# Patient Record
Sex: MALE | Race: WHITE | NOT HISPANIC OR LATINO | Employment: OTHER | URBAN - METROPOLITAN AREA
[De-identification: names, ages, dates, MRNs, and addresses within clinical notes are randomized per-mention and may not be internally consistent; named-entity substitution may affect disease eponyms.]

---

## 2017-01-05 ENCOUNTER — ANESTHESIA EVENT (OUTPATIENT)
Dept: PERIOP | Facility: HOSPITAL | Age: 72
End: 2017-01-05
Payer: MEDICARE

## 2017-01-06 ENCOUNTER — ANESTHESIA (OUTPATIENT)
Dept: PERIOP | Facility: HOSPITAL | Age: 72
End: 2017-01-06
Payer: MEDICARE

## 2017-01-06 ENCOUNTER — HOSPITAL ENCOUNTER (OUTPATIENT)
Facility: HOSPITAL | Age: 72
Setting detail: OUTPATIENT SURGERY
Discharge: HOME | End: 2017-01-06
Attending: THORACIC SURGERY (CARDIOTHORACIC VASCULAR SURGERY) | Admitting: THORACIC SURGERY (CARDIOTHORACIC VASCULAR SURGERY)
Payer: MEDICARE

## 2017-01-06 VITALS
OXYGEN SATURATION: 95 % | BODY MASS INDEX: 32.93 KG/M2 | HEIGHT: 70 IN | RESPIRATION RATE: 16 BRPM | DIASTOLIC BLOOD PRESSURE: 67 MMHG | SYSTOLIC BLOOD PRESSURE: 120 MMHG | WEIGHT: 230 LBS | HEART RATE: 76 BPM | TEMPERATURE: 98.6 F

## 2017-01-06 LAB
GLUCOSE SERPL-MCNC: 101 MG/DL (ref 65–140)
GLUCOSE SERPL-MCNC: 98 MG/DL (ref 65–140)

## 2017-01-06 PROCEDURE — 82948 REAGENT STRIP/BLOOD GLUCOSE: CPT

## 2017-01-06 RX ORDER — ALLOPURINOL 100 MG/1
100 TABLET ORAL DAILY
COMMUNITY

## 2017-01-06 RX ORDER — SUCCINYLCHOLINE CHLORIDE 20 MG/ML
INJECTION INTRAMUSCULAR; INTRAVENOUS AS NEEDED
Status: DISCONTINUED | OUTPATIENT
Start: 2017-01-06 | End: 2017-01-06 | Stop reason: SURG

## 2017-01-06 RX ORDER — DORZOLAMIDE HYDROCHLORIDE AND TIMOLOL MALEATE 20; 5 MG/ML; MG/ML
1 SOLUTION/ DROPS OPHTHALMIC 2 TIMES DAILY
Status: ON HOLD | COMMUNITY
End: 2018-02-14 | Stop reason: ALTCHOICE

## 2017-01-06 RX ORDER — FENTANYL CITRATE 50 UG/ML
INJECTION, SOLUTION INTRAMUSCULAR; INTRAVENOUS AS NEEDED
Status: DISCONTINUED | OUTPATIENT
Start: 2017-01-06 | End: 2017-01-06 | Stop reason: SURG

## 2017-01-06 RX ORDER — LIDOCAINE HYDROCHLORIDE 10 MG/ML
INJECTION, SOLUTION INFILTRATION; PERINEURAL AS NEEDED
Status: DISCONTINUED | OUTPATIENT
Start: 2017-01-06 | End: 2017-01-06 | Stop reason: SURG

## 2017-01-06 RX ORDER — SODIUM CHLORIDE, SODIUM LACTATE, POTASSIUM CHLORIDE, CALCIUM CHLORIDE 600; 310; 30; 20 MG/100ML; MG/100ML; MG/100ML; MG/100ML
100 INJECTION, SOLUTION INTRAVENOUS CONTINUOUS
Status: DISCONTINUED | OUTPATIENT
Start: 2017-01-06 | End: 2017-01-09 | Stop reason: HOSPADM

## 2017-01-06 RX ORDER — ONDANSETRON 4 MG/1
4 TABLET, ORALLY DISINTEGRATING ORAL EVERY 8 HOURS PRN
Status: ON HOLD | COMMUNITY
End: 2017-08-03 | Stop reason: ALTCHOICE

## 2017-01-06 RX ORDER — EPHEDRINE SULFATE 50 MG/ML
INJECTION, SOLUTION INTRAVENOUS AS NEEDED
Status: DISCONTINUED | OUTPATIENT
Start: 2017-01-06 | End: 2017-01-06 | Stop reason: SURG

## 2017-01-06 RX ORDER — FENTANYL CITRATE/PF 50 MCG/ML
25 SYRINGE (ML) INJECTION
Status: DISCONTINUED | OUTPATIENT
Start: 2017-01-06 | End: 2017-01-06 | Stop reason: HOSPADM

## 2017-01-06 RX ORDER — PROPOFOL 10 MG/ML
INJECTION, EMULSION INTRAVENOUS AS NEEDED
Status: DISCONTINUED | OUTPATIENT
Start: 2017-01-06 | End: 2017-01-06 | Stop reason: SURG

## 2017-01-06 RX ORDER — ALBUTEROL SULFATE 2.5 MG/3ML
2.5 SOLUTION RESPIRATORY (INHALATION) ONCE
Status: COMPLETED | OUTPATIENT
Start: 2017-01-06 | End: 2017-01-06

## 2017-01-06 RX ORDER — SERTRALINE HYDROCHLORIDE 25 MG/1
25 TABLET, FILM COATED ORAL
COMMUNITY

## 2017-01-06 RX ORDER — ONDANSETRON 2 MG/ML
INJECTION INTRAMUSCULAR; INTRAVENOUS AS NEEDED
Status: DISCONTINUED | OUTPATIENT
Start: 2017-01-06 | End: 2017-01-06 | Stop reason: SURG

## 2017-01-06 RX ORDER — MAGNESIUM HYDROXIDE 1200 MG/15ML
LIQUID ORAL AS NEEDED
Status: DISCONTINUED | OUTPATIENT
Start: 2017-01-06 | End: 2017-01-06 | Stop reason: HOSPADM

## 2017-01-06 RX ADMIN — SUCCINYLCHOLINE CHLORIDE 100 MG: 20 INJECTION, SOLUTION INTRAMUSCULAR; INTRAVENOUS at 14:32

## 2017-01-06 RX ADMIN — ALBUTEROL SULFATE 2.5 MG: 2.5 SOLUTION RESPIRATORY (INHALATION) at 16:10

## 2017-01-06 RX ADMIN — FENTANYL CITRATE 50 MCG: 50 INJECTION, SOLUTION INTRAMUSCULAR; INTRAVENOUS at 14:32

## 2017-01-06 RX ADMIN — PROPOFOL 150 MG: 10 INJECTION, EMULSION INTRAVENOUS at 14:32

## 2017-01-06 RX ADMIN — SODIUM CHLORIDE, SODIUM LACTATE, POTASSIUM CHLORIDE, AND CALCIUM CHLORIDE 100 ML/HR: .6; .31; .03; .02 INJECTION, SOLUTION INTRAVENOUS at 13:16

## 2017-01-06 RX ADMIN — EPHEDRINE SULFATE 10 MG: 50 INJECTION, SOLUTION INTRAMUSCULAR; INTRAVENOUS; SUBCUTANEOUS at 14:45

## 2017-01-06 RX ADMIN — ONDANSETRON 4 MG: 2 INJECTION INTRAMUSCULAR; INTRAVENOUS at 14:41

## 2017-01-06 RX ADMIN — LIDOCAINE HYDROCHLORIDE 80 MG: 10 INJECTION, SOLUTION INFILTRATION; PERINEURAL at 14:32

## 2017-02-07 ENCOUNTER — GENERIC CONVERSION - ENCOUNTER (OUTPATIENT)
Dept: OTHER | Facility: OTHER | Age: 72
End: 2017-02-07

## 2017-02-15 ENCOUNTER — GENERIC CONVERSION - ENCOUNTER (OUTPATIENT)
Dept: OTHER | Facility: OTHER | Age: 72
End: 2017-02-15

## 2017-03-09 ENCOUNTER — HOSPITAL ENCOUNTER (OUTPATIENT)
Dept: RADIOLOGY | Facility: HOSPITAL | Age: 72
Discharge: HOME | End: 2017-03-09
Attending: SURGERY
Payer: MEDICARE

## 2017-03-09 ENCOUNTER — ALLSCRIPTS OFFICE VISIT (OUTPATIENT)
Dept: OTHER | Facility: OTHER | Age: 72
End: 2017-03-09

## 2017-03-09 DIAGNOSIS — C15.9 MALIGNANT NEOPLASM OF ESOPHAGUS (HCC): ICD-10-CM

## 2017-03-09 PROCEDURE — 71260 CT THORAX DX C+: CPT

## 2017-03-09 PROCEDURE — 74177 CT ABD & PELVIS W/CONTRAST: CPT

## 2017-03-09 RX ADMIN — IOHEXOL 100 ML: 350 INJECTION, SOLUTION INTRAVENOUS at 15:07

## 2017-03-22 ENCOUNTER — ALLSCRIPTS OFFICE VISIT (OUTPATIENT)
Dept: OTHER | Facility: OTHER | Age: 72
End: 2017-03-22

## 2017-03-22 ENCOUNTER — TRANSCRIBE ORDERS (OUTPATIENT)
Dept: ADMINISTRATIVE | Facility: HOSPITAL | Age: 72
End: 2017-03-22

## 2017-03-22 DIAGNOSIS — C15.9 MALIGNANT NEOPLASM OF ESOPHAGUS, UNSPECIFIED LOCATION (HCC): Primary | ICD-10-CM

## 2017-03-24 ENCOUNTER — GENERIC CONVERSION - ENCOUNTER (OUTPATIENT)
Dept: OTHER | Facility: OTHER | Age: 72
End: 2017-03-24

## 2017-03-24 ENCOUNTER — HOSPITAL ENCOUNTER (OUTPATIENT)
Dept: INTERVENTIONAL RADIOLOGY/VASCULAR | Facility: HOSPITAL | Age: 72
End: 2017-03-24
Attending: SURGERY | Admitting: RADIOLOGY
Payer: MEDICARE

## 2017-03-24 ENCOUNTER — HOSPITAL ENCOUNTER (INPATIENT)
Facility: HOSPITAL | Age: 72
LOS: 8 days | Discharge: HOME HEALTH | DRG: 178 | End: 2017-04-01
Attending: THORACIC SURGERY (CARDIOTHORACIC VASCULAR SURGERY) | Admitting: THORACIC SURGERY (CARDIOTHORACIC VASCULAR SURGERY)
Payer: MEDICARE

## 2017-03-24 ENCOUNTER — HOSPITAL ENCOUNTER (OUTPATIENT)
Dept: CT IMAGING | Facility: HOSPITAL | Age: 72
Discharge: HOME | End: 2017-03-24
Payer: MEDICARE

## 2017-03-24 VITALS
OXYGEN SATURATION: 90 % | SYSTOLIC BLOOD PRESSURE: 113 MMHG | RESPIRATION RATE: 22 BRPM | DIASTOLIC BLOOD PRESSURE: 72 MMHG | HEART RATE: 68 BPM

## 2017-03-24 VITALS
DIASTOLIC BLOOD PRESSURE: 63 MMHG | OXYGEN SATURATION: 94 % | SYSTOLIC BLOOD PRESSURE: 129 MMHG | RESPIRATION RATE: 22 BRPM | HEART RATE: 66 BPM

## 2017-03-24 DIAGNOSIS — C15.9 ESOPHAGEAL CARCINOMA (HCC): Primary | ICD-10-CM

## 2017-03-24 DIAGNOSIS — J91.0 MALIGNANT PLEURAL EFFUSION: ICD-10-CM

## 2017-03-24 DIAGNOSIS — J86.9 EMPYEMA LUNG (HCC): ICD-10-CM

## 2017-03-24 DIAGNOSIS — H53.9 VISION ABNORMALITIES: Primary | ICD-10-CM

## 2017-03-24 LAB
GLUCOSE FLD-MCNC: 12 MG/DL
GLUCOSE SERPL-MCNC: 163 MG/DL (ref 65–140)
GLUCOSE SERPL-MCNC: 193 MG/DL (ref 65–140)
INR PPP: 1.98 (ref 0.86–1.16)
LDH FLD L TO P-CCNC: NORMAL U/L
PH BODY FLUID: 6.7
PLATELET # BLD AUTO: 251 THOUSANDS/UL (ref 149–390)
PMV BLD AUTO: 10.6 FL (ref 8.9–12.7)
PROT FLD-MCNC: 3.3 G/DL
PROTHROMBIN TIME: 22 SECONDS (ref 12–14.3)

## 2017-03-24 PROCEDURE — 88305 TISSUE EXAM BY PATHOLOGIST: CPT | Performed by: SURGERY

## 2017-03-24 PROCEDURE — 82945 GLUCOSE OTHER FLUID: CPT | Performed by: SURGERY

## 2017-03-24 PROCEDURE — 94640 AIRWAY INHALATION TREATMENT: CPT

## 2017-03-24 PROCEDURE — 94760 N-INVAS EAR/PLS OXIMETRY 1: CPT

## 2017-03-24 PROCEDURE — 82948 REAGENT STRIP/BLOOD GLUCOSE: CPT

## 2017-03-24 PROCEDURE — 85049 AUTOMATED PLATELET COUNT: CPT | Performed by: RADIOLOGY

## 2017-03-24 PROCEDURE — 85610 PROTHROMBIN TIME: CPT | Performed by: RADIOLOGY

## 2017-03-24 PROCEDURE — C1769 GUIDE WIRE: HCPCS

## 2017-03-24 PROCEDURE — 88112 CYTOPATH CELL ENHANCE TECH: CPT | Performed by: SURGERY

## 2017-03-24 PROCEDURE — 87077 CULTURE AEROBIC IDENTIFY: CPT | Performed by: SURGERY

## 2017-03-24 PROCEDURE — 83615 LACTATE (LD) (LDH) ENZYME: CPT | Performed by: SURGERY

## 2017-03-24 PROCEDURE — 87205 SMEAR GRAM STAIN: CPT | Performed by: SURGERY

## 2017-03-24 PROCEDURE — 88312 SPECIAL STAINS GROUP 1: CPT | Performed by: SURGERY

## 2017-03-24 PROCEDURE — 87070 CULTURE OTHR SPECIMN AEROBIC: CPT | Performed by: SURGERY

## 2017-03-24 PROCEDURE — C1729 CATH, DRAINAGE: HCPCS

## 2017-03-24 PROCEDURE — 32557 INSERT CATH PLEURA W/ IMAGE: CPT

## 2017-03-24 PROCEDURE — 84157 ASSAY OF PROTEIN OTHER: CPT | Performed by: SURGERY

## 2017-03-24 PROCEDURE — 83986 ASSAY PH BODY FLUID NOS: CPT | Performed by: SURGERY

## 2017-03-24 RX ORDER — LIDOCAINE HYDROCHLORIDE 10 MG/ML
INJECTION, SOLUTION INFILTRATION; PERINEURAL CODE/TRAUMA/SEDATION MEDICATION
Status: COMPLETED | OUTPATIENT
Start: 2017-03-24 | End: 2017-03-24

## 2017-03-24 RX ORDER — HYDROXYZINE HYDROCHLORIDE 25 MG/1
25 TABLET, FILM COATED ORAL 2 TIMES DAILY PRN
Status: DISCONTINUED | OUTPATIENT
Start: 2017-03-24 | End: 2017-04-01 | Stop reason: HOSPADM

## 2017-03-24 RX ORDER — ROPINIROLE 1 MG/1
2 TABLET, FILM COATED ORAL ONCE
Status: COMPLETED | OUTPATIENT
Start: 2017-03-24 | End: 2017-03-24

## 2017-03-24 RX ORDER — LEVALBUTEROL 1.25 MG/.5ML
1.25 SOLUTION, CONCENTRATE RESPIRATORY (INHALATION)
Status: DISCONTINUED | OUTPATIENT
Start: 2017-03-24 | End: 2017-04-01 | Stop reason: HOSPADM

## 2017-03-24 RX ORDER — ASPIRIN 81 MG/1
81 TABLET ORAL DAILY
Status: DISCONTINUED | OUTPATIENT
Start: 2017-03-25 | End: 2017-04-01 | Stop reason: HOSPADM

## 2017-03-24 RX ORDER — LATANOPROST 50 UG/ML
1 SOLUTION/ DROPS OPHTHALMIC
Status: DISCONTINUED | OUTPATIENT
Start: 2017-03-24 | End: 2017-04-01 | Stop reason: HOSPADM

## 2017-03-24 RX ORDER — BRIMONIDINE TARTRATE 0.15 %
1 DROPS OPHTHALMIC (EYE) EVERY 12 HOURS
Status: DISCONTINUED | OUTPATIENT
Start: 2017-03-24 | End: 2017-04-01 | Stop reason: HOSPADM

## 2017-03-24 RX ORDER — SERTRALINE HYDROCHLORIDE 25 MG/1
25 TABLET, FILM COATED ORAL DAILY
Status: DISCONTINUED | OUTPATIENT
Start: 2017-03-25 | End: 2017-04-01 | Stop reason: HOSPADM

## 2017-03-24 RX ORDER — ONDANSETRON 2 MG/ML
4 INJECTION INTRAMUSCULAR; INTRAVENOUS EVERY 6 HOURS PRN
Status: DISCONTINUED | OUTPATIENT
Start: 2017-03-24 | End: 2017-04-01 | Stop reason: HOSPADM

## 2017-03-24 RX ORDER — AMIODARONE HYDROCHLORIDE 200 MG/1
200 TABLET ORAL DAILY
Status: DISCONTINUED | OUTPATIENT
Start: 2017-03-25 | End: 2017-03-24

## 2017-03-24 RX ORDER — SODIUM CHLORIDE FOR INHALATION 0.9 %
3 VIAL, NEBULIZER (ML) INHALATION EVERY 6 HOURS PRN
Status: DISCONTINUED | OUTPATIENT
Start: 2017-03-24 | End: 2017-04-01 | Stop reason: HOSPADM

## 2017-03-24 RX ORDER — DIAZEPAM 5 MG/1
5 TABLET ORAL EVERY 6 HOURS PRN
Status: DISCONTINUED | OUTPATIENT
Start: 2017-03-24 | End: 2017-04-01 | Stop reason: HOSPADM

## 2017-03-24 RX ORDER — GUAIFENESIN AND CODEINE PHOSPHATE 100; 10 MG/5ML; MG/5ML
5 SOLUTION ORAL 4 TIMES DAILY PRN
COMMUNITY
End: 2017-05-19 | Stop reason: ALTCHOICE

## 2017-03-24 RX ORDER — ACETAMINOPHEN 325 MG/1
650 TABLET ORAL EVERY 6 HOURS PRN
Status: DISCONTINUED | OUTPATIENT
Start: 2017-03-24 | End: 2017-04-01 | Stop reason: HOSPADM

## 2017-03-24 RX ORDER — ATORVASTATIN CALCIUM 80 MG/1
80 TABLET, FILM COATED ORAL
Status: DISCONTINUED | OUTPATIENT
Start: 2017-03-24 | End: 2017-03-24

## 2017-03-24 RX ORDER — ALPRAZOLAM 0.5 MG/1
0.5 TABLET ORAL
Status: DISCONTINUED | OUTPATIENT
Start: 2017-03-24 | End: 2017-04-01 | Stop reason: HOSPADM

## 2017-03-24 RX ORDER — LEVALBUTEROL 1.25 MG/.5ML
1.25 SOLUTION, CONCENTRATE RESPIRATORY (INHALATION) EVERY 6 HOURS PRN
Status: DISCONTINUED | OUTPATIENT
Start: 2017-03-24 | End: 2017-04-01 | Stop reason: HOSPADM

## 2017-03-24 RX ORDER — DOCUSATE SODIUM 100 MG/1
100 CAPSULE, LIQUID FILLED ORAL 2 TIMES DAILY
Status: DISCONTINUED | OUTPATIENT
Start: 2017-03-24 | End: 2017-04-01 | Stop reason: HOSPADM

## 2017-03-24 RX ORDER — OXYCODONE HYDROCHLORIDE 10 MG/1
10 TABLET ORAL EVERY 4 HOURS PRN
Status: DISCONTINUED | OUTPATIENT
Start: 2017-03-24 | End: 2017-04-01 | Stop reason: HOSPADM

## 2017-03-24 RX ORDER — FENTANYL CITRATE 50 UG/ML
INJECTION, SOLUTION INTRAMUSCULAR; INTRAVENOUS CODE/TRAUMA/SEDATION MEDICATION
Status: COMPLETED | OUTPATIENT
Start: 2017-03-24 | End: 2017-03-24

## 2017-03-24 RX ORDER — ALBUTEROL SULFATE 2.5 MG/3ML
2.5 SOLUTION RESPIRATORY (INHALATION) EVERY 4 HOURS PRN
Status: DISCONTINUED | OUTPATIENT
Start: 2017-03-24 | End: 2017-03-24

## 2017-03-24 RX ORDER — OXYCODONE HYDROCHLORIDE 5 MG/1
5 TABLET ORAL EVERY 4 HOURS PRN
Status: DISCONTINUED | OUTPATIENT
Start: 2017-03-24 | End: 2017-04-01 | Stop reason: HOSPADM

## 2017-03-24 RX ORDER — DORZOLAMIDE HYDROCHLORIDE AND TIMOLOL MALEATE 20; 5 MG/ML; MG/ML
1 SOLUTION/ DROPS OPHTHALMIC EVERY 12 HOURS
Status: DISCONTINUED | OUTPATIENT
Start: 2017-03-24 | End: 2017-04-01 | Stop reason: HOSPADM

## 2017-03-24 RX ORDER — ROPINIROLE 1 MG/1
1 TABLET, FILM COATED ORAL
Status: DISCONTINUED | OUTPATIENT
Start: 2017-03-24 | End: 2017-03-24

## 2017-03-24 RX ORDER — ALLOPURINOL 100 MG/1
100 TABLET ORAL DAILY
Status: DISCONTINUED | OUTPATIENT
Start: 2017-03-25 | End: 2017-04-01 | Stop reason: HOSPADM

## 2017-03-24 RX ORDER — ROPINIROLE 1 MG/1
3 TABLET, FILM COATED ORAL
Status: DISCONTINUED | OUTPATIENT
Start: 2017-03-25 | End: 2017-04-01 | Stop reason: HOSPADM

## 2017-03-24 RX ORDER — SODIUM CHLORIDE FOR INHALATION 0.9 %
3 VIAL, NEBULIZER (ML) INHALATION
Status: DISCONTINUED | OUTPATIENT
Start: 2017-03-24 | End: 2017-04-01 | Stop reason: HOSPADM

## 2017-03-24 RX ADMIN — HYDROXYZINE HYDROCHLORIDE 25 MG: 25 TABLET, FILM COATED ORAL at 22:29

## 2017-03-24 RX ADMIN — LIDOCAINE HYDROCHLORIDE 5 ML: 10 INJECTION, SOLUTION INFILTRATION; PERINEURAL at 12:39

## 2017-03-24 RX ADMIN — APIXABAN 5 MG: 5 TABLET, FILM COATED ORAL at 20:11

## 2017-03-24 RX ADMIN — BRIMONIDINE TARTRATE 1 DROP: 1.5 SOLUTION OPHTHALMIC at 22:13

## 2017-03-24 RX ADMIN — INSULIN LISPRO 1 UNITS: 100 INJECTION, SOLUTION INTRAVENOUS; SUBCUTANEOUS at 18:33

## 2017-03-24 RX ADMIN — ISODIUM CHLORIDE 3 ML: 0.03 SOLUTION RESPIRATORY (INHALATION) at 20:03

## 2017-03-24 RX ADMIN — OXYCODONE HYDROCHLORIDE 10 MG: 10 TABLET ORAL at 20:10

## 2017-03-24 RX ADMIN — LIDOCAINE HYDROCHLORIDE 5 ML: 10 INJECTION, SOLUTION INFILTRATION; PERINEURAL at 12:06

## 2017-03-24 RX ADMIN — LEVALBUTEROL HYDROCHLORIDE 1.25 MG: 1.25 SOLUTION, CONCENTRATE RESPIRATORY (INHALATION) at 20:03

## 2017-03-24 RX ADMIN — FENTANYL CITRATE 25 MCG: 50 INJECTION, SOLUTION INTRAMUSCULAR; INTRAVENOUS at 13:29

## 2017-03-24 RX ADMIN — DORZOLAMIDE HYDROCHLORIDE AND TIMOLOL MALEATE 1 DROP: 20; 5 SOLUTION/ DROPS OPHTHALMIC at 22:12

## 2017-03-24 RX ADMIN — DOCUSATE SODIUM 100 MG: 100 CAPSULE, LIQUID FILLED ORAL at 17:27

## 2017-03-24 RX ADMIN — ROPINIROLE 1 MG: 1 TABLET, FILM COATED ORAL at 22:05

## 2017-03-24 RX ADMIN — FENTANYL CITRATE 25 MCG: 50 INJECTION, SOLUTION INTRAMUSCULAR; INTRAVENOUS at 13:15

## 2017-03-24 RX ADMIN — INSULIN LISPRO 1 UNITS: 100 INJECTION, SOLUTION INTRAVENOUS; SUBCUTANEOUS at 22:14

## 2017-03-24 RX ADMIN — LATANOPROST 1 DROP: 50 SOLUTION OPHTHALMIC at 22:05

## 2017-03-24 RX ADMIN — ROPINIROLE 2 MG: 1 TABLET, FILM COATED ORAL at 23:11

## 2017-03-25 ENCOUNTER — APPOINTMENT (INPATIENT)
Dept: RADIOLOGY | Facility: HOSPITAL | Age: 72
DRG: 178 | End: 2017-03-25
Payer: MEDICARE

## 2017-03-25 PROBLEM — J86.9 EMPYEMA (HCC): Status: ACTIVE | Noted: 2017-03-25

## 2017-03-25 LAB
ANION GAP SERPL CALCULATED.3IONS-SCNC: 5 MMOL/L (ref 4–13)
BASOPHILS # BLD AUTO: 0.01 THOUSANDS/ΜL (ref 0–0.1)
BASOPHILS NFR BLD AUTO: 0 % (ref 0–1)
BUN SERPL-MCNC: 11 MG/DL (ref 5–25)
CALCIUM SERPL-MCNC: 8.6 MG/DL (ref 8.3–10.1)
CHLORIDE SERPL-SCNC: 100 MMOL/L (ref 100–108)
CO2 SERPL-SCNC: 33 MMOL/L (ref 21–32)
CREAT SERPL-MCNC: 0.74 MG/DL (ref 0.6–1.3)
EOSINOPHIL # BLD AUTO: 0.2 THOUSAND/ΜL (ref 0–0.61)
EOSINOPHIL NFR BLD AUTO: 2 % (ref 0–6)
ERYTHROCYTE [DISTWIDTH] IN BLOOD BY AUTOMATED COUNT: 21.8 % (ref 11.6–15.1)
GFR SERPL CREATININE-BSD FRML MDRD: >60 ML/MIN/1.73SQ M
GLUCOSE SERPL-MCNC: 169 MG/DL (ref 65–140)
GLUCOSE SERPL-MCNC: 185 MG/DL (ref 65–140)
GLUCOSE SERPL-MCNC: 217 MG/DL (ref 65–140)
GLUCOSE SERPL-MCNC: 223 MG/DL (ref 65–140)
GLUCOSE SERPL-MCNC: 271 MG/DL (ref 65–140)
HCT VFR BLD AUTO: 34.3 % (ref 36.5–49.3)
HGB BLD-MCNC: 9.9 G/DL (ref 12–17)
LYMPHOCYTES # BLD AUTO: 0.95 THOUSANDS/ΜL (ref 0.6–4.47)
LYMPHOCYTES NFR BLD AUTO: 8 % (ref 14–44)
MAGNESIUM SERPL-MCNC: 1.6 MG/DL (ref 1.6–2.6)
MCH RBC QN AUTO: 22.6 PG (ref 26.8–34.3)
MCHC RBC AUTO-ENTMCNC: 28.9 G/DL (ref 31.4–37.4)
MCV RBC AUTO: 78 FL (ref 82–98)
MONOCYTES # BLD AUTO: 0.68 THOUSAND/ΜL (ref 0.17–1.22)
MONOCYTES NFR BLD AUTO: 6 % (ref 4–12)
NEUTROPHILS # BLD AUTO: 9.88 THOUSANDS/ΜL (ref 1.85–7.62)
NEUTS SEG NFR BLD AUTO: 84 % (ref 43–75)
NRBC BLD AUTO-RTO: 0 /100 WBCS
PLATELET # BLD AUTO: 226 THOUSANDS/UL (ref 149–390)
PMV BLD AUTO: 10.3 FL (ref 8.9–12.7)
POTASSIUM SERPL-SCNC: 3.9 MMOL/L (ref 3.5–5.3)
RBC # BLD AUTO: 4.38 MILLION/UL (ref 3.88–5.62)
SODIUM SERPL-SCNC: 138 MMOL/L (ref 136–145)
WBC # BLD AUTO: 11.75 THOUSAND/UL (ref 4.31–10.16)

## 2017-03-25 PROCEDURE — 71020 HB CHEST X-RAY 2VW FRONTAL&LATL: CPT

## 2017-03-25 PROCEDURE — 94640 AIRWAY INHALATION TREATMENT: CPT

## 2017-03-25 PROCEDURE — 85025 COMPLETE CBC W/AUTO DIFF WBC: CPT | Performed by: SURGERY

## 2017-03-25 PROCEDURE — 94760 N-INVAS EAR/PLS OXIMETRY 1: CPT

## 2017-03-25 PROCEDURE — 82948 REAGENT STRIP/BLOOD GLUCOSE: CPT

## 2017-03-25 PROCEDURE — 83735 ASSAY OF MAGNESIUM: CPT | Performed by: SURGERY

## 2017-03-25 PROCEDURE — 80048 BASIC METABOLIC PNL TOTAL CA: CPT | Performed by: SURGERY

## 2017-03-25 PROCEDURE — 3E0F3GC INTRODUCTION OF OTHER THERAPEUTIC SUBSTANCE INTO RESPIRATORY TRACT, PERCUTANEOUS APPROACH: ICD-10-PCS | Performed by: THORACIC SURGERY (CARDIOTHORACIC VASCULAR SURGERY)

## 2017-03-25 RX ORDER — AMIODARONE HYDROCHLORIDE 200 MG/1
200 TABLET ORAL
Status: DISCONTINUED | OUTPATIENT
Start: 2017-03-25 | End: 2017-04-01 | Stop reason: HOSPADM

## 2017-03-25 RX ORDER — VANCOMYCIN HYDROCHLORIDE 1 G/200ML
12.5 INJECTION, SOLUTION INTRAVENOUS EVERY 12 HOURS
Status: DISCONTINUED | OUTPATIENT
Start: 2017-03-25 | End: 2017-03-27

## 2017-03-25 RX ADMIN — INSULIN LISPRO 2 UNITS: 100 INJECTION, SOLUTION INTRAVENOUS; SUBCUTANEOUS at 22:53

## 2017-03-25 RX ADMIN — LATANOPROST 1 DROP: 50 SOLUTION OPHTHALMIC at 22:53

## 2017-03-25 RX ADMIN — Medication 300 UNITS: at 09:28

## 2017-03-25 RX ADMIN — BRIMONIDINE TARTRATE 1 DROP: 1.5 SOLUTION OPHTHALMIC at 20:41

## 2017-03-25 RX ADMIN — DORZOLAMIDE HYDROCHLORIDE AND TIMOLOL MALEATE 1 DROP: 20; 5 SOLUTION/ DROPS OPHTHALMIC at 08:44

## 2017-03-25 RX ADMIN — INSULIN LISPRO 1 UNITS: 100 INJECTION, SOLUTION INTRAVENOUS; SUBCUTANEOUS at 07:23

## 2017-03-25 RX ADMIN — ISODIUM CHLORIDE 3 ML: 0.03 SOLUTION RESPIRATORY (INHALATION) at 14:14

## 2017-03-25 RX ADMIN — ALTEPLASE 8 MG: 2.2 INJECTION, POWDER, LYOPHILIZED, FOR SOLUTION INTRAVENOUS at 10:11

## 2017-03-25 RX ADMIN — DOCUSATE SODIUM 100 MG: 100 CAPSULE, LIQUID FILLED ORAL at 08:26

## 2017-03-25 RX ADMIN — DORZOLAMIDE HYDROCHLORIDE AND TIMOLOL MALEATE 1 DROP: 20; 5 SOLUTION/ DROPS OPHTHALMIC at 20:41

## 2017-03-25 RX ADMIN — METOPROLOL TARTRATE 5 MG: 5 INJECTION INTRAVENOUS at 08:28

## 2017-03-25 RX ADMIN — Medication 300 UNITS: at 10:20

## 2017-03-25 RX ADMIN — HYDROXYZINE HYDROCHLORIDE 25 MG: 25 TABLET, FILM COATED ORAL at 15:46

## 2017-03-25 RX ADMIN — APIXABAN 5 MG: 5 TABLET, FILM COATED ORAL at 20:41

## 2017-03-25 RX ADMIN — SERTRALINE 25 MG: 25 TABLET, FILM COATED ORAL at 08:26

## 2017-03-25 RX ADMIN — LEVALBUTEROL HYDROCHLORIDE 1.25 MG: 1.25 SOLUTION, CONCENTRATE RESPIRATORY (INHALATION) at 20:14

## 2017-03-25 RX ADMIN — ROPINIROLE 3 MG: 1 TABLET, FILM COATED ORAL at 22:53

## 2017-03-25 RX ADMIN — OXYCODONE HYDROCHLORIDE 10 MG: 10 TABLET ORAL at 03:08

## 2017-03-25 RX ADMIN — ASPIRIN 81 MG: 81 TABLET, COATED ORAL at 08:26

## 2017-03-25 RX ADMIN — DOCUSATE SODIUM 100 MG: 100 CAPSULE, LIQUID FILLED ORAL at 20:41

## 2017-03-25 RX ADMIN — OXYCODONE HYDROCHLORIDE 10 MG: 10 TABLET ORAL at 22:55

## 2017-03-25 RX ADMIN — VANCOMYCIN HYDROCHLORIDE 1000 MG: 1 INJECTION, SOLUTION INTRAVENOUS at 13:20

## 2017-03-25 RX ADMIN — ACETAMINOPHEN 650 MG: 325 TABLET, FILM COATED ORAL at 12:33

## 2017-03-25 RX ADMIN — ISODIUM CHLORIDE 3 ML: 0.03 SOLUTION RESPIRATORY (INHALATION) at 20:14

## 2017-03-25 RX ADMIN — INSULIN LISPRO 3 UNITS: 100 INJECTION, SOLUTION INTRAVENOUS; SUBCUTANEOUS at 16:25

## 2017-03-25 RX ADMIN — BRIMONIDINE TARTRATE 1 DROP: 1.5 SOLUTION OPHTHALMIC at 08:44

## 2017-03-25 RX ADMIN — APIXABAN 5 MG: 5 TABLET, FILM COATED ORAL at 08:26

## 2017-03-25 RX ADMIN — LEVALBUTEROL HYDROCHLORIDE 1.25 MG: 1.25 SOLUTION, CONCENTRATE RESPIRATORY (INHALATION) at 14:14

## 2017-03-25 RX ADMIN — AMIODARONE HYDROCHLORIDE 200 MG: 200 TABLET ORAL at 08:47

## 2017-03-25 RX ADMIN — INSULIN LISPRO 2 UNITS: 100 INJECTION, SOLUTION INTRAVENOUS; SUBCUTANEOUS at 11:56

## 2017-03-25 RX ADMIN — ALLOPURINOL 100 MG: 100 TABLET ORAL at 08:26

## 2017-03-26 ENCOUNTER — APPOINTMENT (INPATIENT)
Dept: RADIOLOGY | Facility: HOSPITAL | Age: 72
DRG: 178 | End: 2017-03-26
Payer: MEDICARE

## 2017-03-26 LAB
ANION GAP SERPL CALCULATED.3IONS-SCNC: 6 MMOL/L (ref 4–13)
BASOPHILS # BLD AUTO: 0.01 THOUSANDS/ΜL (ref 0–0.1)
BASOPHILS NFR BLD AUTO: 0 % (ref 0–1)
BUN SERPL-MCNC: 11 MG/DL (ref 5–25)
CALCIUM SERPL-MCNC: 8.7 MG/DL (ref 8.3–10.1)
CHLORIDE SERPL-SCNC: 98 MMOL/L (ref 100–108)
CO2 SERPL-SCNC: 32 MMOL/L (ref 21–32)
CREAT SERPL-MCNC: 0.7 MG/DL (ref 0.6–1.3)
EOSINOPHIL # BLD AUTO: 0.18 THOUSAND/ΜL (ref 0–0.61)
EOSINOPHIL NFR BLD AUTO: 1 % (ref 0–6)
ERYTHROCYTE [DISTWIDTH] IN BLOOD BY AUTOMATED COUNT: 21.5 % (ref 11.6–15.1)
GFR SERPL CREATININE-BSD FRML MDRD: >60 ML/MIN/1.73SQ M
GLUCOSE SERPL-MCNC: 150 MG/DL (ref 65–140)
GLUCOSE SERPL-MCNC: 186 MG/DL (ref 65–140)
GLUCOSE SERPL-MCNC: 187 MG/DL (ref 65–140)
GLUCOSE SERPL-MCNC: 224 MG/DL (ref 65–140)
GLUCOSE SERPL-MCNC: 252 MG/DL (ref 65–140)
HCT VFR BLD AUTO: 31 % (ref 36.5–49.3)
HGB BLD-MCNC: 9.1 G/DL (ref 12–17)
LYMPHOCYTES # BLD AUTO: 1.09 THOUSANDS/ΜL (ref 0.6–4.47)
LYMPHOCYTES NFR BLD AUTO: 8 % (ref 14–44)
MCH RBC QN AUTO: 22.7 PG (ref 26.8–34.3)
MCHC RBC AUTO-ENTMCNC: 29.4 G/DL (ref 31.4–37.4)
MCV RBC AUTO: 77 FL (ref 82–98)
MONOCYTES # BLD AUTO: 0.82 THOUSAND/ΜL (ref 0.17–1.22)
MONOCYTES NFR BLD AUTO: 6 % (ref 4–12)
NEUTROPHILS # BLD AUTO: 10.99 THOUSANDS/ΜL (ref 1.85–7.62)
NEUTS SEG NFR BLD AUTO: 85 % (ref 43–75)
NRBC BLD AUTO-RTO: 0 /100 WBCS
PLATELET # BLD AUTO: 229 THOUSANDS/UL (ref 149–390)
PMV BLD AUTO: 11 FL (ref 8.9–12.7)
POTASSIUM SERPL-SCNC: 3.7 MMOL/L (ref 3.5–5.3)
RBC # BLD AUTO: 4.01 MILLION/UL (ref 3.88–5.62)
SODIUM SERPL-SCNC: 136 MMOL/L (ref 136–145)
WBC # BLD AUTO: 13.12 THOUSAND/UL (ref 4.31–10.16)

## 2017-03-26 PROCEDURE — 94640 AIRWAY INHALATION TREATMENT: CPT

## 2017-03-26 PROCEDURE — 82948 REAGENT STRIP/BLOOD GLUCOSE: CPT

## 2017-03-26 PROCEDURE — 80048 BASIC METABOLIC PNL TOTAL CA: CPT | Performed by: PHYSICIAN ASSISTANT

## 2017-03-26 PROCEDURE — 94668 MNPJ CHEST WALL SBSQ: CPT

## 2017-03-26 PROCEDURE — 85025 COMPLETE CBC W/AUTO DIFF WBC: CPT | Performed by: PHYSICIAN ASSISTANT

## 2017-03-26 PROCEDURE — 94760 N-INVAS EAR/PLS OXIMETRY 1: CPT

## 2017-03-26 PROCEDURE — 71020 HB CHEST X-RAY 2VW FRONTAL&LATL: CPT

## 2017-03-26 RX ORDER — POTASSIUM CHLORIDE 20 MEQ/1
40 TABLET, EXTENDED RELEASE ORAL ONCE
Status: COMPLETED | OUTPATIENT
Start: 2017-03-26 | End: 2017-03-26

## 2017-03-26 RX ADMIN — ISODIUM CHLORIDE 3 ML: 0.03 SOLUTION RESPIRATORY (INHALATION) at 13:51

## 2017-03-26 RX ADMIN — VANCOMYCIN HYDROCHLORIDE 1000 MG: 1 INJECTION, SOLUTION INTRAVENOUS at 11:45

## 2017-03-26 RX ADMIN — BRIMONIDINE TARTRATE 1 DROP: 1.5 SOLUTION OPHTHALMIC at 08:04

## 2017-03-26 RX ADMIN — BISACODYL 5 MG: 5 TABLET, COATED ORAL at 17:17

## 2017-03-26 RX ADMIN — ASPIRIN 81 MG: 81 TABLET, COATED ORAL at 08:04

## 2017-03-26 RX ADMIN — ACETAMINOPHEN 650 MG: 325 TABLET, FILM COATED ORAL at 15:18

## 2017-03-26 RX ADMIN — BRIMONIDINE TARTRATE 1 DROP: 1.5 SOLUTION OPHTHALMIC at 20:56

## 2017-03-26 RX ADMIN — INSULIN LISPRO 1 UNITS: 100 INJECTION, SOLUTION INTRAVENOUS; SUBCUTANEOUS at 11:53

## 2017-03-26 RX ADMIN — LATANOPROST 1 DROP: 50 SOLUTION OPHTHALMIC at 21:07

## 2017-03-26 RX ADMIN — INSULIN LISPRO 3 UNITS: 100 INJECTION, SOLUTION INTRAVENOUS; SUBCUTANEOUS at 15:18

## 2017-03-26 RX ADMIN — ROPINIROLE 3 MG: 1 TABLET, FILM COATED ORAL at 21:07

## 2017-03-26 RX ADMIN — DOCUSATE SODIUM 100 MG: 100 CAPSULE, LIQUID FILLED ORAL at 08:04

## 2017-03-26 RX ADMIN — ALLOPURINOL 100 MG: 100 TABLET ORAL at 08:04

## 2017-03-26 RX ADMIN — AMIODARONE HYDROCHLORIDE 200 MG: 200 TABLET ORAL at 08:04

## 2017-03-26 RX ADMIN — ISODIUM CHLORIDE 3 ML: 0.03 SOLUTION RESPIRATORY (INHALATION) at 07:39

## 2017-03-26 RX ADMIN — LEVALBUTEROL HYDROCHLORIDE 1.25 MG: 1.25 SOLUTION, CONCENTRATE RESPIRATORY (INHALATION) at 13:50

## 2017-03-26 RX ADMIN — ISODIUM CHLORIDE 3 ML: 0.03 SOLUTION RESPIRATORY (INHALATION) at 19:27

## 2017-03-26 RX ADMIN — HYDROXYZINE HYDROCHLORIDE 25 MG: 25 TABLET, FILM COATED ORAL at 11:52

## 2017-03-26 RX ADMIN — LEVALBUTEROL HYDROCHLORIDE 1.25 MG: 1.25 SOLUTION, CONCENTRATE RESPIRATORY (INHALATION) at 19:27

## 2017-03-26 RX ADMIN — Medication 300 UNITS: at 06:49

## 2017-03-26 RX ADMIN — DORZOLAMIDE HYDROCHLORIDE AND TIMOLOL MALEATE 1 DROP: 20; 5 SOLUTION/ DROPS OPHTHALMIC at 08:04

## 2017-03-26 RX ADMIN — APIXABAN 5 MG: 5 TABLET, FILM COATED ORAL at 20:56

## 2017-03-26 RX ADMIN — VANCOMYCIN HYDROCHLORIDE 1000 MG: 1 INJECTION, SOLUTION INTRAVENOUS at 23:53

## 2017-03-26 RX ADMIN — LEVALBUTEROL HYDROCHLORIDE 1.25 MG: 1.25 SOLUTION, CONCENTRATE RESPIRATORY (INHALATION) at 07:39

## 2017-03-26 RX ADMIN — APIXABAN 5 MG: 5 TABLET, FILM COATED ORAL at 08:04

## 2017-03-26 RX ADMIN — INSULIN LISPRO 1 UNITS: 100 INJECTION, SOLUTION INTRAVENOUS; SUBCUTANEOUS at 06:46

## 2017-03-26 RX ADMIN — SERTRALINE 25 MG: 25 TABLET, FILM COATED ORAL at 08:04

## 2017-03-26 RX ADMIN — POTASSIUM CHLORIDE 40 MEQ: 1500 TABLET, EXTENDED RELEASE ORAL at 08:54

## 2017-03-26 RX ADMIN — INSULIN LISPRO 2 UNITS: 100 INJECTION, SOLUTION INTRAVENOUS; SUBCUTANEOUS at 21:15

## 2017-03-26 RX ADMIN — OXYCODONE HYDROCHLORIDE 10 MG: 10 TABLET ORAL at 22:51

## 2017-03-26 RX ADMIN — DORZOLAMIDE HYDROCHLORIDE AND TIMOLOL MALEATE 1 DROP: 20; 5 SOLUTION/ DROPS OPHTHALMIC at 20:56

## 2017-03-26 RX ADMIN — VANCOMYCIN HYDROCHLORIDE 1000 MG: 1 INJECTION, SOLUTION INTRAVENOUS at 00:03

## 2017-03-27 ENCOUNTER — APPOINTMENT (INPATIENT)
Dept: RADIOLOGY | Facility: HOSPITAL | Age: 72
DRG: 178 | End: 2017-03-27
Payer: MEDICARE

## 2017-03-27 ENCOUNTER — APPOINTMENT (INPATIENT)
Dept: RADIOLOGY | Facility: HOSPITAL | Age: 72
DRG: 178 | End: 2017-03-27
Attending: THORACIC SURGERY (CARDIOTHORACIC VASCULAR SURGERY)
Payer: MEDICARE

## 2017-03-27 ENCOUNTER — GENERIC CONVERSION - ENCOUNTER (OUTPATIENT)
Dept: OTHER | Facility: OTHER | Age: 72
End: 2017-03-27

## 2017-03-27 LAB
ANION GAP SERPL CALCULATED.3IONS-SCNC: 7 MMOL/L (ref 4–13)
ATRIAL RATE: 64 BPM
BASOPHILS # BLD AUTO: 0.01 THOUSANDS/ΜL (ref 0–0.1)
BASOPHILS NFR BLD AUTO: 0 % (ref 0–1)
BUN SERPL-MCNC: 11 MG/DL (ref 5–25)
CALCIUM SERPL-MCNC: 8.5 MG/DL (ref 8.3–10.1)
CHLORIDE SERPL-SCNC: 99 MMOL/L (ref 100–108)
CO2 SERPL-SCNC: 32 MMOL/L (ref 21–32)
CREAT SERPL-MCNC: 0.66 MG/DL (ref 0.6–1.3)
EOSINOPHIL # BLD AUTO: 0.18 THOUSAND/ΜL (ref 0–0.61)
EOSINOPHIL NFR BLD AUTO: 2 % (ref 0–6)
ERYTHROCYTE [DISTWIDTH] IN BLOOD BY AUTOMATED COUNT: 21.6 % (ref 11.6–15.1)
GFR SERPL CREATININE-BSD FRML MDRD: >60 ML/MIN/1.73SQ M
GLUCOSE SERPL-MCNC: 145 MG/DL (ref 65–140)
GLUCOSE SERPL-MCNC: 167 MG/DL (ref 65–140)
GLUCOSE SERPL-MCNC: 175 MG/DL (ref 65–140)
GLUCOSE SERPL-MCNC: 185 MG/DL (ref 65–140)
GLUCOSE SERPL-MCNC: 259 MG/DL (ref 65–140)
HCT VFR BLD AUTO: 28.9 % (ref 36.5–49.3)
HGB BLD-MCNC: 8.5 G/DL (ref 12–17)
LYMPHOCYTES # BLD AUTO: 0.95 THOUSANDS/ΜL (ref 0.6–4.47)
LYMPHOCYTES NFR BLD AUTO: 10 % (ref 14–44)
MAGNESIUM SERPL-MCNC: 1.6 MG/DL (ref 1.6–2.6)
MCH RBC QN AUTO: 22.7 PG (ref 26.8–34.3)
MCHC RBC AUTO-ENTMCNC: 29.4 G/DL (ref 31.4–37.4)
MCV RBC AUTO: 77 FL (ref 82–98)
MONOCYTES # BLD AUTO: 0.66 THOUSAND/ΜL (ref 0.17–1.22)
MONOCYTES NFR BLD AUTO: 7 % (ref 4–12)
NEUTROPHILS # BLD AUTO: 7.79 THOUSANDS/ΜL (ref 1.85–7.62)
NEUTS SEG NFR BLD AUTO: 81 % (ref 43–75)
NRBC BLD AUTO-RTO: 0 /100 WBCS
P AXIS: 47 DEGREES
PLATELET # BLD AUTO: 216 THOUSANDS/UL (ref 149–390)
PMV BLD AUTO: 11.3 FL (ref 8.9–12.7)
POTASSIUM SERPL-SCNC: 3.6 MMOL/L (ref 3.5–5.3)
PR INTERVAL: 124 MS
QRS AXIS: -42 DEGREES
QRSD INTERVAL: 104 MS
QT INTERVAL: 338 MS
QTC INTERVAL: 348 MS
RBC # BLD AUTO: 3.75 MILLION/UL (ref 3.88–5.62)
SODIUM SERPL-SCNC: 138 MMOL/L (ref 136–145)
T WAVE AXIS: 9 DEGREES
VENTRICULAR RATE: 64 BPM
WBC # BLD AUTO: 9.61 THOUSAND/UL (ref 4.31–10.16)

## 2017-03-27 PROCEDURE — 0W28X0Z CHANGE DRAINAGE DEVICE IN CHEST WALL, EXTERNAL APPROACH: ICD-10-PCS | Performed by: RADIOLOGY

## 2017-03-27 PROCEDURE — 85025 COMPLETE CBC W/AUTO DIFF WBC: CPT | Performed by: PHYSICIAN ASSISTANT

## 2017-03-27 PROCEDURE — 94640 AIRWAY INHALATION TREATMENT: CPT

## 2017-03-27 PROCEDURE — 80048 BASIC METABOLIC PNL TOTAL CA: CPT | Performed by: PHYSICIAN ASSISTANT

## 2017-03-27 PROCEDURE — C1729 CATH, DRAINAGE: HCPCS

## 2017-03-27 PROCEDURE — 93005 ELECTROCARDIOGRAM TRACING: CPT

## 2017-03-27 PROCEDURE — 32557 INSERT CATH PLEURA W/ IMAGE: CPT

## 2017-03-27 PROCEDURE — C1769 GUIDE WIRE: HCPCS

## 2017-03-27 PROCEDURE — 82948 REAGENT STRIP/BLOOD GLUCOSE: CPT

## 2017-03-27 PROCEDURE — 94760 N-INVAS EAR/PLS OXIMETRY 1: CPT

## 2017-03-27 PROCEDURE — 71020 HB CHEST X-RAY 2VW FRONTAL&LATL: CPT

## 2017-03-27 PROCEDURE — 83735 ASSAY OF MAGNESIUM: CPT | Performed by: PHYSICIAN ASSISTANT

## 2017-03-27 RX ORDER — POTASSIUM CHLORIDE 20 MEQ/1
40 TABLET, EXTENDED RELEASE ORAL 2 TIMES DAILY
Status: COMPLETED | OUTPATIENT
Start: 2017-03-27 | End: 2017-03-27

## 2017-03-27 RX ORDER — MAGNESIUM SULFATE HEPTAHYDRATE 40 MG/ML
2 INJECTION, SOLUTION INTRAVENOUS ONCE
Status: COMPLETED | OUTPATIENT
Start: 2017-03-27 | End: 2017-03-27

## 2017-03-27 RX ORDER — FENTANYL CITRATE 50 UG/ML
INJECTION, SOLUTION INTRAMUSCULAR; INTRAVENOUS CODE/TRAUMA/SEDATION MEDICATION
Status: COMPLETED | OUTPATIENT
Start: 2017-03-27 | End: 2017-03-27

## 2017-03-27 RX ADMIN — FENTANYL CITRATE 50 MCG: 50 INJECTION, SOLUTION INTRAMUSCULAR; INTRAVENOUS at 12:26

## 2017-03-27 RX ADMIN — MAGNESIUM SULFATE HEPTAHYDRATE 2 G: 40 INJECTION, SOLUTION INTRAVENOUS at 08:16

## 2017-03-27 RX ADMIN — LEVALBUTEROL HYDROCHLORIDE 1.25 MG: 1.25 SOLUTION, CONCENTRATE RESPIRATORY (INHALATION) at 08:22

## 2017-03-27 RX ADMIN — CEFAZOLIN SODIUM 2000 MG: 2 SOLUTION INTRAVENOUS at 16:49

## 2017-03-27 RX ADMIN — SERTRALINE 25 MG: 25 TABLET, FILM COATED ORAL at 08:11

## 2017-03-27 RX ADMIN — ALLOPURINOL 100 MG: 100 TABLET ORAL at 08:11

## 2017-03-27 RX ADMIN — BRIMONIDINE TARTRATE 1 DROP: 1.5 SOLUTION OPHTHALMIC at 07:07

## 2017-03-27 RX ADMIN — APIXABAN 5 MG: 5 TABLET, FILM COATED ORAL at 07:06

## 2017-03-27 RX ADMIN — POTASSIUM CHLORIDE 40 MEQ: 1500 TABLET, EXTENDED RELEASE ORAL at 18:21

## 2017-03-27 RX ADMIN — LEVALBUTEROL HYDROCHLORIDE 1.25 MG: 1.25 SOLUTION, CONCENTRATE RESPIRATORY (INHALATION) at 19:44

## 2017-03-27 RX ADMIN — Medication 300 UNITS: at 04:33

## 2017-03-27 RX ADMIN — HYDROXYZINE HYDROCHLORIDE 25 MG: 25 TABLET, FILM COATED ORAL at 20:05

## 2017-03-27 RX ADMIN — CEFAZOLIN SODIUM 2000 MG: 2 SOLUTION INTRAVENOUS at 23:12

## 2017-03-27 RX ADMIN — POTASSIUM CHLORIDE 40 MEQ: 1500 TABLET, EXTENDED RELEASE ORAL at 08:13

## 2017-03-27 RX ADMIN — CEFAZOLIN SODIUM 2000 MG: 2 SOLUTION INTRAVENOUS at 10:09

## 2017-03-27 RX ADMIN — INSULIN LISPRO 3 UNITS: 100 INJECTION, SOLUTION INTRAVENOUS; SUBCUTANEOUS at 13:06

## 2017-03-27 RX ADMIN — ISODIUM CHLORIDE 3 ML: 0.03 SOLUTION RESPIRATORY (INHALATION) at 13:46

## 2017-03-27 RX ADMIN — DOCUSATE SODIUM 100 MG: 100 CAPSULE, LIQUID FILLED ORAL at 08:11

## 2017-03-27 RX ADMIN — BRIMONIDINE TARTRATE 1 DROP: 1.5 SOLUTION OPHTHALMIC at 20:49

## 2017-03-27 RX ADMIN — ACETAMINOPHEN 650 MG: 325 TABLET, FILM COATED ORAL at 18:30

## 2017-03-27 RX ADMIN — DORZOLAMIDE HYDROCHLORIDE AND TIMOLOL MALEATE 1 DROP: 20; 5 SOLUTION/ DROPS OPHTHALMIC at 07:10

## 2017-03-27 RX ADMIN — HYDROXYZINE HYDROCHLORIDE 25 MG: 25 TABLET, FILM COATED ORAL at 04:33

## 2017-03-27 RX ADMIN — DORZOLAMIDE HYDROCHLORIDE AND TIMOLOL MALEATE 1 DROP: 20; 5 SOLUTION/ DROPS OPHTHALMIC at 20:07

## 2017-03-27 RX ADMIN — APIXABAN 5 MG: 5 TABLET, FILM COATED ORAL at 20:06

## 2017-03-27 RX ADMIN — OXYCODONE HYDROCHLORIDE 10 MG: 10 TABLET ORAL at 15:09

## 2017-03-27 RX ADMIN — ACETAMINOPHEN 650 MG: 325 TABLET, FILM COATED ORAL at 04:26

## 2017-03-27 RX ADMIN — LEVALBUTEROL HYDROCHLORIDE 1.25 MG: 1.25 SOLUTION, CONCENTRATE RESPIRATORY (INHALATION) at 13:46

## 2017-03-27 RX ADMIN — INSULIN LISPRO 1 UNITS: 100 INJECTION, SOLUTION INTRAVENOUS; SUBCUTANEOUS at 22:36

## 2017-03-27 RX ADMIN — LATANOPROST 1 DROP: 50 SOLUTION OPHTHALMIC at 22:36

## 2017-03-27 RX ADMIN — ISODIUM CHLORIDE 3 ML: 0.03 SOLUTION RESPIRATORY (INHALATION) at 08:22

## 2017-03-27 RX ADMIN — ISODIUM CHLORIDE 3 ML: 0.03 SOLUTION RESPIRATORY (INHALATION) at 19:44

## 2017-03-27 RX ADMIN — INSULIN LISPRO 1 UNITS: 100 INJECTION, SOLUTION INTRAVENOUS; SUBCUTANEOUS at 16:48

## 2017-03-27 RX ADMIN — BISACODYL 5 MG: 5 TABLET, COATED ORAL at 08:16

## 2017-03-27 RX ADMIN — AMIODARONE HYDROCHLORIDE 200 MG: 200 TABLET ORAL at 07:06

## 2017-03-27 RX ADMIN — DOCUSATE SODIUM 100 MG: 100 CAPSULE, LIQUID FILLED ORAL at 18:22

## 2017-03-27 RX ADMIN — ASPIRIN 81 MG: 81 TABLET, COATED ORAL at 08:11

## 2017-03-27 RX ADMIN — ROPINIROLE 3 MG: 1 TABLET, FILM COATED ORAL at 22:36

## 2017-03-27 RX ADMIN — INSULIN LISPRO 1 UNITS: 100 INJECTION, SOLUTION INTRAVENOUS; SUBCUTANEOUS at 07:06

## 2017-03-28 ENCOUNTER — APPOINTMENT (INPATIENT)
Dept: RADIOLOGY | Facility: HOSPITAL | Age: 72
DRG: 178 | End: 2017-03-28
Payer: MEDICARE

## 2017-03-28 LAB
ANION GAP SERPL CALCULATED.3IONS-SCNC: 6 MMOL/L (ref 4–13)
BUN SERPL-MCNC: 10 MG/DL (ref 5–25)
CALCIUM SERPL-MCNC: 8.5 MG/DL (ref 8.3–10.1)
CHLORIDE SERPL-SCNC: 100 MMOL/L (ref 100–108)
CO2 SERPL-SCNC: 31 MMOL/L (ref 21–32)
CREAT SERPL-MCNC: 0.68 MG/DL (ref 0.6–1.3)
GFR SERPL CREATININE-BSD FRML MDRD: >60 ML/MIN/1.73SQ M
GLUCOSE SERPL-MCNC: 128 MG/DL (ref 65–140)
GLUCOSE SERPL-MCNC: 146 MG/DL (ref 65–140)
GLUCOSE SERPL-MCNC: 159 MG/DL (ref 65–140)
GLUCOSE SERPL-MCNC: 180 MG/DL (ref 65–140)
GLUCOSE SERPL-MCNC: 186 MG/DL (ref 65–140)
MAGNESIUM SERPL-MCNC: 1.8 MG/DL (ref 1.6–2.6)
POTASSIUM SERPL-SCNC: 4.1 MMOL/L (ref 3.5–5.3)
SODIUM SERPL-SCNC: 137 MMOL/L (ref 136–145)

## 2017-03-28 PROCEDURE — 71020 HB CHEST X-RAY 2VW FRONTAL&LATL: CPT

## 2017-03-28 PROCEDURE — 83735 ASSAY OF MAGNESIUM: CPT | Performed by: PHYSICIAN ASSISTANT

## 2017-03-28 PROCEDURE — 82948 REAGENT STRIP/BLOOD GLUCOSE: CPT

## 2017-03-28 PROCEDURE — 94668 MNPJ CHEST WALL SBSQ: CPT

## 2017-03-28 PROCEDURE — 94640 AIRWAY INHALATION TREATMENT: CPT

## 2017-03-28 PROCEDURE — 94760 N-INVAS EAR/PLS OXIMETRY 1: CPT

## 2017-03-28 PROCEDURE — 80048 BASIC METABOLIC PNL TOTAL CA: CPT | Performed by: PHYSICIAN ASSISTANT

## 2017-03-28 RX ORDER — BISACODYL 10 MG
10 SUPPOSITORY, RECTAL RECTAL DAILY PRN
Status: DISCONTINUED | OUTPATIENT
Start: 2017-03-28 | End: 2017-04-01 | Stop reason: HOSPADM

## 2017-03-28 RX ORDER — SODIUM PHOSPHATE, DIBASIC AND SODIUM PHOSPHATE, MONOBASIC 7; 19 G/133ML; G/133ML
1 ENEMA RECTAL DAILY PRN
Status: DISCONTINUED | OUTPATIENT
Start: 2017-03-28 | End: 2017-04-01 | Stop reason: HOSPADM

## 2017-03-28 RX ORDER — ROPINIROLE 1 MG/1
3 TABLET, FILM COATED ORAL ONCE
Status: COMPLETED | OUTPATIENT
Start: 2017-03-28 | End: 2017-03-28

## 2017-03-28 RX ORDER — METOCLOPRAMIDE HYDROCHLORIDE 5 MG/ML
10 INJECTION INTRAMUSCULAR; INTRAVENOUS
Status: DISCONTINUED | OUTPATIENT
Start: 2017-03-28 | End: 2017-04-01 | Stop reason: HOSPADM

## 2017-03-28 RX ORDER — POLYETHYLENE GLYCOL 3350 17 G/17G
17 POWDER, FOR SOLUTION ORAL DAILY PRN
Status: DISCONTINUED | OUTPATIENT
Start: 2017-03-28 | End: 2017-03-29

## 2017-03-28 RX ADMIN — DORZOLAMIDE HYDROCHLORIDE AND TIMOLOL MALEATE 1 DROP: 20; 5 SOLUTION/ DROPS OPHTHALMIC at 10:56

## 2017-03-28 RX ADMIN — MAGNESIUM SULFATE HEPTAHYDRATE 1 G: 1 INJECTION, SOLUTION INTRAVENOUS at 10:52

## 2017-03-28 RX ADMIN — ISODIUM CHLORIDE 3 ML: 0.03 SOLUTION RESPIRATORY (INHALATION) at 14:35

## 2017-03-28 RX ADMIN — METOCLOPRAMIDE 10 MG: 5 INJECTION, SOLUTION INTRAMUSCULAR; INTRAVENOUS at 10:56

## 2017-03-28 RX ADMIN — AMIODARONE HYDROCHLORIDE 200 MG: 200 TABLET ORAL at 08:04

## 2017-03-28 RX ADMIN — DOCUSATE SODIUM 100 MG: 100 CAPSULE, LIQUID FILLED ORAL at 08:04

## 2017-03-28 RX ADMIN — METOCLOPRAMIDE 10 MG: 5 INJECTION, SOLUTION INTRAMUSCULAR; INTRAVENOUS at 15:31

## 2017-03-28 RX ADMIN — INSULIN LISPRO 1 UNITS: 100 INJECTION, SOLUTION INTRAVENOUS; SUBCUTANEOUS at 21:51

## 2017-03-28 RX ADMIN — DORZOLAMIDE HYDROCHLORIDE AND TIMOLOL MALEATE 1 DROP: 20; 5 SOLUTION/ DROPS OPHTHALMIC at 20:20

## 2017-03-28 RX ADMIN — ALLOPURINOL 100 MG: 100 TABLET ORAL at 08:04

## 2017-03-28 RX ADMIN — BRIMONIDINE TARTRATE 1 DROP: 1.5 SOLUTION OPHTHALMIC at 20:50

## 2017-03-28 RX ADMIN — INSULIN LISPRO 1 UNITS: 100 INJECTION, SOLUTION INTRAVENOUS; SUBCUTANEOUS at 12:03

## 2017-03-28 RX ADMIN — ISODIUM CHLORIDE 3 ML: 0.03 SOLUTION RESPIRATORY (INHALATION) at 20:28

## 2017-03-28 RX ADMIN — ROPINIROLE 3 MG: 1 TABLET, FILM COATED ORAL at 15:29

## 2017-03-28 RX ADMIN — LATANOPROST 1 DROP: 50 SOLUTION OPHTHALMIC at 21:50

## 2017-03-28 RX ADMIN — APIXABAN 5 MG: 5 TABLET, FILM COATED ORAL at 20:19

## 2017-03-28 RX ADMIN — Medication 300 UNITS: at 05:14

## 2017-03-28 RX ADMIN — LEVALBUTEROL HYDROCHLORIDE 1.25 MG: 1.25 SOLUTION, CONCENTRATE RESPIRATORY (INHALATION) at 14:31

## 2017-03-28 RX ADMIN — CEFAZOLIN SODIUM 2000 MG: 2 SOLUTION INTRAVENOUS at 17:09

## 2017-03-28 RX ADMIN — INSULIN LISPRO 1 UNITS: 100 INJECTION, SOLUTION INTRAVENOUS; SUBCUTANEOUS at 17:08

## 2017-03-28 RX ADMIN — CEFAZOLIN SODIUM 2000 MG: 2 SOLUTION INTRAVENOUS at 10:48

## 2017-03-28 RX ADMIN — ONDANSETRON 4 MG: 2 INJECTION INTRAMUSCULAR; INTRAVENOUS at 08:04

## 2017-03-28 RX ADMIN — SERTRALINE 25 MG: 25 TABLET, FILM COATED ORAL at 08:04

## 2017-03-28 RX ADMIN — ASPIRIN 81 MG: 81 TABLET, COATED ORAL at 08:04

## 2017-03-28 RX ADMIN — ROPINIROLE 3 MG: 1 TABLET, FILM COATED ORAL at 21:50

## 2017-03-28 RX ADMIN — LEVALBUTEROL HYDROCHLORIDE 1.25 MG: 1.25 SOLUTION, CONCENTRATE RESPIRATORY (INHALATION) at 20:28

## 2017-03-28 RX ADMIN — LEVALBUTEROL HYDROCHLORIDE 1.25 MG: 1.25 SOLUTION, CONCENTRATE RESPIRATORY (INHALATION) at 08:41

## 2017-03-28 RX ADMIN — APIXABAN 5 MG: 5 TABLET, FILM COATED ORAL at 08:05

## 2017-03-28 RX ADMIN — ISODIUM CHLORIDE 3 ML: 0.03 SOLUTION RESPIRATORY (INHALATION) at 08:41

## 2017-03-28 RX ADMIN — DOCUSATE SODIUM 100 MG: 100 CAPSULE, LIQUID FILLED ORAL at 17:08

## 2017-03-28 RX ADMIN — Medication 300 UNITS: at 20:20

## 2017-03-28 RX ADMIN — BRIMONIDINE TARTRATE 1 DROP: 1.5 SOLUTION OPHTHALMIC at 10:48

## 2017-03-29 ENCOUNTER — APPOINTMENT (INPATIENT)
Dept: RADIOLOGY | Facility: HOSPITAL | Age: 72
DRG: 178 | End: 2017-03-29
Attending: THORACIC SURGERY (CARDIOTHORACIC VASCULAR SURGERY)
Payer: MEDICARE

## 2017-03-29 ENCOUNTER — APPOINTMENT (INPATIENT)
Dept: RADIOLOGY | Facility: HOSPITAL | Age: 72
DRG: 178 | End: 2017-03-29
Payer: MEDICARE

## 2017-03-29 LAB
ANION GAP SERPL CALCULATED.3IONS-SCNC: 7 MMOL/L (ref 4–13)
BACTERIA SPEC BFLD CULT: NORMAL
BILIRUB UR QL STRIP: NEGATIVE
BUN SERPL-MCNC: 9 MG/DL (ref 5–25)
CALCIUM SERPL-MCNC: 8.4 MG/DL (ref 8.3–10.1)
CHLORIDE SERPL-SCNC: 101 MMOL/L (ref 100–108)
CLARITY UR: CLEAR
CO2 SERPL-SCNC: 31 MMOL/L (ref 21–32)
COLOR UR: YELLOW
CREAT SERPL-MCNC: 0.74 MG/DL (ref 0.6–1.3)
GFR SERPL CREATININE-BSD FRML MDRD: >60 ML/MIN/1.73SQ M
GLUCOSE SERPL-MCNC: 129 MG/DL (ref 65–140)
GLUCOSE SERPL-MCNC: 158 MG/DL (ref 65–140)
GLUCOSE SERPL-MCNC: 171 MG/DL (ref 65–140)
GLUCOSE SERPL-MCNC: 186 MG/DL (ref 65–140)
GLUCOSE SERPL-MCNC: 205 MG/DL (ref 65–140)
GLUCOSE UR STRIP-MCNC: NEGATIVE MG/DL
GRAM STN SPEC: NORMAL
GRAM STN SPEC: NORMAL
HGB UR QL STRIP.AUTO: NEGATIVE
KETONES UR STRIP-MCNC: NEGATIVE MG/DL
LEUKOCYTE ESTERASE UR QL STRIP: NEGATIVE
MAGNESIUM SERPL-MCNC: 1.9 MG/DL (ref 1.6–2.6)
NITRITE UR QL STRIP: NEGATIVE
PH UR STRIP.AUTO: 7 [PH] (ref 4.5–8)
POTASSIUM SERPL-SCNC: 3.7 MMOL/L (ref 3.5–5.3)
PROT UR STRIP-MCNC: NEGATIVE MG/DL
SODIUM SERPL-SCNC: 139 MMOL/L (ref 136–145)
SP GR UR STRIP.AUTO: 1 (ref 1–1.03)
UROBILINOGEN UR QL STRIP.AUTO: 1 E.U./DL

## 2017-03-29 PROCEDURE — 94640 AIRWAY INHALATION TREATMENT: CPT

## 2017-03-29 PROCEDURE — 80048 BASIC METABOLIC PNL TOTAL CA: CPT | Performed by: PHYSICIAN ASSISTANT

## 2017-03-29 PROCEDURE — 83735 ASSAY OF MAGNESIUM: CPT | Performed by: PHYSICIAN ASSISTANT

## 2017-03-29 PROCEDURE — 94668 MNPJ CHEST WALL SBSQ: CPT

## 2017-03-29 PROCEDURE — 71020 HB CHEST X-RAY 2VW FRONTAL&LATL: CPT

## 2017-03-29 PROCEDURE — 81003 URINALYSIS AUTO W/O SCOPE: CPT | Performed by: SURGERY

## 2017-03-29 PROCEDURE — 82948 REAGENT STRIP/BLOOD GLUCOSE: CPT

## 2017-03-29 PROCEDURE — 94760 N-INVAS EAR/PLS OXIMETRY 1: CPT

## 2017-03-29 PROCEDURE — 70551 MRI BRAIN STEM W/O DYE: CPT

## 2017-03-29 RX ORDER — POTASSIUM CHLORIDE 20 MEQ/1
40 TABLET, EXTENDED RELEASE ORAL 2 TIMES DAILY
Status: COMPLETED | OUTPATIENT
Start: 2017-03-29 | End: 2017-03-29

## 2017-03-29 RX ORDER — POLYETHYLENE GLYCOL 3350 17 G/17G
17 POWDER, FOR SOLUTION ORAL DAILY
Status: DISCONTINUED | OUTPATIENT
Start: 2017-03-29 | End: 2017-04-01 | Stop reason: HOSPADM

## 2017-03-29 RX ORDER — MAGNESIUM SULFATE HEPTAHYDRATE 40 MG/ML
2 INJECTION, SOLUTION INTRAVENOUS ONCE
Status: COMPLETED | OUTPATIENT
Start: 2017-03-29 | End: 2017-03-29

## 2017-03-29 RX ADMIN — DORZOLAMIDE HYDROCHLORIDE AND TIMOLOL MALEATE 1 DROP: 20; 5 SOLUTION/ DROPS OPHTHALMIC at 08:09

## 2017-03-29 RX ADMIN — LEVALBUTEROL HYDROCHLORIDE 1.25 MG: 1.25 SOLUTION, CONCENTRATE RESPIRATORY (INHALATION) at 08:10

## 2017-03-29 RX ADMIN — METOCLOPRAMIDE 10 MG: 5 INJECTION, SOLUTION INTRAMUSCULAR; INTRAVENOUS at 06:13

## 2017-03-29 RX ADMIN — ROPINIROLE 3 MG: 1 TABLET, FILM COATED ORAL at 21:29

## 2017-03-29 RX ADMIN — APIXABAN 5 MG: 5 TABLET, FILM COATED ORAL at 20:53

## 2017-03-29 RX ADMIN — Medication 300 UNITS: at 17:42

## 2017-03-29 RX ADMIN — ISODIUM CHLORIDE 3 ML: 0.03 SOLUTION RESPIRATORY (INHALATION) at 08:10

## 2017-03-29 RX ADMIN — Medication 300 UNITS: at 11:51

## 2017-03-29 RX ADMIN — POLYETHYLENE GLYCOL 3350 17 G: 17 POWDER, FOR SOLUTION ORAL at 09:09

## 2017-03-29 RX ADMIN — CEFAZOLIN SODIUM 2000 MG: 2 SOLUTION INTRAVENOUS at 08:59

## 2017-03-29 RX ADMIN — Medication 300 UNITS: at 09:38

## 2017-03-29 RX ADMIN — Medication 300 UNITS: at 06:13

## 2017-03-29 RX ADMIN — APIXABAN 5 MG: 5 TABLET, FILM COATED ORAL at 08:06

## 2017-03-29 RX ADMIN — ISODIUM CHLORIDE 3 ML: 0.03 SOLUTION RESPIRATORY (INHALATION) at 21:07

## 2017-03-29 RX ADMIN — SERTRALINE 25 MG: 25 TABLET, FILM COATED ORAL at 08:06

## 2017-03-29 RX ADMIN — METOCLOPRAMIDE 10 MG: 5 INJECTION, SOLUTION INTRAMUSCULAR; INTRAVENOUS at 16:52

## 2017-03-29 RX ADMIN — LEVALBUTEROL HYDROCHLORIDE 1.25 MG: 1.25 SOLUTION, CONCENTRATE RESPIRATORY (INHALATION) at 14:36

## 2017-03-29 RX ADMIN — DOCUSATE SODIUM 100 MG: 100 CAPSULE, LIQUID FILLED ORAL at 17:42

## 2017-03-29 RX ADMIN — BRIMONIDINE TARTRATE 1 DROP: 1.5 SOLUTION OPHTHALMIC at 08:09

## 2017-03-29 RX ADMIN — INSULIN LISPRO 1 UNITS: 100 INJECTION, SOLUTION INTRAVENOUS; SUBCUTANEOUS at 16:52

## 2017-03-29 RX ADMIN — CEFAZOLIN SODIUM 2000 MG: 2 SOLUTION INTRAVENOUS at 16:52

## 2017-03-29 RX ADMIN — POTASSIUM CHLORIDE 40 MEQ: 1500 TABLET, EXTENDED RELEASE ORAL at 09:09

## 2017-03-29 RX ADMIN — POTASSIUM CHLORIDE 40 MEQ: 1500 TABLET, EXTENDED RELEASE ORAL at 17:42

## 2017-03-29 RX ADMIN — Medication 300 UNITS: at 01:29

## 2017-03-29 RX ADMIN — METOCLOPRAMIDE 10 MG: 5 INJECTION, SOLUTION INTRAMUSCULAR; INTRAVENOUS at 11:46

## 2017-03-29 RX ADMIN — POLYETHYLENE GLYCOL 3350 17 G: 17 POWDER, FOR SOLUTION ORAL at 23:13

## 2017-03-29 RX ADMIN — DORZOLAMIDE HYDROCHLORIDE AND TIMOLOL MALEATE 1 DROP: 20; 5 SOLUTION/ DROPS OPHTHALMIC at 20:53

## 2017-03-29 RX ADMIN — INSULIN LISPRO 1 UNITS: 100 INJECTION, SOLUTION INTRAVENOUS; SUBCUTANEOUS at 11:46

## 2017-03-29 RX ADMIN — MAGNESIUM SULFATE HEPTAHYDRATE 2 G: 40 INJECTION, SOLUTION INTRAVENOUS at 09:38

## 2017-03-29 RX ADMIN — DOCUSATE SODIUM 100 MG: 100 CAPSULE, LIQUID FILLED ORAL at 08:06

## 2017-03-29 RX ADMIN — INSULIN LISPRO 2 UNITS: 100 INJECTION, SOLUTION INTRAVENOUS; SUBCUTANEOUS at 06:29

## 2017-03-29 RX ADMIN — CEFAZOLIN SODIUM 2000 MG: 2 SOLUTION INTRAVENOUS at 00:13

## 2017-03-29 RX ADMIN — ASPIRIN 81 MG: 81 TABLET, COATED ORAL at 08:06

## 2017-03-29 RX ADMIN — AMIODARONE HYDROCHLORIDE 200 MG: 200 TABLET ORAL at 08:06

## 2017-03-29 RX ADMIN — LEVALBUTEROL HYDROCHLORIDE 1.25 MG: 1.25 SOLUTION, CONCENTRATE RESPIRATORY (INHALATION) at 21:07

## 2017-03-29 RX ADMIN — LATANOPROST 1 DROP: 50 SOLUTION OPHTHALMIC at 21:26

## 2017-03-29 RX ADMIN — DIAZEPAM 5 MG: 5 TABLET ORAL at 14:49

## 2017-03-29 RX ADMIN — ISODIUM CHLORIDE 3 ML: 0.03 SOLUTION RESPIRATORY (INHALATION) at 14:36

## 2017-03-29 RX ADMIN — ALLOPURINOL 100 MG: 100 TABLET ORAL at 08:06

## 2017-03-29 RX ADMIN — BRIMONIDINE TARTRATE 1 DROP: 1.5 SOLUTION OPHTHALMIC at 20:53

## 2017-03-29 RX ADMIN — CEFAZOLIN SODIUM 2000 MG: 2 SOLUTION INTRAVENOUS at 23:13

## 2017-03-30 ENCOUNTER — APPOINTMENT (INPATIENT)
Dept: RADIOLOGY | Facility: HOSPITAL | Age: 72
DRG: 178 | End: 2017-03-30
Attending: THORACIC SURGERY (CARDIOTHORACIC VASCULAR SURGERY)
Payer: MEDICARE

## 2017-03-30 ENCOUNTER — APPOINTMENT (INPATIENT)
Dept: PHYSICAL THERAPY | Facility: HOSPITAL | Age: 72
DRG: 178 | End: 2017-03-30
Payer: MEDICARE

## 2017-03-30 ENCOUNTER — APPOINTMENT (INPATIENT)
Dept: RADIOLOGY | Facility: HOSPITAL | Age: 72
DRG: 178 | End: 2017-03-30
Payer: MEDICARE

## 2017-03-30 LAB
ANION GAP SERPL CALCULATED.3IONS-SCNC: 7 MMOL/L (ref 4–13)
BUN SERPL-MCNC: 9 MG/DL (ref 5–25)
CALCIUM SERPL-MCNC: 7.2 MG/DL (ref 8.3–10.1)
CHLORIDE SERPL-SCNC: 107 MMOL/L (ref 100–108)
CO2 SERPL-SCNC: 26 MMOL/L (ref 21–32)
CREAT SERPL-MCNC: 0.56 MG/DL (ref 0.6–1.3)
GFR SERPL CREATININE-BSD FRML MDRD: >60 ML/MIN/1.73SQ M
GLUCOSE SERPL-MCNC: 148 MG/DL (ref 65–140)
GLUCOSE SERPL-MCNC: 153 MG/DL (ref 65–140)
GLUCOSE SERPL-MCNC: 166 MG/DL (ref 65–140)
GLUCOSE SERPL-MCNC: 171 MG/DL (ref 65–140)
GLUCOSE SERPL-MCNC: 219 MG/DL (ref 65–140)
MAGNESIUM SERPL-MCNC: 1.6 MG/DL (ref 1.6–2.6)
POTASSIUM SERPL-SCNC: 3.3 MMOL/L (ref 3.5–5.3)
SODIUM SERPL-SCNC: 140 MMOL/L (ref 136–145)

## 2017-03-30 PROCEDURE — 71250 CT THORAX DX C-: CPT

## 2017-03-30 PROCEDURE — 94640 AIRWAY INHALATION TREATMENT: CPT

## 2017-03-30 PROCEDURE — G8978 MOBILITY CURRENT STATUS: HCPCS

## 2017-03-30 PROCEDURE — 83735 ASSAY OF MAGNESIUM: CPT | Performed by: PHYSICIAN ASSISTANT

## 2017-03-30 PROCEDURE — 71020 HB CHEST X-RAY 2VW FRONTAL&LATL: CPT

## 2017-03-30 PROCEDURE — G8979 MOBILITY GOAL STATUS: HCPCS

## 2017-03-30 PROCEDURE — 80048 BASIC METABOLIC PNL TOTAL CA: CPT | Performed by: PHYSICIAN ASSISTANT

## 2017-03-30 PROCEDURE — 82948 REAGENT STRIP/BLOOD GLUCOSE: CPT

## 2017-03-30 PROCEDURE — 94668 MNPJ CHEST WALL SBSQ: CPT

## 2017-03-30 PROCEDURE — 94760 N-INVAS EAR/PLS OXIMETRY 1: CPT

## 2017-03-30 PROCEDURE — 97162 PT EVAL MOD COMPLEX 30 MIN: CPT

## 2017-03-30 RX ORDER — POLYVINYL ALCOHOL 14 MG/ML
1 SOLUTION/ DROPS OPHTHALMIC
Status: DISCONTINUED | OUTPATIENT
Start: 2017-03-30 | End: 2017-04-01 | Stop reason: HOSPADM

## 2017-03-30 RX ORDER — POTASSIUM CHLORIDE 20 MEQ/1
40 TABLET, EXTENDED RELEASE ORAL ONCE
Status: COMPLETED | OUTPATIENT
Start: 2017-03-30 | End: 2017-03-30

## 2017-03-30 RX ADMIN — POLYVINYL ALCOHOL 1 DROP: 14 SOLUTION/ DROPS OPHTHALMIC at 12:10

## 2017-03-30 RX ADMIN — POLYVINYL ALCOHOL 1 DROP: 14 SOLUTION/ DROPS OPHTHALMIC at 15:20

## 2017-03-30 RX ADMIN — INSULIN LISPRO 2 UNITS: 100 INJECTION, SOLUTION INTRAVENOUS; SUBCUTANEOUS at 11:27

## 2017-03-30 RX ADMIN — ROPINIROLE 3 MG: 1 TABLET, FILM COATED ORAL at 22:22

## 2017-03-30 RX ADMIN — DOCUSATE SODIUM 100 MG: 100 CAPSULE, LIQUID FILLED ORAL at 08:23

## 2017-03-30 RX ADMIN — LEVALBUTEROL HYDROCHLORIDE 1.25 MG: 1.25 SOLUTION, CONCENTRATE RESPIRATORY (INHALATION) at 07:50

## 2017-03-30 RX ADMIN — ISODIUM CHLORIDE 3 ML: 0.03 SOLUTION RESPIRATORY (INHALATION) at 07:50

## 2017-03-30 RX ADMIN — Medication 300 UNITS: at 18:09

## 2017-03-30 RX ADMIN — Medication 300 UNITS: at 04:48

## 2017-03-30 RX ADMIN — SERTRALINE 25 MG: 25 TABLET, FILM COATED ORAL at 08:24

## 2017-03-30 RX ADMIN — CEFAZOLIN SODIUM 2000 MG: 2 SOLUTION INTRAVENOUS at 08:45

## 2017-03-30 RX ADMIN — INSULIN LISPRO 1 UNITS: 100 INJECTION, SOLUTION INTRAVENOUS; SUBCUTANEOUS at 22:25

## 2017-03-30 RX ADMIN — Medication 300 UNITS: at 10:06

## 2017-03-30 RX ADMIN — ISODIUM CHLORIDE 3 ML: 0.03 SOLUTION RESPIRATORY (INHALATION) at 19:36

## 2017-03-30 RX ADMIN — METOCLOPRAMIDE 10 MG: 5 INJECTION, SOLUTION INTRAMUSCULAR; INTRAVENOUS at 06:32

## 2017-03-30 RX ADMIN — APIXABAN 5 MG: 5 TABLET, FILM COATED ORAL at 20:47

## 2017-03-30 RX ADMIN — LEVALBUTEROL HYDROCHLORIDE 1.25 MG: 1.25 SOLUTION, CONCENTRATE RESPIRATORY (INHALATION) at 19:36

## 2017-03-30 RX ADMIN — DORZOLAMIDE HYDROCHLORIDE AND TIMOLOL MALEATE 1 DROP: 20; 5 SOLUTION/ DROPS OPHTHALMIC at 20:47

## 2017-03-30 RX ADMIN — LATANOPROST 1 DROP: 50 SOLUTION OPHTHALMIC at 22:26

## 2017-03-30 RX ADMIN — METOCLOPRAMIDE 10 MG: 5 INJECTION, SOLUTION INTRAMUSCULAR; INTRAVENOUS at 11:28

## 2017-03-30 RX ADMIN — METOCLOPRAMIDE 10 MG: 5 INJECTION, SOLUTION INTRAMUSCULAR; INTRAVENOUS at 16:38

## 2017-03-30 RX ADMIN — DORZOLAMIDE HYDROCHLORIDE AND TIMOLOL MALEATE 1 DROP: 20; 5 SOLUTION/ DROPS OPHTHALMIC at 08:35

## 2017-03-30 RX ADMIN — ASPIRIN 81 MG: 81 TABLET, COATED ORAL at 08:24

## 2017-03-30 RX ADMIN — INSULIN LISPRO 1 UNITS: 100 INJECTION, SOLUTION INTRAVENOUS; SUBCUTANEOUS at 06:32

## 2017-03-30 RX ADMIN — INSULIN LISPRO 1 UNITS: 100 INJECTION, SOLUTION INTRAVENOUS; SUBCUTANEOUS at 16:42

## 2017-03-30 RX ADMIN — APIXABAN 5 MG: 5 TABLET, FILM COATED ORAL at 08:27

## 2017-03-30 RX ADMIN — POLYETHYLENE GLYCOL 3350 17 G: 17 POWDER, FOR SOLUTION ORAL at 08:25

## 2017-03-30 RX ADMIN — BRIMONIDINE TARTRATE 1 DROP: 1.5 SOLUTION OPHTHALMIC at 20:47

## 2017-03-30 RX ADMIN — DOCUSATE SODIUM 100 MG: 100 CAPSULE, LIQUID FILLED ORAL at 18:10

## 2017-03-30 RX ADMIN — LEVALBUTEROL HYDROCHLORIDE 1.25 MG: 1.25 SOLUTION, CONCENTRATE RESPIRATORY (INHALATION) at 14:16

## 2017-03-30 RX ADMIN — CEFAZOLIN SODIUM 2000 MG: 2 SOLUTION INTRAVENOUS at 16:38

## 2017-03-30 RX ADMIN — DIAZEPAM 5 MG: 5 TABLET ORAL at 20:47

## 2017-03-30 RX ADMIN — ALLOPURINOL 100 MG: 100 TABLET ORAL at 08:24

## 2017-03-30 RX ADMIN — POTASSIUM CHLORIDE 40 MEQ: 1500 TABLET, EXTENDED RELEASE ORAL at 10:05

## 2017-03-30 RX ADMIN — AMIODARONE HYDROCHLORIDE 200 MG: 200 TABLET ORAL at 08:24

## 2017-03-30 RX ADMIN — ISODIUM CHLORIDE 3 ML: 0.03 SOLUTION RESPIRATORY (INHALATION) at 14:16

## 2017-03-30 RX ADMIN — Medication 300 UNITS: at 06:33

## 2017-03-30 RX ADMIN — Medication 300 UNITS: at 00:05

## 2017-03-30 RX ADMIN — BRIMONIDINE TARTRATE 1 DROP: 1.5 SOLUTION OPHTHALMIC at 08:35

## 2017-03-30 RX ADMIN — Medication 300 UNITS: at 11:33

## 2017-03-31 ENCOUNTER — TELEPHONE (OUTPATIENT)
Dept: OTHER | Facility: HOSPITAL | Age: 72
End: 2017-03-31

## 2017-03-31 LAB
ANION GAP SERPL CALCULATED.3IONS-SCNC: 7 MMOL/L (ref 4–13)
BUN SERPL-MCNC: 10 MG/DL (ref 5–25)
CALCIUM SERPL-MCNC: 8.5 MG/DL (ref 8.3–10.1)
CHLORIDE SERPL-SCNC: 101 MMOL/L (ref 100–108)
CO2 SERPL-SCNC: 31 MMOL/L (ref 21–32)
CREAT SERPL-MCNC: 0.7 MG/DL (ref 0.6–1.3)
GFR SERPL CREATININE-BSD FRML MDRD: >60 ML/MIN/1.73SQ M
GLUCOSE SERPL-MCNC: 149 MG/DL (ref 65–140)
GLUCOSE SERPL-MCNC: 165 MG/DL (ref 65–140)
GLUCOSE SERPL-MCNC: 178 MG/DL (ref 65–140)
GLUCOSE SERPL-MCNC: 180 MG/DL (ref 65–140)
GLUCOSE SERPL-MCNC: 190 MG/DL (ref 65–140)
MAGNESIUM SERPL-MCNC: 1.8 MG/DL (ref 1.6–2.6)
POTASSIUM SERPL-SCNC: 4 MMOL/L (ref 3.5–5.3)
SODIUM SERPL-SCNC: 139 MMOL/L (ref 136–145)

## 2017-03-31 PROCEDURE — 82948 REAGENT STRIP/BLOOD GLUCOSE: CPT

## 2017-03-31 PROCEDURE — 83735 ASSAY OF MAGNESIUM: CPT | Performed by: SURGERY

## 2017-03-31 PROCEDURE — 97110 THERAPEUTIC EXERCISES: CPT

## 2017-03-31 PROCEDURE — 97112 NEUROMUSCULAR REEDUCATION: CPT

## 2017-03-31 PROCEDURE — 94760 N-INVAS EAR/PLS OXIMETRY 1: CPT

## 2017-03-31 PROCEDURE — 94668 MNPJ CHEST WALL SBSQ: CPT

## 2017-03-31 PROCEDURE — 94640 AIRWAY INHALATION TREATMENT: CPT

## 2017-03-31 PROCEDURE — 97116 GAIT TRAINING THERAPY: CPT

## 2017-03-31 PROCEDURE — 80048 BASIC METABOLIC PNL TOTAL CA: CPT | Performed by: SURGERY

## 2017-03-31 RX ORDER — MAGNESIUM SULFATE HEPTAHYDRATE 40 MG/ML
2 INJECTION, SOLUTION INTRAVENOUS ONCE
Status: COMPLETED | OUTPATIENT
Start: 2017-03-31 | End: 2017-03-31

## 2017-03-31 RX ADMIN — METOCLOPRAMIDE 10 MG: 5 INJECTION, SOLUTION INTRAMUSCULAR; INTRAVENOUS at 06:30

## 2017-03-31 RX ADMIN — APIXABAN 5 MG: 5 TABLET, FILM COATED ORAL at 19:47

## 2017-03-31 RX ADMIN — SERTRALINE 25 MG: 25 TABLET, FILM COATED ORAL at 08:04

## 2017-03-31 RX ADMIN — BRIMONIDINE TARTRATE 1 DROP: 1.5 SOLUTION OPHTHALMIC at 19:51

## 2017-03-31 RX ADMIN — CEFAZOLIN SODIUM 2000 MG: 2 SOLUTION INTRAVENOUS at 23:05

## 2017-03-31 RX ADMIN — DOCUSATE SODIUM 100 MG: 100 CAPSULE, LIQUID FILLED ORAL at 08:05

## 2017-03-31 RX ADMIN — INSULIN LISPRO 1 UNITS: 100 INJECTION, SOLUTION INTRAVENOUS; SUBCUTANEOUS at 16:30

## 2017-03-31 RX ADMIN — APIXABAN 5 MG: 5 TABLET, FILM COATED ORAL at 08:05

## 2017-03-31 RX ADMIN — METOCLOPRAMIDE 10 MG: 5 INJECTION, SOLUTION INTRAMUSCULAR; INTRAVENOUS at 13:13

## 2017-03-31 RX ADMIN — Medication 300 UNITS: at 06:32

## 2017-03-31 RX ADMIN — BRIMONIDINE TARTRATE 1 DROP: 1.5 SOLUTION OPHTHALMIC at 08:08

## 2017-03-31 RX ADMIN — INSULIN LISPRO 1 UNITS: 100 INJECTION, SOLUTION INTRAVENOUS; SUBCUTANEOUS at 22:56

## 2017-03-31 RX ADMIN — ISODIUM CHLORIDE 3 ML: 0.03 SOLUTION RESPIRATORY (INHALATION) at 19:10

## 2017-03-31 RX ADMIN — INSULIN LISPRO 1 UNITS: 100 INJECTION, SOLUTION INTRAVENOUS; SUBCUTANEOUS at 06:33

## 2017-03-31 RX ADMIN — ISODIUM CHLORIDE 3 ML: 0.03 SOLUTION RESPIRATORY (INHALATION) at 07:45

## 2017-03-31 RX ADMIN — POLYETHYLENE GLYCOL 3350 17 G: 17 POWDER, FOR SOLUTION ORAL at 08:04

## 2017-03-31 RX ADMIN — Medication 300 UNITS: at 02:21

## 2017-03-31 RX ADMIN — Medication 300 UNITS: at 09:03

## 2017-03-31 RX ADMIN — DORZOLAMIDE HYDROCHLORIDE AND TIMOLOL MALEATE 1 DROP: 20; 5 SOLUTION/ DROPS OPHTHALMIC at 19:52

## 2017-03-31 RX ADMIN — LEVALBUTEROL HYDROCHLORIDE 1.25 MG: 1.25 SOLUTION, CONCENTRATE RESPIRATORY (INHALATION) at 19:10

## 2017-03-31 RX ADMIN — CEFAZOLIN SODIUM 2000 MG: 2 SOLUTION INTRAVENOUS at 16:21

## 2017-03-31 RX ADMIN — LATANOPROST 1 DROP: 50 SOLUTION OPHTHALMIC at 22:56

## 2017-03-31 RX ADMIN — Medication 300 UNITS: at 05:07

## 2017-03-31 RX ADMIN — LEVALBUTEROL HYDROCHLORIDE 1.25 MG: 1.25 SOLUTION, CONCENTRATE RESPIRATORY (INHALATION) at 13:15

## 2017-03-31 RX ADMIN — ASPIRIN 81 MG: 81 TABLET, COATED ORAL at 08:05

## 2017-03-31 RX ADMIN — METOCLOPRAMIDE 10 MG: 5 INJECTION, SOLUTION INTRAMUSCULAR; INTRAVENOUS at 16:29

## 2017-03-31 RX ADMIN — Medication 300 UNITS: at 13:14

## 2017-03-31 RX ADMIN — INSULIN LISPRO 1 UNITS: 100 INJECTION, SOLUTION INTRAVENOUS; SUBCUTANEOUS at 11:30

## 2017-03-31 RX ADMIN — LEVALBUTEROL HYDROCHLORIDE 1.25 MG: 1.25 SOLUTION, CONCENTRATE RESPIRATORY (INHALATION) at 07:45

## 2017-03-31 RX ADMIN — AMIODARONE HYDROCHLORIDE 200 MG: 200 TABLET ORAL at 06:39

## 2017-03-31 RX ADMIN — CEFAZOLIN SODIUM 2000 MG: 2 SOLUTION INTRAVENOUS at 00:42

## 2017-03-31 RX ADMIN — MAGNESIUM SULFATE HEPTAHYDRATE 2 G: 40 INJECTION, SOLUTION INTRAVENOUS at 14:00

## 2017-03-31 RX ADMIN — ISODIUM CHLORIDE 3 ML: 0.03 SOLUTION RESPIRATORY (INHALATION) at 13:16

## 2017-03-31 RX ADMIN — Medication 300 UNITS: at 23:52

## 2017-03-31 RX ADMIN — Medication 300 UNITS: at 17:18

## 2017-03-31 RX ADMIN — ALLOPURINOL 100 MG: 100 TABLET ORAL at 08:05

## 2017-03-31 RX ADMIN — CEFAZOLIN SODIUM 2000 MG: 2 SOLUTION INTRAVENOUS at 08:33

## 2017-03-31 RX ADMIN — DORZOLAMIDE HYDROCHLORIDE AND TIMOLOL MALEATE 1 DROP: 20; 5 SOLUTION/ DROPS OPHTHALMIC at 08:08

## 2017-04-01 ENCOUNTER — APPOINTMENT (INPATIENT)
Dept: RADIOLOGY | Facility: HOSPITAL | Age: 72
DRG: 178 | End: 2017-04-01
Payer: MEDICARE

## 2017-04-01 VITALS
SYSTOLIC BLOOD PRESSURE: 127 MMHG | TEMPERATURE: 98.2 F | WEIGHT: 186 LBS | BODY MASS INDEX: 26.63 KG/M2 | HEART RATE: 72 BPM | HEIGHT: 70 IN | DIASTOLIC BLOOD PRESSURE: 68 MMHG | RESPIRATION RATE: 18 BRPM | OXYGEN SATURATION: 95 %

## 2017-04-01 LAB
GLUCOSE SERPL-MCNC: 177 MG/DL (ref 65–140)
GLUCOSE SERPL-MCNC: 179 MG/DL (ref 65–140)

## 2017-04-01 PROCEDURE — 94640 AIRWAY INHALATION TREATMENT: CPT

## 2017-04-01 PROCEDURE — 94760 N-INVAS EAR/PLS OXIMETRY 1: CPT

## 2017-04-01 PROCEDURE — 97116 GAIT TRAINING THERAPY: CPT

## 2017-04-01 PROCEDURE — 71020 HB CHEST X-RAY 2VW FRONTAL&LATL: CPT

## 2017-04-01 PROCEDURE — 82948 REAGENT STRIP/BLOOD GLUCOSE: CPT

## 2017-04-01 PROCEDURE — 94668 MNPJ CHEST WALL SBSQ: CPT

## 2017-04-01 RX ORDER — CEPHALEXIN 500 MG/1
500 CAPSULE ORAL EVERY 6 HOURS SCHEDULED
Qty: 40 CAPSULE | Refills: 0 | Status: SHIPPED | OUTPATIENT
Start: 2017-04-01 | End: 2017-04-11

## 2017-04-01 RX ORDER — CEPHALEXIN 500 MG/1
500 CAPSULE ORAL EVERY 6 HOURS SCHEDULED
Status: DISCONTINUED | OUTPATIENT
Start: 2017-04-01 | End: 2017-04-01 | Stop reason: HOSPADM

## 2017-04-01 RX ADMIN — INSULIN LISPRO 1 UNITS: 100 INJECTION, SOLUTION INTRAVENOUS; SUBCUTANEOUS at 11:54

## 2017-04-01 RX ADMIN — ASPIRIN 81 MG: 81 TABLET, COATED ORAL at 09:00

## 2017-04-01 RX ADMIN — Medication 300 UNITS: at 08:41

## 2017-04-01 RX ADMIN — BRIMONIDINE TARTRATE 1 DROP: 1.5 SOLUTION OPHTHALMIC at 09:04

## 2017-04-01 RX ADMIN — ISODIUM CHLORIDE 3 ML: 0.03 SOLUTION RESPIRATORY (INHALATION) at 13:39

## 2017-04-01 RX ADMIN — DORZOLAMIDE HYDROCHLORIDE AND TIMOLOL MALEATE 1 DROP: 20; 5 SOLUTION/ DROPS OPHTHALMIC at 09:04

## 2017-04-01 RX ADMIN — ISODIUM CHLORIDE 3 ML: 0.03 SOLUTION RESPIRATORY (INHALATION) at 08:32

## 2017-04-01 RX ADMIN — SERTRALINE 25 MG: 25 TABLET, FILM COATED ORAL at 09:00

## 2017-04-01 RX ADMIN — METOCLOPRAMIDE 10 MG: 5 INJECTION, SOLUTION INTRAMUSCULAR; INTRAVENOUS at 06:42

## 2017-04-01 RX ADMIN — CEPHALEXIN 500 MG: 500 CAPSULE ORAL at 11:54

## 2017-04-01 RX ADMIN — INSULIN LISPRO 1 UNITS: 100 INJECTION, SOLUTION INTRAVENOUS; SUBCUTANEOUS at 06:42

## 2017-04-01 RX ADMIN — ALLOPURINOL 100 MG: 100 TABLET ORAL at 09:00

## 2017-04-01 RX ADMIN — APIXABAN 5 MG: 5 TABLET, FILM COATED ORAL at 09:00

## 2017-04-01 RX ADMIN — Medication 300 UNITS: at 11:54

## 2017-04-01 RX ADMIN — Medication 300 UNITS: at 13:20

## 2017-04-01 RX ADMIN — LEVALBUTEROL HYDROCHLORIDE 1.25 MG: 1.25 SOLUTION, CONCENTRATE RESPIRATORY (INHALATION) at 13:39

## 2017-04-01 RX ADMIN — CEFAZOLIN SODIUM 2000 MG: 2 SOLUTION INTRAVENOUS at 07:37

## 2017-04-01 RX ADMIN — LEVALBUTEROL HYDROCHLORIDE 1.25 MG: 1.25 SOLUTION, CONCENTRATE RESPIRATORY (INHALATION) at 08:33

## 2017-04-01 RX ADMIN — AMIODARONE HYDROCHLORIDE 200 MG: 200 TABLET ORAL at 09:00

## 2017-04-01 RX ADMIN — METOCLOPRAMIDE 10 MG: 5 INJECTION, SOLUTION INTRAMUSCULAR; INTRAVENOUS at 11:54

## 2017-04-07 ENCOUNTER — HOSPITAL ENCOUNTER (OUTPATIENT)
Dept: RADIOLOGY | Facility: HOSPITAL | Age: 72
Discharge: HOME | End: 2017-04-07
Payer: MEDICARE

## 2017-04-07 ENCOUNTER — TRANSCRIBE ORDERS (OUTPATIENT)
Dept: ADMINISTRATIVE | Facility: HOSPITAL | Age: 72
End: 2017-04-07

## 2017-04-07 DIAGNOSIS — R06.02 SHORTNESS OF BREATH: ICD-10-CM

## 2017-04-07 PROCEDURE — 71020 HB CHEST X-RAY 2VW FRONTAL&LATL: CPT

## 2017-04-18 ENCOUNTER — ALLSCRIPTS OFFICE VISIT (OUTPATIENT)
Dept: OTHER | Facility: OTHER | Age: 72
End: 2017-04-18

## 2017-04-19 ENCOUNTER — GENERIC CONVERSION - ENCOUNTER (OUTPATIENT)
Dept: OTHER | Facility: OTHER | Age: 72
End: 2017-04-19

## 2017-05-16 DIAGNOSIS — C15.9 MALIGNANT NEOPLASM OF ESOPHAGUS (HCC): ICD-10-CM

## 2017-05-16 DIAGNOSIS — R13.10 DYSPHAGIA: ICD-10-CM

## 2017-05-16 DIAGNOSIS — C22.0 LIVER CELL CARCINOMA (HCC): ICD-10-CM

## 2017-05-23 ENCOUNTER — ANESTHESIA EVENT (OUTPATIENT)
Dept: PERIOP | Facility: HOSPITAL | Age: 72
End: 2017-05-23
Payer: MEDICARE

## 2017-05-24 ENCOUNTER — ANESTHESIA (OUTPATIENT)
Dept: PERIOP | Facility: HOSPITAL | Age: 72
End: 2017-05-24
Payer: MEDICARE

## 2017-05-24 ENCOUNTER — HOSPITAL ENCOUNTER (OUTPATIENT)
Facility: HOSPITAL | Age: 72
Setting detail: OUTPATIENT SURGERY
Discharge: HOME | End: 2017-05-24
Attending: THORACIC SURGERY (CARDIOTHORACIC VASCULAR SURGERY) | Admitting: THORACIC SURGERY (CARDIOTHORACIC VASCULAR SURGERY)
Payer: MEDICARE

## 2017-05-24 VITALS
HEART RATE: 52 BPM | RESPIRATION RATE: 16 BRPM | BODY MASS INDEX: 28 KG/M2 | DIASTOLIC BLOOD PRESSURE: 62 MMHG | SYSTOLIC BLOOD PRESSURE: 120 MMHG | OXYGEN SATURATION: 99 % | WEIGHT: 200 LBS | HEIGHT: 71 IN | TEMPERATURE: 98 F

## 2017-05-24 LAB
ABO GROUP BLD: NORMAL
BLD GP AB SCN SERPL QL: NEGATIVE
GLUCOSE SERPL-MCNC: 160 MG/DL (ref 65–140)
GLUCOSE SERPL-MCNC: 175 MG/DL (ref 65–140)
RH BLD: POSITIVE
SPECIMEN EXPIRATION DATE: NORMAL

## 2017-05-24 PROCEDURE — C1726 CATH, BAL DIL, NON-VASCULAR: HCPCS | Performed by: THORACIC SURGERY (CARDIOTHORACIC VASCULAR SURGERY)

## 2017-05-24 PROCEDURE — 86900 BLOOD TYPING SEROLOGIC ABO: CPT | Performed by: THORACIC SURGERY (CARDIOTHORACIC VASCULAR SURGERY)

## 2017-05-24 PROCEDURE — 86901 BLOOD TYPING SEROLOGIC RH(D): CPT | Performed by: THORACIC SURGERY (CARDIOTHORACIC VASCULAR SURGERY)

## 2017-05-24 PROCEDURE — 86850 RBC ANTIBODY SCREEN: CPT | Performed by: THORACIC SURGERY (CARDIOTHORACIC VASCULAR SURGERY)

## 2017-05-24 PROCEDURE — 82948 REAGENT STRIP/BLOOD GLUCOSE: CPT

## 2017-05-24 RX ORDER — PROPOFOL 10 MG/ML
INJECTION, EMULSION INTRAVENOUS AS NEEDED
Status: DISCONTINUED | OUTPATIENT
Start: 2017-05-24 | End: 2017-05-24 | Stop reason: SURG

## 2017-05-24 RX ORDER — SODIUM CHLORIDE, SODIUM LACTATE, POTASSIUM CHLORIDE, CALCIUM CHLORIDE 600; 310; 30; 20 MG/100ML; MG/100ML; MG/100ML; MG/100ML
50 INJECTION, SOLUTION INTRAVENOUS CONTINUOUS
Status: DISCONTINUED | OUTPATIENT
Start: 2017-05-24 | End: 2017-05-24 | Stop reason: HOSPADM

## 2017-05-24 RX ORDER — ONDANSETRON 2 MG/ML
4 INJECTION INTRAMUSCULAR; INTRAVENOUS EVERY 6 HOURS PRN
Status: DISCONTINUED | OUTPATIENT
Start: 2017-05-24 | End: 2017-05-24 | Stop reason: HOSPADM

## 2017-05-24 RX ORDER — ONDANSETRON 2 MG/ML
4 INJECTION INTRAMUSCULAR; INTRAVENOUS ONCE AS NEEDED
Status: DISCONTINUED | OUTPATIENT
Start: 2017-05-24 | End: 2017-05-24 | Stop reason: HOSPADM

## 2017-05-24 RX ORDER — FENTANYL CITRATE/PF 50 MCG/ML
25 SYRINGE (ML) INJECTION
Status: DISCONTINUED | OUTPATIENT
Start: 2017-05-24 | End: 2017-05-24 | Stop reason: HOSPADM

## 2017-05-24 RX ORDER — LIDOCAINE HYDROCHLORIDE 10 MG/ML
INJECTION, SOLUTION INFILTRATION; PERINEURAL AS NEEDED
Status: DISCONTINUED | OUTPATIENT
Start: 2017-05-24 | End: 2017-05-24 | Stop reason: SURG

## 2017-05-24 RX ORDER — SUCCINYLCHOLINE CHLORIDE 20 MG/ML
INJECTION INTRAMUSCULAR; INTRAVENOUS AS NEEDED
Status: DISCONTINUED | OUTPATIENT
Start: 2017-05-24 | End: 2017-05-24 | Stop reason: SURG

## 2017-05-24 RX ORDER — ONDANSETRON 2 MG/ML
INJECTION INTRAMUSCULAR; INTRAVENOUS AS NEEDED
Status: DISCONTINUED | OUTPATIENT
Start: 2017-05-24 | End: 2017-05-24 | Stop reason: SURG

## 2017-05-24 RX ORDER — ACETAMINOPHEN 325 MG/1
650 TABLET ORAL EVERY 4 HOURS PRN
Status: DISCONTINUED | OUTPATIENT
Start: 2017-05-24 | End: 2017-05-24 | Stop reason: HOSPADM

## 2017-05-24 RX ADMIN — LIDOCAINE HYDROCHLORIDE 40 MG: 10 INJECTION, SOLUTION INFILTRATION; PERINEURAL at 13:22

## 2017-05-24 RX ADMIN — PROPOFOL 150 MG: 10 INJECTION, EMULSION INTRAVENOUS at 13:22

## 2017-05-24 RX ADMIN — SODIUM CHLORIDE, SODIUM LACTATE, POTASSIUM CHLORIDE, AND CALCIUM CHLORIDE 50 ML/HR: .6; .31; .03; .02 INJECTION, SOLUTION INTRAVENOUS at 11:09

## 2017-05-24 RX ADMIN — SODIUM CHLORIDE, SODIUM LACTATE, POTASSIUM CHLORIDE, AND CALCIUM CHLORIDE: .6; .31; .03; .02 INJECTION, SOLUTION INTRAVENOUS at 13:12

## 2017-05-24 RX ADMIN — PROPOFOL 20 MG: 10 INJECTION, EMULSION INTRAVENOUS at 13:40

## 2017-05-24 RX ADMIN — SUCCINYLCHOLINE CHLORIDE 100 MG: 20 INJECTION, SOLUTION INTRAMUSCULAR; INTRAVENOUS at 13:22

## 2017-05-24 RX ADMIN — ONDANSETRON 4 MG: 2 INJECTION INTRAMUSCULAR; INTRAVENOUS at 13:34

## 2017-06-06 ENCOUNTER — ALLSCRIPTS OFFICE VISIT (OUTPATIENT)
Dept: OTHER | Facility: OTHER | Age: 72
End: 2017-06-06

## 2017-06-06 ENCOUNTER — HOSPITAL ENCOUNTER (OUTPATIENT)
Dept: CT IMAGING | Facility: HOSPITAL | Age: 72
Discharge: HOME | End: 2017-06-06
Attending: SURGERY
Payer: MEDICARE

## 2017-06-06 DIAGNOSIS — C15.9 MALIGNANT NEOPLASM OF ESOPHAGUS (HCC): ICD-10-CM

## 2017-06-06 PROCEDURE — 71260 CT THORAX DX C+: CPT

## 2017-06-06 PROCEDURE — 74177 CT ABD & PELVIS W/CONTRAST: CPT

## 2017-06-06 RX ADMIN — IOHEXOL 100 ML: 350 INJECTION, SOLUTION INTRAVENOUS at 10:40

## 2017-06-22 DIAGNOSIS — I63.9 CEREBRAL INFARCTION (HCC): ICD-10-CM

## 2017-06-22 DIAGNOSIS — C15.9 MALIGNANT NEOPLASM OF ESOPHAGUS (HCC): ICD-10-CM

## 2017-06-22 DIAGNOSIS — C22.0 LIVER CELL CARCINOMA (HCC): ICD-10-CM

## 2017-06-30 ENCOUNTER — TRANSCRIBE ORDERS (OUTPATIENT)
Dept: ADMINISTRATIVE | Facility: HOSPITAL | Age: 72
End: 2017-06-30

## 2017-06-30 DIAGNOSIS — I63.9 CEREBROVASCULAR ACCIDENT (CVA), UNSPECIFIED MECHANISM (HCC): Primary | ICD-10-CM

## 2017-07-14 ENCOUNTER — HOSPITAL ENCOUNTER (OUTPATIENT)
Dept: MRI IMAGING | Facility: HOSPITAL | Age: 72
Discharge: HOME | End: 2017-07-14
Payer: MEDICARE

## 2017-07-14 DIAGNOSIS — I63.9 CEREBRAL INFARCTION (HCC): ICD-10-CM

## 2017-07-14 DIAGNOSIS — I63.9 CEREBROVASCULAR ACCIDENT (CVA), UNSPECIFIED MECHANISM (HCC): ICD-10-CM

## 2017-07-14 PROCEDURE — 70544 MR ANGIOGRAPHY HEAD W/O DYE: CPT

## 2017-07-14 PROCEDURE — 70551 MRI BRAIN STEM W/O DYE: CPT

## 2017-07-14 PROCEDURE — 70547 MR ANGIOGRAPHY NECK W/O DYE: CPT

## 2017-07-28 ENCOUNTER — TRANSCRIBE ORDERS (OUTPATIENT)
Dept: LAB | Facility: HOSPITAL | Age: 72
End: 2017-07-28

## 2017-07-28 ENCOUNTER — TRANSCRIBE ORDERS (OUTPATIENT)
Dept: ADMINISTRATIVE | Facility: HOSPITAL | Age: 72
End: 2017-07-28

## 2017-07-28 ENCOUNTER — LAB (OUTPATIENT)
Dept: LAB | Facility: HOSPITAL | Age: 72
End: 2017-07-28
Payer: MEDICARE

## 2017-07-28 ENCOUNTER — ALLSCRIPTS OFFICE VISIT (OUTPATIENT)
Dept: OTHER | Facility: OTHER | Age: 72
End: 2017-07-28

## 2017-07-28 DIAGNOSIS — R13.10 DYSPHAGIA: ICD-10-CM

## 2017-07-28 DIAGNOSIS — R13.10 DYSPHAGIA, UNSPECIFIED TYPE: Primary | ICD-10-CM

## 2017-07-28 DIAGNOSIS — Z87.39 PERSONAL HISTORY OF OTHER DISEASES OF THE MUSCULOSKELETAL SYSTEM AND CONNECTIVE TISSUE: ICD-10-CM

## 2017-07-28 DIAGNOSIS — E11.9 TYPE 2 DIABETES MELLITUS WITHOUT COMPLICATIONS (HCC): ICD-10-CM

## 2017-07-28 DIAGNOSIS — Z87.39 PERSONAL HISTORY OF ARTHRITIS: Primary | ICD-10-CM

## 2017-07-28 DIAGNOSIS — R13.10 DYSPHAGIA, UNSPECIFIED TYPE: ICD-10-CM

## 2017-07-28 LAB
ABO GROUP BLD: NORMAL
ANION GAP SERPL CALCULATED.3IONS-SCNC: 5 MMOL/L (ref 4–13)
APTT PPP: 29 SECONDS (ref 23–35)
ATRIAL RATE: 50 BPM
BLD GP AB SCN SERPL QL: NEGATIVE
BUN SERPL-MCNC: 11 MG/DL (ref 5–25)
CALCIUM SERPL-MCNC: 8.8 MG/DL (ref 8.3–10.1)
CHLORIDE SERPL-SCNC: 103 MMOL/L (ref 100–108)
CO2 SERPL-SCNC: 29 MMOL/L (ref 21–32)
CREAT SERPL-MCNC: 0.89 MG/DL (ref 0.6–1.3)
ERYTHROCYTE [DISTWIDTH] IN BLOOD BY AUTOMATED COUNT: 14.6 % (ref 11.6–15.1)
EST. AVERAGE GLUCOSE BLD GHB EST-MCNC: 183 MG/DL
GFR SERPL CREATININE-BSD FRML MDRD: 84 ML/MIN/1.73SQ M
GLUCOSE SERPL-MCNC: 300 MG/DL (ref 65–140)
HBA1C MFR BLD: 8 % (ref 4.2–6.3)
HCT VFR BLD AUTO: 45.1 % (ref 36.5–49.3)
HGB BLD-MCNC: 15 G/DL (ref 12–17)
INR PPP: 1.3 (ref 0.86–1.16)
MCH RBC QN AUTO: 29.2 PG (ref 26.8–34.3)
MCHC RBC AUTO-ENTMCNC: 33.3 G/DL (ref 31.4–37.4)
MCV RBC AUTO: 88 FL (ref 82–98)
P AXIS: 53 DEGREES
PLATELET # BLD AUTO: 107 THOUSANDS/UL (ref 149–390)
PMV BLD AUTO: 11.4 FL (ref 8.9–12.7)
POTASSIUM SERPL-SCNC: 4.3 MMOL/L (ref 3.5–5.3)
PR INTERVAL: 146 MS
PROTHROMBIN TIME: 16.3 SECONDS (ref 12.1–14.4)
QRS AXIS: -58 DEGREES
QRSD INTERVAL: 106 MS
QT INTERVAL: 456 MS
QTC INTERVAL: 415 MS
RBC # BLD AUTO: 5.14 MILLION/UL (ref 3.88–5.62)
RH BLD: POSITIVE
SODIUM SERPL-SCNC: 137 MMOL/L (ref 136–145)
SPECIMEN EXPIRATION DATE: NORMAL
T WAVE AXIS: -72 DEGREES
VENTRICULAR RATE: 50 BPM
WBC # BLD AUTO: 6.49 THOUSAND/UL (ref 4.31–10.16)

## 2017-07-28 PROCEDURE — 86900 BLOOD TYPING SEROLOGIC ABO: CPT

## 2017-07-28 PROCEDURE — 86850 RBC ANTIBODY SCREEN: CPT

## 2017-07-28 PROCEDURE — 85027 COMPLETE CBC AUTOMATED: CPT

## 2017-07-28 PROCEDURE — 93005 ELECTROCARDIOGRAM TRACING: CPT

## 2017-07-28 PROCEDURE — 85610 PROTHROMBIN TIME: CPT

## 2017-07-28 PROCEDURE — 80048 BASIC METABOLIC PNL TOTAL CA: CPT

## 2017-07-28 PROCEDURE — 85730 THROMBOPLASTIN TIME PARTIAL: CPT

## 2017-07-28 PROCEDURE — 83036 HEMOGLOBIN GLYCOSYLATED A1C: CPT

## 2017-07-28 PROCEDURE — 86901 BLOOD TYPING SEROLOGIC RH(D): CPT

## 2017-07-28 PROCEDURE — 36415 COLL VENOUS BLD VENIPUNCTURE: CPT

## 2017-07-31 LAB
ATRIAL RATE: 50 BPM
P AXIS: 53 DEGREES
PR INTERVAL: 146 MS
QRS AXIS: -58 DEGREES
QRSD INTERVAL: 106 MS
QT INTERVAL: 456 MS
QTC INTERVAL: 415 MS
T WAVE AXIS: -72 DEGREES
VENTRICULAR RATE: 50 BPM

## 2017-08-02 ENCOUNTER — ANESTHESIA EVENT (OUTPATIENT)
Dept: PERIOP | Facility: HOSPITAL | Age: 72
End: 2017-08-02
Payer: MEDICARE

## 2017-08-03 ENCOUNTER — HOSPITAL ENCOUNTER (OUTPATIENT)
Facility: HOSPITAL | Age: 72
Setting detail: OUTPATIENT SURGERY
Discharge: HOME | End: 2017-08-03
Attending: THORACIC SURGERY (CARDIOTHORACIC VASCULAR SURGERY) | Admitting: THORACIC SURGERY (CARDIOTHORACIC VASCULAR SURGERY)
Payer: MEDICARE

## 2017-08-03 ENCOUNTER — ANESTHESIA (OUTPATIENT)
Dept: PERIOP | Facility: HOSPITAL | Age: 72
End: 2017-08-03
Payer: MEDICARE

## 2017-08-03 VITALS
HEIGHT: 71 IN | SYSTOLIC BLOOD PRESSURE: 128 MMHG | RESPIRATION RATE: 20 BRPM | OXYGEN SATURATION: 98 % | WEIGHT: 210 LBS | HEART RATE: 50 BPM | DIASTOLIC BLOOD PRESSURE: 73 MMHG | BODY MASS INDEX: 29.4 KG/M2 | TEMPERATURE: 97.3 F

## 2017-08-03 LAB
GLUCOSE SERPL-MCNC: 167 MG/DL (ref 65–140)
GLUCOSE SERPL-MCNC: 192 MG/DL (ref 65–140)

## 2017-08-03 PROCEDURE — 82948 REAGENT STRIP/BLOOD GLUCOSE: CPT

## 2017-08-03 RX ORDER — ONDANSETRON 2 MG/ML
4 INJECTION INTRAMUSCULAR; INTRAVENOUS ONCE AS NEEDED
Status: DISCONTINUED | OUTPATIENT
Start: 2017-08-03 | End: 2017-08-03 | Stop reason: HOSPADM

## 2017-08-03 RX ORDER — ONDANSETRON 2 MG/ML
4 INJECTION INTRAMUSCULAR; INTRAVENOUS ONCE AS NEEDED
Status: CANCELLED | OUTPATIENT
Start: 2017-08-03

## 2017-08-03 RX ORDER — LIDOCAINE HYDROCHLORIDE 10 MG/ML
INJECTION, SOLUTION INFILTRATION; PERINEURAL AS NEEDED
Status: DISCONTINUED | OUTPATIENT
Start: 2017-08-03 | End: 2017-08-03 | Stop reason: SURG

## 2017-08-03 RX ORDER — FENTANYL CITRATE/PF 50 MCG/ML
50 SYRINGE (ML) INJECTION
Status: CANCELLED | OUTPATIENT
Start: 2017-08-03

## 2017-08-03 RX ORDER — PROPOFOL 10 MG/ML
INJECTION, EMULSION INTRAVENOUS AS NEEDED
Status: DISCONTINUED | OUTPATIENT
Start: 2017-08-03 | End: 2017-08-03 | Stop reason: SURG

## 2017-08-03 RX ORDER — SODIUM CHLORIDE, SODIUM LACTATE, POTASSIUM CHLORIDE, CALCIUM CHLORIDE 600; 310; 30; 20 MG/100ML; MG/100ML; MG/100ML; MG/100ML
75 INJECTION, SOLUTION INTRAVENOUS CONTINUOUS
Status: DISCONTINUED | OUTPATIENT
Start: 2017-08-03 | End: 2017-08-03 | Stop reason: HOSPADM

## 2017-08-03 RX ORDER — FENTANYL CITRATE/PF 50 MCG/ML
25 SYRINGE (ML) INJECTION
Status: DISCONTINUED | OUTPATIENT
Start: 2017-08-03 | End: 2017-08-03 | Stop reason: HOSPADM

## 2017-08-03 RX ORDER — SUCCINYLCHOLINE CHLORIDE 20 MG/ML
INJECTION INTRAMUSCULAR; INTRAVENOUS AS NEEDED
Status: DISCONTINUED | OUTPATIENT
Start: 2017-08-03 | End: 2017-08-03 | Stop reason: SURG

## 2017-08-03 RX ORDER — ONDANSETRON 2 MG/ML
INJECTION INTRAMUSCULAR; INTRAVENOUS AS NEEDED
Status: DISCONTINUED | OUTPATIENT
Start: 2017-08-03 | End: 2017-08-03 | Stop reason: SURG

## 2017-08-03 RX ADMIN — LIDOCAINE HYDROCHLORIDE 50 MG: 10 INJECTION, SOLUTION INFILTRATION; PERINEURAL at 07:59

## 2017-08-03 RX ADMIN — PROPOFOL 130 MG: 10 INJECTION, EMULSION INTRAVENOUS at 07:59

## 2017-08-03 RX ADMIN — SODIUM CHLORIDE, SODIUM LACTATE, POTASSIUM CHLORIDE, AND CALCIUM CHLORIDE: .6; .31; .03; .02 INJECTION, SOLUTION INTRAVENOUS at 07:54

## 2017-08-03 RX ADMIN — ONDANSETRON 4 MG: 2 INJECTION INTRAMUSCULAR; INTRAVENOUS at 08:11

## 2017-08-03 RX ADMIN — SUCCINYLCHOLINE CHLORIDE 100 MG: 20 INJECTION, SOLUTION INTRAMUSCULAR; INTRAVENOUS at 07:59

## 2017-08-28 ENCOUNTER — ALLSCRIPTS OFFICE VISIT (OUTPATIENT)
Dept: OTHER | Facility: OTHER | Age: 72
End: 2017-08-28

## 2017-08-28 ENCOUNTER — HOSPITAL ENCOUNTER (OUTPATIENT)
Dept: INFUSION CENTER | Facility: HOSPITAL | Age: 72
Discharge: HOME | End: 2017-08-28
Payer: MEDICARE

## 2017-08-28 PROCEDURE — 96523 IRRIG DRUG DELIVERY DEVICE: CPT

## 2017-08-28 RX ADMIN — Medication 300 UNITS: at 15:38

## 2017-08-28 NOTE — PLAN OF CARE
Problem: Potential for Falls  Goal: Patient will remain free of falls  INTERVENTIONS:  - Assess patient frequently for physical needs  -  Identify cognitive and physical deficits and behaviors that affect risk of falls    -  [place] fall precautions as indicated by assessment   - Educate patient/family on patient safety including physical limitations  - Instruct patient to call for assistance with activity based on assessment  - Modify environment to reduce risk of injury  - Consider OT/PT consult to assist with strengthening/mobility   Outcome: Progressing

## 2017-09-06 ENCOUNTER — GENERIC CONVERSION - ENCOUNTER (OUTPATIENT)
Dept: OTHER | Facility: OTHER | Age: 72
End: 2017-09-06

## 2017-09-14 ENCOUNTER — ALLSCRIPTS OFFICE VISIT (OUTPATIENT)
Dept: OTHER | Facility: OTHER | Age: 72
End: 2017-09-14

## 2017-09-21 ENCOUNTER — HOSPITAL ENCOUNTER (OUTPATIENT)
Dept: NEUROLOGY | Facility: AMBULATORY SURGERY CENTER | Age: 72
Discharge: HOME | End: 2017-09-21
Payer: MEDICARE

## 2017-09-21 DIAGNOSIS — G57.31 PERONEAL NEUROPATHY, RIGHT: ICD-10-CM

## 2017-09-21 DIAGNOSIS — Z87.39 PERSONAL HISTORY OF ARTHRITIS: ICD-10-CM

## 2017-09-21 PROCEDURE — 95886 MUSC TEST DONE W/N TEST COMP: CPT

## 2017-09-21 PROCEDURE — 95910 NRV CNDJ TEST 7-8 STUDIES: CPT

## 2017-10-05 ENCOUNTER — GENERIC CONVERSION - ENCOUNTER (OUTPATIENT)
Dept: OTHER | Facility: OTHER | Age: 72
End: 2017-10-05

## 2017-10-06 ENCOUNTER — GENERIC CONVERSION - ENCOUNTER (OUTPATIENT)
Dept: OTHER | Facility: OTHER | Age: 72
End: 2017-10-06

## 2017-10-06 ENCOUNTER — ALLSCRIPTS OFFICE VISIT (OUTPATIENT)
Dept: OTHER | Facility: OTHER | Age: 72
End: 2017-10-06

## 2017-10-06 ENCOUNTER — HOSPITAL ENCOUNTER (OUTPATIENT)
Dept: CT IMAGING | Facility: HOSPITAL | Age: 72
Discharge: HOME | End: 2017-10-06
Attending: SURGERY
Payer: MEDICARE

## 2017-10-06 DIAGNOSIS — C15.9 MALIGNANT NEOPLASM OF ESOPHAGUS (HCC): ICD-10-CM

## 2017-10-06 DIAGNOSIS — C22.0 LIVER CELL CARCINOMA (HCC): ICD-10-CM

## 2017-10-06 PROCEDURE — 74177 CT ABD & PELVIS W/CONTRAST: CPT

## 2017-10-06 PROCEDURE — 71260 CT THORAX DX C+: CPT

## 2017-10-06 RX ADMIN — IOHEXOL 100 ML: 350 INJECTION, SOLUTION INTRAVENOUS at 11:21

## 2017-10-07 NOTE — PROGRESS NOTES
Assessment  1  Liver lesion (573 8) (K76 9)   2  Hepatocellular carcinoma (155 0) (C22 0)   3  Esophageal cancer (150 9) (C15 9)    Plan  Liver lesion    · *1- [facility] Co-Management  *  Status: Hold For - Scheduling   Requested for: 29PIM1178   Ordered; For: Liver lesion; Ordered By: Allison Malone Performed:  Due: 32XRJ4962  Care Summary provided  : Yes   · Follow-up visit in 2 weeks Evaluation and Treatment  Follow-up  Status: Hold For -  Scheduling  Requested for: 92OFN5675   Ordered; For: Liver lesion; Ordered By: Allison Malone Performed:  Due: 60QZX3256    Discussion/Summary  Discussion Summary:   64-year-old male status post esophagectomy and liver resection  His tumor markers are normal, but his CT shows a new liver lesion  It is unclear if this is esophageal versus HCC  I've recommended liver biopsy  I will see him back once we have those results  We discussed treatment options based on the pathology including further surgery, chemotherapy, or embolization  He is agreeable to this  All his questions were answered  Counseling Documentation With Imm: The patient, patient's family was counseled regarding diagnostic results,-instructions for management,-prognosis,-risks and benefits of treatment options  Patient's Capacity to Self-Care: Patient is able to Self-Care  Medication SE Review and Pt Understands Tx: The treatment plan was reviewed with the patient/guardian  The patient/guardian understands and agrees with the treatment plan      Chief Complaint  Chief Complaint Free Text Note Form: Patient here today for a four month follow up for esophageal cancer and hepatocellular carcinoma   Patient has no complaints today      History of Present Illness  Diagnosis and Staging: [id number] esophageal adenocarcinoma May 2016esophageal adenocarcinoma October 2016[facility] October 2016   Treatment History: Taxol Carbo with radiation   Current Therapy: Observation   Disease Status: MIGUEL   Interval History: Patient returns in follow-up of his esophageal cancer and HCC  He is eating all consistencies of food without difficulty, although he did have some dysphagia a few weeks ago  His weight is stable  His tumor markers are now normal  His CEA is 4 4 and his AFP is 2  CT from October 6, 2017 revealed a new lesion in the left lobe of the liver measuring 4 1 x 2 7 cm  This was subtle  I personally reviewed the films with radiology  He is having no changes to his physician at this time  Review of Systems  Complete Male ROS Surg Onc:   Constitutional: The patient denies new or recent history of general fatigue, no recent weight loss, no change in appetite  Eyes: No complaints of visual problems, no scleral icterus  ENT: no complaints of ear pain, no hoarseness, no difficulty swallowing,-no tinnitus-and-no new masses in head, oral cavity, or neck  Cardiovascular: No complaints of chest pain, no palpitations, no ankle edema  Respiratory: No complaints of shortness of breath, no cough  Gastrointestinal: No complaints of jaundice, no bloody stools, no pale stools  Genitourinary: No complaints of dysuria, no hematuria, no nocturia, no frequent urination, no urethral discharge  Musculoskeletal: No complaints of weakness, paralysis, joint stiffness or arthralgias,  Integumentary: No complaints of rash, no new lesions  Neurological: No complaints of convulsions, no seizures, no dizziness  Hematologic/Lymphatic: No complaints of easy bruising  ROS Reviewed:   ROS reviewed  Active Problems  1  Acute bronchitis (466 0) (J20 9)   2  Acute cardioembolic stroke (530 41) (I63 9)   3  Arthritis (716 90) (M19 90)   4  Atrial fibrillation (427 31) (I48 91)   5  Chemotherapy-induced nausea (787 02,E933 1) (R11 0,T45  1X5A)   6  Diabetes (250 00) (E11 9)   7  Dysphagia (787 20) (R13 10)   8  Empyema, right (510 9) (J86 9)   9  Esophageal cancer (150 9) (C15 9)   10  Gout (274 9) (M10 9)   11   Hepatocellular carcinoma (155 0) (C22 0)   12  History of right foot drop (V13 59) (Z87 39)   13  Hyperlipidemia (272 4) (E78 5)   14  Hypertension (401 9) (I10)   15  Liver lesion (573 8) (K76 9)   16  Radiation esophagitis (530 19,E926 9) (K20 8)   17  Restless legs syndrome (333 94) (G25 81)   18  Rheumatoid arthritis (714 0) (M06 9)   19  Shortness of breath (786 05) (R06 02)   20  Swallowing difficulty (787 20) (R13 10)   21  Urinary retention (788 20) (R33 9)   22  Urinary tract infection, site unspecified (599 0) (N39 0)   23  Wheezing (786 07) (R06 2)    Past Medical History  1  History of Anxiety (300 00) (F41 9)   2  History of Coronary artery disease (414 00) (I25 10)   3  History of anemia (V12 3) (Z86 2)   4  History of depression (V11 8) (Z86 59)   5  History of diabetes mellitus (V12 29) (Z86 39)   6  History of esophagogastroduodenoscopy (EGD) (V15 29) (Z98 890)   7  History of esophagogastroduodenoscopy (EGD) (V15 29) (Z98 890)   8  History of peripheral neuropathy (V12 49) (Z86 69)   9  History of rheumatoid arthritis (V13 4) (Z87 39)   10  History of Stroke or transient ischemic attack (TIA) diagnosed during current admission    (435 9)    Surgical History  1  History of Appendectomy   2  History of Arthroscopy Knee   3  History of Cholecystotomy   4  History of Esophagogastroduodenoscopy With Biopsy   · 4/27/16   5  History of Knee Surgery   6  History of Percutaneous Placement Of Jejunostomy Tube   7  History of Tonsillectomy   8  History of Upper GI Endoscopy With Endoscopic Ultrasound Examination   · 6/1/16  Surgical History Reviewed: The surgical history was reviewed and updated today  Family History  Mother    1  Family history of cardiac disorder (V17 49) (Z82 49)   2  Family history of cerebrovascular accident (CVA) (V17 1) (Z82 3)  Father    3  Family history of Colon cancer   4  Family history of Colon carcinoma   5  Family history of cardiac disorder (V17 49) (Z82 49)   6   Family history of Inflammatory polyps of colon  Maternal Half Sister    7  Family history of cardiac disorder (V17 49) (Z82 49)   8  Family history of diabetes mellitus (V18 0) (Z83 3)   9  Family history of Mouth cancer   10  Family history of Oral cancer  Grandfather    11  Family history of Mouth cancer   12  Family history of Oral cancer  Family History Reviewed: The family history was reviewed and updated today  Social History   · Denied: History of Drug use   · Former smoker (V15 82) (L96 041)   ·    · No alcohol use   · Three children  Social History Reviewed: The social history was reviewed and updated today  Current Meds   1  Albuterol Sulfate (2 5 MG/3ML) 0 083% Inhalation Nebulization Solution; USE 1 UNIT   DOSE IN NEBULIZER EVERY 4 TO 6 HOURS AS NEEDED; Therapy: (Recorded:15Jun2016) to Recorded   2  Allopurinol 100 MG Oral Tablet; TAKE 1 TABLET DAILY Recorded   3  ALPRAZolam 0 5 MG Oral Tablet; TAKE 1 TABLET AT BEDTIME AS NEEDED; Therapy: (Recorded:15Jun2016) to Recorded   4  Amiodarone HCl - 200 MG Oral Tablet; TAKE 1 TABLET DAILY; Therapy: 06XJW7610 to (Evaluate:41Wsz2019)  Requested for: 82GSL5072; Last   Rx:67Pmz7924; Status: ACTIVE - Transmit to Pharmacy - Awaiting Verification   Ordered   5  Brimonidine Tartrate 0 2 % Ophthalmic Solution; INSTILL 1 DROP IN BOTH EYES   EVERY 12 HOURS DAILY; Therapy: (Recorded:09Mar2017) to Recorded   6  DiazePAM 5 MG Oral Tablet; take 1/2 to 1 tab as needed for sleep; Therapy: (Recorded:23Dec2016) to Recorded   7   MG Oral Capsule; PRN; Therapy: (Recorded:09Mar2017) to Recorded   8  Dorzolamide HCl-Timolol Mal 22 3-6 8 MG/ML Ophthalmic Solution; INSTILL 1 DROP   INTO BOTH EYES 2 TIMES DAILY; Therapy: (Recorded:15Jun2016) to Recorded   9  Ecotrin 81 MG TBEC; TAKE 1 TABLET DAILY; Therapy: (Recorded:09Mar2017) to Recorded   10  Eliquis 5 MG Oral Tablet; Take 1 tablet daily;     Therapy: 66RDL7988 to (Evaluate:69Qza5933)  Requested for: 13PTA7293; Last    Rx:09Dec2016; Status: [facility]IVE [facility]    Ordered   11  HydrOXYzine HCl - 25 MG Oral Tablet; 1 pill 2x daily as needed; Therapy: (Recorded:93Qbj0307) to Recorded   12  Latanoprost 0 005 % Ophthalmic Solution; 1 drop each eye in pm;    Therapy: (Recorded:15Jun2016) to Recorded   13  LORazepam 1 MG Oral Tablet; Therapy: (Recorded:06Oct2017) to Recorded   14  NovoLOG 100 UNIT/ML Subcutaneous Solution; sliding scale BID; Therapy: (Recorded:15Jun2016) to Recorded   15  NovoLOG Mix 70/30 FlexPen (70-30) 100 UNIT/ML SUSP; INJECT 40 UNIT Twice daily; Therapy: (Recorded:15Jun2016) to Recorded   16  Ondansetron HCl - 4 MG Oral Tablet; One tab PO q 4-6 hours PRN nausea; Therapy: 35OQS8834 to (Last Rx:22Jun2016) Ordered   17  Reglan 10 MG Oral Tablet; Therapy: (Recorded:18Apr2017) to Recorded   18  ROPINIRole HCl - 1 MG Oral Tablet; TAKE 3 TABLET Bedtime Recorded   19  Sertraline HCl - 25 MG Oral Tablet; TAKE 1 TABLET DAILY; Therapy: (Recorded:09Mar2017) to Recorded   20  TraMADol HCl - 50 MG Oral Tablet; 2 tabs 4x daily as needed for pain; Therapy: (Recorded:45Vhr8261) to Recorded   21  Thea Cee AEPB; Therapy: (Recorded:06Oct2017) to Recorded   22  Vitron-C TABS; Take 1 tablet daily; Therapy: (Recorded:09Mar2017) to Recorded  Medication List Reviewed: The medication list was reviewed and updated today  Allergies  1  Humira   2  levofloxacin    Vitals  Vital Signs    Recorded: 68EVH1157 01:17PM   Temperature 97 6 F   Heart Rate 60   Respiration 16   Systolic 133   Diastolic 60   Height 5 ft 10 in   Weight 211 lb 0 48 oz   BMI Calculated 30 28   BSA Calculated 2 14   O2 Saturation 99     Physical Exam    Constitutional: General appearance: The Patient is well-developed, well-nourished male who appears his stated age in no acute distress  He is pleasant and talkative      HEENT: Pupils and irises: Abnormal  -There is still a field cut in his left eye -Mucous membranes are moist  -Neck is supple without adenopathy  No JVD  Chest: Auscultation of lungs: Abnormal   diminished breath sounds over the right base  Cardiac: Heart is regular rate  Abdomen: Abdomen is soft, nontender without masses  Extremities: There is no clubbing or cyanosis  -There is no edema  Neuro: Grossly nonfocal  -Gait is normal  -(He is walking with a cane)  Lymphatic: no evidence of cervical adenopathy bilaterally  -no evidence of axillary adenopathy bilaterally  -no evidence of inguinal adenopathy bilaterally  Skin: Warm, anicteric  Results/Data  * CT CHEST ABDOMEN PELVIS W CONTRAST 37RMQ1525 10:16AM Maria De Jesus Christenseneduardo    Order Number: [id number]   Performing Comments: NO ORAL CONTRAST  IV ONLY  - Patient Instructions: To be done at [address] on 6/6/2017 at 11:00AM     Central Scheduling at [phone]  Test Name Result Flag Reference   CT CHEST ABDOMEN PELVIS W CONTRAST (Report)     CT CHEST, ABDOMEN AND PELVIS WITH IV CONTRAST     INDICATION: Esophageal carcinoma  Follow-up study  COMPARISON: 6/6/2017     TECHNIQUE: CT examination of the chest, abdomen and pelvis was performed  Reformatted images were created in axial, sagittal, and coronal planes  Radiation dose length product (DLP) for this visit: 1225 mGy-cm   This examination, like all CT scans performed in the [facility], was performed utilizing techniques to minimize radiation dose exposure, including the use of iterative    reconstruction and automated exposure control  IV Contrast: 100 mL of iohexol (OMNIPAQUE)      Enteric Contrast: No oral contrast     FINDINGS:     CHEST     LUNGS: Scattered miniscule pulmonary nodules, all less than 3 mm in size noted without interval change  There is no new or enlarging pulmonary nodule  PLEURA: Right posterior pleural thickening   On the prior study, and air-fluid collection was present posteriorly  There is complete resolution of the air and no progression of the fluid component     HEART/GREAT VESSELS: Coronary artery calcification, no pericardial effusion  MEDIASTINUM AND JORGE: Status post gastric pull-through without change in appearance from prior study  No suspicious mediastinal or hilar adenopathy  CHEST WALL AND LOWER NECK:    Normal      ABDOMEN     LIVER/BILIARY TREE: Unchanged postoperative appearance of the anterior portions of the right and left hepatic lobes  However, there is suspicion of a developing lesion in the medial segment of the left hepatic lobe best seen on image 2/57 and measuring   4 1 x 2 7 cm  The finding is subtle however there does appear to be slightly diminished central density and potentially rim enhancement  GALLBLADDER: Absent  Fluid collection underneath the gallbladder fossa remains stable since prior     SPLEEN: Unremarkable  PANCREAS: Unremarkable  ADRENAL GLANDS: Unremarkable  KIDNEYS/URETERS: Unremarkable  No hydronephrosis  STOMACH AND BOWEL: Unremarkable  APPENDIX: No findings to suggest appendicitis  ABDOMINOPELVIC CAVITY: No ascites or free intraperitoneal air  No lymphadenopathy  VESSELS: Unremarkable for patient's age  PELVIS     REPRODUCTIVE ORGANS: Unremarkable for patient's age  URINARY BLADDER: Unremarkable  ABDOMINAL WALL/INGUINAL REGIONS: Unremarkable  OSSEOUS STRUCTURES: No acute fracture or destructive osseous lesion  IMPRESSION:   1  New metastatic lesion, medial segment of left hepatic lobe measuring 4 1 x 2 7 cm    2  Other areas of the liver with postoperative change are stable  3  No other sites of new metastatic disease detected in the chest abdomen or pelvis     4  Resolution of previously seen loculated air within the right posterior pleural space       ##sigslh##sigslh       Workstation performed: [id number]     Signed by:   Maged Servin MD   10/6/17     Future Appointments    Date/Time Provider Specialty Site   12/22/2017 03:30 PM Bennie Cheek MD Neu[facility]     End of Encounter Meds  1  Eliquis 5 MG Oral Tablet; Take 1 tablet daily; Therapy: 51UNQ4324 to (Evaluate:03Vrg5947)  Requested for: 80XFB2649; Last   Rx:94Tpy1236; Status: ACTIVE - Transmit to Pharmacy - Awaiting Verification   Ordered  2  Amiodarone HCl - 200 MG Oral Tablet (Cordarone); TAKE 1 TABLET DAILY; Therapy: 44SEV2053 to (Evaluate:73Fjs1210)  Requested for: 94QBC0509; Last   Rx:39Kch3150; Status: ACTIVE - Transmit to Pharmacy - Awaiting Verification   Ordered  3  Ondansetron HCl - 4 MG Oral Tablet (Zofran); One tab PO q 4-6 hours PRN nausea; Therapy: 19YND9473 to (Last Rx:22Jun2016) Ordered  4  Reglan 10 MG Oral Tablet (Metoclopramide HCl); Therapy: (Recorded:18Apr2017) to Recorded  5  Albuterol Sulfate (2 5 MG/3ML) 0 083% Inhalation Nebulization Solution; USE 1 UNIT   DOSE IN NEBULIZER EVERY 4 TO 6 HOURS AS NEEDED; Therapy: (Recorded:15Jun2016) to Recorded   6  Allopurinol 100 MG Oral Tablet; TAKE 1 TABLET DAILY Recorded   7  ALPRAZolam 0 5 MG Oral Tablet; TAKE 1 TABLET AT BEDTIME AS NEEDED; Therapy: (Recorded:15Jun2016) to Recorded   8  Brimonidine Tartrate 0 2 % Ophthalmic Solution; INSTILL 1 DROP IN BOTH EYES   EVERY 12 HOURS DAILY; Therapy: (Recorded:09Mar2017) to Recorded   9  DiazePAM 5 MG Oral Tablet; take 1/2 to 1 tab as needed for sleep; Therapy: (Recorded:53Uig4494) to Recorded   10   MG Oral Capsule; PRN; Therapy: (Recorded:09Mar2017) to Recorded   11  Dorzolamide HCl-Timolol Mal 22 3-6 8 MG/ML Ophthalmic Solution; INSTILL 1 DROP    INTO BOTH EYES 2 TIMES DAILY; Therapy: (Recorded:15Jun2016) to Recorded   12  Ecotrin 81 MG TBEC; TAKE 1 TABLET DAILY; Therapy: (Recorded:09Mar2017) to Recorded   13  HydrOXYzine HCl - 25 MG Oral Tablet; 1 pill 2x daily as needed; Therapy: (Recorded:32Dgz6820) to Recorded   14   Latanoprost 0 005 % Ophthalmic Solution; 1 drop each eye in pm;    Therapy: (Recorded:15Jun2016) to Recorded   15  LORazepam 1 MG Oral Tablet; Therapy: (Recorded:06Oct2017) to Recorded   16  NovoLOG 100 UNIT/ML Subcutaneous Solution; sliding scale BID; Therapy: (Recorded:15Jun2016) to Recorded   17  NovoLOG Mix 70/30 FlexPen (70-30) 100 UNIT/ML SUSP; INJECT 40 UNIT Twice daily; Therapy: (Recorded:15Jun2016) to Recorded   18  ROPINIRole HCl - 1 MG Oral Tablet; TAKE 3 TABLET Bedtime Recorded   19  Sertraline HCl - 25 MG Oral Tablet; TAKE 1 TABLET DAILY; Therapy: (Recorded:09Mar2017) to Recorded   20  TraMADol HCl - 50 MG Oral Tablet; 2 tabs 4x daily as needed for pain; Therapy: (Recorded:79Rua5108) to Recorded   21  Skipper Cliche AEPB; Therapy: (Recorded:06Oct2017) to Recorded   22  Vitron-C TABS; Take 1 tablet daily;     Therapy: (Recorded:09Mar2017) to Recorded    Signatures   Electronically signed by : JAMSHID Westbrook ; Oct  6 2017  2:09PM EST                       (Author)

## 2017-10-11 ENCOUNTER — HOSPITAL ENCOUNTER (OUTPATIENT)
Dept: RADIOLOGY | Facility: HOSPITAL | Age: 72
Discharge: HOME | End: 2017-10-11
Attending: RADIOLOGY | Admitting: RADIOLOGY
Payer: MEDICARE

## 2017-10-11 VITALS
WEIGHT: 210 LBS | DIASTOLIC BLOOD PRESSURE: 65 MMHG | TEMPERATURE: 98 F | OXYGEN SATURATION: 94 % | HEIGHT: 70 IN | SYSTOLIC BLOOD PRESSURE: 142 MMHG | BODY MASS INDEX: 30.06 KG/M2 | HEART RATE: 58 BPM | RESPIRATION RATE: 18 BRPM

## 2017-10-11 DIAGNOSIS — K76.9 LIVER LESION: ICD-10-CM

## 2017-10-11 LAB
ERYTHROCYTE [DISTWIDTH] IN BLOOD BY AUTOMATED COUNT: 13.8 % (ref 11.6–15.1)
HCT VFR BLD AUTO: 41 % (ref 36.5–49.3)
HGB BLD-MCNC: 13.4 G/DL (ref 12–17)
INR PPP: 1.15 (ref 0.86–1.16)
MCH RBC QN AUTO: 29.6 PG (ref 26.8–34.3)
MCHC RBC AUTO-ENTMCNC: 32.7 G/DL (ref 31.4–37.4)
MCV RBC AUTO: 91 FL (ref 82–98)
PLATELET # BLD AUTO: 102 THOUSANDS/UL (ref 149–390)
PMV BLD AUTO: 11 FL (ref 8.9–12.7)
PROTHROMBIN TIME: 14.7 SECONDS (ref 12.1–14.4)
RBC # BLD AUTO: 4.52 MILLION/UL (ref 3.88–5.62)
WBC # BLD AUTO: 5.73 THOUSAND/UL (ref 4.31–10.16)

## 2017-10-11 PROCEDURE — 99153 MOD SED SAME PHYS/QHP EA: CPT

## 2017-10-11 PROCEDURE — 88341 IMHCHEM/IMCYTCHM EA ADD ANTB: CPT | Performed by: SURGERY

## 2017-10-11 PROCEDURE — 88334 PATH CONSLTJ SURG CYTO XM EA: CPT | Performed by: SURGERY

## 2017-10-11 PROCEDURE — 77012 CT SCAN FOR NEEDLE BIOPSY: CPT

## 2017-10-11 PROCEDURE — 88342 IMHCHEM/IMCYTCHM 1ST ANTB: CPT | Performed by: SURGERY

## 2017-10-11 PROCEDURE — 85610 PROTHROMBIN TIME: CPT | Performed by: RADIOLOGY

## 2017-10-11 PROCEDURE — 88333 PATH CONSLTJ SURG CYTO XM 1: CPT | Performed by: SURGERY

## 2017-10-11 PROCEDURE — 88313 SPECIAL STAINS GROUP 2: CPT | Performed by: SURGERY

## 2017-10-11 PROCEDURE — 85027 COMPLETE CBC AUTOMATED: CPT | Performed by: RADIOLOGY

## 2017-10-11 PROCEDURE — 99152 MOD SED SAME PHYS/QHP 5/>YRS: CPT

## 2017-10-11 PROCEDURE — 47000 NEEDLE BIOPSY OF LIVER PERQ: CPT

## 2017-10-11 PROCEDURE — 88307 TISSUE EXAM BY PATHOLOGIST: CPT | Performed by: SURGERY

## 2017-10-11 RX ORDER — MIDAZOLAM HYDROCHLORIDE 1 MG/ML
INJECTION INTRAMUSCULAR; INTRAVENOUS CODE/TRAUMA/SEDATION MEDICATION
Status: COMPLETED | OUTPATIENT
Start: 2017-10-11 | End: 2017-10-11

## 2017-10-11 RX ORDER — FENTANYL CITRATE 50 UG/ML
INJECTION, SOLUTION INTRAMUSCULAR; INTRAVENOUS CODE/TRAUMA/SEDATION MEDICATION
Status: COMPLETED | OUTPATIENT
Start: 2017-10-11 | End: 2017-10-11

## 2017-10-11 RX ORDER — SODIUM CHLORIDE 9 MG/ML
75 INJECTION, SOLUTION INTRAVENOUS CONTINUOUS
Status: DISCONTINUED | OUTPATIENT
Start: 2017-10-11 | End: 2017-10-11 | Stop reason: HOSPADM

## 2017-10-11 RX ORDER — LORAZEPAM 1 MG/1
2 TABLET ORAL
COMMUNITY
End: 2019-02-04 | Stop reason: ALTCHOICE

## 2017-10-11 RX ORDER — ROSUVASTATIN CALCIUM 40 MG/1
40 TABLET, COATED ORAL DAILY
COMMUNITY
End: 2018-04-11 | Stop reason: ALTCHOICE

## 2017-10-11 RX ORDER — AMIODARONE HYDROCHLORIDE 200 MG/1
200 TABLET ORAL DAILY
COMMUNITY

## 2017-10-11 RX ORDER — DICYCLOMINE HYDROCHLORIDE 10 MG/1
10 CAPSULE ORAL
COMMUNITY
End: 2018-04-11 | Stop reason: ALTCHOICE

## 2017-10-11 RX ORDER — DEXLANSOPRAZOLE 60 MG/1
60 CAPSULE, DELAYED RELEASE ORAL DAILY
COMMUNITY
End: 2018-09-20

## 2017-10-11 RX ADMIN — MIDAZOLAM 0.5 MG: 1 INJECTION INTRAMUSCULAR; INTRAVENOUS at 12:30

## 2017-10-11 RX ADMIN — SODIUM CHLORIDE 75 ML/HR: 0.9 INJECTION, SOLUTION INTRAVENOUS at 08:55

## 2017-10-11 RX ADMIN — FENTANYL CITRATE 50 MCG: 50 INJECTION, SOLUTION INTRAMUSCULAR; INTRAVENOUS at 12:20

## 2017-10-11 RX ADMIN — MIDAZOLAM 1 MG: 1 INJECTION INTRAMUSCULAR; INTRAVENOUS at 12:20

## 2017-10-11 NOTE — DISCHARGE INSTRUCTIONS
Percutaneous Liver Biopsy   WHAT YOU NEED TO KNOW:   A PLB is a procedure to remove a sample of tissue from your liver  The sample can be sent to a lab and tested for liver disease, cancer, or infection  After the procedure you may have pain and bruising at the biopsy site  You may also have pain in your right shoulder  These symptoms should get better in 48 to 72 hours  DISCHARGE INSTRUCTIONS:     [facility] and [facility] patients,  Contact Interventional Radiology at [phone] PATIENTS: Contact Interventional Radiology at [phone]   [facility] PATIENTS: Contact Interventional Radiology at [phone] if:  · You cannot stop the bleeding from your wound even after you hold firm pressure for 10 minutes  · Blood soaks through your bandage  · You have severe pain in your abdomen  · Your abdomen is larger than usual and feels hard  · Your neck is more swollen and you have trouble swallowing  · You feel weak or dizzy  · Your heart is beating faster than usual    · You have a fever or chills  · Your pain does not get better after you take pain medicine  · Your wound is red, swollen, or draining pus  · You have nausea or are vomiting  · Your skin is itchy, swollen, or you have a rash  · You have questions or concerns about your condition or care  Medicines:   · Acetaminophen decreases pain and fever  It is available without a doctor's order  Acetaminophen can cause liver damage if not taken correctly  · Take your home medicine as directed  · Resume your normal diet  Self-care:   · Rest as directed  Do not play sports, exercise, or lift anything heavier than 5 pounds for up to 1 week  · Drink liquids as directed  Liquids will help flush the contrast liquid out of your body  Ask how much liquid to drink each day and which liquids are best for you  · Apply firm, steady pressure if bleeding occurs  A small amount of bleeding from your wound is possible  Apply pressure with a clean gauze or towel for 5 to 10 minutes  Call 911 if bleeding becomes heavy or does not stop  · Ask your healthcare provider when to take your blood thinner or antiplatelet medicine  You may need to wait 24 to 72 hours to take your medicine  This will prevent bleeding  Follow up with your healthcare provider as directed: Write down your questions so you remember to ask them during your visits

## 2017-10-11 NOTE — BRIEF OP NOTE (RAD/CATH)
CT NEEDLE BIOPSY LIVER  Procedure Note    PATIENT NAME: James Millan  : 1945  MRN: [id number]     Pre-op Diagnosis:   1  Liver lesion      Post-op Diagnosis:   1  Liver lesion        Surgeon:   Parvez Trinh DO  Assistants:     No qualified resident was available  Estimated Blood Loss: None  Findings: Ill-defined hypodense lesion in left hepatic lobe  Specimens: Three 18G core biopsy samples left hepatic lobe lesion  Complications:  None      Anesthesia: Conscious sedation and Local    Parvez Trinh DO     Date: 10/11/2017  Time: 12:53 PM

## 2017-10-18 ENCOUNTER — TRANSCRIBE ORDERS (OUTPATIENT)
Dept: ADMINISTRATIVE | Facility: HOSPITAL | Age: 72
End: 2017-10-18

## 2017-10-18 DIAGNOSIS — D75.1 ERYTHROCYTOSIS DUE TO HEPATOMA (HCC): Primary | ICD-10-CM

## 2017-10-18 DIAGNOSIS — C22.0 ERYTHROCYTOSIS DUE TO HEPATOMA (HCC): Primary | ICD-10-CM

## 2017-10-25 ENCOUNTER — TRANSCRIBE ORDERS (OUTPATIENT)
Dept: ADMINISTRATIVE | Facility: HOSPITAL | Age: 72
End: 2017-10-25

## 2017-10-25 DIAGNOSIS — M25.511 RIGHT SHOULDER PAIN, UNSPECIFIED CHRONICITY: Primary | ICD-10-CM

## 2017-10-26 ENCOUNTER — GENERIC CONVERSION - ENCOUNTER (OUTPATIENT)
Dept: OTHER | Facility: OTHER | Age: 72
End: 2017-10-26

## 2017-10-26 ENCOUNTER — HOSPITAL ENCOUNTER (OUTPATIENT)
Dept: MRI IMAGING | Facility: HOSPITAL | Age: 72
Discharge: HOME | End: 2017-10-26
Attending: SURGERY
Payer: MEDICARE

## 2017-10-26 DIAGNOSIS — D75.1 ERYTHROCYTOSIS DUE TO HEPATOMA (HCC): ICD-10-CM

## 2017-10-26 DIAGNOSIS — C22.0 ERYTHROCYTOSIS DUE TO HEPATOMA (HCC): ICD-10-CM

## 2017-10-26 PROCEDURE — 74183 MRI ABD W/O CNTR FLWD CNTR: CPT

## 2017-10-26 PROCEDURE — A9581 GADOXETATE DISODIUM INJ: HCPCS | Performed by: SURGERY

## 2017-10-26 RX ADMIN — GADOXETATE DISODIUM 10 ML: 181.43 INJECTION, SOLUTION INTRAVENOUS at 14:08

## 2017-11-07 ENCOUNTER — APPOINTMENT (OUTPATIENT)
Dept: LAB | Facility: CLINIC | Age: 72
End: 2017-11-07
Payer: MEDICARE

## 2017-11-07 ENCOUNTER — GENERIC CONVERSION - ENCOUNTER (OUTPATIENT)
Dept: OTHER | Facility: OTHER | Age: 72
End: 2017-11-07

## 2017-11-07 ENCOUNTER — APPOINTMENT (OUTPATIENT)
Dept: RADIATION ONCOLOGY | Facility: HOSPITAL | Age: 72
End: 2017-11-07
Attending: RADIOLOGY
Payer: MEDICARE

## 2017-11-07 DIAGNOSIS — C22.0 LIVER CELL CARCINOMA (HCC): ICD-10-CM

## 2017-11-07 LAB
ALBUMIN SERPL BCP-MCNC: 3.4 G/DL (ref 3.5–5)
ALP SERPL-CCNC: 132 U/L (ref 46–116)
ALT SERPL W P-5'-P-CCNC: 32 U/L (ref 12–78)
ANION GAP SERPL CALCULATED.3IONS-SCNC: 8 MMOL/L (ref 4–13)
APTT PPP: 29 SECONDS (ref 23–35)
AST SERPL W P-5'-P-CCNC: 23 U/L (ref 5–45)
BASOPHILS # BLD AUTO: 0.02 THOUSANDS/ΜL (ref 0–0.1)
BASOPHILS NFR BLD AUTO: 0 % (ref 0–1)
BILIRUB SERPL-MCNC: 0.4 MG/DL (ref 0.2–1)
BUN SERPL-MCNC: 16 MG/DL (ref 5–25)
CALCIUM SERPL-MCNC: 9.1 MG/DL (ref 8.3–10.1)
CHLORIDE SERPL-SCNC: 104 MMOL/L (ref 100–108)
CO2 SERPL-SCNC: 29 MMOL/L (ref 21–32)
CREAT SERPL-MCNC: 1.05 MG/DL (ref 0.6–1.3)
EOSINOPHIL # BLD AUTO: 0.15 THOUSAND/ΜL (ref 0–0.61)
EOSINOPHIL NFR BLD AUTO: 2 % (ref 0–6)
ERYTHROCYTE [DISTWIDTH] IN BLOOD BY AUTOMATED COUNT: 14 % (ref 11.6–15.1)
GFR SERPL CREATININE-BSD FRML MDRD: 70 ML/MIN/1.73SQ M
GLUCOSE SERPL-MCNC: 172 MG/DL (ref 65–140)
HCT VFR BLD AUTO: 46.8 % (ref 36.5–49.3)
HGB BLD-MCNC: 15.4 G/DL (ref 12–17)
INR PPP: 1.2 (ref 0.86–1.16)
LYMPHOCYTES # BLD AUTO: 1.22 THOUSANDS/ΜL (ref 0.6–4.47)
LYMPHOCYTES NFR BLD AUTO: 19 % (ref 14–44)
MCH RBC QN AUTO: 29.3 PG (ref 26.8–34.3)
MCHC RBC AUTO-ENTMCNC: 32.9 G/DL (ref 31.4–37.4)
MCV RBC AUTO: 89 FL (ref 82–98)
MONOCYTES # BLD AUTO: 0.47 THOUSAND/ΜL (ref 0.17–1.22)
MONOCYTES NFR BLD AUTO: 7 % (ref 4–12)
NEUTROPHILS # BLD AUTO: 4.61 THOUSANDS/ΜL (ref 1.85–7.62)
NEUTS SEG NFR BLD AUTO: 72 % (ref 43–75)
PLATELET # BLD AUTO: 140 THOUSANDS/UL (ref 149–390)
PMV BLD AUTO: 11 FL (ref 8.9–12.7)
POTASSIUM SERPL-SCNC: 4.3 MMOL/L (ref 3.5–5.3)
PROT SERPL-MCNC: 7.7 G/DL (ref 6.4–8.2)
PROTHROMBIN TIME: 15.6 SECONDS (ref 12.1–14.4)
RBC # BLD AUTO: 5.26 MILLION/UL (ref 3.88–5.62)
SODIUM SERPL-SCNC: 141 MMOL/L (ref 136–145)
WBC # BLD AUTO: 6.47 THOUSAND/UL (ref 4.31–10.16)

## 2017-11-07 PROCEDURE — 82105 ALPHA-FETOPROTEIN SERUM: CPT

## 2017-11-07 PROCEDURE — 85025 COMPLETE CBC W/AUTO DIFF WBC: CPT

## 2017-11-07 PROCEDURE — 80053 COMPREHEN METABOLIC PANEL: CPT

## 2017-11-07 PROCEDURE — 99215 OFFICE O/P EST HI 40 MIN: CPT | Performed by: RADIOLOGY

## 2017-11-07 PROCEDURE — 85730 THROMBOPLASTIN TIME PARTIAL: CPT

## 2017-11-07 PROCEDURE — 36415 COLL VENOUS BLD VENIPUNCTURE: CPT

## 2017-11-07 PROCEDURE — 85610 PROTHROMBIN TIME: CPT

## 2017-11-08 LAB — AFP-TM SERPL-MCNC: 2.5 NG/ML (ref 0–8.3)

## 2017-11-10 ENCOUNTER — HOSPITAL ENCOUNTER (OUTPATIENT)
Dept: MRI IMAGING | Facility: HOSPITAL | Age: 72
Discharge: HOME | End: 2017-11-10
Payer: MEDICARE

## 2017-11-10 DIAGNOSIS — M25.511 RIGHT SHOULDER PAIN, UNSPECIFIED CHRONICITY: ICD-10-CM

## 2017-11-10 PROCEDURE — 73221 MRI JOINT UPR EXTREM W/O DYE: CPT

## 2017-11-16 ENCOUNTER — GENERIC CONVERSION - ENCOUNTER (OUTPATIENT)
Dept: OTHER | Facility: OTHER | Age: 72
End: 2017-11-16

## 2017-11-21 ENCOUNTER — GENERIC CONVERSION - ENCOUNTER (OUTPATIENT)
Dept: OTHER | Facility: OTHER | Age: 72
End: 2017-11-21

## 2017-11-28 ENCOUNTER — TELEPHONE (OUTPATIENT)
Dept: RADIOLOGY | Facility: HOSPITAL | Age: 72
End: 2017-11-28

## 2017-11-29 ENCOUNTER — TELEPHONE (OUTPATIENT)
Dept: RADIOLOGY | Facility: HOSPITAL | Age: 72
End: 2017-11-29

## 2017-11-30 ENCOUNTER — TELEPHONE (OUTPATIENT)
Dept: RADIOLOGY | Facility: HOSPITAL | Age: 72
End: 2017-11-30

## 2017-11-30 RX ORDER — SODIUM CHLORIDE 9 MG/ML
75 INJECTION, SOLUTION INTRAVENOUS CONTINUOUS
Status: CANCELLED | OUTPATIENT
Start: 2017-11-30

## 2017-12-03 ENCOUNTER — TELEPHONE (OUTPATIENT)
Dept: INPATIENT UNIT | Facility: HOSPITAL | Age: 72
End: 2017-12-03

## 2017-12-04 ENCOUNTER — ANESTHESIA (OUTPATIENT)
Dept: SURGERY | Facility: HOSPITAL | Age: 72
End: 2017-12-04
Payer: MEDICARE

## 2017-12-04 ENCOUNTER — GENERIC CONVERSION - ENCOUNTER (OUTPATIENT)
Dept: OTHER | Facility: OTHER | Age: 72
End: 2017-12-04

## 2017-12-04 ENCOUNTER — HOSPITAL ENCOUNTER (OUTPATIENT)
Dept: RADIOLOGY | Facility: HOSPITAL | Age: 72
Discharge: HOME | End: 2017-12-04
Attending: RADIOLOGY
Payer: MEDICARE

## 2017-12-04 ENCOUNTER — HOSPITAL ENCOUNTER (OUTPATIENT)
Dept: RADIOLOGY | Facility: HOSPITAL | Age: 72
Discharge: HOME | End: 2017-12-04
Attending: RADIOLOGY | Admitting: RADIOLOGY
Payer: MEDICARE

## 2017-12-04 ENCOUNTER — ANESTHESIA EVENT (OUTPATIENT)
Dept: SURGERY | Facility: HOSPITAL | Age: 72
End: 2017-12-04
Payer: MEDICARE

## 2017-12-04 ENCOUNTER — APPOINTMENT (OUTPATIENT)
Dept: RADIOLOGY | Facility: HOSPITAL | Age: 72
End: 2017-12-04
Payer: MEDICARE

## 2017-12-04 VITALS
TEMPERATURE: 97.4 F | HEIGHT: 70 IN | OXYGEN SATURATION: 95 % | SYSTOLIC BLOOD PRESSURE: 117 MMHG | WEIGHT: 210 LBS | BODY MASS INDEX: 30.06 KG/M2 | HEART RATE: 71 BPM | DIASTOLIC BLOOD PRESSURE: 66 MMHG | RESPIRATION RATE: 18 BRPM

## 2017-12-04 DIAGNOSIS — C22.0 LIVER CELL CARCINOMA (HCC): ICD-10-CM

## 2017-12-04 LAB
GLUCOSE SERPL-MCNC: 200 MG/DL (ref 65–140)
INR PPP: 2.15 (ref 0.86–1.16)
PROTHROMBIN TIME: 24.2 SECONDS (ref 12.1–14.4)

## 2017-12-04 PROCEDURE — 75726 ARTERY X-RAYS ABDOMEN: CPT

## 2017-12-04 PROCEDURE — C1769 GUIDE WIRE: HCPCS

## 2017-12-04 PROCEDURE — C1760 CLOSURE DEV, VASC: HCPCS

## 2017-12-04 PROCEDURE — C1887 CATHETER, GUIDING: HCPCS

## 2017-12-04 PROCEDURE — 75774 ARTERY X-RAY EACH VESSEL: CPT

## 2017-12-04 PROCEDURE — 78201 LIVER IMAGING STATIC ONLY: CPT

## 2017-12-04 PROCEDURE — C1894 INTRO/SHEATH, NON-LASER: HCPCS

## 2017-12-04 PROCEDURE — 85610 PROTHROMBIN TIME: CPT | Performed by: RADIOLOGY

## 2017-12-04 PROCEDURE — 82948 REAGENT STRIP/BLOOD GLUCOSE: CPT

## 2017-12-04 PROCEDURE — 36247 INS CATH ABD/L-EXT ART 3RD: CPT

## 2017-12-04 PROCEDURE — 36248 INS CATH ABD/L-EXT ART ADDL: CPT

## 2017-12-04 PROCEDURE — 74170 CT ABD WO CNTRST FLWD CNTRST: CPT

## 2017-12-04 PROCEDURE — 36245 INS CATH ABD/L-EXT ART 1ST: CPT

## 2017-12-04 PROCEDURE — A9540 TC99M MAA: HCPCS

## 2017-12-04 RX ORDER — FENTANYL CITRATE/PF 50 MCG/ML
25 SYRINGE (ML) INJECTION
Status: DISCONTINUED | OUTPATIENT
Start: 2017-12-04 | End: 2017-12-04 | Stop reason: HOSPADM

## 2017-12-04 RX ORDER — ONDANSETRON 2 MG/ML
4 INJECTION INTRAMUSCULAR; INTRAVENOUS ONCE AS NEEDED
Status: DISCONTINUED | OUTPATIENT
Start: 2017-12-04 | End: 2017-12-04 | Stop reason: HOSPADM

## 2017-12-04 RX ORDER — EPHEDRINE SULFATE 50 MG/ML
INJECTION, SOLUTION INTRAVENOUS AS NEEDED
Status: DISCONTINUED | OUTPATIENT
Start: 2017-12-04 | End: 2017-12-04 | Stop reason: SURG

## 2017-12-04 RX ORDER — PROPOFOL 10 MG/ML
INJECTION, EMULSION INTRAVENOUS CONTINUOUS PRN
Status: DISCONTINUED | OUTPATIENT
Start: 2017-12-04 | End: 2017-12-04 | Stop reason: SURG

## 2017-12-04 RX ORDER — SODIUM CHLORIDE 9 MG/ML
75 INJECTION, SOLUTION INTRAVENOUS CONTINUOUS
Status: DISCONTINUED | OUTPATIENT
Start: 2017-12-04 | End: 2017-12-04 | Stop reason: HOSPADM

## 2017-12-04 RX ORDER — ONDANSETRON 2 MG/ML
INJECTION INTRAMUSCULAR; INTRAVENOUS AS NEEDED
Status: DISCONTINUED | OUTPATIENT
Start: 2017-12-04 | End: 2017-12-04 | Stop reason: SURG

## 2017-12-04 RX ORDER — LIDOCAINE HYDROCHLORIDE 10 MG/ML
INJECTION, SOLUTION INFILTRATION; PERINEURAL AS NEEDED
Status: DISCONTINUED | OUTPATIENT
Start: 2017-12-04 | End: 2017-12-04 | Stop reason: SURG

## 2017-12-04 RX ORDER — PROPOFOL 10 MG/ML
INJECTION, EMULSION INTRAVENOUS AS NEEDED
Status: DISCONTINUED | OUTPATIENT
Start: 2017-12-04 | End: 2017-12-04 | Stop reason: SURG

## 2017-12-04 RX ORDER — MEPERIDINE HYDROCHLORIDE 25 MG/ML
12.5 INJECTION INTRAMUSCULAR; INTRAVENOUS; SUBCUTANEOUS ONCE AS NEEDED
Status: DISCONTINUED | OUTPATIENT
Start: 2017-12-04 | End: 2017-12-04 | Stop reason: HOSPADM

## 2017-12-04 RX ORDER — ROCURONIUM BROMIDE 10 MG/ML
INJECTION, SOLUTION INTRAVENOUS AS NEEDED
Status: DISCONTINUED | OUTPATIENT
Start: 2017-12-04 | End: 2017-12-04 | Stop reason: SURG

## 2017-12-04 RX ORDER — METOCLOPRAMIDE HYDROCHLORIDE 5 MG/ML
10 INJECTION INTRAMUSCULAR; INTRAVENOUS ONCE AS NEEDED
Status: DISCONTINUED | OUTPATIENT
Start: 2017-12-04 | End: 2017-12-04 | Stop reason: HOSPADM

## 2017-12-04 RX ORDER — FENTANYL CITRATE 50 UG/ML
INJECTION, SOLUTION INTRAMUSCULAR; INTRAVENOUS AS NEEDED
Status: DISCONTINUED | OUTPATIENT
Start: 2017-12-04 | End: 2017-12-04 | Stop reason: SURG

## 2017-12-04 RX ORDER — SODIUM CHLORIDE, SODIUM LACTATE, POTASSIUM CHLORIDE, CALCIUM CHLORIDE 600; 310; 30; 20 MG/100ML; MG/100ML; MG/100ML; MG/100ML
50 INJECTION, SOLUTION INTRAVENOUS CONTINUOUS
Status: DISCONTINUED | OUTPATIENT
Start: 2017-12-04 | End: 2017-12-04 | Stop reason: HOSPADM

## 2017-12-04 RX ORDER — SUCCINYLCHOLINE CHLORIDE 20 MG/ML
INJECTION INTRAMUSCULAR; INTRAVENOUS AS NEEDED
Status: DISCONTINUED | OUTPATIENT
Start: 2017-12-04 | End: 2017-12-04 | Stop reason: SURG

## 2017-12-04 RX ADMIN — FENTANYL CITRATE 25 MCG: 50 INJECTION, SOLUTION INTRAMUSCULAR; INTRAVENOUS at 12:55

## 2017-12-04 RX ADMIN — SODIUM CHLORIDE 75 ML/HR: 0.9 INJECTION, SOLUTION INTRAVENOUS at 08:41

## 2017-12-04 RX ADMIN — ROCURONIUM BROMIDE 20 MG: 10 INJECTION INTRAVENOUS at 11:50

## 2017-12-04 RX ADMIN — EPHEDRINE SULFATE 5 MG: 50 INJECTION, SOLUTION INTRAMUSCULAR; INTRAVENOUS; SUBCUTANEOUS at 11:55

## 2017-12-04 RX ADMIN — EPHEDRINE SULFATE 5 MG: 50 INJECTION, SOLUTION INTRAMUSCULAR; INTRAVENOUS; SUBCUTANEOUS at 12:05

## 2017-12-04 RX ADMIN — IOHEXOL 28 ML: 350 INJECTION, SOLUTION INTRAVENOUS at 13:45

## 2017-12-04 RX ADMIN — PROPOFOL 100 MCG/KG/MIN: 10 INJECTION, EMULSION INTRAVENOUS at 12:45

## 2017-12-04 RX ADMIN — IODIXANOL 88 ML: 320 INJECTION, SOLUTION INTRAVASCULAR at 13:39

## 2017-12-04 RX ADMIN — ONDANSETRON 4 MG: 2 INJECTION INTRAMUSCULAR; INTRAVENOUS at 12:39

## 2017-12-04 RX ADMIN — SUCCINYLCHOLINE CHLORIDE 120 MG: 20 INJECTION, SOLUTION INTRAMUSCULAR; INTRAVENOUS at 11:08

## 2017-12-04 RX ADMIN — Medication 300 UNITS: at 17:43

## 2017-12-04 RX ADMIN — LIDOCAINE HYDROCHLORIDE 50 MG: 10 INJECTION, SOLUTION INFILTRATION; PERINEURAL at 11:08

## 2017-12-04 RX ADMIN — DEXAMETHASONE SODIUM PHOSPHATE 10 MG: 10 INJECTION INTRAMUSCULAR; INTRAVENOUS at 12:39

## 2017-12-04 RX ADMIN — EPHEDRINE SULFATE 10 MG: 50 INJECTION, SOLUTION INTRAMUSCULAR; INTRAVENOUS; SUBCUTANEOUS at 11:23

## 2017-12-04 RX ADMIN — EPHEDRINE SULFATE 10 MG: 50 INJECTION, SOLUTION INTRAMUSCULAR; INTRAVENOUS; SUBCUTANEOUS at 11:39

## 2017-12-04 RX ADMIN — FENTANYL CITRATE 50 MCG: 50 INJECTION, SOLUTION INTRAMUSCULAR; INTRAVENOUS at 11:37

## 2017-12-04 RX ADMIN — PROPOFOL 200 MG: 10 INJECTION, EMULSION INTRAVENOUS at 11:08

## 2017-12-04 NOTE — ANESTHESIA POSTPROCEDURE EVALUATION
Post-Op Assessment Note      CV Status:  Stable    Mental Status:  Alert and awake    Hydration Status:  Euvolemic    PONV Controlled:  Controlled    Airway Patency:  Patent    Post Op Vitals Reviewed: Yes          Staff: CRNA           /70 (12/04/17 1445)    Temp 98 1 °F (36 7 °C) (12/04/17 1445)    Pulse 73 (12/04/17 1445)   Resp 18 (12/04/17 1445)    SpO2 98 % (12/04/17 1445)

## 2017-12-04 NOTE — SEDATION DOCUMENTATION
To nuclear med via stretcher for post scan  Remains intubated/ventilated with anesthesia at bedside    Right groin C/D/I

## 2017-12-04 NOTE — SEDATION DOCUMENTATION
To CT for scanning during SIRS mapping  Patient remains intubated/ventilated with anesthesia at bedside  Right groin sheath C/D/I

## 2017-12-04 NOTE — PROGRESS NOTES
59-year-old male with a history of esophageal cancer treated with chemo radiation and surgery, now presents with 3 liver masses  One of the lesions was biopsied, and was demonstrated to represent hepatocellular carcinoma  Patient presents for [facility] mapping procedure  Consent was obtained for both the [facility] mapping and treatment today  Due to the patient's gastric pull-through operation, he has significant issues with regurgitation/vomiting, and cannot lay flat for that reason  The Department of Anesthesiology was consulted, and will intubate the patient for the procedure

## 2017-12-04 NOTE — PROGRESS NOTES
Patient stated that his port has "not been flushed for awhile" asking if it can be flushed here before he goes home, Dr Mcadams Challenger aware, orders received to flush port, P6 charge nurse made aware

## 2017-12-04 NOTE — PERIOPERATIVE NURSING NOTE
VSS, pt denies pain or nausea, assessment unchanged, report called, no questions, pt transferred to floor via stretcher with RN

## 2017-12-04 NOTE — ANESTHESIA PREPROCEDURE EVALUATION
Review of Systems/Medical History  Patient summary reviewed  Chart reviewed  No history of anesthetic complications     Cardiovascular  Hyperlipidemia, Hypertension , No CAD, , Dysrhythmias, atrial fibrillation,    Pulmonary  Smoker ex-smoker , Sleep apnea (doesn't use CPAP currently) , ,   Comment: Hx empyema     GI/Hepatic    Esophageal disease (S/P esophagectomy with gastric pull-up 10/31/2016) esophageal cancer, GI malignancy (Hepatocellular cancer),   Comment: Hx aspiration          Endo/Other  Diabetes Insulin,      GYN       Hematology  Anemia ,     Musculoskeletal  Rheumatoid arthritis ,        Neurology    CVA (Post esophagectomy/ only slight visual field deficit remains) ,    Psychology           Physical Exam    Airway  Comment: Small mouth opening, MP 2-3    TM Distance: >3 FB       Dental   No notable dental hx     Cardiovascular  Rhythm: irregular,     Pulmonary  Breath sounds clear to auscultation,     Other Findings        Anesthesia Plan  ASA Score- 3       Anesthesia Type- general with ASA Monitors  Additional Monitors:   Airway Plan: ETT  Induction- intravenous  Informed Consent- Anesthetic plan and risks discussed with patient  I personally reviewed this patient with the CRNA  Discussed and agreed on the Anesthesia Plan with the CRNA  Remi Fernandez

## 2017-12-04 NOTE — PROGRESS NOTES
Dr Quan Miranda aware of blood sugar 200, no new orders received, may transfer to floor when patient is ready

## 2017-12-04 NOTE — DISCHARGE INSTRUCTIONS
Discharge Instructions for SIRS Mapping Procedure                                    A Sirs mapping procedure is an arteriogram  done to prepare your                                    liver for a SIRS Implantation  During the mapping your doctor will                                      embolize (block) some of your blood vessels to keep the SIRS                                           Spheres from traveling to areas outside your liver        AFTER YOU LEAVE:     Self-care:   · Limit activity: Rest for the remainder of the day of your procedure  Have some one with you until the next morning  Keep your arm or leg straight as much as possible   Rest as much as possible, sitting lying or reclining  Walk only to go to the bathroom, to bed or to eat  If the angiogram catheter was put in your leg, use the stairs as little as possible  No driving  · Keep your wound clean and dry  You may shower 24 hours after your procedure  The bandage you have on should fall off in 2-3 days  If there is any drainage from the puncture site, you should put on a clean bandage  · Watch for bleeding and bruising: It is normal to have a bruise and soreness where the angiogram catheter went in  · Diet:   · You may resume your regular diet  Small sips of flat soda will help with mild nausea  · Drink more liquids than usual for the next 24 hours                      Medications              Resume your normal medications              Start taking Prilosec 20 mg daily for the next 30 days               Please get the Medrol prescription filled prior to your implantation (you                 will start taking it after the implantation)             WHAT [address]    After your second procedure, the SIRS implant  For  72 hours  NO pregnant visitors or family  No physical contact with others for more than two hours  Sleep alone in bed   No children or pets sitting on your lap  Keep a distance of three feet between yourself and others  · IMMEDIATELY Contact Interventional Radiology at [phone] [place] PATIENTS: Contact Interventional Radiology at [phone]) Keira Back PATIENTS: Contact Interventional Radiology at [phone]) if any of the following occur:  · If your bruise gets larger or if you notice any active bleeding  APPLY DIRECT PRESSURE TO THE BLEEDING SITE  · If you notice increased swelling or have increased pain at the puncture site   · If you have any numbness or pain in the extremity of the puncture site   · If that extremity seems cold or pale  · You have fever greater than 101  · Persistent nausea or vomiting    Follow up with your primary healthcare provider  as directed: Write down your questions so you remember to ask them during your visits              SIRS IMPLANT DISCHARGE INSTRUCTIONS    WHAT YOU SHOULD KNOW:  For the next 72 hours after your SIRS implant NO pregnant visitors or family  No physical contact with others for more than two hours  Sleep alone in bed  No children or pets sitting on your lap  Keep a distance of three feet between yourself and others  AFTER YOU LEAVE:     Self-care:   · Limit activity: Rest for the remainder of the day of your procedure  Have some one with you until the next morning  Keep your arm or leg straight as much as possible  Rest as much as possible, sitting lying or reclining  Walk only to go to the bathroom, to bed or to eat  If the angiogram catheter was put in your leg, use the stairs as little as possible  No driving  · Keep your wound clean and dry  You may shower 24 hours after your procedure  The bandage you have on should fall off in 2-3 days  If there is any drainage from the puncture site, you should put on a clean bandage  · Watch for bleeding and bruising: It is normal to have a bruise and soreness where the angiogram catheter went in    · Medication Continue  to take Prilosec 20 mg daily for a total of  30 days after the initial mapping procedure  Start taking the  Medrol as directed  · Diet: You may resume your regular diet  Small sips of flat soda will help with mild nausea  Drink more liquids than usual for the next 24 hours      · IMMEDIATELY Contact Interventional Radiology at [phone] [place] PATIENTS: Contact Interventional Radiology at [phone]) Henry Booker PATIENTS: Contact Interventional Radiology at [phone]) if any of the following occur:  · If your bruise gets larger or if you notice any active bleeding  APPLY DIRECT PRESSURE TO THE BLEEDING SITE  · If you notice increased swelling or have increased pain at the puncture site   · If you have any numbness or pain in the extremity of the puncture site   · If that extremity seems cold or pale  · You have fever greater than 101  · Persistent nausea or vomiting      Follow up with your primary healthcare provider  as directed: Write down your questions so you remember to ask them during your visits

## 2017-12-07 ENCOUNTER — TELEPHONE (OUTPATIENT)
Dept: RADIOLOGY | Facility: HOSPITAL | Age: 72
End: 2017-12-07

## 2017-12-07 DIAGNOSIS — C22.0 LIVER CELL CARCINOMA (HCC): ICD-10-CM

## 2017-12-07 RX ORDER — SODIUM CHLORIDE 9 MG/ML
75 INJECTION, SOLUTION INTRAVENOUS CONTINUOUS
Status: CANCELLED | OUTPATIENT
Start: 2017-12-07

## 2017-12-07 RX ORDER — WARFARIN SODIUM 2.5 MG/1
2.5 TABLET ORAL
Status: ON HOLD | COMMUNITY
End: 2018-02-14 | Stop reason: ALTCHOICE

## 2017-12-13 ENCOUNTER — APPOINTMENT (OUTPATIENT)
Dept: RADIOLOGY | Facility: HOSPITAL | Age: 72
End: 2017-12-13
Attending: RADIOLOGY
Payer: MEDICARE

## 2017-12-13 ENCOUNTER — OFFICE VISIT (OUTPATIENT)
Dept: RADIATION ONCOLOGY | Facility: HOSPITAL | Age: 72
End: 2017-12-13
Attending: RADIOLOGY
Payer: MEDICARE

## 2017-12-13 ENCOUNTER — ANESTHESIA (OUTPATIENT)
Dept: SURGERY | Facility: HOSPITAL | Age: 72
End: 2017-12-13
Payer: MEDICARE

## 2017-12-13 ENCOUNTER — HOSPITAL ENCOUNTER (OUTPATIENT)
Dept: RADIOLOGY | Facility: HOSPITAL | Age: 72
Discharge: HOME | End: 2017-12-13
Attending: RADIOLOGY

## 2017-12-13 ENCOUNTER — ANESTHESIA EVENT (OUTPATIENT)
Dept: SURGERY | Facility: HOSPITAL | Age: 72
End: 2017-12-13
Payer: MEDICARE

## 2017-12-13 ENCOUNTER — HOSPITAL ENCOUNTER (OUTPATIENT)
Dept: RADIOLOGY | Facility: HOSPITAL | Age: 72
Discharge: HOME | End: 2017-12-13
Attending: RADIOLOGY | Admitting: RADIOLOGY
Payer: MEDICARE

## 2017-12-13 VITALS
RESPIRATION RATE: 18 BRPM | SYSTOLIC BLOOD PRESSURE: 130 MMHG | HEART RATE: 71 BPM | HEIGHT: 70 IN | DIASTOLIC BLOOD PRESSURE: 61 MMHG | BODY MASS INDEX: 30.06 KG/M2 | OXYGEN SATURATION: 93 % | WEIGHT: 210 LBS | TEMPERATURE: 97.2 F

## 2017-12-13 DIAGNOSIS — C22.0 LIVER CELL CARCINOMA (HCC): ICD-10-CM

## 2017-12-13 LAB
ALBUMIN SERPL BCP-MCNC: 2.8 G/DL (ref 3.5–5)
ALP SERPL-CCNC: 141 U/L (ref 46–116)
ALT SERPL W P-5'-P-CCNC: 30 U/L (ref 12–78)
ANION GAP SERPL CALCULATED.3IONS-SCNC: 3 MMOL/L (ref 4–13)
AST SERPL W P-5'-P-CCNC: 43 U/L (ref 5–45)
BILIRUB SERPL-MCNC: 1.07 MG/DL (ref 0.2–1)
BUN SERPL-MCNC: 16 MG/DL (ref 5–25)
CALCIUM SERPL-MCNC: 8.6 MG/DL (ref 8.3–10.1)
CHLORIDE SERPL-SCNC: 102 MMOL/L (ref 100–108)
CO2 SERPL-SCNC: 34 MMOL/L (ref 21–32)
CREAT SERPL-MCNC: 0.82 MG/DL (ref 0.6–1.3)
ERYTHROCYTE [DISTWIDTH] IN BLOOD BY AUTOMATED COUNT: 14.1 % (ref 11.6–15.1)
GFR SERPL CREATININE-BSD FRML MDRD: 87 ML/MIN/1.73SQ M
GLUCOSE P FAST SERPL-MCNC: 165 MG/DL (ref 65–99)
GLUCOSE SERPL-MCNC: 152 MG/DL (ref 65–140)
GLUCOSE SERPL-MCNC: 165 MG/DL (ref 65–140)
GLUCOSE SERPL-MCNC: 180 MG/DL (ref 65–140)
HCT VFR BLD AUTO: 45.6 % (ref 36.5–49.3)
HGB BLD-MCNC: 14.9 G/DL (ref 12–17)
INR PPP: 1.03 (ref 0.86–1.16)
MCH RBC QN AUTO: 30.1 PG (ref 26.8–34.3)
MCHC RBC AUTO-ENTMCNC: 32.7 G/DL (ref 31.4–37.4)
MCV RBC AUTO: 92 FL (ref 82–98)
PLATELET # BLD AUTO: 103 THOUSANDS/UL (ref 149–390)
PMV BLD AUTO: 11.2 FL (ref 8.9–12.7)
POTASSIUM SERPL-SCNC: 5.6 MMOL/L (ref 3.5–5.3)
PROT SERPL-MCNC: 7.6 G/DL (ref 6.4–8.2)
PROTHROMBIN TIME: 13.5 SECONDS (ref 12.1–14.4)
RBC # BLD AUTO: 4.95 MILLION/UL (ref 3.88–5.62)
SODIUM SERPL-SCNC: 139 MMOL/L (ref 136–145)
WBC # BLD AUTO: 7.69 THOUSAND/UL (ref 4.31–10.16)

## 2017-12-13 PROCEDURE — 75774 ARTERY X-RAY EACH VESSEL: CPT

## 2017-12-13 PROCEDURE — 77470 SPECIAL RADIATION TREATMENT: CPT | Performed by: RADIOLOGY

## 2017-12-13 PROCEDURE — 75898 FOLLOW-UP ANGIOGRAPHY: CPT

## 2017-12-13 PROCEDURE — 36247 INS CATH ABD/L-EXT ART 3RD: CPT

## 2017-12-13 PROCEDURE — 80053 COMPREHEN METABOLIC PANEL: CPT | Performed by: RADIOLOGY

## 2017-12-13 PROCEDURE — C1894 INTRO/SHEATH, NON-LASER: HCPCS

## 2017-12-13 PROCEDURE — C1760 CLOSURE DEV, VASC: HCPCS

## 2017-12-13 PROCEDURE — 77790 RADIATION HANDLING: CPT | Performed by: RADIOLOGY

## 2017-12-13 PROCEDURE — 82948 REAGENT STRIP/BLOOD GLUCOSE: CPT

## 2017-12-13 PROCEDURE — 36248 INS CATH ABD/L-EXT ART ADDL: CPT

## 2017-12-13 PROCEDURE — 77300 RADIATION THERAPY DOSE PLAN: CPT | Performed by: RADIOLOGY

## 2017-12-13 PROCEDURE — 37243 VASC EMBOLIZE/OCCLUDE ORGAN: CPT

## 2017-12-13 PROCEDURE — 85610 PROTHROMBIN TIME: CPT | Performed by: RADIOLOGY

## 2017-12-13 PROCEDURE — C1769 GUIDE WIRE: HCPCS

## 2017-12-13 PROCEDURE — C2616 BRACHYTX, NON-STR,YTTRIUM-90: HCPCS | Performed by: RADIOLOGY

## 2017-12-13 PROCEDURE — 85027 COMPLETE CBC AUTOMATED: CPT | Performed by: RADIOLOGY

## 2017-12-13 PROCEDURE — 79445 NUCLEAR RX INTRA-ARTERIAL: CPT | Performed by: RADIOLOGY

## 2017-12-13 PROCEDURE — 78205 HB LIVER IMAGING (3D): CPT

## 2017-12-13 PROCEDURE — 77370 RADIATION PHYSICS CONSULT: CPT | Performed by: RADIOLOGY

## 2017-12-13 PROCEDURE — 75726 ARTERY X-RAYS ABDOMEN: CPT

## 2017-12-13 RX ORDER — FENTANYL CITRATE 50 UG/ML
INJECTION, SOLUTION INTRAMUSCULAR; INTRAVENOUS AS NEEDED
Status: DISCONTINUED | OUTPATIENT
Start: 2017-12-13 | End: 2017-12-13 | Stop reason: SURG

## 2017-12-13 RX ORDER — ONDANSETRON 2 MG/ML
INJECTION INTRAMUSCULAR; INTRAVENOUS AS NEEDED
Status: DISCONTINUED | OUTPATIENT
Start: 2017-12-13 | End: 2017-12-13 | Stop reason: SURG

## 2017-12-13 RX ORDER — SODIUM CHLORIDE 9 MG/ML
75 INJECTION, SOLUTION INTRAVENOUS CONTINUOUS
Status: DISCONTINUED | OUTPATIENT
Start: 2017-12-13 | End: 2017-12-13 | Stop reason: HOSPADM

## 2017-12-13 RX ORDER — LIDOCAINE HYDROCHLORIDE 10 MG/ML
INJECTION, SOLUTION INFILTRATION; PERINEURAL AS NEEDED
Status: DISCONTINUED | OUTPATIENT
Start: 2017-12-13 | End: 2017-12-13 | Stop reason: SURG

## 2017-12-13 RX ORDER — SODIUM CHLORIDE, SODIUM LACTATE, POTASSIUM CHLORIDE, CALCIUM CHLORIDE 600; 310; 30; 20 MG/100ML; MG/100ML; MG/100ML; MG/100ML
125 INJECTION, SOLUTION INTRAVENOUS CONTINUOUS
Status: DISCONTINUED | OUTPATIENT
Start: 2017-12-13 | End: 2017-12-13 | Stop reason: HOSPADM

## 2017-12-13 RX ORDER — SUCCINYLCHOLINE CHLORIDE 20 MG/ML
INJECTION INTRAMUSCULAR; INTRAVENOUS AS NEEDED
Status: DISCONTINUED | OUTPATIENT
Start: 2017-12-13 | End: 2017-12-13 | Stop reason: SURG

## 2017-12-13 RX ORDER — FENTANYL CITRATE/PF 50 MCG/ML
25 SYRINGE (ML) INJECTION
Status: DISCONTINUED | OUTPATIENT
Start: 2017-12-13 | End: 2017-12-13 | Stop reason: HOSPADM

## 2017-12-13 RX ORDER — ALBUTEROL SULFATE 2.5 MG/3ML
2.5 SOLUTION RESPIRATORY (INHALATION) ONCE AS NEEDED
Status: DISCONTINUED | OUTPATIENT
Start: 2017-12-13 | End: 2017-12-13 | Stop reason: HOSPADM

## 2017-12-13 RX ORDER — EPHEDRINE SULFATE 50 MG/ML
INJECTION, SOLUTION INTRAVENOUS AS NEEDED
Status: DISCONTINUED | OUTPATIENT
Start: 2017-12-13 | End: 2017-12-13 | Stop reason: SURG

## 2017-12-13 RX ORDER — PROPOFOL 10 MG/ML
INJECTION, EMULSION INTRAVENOUS AS NEEDED
Status: DISCONTINUED | OUTPATIENT
Start: 2017-12-13 | End: 2017-12-13 | Stop reason: SURG

## 2017-12-13 RX ADMIN — EPHEDRINE SULFATE 5 MG: 50 INJECTION, SOLUTION INTRAMUSCULAR; INTRAVENOUS; SUBCUTANEOUS at 11:33

## 2017-12-13 RX ADMIN — NITROGLYCERIN 50 MCG: 20 INJECTION INTRAVENOUS at 11:51

## 2017-12-13 RX ADMIN — EPHEDRINE SULFATE 5 MG: 50 INJECTION, SOLUTION INTRAMUSCULAR; INTRAVENOUS; SUBCUTANEOUS at 11:45

## 2017-12-13 RX ADMIN — PROPOFOL 150 MG: 10 INJECTION, EMULSION INTRAVENOUS at 11:14

## 2017-12-13 RX ADMIN — IODIXANOL 80 ML: 320 INJECTION, SOLUTION INTRAVASCULAR at 13:41

## 2017-12-13 RX ADMIN — FENTANYL CITRATE 50 MCG: 50 INJECTION, SOLUTION INTRAMUSCULAR; INTRAVENOUS at 12:03

## 2017-12-13 RX ADMIN — SUCCINYLCHOLINE CHLORIDE 120 MG: 20 INJECTION, SOLUTION INTRAMUSCULAR; INTRAVENOUS at 11:14

## 2017-12-13 RX ADMIN — SODIUM CHLORIDE 75 ML/HR: 0.9 INJECTION, SOLUTION INTRAVENOUS at 08:00

## 2017-12-13 RX ADMIN — FENTANYL CITRATE 50 MCG: 50 INJECTION, SOLUTION INTRAMUSCULAR; INTRAVENOUS at 11:14

## 2017-12-13 RX ADMIN — SODIUM CHLORIDE: 0.9 INJECTION, SOLUTION INTRAVENOUS at 13:00

## 2017-12-13 RX ADMIN — LIDOCAINE HYDROCHLORIDE 50 MG: 10 INJECTION, SOLUTION INFILTRATION; PERINEURAL at 11:14

## 2017-12-13 RX ADMIN — EPHEDRINE SULFATE 10 MG: 50 INJECTION, SOLUTION INTRAMUSCULAR; INTRAVENOUS; SUBCUTANEOUS at 11:21

## 2017-12-13 RX ADMIN — NITROGLYCERIN 50 MCG: 20 INJECTION INTRAVENOUS at 12:23

## 2017-12-13 RX ADMIN — ONDANSETRON 4 MG: 2 INJECTION INTRAMUSCULAR; INTRAVENOUS at 11:23

## 2017-12-13 NOTE — PROGRESS NOTES
49-year-old male with a history of esophageal cancer, status post radiation therapy and surgery with gastric pull-through  Due to reflux from the surgery, the patient is scheduled with anesthesia  The patient presents with multifocal hepatocellular carcinoma affecting predominantly the left lobe, but with significant amount of tumor supplied by the right hepatic artery  Patient is going to have whole liver brachytherapy with Y 90

## 2017-12-13 NOTE — ANESTHESIA POSTPROCEDURE EVALUATION
Post-Op Assessment Note      CV Status:  Stable    Mental Status:  Alert and awake    Hydration Status:  Euvolemic    PONV Controlled:  Controlled    Airway Patency:  Patent    Post Op Vitals Reviewed: Yes          Staff: Anesthesiologist           /66 (12/13/17 1311)    Temp (!) 97 1 °F (36 2 °C) (12/13/17 1311)    Pulse 88 (12/13/17 1311)   Resp 20 (12/13/17 1311)    SpO2 98 % (12/13/17 1311)

## 2017-12-13 NOTE — DISCHARGE INSTRUCTIONS
SIRS IMPLANT DISCHARGE INSTRUCTIONS    WHAT YOU SHOULD KNOW:  For the next 72 hours after your SIRS implant NO pregnant visitors or family  No physical contact with others for more than two hours  Sleep alone in bed  No children or pets sitting on your lap  Keep a distance of three feet between yourself and others  AFTER YOU LEAVE:     Self-care:   · Limit activity: Rest for the remainder of the day of your procedure  Have some one with you until the next morning  Keep your arm or leg straight as much as possible  Rest as much as possible, sitting lying or reclining  Walk only to go to the bathroom, to bed or to eat  If the angiogram catheter was put in your leg, use the stairs as little as possible  No driving  · Keep your wound clean and dry  You may shower 24 hours after your procedure  The bandage you have on should fall off in 2-3 days  If there is any drainage from the puncture site, you should put on a clean bandage  · Watch for bleeding and bruising: It is normal to have a bruise and soreness where the angiogram catheter went in  · Medication Continue  to take Prilosec 20 mg daily for a total of  30 days after the initial mapping procedure  Start taking the  Medrol as directed  · Diet: You may resume your regular diet  Small sips of flat soda will help with mild nausea  Drink more liquids than usual for the next 24 hours      · IMMEDIATELY Contact Interventional Radiology at [phone] [facility] PATIENTS: Contact Interventional Radiology at [phone]) Eugene Night PATIENTS: Contact Interventional Radiology at [phone]) if any of the following occur:  · If your bruise gets larger or if you notice any active bleeding  APPLY DIRECT PRESSURE TO THE BLEEDING SITE  · If you notice increased swelling or have increased pain at the puncture site   · If you have any numbness or pain in the extremity of the puncture site   · If that extremity seems cold or pale      · You have fever greater than 101  · Persistent nausea or vomiting      Follow up with your primary healthcare provider  as directed: Write down your questions so you remember to ask them during your visits

## 2017-12-13 NOTE — ANESTHESIA PREPROCEDURE EVALUATION
Review of Systems/Medical History  Patient summary reviewed  Chart reviewed  No history of anesthetic complications (grade 1 view with Quiñones 3)     Cardiovascular  EKG reviewed, Exercise tolerance: poor,  Hyperlipidemia, Hypertension , Dysrhythmias (paroxysmal afib, on coumadin until 12/3, on amiodarone with last dose this AM), ,    Pulmonary  Not a smoker (former smoker) , Sleep apnea (does not use CPAP) , ,   Comment: Hx of empyema     GI/Hepatic    Liver disease (hepatocellular carcinoma), , GI malignancy (esophageal CA s/p esophagectomy),   Comment: Confirmed NPO appropriate  Patient does have hx of aspiration     Negative  ROS        Endo/Other  Diabetes (Hgb A1c 8) poorly controlled type 2 Insulin, Arthritis     GYN       Hematology  Anemia ,     Musculoskeletal  Rheumatoid arthritis , Gout,        Neurology    CVA (s/p esophagectomy, likely 2/2 new onset Afib at the time, left visual field deficit, otherwise no nubmness/tingling/weakness) , Diabetic neuropathy,    Psychology           Physical Exam    Airway    Mallampati score: II  TM Distance: >3 FB  Neck ROM: full     Dental       Cardiovascular  Rhythm: regular, Rate: normal, Cardiovascular exam normal    Pulmonary  Pulmonary exam normal No rhonchi, Decreased breath sounds, No wheezes,     Other Findings        Anesthesia Plan  ASA Score- 3       Anesthesia Type- general with ASA Monitors  Additional Monitors:   Airway Plan: ETT  Induction- intravenous and rapid sequence induction  Informed Consent- Anesthetic plan and risks discussed with patient and spouse  I personally reviewed this patient with the CRNA  Discussed and agreed on the Anesthesia Plan with the CRNA     CXR 12/10/17:  FINDINGS  Heart size and vascularity normal   Ngozi normal   Hyperinflation   Small ill-defined infiltrate for atelectasis right base with pleural thickening or fluid   This is similar compared with the previous study and could be chronic or recurrent   Tiny strand of linear fibrosis or atelectasis left costophrenic angle   No pneumothorax   No tracheal deviation   No mediastinal widening   Right power port tip in the SVC   Gastric pull-through is better appreciated on the previous CT scan   Visualized osseous structures are intact   The visualized upper abdomen is unremarkable  IMPRESSION  Small infiltrate or atelectasis right base with pleural thickening or fluid, either chronic or recurrent  TTE 11/2/16:SUMMARY  PROCEDURE INFORMATION:  This was a technically difficult study  LEFT VENTRICLE:  Systolic function was normal  Ejection fraction was estimated to be 58 %  Although no diagnostic regional wall motion abnormality was identified, this  possibility cannot be completely excluded on the basis of this study  Wall thickness was mildly increased  There was mild concentric hypertrophy  Doppler parameters were consistent with abnormal left ventricular relaxation  (grade 1 diastolic dysfunction)  LEFT ATRIUM:  The atrium was mildly dilated  TRICUSPID VALVE:  There was trace regurgitation  PULMONIC VALVE:  There was trace regurgitation  AORTA:  The root exhibited mild dilatation        Lab Results   Component Value Date    WBC 7 69 12/13/2017    HGB 14 9 12/13/2017    HCT 45 6 12/13/2017    MCV 92 12/13/2017     (L) 12/13/2017     Lab Results   Component Value Date    GLUCOSE 165 (H) 12/13/2017    CALCIUM 8 6 12/13/2017     12/13/2017    K 5 6 (H) 12/13/2017    CO2 34 (H) 12/13/2017     12/13/2017    BUN 16 12/13/2017    CREATININE 0 82 12/13/2017     Lab Results   Component Value Date    ALT 30 12/13/2017    AST 43 12/13/2017    ALKPHOS 141 (H) 12/13/2017    BILITOT 1 07 (H) 12/13/2017     Lab Results   Component Value Date    INR 1 03 12/13/2017    INR 2 15 (H) 12/04/2017    INR 1 20 (H) 11/07/2017    PROTIME 13 5 12/13/2017    PROTIME 24 2 (H) 12/04/2017    PROTIME 15 6 (H) 11/07/2017     Lab Results   Component Value Date    HGBA1C 8 0 (H) 07/28/2017

## 2017-12-13 NOTE — PERIOPERATIVE NURSING NOTE
Spoke with Rasta Coker in [facility]- patient is not to be scanned today and to return to [facility]

## 2017-12-19 ENCOUNTER — GENERIC CONVERSION - ENCOUNTER (OUTPATIENT)
Dept: OTHER | Facility: OTHER | Age: 72
End: 2017-12-19

## 2017-12-22 ENCOUNTER — ALLSCRIPTS OFFICE VISIT (OUTPATIENT)
Dept: OTHER | Facility: OTHER | Age: 72
End: 2017-12-22

## 2017-12-26 NOTE — PROGRESS NOTES
Assessment   1  Acute cardioembolic stroke (841 82) (I63 9)   2  Atrial fibrillation (427 31) (I48 91)   3  Diabetes (250 00) (E11 9)   4  Hepatocellular carcinoma (155 0) (C22 0)   5  Hyperlipidemia (272 4) (E78 5)   6  Hypertension (401 9) (I10)   7  Peripheral neuropathy (356 9) (G62 9)   8  Restless legs syndrome (333 94) (G25 81)    Plan   Peripheral neuropathy, Restless legs syndrome    · Gabapentin 100 MG Oral Capsule; take 3 caps at bedtime, for daytime sx start an    additoinal 1 cap in the AM X 1 week, then 1 cap BID X 1 week, then call    MD   Rx By: Moo Be; Dispense: 30 Days ; #:150 Capsule; Refill: 3;For: Peripheral neuropathy, Restless legs syndrome; BETH = N; Verified Transmission to [facility] # [id number]; Last Updated By: Systemipadio; 12/22/2017 4:45:48 PM    Discussion/Summary   Discussion Summary:    Stroke: From a stroke standpoint Elaine Carmen is doing reasonably well  He did not endorse any new events concerning for recurrent TIA or stroke  He reports that he does take his medication as prescribed  He has not had any recent severe bleeding issues  In the interval since his last visit to the office it has been determined that he has recurrence of his liver cancer  He has been transitioned by the [place] pharmacy from Eliquis to Coumadin  He also reports a worsening of his RLS symptoms as well as his overall neuropathy symptoms  for secondary stroke prevention will plan to continue his combination of full anticoagulation, and we will continue to defer a statin medication at this point in time considering his liver carcinoma  he should continue to follow-up with his cardiologist, and I would like him to call his cardiologist's office in regard to an abnormal sensation in his chest which occurs when he sits on the toilet, however considering his lack of chest pain, dizziness, sweating, or other associated symptoms I think it is unlikely this represents a dangerous cardiac arrhythmia   for improved control of his neuropathy symptoms and restless leg syndrome she begin taking gabapentin 300 mg at bedtime  If he notes some benefit it would be reasonable to add a morning dose for 1 week at 100 mg, and then an afternoon dose at 100 mg for a total daily dose of 500 mg  I would like him to call our office in no more than 4 weeks time to report on his progress on this medication and we would be happy to increase it as directed  am concerned about his transition to Coumadin considering that he does not have any current prescription to have his INR monitored, and recently his Coumadin was on hold for implantation of a radiation emission device into his liver  I would like him to call the [address] [place] [address] pharmacy/ anticoagulation program as well as his family physician to work on setting up monitoring of his INR  Ultimately, it may be reasonable to remain on Coumadin as the INR is easier to monitor than the anticoagulant effect of Eliquis, but in the long run I would consider Eliquis to be the superior option for him in terms of bleeding risk as well as treatment stability  I would like him to review this with his oncology team to determine their preference in this regard  will plan for him to call our office in 4 weeks time and to return to see me in 6 months  Chief Complaint   Chief Complaint Free Text Note Form: Patient present for follow-up appointment regarding stroke      History of Present Illness   HPI: Bushra Hutchinson Presents for follow-up with regard to his prior stroke  He takes his medication as prescribed  He did not describe /in Anette any events concerning for recurrent TIA or stroke  Unfortunately he seems to have had a recurrence of carcinoma in the liver  He has recently had implantation of radioactive material in the region  At the time of his cancer diagnosis he reports that the [facility] pharmacy discontinued the use of his Eliquis in favor of transitioning to Coumadin which he reports is their policy  Recently his Coumadin seems to have being placed on hold for implantation of the radiation delivering material into his liver  he is not aware of any specific plan with regard to repeat INR testing at this point in time  He reports that he occasionally gets an abnormal sensation in his chest when he sits on the toilet  He denies any associated chest pain, spread into his jaw, lightheadedness, or diaphoresis associated with this sensation  He also reports that his neuropathy and restless leg syndrome symptoms have been growing more significant recently  Review of Systems   Neurological ROS:      Constitutional: no fever, no chills, no recent weight gain, no recent weight loss, no complaints of feeling tired, no changes in appetite  HEENT: feeling congested-- and-- dysphagia  Cardiovascular: palpitations present   Respiratory: unusual or persistant cough-- and-- shortness of breath with or without exertion  Gastrointestinal: nausea,-- vomiting-- and-- loss of bowel control  Genitourinary:  no incontinence, no feelings of urinary urgency, no increase in frequency, no urinary hesitancy, no dysuria, no hematuria  Musculoskeletal:  no arthralgias, no myalgias, no immobility or loss of function, no head/neck/back pain, no pain while walking  Integumentary  no masses, no rash, no skin lesions, no livedo reticularis  Psychiatric: anxiety-- and-- depression  Endocrine loss of sexual ability or drive -- and-- erection difficulty  Hematologic/Lymphatic:  no unusual bleeding, no tendency for easy bruising, no clotting skin or lumps  Neurological General: waking up at night  Neurological Mental Status: memory problems  Neurological Cranial Nerves: loss of vision-- and-- taste or smell loss/changes  Neurological Motor findings include:  no tremor, no twitching, no cramping(pre/post exercise), no atrophy  Neurological Coordination: balance difficulties  Neurological Sensory: numbness  Neurological Gait: difficulty walking  ROS Reviewed:    ROS reviewed  Active Problems   1  Acute bronchitis (466 0) (J20 9)   2  Acute cardioembolic stroke (566 31) (I63 9)   3  Arthritis (716 90) (M19 90)   4  Atrial fibrillation (427 31) (I48 91)   5  Chemotherapy-induced nausea (787 02,E933 1) (R11 0,T45  1X5A)   6  Diabetes (250 00) (E11 9)   7  Dysphagia (787 20) (R13 10)   8  Empyema, right (510 9) (J86 9)   9  Esophageal cancer (150 9) (C15 9)   10  Gout (274 9) (M10 9)   11  Hepatocellular carcinoma (155 0) (C22 0)   12  History of right foot drop (V13 59) (Z87 39)   13  Hyperlipidemia (272 4) (E78 5)   14  Hypertension (401 9) (I10)   15  Liver lesion (573 8) (K76 9)   16  Radiation esophagitis (530 19,E926 9) (K20 8)   17  Restless legs syndrome (333 94) (G25 81)   18  Rheumatoid arthritis (714 0) (M06 9)   19  Shortness of breath (786 05) (R06 02)   20  Swallowing difficulty (787 20) (R13 10)   21  Urinary retention (788 20) (R33 9)   22  Urinary tract infection, site unspecified (599 0) (N39 0)   23  Wheezing (786 07) (R06 2)    Past Medical History   1  History of Anxiety (300 00) (F41 9)   2  History of Coronary artery disease (414 00) (I25 10)   3  History of anemia (V12 3) (Z86 2)   4  History of chemotherapy (V87 41) (Z92 21)   5  History of depression (V11 8) (Z86 59)   6  History of diabetes mellitus (V12 29) (Z86 39)   7  History of esophagogastroduodenoscopy (EGD) (V15 29) (Z98 890)   8  History of esophagogastroduodenoscopy (EGD) (V15 29) (Z98 890)   9  History of radiation therapy (V15 3) (Z92 3)   10  History of rheumatoid arthritis (V13 4) (Z87 39)   11  History of Stroke or transient ischemic attack (TIA) diagnosed during current admission      (435 9)  Active Problems And Past Medical History Reviewed: The active problems and past medical history were reviewed and updated today  Surgical History   1  History of Appendectomy   2  History of Arthroscopy Knee   3  History of Cholecystotomy   4  History of Esophagogastroduodenoscopy With Biopsy   5  History of Knee Surgery   6  History of Liver Surgery   7  History of Percutaneous Placement Of Jejunostomy Tube   8  History of Tonsillectomy   9  History of Upper GI Endoscopy With Endoscopic Ultrasound Examination  Surgical History Reviewed: The surgical history was reviewed and updated today  Family History   Mother    1  Family history of cardiac disorder (V17 49) (Z82 49)   2  Family history of cerebrovascular accident (CVA) (V17 1) (Z82 3)  Father    3  Family history of Colon cancer   4  Family history of Colon carcinoma   5  Family history of cardiac disorder (V17 49) (Z82 49)   6  Family history of Inflammatory polyps of colon  Maternal Half Sister    7  Family history of cardiac disorder (V17 49) (Z82 49)   8  Family history of diabetes mellitus (V18 0) (Z83 3)   9  Family history of Mouth cancer   10  Family history of Oral cancer  Grandfather    11  Family history of Mouth cancer   12  Family history of Oral cancer  Family History Reviewed: The family history was reviewed and updated today  Social History    · Denied: History of Drug use   · Former smoker (V15 82) (M18 674)   ·    · No alcohol use   · Three children  Social History Reviewed: The social history was reviewed and updated today  Current Meds    1  Albuterol Sulfate (2 5 MG/3ML) 0 083% Inhalation Nebulization Solution; USE 1 UNIT     DOSE IN NEBULIZER EVERY 4 TO 6 HOURS AS NEEDED; Therapy: (Recorded:85Itt5856) to Recorded   2  Allopurinol 100 MG Oral Tablet; TAKE 1 TABLET DAILY; Therapy: (Kiersten Carney) to Recorded   3  Amiodarone HCl - 100 MG Oral Tablet; TAKE 1 TABLET DAILY; Therapy: (Kiersten Carney) to Recorded   4  Amiodarone HCl - 200 MG Oral Tablet; TAKE 1 TABLET DAILY;      Therapy: 14HUC3218 to (Evaluate:25Cff6497)  Requested for: 69OFS9476; Last     Rx:[id number]; Status: ACTIVE - Transmit to Pharmacy - Awaiting Verification     Ordered   5  Bentyl 10 MG Oral Capsule; TAKE 1 CAPSULE Daily PRN; Therapy: (Elkin Lot) to Recorded   6  Cefuroxime Axetil 500 MG Oral Tablet; TAKE 1 TABLET EVERY 12 HOURS DAILY; Therapy: (Elkin Lot) to Recorded   7  Crestor 40 MG Oral Tablet; TAKE 1 TABLET DAILY; Therapy: (Elkin Lot) to Recorded   8  Dexilant 60 MG Oral Capsule Delayed Release; TAKE 1 CAPSULE DAILY EVERY     MORNING BEFORE BREAKFAST; Therapy: (Elkin Lot) to Recorded   9  DiazePAM 5 MG Oral Tablet; take 1/2 to 1 tab as needed for sleep; Therapy: (Elkin Lot) to Recorded   10  Eliquis 5 MG Oral Tablet; Take 1 tablet daily; Therapy: 73ERM9207 to (Evaluate:96Gpj7969)  Requested for: 80XRU0971; Last      Rx:[id number]; Status: ACTIVE - Transmit to [facility] Verification      Ordered   11  Guaifenesin-Codeine 100-10 MG/5ML SYRP; SWALLOW 2 TSP Every 6 hours PRN; Therapy: (Elkin Lot) to Recorded   12  HydrOXYzine HCl - 25 MG Oral Tablet; 1 pill 2x daily as needed; Therapy: (Elkin Lot) to Recorded   13  Latanoprost 0 005 % Ophthalmic Solution; 1 drop each eye in pm;      Therapy: (Recorded:83Uld1869) to Recorded   14  LORazepam 1 MG Oral Tablet; TAKE 1 TABLET Bedtime; Therapy: (Elkin Lot) to Recorded   15  NovoLOG 100 UNIT/ML Subcutaneous Solution; sliding scale BID; Therapy: (Recorded:04Agp9889) to Recorded   16  NovoLOG Mix 70/30 FlexPen (70-30) 100 UNIT/ML SUSP; INJECT 40 UNIT Twice daily; Therapy: (Recorded:90Bon8259) to Recorded   17  ROPINIRole HCl - 1 MG Oral Tablet; TAKE 3 TABLET Bedtime; Therapy: (Elkin Lot) to Recorded   18  Sertraline HCl - 25 MG Oral Tablet; TAKE 1 TABLET DAILY; Therapy: (Elkin Lot) to Recorded   19  TraMADol HCl - 50 MG Oral Tablet; 2 tabs 4x daily as needed for pain;       Therapy: (Recorded:10Otm0192) to Recorded   20  Tudorza Pressair AEPB; PRN; Therapy: (Recorded:58Ker9629) to Recorded  Medication List Reviewed: The medication list was reviewed and updated today  Allergies   1  Humira   2  levofloxacin    Vitals   Signs   Recorded: 91Lnt4157 03:44PM   Heart Rate: 63  Systolic: 298  Diastolic: 68  Height: 5 ft 10 in  Weight: 210 lb   BMI Calculated: 30 13  BSA Calculated: 2 13    Physical Exam     At the time of my evaluation the patient was awake and alert with intact an appropriate language in mental status function  There were no obvious cranial neuropathies or lateralizing weakness apparent  Future Appointments      Date/Time Provider Specialty Site   01/04/2018 03:15 PM JAMSHID Javier   Surgical Oncology [facility] [address]     Signatures    Electronically signed by : Sekou Verdugo MD; Dec 26 2017  1:41AM EST                       (Author)

## 2018-01-04 ENCOUNTER — TRANSCRIBE ORDERS (OUTPATIENT)
Dept: ADMINISTRATIVE | Facility: HOSPITAL | Age: 73
End: 2018-01-04

## 2018-01-04 DIAGNOSIS — C22.0 LIVER CARCINOMA (HCC): Primary | ICD-10-CM

## 2018-01-08 ENCOUNTER — GENERIC CONVERSION - ENCOUNTER (OUTPATIENT)
Dept: NEUROLOGY | Facility: CLINIC | Age: 73
End: 2018-01-08

## 2018-01-09 NOTE — MISCELLANEOUS
Message   Recorded as Task   Date: 10/21/2016 10:50 AM, Created By: Wilbur Servin   Task Name: Follow Up   Assigned To: Rianna Morales   Regarding Patient: Rimma Srivastava, Status: Active   CommentAnnemarie Damon - 21 Oct 2016 10:50 AM     TASK CREATED  Caller: Self; Other; [phone] (Home)  patient called this morning and indicated that he wanted to cancel his appointment for today with Dr Lazaro Smith which was scheduled for 1:15  I asked him to reschedule and he indicated that he was having surgery and didn't wish to reschedule his appointment at this time  No appointment was rescheduled  Active Problems    1  Acute bronchitis (466 0) (J20 9)   2  Arthritis (716 90) (M19 90)   3  Chemotherapy-induced nausea (787 02,E933 1) (R11 0,T45  1X5A)   4  Diabetes (250 00) (E11 9)   5  Dysphagia (787 20) (R13 10)   6  Esophageal cancer (150 9) (C15 9)   7  Gout (274 9) (M10 9)   8  Hepatocellular carcinoma (155 0) (C22 0)   9  Hyperlipidemia (272 4) (E78 5)   10  Hypertension (401 9) (I10)   11  Liver lesion (573 8) (K76 9)   12  Radiation esophagitis (530 19) (K20 8)   13  Restless legs syndrome (333 94) (G25 81)   14  Rheumatoid arthritis (714 0) (M06 9)   15  Shortness of breath (786 05) (R06 02)   16  Swallowing difficulty (787 20) (R13 10)   17  Urinary retention (788 20) (R33 9)   18  Urinary tract infection, site unspecified (599 0) (N39 0)   19  Wheezing (786 07) (R06 2)    Current Meds   1  Albuterol Sulfate (2 5 MG/3ML) 0 083% Inhalation Nebulization Solution; USE 1 UNIT   DOSE IN NEBULIZER EVERY 4 TO 6 HOURS AS NEEDED; Therapy: (Recorded:15Jun2016) to Recorded   2  Allopurinol 100 MG Oral Tablet; TAKE 1 TABLET DAILY Recorded   3  ALPRAZolam 0 5 MG Oral Tablet; TAKE 1 TABLET AT BEDTIME AS NEEDED; Therapy: (Recorded:15Jun2016) to Recorded   4  Carafate 1 GM/10ML Oral Suspension; TAKE 10 ML 4 TIMES DAILY Recorded   5  DiazePAM 5 MG Oral Tablet; Take 1/2 to 1 tablet as needed to sleep;    Therapy: (Recorded:15Jun2016) to Recorded   6  Dorzolamide HCl-Timolol Mal 22 3-6 8 MG/ML Ophthalmic Solution; INSTILL 1 DROP   INTO BOTH EYES 2 TIMES DAILY; Therapy: (Recorded:15Jun2016) to Recorded   7  Latanoprost 0 005 % Ophthalmic Solution; 1 drop each eye in pm;   Therapy: (Recorded:15Jun2016) to Recorded   8  NovoLOG 100 UNIT/ML Subcutaneous Solution; sliding scale BID; Therapy: (Recorded:15Jun2016) to Recorded   9  NovoLOG Mix 70/30 FlexPen (70-30) 100 UNIT/ML SUSP; INJECT 40 UNIT Twice daily; Therapy: (Recorded:15Jun2016) to Recorded   10  Omeprazole 40 MG Oral Capsule Delayed Release; TAKE 1 CAPSULE TWICE DAILY; Therapy: (Recorded:15Jun2016) to Recorded   11  Ondansetron HCl - 4 MG Oral Tablet (Zofran); One tab PO q 4-6 hours PRN nausea; Therapy: 29TJG6784 to (Last Rx:22Jun2016) Ordered   12  ROPINIRole HCl - 1 MG Oral Tablet; TAKE 3 TABLET Bedtime Recorded   13  Sucralfate 1 GM Oral Tablet; Therapy: (Recorded:97Uuq6107) to Recorded    Allergies    1   Humira   2  levofloxacin    Signatures   Electronically signed by : JAMSHID Fuentes ,DO; Oct 21 2016  4:29PM EST

## 2018-01-09 NOTE — MISCELLANEOUS
Message   Recorded as Task   Date: 02/07/2017 12:45 PM, Created By: Naomi Oro   Task Name: Care Coordination   Assigned To: Shirley Batres   Regarding Patient: Alison Whittaker, Status: Active   Comment:    Suzi Tejada - 07 Feb 2017 12:45 PM     TASK CREATED  Caller: Self; Care Coordination; [phone] (Home)  Olga Bob / DR Peg Galvez PT, WOULD [place]  HE IS VERY WEAK AND HAVING DIFFICULTY BREATHING  HE WAS ALSO LOOKING FOR A 'S APPT, BUT WANTED TO TALK FIRST  PLEASE CALL  Patient reports that he is feeling very weak and having SOB  This is getting progressively worse  He oxygen was ordered by his pcp  he states even with oxygen he is getting SOB with ambulation  I suggested patient go to the ER for evaluation  He was agreeable but says he will go tomorrow  I explained he should be going today - I told him to call an ambulance if things are getting worse  He was agreeable and verbalized understanding      Active Problems    1  Acute bronchitis (466 0) (J20 9)   2  Acute cardioembolic stroke (497 67) (I63 9)   3  Arthritis (716 90) (M19 90)   4  Atrial fibrillation (427 31) (I48 91)   5  Chemotherapy-induced nausea (787 02,E933 1) (R11 0,T45  1X5A)   6  Diabetes (250 00) (E11 9)   7  Dysphagia (787 20) (R13 10)   8  Esophageal cancer (150 9) (C15 9)   9  Gout (274 9) (M10 9)   10  Hepatocellular carcinoma (155 0) (C22 0)   11  Hyperlipidemia (272 4) (E78 5)   12  Hypertension (401 9) (I10)   13  Liver lesion (573 8) (K76 9)   14  Radiation esophagitis (530 19) (K20 8)   15  Restless legs syndrome (333 94) (G25 81)   16  Rheumatoid arthritis (714 0) (M06 9)   17  Shortness of breath (786 05) (R06 02)   18  Swallowing difficulty (787 20) (R13 10)   19  Urinary retention (788 20) (R33 9)   20  Urinary tract infection, site unspecified (599 0) (N39 0)   21  Wheezing (786 07) (R06 2)    Current Meds   1   Albuterol Sulfate (2 5 MG/3ML) 0 083% Inhalation Nebulization Solution; USE 1 UNIT   DOSE IN NEBULIZER EVERY 4 TO 6 HOURS AS NEEDED; Therapy: (Recorded:15Jun2016) to Recorded   2  Allopurinol 100 MG Oral Tablet; TAKE 1 TABLET DAILY Recorded   3  ALPRAZolam 0 5 MG Oral Tablet; TAKE 1 TABLET AT BEDTIME AS NEEDED; Therapy: (Recorded:15Jun2016) to Recorded   4  Amiodarone HCl - 200 MG Oral Tablet (Cordarone); TAKE 1 TABLET DAILY; Therapy: 66CZF2235 to (Evaluate:61Etx7098)  Requested for: 53QZV4247; Last   Rx:09Dec2016; Status: ACTIVE - Transmit to Pharmacy - Awaiting Verification   Ordered   5  DiazePAM 5 MG Oral Tablet; take 1/2 to 1 tab as needed for sleep; Therapy: (Recorded:23Dec2016) to Recorded   6  Dorzolamide HCl-Timolol Mal 22 3-6 8 MG/ML Ophthalmic Solution; INSTILL 1 DROP   INTO BOTH EYES 2 TIMES DAILY; Therapy: (Recorded:15Jun2016) to Recorded   7  Eliquis 5 MG Oral Tablet; Take 1 tablet daily; Therapy: 89RSR2716 to (Evaluate:16Dec2016)  Requested for: 99UFC5727; Last   Rx:[id number]; Status: ACTIVE - Transmit to Pharmacy - Awaiting Verification   Ordered   8  HydrOXYzine HCl - 25 MG Oral Tablet; 1 pill 2x daily as needed; Therapy: (Recorded:23Dec2016) to Recorded   9  Latanoprost 0 005 % Ophthalmic Solution; 1 drop each eye in pm;   Therapy: (Recorded:15Jun2016) to Recorded   10  LORazepam 0 5 MG Oral Tablet; 1-2 tabs under the tongue prio to meals; Therapy: 23Dec2016 to (Evaluate:22Jan2017); Last Rx:23Dec2016 Ordered   11  NovoLOG 100 UNIT/ML Subcutaneous Solution; sliding scale BID; Therapy: (Recorded:15Jun2016) to Recorded   12  NovoLOG Mix 70/30 FlexPen (70-30) 100 UNIT/ML SUSP; INJECT 40 UNIT Twice daily; Therapy: (Recorded:15Jun2016) to Recorded   13  Ondansetron HCl - 4 MG Oral Tablet (Zofran); One tab PO q 4-6 hours PRN nausea; Therapy: 97BAH2003 to (Last Rx:22Jun2016) Ordered   14  Potassium Chloride 10 MEQ TBCR; Therapy: (Recorded:16Nov2016) to Recorded   15  ROPINIRole HCl - 1 MG Oral Tablet; TAKE 3 TABLET Bedtime Recorded   16   Sertraline HCl - 25 MG Oral Tablet; Therapy: (Recorded:90Uqj5027) to Recorded   17  TraMADol HCl - 50 MG Oral Tablet; 2 tabs 4x daily as needed for pain; Therapy: (Recorded:99Act5311) to Recorded    Allergies    1   Humira   2  levofloxacin    Signatures   Electronically signed by : Branden Blair, ; Feb 7 2017  1:15PM EST                       (Author)

## 2018-01-09 NOTE — MISCELLANEOUS
Message   Recorded as Task   Date: 02/13/2017 03:48 PM, Created By: Kenya Shafer   Task Name: Call Back   Assigned To: Lesia Bailey   Regarding Patient: Solomon Andre, Status: In Progress   Era Mendes - 13 Feb 2017 3:48 PM     TASK CREATED  Caller: Adele Bhatti; Other; [phone]  called from Dr Hema Escobedo office (internal medicine) wanting to let us know that the pt was treated for pneumonia last week and is much better  His hemoglobin is low though  On Friday it was 8 1 and his iron was 15  FYI If we need anything we call them at [phone]   Lesia Bailey - 14 Feb 2017 9:16 AM     TASK IN PROGRESS   Spoke with patient yesteday and today - he will have [facility] call me to discuss additional lab work that should be drawn when they are at his house today  Iron, TIBC and Ferritin      Active Problems    1  Acute bronchitis (466 0) (J20 9)   2  Acute cardioembolic stroke (173 86) (I63 9)   3  Arthritis (716 90) (M19 90)   4  Atrial fibrillation (427 31) (I48 91)   5  Chemotherapy-induced nausea (787 02,E933 1) (R11 0,T45  1X5A)   6  Diabetes (250 00) (E11 9)   7  Dysphagia (787 20) (R13 10)   8  Esophageal cancer (150 9) (C15 9)   9  Gout (274 9) (M10 9)   10  Hepatocellular carcinoma (155 0) (C22 0)   11  Hyperlipidemia (272 4) (E78 5)   12  Hypertension (401 9) (I10)   13  Liver lesion (573 8) (K76 9)   14  Radiation esophagitis (530 19) (K20 8)   15  Restless legs syndrome (333 94) (G25 81)   16  Rheumatoid arthritis (714 0) (M06 9)   17  Shortness of breath (786 05) (R06 02)   18  Swallowing difficulty (787 20) (R13 10)   19  Urinary retention (788 20) (R33 9)   20  Urinary tract infection, site unspecified (599 0) (N39 0)   21  Wheezing (786 07) (R06 2)    Current Meds   1  Albuterol Sulfate (2 5 MG/3ML) 0 083% Inhalation Nebulization Solution; USE 1 UNIT   DOSE IN NEBULIZER EVERY 4 TO 6 HOURS AS NEEDED; Therapy: (Recorded:15Jun2016) to Recorded   2   Allopurinol 100 MG Oral Tablet; TAKE 1 TABLET DAILY Recorded   3  ALPRAZolam 0 5 MG Oral Tablet; TAKE 1 TABLET AT BEDTIME AS NEEDED; Therapy: (Recorded:15Jun2016) to Recorded   4  Amiodarone HCl - 200 MG Oral Tablet (Cordarone); TAKE 1 TABLET DAILY; Therapy: 44DOR0993 to (Evaluate:39Qyz0241)  Requested for: 33XQU0982; Last   Rx:[id number]; Status: ACTIVE - Transmit to Pharmacy - Awaiting Verification   Ordered   5  DiazePAM 5 MG Oral Tablet; take 1/2 to 1 tab as needed for sleep; Therapy: (Recorded:23Dec2016) to Recorded   6  Dorzolamide HCl-Timolol Mal 22 3-6 8 MG/ML Ophthalmic Solution; INSTILL 1 DROP   INTO BOTH EYES 2 TIMES DAILY; Therapy: (Recorded:15Jun2016) to Recorded   7  Eliquis 5 MG Oral Tablet; Take 1 tablet daily; Therapy: 38EIG6586 to (Evaluate:16Dec2016)  Requested for: 34VBD8463; Last   Rx:[id number]; Status: ACTIVE - Transmit to Pharmacy - Awaiting Verification   Ordered   8  HydrOXYzine HCl - 25 MG Oral Tablet; 1 pill 2x daily as needed; Therapy: (Recorded:23Dec2016) to Recorded   9  Latanoprost 0 005 % Ophthalmic Solution; 1 drop each eye in pm;   Therapy: (Recorded:15Jun2016) to Recorded   10  LORazepam 0 5 MG Oral Tablet; 1-2 tabs under the tongue prio to meals; Therapy: 23Dec2016 to (Evaluate:22Jan2017); Last Rx:23Dec2016 Ordered   11  NovoLOG 100 UNIT/ML Subcutaneous Solution; sliding scale BID; Therapy: (Recorded:15Jun2016) to Recorded   12  NovoLOG Mix 70/30 FlexPen (70-30) 100 UNIT/ML SUSP; INJECT 40 UNIT Twice daily; Therapy: (Recorded:15Jun2016) to Recorded   13  Ondansetron HCl - 4 MG Oral Tablet (Zofran); One tab PO q 4-6 hours PRN nausea; Therapy: 00JEO8868 to (Last Rx:22Jun2016) Ordered   14  Potassium Chloride 10 MEQ TBCR; Therapy: (Recorded:16Nov2016) to Recorded   15  ROPINIRole HCl - 1 MG Oral Tablet; TAKE 3 TABLET Bedtime Recorded   16  Sertraline HCl - 25 MG Oral Tablet; Therapy: (Recorded:30Vxl6580) to Recorded   17   TraMADol HCl - 50 MG Oral Tablet; 2 tabs 4x daily as needed for pain; Therapy: (Recorded:09Gyr9222) to Recorded    Allergies    1   Humira   2  levofloxacin    Signatures   Electronically signed by : Marisa Merlin, ; Feb 15 2017  8:49AM EST                       (Author)

## 2018-01-09 NOTE — MISCELLANEOUS
Message   Recorded as Task   Date: 06/16/2016 04:42 PM, Created By: Candice Hernandez   Task Name: Care Coordination   Assigned To: Ravindra Turner   Regarding Patient: Aneudy Moyer, Status: In Progress   Linda Barbosa - 16 Jun 2016 4:42 PM     TASK CREATED  Please call patient about chemo set up etc at the [facility], as well as Dr Sly Borrego [phone] to do concurrent XRT  pt has already had simulation  Lesia Bailey - 17 Jun 2016 1:16 PM     TASK IN PROGRESS   Spoke with Dr Chaya Prado office today - They will make arrangements for patient to begin RT on Monday 6/27/16 - Patient will have first chemotherapy that day as well  Will review schedule at [facility] on 6/22/16  Patient verbalized understanding of plan and instructions      Active Problems    1  Arthritis (716 90) (M19 90)   2  Diabetes (250 00) (E11 9)   3  Difficulty swallowing (787 20) (R13 10)   4  Dysphagia (787 20) (R13 10)   5  Esophageal cancer (150 9) (C15 9)   6  Gout (274 9) (M10 9)   7  Hyperlipidemia (272 4) (E78 5)   8  Hypertension (401 9) (I10)   9  Restless legs syndrome (333 94) (G25 81)   10  Rheumatoid arthritis (714 0) (M06 9)   11  Shortness of breath (786 05) (R06 02)   12  Wheezing (786 07) (R06 2)    Current Meds   1  Albuterol Sulfate (2 5 MG/3ML) 0 083% Inhalation Nebulization Solution; USE 1 UNIT   DOSE IN NEBULIZER EVERY 4 TO 6 HOURS AS NEEDED; Therapy: (Recorded:15Jun2016) to Recorded   2  Allopurinol 100 MG Oral Tablet; TAKE 1 TABLET DAILY; Therapy: (Recorded:15Jun2016) to Recorded   3  ALPRAZolam 0 5 MG Oral Tablet; TAKE 1 TABLET AT BEDTIME AS NEEDED; Therapy: (Recorded:15Jun2016) to Recorded   4  Brimonidine Tartrate 0 2 % Ophthalmic Solution; INSTILL 1 DROP IN BOTH EYES   EVERY 12 HOURS DAILY; Therapy: (Recorded:15Jun2016) to Recorded   5  Clotrimazole-Betamethasone 1-0 05 % External Cream; Apply BID as needed; Therapy: (Recorded:15Jun2016) to Recorded   6  Diazepam 5 MG Oral Tablet;  Take 1/2 to 1 tablet as needed to sleep; Therapy: (Recorded:15Jun2016) to Recorded   7  Dorzolamide HCl-Timolol Mal 22 3-6 8 MG/ML Ophthalmic Solution; INSTILL 1 DROP   INTO BOTH EYES 2 TIMES DAILY; Therapy: (Recorded:15Jun2016) to Recorded   8  Ecotrin 81 MG TBEC; take 2 tablet daily; Therapy: (Recorded:15Jun2016) to Recorded   9  HydrOXYzine HCl - 25 MG Oral Tablet; Take 1 pill BID PRN; Therapy: (Recorded:15Jun2016) to Recorded   10  Hyoscyamine Sulfate 0 125 MG Sublingual Tablet Sublingual; Take 2 under tongue    before meals; Therapy: (Recorded:15Jun2016) to Recorded   11  Iron (Ferrous Sulfate) 325 MG TABS; TAKE 1 TABLET 3 TIMES DAILY; Therapy: (Recorded:15Jun2016) to Recorded   12  Latanoprost 0 005 % Ophthalmic Solution; 1 drop each eye in pm;    Therapy: (Recorded:15Jun2016) to Recorded   13  Levocetirizine Dihydrochloride 5 MG Oral Tablet; TAKE 1 TABLET DAILY; Therapy: (Recorded:15Jun2016) to Recorded   14  Lisinopril 20 MG Oral Tablet; TAKE 1 TABLET DAILY; Therapy: (Recorded:15Jun2016) to Recorded   15  Magnesium 500 MG Oral Capsule; take 1 capsule daily; Therapy: (Recorded:15Jun2016) to Recorded   16  MetFORMIN HCl - 1000 MG Oral Tablet; TAKE 1 TABLET EVERY 12 HOURS; Therapy: (Recorded:15Jun2016) to Recorded   17  NovoLOG 100 UNIT/ML Subcutaneous Solution; sliding scale BID; Therapy: (Recorded:15Jun2016) to Recorded   18  NovoLOG Mix 70/30 FlexPen (70-30) 100 UNIT/ML SUSP; INJECT 40 UNIT Twice daily; Therapy: (Recorded:15Jun2016) to Recorded   19  Omeprazole 40 MG Oral Capsule Delayed Release; TAKE 1 CAPSULE TWICE DAILY; Therapy: (Recorded:15Jun2016) to Recorded   20  Pioglitazone HCl - 15 MG Oral Tablet; TAKE 1 TABLET ONCE DAILY; Therapy: (Recorded:15Jun2016) to Recorded   21  Potassium Gluconate 595 MG Oral Tablet; TAKE 1 TABLET DAILY; Therapy: (Recorded:15Jun2016) to Recorded   22  ROPINIRole HCl - 1 MG Oral Tablet; TAKE 3 TABLET Bedtime;     Therapy: (Recorded:15Jun2016) to Recorded   23  Santyl OINT; apply to ankle wound  BID; Therapy: (Recorded:15Jun2016) to Recorded   24  Simvastatin 10 MG Oral Tablet; TAKE 1 TABLET DAILY; Therapy: (Recorded:15Jun2016) to Recorded   25  TraMADol HCl - 50 MG Oral Tablet; TAKE 2 TABLETS 4 TIMES DAILY AS NEEDED FOR    PAIN;    Therapy: (Recorded:15Jun2016) to Recorded   26  Vitamin D3 2000 UNIT Oral Tablet; Take 1 tablet daily; Therapy: (Recorded:15Jun2016) to Recorded    Allergies    1   Humira   2  levofloxacin    Signatures   Electronically signed by : Mirian Mitchell, ; Jun 20 2016  2:24PM EST                       (Author)

## 2018-01-11 NOTE — PROGRESS NOTES
Assessment    1  Acute cardioembolic stroke (403 70) (I63 9)    Plan  Acute cardioembolic stroke    · Follow-up visit in 6 months Evaluation and Treatment  Follow-up  Status: Hold For -  Scheduling  Requested for: 86TWG6381   Ordered; For: Acute cardioembolic stroke; Ordered By: Johnathan Strong Performed:  Due: 36ZUJ1927    Discussion/Summary  Discussion Summary:   The patient is doing well from a cerebrovascular point of view  He should remain on an oral anticoagulant namely Eliquis and low-dose aspirin  He is tolerating this well without any bleeding problems  I have asked to see him in 6 months  Patient's Capacity to Self-Care: Patient is able to Self-Care  Medication SE Review and Pt Understands Tx: Possible side effects of new medications were reviewed with the patient/guardian today  The treatment plan was reviewed with the patient/guardian  The patient/guardian understands and agrees with the treatment plan   Patient Guardian understands agrees: The treatment plan was reviewed with the patient/guardian  The patient/guardian understands and agrees with the treatment plan   Counseling Documentation With Imm: The patient, patient's family was counseled regarding diagnostic results, instructions for management, risk factor reductions, prognosis, patient and family education, impressions, risks and benefits of treatment options, importance of compliance with treatment  total time of encounter was 35 minutes and 20 minutes was spent counseling  Headache [facility]:   The patient was counseled regarding;      Chief Complaint  Chief Complaint Free Text Note Form: Patient presents for follow-up appointment regarding stroke      History of Present Illness  HPI: The patient returns today is doing well from the cerebrovascular point of view with no further events of visual obscuration or loss   He still has decreased vision in his left homonymous inferior quadrant territory but he has had no further visual obscurations or visual intrusions      Review of Systems  Neurological ROS:   Constitutional: no fever, no chills, no recent weight gain, no recent weight loss, no complaints of feeling tired, no changes in appetite  HEENT:  no sinus problems, not feeling congested, no blurred vision, no dryness of the eyes, no eye pain, no hearing loss, no tinnitus, no mouth sores, no sore throat, no hoarseness, no dysphagia, no masses, no bleeding  Cardiovascular:  no chest pain or pressure, no palpitations present, the heart rate was not rapid or irregular, no swelling in the arms or legs, no poor circulation  Respiratory: unusual or persistant cough and shortness of breath with or without exertion  Gastrointestinal: nausea and vomiting  Genitourinary:  no incontinence, no feelings of urinary urgency, no increase in frequency, no urinary hesitancy, no dysuria, no hematuria  Musculoskeletal:  no arthralgias, no myalgias, no immobility or loss of function, no head/neck/back pain, no pain while walking  Integumentary  no masses, no rash, no skin lesions, no livedo reticularis  Psychiatric:  no anxiety, no depression, no mood swings, no psychiatric hospitalizations, no sleep problems  Endocrine  no unusual weight loss or gain, no excessive urination, no excessive thirst, no hair loss or gain, no hot or cold intolerance, no menstrual period change or irregularity, no loss of sexual ability or drive, no erection difficulty, no nipple discharge  Hematologic/Lymphatic:  no unusual bleeding, no tendency for easy bruising, no clotting skin or lumps  Neurological General:  no headache, no nausea or vomiting, no lightheadedness, no convulsions, no blackouts, no syncope, no trauma, no photopsia, no increased sleepiness, no trouble falling asleep, no snoring, no awakening at night     Neurological Mental Status:  no confusion, no mood swings, no alteration or loss of consciousness, no difficulty expressing/understanding speech, no memory problems  Neurological Cranial Nerves:  no blurry or double vision, no loss of vision, no face drooping, no facial numbness or weakness, no taste or smell loss/changes, no hearing loss or ringing, no vertigo or dizziness, no dysphagia, no slurred speech  Neurological Motor findings include:  no tremor, no twitching, no cramping(pre/post exercise), no atrophy  Neurological Coordination:  no unsteadiness, no vertigo or dizziness, no clumsiness, no problems reaching for objects  Neurological Sensory:  no numbness, no pain, no tingling, does not fall when eyes closed or taking a shower  Neurological Gait:  no difficulty walking, not falling to one side, no sensation of being pushed, has not had falls  ROS Reviewed:   ROS reviewed  Active Problems    1  Acute bronchitis (466 0) (J20 9)   2  Acute cardioembolic stroke (054 99) (I63 9)   3  Arthritis (716 90) (M19 90)   4  Atrial fibrillation (427 31) (I48 91)   5  Chemotherapy-induced nausea (787 02,E933 1) (R11 0,T45  1X5A)   6  Diabetes (250 00) (E11 9)   7  Dysphagia (787 20) (R13 10)   8  Esophageal cancer (150 9) (C15 9)   9  Gout (274 9) (M10 9)   10  Hepatocellular carcinoma (155 0) (C22 0)   11  Hyperlipidemia (272 4) (E78 5)   12  Hypertension (401 9) (I10)   13  Liver lesion (573 8) (K76 9)   14  Radiation esophagitis (530 19) (K20 8)   15  Restless legs syndrome (333 94) (G25 81)   16  Rheumatoid arthritis (714 0) (M06 9)   17  Shortness of breath (786 05) (R06 02)   18  Swallowing difficulty (787 20) (R13 10)   19  Urinary retention (788 20) (R33 9)   20  Urinary tract infection, site unspecified (599 0) (N39 0)   21  Wheezing (786 07) (R06 2)    Past Medical History    1  History of Anxiety (300 00) (F41 9)   2  History of Coronary artery disease (414 00) (I25 10)   3  History of anemia (V12 3) (Z86 2)   4  History of depression (V11 8) (Z86 59)   5  History of diabetes mellitus (V12 29) (Z86 39)   6   History of esophagogastroduodenoscopy (EGD) (V15 29) (Z98 890)   7  History of esophagogastroduodenoscopy (EGD) (V15 29) (Z98 890)   8  History of rheumatoid arthritis (V13 4) (Z87 39)   9  History of Stroke or transient ischemic attack (TIA) diagnosed during current admission   (435 9)  Active Problems And Past Medical History Reviewed: The active problems and past medical history were reviewed and updated today  Surgical History    1  History of Appendectomy   2  History of Arthroscopy Knee   3  History of Cholecystotomy   4  History of Esophagogastroduodenoscopy With Biopsy   5  History of Knee Surgery   6  History of Percutaneous Placement Of Jejunostomy Tube   7  History of Tonsillectomy   8  History of Upper GI Endoscopy With Endoscopic Ultrasound Examination  Surgical History Reviewed: The surgical history was reviewed and updated today  Family History  Mother    1  Family history of cardiac disorder (V17 49) (Z82 49)   2  Family history of cerebrovascular accident (CVA) (V17 1) (Z82 3)  Father    3  Family history of Colon cancer   4  Family history of Colon carcinoma   5  Family history of cardiac disorder (V17 49) (Z82 49)   6  Family history of Inflammatory polyps of colon  Maternal Half Sister    7  Family history of cardiac disorder (V17 49) (Z82 49)   8  Family history of diabetes mellitus (V18 0) (Z83 3)   9  Family history of Mouth cancer   10  Family history of Oral cancer  Grandfather    11  Family history of Mouth cancer   12  Family history of Oral cancer  Family History Reviewed: The family history was reviewed and updated today  Social History    · Denied: History of Drug use   · Former smoker (V15 82) (J64 198)   ·    · No alcohol use   · Three children  Social History Reviewed: The social history was reviewed and updated today  The social history was reviewed and is unchanged  Current Meds   1   Albuterol Sulfate (2 5 MG/3ML) 0 083% Inhalation Nebulization Solution; USE 1 UNIT   DOSE IN NEBULIZER EVERY 4 TO 6 HOURS AS NEEDED; Therapy: (Recorded:15Jun2016) to Recorded   2  Allopurinol 100 MG Oral Tablet; TAKE 1 TABLET DAILY Recorded   3  ALPRAZolam 0 5 MG Oral Tablet; TAKE 1 TABLET AT BEDTIME AS NEEDED; Therapy: (Recorded:15Jun2016) to Recorded   4  Amiodarone HCl - 200 MG Oral Tablet; TAKE 1 TABLET DAILY; Therapy: 71NKE4413 to (Evaluate:20Cue0176)  Requested for: 09TVR9120; Last   Rx:09Dec2016; Status: ACTIVE - Transmit to Pharmacy - Awaiting Verification   Ordered   5  Brimonidine Tartrate 0 2 % Ophthalmic Solution; INSTILL 1 DROP IN BOTH EYES   EVERY 12 HOURS DAILY; Therapy: (Recorded:09Mar2017) to Recorded   6  DiazePAM 5 MG Oral Tablet; take 1/2 to 1 tab as needed for sleep; Therapy: (Recorded:23Dec2016) to Recorded   7   MG Oral Capsule; PRN; Therapy: (Recorded:09Mar2017) to Recorded   8  Dorzolamide HCl-Timolol Mal 22 3-6 8 MG/ML Ophthalmic Solution; INSTILL 1 DROP   INTO BOTH EYES 2 TIMES DAILY; Therapy: (Recorded:15Jun2016) to Recorded   9  Ecotrin 81 MG TBEC; TAKE 1 TABLET DAILY; Therapy: (Recorded:09Mar2017) to Recorded   10  Eliquis 5 MG Oral Tablet; Take 1 tablet daily; Therapy: 22ZLT9722 to (Evaluate:62Dky4597)  Requested for: 55HAK4404; Last    Rx:[id number]; Status: ACTIVE - Transmit to [facility] Verification    Ordered   11  HydrOXYzine HCl - 25 MG Oral Tablet; 1 pill 2x daily as needed; Therapy: (Recorded:53Qyn7717) to Recorded   12  Latanoprost 0 005 % Ophthalmic Solution; 1 drop each eye in pm;    Therapy: (Recorded:15Jun2016) to Recorded   13  NovoLOG 100 UNIT/ML Subcutaneous Solution; sliding scale BID; Therapy: (Recorded:15Jun2016) to Recorded   14  NovoLOG Mix 70/30 FlexPen (70-30) 100 UNIT/ML SUSP; INJECT 40 UNIT Twice daily; Therapy: (Recorded:15Jun2016) to Recorded   15  Ondansetron HCl - 4 MG Oral Tablet; One tab PO q 4-6 hours PRN nausea; Therapy: 85DXQ4096 to (Last Rx:22Jun2016) Ordered   16   ROPINIRole HCl - 1 MG Oral Tablet; TAKE 3 TABLET Bedtime Recorded   17  Sertraline HCl - 25 MG Oral Tablet; TAKE 1 TABLET DAILY; Therapy: (Recorded:09Mar2017) to Recorded   18  TraMADol HCl - 50 MG Oral Tablet; 2 tabs 4x daily as needed for pain; Therapy: (Recorded:80Xqh7199) to Recorded   19  Vitron-C TABS; Take 1 tablet daily; Therapy: (Recorded:09Mar2017) to Recorded  Medication List Reviewed: The medication list was reviewed and updated today  Allergies    1  Humira   2  levofloxacin    Vitals  Signs   Recorded: 85BVE6728 73:79SV   Systolic: 92  Diastolic: 54  Height: 5 ft 11 in  Weight: 212 lb 9 oz  BMI Calculated: 29 65  BSA Calculated: 2 16    Physical Exam    Constitutional   General appearance: No acute distress, well appearing and well nourished  Eyes   Ophthalmoscopic examination: Vision is grossly normal  Gross visual field testing by confrontation shows no abnormalities  EOMI in both eyes  Conjunctivae clear  Eyelids normal palpebral fissures equal  Orbits exhibit normal position  No discharge from the eyes  PERRL  Musculoskeletal   Gait and station: Normal gait, stance and balance  Muscle strength: Normal strength throughout  Muscle tone: No atrophy, abnormal movements, flaccidity, cogwheeling or spasticity  Involuntary movements: None observed  Neurologic   Orientation to person, place, and time: Normal     Recent and remote memory: Demonstrates normal memory  Attention span and concentration: Normal thought process and attention span  Fund of knowledge: Normal vocabulary with appropriate knowledge of current events and past history  1st cranial nerve: Normal     2nd cranial nerve: Abnormal   He has a left inferior homonymous quadrant defect     3rd, 4th, and 6th cranial nerves: Normal     5th cranial nerve: Normal     7th cranial nerve: Normal     9th cranial nerve: Normal     10th cranial nerve: Normal     11th cranial nerve: Normal     12th cranial nerve: Normal  Future Appointments    Date/Time Provider Specialty Site   03/24/2017 03:00 PM JAMSHID Oliveira , DO, [facility] Hematology Oncology [facility]   03/14/2017 03:00 PM JAMSHID Quintanilla   Surgical Oncology [facility]   07/13/2017 01:50 PM Berto Powell MD Neurology [facility]     Signatures   Electronically signed by : Darci Orlando MD; Mar  9 2017  1:11PM EST                       (Author)

## 2018-01-11 NOTE — MISCELLANEOUS
Message  Pt's IR bx being scheduled for 10am Wednesday 10/11/17 @ [facility]  Called pt, made him aware of bx time, and that IR nurse will call him on Monday  Per IR, Eliquis must be stopped 48 hrs in advance  Spoke with Dr Obed Melendez office (neurology)  Per covering physician, pt must have lovenox bridging if Eliquis will be stopped due to high risk of CVA  Discussed this with nurse at Dr Nikolai Dos Santos office (PCP)  They advised me that they can give pt lovenox shot if he brings the syringes in, but that Dr Juan F Heath must order the bridging  Script for 4 syringes of lovenox 100 mg/ml sent to pharmacy with instructions to give 0 95 mL q 12 hours beginning Monday morning  Last dose to be given Tuesday night  Discussed these instructions with pt and explained that he is to take his last dose of Eliquis on Sunday; he acknowledged understanding  Pt will  script and walk-in at his PCP's office Monday morning for shot and instruction  Active Problems    1  Acute bronchitis (466 0) (J20 9)   2  Acute cardioembolic stroke (765 13) (I63 9)   3  Arthritis (716 90) (M19 90)   4  Atrial fibrillation (427 31) (I48 91)   5  Chemotherapy-induced nausea (787 02,E933 1) (R11 0,T45  1X5A)   6  Diabetes (250 00) (E11 9)   7  Dysphagia (787 20) (R13 10)   8  Empyema, right (510 9) (J86 9)   9  Esophageal cancer (150 9) (C15 9)   10  Gout (274 9) (M10 9)   11  Hepatocellular carcinoma (155 0) (C22 0)   12  History of right foot drop (V13 59) (Z87 39)   13  Hyperlipidemia (272 4) (E78 5)   14  Hypertension (401 9) (I10)   15  Liver lesion (573 8) (K76 9)   16  Radiation esophagitis (530 19,E926 9) (K20 8)   17  Restless legs syndrome (333 94) (G25 81)   18  Rheumatoid arthritis (714 0) (M06 9)   19  Shortness of breath (786 05) (R06 02)   20  Swallowing difficulty (787 20) (R13 10)   21  Urinary retention (788 20) (R33 9)   22  Urinary tract infection, site unspecified (599 0) (N39 0)   23  Wheezing (786 07) (R06 2)    Current Meds   1  Albuterol Sulfate (2 5 MG/3ML) 0 083% Inhalation Nebulization Solution; USE 1 UNIT   DOSE IN NEBULIZER EVERY 4 TO 6 HOURS AS NEEDED; Therapy: (Recorded:15Jun2016) to Recorded   2  Allopurinol 100 MG Oral Tablet; TAKE 1 TABLET DAILY Recorded   3  ALPRAZolam 0 5 MG Oral Tablet; TAKE 1 TABLET AT BEDTIME AS NEEDED; Therapy: (Recorded:15Jun2016) to Recorded   4  Amiodarone HCl - 200 MG Oral Tablet (Cordarone); TAKE 1 TABLET DAILY; Therapy: 58ESI3806 to (Evaluate:12Dig8937)  Requested for: 83RGO2155; Last   Rx:[id number]; Status: ACTIVE - Transmit to Pharmacy - Awaiting Verification   Ordered   5  Brimonidine Tartrate 0 2 % Ophthalmic Solution; INSTILL 1 DROP IN BOTH EYES   EVERY 12 HOURS DAILY; Therapy: (Recorded:09Mar2017) to Recorded   6  DiazePAM 5 MG Oral Tablet; take 1/2 to 1 tab as needed for sleep; Therapy: (Recorded:23Dec2016) to Recorded   7   MG Oral Capsule; PRN; Therapy: (Recorded:09Mar2017) to Recorded   8  Dorzolamide HCl-Timolol Mal 22 3-6 8 MG/ML Ophthalmic Solution; INSTILL 1 DROP   INTO BOTH EYES 2 TIMES DAILY; Therapy: (Recorded:15Jun2016) to Recorded   9  Ecotrin 81 MG TBEC; TAKE 1 TABLET DAILY; Therapy: (Recorded:09Mar2017) to Recorded   10  Eliquis 5 MG Oral Tablet; Take 1 tablet daily; Therapy: 42PFZ6663 to (Evaluate:38Kjc4584)  Requested for: 36DKK0891; Last    Rx:[id number]; Status: ACTIVE - Transmit to [facility] Verification    Ordered   11  HydrOXYzine HCl - 25 MG Oral Tablet; 1 pill 2x daily as needed; Therapy: (Recorded:63Kid8696) to Recorded   12  Latanoprost 0 005 % Ophthalmic Solution; 1 drop each eye in pm;    Therapy: (Recorded:15Jun2016) to Recorded   13  LORazepam 1 MG Oral Tablet; Therapy: (Recorded:06Oct2017) to Recorded   14  NovoLOG 100 UNIT/ML Subcutaneous Solution; sliding scale BID; Therapy: (Recorded:15Jun2016) to Recorded   15  NovoLOG Mix 70/30 FlexPen (70-30) 100 UNIT/ML SUSP; INJECT 40 UNIT Twice daily;     Therapy: (Recorded:15Jun2016) to Recorded   16  Ondansetron HCl - 4 MG Oral Tablet (Zofran); One tab PO q 4-6 hours PRN nausea; Therapy: 06VCD2594 to (Last Rx:22Jun2016) Ordered   17  Reglan 10 MG Oral Tablet (Metoclopramide HCl); Therapy: (Recorded:18Apr2017) to Recorded   18  ROPINIRole HCl - 1 MG Oral Tablet; TAKE 3 TABLET Bedtime Recorded   19  Sertraline HCl - 25 MG Oral Tablet; TAKE 1 TABLET DAILY; Therapy: (Recorded:09Mar2017) to Recorded   20  TraMADol HCl - 50 MG Oral Tablet; 2 tabs 4x daily as needed for pain; Therapy: (Recorded:23Dec2016) to Recorded   21  Ismael Breaker AEPB; Therapy: (Recorded:06Oct2017) to Recorded   22  Vitron-C TABS; Take 1 tablet daily; Therapy: (Recorded:09Mar2017) to Recorded    Allergies    1  Humira   2  levofloxacin    Plan  Liver lesion    · Enoxaparin Sodium 100 MG/ML Subcutaneous Solution; Inject 0 95 mL every 12  hours - 2 doses on Monday and 2 doses on Tuesday    Signatures   Electronically signed by :  Dartha Gaucher, ; Oct  6 2017  4:29PM EST                       (Author)

## 2018-01-11 NOTE — CONSULTS
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, HER-2 negative  Tumor extended from the GE junction to the gastric cardia  CT scan showed no evidence of metastatic disease  May 19, 2016- PET/CT showed thickening of the distal esophagus/gastric cardia  SUV 13 0  No disease was noted elsewhere  June 1, 2016-endoscopic ultrasound indicated a T3 lesion with malignant appearing nodes in the perigastric and gastrohepatic ligament area, suggesting a stage of T3 N1  Reports 7 8, HEMOGLOBIN 10 8, MCV 73, PLATELETS 164, CREATININE 0 7  LFTS NORMAL  He saw Dr Mk Daniel for evaluation  Recommendation was made for radiation therapy with concomitant chemotherapy  He indicated a number of different chemotherapy programs that were considered  He is seen today regarding second opinion  Review of Systems    Constitutional: recent 10 lb weight loss  Eyes: No complaints of eye pain, no red eyes, no discharge from eyes, no itchy eyes  ENT: no complaints of earache, no hearing loss, no nosebleeds, no nasal discharge, no sore throat, no hoarseness  Cardiovascular: No complaints of slow heart rate, no fast heart rate, no chest pain, no palpitations, no leg claudication, no lower extremity  Respiratory: No complaints of shortness of breath, no wheezing, no cough, no SOB on exertion, no orthopnea or PND  Gastrointestinal: No complaints of abdominal pain, no constipation, no nausea or vomiting, no diarrhea or bloody stools  Genitourinary: No complaints of dysuria, no incontinence, no hesitancy, no nocturia, no genital lesion, no testicular pain  Musculoskeletal: No complaints of arthralgia, no myalgias, no joint swelling or stiffness, no limb pain or swelling  Integumentary: No complaints of skin rash or skin lesions, no itching, no skin wound, no dry skin  Neurological: No compliants of headache, no confusion, no convulsions, no numbness or tingling, no dizziness or fainting, no limb weakness, no difficulty walking  Psychiatric: Is not suicidal, no sleep disturbances, no anxiety or depression, no change in personality, no emotional problems  Endocrine: No complaints of proptosis, no hot flashes, no muscle weakness, no erectile dysfunction, no deepening of the voice, no feelings of weakness  Hematologic/Lymphatic: No complaints of swollen glands, no swollen glands in the neck, does not bleed easily, no easy bruising  Active Problems    1  Arthritis (716 90) (M19 90)   2  Diabetes (250 00) (E11 9)   3  Difficulty swallowing (787 20) (R13 10)   4  Dysphagia (787 20) (R13 10)   5  Esophageal cancer (150 9) (C15 9)   6  Gout (274 9) (M10 9)   7  Hyperlipidemia (272 4) (E78 5)   8  Hypertension (401 9) (I10)   9  Restless legs syndrome (333 94) (G25 81)   10  Rheumatoid arthritis (714 0) (M06 9)   11  Shortness of breath (786 05) (R06 02)   12   Wheezing (786 07) (R06 2)    Past Medical History    · History of Coronary artery disease (414 00) (I25 10)   · History of anemia (V12 3) (Z86 2)   · History of diabetes mellitus (V12 29) (Z86 39)   · History of rheumatoid arthritis (V13 4) (Z87 39)    Surgical History    · History of Appendectomy   · History of Appendectomy   · History of Arthroscopy Knee   · History of Cholecystectomy   · History of Cholecystotomy   · History of Esophagogastroduodenoscopy With Biopsy   · History of Knee Surgery   · History of Tonsillectomy   · History of Tonsillectomy   · History of Upper GI Endoscopy With Endoscopic Ultrasound Examination    Family History    · Family history of cardiac disorder (V17 49) (Z82 49)   · Family history of cerebrovascular accident (CVA) (V17 1) (Z82 3)    · Family history of Colon cancer   · Family history of Colon carcinoma   · Family history of cardiac disorder (V17 49) (Z82 49)   · Family history of Inflammatory polyps of colon    · Family history of cardiac disorder (V17 49) (Z82 49)   · Family history of diabetes mellitus (V18 0) (Z83 3)   · Family history of Mouth cancer   · Family history of Oral cancer    · Family history of Mouth cancer   · Family history of Oral cancer    Social History    · Former smoker (V15 82) (S29 437)   · No alcohol use   · Occasional alcohol use    Current Meds   1  Albuterol Sulfate (2 5 MG/3ML) 0 083% Inhalation Nebulization Solution; USE 1 UNIT   DOSE IN NEBULIZER EVERY 4 TO 6 HOURS AS NEEDED; Therapy: (Recorded:15Jun2016) to Recorded   2  Allopurinol 100 MG Oral Tablet; TAKE 1 TABLET DAILY; Therapy: (Recorded:15Jun2016) to Recorded   3  ALPRAZolam 0 5 MG Oral Tablet; TAKE 1 TABLET AT BEDTIME AS NEEDED; Therapy: (Recorded:15Jun2016) to Recorded   4  Brimonidine Tartrate 0 2 % Ophthalmic Solution; INSTILL 1 DROP IN BOTH EYES   EVERY 12 HOURS DAILY; Therapy: (Recorded:15Jun2016) to Recorded   5  Clotrimazole-Betamethasone 1-0 05 % External Cream; Apply BID as needed; Therapy: (Recorded:15Jun2016) to Recorded   6  Diazepam 5 MG Oral Tablet; Take 1/2 to 1 tablet as needed to sleep; Therapy: (Recorded:15Jun2016) to Recorded   7  Dorzolamide HCl-Timolol Mal 22 3-6 8 MG/ML Ophthalmic Solution; INSTILL 1 DROP   INTO BOTH EYES 2 TIMES DAILY; Therapy: (Recorded:15Jun2016) to Recorded   8  Ecotrin 81 MG TBEC; take 2 tablet daily; Therapy: (Recorded:15Jun2016) to Recorded   9  HydrOXYzine HCl - 25 MG Oral Tablet; Take 1 pill BID PRN; Therapy: (Recorded:15Jun2016) to Recorded   10  Hyoscyamine Sulfate 0 125 MG Sublingual Tablet Sublingual; Take 2 under tongue    before meals; Therapy: (Recorded:15Jun2016) to Recorded   11  Iron (Ferrous Sulfate) 325 MG TABS; TAKE 1 TABLET 3 TIMES DAILY; Therapy: (Recorded:15Jun2016) to Recorded   12   Latanoprost 0 005 % Ophthalmic Solution; 1 drop each eye in pm;    Therapy: (Recorded:15Jun2016) to Recorded   13  Levocetirizine Dihydrochloride 5 MG Oral Tablet; TAKE 1 TABLET DAILY; Therapy: (Recorded:15Jun2016) to Recorded   14  Lisinopril 20 MG Oral Tablet; TAKE 1 TABLET DAILY; Therapy: (Recorded:15Jun2016) to Recorded   15  Magnesium 500 MG Oral Capsule; take 1 capsule daily; Therapy: (Recorded:15Jun2016) to Recorded   16  MetFORMIN HCl - 1000 MG Oral Tablet; TAKE 1 TABLET EVERY 12 HOURS; Therapy: (Recorded:15Jun2016) to Recorded   17  NovoLOG 100 UNIT/ML Subcutaneous Solution; sliding scale BID; Therapy: (Recorded:15Jun2016) to Recorded   18  NovoLOG Mix 70/30 FlexPen (70-30) 100 UNIT/ML SUSP; INJECT 40 UNIT Twice daily; Therapy: (Recorded:15Jun2016) to Recorded   19  Omeprazole 40 MG Oral Capsule Delayed Release; TAKE 1 CAPSULE TWICE DAILY; Therapy: (Recorded:15Jun2016) to Recorded   20  Pioglitazone HCl - 15 MG Oral Tablet; TAKE 1 TABLET ONCE DAILY; Therapy: (Recorded:15Jun2016) to Recorded   21  Potassium Gluconate 595 MG Oral Tablet; TAKE 1 TABLET DAILY; Therapy: (Recorded:15Jun2016) to Recorded   22  ROPINIRole HCl - 1 MG Oral Tablet; TAKE 3 TABLET Bedtime; Therapy: (Recorded:15Jun2016) to Recorded   23  Santyl OINT; apply to ankle wound  BID; Therapy: (Recorded:15Jun2016) to Recorded   24  Simvastatin 10 MG Oral Tablet; TAKE 1 TABLET DAILY; Therapy: (Recorded:15Jun2016) to Recorded   25  TraMADol HCl - 50 MG Oral Tablet; TAKE 2 TABLETS 4 TIMES DAILY AS NEEDED FOR    PAIN;    Therapy: (Recorded:15Jun2016) to Recorded   26  Vitamin D3 2000 UNIT Oral Tablet; Take 1 tablet daily; Therapy: (Recorded:15Jun2016) to Recorded    Allergies    1  Humira   2  levofloxacin    Physical Exam    Constitutional   General appearance: No acute distress, well appearing and well nourished  Eyes   Conjunctiva and lids: No swelling, erythema, or discharge      Ears, Nose, Mouth, and Throat   External inspection of ears and nose: Normal     Oropharynx: Normal with no erythema, edema, exudate or lesions  Pulmonary   Auscultation of lungs: Clear to auscultation, equal breath sounds bilaterally, no wheezes, no rales, no rhonci  Cardiovascular   Auscultation of heart: Normal rate and rhythm, normal S1 and S2, without murmurs  Examination of extremities for edema and/or varicosities: Normal     Abdomen   Abdomen: Non-tender, no masses  Liver and spleen: No hepatomegaly or splenomegaly  Lymphatic   Palpation of lymph nodes in neck: No lymphadenopathy  Musculoskeletal   Gait and station: Normal     Skin   Skin and subcutaneous tissue: Normal without rashes or lesions  Neurologic   Cranial nerves: Cranial nerves 2-12 intact  Psychiatric   Orientation to person, place and time: Normal          Assessment    1  Esophageal cancer (150 9) (C15 9)   2  History of diabetes mellitus (V12 29) (Z86 39)    Plan  Esophageal cancer    · Drink plenty of fluids ; Status:Complete;   Done: 16IXG8048   Ordered;  For:Esophageal cancer; Ordered By:Belman, Alveda Sandifer;   · Follow-up PRN Evaluation and Treatment  Follow-up  To be arranged  Status: Complete  Done: 43SES8069   Ordered; For: Esophageal cancer; Ordered By: Adna Arce Performed:  Due: 15ZHP4655    Discussion/Summary    In summary, this is a 68-year-old male history of rheumatoid arthritis and diabetes as well as 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  Staging is likely, stage IIB or IIIa depending upon the accuracy of wanda staging  This is discrepant between the endoscopic ultrasound and PET/CT  In either event, treatment with Radhada Sandifer adjuvant chemoradiation therapy appears reasonable and within NCCN guidelines  We reviewed that optimal chemotherapy program for patients with concomitant chemoradiation in the harshal-adjuvant setting is unclear   A number of programs could be considered, including 5-FU either by infusion or orally combined with cisplatin, weekly Taxol and carboplatin, 5-FU plus Oxaliplatin  These all have similar efficacy rates, but some variability in toxicity  I reviewed with the patient and his significant other that any of these choices would be reasonable  Based on efficacy  My inclination would be to use weekly Taxol Carbo at a dose of 50 mg/m^2 and AUC-2, respectively with concomitant radiation therapy  This would be based on consideration of minimizing potential toxicity for worsening neuropathy  Wall  Taxol can cause neuropathy this is more common at the 175 mg/m^2 every 3 week dose Ativan at the 50 mg/m^2 every week dose  We reviewed potential toxicities of this program including but not limited to, nausea, vomiting, alopecia, neuropathy, constipation, cytopenias, allergic reactions  We reviewed prophylactic measures taken routinely as well as monitoring to allow for early intervention is appropriate  We reviewed the natural history of this disease with and without treatment  The above program combined with radiation therapy and surgery can-year-old results with long-term survival in 20-30% of patients  The pathologic complete response rate is in the 25-30 percent range  There is some discussion in the literature about the incremental benefit of surgery above and beyond that of combined chemoradiation  For patients who are medically fit to undergo surgery  It is preferred, however  Again, I reviewed the above considerations at length with the patient and his   He is considering what facility to be treated at  I asked him to consider the matter somewhat more and let us know how he wishes to proceed  I discussed his case with Dr Wilner Blanton earlier today was in agreement with the above recommendation  The patient voiced understanding and agreement with the above plan  Possible side effects of new medications were reviewed with the patient/guardian today  The treatment plan was reviewed with the patient/guardian   The patient/guardian understands and agrees with the treatment plan   The treatment plan was reviewed with the patient/guardian  The patient/guardian understands and agrees with the treatment plan   The patient was counseled regarding diagnostic results, instructions for management, prognosis, patient and family education, impressions, risks and benefits of treatment options  total time of encounter was 40 minutes        Signatures   Electronically signed by : JAMSHID Nino D ,DO; Darrick 15 2016  5:56PM EST                       (Author)

## 2018-01-12 VITALS
TEMPERATURE: 98.4 F | RESPIRATION RATE: 16 BRPM | SYSTOLIC BLOOD PRESSURE: 122 MMHG | WEIGHT: 209 LBS | HEIGHT: 71 IN | OXYGEN SATURATION: 95 % | BODY MASS INDEX: 29.26 KG/M2 | DIASTOLIC BLOOD PRESSURE: 68 MMHG | HEART RATE: 54 BPM

## 2018-01-12 VITALS
DIASTOLIC BLOOD PRESSURE: 62 MMHG | HEIGHT: 70 IN | HEART RATE: 69 BPM | RESPIRATION RATE: 16 BRPM | WEIGHT: 213.13 LBS | OXYGEN SATURATION: 96 % | TEMPERATURE: 98.2 F | SYSTOLIC BLOOD PRESSURE: 112 MMHG | BODY MASS INDEX: 30.51 KG/M2

## 2018-01-12 NOTE — MISCELLANEOUS
Message   Recorded as Task   Date: 07/13/2016 02:11 PM, Created By: Piedda Perez   Task Name: Call Back   Assigned To: Lesia Bailey   Regarding Patient: Marina Samuels, Status: Active   Comment:    Piedad Peerz - 13 Jul 2016 2:11 PM     TASK CREATED  patient called having a problem  left knee is super sore and swollen  He did not bump  Radiation told him to call us    please call him back at [phone]   Spoke with patient yesterday - c/o pain in L knee - very painful to touch  Knee is swollen - Pt denies redness  this has been developing over the past week  Pt does have a history of gout - he admits he does not remember if he has been taking his Allopurinol correctly  Discussed with Dr Niya Dexter prescribed for patient - instructions reviewed  Instructed him to follow up with me on status of his pain - He verbalized understanding      Active Problems    1  Acute bronchitis (466 0) (J20 9)   2  Arthritis (716 90) (M19 90)   3  Chemotherapy-induced nausea (787 02,E933 1) (R11 0,T45  1X5A)   4  Diabetes (250 00) (E11 9)   5  Difficulty swallowing (787 20) (R13 10)   6  Dysphagia (787 20) (R13 10)   7  Esophageal cancer (150 9) (C15 9)   8  Gout (274 9) (M10 9)   9  Hyperlipidemia (272 4) (E78 5)   10  Hypertension (401 9) (I10)   11  Restless legs syndrome (333 94) (G25 81)   12  Rheumatoid arthritis (714 0) (M06 9)   13  Shortness of breath (786 05) (R06 02)   14  Wheezing (786 07) (R06 2)    Current Meds   1  Albuterol Sulfate (2 5 MG/3ML) 0 083% Inhalation Nebulization Solution; USE 1 UNIT   DOSE IN NEBULIZER EVERY 4 TO 6 HOURS AS NEEDED; Therapy: (Recorded:15Jun2016) to Recorded   2  Allopurinol 100 MG Oral Tablet; TAKE 1 TABLET DAILY; Therapy: (Recorded:15Jun2016) to Recorded   3  ALPRAZolam 0 5 MG Oral Tablet; TAKE 1 TABLET AT BEDTIME AS NEEDED; Therapy: (Recorded:15Jun2016) to Recorded   4   Azithromycin 250 MG Oral Tablet (Zithromax Z-Jayy); TAKE 2 TABLETS ON DAY 1 THEN   TAKE 1 TABLET A DAY FOR 4 DAYS; Therapy: 62DOM2284 to (Evaluate:84Xuk6996); Last Rx:[id number] Ordered   5  Brimonidine Tartrate 0 2 % Ophthalmic Solution; INSTILL 1 DROP IN BOTH EYES   EVERY 12 HOURS DAILY; Therapy: (Recorded:15Jun2016) to Recorded   6  Clotrimazole-Betamethasone 1-0 05 % External Cream; Apply BID as needed; Therapy: (Recorded:15Jun2016) to Recorded   7  Colcrys 0 6 MG Oral Tablet; one tablet every 3 hours; Therapy: 88YFH4877 to (Last Rx:89Nge1789)  Requested for: 94Ufo2398; Status: ACTIVE -   Retrospective By Protocol Authorization Ordered   8  Diazepam 5 MG Oral Tablet; Take 1/2 to 1 tablet as needed to sleep; Therapy: (Recorded:15Jun2016) to Recorded   9  Dorzolamide HCl-Timolol Mal 22 3-6 8 MG/ML Ophthalmic Solution; INSTILL 1 DROP   INTO BOTH EYES 2 TIMES DAILY; Therapy: (Recorded:15Jun2016) to Recorded   10  Ecotrin 81 MG TBEC; take 2 tablet daily; Therapy: (Recorded:15Jun2016) to Recorded   11  HydrOXYzine HCl - 25 MG Oral Tablet; Take 1 pill BID PRN; Therapy: (Recorded:15Jun2016) to Recorded   12  Hyoscyamine Sulfate 0 125 MG Sublingual Tablet Sublingual; Take 2 under tongue    before meals; Therapy: (Recorded:15Jun2016) to Recorded   13  Iron (Ferrous Sulfate) 325 MG TABS; TAKE 1 TABLET 3 TIMES DAILY; Therapy: (Recorded:15Jun2016) to Recorded   14  Latanoprost 0 005 % Ophthalmic Solution; 1 drop each eye in pm;    Therapy: (Recorded:15Jun2016) to Recorded   15  Levocetirizine Dihydrochloride 5 MG Oral Tablet; TAKE 1 TABLET DAILY; Therapy: (Recorded:15Jun2016) to Recorded   16  Lidocaine-Prilocaine 2 5-2 5 % External Cream; Apply to port 30min prior to use; Therapy: 35JKY1016 to (Last Rx:27Jun2016) Ordered   17  Lisinopril 20 MG Oral Tablet; TAKE 1 TABLET DAILY; Therapy: (Recorded:15Jun2016) to Recorded   18  Magnesium 500 MG Oral Capsule; take 1 capsule daily; Therapy: (Recorded:70Qzb7750) to Recorded   19   MetFORMIN HCl - 1000 MG Oral Tablet; TAKE 1 TABLET EVERY 12 HOURS; Therapy: (Recorded:15Jun2016) to Recorded   20  NovoLOG 100 UNIT/ML Subcutaneous Solution; sliding scale BID; Therapy: (Recorded:15Jun2016) to Recorded   21  NovoLOG Mix 70/30 FlexPen (70-30) 100 UNIT/ML SUSP; INJECT 40 UNIT Twice daily; Therapy: (Recorded:15Jun2016) to Recorded   22  Omeprazole 40 MG Oral Capsule Delayed Release; TAKE 1 CAPSULE TWICE DAILY; Therapy: (Recorded:15Jun2016) to Recorded   23  Ondansetron HCl - 4 MG Oral Tablet (Zofran); One tab PO q 4-6 hours PRN nausea; Therapy: 45PSR6840 to (Last Rx:22Jun2016) Ordered   24  Pioglitazone HCl - 15 MG Oral Tablet; TAKE 1 TABLET ONCE DAILY; Therapy: (Recorded:15Jun2016) to Recorded   25  Potassium Gluconate 595 MG Oral Tablet; TAKE 1 TABLET DAILY; Therapy: (Recorded:15Jun2016) to Recorded   26  ROPINIRole HCl - 1 MG Oral Tablet; TAKE 3 TABLET Bedtime; Therapy: (Recorded:15Jun2016) to Recorded   27  Santyl OINT; apply to ankle wound  BID; Therapy: (Recorded:15Jun2016) to Recorded   28  Simvastatin 10 MG Oral Tablet; TAKE 1 TABLET DAILY; Therapy: (Recorded:15Jun2016) to Recorded   29  TraMADol HCl - 50 MG Oral Tablet; TAKE 2 TABLETS 4 TIMES DAILY AS NEEDED FOR    PAIN;    Therapy: (Recorded:15Jun2016) to Recorded   30  Vitamin D3 2000 UNIT Oral Tablet; Take 1 tablet daily; Therapy: (Recorded:15Jun2016) to Recorded    Allergies    1   Humira   2  levofloxacin    Signatures   Electronically signed by : Stefani Tate, ; Jul 14 2016 11:56AM EST                       (Author)

## 2018-01-12 NOTE — MISCELLANEOUS
Message  Patient will have a portacath placed in IR on Wednesday in the [facility] at 11am-report at 9:30  The order is generated in [employer]  The patient will have a chemotherapy teach and consent for chemo signed on Wed 6/22 at 8:30 in the [facility] office  Crystal RN aware of plan  VM left for patient and will follow up with additional call to confirm  Active Problems    1  Arthritis (716 90) (M19 90)   2  Diabetes (250 00) (E11 9)   3  Difficulty swallowing (787 20) (R13 10)   4  Dysphagia (787 20) (R13 10)   5  Esophageal cancer (150 9) (C15 9)   6  Gout (274 9) (M10 9)   7  Hyperlipidemia (272 4) (E78 5)   8  Hypertension (401 9) (I10)   9  Restless legs syndrome (333 94) (G25 81)   10  Rheumatoid arthritis (714 0) (M06 9)   11  Shortness of breath (786 05) (R06 02)   12  Wheezing (786 07) (R06 2)    Current Meds   1  Albuterol Sulfate (2 5 MG/3ML) 0 083% Inhalation Nebulization Solution; USE 1 UNIT   DOSE IN NEBULIZER EVERY 4 TO 6 HOURS AS NEEDED; Therapy: (Recorded:15Jun2016) to Recorded   2  Allopurinol 100 MG Oral Tablet; TAKE 1 TABLET DAILY; Therapy: (Recorded:15Jun2016) to Recorded   3  ALPRAZolam 0 5 MG Oral Tablet; TAKE 1 TABLET AT BEDTIME AS NEEDED; Therapy: (Recorded:15Jun2016) to Recorded   4  Brimonidine Tartrate 0 2 % Ophthalmic Solution; INSTILL 1 DROP IN BOTH EYES   EVERY 12 HOURS DAILY; Therapy: (Recorded:15Jun2016) to Recorded   5  Clotrimazole-Betamethasone 1-0 05 % External Cream; Apply BID as needed; Therapy: (Recorded:15Jun2016) to Recorded   6  Diazepam 5 MG Oral Tablet; Take 1/2 to 1 tablet as needed to sleep; Therapy: (Recorded:15Jun2016) to Recorded   7  Dorzolamide HCl-Timolol Mal 22 3-6 8 MG/ML Ophthalmic Solution; INSTILL 1 DROP   INTO BOTH EYES 2 TIMES DAILY; Therapy: (Recorded:15Jun2016) to Recorded   8  Ecotrin 81 MG TBEC; take 2 tablet daily; Therapy: (Recorded:15Jun2016) to Recorded   9  HydrOXYzine HCl - 25 MG Oral Tablet;  Take 1 pill BID PRN; Therapy: (Recorded:15Jun2016) to Recorded   10  Hyoscyamine Sulfate 0 125 MG Sublingual Tablet Sublingual; Take 2 under tongue    before meals; Therapy: (Recorded:15Jun2016) to Recorded   11  Iron (Ferrous Sulfate) 325 MG TABS; TAKE 1 TABLET 3 TIMES DAILY; Therapy: (Recorded:15Jun2016) to Recorded   12  Latanoprost 0 005 % Ophthalmic Solution; 1 drop each eye in pm;    Therapy: (Recorded:15Jun2016) to Recorded   13  Levocetirizine Dihydrochloride 5 MG Oral Tablet; TAKE 1 TABLET DAILY; Therapy: (Recorded:15Jun2016) to Recorded   14  Lisinopril 20 MG Oral Tablet; TAKE 1 TABLET DAILY; Therapy: (Recorded:15Jun2016) to Recorded   15  Magnesium 500 MG Oral Capsule; take 1 capsule daily; Therapy: (Recorded:15Jun2016) to Recorded   16  MetFORMIN HCl - 1000 MG Oral Tablet; TAKE 1 TABLET EVERY 12 HOURS; Therapy: (Recorded:15Jun2016) to Recorded   17  NovoLOG 100 UNIT/ML Subcutaneous Solution; sliding scale BID; Therapy: (Recorded:15Jun2016) to Recorded   18  NovoLOG Mix 70/30 FlexPen (70-30) 100 UNIT/ML SUSP; INJECT 40 UNIT Twice daily; Therapy: (Recorded:15Jun2016) to Recorded   19  Omeprazole 40 MG Oral Capsule Delayed Release; TAKE 1 CAPSULE TWICE DAILY; Therapy: (Recorded:15Jun2016) to Recorded   20  Pioglitazone HCl - 15 MG Oral Tablet; TAKE 1 TABLET ONCE DAILY; Therapy: (Recorded:15Jun2016) to Recorded   21  Potassium Gluconate 595 MG Oral Tablet; TAKE 1 TABLET DAILY; Therapy: (Recorded:15Jun2016) to Recorded   22  ROPINIRole HCl - 1 MG Oral Tablet; TAKE 3 TABLET Bedtime; Therapy: (Recorded:15Jun2016) to Recorded   23  Santyl OINT; apply to ankle wound  BID; Therapy: (Recorded:15Jun2016) to Recorded   24  Simvastatin 10 MG Oral Tablet; TAKE 1 TABLET DAILY; Therapy: (Recorded:15Jun2016) to Recorded   25  TraMADol HCl - 50 MG Oral Tablet; TAKE 2 TABLETS 4 TIMES DAILY AS NEEDED FOR    PAIN;    Therapy: (Recorded:15Jun2016) to Recorded   26  Vitamin D3 2000 UNIT Oral Tablet;  Take 1 tablet daily; Therapy: (Recorded:15Jun2016) to Recorded    Allergies    1   Humira   2  levofloxacin    Signatures   Electronically signed by : Bg Boyle, ; Jun 20 2016 10:35AM EST                       (Author)

## 2018-01-12 NOTE — PROCEDURES
Procedures by JIMBO Patel at 11/7/2016 11:05 AM      Author:  JIMBO Patel Service:  (none) Author Type:  Speech and Language Pathologist     Filed:  11/7/2016 12:21 PM Date of Service:  11/7/2016 11:05 AM Status:  Signed     :  JIMBO Patel (Speech and Language Pathologist)                                               Video Swallow Study      Patient Name: Cecilia Centeno  Today's Date: 11/7/2016  par  par  Past Medical History  Past Medical History     Diagnosis  Date    Anemia     Arthritis     Coronary artery disease     Diabetes mellitus     Dysphagia     Esophageal cancer     Gout     Hyperlipidemia     Hypertension     Restless leg syndrome     Rheumatoid arthritis     Sleep apnea         Past Surgical History  Past Surgical History       Procedure   Laterality Date    Appendectomy       Knee arthroscopy       Cholecystectomy       Tonsillectomy       Gastrojejunostomy w/ jejunostomy tube  N/A 8/4/2016     Procedure: INSERTION JEJUNOSTOMY TUBE OPEN;  Surgeon: Jocelynn Melgar MD;  [facility];  Service:      Levora Ornelas Portacath placement       Pr esophagogastroduodenoscopy transoral diagnostic  N/A 10/31/2016     Procedure: ESOPHAGOGASTRODUODENOSCOPY (EGD); Surgeon: Jocelynn Melgar MD;  [facility];  Service: Surgical Oncology      Pr removal esophagus,no thoracotomy  N/A 10/31/2016     Procedure: ESOPHAGECTOMY;  Surgeon: Jocelynn Melgar MD;  [facility];  Service: Surgical Oncology      Liver resection  N/A 10/31/2016     Procedure: INTRAOPERATIVE ULTRASOUND OF LIVER; LIVER LESION RESECTION/ABLATION ;  Surgeon: Jocelynn Melgar MD;  [facility];  Service:            General Information:  General Information  Type of Study: Initial MBS  Additional History: Recent esophagectomy c gastric pull up  Aspiration on Barium Swallow (I took too big of a sip)  Pt admits to h/o choking spells c liquids    Materials Adminstered: Puree, Soft solid food, Honey thick liquid, Nectar thick liquid, Thin liquid      Oral Stage: Within Functional Limits             Pharyngeal Stage:  Mild impaired    Swallow Mechanics  Swallowing Initiation was[de-identified] Prompt  Hyloaryngeal Excursion was[de-identified] Mildly limited, Moderatley limited  Epiglottic Inversion was[de-identified] Present  Tongue Drive was[de-identified] Adequate  Cricopharyngeal Opening/Relaxation was[de-identified] Adequate    Pharyngeal Comment: All food traversed the pharynx safely & efficiently  Penetration to vocal cords noted c honey thick liquid to begin (not remediated c neck flexion or head turn strategies)  Trace penetration c nectar noted only 1 of 3 trials  No  penetration/aspiration c thin liquid with & without strategies  Assessment Summary:  Mr Lynette Leija presented c s/s mild pharygneal dysphagia c suggestion of improvement in airway protection during swallowing/swallowing physiology as study progressed  He tolerated soft food & thin liquid well in the limited sample this am     Diagnosis/Prognosis:  Dysphagia Diagnosis: Mild pharyngeal stage dysphagia  Prognosis for Safe Diet Advancement: Excellent         Recommendations:  Recommendations/Treat  Primary Recommendations: Oral feeding  Liquid Consistency:  Thin  Suggested Postioning: Upright/ 90 degrees  Suggested Precautions: Small frequent meals, Remain upright 45 degrees after meals, Head up 30 degreees at all times  Dysphagia Treatment Recommendations: As directed, By medical status, By clincial status                           Received for:Provider  EPIC   Nov 7 2016 12:21PM [facility] Standard Time

## 2018-01-12 NOTE — MISCELLANEOUS
Message  Spoke with patient today as a follow up to yesterdays appointment who would like to have his chemotherapy Rx with Dr Tin Huynh at the [facility] site  Dr Tin Huynh and Edel Goncalves are aware that he would like to start treatment and will be getting his XRT treatment in [facility] with Dr Pawel Menchaca [phone]  According to the patient, he has already gone through simulation  He is aware that Crystal will call him tomorrow as well as Dr Bety Watson to discuss treatment details  A task was generated to Edel Goncalves for f/u  Active Problems    1  Arthritis (716 90) (M19 90)   2  Diabetes (250 00) (E11 9)   3  Difficulty swallowing (787 20) (R13 10)   4  Dysphagia (787 20) (R13 10)   5  Esophageal cancer (150 9) (C15 9)   6  Gout (274 9) (M10 9)   7  Hyperlipidemia (272 4) (E78 5)   8  Hypertension (401 9) (I10)   9  Restless legs syndrome (333 94) (G25 81)   10  Rheumatoid arthritis (714 0) (M06 9)   11  Shortness of breath (786 05) (R06 02)   12  Wheezing (786 07) (R06 2)    Current Meds   1  Albuterol Sulfate (2 5 MG/3ML) 0 083% Inhalation Nebulization Solution; USE 1 UNIT   DOSE IN NEBULIZER EVERY 4 TO 6 HOURS AS NEEDED; Therapy: (Recorded:15Jun2016) to Recorded   2  Allopurinol 100 MG Oral Tablet; TAKE 1 TABLET DAILY; Therapy: (Recorded:15Jun2016) to Recorded   3  ALPRAZolam 0 5 MG Oral Tablet; TAKE 1 TABLET AT BEDTIME AS NEEDED; Therapy: (Recorded:15Jun2016) to Recorded   4  Brimonidine Tartrate 0 2 % Ophthalmic Solution; INSTILL 1 DROP IN BOTH EYES   EVERY 12 HOURS DAILY; Therapy: (Recorded:15Jun2016) to Recorded   5  Clotrimazole-Betamethasone 1-0 05 % External Cream; Apply BID as needed; Therapy: (Recorded:15Jun2016) to Recorded   6  Diazepam 5 MG Oral Tablet; Take 1/2 to 1 tablet as needed to sleep; Therapy: (Recorded:15Jun2016) to Recorded   7  Dorzolamide HCl-Timolol Mal 22 3-6 8 MG/ML Ophthalmic Solution; INSTILL 1 DROP   INTO BOTH EYES 2 TIMES DAILY;    Therapy: (Recorded:15Jun2016) to Recorded   8  Ecotrin 81 MG TBEC; take 2 tablet daily; Therapy: (Recorded:15Jun2016) to Recorded   9  HydrOXYzine HCl - 25 MG Oral Tablet; Take 1 pill BID PRN; Therapy: (Recorded:15Jun2016) to Recorded   10  Hyoscyamine Sulfate 0 125 MG Sublingual Tablet Sublingual; Take 2 under tongue    before meals; Therapy: (Recorded:15Jun2016) to Recorded   11  Iron (Ferrous Sulfate) 325 MG TABS; TAKE 1 TABLET 3 TIMES DAILY; Therapy: (Recorded:15Jun2016) to Recorded   12  Latanoprost 0 005 % Ophthalmic Solution; 1 drop each eye in pm;    Therapy: (Recorded:15Jun2016) to Recorded   13  Levocetirizine Dihydrochloride 5 MG Oral Tablet; TAKE 1 TABLET DAILY; Therapy: (Recorded:15Jun2016) to Recorded   14  Lisinopril 20 MG Oral Tablet; TAKE 1 TABLET DAILY; Therapy: (Recorded:15Jun2016) to Recorded   15  Magnesium 500 MG Oral Capsule; take 1 capsule daily; Therapy: (Recorded:15Jun2016) to Recorded   16  MetFORMIN HCl - 1000 MG Oral Tablet; TAKE 1 TABLET EVERY 12 HOURS; Therapy: (Recorded:15Jun2016) to Recorded   17  NovoLOG 100 UNIT/ML Subcutaneous Solution; sliding scale BID; Therapy: (Recorded:15Jun2016) to Recorded   18  NovoLOG Mix 70/30 FlexPen (70-30) 100 UNIT/ML SUSP; INJECT 40 UNIT Twice daily; Therapy: (Recorded:15Jun2016) to Recorded   19  Omeprazole 40 MG Oral Capsule Delayed Release; TAKE 1 CAPSULE TWICE DAILY; Therapy: (Recorded:15Jun2016) to Recorded   20  Pioglitazone HCl - 15 MG Oral Tablet; TAKE 1 TABLET ONCE DAILY; Therapy: (Recorded:15Jun2016) to Recorded   21  Potassium Gluconate 595 MG Oral Tablet; TAKE 1 TABLET DAILY; Therapy: (Recorded:15Jun2016) to Recorded   22  ROPINIRole HCl - 1 MG Oral Tablet; TAKE 3 TABLET Bedtime; Therapy: (Recorded:15Jun2016) to Recorded   23  Santyl OINT; apply to ankle wound  BID; Therapy: (Recorded:15Jun2016) to Recorded   24  Simvastatin 10 MG Oral Tablet; TAKE 1 TABLET DAILY; Therapy: (Recorded:15Jun2016) to Recorded   25   TraMADol HCl - 50 MG Oral Tablet; TAKE 2 TABLETS 4 TIMES DAILY AS NEEDED FOR    PAIN;    Therapy: (Recorded:[id number]) to Recorded   26  Vitamin D3 2000 UNIT Oral Tablet; Take 1 tablet daily; Therapy: (Recorded:15Jun2016) to Recorded    Allergies    1   Humira   2  levofloxacin    Signatures   Electronically signed by : Alexandra Jameson, ; Jun 16 2016  4:38PM EST                       (Author)

## 2018-01-13 VITALS
TEMPERATURE: 97.6 F | HEART RATE: 60 BPM | RESPIRATION RATE: 16 BRPM | BODY MASS INDEX: 30.21 KG/M2 | WEIGHT: 211.03 LBS | OXYGEN SATURATION: 99 % | HEIGHT: 70 IN | DIASTOLIC BLOOD PRESSURE: 60 MMHG | SYSTOLIC BLOOD PRESSURE: 126 MMHG

## 2018-01-13 VITALS
HEIGHT: 71 IN | BODY MASS INDEX: 29.76 KG/M2 | SYSTOLIC BLOOD PRESSURE: 92 MMHG | WEIGHT: 212.56 LBS | DIASTOLIC BLOOD PRESSURE: 54 MMHG

## 2018-01-13 VITALS
HEIGHT: 71 IN | HEART RATE: 74 BPM | BODY MASS INDEX: 28.42 KG/M2 | TEMPERATURE: 96.6 F | OXYGEN SATURATION: 94 % | WEIGHT: 203 LBS | SYSTOLIC BLOOD PRESSURE: 142 MMHG | DIASTOLIC BLOOD PRESSURE: 94 MMHG

## 2018-01-13 VITALS
RESPIRATION RATE: 18 BRPM | OXYGEN SATURATION: 97 % | DIASTOLIC BLOOD PRESSURE: 58 MMHG | SYSTOLIC BLOOD PRESSURE: 118 MMHG | WEIGHT: 203.19 LBS | BODY MASS INDEX: 28.34 KG/M2 | HEART RATE: 82 BPM

## 2018-01-13 NOTE — PROGRESS NOTES
Preliminary Nursing Report                Patient Information    Initial Encounter Entry Date:   2016 1:55 PM EST (Automated Transmission Automated Transmission)       Last Modified:   {Dariusz Roche}              Legal Name: Shaun Morocho        Social Security Number:        YOB: 1945        Age (years): 66        Gender: M        Body Mass Index (BMI): 2 kg/m2        Height: 71 in  Weight: 15 44 lbs (7 kgs)           Address:   [address] [id number] [place]              Phone: [phone]   (consent to leave messages)        Email:        Ethnicity: Decline to State        Mandaen:        Marital Status:        Preferred Language: English        Race: Other Race                    Patient Insurance Information        Primary Insurance Information Carrier Name: {Primary  CarrierName}           Carrier Address:   {Primary  CarrierAddress}              Carrier Phone: {Primary  CarrierPhone}          Group Number: {Primary  GroupNumber}          Policy Number: {Primary  PolicyNumber}          Insured Name: {Primary  InsuredName}          Insured : {Primary  InsuredDOB}          Relationship to Insured: {Primary  RelationshiptoInsured}           Secondary Insurance Information Carrier Name: {Secondary  CarrierName}           Carrier Address:   {Secondary  CarrierAddress}              Carrier Phone: {Secondary  CarrierPhone}          Group Number: {Secondary  GroupNumber}          Policy Number: {Secondary  PolicyNumber}          Insured Name: {Secondary  InsuredName}          Insured : {Secondary  InsuredDOB}          Relationship to Insured: {Secondary  RelationshiptoInsured}                       Health Profile   Booking #:   Michael Mercado #: [id number]               DOS: 10/31/2016    Surgery :  Total or near total esophagectomy, without thoracotomy; with pharyngogastrostomy or cervical esophagogastrostomy, with or without pyloroplasty (transhiatal)    Add'l Procedures/Notes: Surgery Risk: Major          Precautions            BMI       Diabetes                   Allergies    Humira       levofloxacin             Medications    Albuterol Sulfate (2 5 MG/3ML) 0 083% Inhalation Nebulization Solution       ALPRAZolam 0 5 MG Oral Tablet       Diazepam 5 MG Oral Tablet       Dorzolamide HCl-Timolol Mal 22 3-6 8 MG/ML Ophthalmic Solution       Hyoscyamine Sulfate 0 125 MG Sublingual Tablet Sublingual       Latanoprost 0 005 % Ophthalmic Solution       NovoLOG 100 UNIT/ML Subcutaneous Solution       NovoLOG Mix 70/30 FlexPen (70-30) 100 UNIT/ML SUSP       Omeprazole 40 MG Oral Capsule Delayed Release       Ondansetron HCl - 4 MG Oral Tablet       Sucralfate 1 GM Oral Tablet       Sulfamethoxazole-Trimethoprim 800-160 MG Oral Tablet       Vitamin D3 2000 UNIT Oral Tablet               Conditions    Acute bronchitis       Arthritis       Chemotherapy-induced nausea       Diabetes       Dysphagia       Esophageal cancer       Gout       Hepatocellular carcinoma       Hyperlipidemia       Hypertension       Liver lesion       Radiation esophagitis       Restless legs syndrome       Rheumatoid arthritis       Shortness of breath       Swallowing difficulty       Urinary retention       Urinary tract infection, site unspecified       Wheezing               Family History    None             Surgical History    None             Social History    Former smoker       No alcohol use       Occasional alcohol use                               Patient Instructions       ? NPO Instructions   The day before surgery it is recommended to have a light dinner at your usual time and you are allowed a light snack early in the evening  Do not eat anything heavy or eat a big meal after 7pm  Do not eat or drink anything after midnight prior to your surgery  If you are supposed to take any of your medications, do so with a sip of water   Failure to follow these instructions can lead to an increased risk of lung complications and may result in a delay or cancellation of your procedure  If you have any questions, contact your institution for further instructions  No candy, no gum, no mints, no chewing tobacco   Triggered by: Medical Procedure Risk         ? Benzodiazepine 15  Please continue the following medications, if needed, up to and including the day of surgery (with a sip of water)  Triggered by: Diazepam 5 MG Oral Tablet, , ALPRAZolam 0 5 MG Oral Tablet         ? Bronchodilator 18  Please continue the following medications up to and including the day of surgery  Please bring this medication with you on the day of surgery  Triggered by: Albuterol Sulfate (2 5 MG/3ML) 0 083% Inhalation Nebulization Solution         ? Diabetic Medication   Please decrease your morning insulin dose to one-half of normal dose  If you are taking oral diabetes medications, the morning dose should be omitted  If taking metformin (Glucophage), discontinue the medication for 24 hours prior to surgery  If you have an insulin pump, continue at a basal rate only  Triggered by: Diabetes, NovoLOG Mix 70/30 FlexPen (70-30) 100 UNIT/ML SUSP, , NovoLOG 100 UNIT/ML Subcutaneous Solution         ? Herbal Medications 39, 40  Please stop the following medications one week prior to surgery  Triggered by: Hyoscyamine Sulfate 0 125 MG Sublingual Tablet Sublingual               Testing Considerations       ? Blood Glucose on Day of Surgery t  Please check the blood sugar on the morning of surgery  Triggered by: Diabetes         ? Chest X-ray (CXR) t  Consider a Chest X-Ray (CXR) if patient is having respiratory symptoms  Triggered by: Shortness of breath, Age or Facility Rec         ? Coagulation Tests (PT/PTT/INR) t  Triggered by: Esophageal cancer         ? Complete Blood Count (CBC) t, client, client  If test was completed and normal within last six months, repeat test is not necessary  Triggered by: Shortness of breath, Age or Facility Rec         ? Comprehensive Metabolic Panel (CMP) t  If test was completed and normal within last six months, repeat test is not necessary  Triggered by: Shortness of breath         ? Electrocardiogram (ECG) t  Patient does not need new test if normal ECG is present within the last six months and no change in clinical condition  Triggered by: Diabetes, Esophageal cancer, Hypertension, Rheumatoid arthritis, Shortness of breath, Age or Facility Rec         ? Hemoglobin A1c (HbA1c) client  If test was completed and normal within the last three months, repeat test is not necessary  Triggered by: Diabetes, Age or Facility Rec         ? Type and Screen client  Type and Screen - Blood: If there is anticipated or possible large blood loss with this procedure, then a Type and Screen for Blood should be ordered  Triggered by: Age or Facility Rec         ? Urinalysis t  Urinalysis may be appropriate if recent urinary symptoms or implants are being placed in surgical procedure  Triggered by: Urinary retention, Urinary tract infection, site unspecified               Consultations       ? Primary Care Physician Evaluation   Primary care physician may need to evaluate patient prior to surgery  This is likely NOT necessary if the listed conditions are chronic and stable  Triggered by: Radiation esophagitis, Rheumatoid arthritis, Shortness of breath, Wheezing               Miscellaneous Questions         Question: Are you able to walk up a flight of stairs, walk up a hill or do heavy housework WITHOUT having chest pain or shortness of breath? Answer: YES                   Allergies/Conditions/Medications Not Found        The following were not recognized by our system when generating the recommendations  Please consider if this would impact any preoperative protocols  ? No alcohol use       ?  Occasional alcohol use                  Appointment Information         Date:    10/31/2016        Location:    [facility] [place]        Address: Directions:                      Footnotes revision 14      ?? Denotes a free-text entry  Legal Disclaimer: Any and all recommendations and services provided herein are designed to assist in the preoperative care of the patient  Nothing contained herein is designed to replace, eliminate or alleviate the responsibility of the attending physician to supervise and determine the patient?s preoperative care and course of treatment  Failure to provide complete, accurate information may negatively impact the system?s ability to recommend the proper preoperative protocol  THE ATTENDING PHYSICIAN IS RESPONSIBLE TO REVIEW THE SUGGESTED PREOPERATIVE PROTOCOLS/COURSE OF TREATMENT AND PRESCRIBE THE FINAL COURSE OF PREOPERATIVE TREATMENT IN CONSULTATION WITH THE PATIENT  THE ePREOP SYSTEM AND ITS MATERIALS ARE PROVIDED ? AS IS? WITHOUT WARRANTY OF ANY KIND, EXPRESS OR IMPLIED, INCLUDING, BUT NOT LIMITED TO, WARRANTIES OF PERFORMANCE OR MERCHANTABILITY OR FITNESS FOR A PARTICULAR PURPOSE  PATIENT AND PHYSICIANS HEREBY AGREE THAT THEIR USE OF THE MATERIALS AND RESOURCES ACT AS A CONSENT TO RELEASE AND WAIVE ePREOP FROM ANY AND ALL CLAIMS OF WARRANTY, TORT OR CONTRACT LAW OF ANY KIND  Electronically signed by:Tobin FONTAINE    Sep 29 2016  3:41PM EST

## 2018-01-13 NOTE — PROCEDURES
Procedures by  Faye Alberts MD at 11/2/2016  4:44 AM      Author:  Faye Alberts MD  Service:  Cardiology  Author Type:  Resident     Filed:  11/2/2016  6:46 AM  Date of Service:  11/2/2016  4:44 AM  Status:  Attested Addendum     :  Faye Alberts MD (Resident)         Related Notes: Original Note by Faye Alberts MD (Resident) filed at 11/2/2016  5:51 AM        Cosigner:  Kaleb Ramírez MD at 11/18/2016  2:49 PM           Procedure Orders:       1  ELECTRICAL CARDIOVERSION [[id number]] ordered by Faye Alberts MD at 11/02/16 0445                 Post-procedure Diagnoses:       1  Paroxysmal atrial fibrillation [I48 0]              Attestation signed by Kaleb Ramírez MD at 11/18/2016  2:49 PM           I have reviewed the notes, assessments, and plan performed by Dr Armijo Ramos was performed in an emergent basis at night  I concur with his documentation of Davis Sierra  Electrical Cardioversion   Date/Time: 11/2/2016 4:45 AM  Performed by: Kar Gale  Authorized by: Kar Gale   Consent: The procedure was performed in an emergent situation  Verbal consent obtained  Risks and benefits: risks, benefits and alternatives were discussed  Patient identity confirmed: arm band and provided demographic  data  Sedation:  Patient sedated: yes  Sedatives: midazolam  Analgesia: fentanyl  Vitals: Vital signs were monitored during sedation      Cardioversion basis: emergent  Pre-procedure rhythm: atrial fibrillation  Patient position: patient was placed in a supine position  Chest area: chest area exposed  Electrodes: pads  Electrodes placed: anterior-posterior  Number of attempts: 3  Attempt 1 mode: synchronous  Attempt 1 waveform: biphasic  Attempt 1 shock (in Joules): 150  Attempt 1 outcome: no change in rhythm  Attempt 2 mode: synchronous  Attempt 2 waveform: biphasic  Attempt 2 shock (in Joules): 200  Attempt 2 outcome: no change in rhythm  Attempt 3 mode: synchronous  Attempt 3 waveform: biphasic  Attempt 3 shock (in Joules): 200  Post-procedure rhythm: atrial fibrillation  Complications: no complications  Patient tolerance: Patient tolerated the procedure well with no immediate complications      Indication: Hemodynamically unstable Atrial fibrillation, refractory to medications (Amiodarone and Digoxin)  Has been hypotensive to 70-80/40-50s over past hour, needing low-dose nor-epinephrine    Procedure notes  Electrode Pads were placed over anterior-posterior chest wall for attempts #1 and #2, and the pads were changed to anterior-lateral for attempt #3   Patient has a right anterior port-a-cath, and as a result the anterior pad was placed slightly medially  towards the sternum    Impression & plan  ·  Unsuccessful electrical cardioversion  ·  Patient remains in Afib at -130  ·  MAP currently remains at 60-65, will continue loading with amiodarone and digoxin, and reconsider cardioversion in AM                   Received for:Francis Tan MD  Nov 18 2016  2:49PM [facility] Standard Time

## 2018-01-13 NOTE — MISCELLANEOUS
Message  Mr Smith contacted the office to state that he has not been able to eat much food secondary to feeling full and like his conduit was filling up with food  He has not had any regurgitation or coughing and is able to tolerate liquids without difficulty  He is using tube feeds at 84 cc/hour for 12 hours at night  He also states that he has not been moving his bowels very frequently  I recommended that he return to tube feeds for 18 hours daily and restrict his oral intake during the day to liquids only and mostly for comfort   He will also go for a barium swallow at [facility] in [place] November 23 and will follow up with Dr Yaima Doe 11/29      Plan  Barium swallow 11/23 at 3 pm     Signatures   Electronically signed by : Sameera Conde [facility]; Nov 23 2016  9:55AM EST                       (Author)

## 2018-01-13 NOTE — MISCELLANEOUS
Message   Recorded as Task   Date: 06/23/2016 10:38 AM, Created By: Shayna Emanuel   Task Name: Call Back   Assigned To: Lesia Bailey   Regarding Patient: Ana Rosa Kenny, Status: Active   Comment:    Meredith Trevino - 23 Jun 2016 10:38 AM     TASK CREATED  pt wouldl aliza to know if there is another med he can take in place of Hyoscyamine  He said he cant even eat because it makes him so nauseous and he has vomiting  [phone]   Discussed with Dr Macie Yoo - He stated that best treatment for difficulty swallowing / nausea is chemotherapy  These symptoms are likely coming from his cancer  Patient may reach out to PCP who prescribed this medication to see if he has any other suggestions  Active Problems    1  Arthritis (716 90) (M19 90)   2  Chemotherapy-induced nausea (787 02,E933 1) (R11 0,T45  1X5A)   3  Diabetes (250 00) (E11 9)   4  Difficulty swallowing (787 20) (R13 10)   5  Dysphagia (787 20) (R13 10)   6  Esophageal cancer (150 9) (C15 9)   7  Gout (274 9) (M10 9)   8  Hyperlipidemia (272 4) (E78 5)   9  Hypertension (401 9) (I10)   10  Restless legs syndrome (333 94) (G25 81)   11  Rheumatoid arthritis (714 0) (M06 9)   12  Shortness of breath (786 05) (R06 02)   13  Wheezing (786 07) (R06 2)    Current Meds   1  Albuterol Sulfate (2 5 MG/3ML) 0 083% Inhalation Nebulization Solution; USE 1 UNIT   DOSE IN NEBULIZER EVERY 4 TO 6 HOURS AS NEEDED; Therapy: (Recorded:15Jun2016) to Recorded   2  Allopurinol 100 MG Oral Tablet; TAKE 1 TABLET DAILY; Therapy: (Recorded:15Jun2016) to Recorded   3  ALPRAZolam 0 5 MG Oral Tablet; TAKE 1 TABLET AT BEDTIME AS NEEDED; Therapy: (Recorded:15Jun2016) to Recorded   4  Brimonidine Tartrate 0 2 % Ophthalmic Solution; INSTILL 1 DROP IN BOTH EYES   EVERY 12 HOURS DAILY; Therapy: (Recorded:15Jun2016) to Recorded   5  Clotrimazole-Betamethasone 1-0 05 % External Cream; Apply BID as needed; Therapy: (Recorded:15Jun2016) to Recorded   6  Diazepam 5 MG Oral Tablet;  Take 1/2 to 1 tablet as needed to sleep; Therapy: (Recorded:15Jun2016) to Recorded   7  Dorzolamide HCl-Timolol Mal 22 3-6 8 MG/ML Ophthalmic Solution; INSTILL 1 DROP   INTO BOTH EYES 2 TIMES DAILY; Therapy: (Recorded:15Jun2016) to Recorded   8  Ecotrin 81 MG TBEC; take 2 tablet daily; Therapy: (Recorded:15Jun2016) to Recorded   9  HydrOXYzine HCl - 25 MG Oral Tablet; Take 1 pill BID PRN; Therapy: (Recorded:15Jun2016) to Recorded   10  Hyoscyamine Sulfate 0 125 MG Sublingual Tablet Sublingual; Take 2 under tongue    before meals; Therapy: (Recorded:15Jun2016) to Recorded   11  Iron (Ferrous Sulfate) 325 MG TABS; TAKE 1 TABLET 3 TIMES DAILY; Therapy: (Recorded:15Jun2016) to Recorded   12  Latanoprost 0 005 % Ophthalmic Solution; 1 drop each eye in pm;    Therapy: (Recorded:15Jun2016) to Recorded   13  Levocetirizine Dihydrochloride 5 MG Oral Tablet; TAKE 1 TABLET DAILY; Therapy: (Recorded:15Jun2016) to Recorded   14  Lisinopril 20 MG Oral Tablet; TAKE 1 TABLET DAILY; Therapy: (Recorded:15Jun2016) to Recorded   15  Magnesium 500 MG Oral Capsule; take 1 capsule daily; Therapy: (Recorded:15Jun2016) to Recorded   16  MetFORMIN HCl - 1000 MG Oral Tablet; TAKE 1 TABLET EVERY 12 HOURS; Therapy: (Recorded:15Jun2016) to Recorded   17  NovoLOG 100 UNIT/ML Subcutaneous Solution; sliding scale BID; Therapy: (Recorded:15Jun2016) to Recorded   18  NovoLOG Mix 70/30 FlexPen (70-30) 100 UNIT/ML SUSP; INJECT 40 UNIT Twice daily; Therapy: (Recorded:15Jun2016) to Recorded   19  Omeprazole 40 MG Oral Capsule Delayed Release; TAKE 1 CAPSULE TWICE DAILY; Therapy: (Recorded:15Jun2016) to Recorded   20  Ondansetron HCl - 4 MG Oral Tablet (Zofran); One tab PO q 4-6 hours PRN nausea; Therapy: [id number] to (Last Rx:22Jun2016) Ordered   21  Pioglitazone HCl - 15 MG Oral Tablet; TAKE 1 TABLET ONCE DAILY; Therapy: (Recorded:15Jun2016) to Recorded   22   Potassium Gluconate 595 MG Oral Tablet; TAKE 1 TABLET DAILY; Therapy: (Recorded:15Jun2016) to Recorded   23  ROPINIRole HCl - 1 MG Oral Tablet; TAKE 3 TABLET Bedtime; Therapy: (Recorded:15Jun2016) to Recorded   24  Santyl OINT; apply to ankle wound  BID; Therapy: (Recorded:15Jun2016) to Recorded   25  Simvastatin 10 MG Oral Tablet; TAKE 1 TABLET DAILY; Therapy: (Recorded:15Jun2016) to Recorded   26  TraMADol HCl - 50 MG Oral Tablet; TAKE 2 TABLETS 4 TIMES DAILY AS NEEDED FOR    PAIN;    Therapy: (Recorded:15Jun2016) to Recorded   27  Vitamin D3 2000 UNIT Oral Tablet; Take 1 tablet daily; Therapy: (Recorded:15Jun2016) to Recorded    Allergies    1   Humira   2  levofloxacin    Signatures   Electronically signed by : Sean Medellin, ; Jun 23 2016  1:50PM EST                       (Author)

## 2018-01-13 NOTE — MISCELLANEOUS
Message   Recorded as Task   Date: 07/26/2016 12:27 PM, Created By: Aki Borrego   Task Name: Add Referring Physician   Assigned To: Malika Guerra   Regarding Patient: Selam Mandel, Status: Active   CommentMarice Sandhoff - 26 Jul 2016 12:27 PM     TASK CREATED  Please add Dr Cheli Trinidad ([facility]) to the database and copy todays letter to that dr Lambert Cazares,  NB   [address] TO [place]- 08/04/2016      Active Problems    1  Acute bronchitis (466 0) (J20 9)   2  Arthritis (716 90) (M19 90)   3  Chemotherapy-induced nausea (787 02,E933 1) (R11 0,T45  1X5A)   4  Diabetes (250 00) (E11 9)   5  Dysphagia (787 20) (R13 10)   6  Esophageal cancer (150 9) (C15 9)   7  Gout (274 9) (M10 9)   8  Hyperlipidemia (272 4) (E78 5)   9  Hypertension (401 9) (I10)   10  Radiation esophagitis (530 19) (K20 8)   11  Restless legs syndrome (333 94) (G25 81)   12  Rheumatoid arthritis (714 0) (M06 9)   13  Shortness of breath (786 05) (R06 02)   14  Swallowing difficulty (787 20) (R13 10)   15  Wheezing (786 07) (R06 2)    Current Meds   1  Albuterol Sulfate (2 5 MG/3ML) 0 083% Inhalation Nebulization Solution; USE 1 UNIT   DOSE IN NEBULIZER EVERY 4 TO 6 HOURS AS NEEDED; Therapy: (Recorded:15Jun2016) to Recorded   2  Allopurinol 100 MG Oral Tablet; TAKE 1 TABLET DAILY; Therapy: (Recorded:15Jun2016) to Recorded   3  ALPRAZolam 0 5 MG Oral Tablet; TAKE 1 TABLET AT BEDTIME AS NEEDED; Therapy: (Recorded:15Jun2016) to Recorded   4  Azithromycin 250 MG Oral Tablet (Zithromax Z-Jayy); TAKE 2 TABLETS ON DAY 1 THEN   TAKE 1 TABLET A DAY FOR 4 DAYS; Therapy: 50CNH9338 to (Evaluate:07Zqg2841); Last Rx:29Hbt1024 Ordered   5  Brimonidine Tartrate 0 2 % Ophthalmic Solution; INSTILL 1 DROP IN BOTH EYES   EVERY 12 HOURS DAILY; Therapy: (Recorded:15Jun2016) to Recorded   6  Carafate 1 GM/10ML Oral Suspension; TAKE 10 ML 4 TIMES DAILY;    Therapy: 49REQ0639 to (Evaluate:17Myl3753)  Requested for: 29Jul2016; Last   Rx:29Jul2016 Ordered   7  Clotrimazole-Betamethasone 1-0 05 % External Cream; Apply BID as needed; Therapy: (Recorded:15Jun2016) to Recorded   8  Colcrys 0 6 MG Oral Tablet; one tablet every 3 hours; Therapy: 06GUI7988 to (Last Rx:86Kqm0035)  Requested for: 24DVC9306 Ordered   9  Diazepam 5 MG Oral Tablet; Take 1/2 to 1 tablet as needed to sleep; Therapy: (Recorded:15Jun2016) to Recorded   10  Dorzolamide HCl-Timolol Mal 22 3-6 8 MG/ML Ophthalmic Solution; INSTILL 1 DROP    INTO BOTH EYES 2 TIMES DAILY; Therapy: (Recorded:15Jun2016) to Recorded   11  Ecotrin 81 MG TBEC; take 2 tablet daily; Therapy: (Recorded:15Jun2016) to Recorded   12  HydrOXYzine HCl - 25 MG Oral Tablet; Take 1 pill BID PRN; Therapy: (Recorded:15Jun2016) to Recorded   13  Hyoscyamine Sulfate 0 125 MG Sublingual Tablet Sublingual; Take 2 under tongue    before meals; Therapy: (Recorded:15Jun2016) to Recorded   14  Iron (Ferrous Sulfate) 325 MG TABS; TAKE 1 TABLET 3 TIMES DAILY; Therapy: (Recorded:15Jun2016) to Recorded   15  Latanoprost 0 005 % Ophthalmic Solution; 1 drop each eye in pm;    Therapy: (Recorded:15Jun2016) to Recorded   16  Levocetirizine Dihydrochloride 5 MG Oral Tablet; TAKE 1 TABLET DAILY; Therapy: (Recorded:15Jun2016) to Recorded   17  Lidocaine-Prilocaine 2 5-2 5 % External Cream; Apply to port 30min prior to use; Therapy: 01MRP9734 to (Last Rx:41Bvr4809) Ordered   18  Lisinopril 20 MG Oral Tablet; TAKE 1 TABLET DAILY; Therapy: (Recorded:15Jun2016) to Recorded   19  Magnesium 500 MG Oral Capsule; take 1 capsule daily; Therapy: (Recorded:15Jun2016) to Recorded   20  MetFORMIN HCl - 1000 MG Oral Tablet; TAKE 1 TABLET EVERY 12 HOURS; Therapy: (Recorded:15Jun2016) to Recorded   21  NovoLOG 100 UNIT/ML Subcutaneous Solution; sliding scale BID; Therapy: (Recorded:42Lzt1878) to Recorded   22  NovoLOG Mix 70/30 FlexPen (70-30) 100 UNIT/ML SUSP; INJECT 40 UNIT Twice daily;     Therapy: (Recorded:15Jun2016) to Recorded   23  Omeprazole 40 MG Oral Capsule Delayed Release; TAKE 1 CAPSULE TWICE DAILY; Therapy: (Recorded:15Jun2016) to Recorded   24  Ondansetron HCl - 4 MG Oral Tablet (Zofran); One tab PO q 4-6 hours PRN nausea; Therapy: 62TBV1965 to (Last Rx:22Jun2016) Ordered   25  Pioglitazone HCl - 15 MG Oral Tablet; TAKE 1 TABLET ONCE DAILY; Therapy: (Recorded:15Jun2016) to Recorded   26  Potassium Gluconate 595 MG Oral Tablet; TAKE 1 TABLET DAILY; Therapy: (Recorded:15Jun2016) to Recorded   27  ROPINIRole HCl - 1 MG Oral Tablet; TAKE 3 TABLET Bedtime; Therapy: (Recorded:15Jun2016) to Recorded   28  Santyl OINT; apply to ankle wound  BID; Therapy: (Recorded:15Jun2016) to Recorded   29  Simvastatin 10 MG Oral Tablet; TAKE 1 TABLET DAILY; Therapy: (Recorded:15Jun2016) to Recorded   30  TraMADol HCl - 50 MG Oral Tablet; TAKE 2 TABLETS 4 TIMES DAILY AS NEEDED FOR    PAIN;    Therapy: (Recorded:15Jun2016) to Recorded   31  Vitamin D3 2000 UNIT Oral Tablet; Take 1 tablet daily; Therapy: (Recorded:15Jun2016) to Recorded    Allergies    1   Humira   2  levofloxacin    Signatures   Electronically signed by : Calin Godinez, ; Aug  5 2016  8:46AM EST                       (Author)

## 2018-01-14 VITALS
HEART RATE: 50 BPM | BODY MASS INDEX: 30.38 KG/M2 | DIASTOLIC BLOOD PRESSURE: 62 MMHG | SYSTOLIC BLOOD PRESSURE: 120 MMHG | WEIGHT: 212.19 LBS | OXYGEN SATURATION: 97 % | RESPIRATION RATE: 16 BRPM | HEIGHT: 70 IN

## 2018-01-14 VITALS
RESPIRATION RATE: 14 BRPM | WEIGHT: 208.56 LBS | DIASTOLIC BLOOD PRESSURE: 60 MMHG | HEART RATE: 58 BPM | SYSTOLIC BLOOD PRESSURE: 102 MMHG | TEMPERATURE: 97.6 F | HEIGHT: 71 IN | BODY MASS INDEX: 29.2 KG/M2

## 2018-01-15 NOTE — PROCEDURES
Procedures by Ramona Shaw MD  at 11/10/2016  6:35 PM      Author:  Ramona Shaw MD Service:  Neurology Author Type:  Physician     Filed:  11/10/2016  6:39 PM Date of Service:  11/10/2016  6:35 PM Status:  Signed     :  Ramona Shaw MD (Physician)            ELECTROENCEPHALOGRAM (EEG)      Patient Name:  Jake Ly  MRN: [id number]   :  1945 File #: Minal Arroyo    Age: 66 y o  Encounter #: [id number]   Date performed: 11/10/16            Report date: 11/10/2016          Study type: Routine EEG    ICD 10 diagnosis: Spells/Fit NOS R56 9    Start time: 12:40 End time: 13:37     -------------------------------------------------------------------------------------------------------------------   Patient History:  66year old male with  esophageal cancer s/p esophagogastrectomy and liver ablation last Monday  Patient developed acute right occipital stroke resulting in left  homonymous hemianopsia      Medications include:   amiodarone 200 mg Oral TID With Meals   apixaban 5 mg Oral BID   aspirin 81 mg Oral Daily   atorvastatin 80 mg Oral Daily With Dinner   brimonidine 1 drop Both Eyes Q12H   calcium gluconate 1 g Intravenous Once   docusate sodium 100 mg Oral BID   dorzolamide-timolol 1 drop Both Eyes BID   furosemide 40 mg Intravenous BID (diuretic)   insulin aspart protamine-insulin aspart 40 Units Subcutaneous BID AC   [START ON 2016] insulin lispro 1-6 Units Subcutaneous 0200   insulin lispro 2-12 Units Subcutaneous TID AC   insulin lispro 2-12 Units Subcutaneous HS   latanoprost 1 drop Both Eyes HS   levETIRAcetam 1,000 mg Oral Q12H   pantoprazole 40 mg Intravenous Q12H ABBIE   polyethylene glycol 17 g Oral Daily   senna-docusate sodium 1 tablet Oral HS       -------------------------------------------------------------------------------------------------------------------   Description of Procedure:  ·  32 channel digital recording with electrodes placed according to the International 10-20 system with additional  T1/T2 electrodes, EOG, EKG, and simultaneous  video  The recording was technically satisfactory  -------------------------------------------------------------------------------------------------------------------   Results:  Background Activity:  The recording was performed with the patient awake  During wakefulness, there were brief runs of slightly irregular, low to mid amplitude,  posteriorly dominant, symmetric, 8-9 Hz with some intermixed 5-6 Hz  activity that did attenuate with eye opening      -------------------------------------------------------------------------------------------------------------------   Activation Procedures:  Hyperventilation was not performed  Stepped photic stimulation between 1-20 cps was performed and did not induce any changes  -------------------------------------------------------------------------------------------------------------------   Abnormal Findings:  See Background Activity  No focal abnormalities or interictal epileptiform discharges were noted  No electrographic seizures were observed during this recording     -------------------------------------------------------------------------------------------------------------------  Other Findings: The single lead ECG demonstrated a regular rhythm     -------------------------------------------------------------------------------------------------------------------  Events:  No patient push button events  -------------------------------------------------------------------------------------------------------------------   EEG Interpretation:  Moderately abnormal 35 minute EEG, due to background irregularity and intermixed theta slowing  No focal abnormalities nor interictal epileptiform discharges were noted  No electrographic seizures occurred during the recording      Scribe Attestation:  Documented by Darlyn Jo, acting as a scribe for Dr George Douglas on 11/10/2016  at 6:38 PM     Physician Attestation:  All documentation recorded by the scribe accurately reflects the service I personally performed and the decisions made by me  I was present during the time the encounter was recorded and have reviewed all the documentation and confirm that it is all accurate  and representative of the encounter  Herrera Holland MD   Medical Director  [address] [facility]  Pager # Kyle Bumpers M D    Nov 10 2016  6:39PM [facility] Standard Time

## 2018-01-15 NOTE — PROGRESS NOTES
Assessment    1  Esophageal cancer (150 9) (C15 9)    Plan  Arthritis, Chemotherapy-induced nausea, Esophageal cancer    · (1) CBC/PLT/DIFF; Status:Active; Requested for:[id number];    Perform:[facility]; JXC:[id number]; For:Arthritis, Chemotherapy-induced nausea, Esophageal cancer; Ordered By:Ketty Don; Discussion/Summary  Discussion Summary:   In summary, this is a 75-year-old male history of rheumatoid arthritis and diabetes who was experiencing progressive dysphagia  Biopsy of the gastric cardia mass 4-27-16 was consistent with adenocarcinoma  Her2 negative  Staging is likely, stage IIB or IIIa depending upon the accuracy of wanda staging  This is discrepant between the endoscopic ultrasound and PET/CT  He is currently undergoing adjuvant chemoradiation  Taxol Carbo at a dose of 50 mg/m^2 and AUC-2, respectively  1st treatment date 6/27/16  His pain and nausea are resolved which he attributes to medical marijuana use  He requests a prescription for medical marijuana  Discussed that I have not been trained to prescribe this medication  He also questions the utilization of cyberknife treatment  He states he has friends who have received cyberknife treatment under the direction so Linda Seay in [place]  Discussed that cyberknife treatment is not indicated in his case at this time  Medication SE Review and Pt Understands Tx: Possible side effects of new medications were reviewed with the patient/guardian today  The treatment plan was reviewed with the patient/guardian  The patient/guardian understands and agrees with the treatment plan   Understands and agrees with treatment plan: The treatment plan was reviewed with the patient/guardian   The patient/guardian understands and agrees with the treatment plan   Counseling Documentation With Imm: The patient was counseled regarding diagnostic results, instructions for management, prognosis, patient and family education, impressions, risks and benefits of treatment options  total time of encounter was 40 minutes  History of Present Illness  HPI: [id number] patient developed progressive dysphasia  Ultimately, he underwent EGD in April 2016  This showed an esophageal carcinoma at the GE junction  Biopsy proved the presence of adenocarcinoma, HER-2 negative  Tumor extended from the GE junction to the gastric cardia  CT scan showed no evidence of metastatic disease  May 19, 2016- PET/CT showed thickening of the distal esophagus/gastric cardia  SUV 13 0  No disease was noted elsewhere  June 1, 2016-endoscopic ultrasound indicated a T3 lesion with malignant appearing nodes in the perigastric and gastrohepatic ligament area, suggesting a stage of T3 N1  Reports 7 8, HEMOGLOBIN 10 8, MCV 73, PLATELETS 327, CREATININE 0 7  LFTS NORMAL  He saw Dr Zena Soliz for evaluation  Recommendation was made for radiation therapy with concomitant chemotherapy  He indicated a number of different chemotherapy programs that were considered  He is seen today regarding second opinion  Current Therapy: weekly paclitaxel + carboplatin concurrent with RT  #1 6/27/16  RT under the direction of Alessia Matos MD in [place], [place]  Today is treatment #5  Did not receive tx 7/1/16 due to machine and did not 7/4/16 due to holiday  Interval History: Called the on call doctor over the weekend with complaint of yellowish phlegm production  No shortness of breath  Chronic sinus congestion x years  No fever, chills  I was asked to see patient in infusion center today regarding this issue  Has been using marijuana candy that a friend gave him and reports that it has helped with his esophageal pain and nausea  He still has a decreased appetite  Review of Systems  Complete-Male:   Constitutional: recent 10 lb weight loss  Eyes: No complaints of eye pain, no red eyes, no discharge from eyes, no itchy eyes     ENT: no complaints of earache, no hearing loss, no nosebleeds, no nasal discharge, no sore throat, no hoarseness  Cardiovascular: No complaints of slow heart rate, no fast heart rate, no chest pain, no palpitations, no leg claudication, no lower extremity  Respiratory: cough, but no shortness of breath, no orthopnea, no wheezing, no shortness of breath during exertion and no PND  Gastrointestinal: No complaints of abdominal pain, no constipation, no nausea or vomiting, no diarrhea or bloody stools  Genitourinary: No complaints of dysuria, no incontinence, no hesitancy, no nocturia, no genital lesion, no testicular pain  Musculoskeletal: No complaints of arthralgia, no myalgias, no joint swelling or stiffness, no limb pain or swelling  Integumentary: No complaints of skin rash or skin lesions, no itching, no skin wound, no dry skin  Neurological: No compliants of headache, no confusion, no convulsions, no numbness or tingling, no dizziness or fainting, no limb weakness, no difficulty walking  Psychiatric: Is not suicidal, no sleep disturbances, no anxiety or depression, no change in personality, no emotional problems  Endocrine: No complaints of proptosis, no hot flashes, no muscle weakness, no erectile dysfunction, no deepening of the voice, no feelings of weakness  Hematologic/Lymphatic: No complaints of swollen glands, no swollen glands in the neck, does not bleed easily, no easy bruising  Active Problems    1  Arthritis (716 90) (M19 90)   2  Chemotherapy-induced nausea (787 02,E933 1) (R11 0,T45  1X5A)   3  Diabetes (250 00) (E11 9)   4  Difficulty swallowing (787 20) (R13 10)   5  Dysphagia (787 20) (R13 10)   6  Esophageal cancer (150 9) (C15 9)   7  Gout (274 9) (M10 9)   8  Hyperlipidemia (272 4) (E78 5)   9  Hypertension (401 9) (I10)   10  Restless legs syndrome (333 94) (G25 81)   11  Rheumatoid arthritis (714 0) (M06 9)   12  Shortness of breath (786 05) (R06 02)   13  Wheezing (786 07) (R06 2)    Past Medical History    1  History of Coronary artery disease (414 00) (I25 10)   2  History of anemia (V12 3) (Z86 2)   3  History of diabetes mellitus (V12 29) (Z86 39)   4  History of rheumatoid arthritis (V13 4) (Z87 39)    Surgical History    1  History of Appendectomy   2  History of Appendectomy   3  History of Arthroscopy Knee   4  History of Cholecystectomy   5  History of Cholecystotomy   6  History of Esophagogastroduodenoscopy With Biopsy   7  History of Knee Surgery   8  History of Tonsillectomy   9  History of Tonsillectomy   10  History of Upper GI Endoscopy With Endoscopic Ultrasound Examination    Family History  Mother    1  Family history of cardiac disorder (V17 49) (Z82 49)   2  Family history of cerebrovascular accident (CVA) (V17 1) (Z82 3)  Father    3  Family history of Colon cancer   4  Family history of Colon carcinoma   5  Family history of cardiac disorder (V17 49) (Z82 49)   6  Family history of Inflammatory polyps of colon  Maternal Half Sister    7  Family history of cardiac disorder (V17 49) (Z82 49)   8  Family history of diabetes mellitus (V18 0) (Z83 3)   9  Family history of Mouth cancer   10  Family history of Oral cancer  Grandfather    11  Family history of Mouth cancer   12  Family history of Oral cancer    Social History    · Former smoker (A04 41) (F78 080)   · No alcohol use   · Occasional alcohol use    Current Meds   1  Albuterol Sulfate (2 5 MG/3ML) 0 083% Inhalation Nebulization Solution; USE 1 UNIT   DOSE IN NEBULIZER EVERY 4 TO 6 HOURS AS NEEDED; Therapy: (Recorded:15Jun2016) to Recorded   2  Allopurinol 100 MG Oral Tablet; TAKE 1 TABLET DAILY; Therapy: (Recorded:15Jun2016) to Recorded   3  ALPRAZolam 0 5 MG Oral Tablet; TAKE 1 TABLET AT BEDTIME AS NEEDED; Therapy: (Recorded:15Jun2016) to Recorded   4  Brimonidine Tartrate 0 2 % Ophthalmic Solution; INSTILL 1 DROP IN BOTH EYES   EVERY 12 HOURS DAILY; Therapy: (Recorded:15Jun2016) to Recorded   5   Clotrimazole-Betamethasone 1-0 05 % External Cream; Apply BID as needed; Therapy: (Recorded:15Jun2016) to Recorded   6  Diazepam 5 MG Oral Tablet; Take 1/2 to 1 tablet as needed to sleep; Therapy: (Recorded:15Jun2016) to Recorded   7  Dorzolamide HCl-Timolol Mal 22 3-6 8 MG/ML Ophthalmic Solution; INSTILL 1 DROP   INTO BOTH EYES 2 TIMES DAILY; Therapy: (Recorded:15Jun2016) to Recorded   8  Ecotrin 81 MG TBEC; take 2 tablet daily; Therapy: (Recorded:15Jun2016) to Recorded   9  HydrOXYzine HCl - 25 MG Oral Tablet; Take 1 pill BID PRN; Therapy: (Recorded:15Jun2016) to Recorded   10  Hyoscyamine Sulfate 0 125 MG Sublingual Tablet Sublingual; Take 2 under tongue    before meals; Therapy: (Recorded:15Jun2016) to Recorded   11  Iron (Ferrous Sulfate) 325 MG TABS; TAKE 1 TABLET 3 TIMES DAILY; Therapy: (Recorded:15Jun2016) to Recorded   12  Latanoprost 0 005 % Ophthalmic Solution; 1 drop each eye in pm;    Therapy: (Recorded:15Jun2016) to Recorded   13  Levocetirizine Dihydrochloride 5 MG Oral Tablet; TAKE 1 TABLET DAILY; Therapy: (Recorded:15Jun2016) to Recorded   14  Lidocaine-Prilocaine 2 5-2 5 % External Cream; Apply to port 30min prior to use; Therapy: [id number] to (Last Rx:27Jun2016); Status: ACTIVE - Retrospective    Authorization Ordered   15  Lisinopril 20 MG Oral Tablet; TAKE 1 TABLET DAILY; Therapy: (Recorded:15Jun2016) to Recorded   16  Magnesium 500 MG Oral Capsule; take 1 capsule daily; Therapy: (Recorded:15Jun2016) to Recorded   17  MetFORMIN HCl - 1000 MG Oral Tablet; TAKE 1 TABLET EVERY 12 HOURS; Therapy: (Recorded:15Jun2016) to Recorded   18  NovoLOG 100 UNIT/ML Subcutaneous Solution; sliding scale BID; Therapy: (Recorded:15Jun2016) to Recorded   19  NovoLOG Mix 70/30 FlexPen (70-30) 100 UNIT/ML SUSP; INJECT 40 UNIT Twice daily; Therapy: (Recorded:15Jun2016) to Recorded   20  Omeprazole 40 MG Oral Capsule Delayed Release; TAKE 1 CAPSULE TWICE DAILY; Therapy: (Recorded:15Jun2016) to Recorded   21  Ondansetron HCl - 4 MG Oral Tablet; One tab PO q 4-6 hours PRN nausea; Therapy: [id number] to (Last Rx:22Jun2016) Ordered   22  Pioglitazone HCl - 15 MG Oral Tablet; TAKE 1 TABLET ONCE DAILY; Therapy: (Recorded:15Jun2016) to Recorded   23  Potassium Gluconate 595 MG Oral Tablet; TAKE 1 TABLET DAILY; Therapy: (Recorded:15Jun2016) to Recorded   24  ROPINIRole HCl - 1 MG Oral Tablet; TAKE 3 TABLET Bedtime; Therapy: (Recorded:15Jun2016) to Recorded   25  Santyl OINT; apply to ankle wound  BID; Therapy: (Recorded:15Jun2016) to Recorded   26  Simvastatin 10 MG Oral Tablet; TAKE 1 TABLET DAILY; Therapy: (Recorded:15Jun2016) to Recorded   27  TraMADol HCl - 50 MG Oral Tablet; TAKE 2 TABLETS 4 TIMES DAILY AS NEEDED FOR    PAIN;    Therapy: (Recorded:15Jun2016) to Recorded   28  Vitamin D3 2000 UNIT Oral Tablet; Take 1 tablet daily; Therapy: (Recorded:15Jun2016) to Recorded    Allergies    1  Humira   2  levofloxacin    Vitals  Vital Signs [Data Includes: Current Encounter]    Recorded: [id number] 02:35PM   Temperature 97 F   Heart Rate 76   Respiration 18   Systolic 888   Diastolic 58   Weight 870 lb 2 26 oz   BMI Calculated 36 28   BSA Calculated 2 36     Physical Exam    Constitutional   General appearance: No acute distress, well appearing and well nourished  Eyes   Conjunctiva and lids: No swelling, erythema, or discharge  Ears, Nose, Mouth, and Throat   External inspection of ears and nose: Normal     Oropharynx: Normal with no erythema, edema, exudate or lesions  Pulmonary   Auscultation of lungs: Clear to auscultation, equal breath sounds bilaterally, no wheezes, no rales, no rhonci  Cardiovascular   Auscultation of heart: Normal rate and rhythm, normal S1 and S2, without murmurs  Examination of extremities for edema and/or varicosities: Normal     Abdomen   Abdomen: Non-tender, no masses  Lymphatic   Palpation of lymph nodes in neck: No lymphadenopathy      Skin   Skin and subcutaneous tissue: Normal without rashes or lesions  Psychiatric   Orientation to person, place and time: Normal          Future Appointments    Date/Time Provider Specialty Site   07/08/2016 11:30 AM JAMSHID Frederick , DO, [facility] Hematology Oncology [address] [place] [address] ONCOLOGY [facility]   09/06/2016 03:45 PM JAMSHID Velasco   Surgical Oncology [facility] SURGICAL ONCOLOGY     Signatures   Electronically signed by : Patience Xiao, [facility]; Jul 5 2016  3:29PM EST                       (Author)    Electronically signed by : JAMSHID Castle ,DO; Jul 8 2016  5:50PM EST

## 2018-01-16 NOTE — CONSULTS
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  Staging is likely, stage IIB or IIIa depending upon the accuracy of wanda staging  This is discrepant between the endoscopic ultrasound and PET/CT  In either event, treatment with Aaron Hansen adjuvant chemoradiation therapy appears reasonable and within NCCN guidelines  We reviewed that optimal chemotherapy program for patients with concomitant chemoradiation in the harshal-adjuvant setting is unclear  A number of programs could be considered, including 5-FU either by infusion or orally combined with cisplatin, weekly Taxol and carboplatin, 5-FU plus Oxaliplatin  These all have similar efficacy rates, but some variability in toxicity  I reviewed with the patient and his significant other that any of these choices would be reasonable  Based on efficacy  My inclination would be to use weekly Taxol Carbo at a dose of 50 mg/m^2 and AUC-2, respectively with concomitant radiation therapy  This would be based on consideration of minimizing potential toxicity for worsening neuropathy  Wall  Taxol can cause neuropathy this is more common at the 175 mg/m^2 every 3 week dose Ativan at the 50 mg/m^2 every week dose  We reviewed potential toxicities of this program including but not limited to, nausea, vomiting, alopecia, neuropathy, constipation, cytopenias, allergic reactions   We reviewed prophylactic measures taken routinely as well as monitoring to allow for early intervention is appropriate  We reviewed the natural history of this disease with and without treatment  The above program combined with radiation therapy and surgery can-year-old results with long-term survival in 20-30% of patients  The pathologic complete response rate is in the 25-30 percent range  There is some discussion in the literature about the incremental benefit of surgery above and beyond that of combined chemoradiation  For patients who are medically fit to undergo surgery  It is preferred, however  Again, I reviewed the above considerations at length with the patient and his   He is considering what facility to be treated at  I asked him to consider the matter somewhat more and let us know how he wishes to proceed  I discussed his case with Dr Oliverio Fabian earlier today was in agreement with the above recommendation  The patient voiced understanding and agreement with the above plan  Possible side effects of new medications were reviewed with the patient/guardian today  The treatment plan was reviewed with the patient/guardian  The patient/guardian understands and agrees with the treatment plan   The treatment plan was reviewed with the patient/guardian  The patient/guardian understands and agrees with the treatment plan   The patient was counseled regarding diagnostic results, instructions for management, prognosis, patient and family education, impressions, risks and benefits of treatment options  total time of encounter was 40 minutes  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  History of Present Illness  [id number] patient developed progressive dysphasia  Ultimately, he underwent EGD in April 2016  This showed an esophageal carcinoma at the GE junction  Biopsy proved the presence of adenocarcinoma, HER-2 negative  Tumor extended from the GE junction to the gastric cardia   CT scan showed no evidence of metastatic disease  May 19, 2016- PET/CT showed thickening of the distal esophagus/gastric cardia  SUV 13 0  No disease was noted elsewhere  June 1, 2016-endoscopic ultrasound indicated a T3 lesion with malignant appearing nodes in the perigastric and gastrohepatic ligament area, suggesting a stage of T3 N1  Reports 7 8, HEMOGLOBIN 10 8, MCV 73, PLATELETS 749, CREATININE 0 7  LFTS NORMAL  He saw Dr Zena Soliz for evaluation  Recommendation was made for radiation therapy with concomitant chemotherapy  He indicated a number of different chemotherapy programs that were considered  He is seen today regarding second opinion  Review of Systems    Constitutional: recent 10 lb weight loss  Eyes: No complaints of eye pain, no red eyes, no discharge from eyes, no itchy eyes  ENT: no complaints of earache, no hearing loss, no nosebleeds, no nasal discharge, no sore throat, no hoarseness  Cardiovascular: No complaints of slow heart rate, no fast heart rate, no chest pain, no palpitations, no leg claudication, no lower extremity  Respiratory: No complaints of shortness of breath, no wheezing, no cough, no SOB on exertion, no orthopnea or PND  Gastrointestinal: No complaints of abdominal pain, no constipation, no nausea or vomiting, no diarrhea or bloody stools  Genitourinary: No complaints of dysuria, no incontinence, no hesitancy, no nocturia, no genital lesion, no testicular pain  Musculoskeletal: No complaints of arthralgia, no myalgias, no joint swelling or stiffness, no limb pain or swelling  Integumentary: No complaints of skin rash or skin lesions, no itching, no skin wound, no dry skin  Neurological: No compliants of headache, no confusion, no convulsions, no numbness or tingling, no dizziness or fainting, no limb weakness, no difficulty walking  Psychiatric: Is not suicidal, no sleep disturbances, no anxiety or depression, no change in personality, no emotional problems     Endocrine: No complaints of proptosis, no hot flashes, no muscle weakness, no erectile dysfunction, no deepening of the voice, no feelings of weakness  Hematologic/Lymphatic: No complaints of swollen glands, no swollen glands in the neck, does not bleed easily, no easy bruising  Active Problems    1  Arthritis (716 90) (M19 90)   2  Diabetes (250 00) (E11 9)   3  Difficulty swallowing (787 20) (R13 10)   4  Dysphagia (787 20) (R13 10)   5  Esophageal cancer (150 9) (C15 9)   6  Gout (274 9) (M10 9)   7  Hyperlipidemia (272 4) (E78 5)   8  Hypertension (401 9) (I10)   9  Restless legs syndrome (333 94) (G25 81)   10  Rheumatoid arthritis (714 0) (M06 9)   11  Shortness of breath (786 05) (R06 02)   12  Wheezing (786 07) (R06 2)    Past Medical History    1  History of Coronary artery disease (414 00) (I25 10)   2  History of anemia (V12 3) (Z86 2)   3  History of diabetes mellitus (V12 29) (Z86 39)   4  History of rheumatoid arthritis (V13 4) (Z87 39)    Surgical History    1  History of Appendectomy   2  History of Appendectomy   3  History of Arthroscopy Knee   4  History of Cholecystectomy   5  History of Cholecystotomy   6  History of Esophagogastroduodenoscopy With Biopsy   7  History of Knee Surgery   8  History of Tonsillectomy   9  History of Tonsillectomy   10  History of Upper GI Endoscopy With Endoscopic Ultrasound Examination    Family History  Mother    1  Family history of cardiac disorder (V17 49) (Z82 49)   2  Family history of cerebrovascular accident (CVA) (V17 1) (Z82 3)  Father    3  Family history of Colon cancer   4  Family history of Colon carcinoma   5  Family history of cardiac disorder (V17 49) (Z82 49)   6  Family history of Inflammatory polyps of colon  Maternal Half Sister    7  Family history of cardiac disorder (V17 49) (Z82 49)   8  Family history of diabetes mellitus (V18 0) (Z83 3)   9  Family history of Mouth cancer   10  Family history of Oral cancer  Grandfather    11   Family history of Mouth cancer   12  Family history of Oral cancer    Social History    · Former smoker (M96 36) (R45 518)   · No alcohol use   · Occasional alcohol use    Current Meds   1  Albuterol Sulfate (2 5 MG/3ML) 0 083% Inhalation Nebulization Solution; USE 1 UNIT   DOSE IN NEBULIZER EVERY 4 TO 6 HOURS AS NEEDED; Therapy: (Recorded:15Jun2016) to Recorded   2  Allopurinol 100 MG Oral Tablet; TAKE 1 TABLET DAILY; Therapy: (Recorded:15Jun2016) to Recorded   3  ALPRAZolam 0 5 MG Oral Tablet; TAKE 1 TABLET AT BEDTIME AS NEEDED; Therapy: (Recorded:15Jun2016) to Recorded   4  Brimonidine Tartrate 0 2 % Ophthalmic Solution; INSTILL 1 DROP IN BOTH EYES   EVERY 12 HOURS DAILY; Therapy: (Recorded:15Jun2016) to Recorded   5  Clotrimazole-Betamethasone 1-0 05 % External Cream; Apply BID as needed; Therapy: (Recorded:15Jun2016) to Recorded   6  Diazepam 5 MG Oral Tablet; Take 1/2 to 1 tablet as needed to sleep; Therapy: (Recorded:15Jun2016) to Recorded   7  Dorzolamide HCl-Timolol Mal 22 3-6 8 MG/ML Ophthalmic Solution; INSTILL 1 DROP   INTO BOTH EYES 2 TIMES DAILY; Therapy: (Recorded:15Jun2016) to Recorded   8  Ecotrin 81 MG TBEC; take 2 tablet daily; Therapy: (Recorded:15Jun2016) to Recorded   9  HydrOXYzine HCl - 25 MG Oral Tablet; Take 1 pill BID PRN; Therapy: (Recorded:15Jun2016) to Recorded   10  Hyoscyamine Sulfate 0 125 MG Sublingual Tablet Sublingual; Take 2 under tongue    before meals; Therapy: (Recorded:15Jun2016) to Recorded   11  Iron (Ferrous Sulfate) 325 MG TABS; TAKE 1 TABLET 3 TIMES DAILY; Therapy: (Recorded:15Jun2016) to Recorded   12  Latanoprost 0 005 % Ophthalmic Solution; 1 drop each eye in pm;    Therapy: (Recorded:15Jun2016) to Recorded   13  Levocetirizine Dihydrochloride 5 MG Oral Tablet; TAKE 1 TABLET DAILY; Therapy: (Recorded:15Jun2016) to Recorded   14  Lisinopril 20 MG Oral Tablet; TAKE 1 TABLET DAILY; Therapy: (Recorded:15Jun2016) to Recorded   15   Magnesium 500 MG Oral Capsule; take 1 capsule daily; Therapy: (Recorded:15Jun2016) to Recorded   16  MetFORMIN HCl - 1000 MG Oral Tablet; TAKE 1 TABLET EVERY 12 HOURS; Therapy: (Recorded:15Jun2016) to Recorded   17  NovoLOG 100 UNIT/ML Subcutaneous Solution; sliding scale BID; Therapy: (Recorded:15Jun2016) to Recorded   18  NovoLOG Mix 70/30 FlexPen (70-30) 100 UNIT/ML SUSP; INJECT 40 UNIT Twice daily; Therapy: (Recorded:15Jun2016) to Recorded   19  Omeprazole 40 MG Oral Capsule Delayed Release; TAKE 1 CAPSULE TWICE DAILY; Therapy: (Recorded:15Jun2016) to Recorded   20  Pioglitazone HCl - 15 MG Oral Tablet; TAKE 1 TABLET ONCE DAILY; Therapy: (Recorded:15Jun2016) to Recorded   21  Potassium Gluconate 595 MG Oral Tablet; TAKE 1 TABLET DAILY; Therapy: (Recorded:15Jun2016) to Recorded   22  ROPINIRole HCl - 1 MG Oral Tablet; TAKE 3 TABLET Bedtime; Therapy: (Recorded:15Jun2016) to Recorded   23  Santyl OINT; apply to ankle wound  BID; Therapy: (Recorded:15Jun2016) to Recorded   24  Simvastatin 10 MG Oral Tablet; TAKE 1 TABLET DAILY; Therapy: (Recorded:15Jun2016) to Recorded   25  TraMADol HCl - 50 MG Oral Tablet; TAKE 2 TABLETS 4 TIMES DAILY AS NEEDED FOR    PAIN;    Therapy: (Recorded:15Jun2016) to Recorded   26  Vitamin D3 2000 UNIT Oral Tablet; Take 1 tablet daily; Therapy: (Recorded:15Jun2016) to Recorded    Allergies    1  Humira   2  levofloxacin    Physical Exam    Constitutional   General appearance: No acute distress, well appearing and well nourished  Eyes   Conjunctiva and lids: No swelling, erythema, or discharge  Ears, Nose, Mouth, and Throat   External inspection of ears and nose: Normal     Oropharynx: Normal with no erythema, edema, exudate or lesions  Pulmonary   Auscultation of lungs: Clear to auscultation, equal breath sounds bilaterally, no wheezes, no rales, no rhonci  Cardiovascular   Auscultation of heart: Normal rate and rhythm, normal S1 and S2, without murmurs      Examination of extremities for edema and/or varicosities: Normal     Abdomen   Abdomen: Non-tender, no masses  Liver and spleen: No hepatomegaly or splenomegaly  Lymphatic   Palpation of lymph nodes in neck: No lymphadenopathy  Musculoskeletal   Gait and station: Normal     Skin   Skin and subcutaneous tissue: Normal without rashes or lesions  Neurologic   Cranial nerves: Cranial nerves 2-12 intact  Psychiatric   Orientation to person, place and time: Normal          Future Appointments    Date/Time Provider Specialty Site   09/06/2016 03:45 PM JAMSHID Edge   [facility]     Signatures   Electronically signed by : JAMSHID Espinoza D ,DO; Darrick 15 2016  5:56PM EST                       (Author)

## 2018-01-16 NOTE — MISCELLANEOUS
Message  UPDATED PT'S CHART TO ADD JENA CASTILLO TO HER DOCTORS  ALSO FAXED 08/02/16 LETTER TO HIM 08-05-16      Active Problems    1  Acute bronchitis (466 0) (J20 9)   2  Arthritis (716 90) (M19 90)   3  Chemotherapy-induced nausea (787 02,E933 1) (R11 0,T45  1X5A)   4  Diabetes (250 00) (E11 9)   5  Dysphagia (787 20) (R13 10)   6  Esophageal cancer (150 9) (C15 9)   7  Gout (274 9) (M10 9)   8  Hyperlipidemia (272 4) (E78 5)   9  Hypertension (401 9) (I10)   10  Radiation esophagitis (530 19) (K20 8)   11  Restless legs syndrome (333 94) (G25 81)   12  Rheumatoid arthritis (714 0) (M06 9)   13  Shortness of breath (786 05) (R06 02)   14  Swallowing difficulty (787 20) (R13 10)   15  Wheezing (786 07) (R06 2)    Current Meds   1  Albuterol Sulfate (2 5 MG/3ML) 0 083% Inhalation Nebulization Solution; USE 1 UNIT   DOSE IN NEBULIZER EVERY 4 TO 6 HOURS AS NEEDED; Therapy: (Recorded:15Jun2016) to Recorded   2  Allopurinol 100 MG Oral Tablet; TAKE 1 TABLET DAILY; Therapy: (Recorded:15Jun2016) to Recorded   3  ALPRAZolam 0 5 MG Oral Tablet; TAKE 1 TABLET AT BEDTIME AS NEEDED; Therapy: (Recorded:15Jun2016) to Recorded   4  Azithromycin 250 MG Oral Tablet (Zithromax Z-Jayy); TAKE 2 TABLETS ON DAY 1 THEN   TAKE 1 TABLET A DAY FOR 4 DAYS; Therapy: 19GZS8766 to (Evaluate:50Twq5957); Last Rx:[id number] Ordered   5  Brimonidine Tartrate 0 2 % Ophthalmic Solution; INSTILL 1 DROP IN BOTH EYES   EVERY 12 HOURS DAILY; Therapy: (Recorded:15Jun2016) to Recorded   6  Carafate 1 GM/10ML Oral Suspension; TAKE 10 ML 4 TIMES DAILY; Therapy: 98ECX7575 to (Evaluate:77Jaj6046)  Requested for: 02Nbm2546; Last   Rx:08Jze1353 Ordered   7  Clotrimazole-Betamethasone 1-0 05 % External Cream; Apply BID as needed; Therapy: (Recorded:15Jun2016) to Recorded   8  Colcrys 0 6 MG Oral Tablet; one tablet every 3 hours; Therapy: 57VAF0878 to (Last Rx:08Qri8275)  Requested for: 97XBJ3310 Ordered   9   Diazepam 5 MG Oral Tablet; Take 1/2 to 1 tablet as needed to sleep; Therapy: (Recorded:15Jun2016) to Recorded   10  Dorzolamide HCl-Timolol Mal 22 3-6 8 MG/ML Ophthalmic Solution; INSTILL 1 DROP    INTO BOTH EYES 2 TIMES DAILY; Therapy: (Recorded:15Jun2016) to Recorded   11  Ecotrin 81 MG TBEC; take 2 tablet daily; Therapy: (Recorded:15Jun2016) to Recorded   12  HydrOXYzine HCl - 25 MG Oral Tablet; Take 1 pill BID PRN; Therapy: (Recorded:15Jun2016) to Recorded   13  Hyoscyamine Sulfate 0 125 MG Sublingual Tablet Sublingual; Take 2 under tongue    before meals; Therapy: (Recorded:15Jun2016) to Recorded   14  Iron (Ferrous Sulfate) 325 MG TABS; TAKE 1 TABLET 3 TIMES DAILY; Therapy: (Recorded:15Jun2016) to Recorded   15  Latanoprost 0 005 % Ophthalmic Solution; 1 drop each eye in pm;    Therapy: (Recorded:15Jun2016) to Recorded   16  Levocetirizine Dihydrochloride 5 MG Oral Tablet; TAKE 1 TABLET DAILY; Therapy: (Recorded:15Jun2016) to Recorded   17  Lidocaine-Prilocaine 2 5-2 5 % External Cream; Apply to port 30min prior to use; Therapy: 27NFZ6483 to (Last Rx:27Jun2016) Ordered   18  Lisinopril 20 MG Oral Tablet; TAKE 1 TABLET DAILY; Therapy: (Recorded:15Jun2016) to Recorded   19  Magnesium 500 MG Oral Capsule; take 1 capsule daily; Therapy: (Recorded:15Jun2016) to Recorded   20  MetFORMIN HCl - 1000 MG Oral Tablet; TAKE 1 TABLET EVERY 12 HOURS; Therapy: (Recorded:15Jun2016) to Recorded   21  NovoLOG 100 UNIT/ML Subcutaneous Solution; sliding scale BID; Therapy: (Recorded:15Jun2016) to Recorded   22  NovoLOG Mix 70/30 FlexPen (70-30) 100 UNIT/ML SUSP; INJECT 40 UNIT Twice daily; Therapy: (Recorded:15Jun2016) to Recorded   23  Omeprazole 40 MG Oral Capsule Delayed Release; TAKE 1 CAPSULE TWICE DAILY; Therapy: (Recorded:15Jun2016) to Recorded   24  Ondansetron HCl - 4 MG Oral Tablet (Zofran); One tab PO q 4-6 hours PRN nausea; Therapy: 11KDY3564 to (Last Rx:22Jun2016) Ordered   25   Pioglitazone HCl - 15 MG Oral Tablet; TAKE 1 TABLET ONCE DAILY; Therapy: (Recorded:15Jun2016) to Recorded   26  Potassium Gluconate 595 MG Oral Tablet; TAKE 1 TABLET DAILY; Therapy: (Recorded:15Jun2016) to Recorded   27  ROPINIRole HCl - 1 MG Oral Tablet; TAKE 3 TABLET Bedtime; Therapy: (Recorded:15Jun2016) to Recorded   28  Santyl OINT; apply to ankle wound  BID; Therapy: (Recorded:15Jun2016) to Recorded   29  Simvastatin 10 MG Oral Tablet; TAKE 1 TABLET DAILY; Therapy: (Recorded:15Jun2016) to Recorded   30  TraMADol HCl - 50 MG Oral Tablet; TAKE 2 TABLETS 4 TIMES DAILY AS NEEDED FOR    PAIN;    Therapy: (Recorded:15Jun2016) to Recorded   31  Vitamin D3 2000 UNIT Oral Tablet; Take 1 tablet daily; Therapy: (Recorded:15Jun2016) to Recorded    Allergies    1   Humira   2  levofloxacin    Signatures   Electronically signed by : Eugene Carmen, ; Aug  5 2016  9:39AM EST                       (Author)

## 2018-01-16 NOTE — PROCEDURES
Procedures by Mary Mayo MD at 3/27/2017  12:41 PM      Author:  Mary Mayo MD Service:  Interventional Radiology  Author Type:  Physician     Filed:  3/27/2017 12:42 PM Date of Service:  3/27/2017 12:41 PM Status:  Signed     :  Mary Mayo MD (Physician)            Procedures    INTERVENTIONAL RADIOLOGY PROCEDURE NOTE    Date: 03/27/17    Procedures:     1  Tube exchange and reposition over a wire  Preoperative diagnosis: Indwelling chest tube was retracted  Postoperative diagnosis: Same    Surgeon: Mary Mayo MD     Assistant: None  No qualified resident was available  Blood loss: Minimal    Specimens: none    Findings: Successful repositioning of chest tube under fluoro guidance  Complications: None    Anesthesia: 1% local lidocaine infiltration only  Plan:  Monitor chest tube outputs                  Received for:Provider  EPIC   Mar 27 2017 12:42PM [facility] [address] Standard Time

## 2018-01-16 NOTE — MISCELLANEOUS
Message  Patient called  He is scheduled for an MRI next week and was wondering if he could take Valium prior to test due to claustrophobia  Patient states the last time he had an MRI he took 10mg of valium  Instructed patient ok to take as long as he has someone driving him to and from MRI  Active Problems    1  Acute bronchitis (466 0) (J20 9)   2  Arthritis (716 90) (M19 90)   3  Chemotherapy-induced nausea (787 02,E933 1) (R11 0,T45  1X5A)   4  Diabetes (250 00) (E11 9)   5  Dysphagia (787 20) (R13 10)   6  Esophageal cancer (150 9) (C15 9)   7  Gout (274 9) (M10 9)   8  Hyperlipidemia (272 4) (E78 5)   9  Hypertension (401 9) (I10)   10  Liver lesion (573 8) (K76 9)   11  Radiation esophagitis (530 19) (K20 8)   12  Restless legs syndrome (333 94) (G25 81)   13  Rheumatoid arthritis (714 0) (M06 9)   14  Shortness of breath (786 05) (R06 02)   15  Swallowing difficulty (787 20) (R13 10)   16  Wheezing (786 07) (R06 2)    Current Meds   1  Albuterol Sulfate (2 5 MG/3ML) 0 083% Inhalation Nebulization Solution; USE 1 UNIT   DOSE IN NEBULIZER EVERY 4 TO 6 HOURS AS NEEDED; Therapy: (Recorded:15Jun2016) to Recorded   2  ALPRAZolam 0 5 MG Oral Tablet; TAKE 1 TABLET AT BEDTIME AS NEEDED; Therapy: (Recorded:15Jun2016) to Recorded   3  Diazepam 5 MG Oral Tablet; Take 1/2 to 1 tablet as needed to sleep; Therapy: (Recorded:15Jun2016) to Recorded   4  Dorzolamide HCl-Timolol Mal 22 3-6 8 MG/ML Ophthalmic Solution; INSTILL 1 DROP   INTO BOTH EYES 2 TIMES DAILY; Therapy: (Recorded:15Jun2016) to Recorded   5  Hyoscyamine Sulfate 0 125 MG Sublingual Tablet Sublingual; Take 2 under tongue   before meals; Therapy: (Recorded:15Jun2016) to Recorded   6  Latanoprost 0 005 % Ophthalmic Solution; 1 drop each eye in pm;   Therapy: (Recorded:15Jun2016) to Recorded   7  NovoLOG 100 UNIT/ML Subcutaneous Solution; sliding scale BID; Therapy: (Recorded:15Jun2016) to Recorded   8   NovoLOG Mix 70/30 FlexPen (70-30) 100 UNIT/ML SUSP; INJECT 40 UNIT Twice daily; Therapy: (Recorded:15Jun2016) to Recorded   9  Omeprazole 40 MG Oral Capsule Delayed Release; TAKE 1 CAPSULE TWICE DAILY; Therapy: (Recorded:15Jun2016) to Recorded   10  Ondansetron HCl - 4 MG Oral Tablet (Zofran); One tab PO q 4-6 hours PRN nausea; Therapy: 17WMN0338 to (Last Rx:22Jun2016) Ordered   11  Sucralfate 1 GM Oral Tablet; Therapy: (Recorded:64Bck2773) to Recorded   12  Vitamin D3 2000 UNIT Oral Tablet; Take 1 tablet daily; Therapy: (Recorded:15Jun2016) to Recorded    Allergies    1   Humira   2  levofloxacin    Signatures   Electronically signed by : JAMSHID Armstrong ; Sep  4 2016  9:59AM EST

## 2018-01-16 NOTE — MISCELLANEOUS
Message   Recorded as Task   Date: 07/29/2016 02:10 PM, Created By: Nuzhat Crump   Task Name: Call Back   Assigned To: Lesia Bailey   Regarding Patient: Zoltan Lowe, Status: Active   Comment:    Darlyn Domínguez - 29 Jul 2016 2:10 PM     TASK CREATED  patient called asking you to call him regarding medication carafate 1gm  Please call him back at [phone]  Thanks   SPoke with patient today - RX and documentation sent to the [place] for Carafate oral suspension  Patient unable to tolerate capsule  [facility] much lower Co[facility] than regular pharmacy  Active Problems    1  Acute bronchitis (466 0) (J20 9)   2  Arthritis (716 90) (M19 90)   3  Chemotherapy-induced nausea (787 02,E933 1) (R11 0,T45  1X5A)   4  Diabetes (250 00) (E11 9)   5  Difficulty swallowing (787 20) (R13 10)   6  Dysphagia (787 20) (R13 10)   7  Esophageal cancer (150 9) (C15 9)   8  Gout (274 9) (M10 9)   9  Hyperlipidemia (272 4) (E78 5)   10  Hypertension (401 9) (I10)   11  Restless legs syndrome (333 94) (G25 81)   12  Rheumatoid arthritis (714 0) (M06 9)   13  Shortness of breath (786 05) (R06 02)   14  Wheezing (786 07) (R06 2)    Current Meds   1  Albuterol Sulfate (2 5 MG/3ML) 0 083% Inhalation Nebulization Solution; USE 1 UNIT   DOSE IN NEBULIZER EVERY 4 TO 6 HOURS AS NEEDED; Therapy: (Recorded:15Jun2016) to Recorded   2  Allopurinol 100 MG Oral Tablet; TAKE 1 TABLET DAILY; Therapy: (Recorded:15Jun2016) to Recorded   3  ALPRAZolam 0 5 MG Oral Tablet; TAKE 1 TABLET AT BEDTIME AS NEEDED; Therapy: (Recorded:15Jun2016) to Recorded   4  Azithromycin 250 MG Oral Tablet (Zithromax Z-Jayy); TAKE 2 TABLETS ON DAY 1 THEN   TAKE 1 TABLET A DAY FOR 4 DAYS; Therapy: 97WJA3561 to (Evaluate:68Slj5627); Last Rx:[id number] Ordered   5  Brimonidine Tartrate 0 2 % Ophthalmic Solution; INSTILL 1 DROP IN BOTH EYES   EVERY 12 HOURS DAILY; Therapy: (Recorded:15Jun2016) to Recorded   6   Carafate 1 GM/10ML Oral Suspension; TAKE 10 ML 4 TIMES DAILY; Therapy: 09HNH5227 to (Evaluate:52Jhp6549)  Requested for: 44Hfj6797; Last   Rx:68Kly4824 Ordered   7  Clotrimazole-Betamethasone 1-0 05 % External Cream; Apply BID as needed; Therapy: (Recorded:63Caa8881) to Recorded   8  Colcrys 0 6 MG Oral Tablet; one tablet every 3 hours; Therapy: 81JFV6947 to (Last Rx:66Gbe0557)  Requested for: 27BID7960 Ordered   9  Diazepam 5 MG Oral Tablet; Take 1/2 to 1 tablet as needed to sleep; Therapy: (Recorded:67Unq8785) to Recorded   10  Dorzolamide HCl-Timolol Mal 22 3-6 8 MG/ML Ophthalmic Solution; INSTILL 1 DROP    INTO BOTH EYES 2 TIMES DAILY; Therapy: (Recorded:15Jun2016) to Recorded   11  Ecotrin 81 MG TBEC; take 2 tablet daily; Therapy: (Recorded:15Jun2016) to Recorded   12  HydrOXYzine HCl - 25 MG Oral Tablet; Take 1 pill BID PRN; Therapy: (Recorded:15Jun2016) to Recorded   13  Hyoscyamine Sulfate 0 125 MG Sublingual Tablet Sublingual; Take 2 under tongue    before meals; Therapy: (Recorded:15Jun2016) to Recorded   14  Iron (Ferrous Sulfate) 325 MG TABS; TAKE 1 TABLET 3 TIMES DAILY; Therapy: (Recorded:15Jun2016) to Recorded   15  Latanoprost 0 005 % Ophthalmic Solution; 1 drop each eye in pm;    Therapy: (Recorded:15Jun2016) to Recorded   16  Levocetirizine Dihydrochloride 5 MG Oral Tablet; TAKE 1 TABLET DAILY; Therapy: (Recorded:15Jun2016) to Recorded   17  Lidocaine-Prilocaine 2 5-2 5 % External Cream; Apply to port 30min prior to use; Therapy: 47XSQ2543 to (Last Rx:42Vwy8506) Ordered   18  Lisinopril 20 MG Oral Tablet; TAKE 1 TABLET DAILY; Therapy: (Recorded:15Jun2016) to Recorded   19  Magnesium 500 MG Oral Capsule; take 1 capsule daily; Therapy: (Recorded:19Fvt0795) to Recorded   20  MetFORMIN HCl - 1000 MG Oral Tablet; TAKE 1 TABLET EVERY 12 HOURS; Therapy: (Recorded:15Jun2016) to Recorded   21  NovoLOG 100 UNIT/ML Subcutaneous Solution; sliding scale BID; Therapy: (Recorded:15Jun2016) to Recorded   22   Garry Aleman Mix 70/30 FlexPen (70-30) 100 UNIT/ML SUSP; INJECT 40 UNIT Twice daily; Therapy: (Recorded:15Jun2016) to Recorded   23  Omeprazole 40 MG Oral Capsule Delayed Release; TAKE 1 CAPSULE TWICE DAILY; Therapy: (Recorded:15Jun2016) to Recorded   24  Ondansetron HCl - 4 MG Oral Tablet (Zofran); One tab PO q 4-6 hours PRN nausea; Therapy: 56TZX9276 to (Last Rx:22Jun2016) Ordered   25  Pioglitazone HCl - 15 MG Oral Tablet; TAKE 1 TABLET ONCE DAILY; Therapy: (Recorded:15Jun2016) to Recorded   26  Potassium Gluconate 595 MG Oral Tablet; TAKE 1 TABLET DAILY; Therapy: (Recorded:15Jun2016) to Recorded   27  ROPINIRole HCl - 1 MG Oral Tablet; TAKE 3 TABLET Bedtime; Therapy: (Recorded:15Jun2016) to Recorded   28  Santyl OINT; apply to ankle wound  BID; Therapy: (Recorded:15Jun2016) to Recorded   29  Simvastatin 10 MG Oral Tablet; TAKE 1 TABLET DAILY; Therapy: (Recorded:15Jun2016) to Recorded   30  TraMADol HCl - 50 MG Oral Tablet; TAKE 2 TABLETS 4 TIMES DAILY AS NEEDED FOR    PAIN;    Therapy: (Recorded:15Jun2016) to Recorded   31  Vitamin D3 2000 UNIT Oral Tablet; Take 1 tablet daily; Therapy: (Recorded:15Jun2016) to Recorded    Allergies    1   Humira   2  levofloxacin    Signatures   Electronically signed by : Guero Winn, ; Jul 29 2016  3:41PM EST                       (Author)

## 2018-01-16 NOTE — MISCELLANEOUS
Message   Recorded as Task   Date: 07/27/2016 11:07 AM, Created By: Luis F Green   Task Name: Call Back   Assigned To: Lesia Bailey   Regarding Patient: Adrian Beard, Status: Active   Comment:    Nelda Pinon - 27 Jul 2016 11:07 AM     TASK CREATED   Arthur from [address] [employer] in [facility] called questioning Carafate susp prescription  Patient has Medicare part D this medication needs a precert and cost is  $6,461 the carafate tablets cost $20 and does not need precert  Said patient could drop tablet in water and make a slurry  He spoke to patient who said he would be able to do that with tablet  Need doctors ok to change order  PHONE [phone]   Rx changed to capsules      Active Problems    1  Acute bronchitis (466 0) (J20 9)   2  Arthritis (716 90) (M19 90)   3  Chemotherapy-induced nausea (787 02,E933 1) (R11 0,T45  1X5A)   4  Diabetes (250 00) (E11 9)   5  Difficulty swallowing (787 20) (R13 10)   6  Dysphagia (787 20) (R13 10)   7  Esophageal cancer (150 9) (C15 9)   8  Gout (274 9) (M10 9)   9  Hyperlipidemia (272 4) (E78 5)   10  Hypertension (401 9) (I10)   11  Restless legs syndrome (333 94) (G25 81)   12  Rheumatoid arthritis (714 0) (M06 9)   13  Shortness of breath (786 05) (R06 02)   14  Wheezing (786 07) (R06 2)    Current Meds   1  Albuterol Sulfate (2 5 MG/3ML) 0 083% Inhalation Nebulization Solution; USE 1 UNIT   DOSE IN NEBULIZER EVERY 4 TO 6 HOURS AS NEEDED; Therapy: (Recorded:15Jun2016) to Recorded   2  Allopurinol 100 MG Oral Tablet; TAKE 1 TABLET DAILY; Therapy: (Recorded:15Jun2016) to Recorded   3  ALPRAZolam 0 5 MG Oral Tablet; TAKE 1 TABLET AT BEDTIME AS NEEDED; Therapy: (Recorded:15Jun2016) to Recorded   4  Azithromycin 250 MG Oral Tablet (Zithromax Z-Jayy); TAKE 2 TABLETS ON DAY 1 THEN   TAKE 1 TABLET A DAY FOR 4 DAYS; Therapy: 80BIE1319 to (Evaluate:37Oqy7319); Last Rx:02Upk6307 Ordered   5   Brimonidine Tartrate 0 2 % Ophthalmic Solution; INSTILL 1 DROP IN BOTH EYES EVERY 12 HOURS DAILY; Therapy: (Recorded:15Jun2016) to Recorded   6  Carafate 1 GM/10ML Oral Suspension; TAKE 10 ML 4 TIMES DAILY; Therapy: 05WEJ1037 to (Evaluate:75Clf9809)  Requested for: 52Cxb0693; Last   Rx:45Gfq8424 Ordered   7  Clotrimazole-Betamethasone 1-0 05 % External Cream; Apply BID as needed; Therapy: (Recorded:15Jun2016) to Recorded   8  Colcrys 0 6 MG Oral Tablet; one tablet every 3 hours; Therapy: 98KBX3860 to (Last Rx:32Nfz7368)  Requested for: 92OZT8921 Ordered   9  Diazepam 5 MG Oral Tablet; Take 1/2 to 1 tablet as needed to sleep; Therapy: (Recorded:15Jun2016) to Recorded   10  Dorzolamide HCl-Timolol Mal 22 3-6 8 MG/ML Ophthalmic Solution; INSTILL 1 DROP    INTO BOTH EYES 2 TIMES DAILY; Therapy: (Recorded:15Jun2016) to Recorded   11  Ecotrin 81 MG TBEC; take 2 tablet daily; Therapy: (Recorded:15Jun2016) to Recorded   12  HydrOXYzine HCl - 25 MG Oral Tablet; Take 1 pill BID PRN; Therapy: (Recorded:15Jun2016) to Recorded   13  Hyoscyamine Sulfate 0 125 MG Sublingual Tablet Sublingual; Take 2 under tongue    before meals; Therapy: (Recorded:15Jun2016) to Recorded   14  Iron (Ferrous Sulfate) 325 MG TABS; TAKE 1 TABLET 3 TIMES DAILY; Therapy: (Recorded:15Jun2016) to Recorded   15  Latanoprost 0 005 % Ophthalmic Solution; 1 drop each eye in pm;    Therapy: (Recorded:15Jun2016) to Recorded   16  Levocetirizine Dihydrochloride 5 MG Oral Tablet; TAKE 1 TABLET DAILY; Therapy: (Recorded:15Jun2016) to Recorded   17  Lidocaine-Prilocaine 2 5-2 5 % External Cream; Apply to port 30min prior to use; Therapy: 61EOT6435 to (Last Rx:66Exu0592) Ordered   18  Lisinopril 20 MG Oral Tablet; TAKE 1 TABLET DAILY; Therapy: (Recorded:15Jun2016) to Recorded   19  Magnesium 500 MG Oral Capsule; take 1 capsule daily; Therapy: (Recorded:15Jun2016) to Recorded   20  MetFORMIN HCl - 1000 MG Oral Tablet; TAKE 1 TABLET EVERY 12 HOURS; Therapy: (Recorded:15Jun2016) to Recorded   21 Zelma Dash 100 UNIT/ML Subcutaneous Solution; sliding scale BID; Therapy: (Recorded:15Jun2016) to Recorded   22  NovoLOG Mix 70/30 FlexPen (70-30) 100 UNIT/ML SUSP; INJECT 40 UNIT Twice daily; Therapy: (Recorded:15Jun2016) to Recorded   23  Omeprazole 40 MG Oral Capsule Delayed Release; TAKE 1 CAPSULE TWICE DAILY; Therapy: (Recorded:15Jun2016) to Recorded   24  Ondansetron HCl - 4 MG Oral Tablet (Zofran); One tab PO q 4-6 hours PRN nausea; Therapy: 59BMM6207 to (Last Rx:22Jun2016) Ordered   25  Pioglitazone HCl - 15 MG Oral Tablet; TAKE 1 TABLET ONCE DAILY; Therapy: (Recorded:15Jun2016) to Recorded   26  Potassium Gluconate 595 MG Oral Tablet; TAKE 1 TABLET DAILY; Therapy: (Recorded:15Jun2016) to Recorded   27  ROPINIRole HCl - 1 MG Oral Tablet; TAKE 3 TABLET Bedtime; Therapy: (Recorded:15Jun2016) to Recorded   28  Santyl OINT; apply to ankle wound  BID; Therapy: (Recorded:15Jun2016) to Recorded   29  Simvastatin 10 MG Oral Tablet; TAKE 1 TABLET DAILY; Therapy: (Recorded:15Jun2016) to Recorded   30  TraMADol HCl - 50 MG Oral Tablet; TAKE 2 TABLETS 4 TIMES DAILY AS NEEDED FOR    PAIN;    Therapy: (Recorded:15Jun2016) to Recorded   31  Vitamin D3 2000 UNIT Oral Tablet; Take 1 tablet daily; Therapy: (Recorded:15Jun2016) to Recorded    Allergies    1   Humira   2  levofloxacin    Signatures   Electronically signed by : Abbi Garcia, ; Jul 27 2016 11:31AM EST                       (Author)

## 2018-01-18 NOTE — PROCEDURES
Procedures by Lori Bright DO at  3/24/2017  1:39 PM      Author:  Lori Bright DO Service:  Interventional Radiology  Author Type:  Physician     Filed:  3/24/2017  1:42 PM Date of Service:  3/24/2017  1:39 PM Status:  Signed     :  Lori Bright DO (Physician)         Procedure Orders:       1  CHEST TUBE INSERTION [[id number]] ordered by Lori Bright DO at 03/24/17 1339                 Post-procedure Diagnoses:       1  Esophageal carcinoma [C15 9]                   Chest Tube Insertion  Date/Time: 3/24/2017 1:00 PM  Performed by: Baltazar Puente by: Vania Davis     Patient location: CT Scan  Other Assisting Provider:  No    Consent:     Consent obtained:  Written    Consent given by:  Patient    Risks discussed:  Bleeding, incomplete drainage, nerve damage, damage to surrounding structures, infection and pain    Alternatives discussed:  No treatment and delayed treatment  Universal protocol:     Procedure explained and questions answered to patient or proxy's satisfaction: yes      Relevant documents present and verified: yes      Test results available and properly labeled: yes       Imaging studies available: yes      Required blood products, implants, devices, and special equipment available: yes      Site/side marked: yes      Immediately prior to procedure a time out was called: yes      Patient identity confirmed:  Verbally with patient and arm band  Pre-procedure details:     Skin preparation:  ChloraPrep    Preparation: Patient was prepped and draped in the usual sterile fashion    Indications:     Indications: pleural effusion    Sedation:     Sedation type: Anxiolysis  Anesthesia (see MAR for exact dosages): Anesthesia method:  Local infiltration    Local anesthetic:  Lidocaine 1% w/o epi (10)  Procedure details:     Placement location:  Lateral    Approach:  Percutaneous    Tube size (Bermudian): 14 Fr      Ultrasound guidance: yes      Tension pneumothorax: no Needle Decompression: no      Tube connected to:  Suction    Suture material: 2-0 prolene  Dressing:  4x4 sterile gauze  Post-procedure details:     Post-insertion x-ray findings: tube in good position      Patient tolerance of procedure:   Tolerated well, no immediate complications                 Received for:Trace Giron DO  Mar 24 2017  1:42PM Eastern Standard Time

## 2018-01-22 VITALS
BODY MASS INDEX: 29.92 KG/M2 | TEMPERATURE: 96.8 F | RESPIRATION RATE: 16 BRPM | HEART RATE: 80 BPM | SYSTOLIC BLOOD PRESSURE: 102 MMHG | DIASTOLIC BLOOD PRESSURE: 60 MMHG | OXYGEN SATURATION: 77 % | WEIGHT: 209 LBS | HEIGHT: 70 IN

## 2018-01-22 VITALS
RESPIRATION RATE: 16 BRPM | WEIGHT: 214.38 LBS | HEART RATE: 67 BPM | SYSTOLIC BLOOD PRESSURE: 102 MMHG | OXYGEN SATURATION: 96 % | HEIGHT: 70 IN | BODY MASS INDEX: 30.69 KG/M2 | TEMPERATURE: 97.2 F | DIASTOLIC BLOOD PRESSURE: 64 MMHG

## 2018-01-23 ENCOUNTER — GENERIC CONVERSION - ENCOUNTER (OUTPATIENT)
Dept: OTHER | Facility: OTHER | Age: 73
End: 2018-01-23

## 2018-01-23 ENCOUNTER — APPOINTMENT (OUTPATIENT)
Dept: RADIATION ONCOLOGY | Facility: HOSPITAL | Age: 73
End: 2018-01-23
Attending: RADIOLOGY
Payer: MEDICARE

## 2018-01-23 VITALS
HEART RATE: 63 BPM | DIASTOLIC BLOOD PRESSURE: 68 MMHG | WEIGHT: 210 LBS | SYSTOLIC BLOOD PRESSURE: 137 MMHG | BODY MASS INDEX: 30.06 KG/M2 | HEIGHT: 70 IN

## 2018-01-23 DIAGNOSIS — C22.0 LIVER CELL CARCINOMA (HCC): ICD-10-CM

## 2018-01-23 PROCEDURE — 99214 OFFICE O/P EST MOD 30 MIN: CPT | Performed by: RADIOLOGY

## 2018-01-25 ENCOUNTER — TELEPHONE (OUTPATIENT)
Dept: SURGICAL ONCOLOGY | Facility: CLINIC | Age: 73
End: 2018-01-25

## 2018-02-06 ENCOUNTER — TRANSCRIBE ORDERS (OUTPATIENT)
Dept: ADMINISTRATIVE | Facility: HOSPITAL | Age: 73
End: 2018-02-06

## 2018-02-06 ENCOUNTER — OFFICE VISIT (OUTPATIENT)
Dept: SURGICAL ONCOLOGY | Facility: CLINIC | Age: 73
End: 2018-02-06
Payer: MEDICARE

## 2018-02-06 ENCOUNTER — HOSPITAL ENCOUNTER (OUTPATIENT)
Dept: MRI IMAGING | Facility: HOSPITAL | Age: 73
Discharge: HOME | End: 2018-02-06
Attending: SURGERY
Payer: MEDICARE

## 2018-02-06 VITALS
BODY MASS INDEX: 30.78 KG/M2 | DIASTOLIC BLOOD PRESSURE: 70 MMHG | TEMPERATURE: 96.2 F | WEIGHT: 215 LBS | SYSTOLIC BLOOD PRESSURE: 126 MMHG | RESPIRATION RATE: 16 BRPM | HEART RATE: 67 BPM | HEIGHT: 70 IN

## 2018-02-06 DIAGNOSIS — C22.0 LIVER CARCINOMA (HCC): ICD-10-CM

## 2018-02-06 DIAGNOSIS — C22.0 CANCER, HEPATOCELLULAR (HCC): Primary | ICD-10-CM

## 2018-02-06 PROBLEM — G47.30 SLEEP APNEA: Status: ACTIVE | Noted: 2018-02-06

## 2018-02-06 PROBLEM — G62.9 PERIPHERAL NEUROPATHY: Status: ACTIVE | Noted: 2017-07-28

## 2018-02-06 PROCEDURE — 99215 OFFICE O/P EST HI 40 MIN: CPT | Performed by: SURGERY

## 2018-02-06 PROCEDURE — 74183 MRI ABD W/O CNTR FLWD CNTR: CPT

## 2018-02-06 PROCEDURE — A9585 GADOBUTROL INJECTION: HCPCS | Performed by: SURGERY

## 2018-02-06 RX ORDER — MULTIVIT-MIN/IRON/FOLIC ACID/K 18-600-40
2000 CAPSULE ORAL
COMMUNITY
End: 2018-04-06 | Stop reason: ALTCHOICE

## 2018-02-06 RX ORDER — CEFUROXIME AXETIL 500 MG/1
1 TABLET ORAL EVERY 12 HOURS
Status: ON HOLD | COMMUNITY
End: 2018-02-14 | Stop reason: ALTCHOICE

## 2018-02-06 RX ORDER — ALPRAZOLAM 0.5 MG/1
TABLET ORAL
Status: ON HOLD | COMMUNITY
End: 2018-02-14 | Stop reason: ALTCHOICE

## 2018-02-06 RX ORDER — GABAPENTIN 100 MG/1
100 CAPSULE ORAL DAILY
COMMUNITY
Start: 2017-12-22 | End: 2018-09-20

## 2018-02-06 RX ORDER — GUAIFENESIN AND CODEINE PHOSPHATE 100; 10 MG/5ML; MG/5ML
10 SOLUTION ORAL EVERY 6 HOURS
Status: ON HOLD | COMMUNITY
End: 2018-02-14 | Stop reason: ALTCHOICE

## 2018-02-06 RX ORDER — PNV NO.95/FERROUS FUM/FOLIC AC 28MG-0.8MG
TABLET ORAL
COMMUNITY
End: 2018-04-11 | Stop reason: ALTCHOICE

## 2018-02-06 RX ORDER — ONDANSETRON 4 MG/1
4 TABLET, FILM COATED ORAL
Status: ON HOLD | COMMUNITY
End: 2018-02-14 | Stop reason: ALTCHOICE

## 2018-02-06 RX ORDER — TRAMADOL HYDROCHLORIDE 50 MG/1
TABLET ORAL
COMMUNITY
End: 2018-04-11 | Stop reason: ALTCHOICE

## 2018-02-06 RX ORDER — OMEPRAZOLE 40 MG/1
40 CAPSULE, DELAYED RELEASE ORAL
Status: ON HOLD | COMMUNITY
End: 2018-02-14 | Stop reason: ALTCHOICE

## 2018-02-06 RX ORDER — SUCRALFATE ORAL 1 G/10ML
1 SUSPENSION ORAL
Status: ON HOLD | COMMUNITY
End: 2018-02-14 | Stop reason: ALTCHOICE

## 2018-02-06 RX ADMIN — GADOBUTROL 9 ML: 604.72 INJECTION INTRAVENOUS at 12:54

## 2018-02-06 NOTE — PROGRESS NOTES
Surgical Oncology Follow Up       [address]  [facility] [place]  [address] [address]  [facility] [address]  1945  [phone]      Pt is here for f/u after tx for recurrent HCC  Diagnosis and Staging: [id number] esophageal adenocarcinoma May 2016  [id number] esophageal adenocarcinoma October 2016    T2NxM0 [address]  October 2016   Treatment History: Taxol Carbo with radiation   Sir spheres December 13, 2017  Current Therapy: Observation   Disease Status: Recurrent HCC   Interval History: Patient returns in follow-up of his esophageal cancer and HCC  He is eating all consistencies of food without difficulty, although he did have some dysphagia a few weeks ago  He is going to undergo dilatation shortly  He underwent Sir spheres embolization on December 13, 2017 for his recurrence HCC  He had an MRI earlier today which shows that these lesions are slightly larger than before  I personally reviewed his films with Radiology  His AFP level has also increased to 15 7  He denies any abdominal pain, nausea or vomiting  His only complaint is dysphagia  Review of Systems  Complete ROS Surg Onc:   Complete ROS Surg Onc:   Constitutional: The patient denies new or recent history of general fatigue, no recent weight loss, no change in appetite  Eyes: No complaints of visual problems, no scleral icterus  ENT: no complaints of ear pain, no hoarseness, no difficulty swallowing,  no tinnitus and no new masses in head, oral cavity, or neck  Cardiovascular: No complaints of chest pain, no palpitations, no ankle edema  Respiratory: No complaints of shortness of breath, no cough  Gastrointestinal: No complaints of jaundice, no bloody stools, no pale stools  Genitourinary: No complaints of dysuria, no hematuria, no nocturia, no frequent urination, no urethral discharge     Musculoskeletal: No complaints of weakness, paralysis, joint stiffness or arthralgias  Integumentary: No complaints of rash, no new lesions  Neurological: No complaints of convulsions, no seizures, no dizziness  Hematologic/Lymphatic: No complaints of easy bruising  Endocrine:  No hot or cold intolerance  No polydipsia, polyphagia, or polyuria  Allergy/immunology:  No environmental allergies  No food allergies  Not immunocompromised  Skin:  No pallor or rash  No wound  Patient Active Problem List   Diagnosis    Esophageal cancer ([place] [address] )    Cancer, hepatocellular (HCC)    Atrial fibrillation (HCC)    Acute blood loss anemia    Hypokalemia    Hypomagnesemia    Hypotension    Type 2 diabetes mellitus (HCC)    Vision abnormalities    Dysphagia    Empyema (HCC)    Acute cardioembolic stroke (HCC)    Radiation esophagitis    Restless legs syndrome    Rheumatoid arthritis (HCC)    Sleep apnea    Hyperlipidemia    Hypertension    Peripheral neuropathy     Past Medical History:   Diagnosis Date    Anemia     Arthritis     Coronary artery disease     Diabetes mellitus ([place] [address] )     Dysphagia     Esophageal cancer (HCC)     Gout     Hyperlipidemia     Hypertension     Restless leg syndrome     Rheumatoid arthritis ([place] [address] )     Sleep apnea     Vision abnormalities      Past Surgical History:   Procedure Laterality Date    APPENDECTOMY      CHOLECYSTECTOMY      GASTROJEJUNOSTOMY W/ JEJUNOSTOMY TUBE N/A 8/4/2016    Procedure: INSERTION JEJUNOSTOMY TUBE OPEN;  Surgeon: Mukul Schmitt MD;  [facility];  Service:     KNEE ARTHROSCOPY      LIVER RESECTION N/A 10/31/2016    Procedure: INTRAOPERATIVE ULTRASOUND OF LIVER; LIVER LESION RESECTION/ABLATION ;  Surgeon: Mukul Shcmitt MD;  [facility];  Service:    D[place] Coma PLACEMENT      [place] ESOPHAGOGASTRODUODENOSCOPY TRANSORAL DIAGNOSTIC N/A 10/31/2016    Procedure: ESOPHAGOGASTRODUODENOSCOPY (EGD);   Surgeon: Mukul Schmitt MD;  [facility];  Service: Surgical Oncology    [place] ESOPHAGOSCOPY FLEX BALLOON DILAT <30 MM DIAM N/A 1/6/2017    Procedure: DILATATION ESOPHAGEAL;  Surgeon: Sara Luz MD;  [facility];  Service: Thoracic    MT ESOPHAGOSCOPY FLEX [place] Choud[place]ury <30 MM DIAM N/A 12/12/2016    Procedure: DILATATION ESOPHAGEAL, EGD;  Surgeon: Sara Luz MD;  [facility];  Service: Thoracic    MT ESOPHAGOSCOPY FLEX BALLOON DILAT <30 MM DIAM N/A 5/24/2017    Procedure: EGD WITH DILATION ;  Surgeon: Paul Garcia MD;  [facility];  Service: Thoracic    MT ESOPHAGOSCOPY FLEX [place] <30 MM DIAM N/A 8/3/2017    Procedure: EGD WITH DILATION;  Surgeon: Sara Luz MD;  [facility];  Service: Thoracic    MT REMOVAL [address] THORACOTOMY N/A 10/31/2016    Procedure: ESOPHAGECTOMY;  Surgeon: Adolph Hernandez MD;  [facility];  Service: Surgical Oncology    TONSILLECTOMY       Family History   Problem Relation Age of Onset    Stroke Mother     Colon cancer Father     Cancer Sister      Social History     Social History    Marital status: /Civil Union     Spouse name: N/A    Number of children: N/A    Years of education: N/A     Occupational History    Not on file       Social History Main Topics    Smoking status: Former Smoker     Packs/day: 3 00     Years: 40 00     Quit date: 4/13/1991    Smokeless tobacco: Never Used      Comment: quit 20 yrs ago    Alcohol use No    Drug use: No    Sexual activity: Not Currently     Other Topics Concern    Not on file     Social History Narrative    No narrative on file       Current Outpatient Prescriptions:     gabapentin (NEURONTIN) 100 mg capsule, Take by mouth, Disp: , Rfl:     glucose blood (FREESTYLE LITE) test strip, USE 1 STRIP TO TEST Q 4-6 H PRN, Disp: , Rfl:     Aclidinium Bromide (TUDORZA PRESSAIR) 400 MCG/ACT AEPB, Inhale 1 Act as needed, Disp: , Rfl:     albuterol (2 5 mg/3 mL) 0 083 % nebulizer solution, Take 1 ampule by nebulization every 4 (four) hours as needed for wheezing  , Disp: , Rfl:     allopurinol (ZYLOPRIM) 100 mg tablet, Take 100 mg by mouth daily, Disp: , Rfl:     ALPRAZolam (XANAX) 0 5 mg tablet, , Disp: , Rfl:     amiodarone 200 mg tablet, Take 200 mg by mouth daily, Disp: , Rfl:     apixaban (ELIQUIS) 5 mg, Take 5 mg by mouth 2 (two) times a day, Disp: , Rfl:     aspirin (ECOTRIN LOW STRENGTH) 81 mg EC tablet, Take 1 tablet by mouth daily for 30 days, Disp: 30 tablet, Rfl: 0    atorvastatin (LIPITOR) 80 mg tablet, Take 1 tablet by mouth daily with dinner for 30 days, Disp: 30 tablet, Rfl: 0    Bisacodyl (LAXATIVE PO), Take by mouth daily as needed, Disp: , Rfl:     brimonidine tartrate 0 2 % ophthalmic solution, 1 drop every 12 (twelve) hours  , Disp: , Rfl:     cefuroxime (CEFTIN) 500 mg tablet, Take 1 tablet by mouth every 12 (twelve) hours, Disp: , Rfl:     Cholecalciferol (VITAMIN D) 2000 units CAPS, Take 2,000 Units by mouth, Disp: , Rfl:     dexlansoprazole (DEXILANT) 60 MG capsule, Take 60 mg by mouth daily, Disp: , Rfl:     diazepam (VALIUM) 5 mg tablet, Take 5 mg by mouth every 6 (six) hours as needed for anxiety, Disp: , Rfl:     dicyclomine (BENTYL) 10 mg capsule, Take 10 mg by mouth 4 (four) times a day (before meals and at bedtime), Disp: , Rfl:     docusate sodium (COLACE) 100 mg capsule, Take 1 capsule by mouth 2 (two) times a day for 30 days, Disp: 60 capsule, Rfl: 0    dorzolamide-timolol (COSOPT) ophthalmic solution, 1 drop 2 (two) times a day, Disp: , Rfl:     Ferrous Sulfate (IRON) 325 (65 Fe) MG TABS, Take by mouth, Disp: , Rfl:     furosemide (LASIX) 40 mg tablet, Take 1 tablet by mouth daily for 7 days, Disp: 7 tablet, Rfl: 0    guaifenesin-codeine (GUAIFENESIN AC) 100-10 MG/5ML liquid, Take 10 mL by mouth every 6 (six) hours, Disp: , Rfl:     hydrOXYzine HCL (ATARAX) 25 mg tablet, Take 25 mg by mouth 2 (two) times a day as needed for itching, Disp: , Rfl:     insulin aspart protamine-insulin aspart (NovoLOG 70/30) 100 units/mL injection, Inject 25 Units under the skin 2 (two) times a day before meals  , Disp: , Rfl:     latanoprost (XALATAN) 0 005 % ophthalmic solution, Administer 1 drop to both eyes daily at bedtime  , Disp: , Rfl:     LORazepam (ATIVAN) 1 mg tablet, Take 1 mg by mouth every 8 (eight) hours as needed for anxiety, Disp: , Rfl:     omeprazole (PriLOSEC) 40 MG capsule, Take 40 mg by mouth, Disp: , Rfl:     ondansetron (ZOFRAN) 4 mg tablet, Take 4 mg by mouth, Disp: , Rfl:     potassium chloride (K-DUR,KLOR-CON) 10 mEq tablet, Take 1 tablet by mouth daily for 7 days, Disp: 7 tablet, Rfl: 0    rOPINIRole (REQUIP) 1 mg tablet, Take 3 mg by mouth daily at bedtime  , Disp: , Rfl:     rosuvastatin (CRESTOR) 40 MG tablet, Take 40 mg by mouth daily, Disp: , Rfl:     sertraline (ZOLOFT) 25 mg tablet, Take 25 mg by mouth daily at bedtime  , Disp: , Rfl:     sucralfate (CARAFATE) 1 g/10 mL suspension, Take 1 g by mouth, Disp: , Rfl:     traMADol (ULTRAM) 50 mg tablet, Take by mouth, Disp: , Rfl:     warfarin (COUMADIN) 2 5 mg tablet, Take 2 5 mg by mouth daily, Disp: , Rfl:   Allergies   Allergen Reactions    Humira [Adalimumab] Rash    Levaquin [Levofloxacin In D5w] Rash    Levofloxacin Rash    Lovenox [Enoxaparin] Rash     There were no vitals filed for this visit  Physical Exam  Constitutional: General appearance: The Patient is well-developed and well-nourished who appears the stated age in no acute distress  Patient is pleasant and talkative  HEENT:  Normocephalic  Sclerae are anicteric  Mucous membranes are moist  Neck is supple without adenopathy  No JVD  Chest: The lungs are clear to auscultation  Cardiac: Heart is regular rate  Abdomen: Abdomen is soft, non-tender, non-distended and without masses  Extremities: There is no clubbing or cyanosis  There is no edema  Symmetric    Neuro: Grossly nonfocal  Gait is normal      Lymphatic: No evidence of cervical adenopathy bilaterally  No evidence of axillary adenopathy bilaterally  No evidence of inguinal adenopathy bilaterally  Skin: Warm, anicteric  Psych:  Patient is pleasant and talkative  Pathology:      Labs:  AFP level is 15 7  Imaging  MRI is as above  I reviewed the above laboratory and imaging data  Discussion/Summary:     26-year-old male status post esophagectomy and liver resection  He has undergone Sir spheres embolization, but is AFP is still elevated  His imaging looks like the lesions have slightly enlarged  I did discuss this with Dr Christa Kwon from Radiation Oncology  He does not think any further radiation doses will be possible  I discussed multiple options  We could wait 6 weeks after discussing this with radiation oncology and repeat his imaging and AFP level to see if this improves since sometimes it can take up to 3 months after Sir spheres embolization to see improvement in the imaging  We discussed possible chemoembolization in the near future  I also discussed starting Nexovar  After some discussion we have elected on short-term observation  I will repeat his MRI and AFP level in 6 weeks  I will see him again at that time  We will also plan on discussing his case at upper GI working group  He is agreeable to this  All his questions were answered

## 2018-02-12 ENCOUNTER — PREP FOR PROCEDURE (OUTPATIENT)
Dept: CARDIAC SURGERY | Facility: CLINIC | Age: 73
End: 2018-02-12

## 2018-02-12 ENCOUNTER — TELEPHONE (OUTPATIENT)
Dept: CARDIAC SURGERY | Facility: CLINIC | Age: 73
End: 2018-02-12

## 2018-02-12 DIAGNOSIS — C15.9 MALIGNANT NEOPLASM OF ESOPHAGUS, UNSPECIFIED LOCATION (HCC): Primary | ICD-10-CM

## 2018-02-12 NOTE — TELEPHONE ENCOUNTER
Pt called this morning  He would like to have his throat stretched  He has been chocking a lot lately

## 2018-02-12 NOTE — TELEPHONE ENCOUNTER
Wm Nichols spoke with patient and he has been having choking with eating for the past 1-2 months, but has recently progressed  This is occurring 1/3 of the time that he eats and drinks  We will schedule him for an EGD on 2/14/18, he took his Eliquis this morning, but will not take anymore until the procedure

## 2018-02-13 ENCOUNTER — ANESTHESIA EVENT (OUTPATIENT)
Dept: PERIOP | Facility: HOSPITAL | Age: 73
End: 2018-02-13
Payer: MEDICARE

## 2018-02-13 RX ORDER — SODIUM CHLORIDE 9 MG/ML
30 INJECTION, SOLUTION INTRAVENOUS CONTINUOUS
Status: CANCELLED | OUTPATIENT
Start: 2018-02-13

## 2018-02-13 NOTE — ANESTHESIA PREPROCEDURE EVALUATION
Review of Systems/Medical History  Patient summary reviewed  Chart reviewed  No history of anesthetic complications     Cardiovascular  Hyperlipidemia, Hypertension , No CAD, , Dysrhythmias, atrial fibrillation,   Comment: 2016: EF 55%,  Pulmonary  Smoker ex-smoker  , Sleep apnea (doesn't use CPAP currently) ,   Comment: Hx empyema     GI/Hepatic    Esophageal disease (S/P esophagectomy with gastric pull-up 10/31/2016) esophageal cancer, Liver disease (CA), , GI malignancy (Hepatocellular cancer),   Comment: Hx aspiration          Endo/Other  Diabetes Insulin,      GYN       Hematology  Anemia ,     Musculoskeletal  Rheumatoid arthritis ,        Neurology    CVA (Post esophagectomy/ only slight visual field deficit remains) ,    Psychology           Physical Exam    Airway    Mallampati score: II  TM Distance: >3 FB  Neck ROM: full     Dental       Cardiovascular      Pulmonary      Other Findings        Anesthesia Plan  ASA Score- 3     Anesthesia Type-   Additional Monitors:   Airway Plan: ETT  Plan Factors-    Induction- intravenous  Postoperative Plan-     Informed Consent- Anesthetic plan and risks discussed with patient  I personally reviewed this patient with the CRNA  Discussed and agreed on the Anesthesia Plan with the CRNA  Abbe Hogan

## 2018-02-14 ENCOUNTER — HOSPITAL ENCOUNTER (OUTPATIENT)
Facility: HOSPITAL | Age: 73
Setting detail: OUTPATIENT SURGERY
Discharge: HOME | End: 2018-02-14
Attending: THORACIC SURGERY (CARDIOTHORACIC VASCULAR SURGERY) | Admitting: THORACIC SURGERY (CARDIOTHORACIC VASCULAR SURGERY)
Payer: MEDICARE

## 2018-02-14 ENCOUNTER — ANESTHESIA (OUTPATIENT)
Dept: PERIOP | Facility: HOSPITAL | Age: 73
End: 2018-02-14
Payer: MEDICARE

## 2018-02-14 VITALS
WEIGHT: 210 LBS | OXYGEN SATURATION: 95 % | HEART RATE: 64 BPM | SYSTOLIC BLOOD PRESSURE: 145 MMHG | TEMPERATURE: 100.4 F | HEIGHT: 70 IN | RESPIRATION RATE: 18 BRPM | BODY MASS INDEX: 30.06 KG/M2 | DIASTOLIC BLOOD PRESSURE: 73 MMHG

## 2018-02-14 LAB
GLUCOSE SERPL-MCNC: 169 MG/DL (ref 65–140)
GLUCOSE SERPL-MCNC: 180 MG/DL (ref 65–140)

## 2018-02-14 PROCEDURE — 43248 EGD GUIDE WIRE INSERTION: CPT | Performed by: THORACIC SURGERY (CARDIOTHORACIC VASCULAR SURGERY)

## 2018-02-14 PROCEDURE — 82948 REAGENT STRIP/BLOOD GLUCOSE: CPT

## 2018-02-14 RX ORDER — SUCCINYLCHOLINE CHLORIDE 20 MG/ML
INJECTION INTRAMUSCULAR; INTRAVENOUS AS NEEDED
Status: DISCONTINUED | OUTPATIENT
Start: 2018-02-14 | End: 2018-02-14 | Stop reason: SURG

## 2018-02-14 RX ORDER — ONDANSETRON 2 MG/ML
4 INJECTION INTRAMUSCULAR; INTRAVENOUS ONCE AS NEEDED
Status: DISCONTINUED | OUTPATIENT
Start: 2018-02-14 | End: 2018-02-14 | Stop reason: HOSPADM

## 2018-02-14 RX ORDER — GLYCOPYRROLATE 0.2 MG/ML
INJECTION INTRAMUSCULAR; INTRAVENOUS AS NEEDED
Status: DISCONTINUED | OUTPATIENT
Start: 2018-02-14 | End: 2018-02-14 | Stop reason: SURG

## 2018-02-14 RX ORDER — PROPOFOL 10 MG/ML
INJECTION, EMULSION INTRAVENOUS AS NEEDED
Status: DISCONTINUED | OUTPATIENT
Start: 2018-02-14 | End: 2018-02-14 | Stop reason: SURG

## 2018-02-14 RX ORDER — ONDANSETRON 2 MG/ML
INJECTION INTRAMUSCULAR; INTRAVENOUS AS NEEDED
Status: DISCONTINUED | OUTPATIENT
Start: 2018-02-14 | End: 2018-02-14 | Stop reason: SURG

## 2018-02-14 RX ORDER — LIDOCAINE HYDROCHLORIDE 10 MG/ML
INJECTION, SOLUTION INFILTRATION; PERINEURAL AS NEEDED
Status: DISCONTINUED | OUTPATIENT
Start: 2018-02-14 | End: 2018-02-14 | Stop reason: SURG

## 2018-02-14 RX ORDER — SODIUM CHLORIDE, SODIUM LACTATE, POTASSIUM CHLORIDE, CALCIUM CHLORIDE 600; 310; 30; 20 MG/100ML; MG/100ML; MG/100ML; MG/100ML
20 INJECTION, SOLUTION INTRAVENOUS CONTINUOUS
Status: DISCONTINUED | OUTPATIENT
Start: 2018-02-14 | End: 2018-02-14 | Stop reason: HOSPADM

## 2018-02-14 RX ORDER — FENTANYL CITRATE 50 UG/ML
INJECTION, SOLUTION INTRAMUSCULAR; INTRAVENOUS AS NEEDED
Status: DISCONTINUED | OUTPATIENT
Start: 2018-02-14 | End: 2018-02-14 | Stop reason: SURG

## 2018-02-14 RX ORDER — SODIUM CHLORIDE 9 MG/ML
30 INJECTION, SOLUTION INTRAVENOUS CONTINUOUS
Status: DISCONTINUED | OUTPATIENT
Start: 2018-02-14 | End: 2018-02-14 | Stop reason: HOSPADM

## 2018-02-14 RX ORDER — EPHEDRINE SULFATE 50 MG/ML
INJECTION, SOLUTION INTRAVENOUS AS NEEDED
Status: DISCONTINUED | OUTPATIENT
Start: 2018-02-14 | End: 2018-02-14 | Stop reason: SURG

## 2018-02-14 RX ORDER — FENTANYL CITRATE/PF 50 MCG/ML
50 SYRINGE (ML) INJECTION
Status: DISCONTINUED | OUTPATIENT
Start: 2018-02-14 | End: 2018-02-14 | Stop reason: HOSPADM

## 2018-02-14 RX ADMIN — FENTANYL CITRATE 50 MCG: 50 INJECTION, SOLUTION INTRAMUSCULAR; INTRAVENOUS at 08:34

## 2018-02-14 RX ADMIN — Medication 300 UNITS: at 10:39

## 2018-02-14 RX ADMIN — LIDOCAINE HYDROCHLORIDE 100 MG: 10 INJECTION, SOLUTION INFILTRATION; PERINEURAL at 08:34

## 2018-02-14 RX ADMIN — EPHEDRINE SULFATE 5 MG: 50 INJECTION, SOLUTION INTRAMUSCULAR; INTRAVENOUS; SUBCUTANEOUS at 08:41

## 2018-02-14 RX ADMIN — SUCCINYLCHOLINE CHLORIDE 140 MG: 20 INJECTION, SOLUTION INTRAMUSCULAR; INTRAVENOUS at 08:34

## 2018-02-14 RX ADMIN — SODIUM CHLORIDE, SODIUM LACTATE, POTASSIUM CHLORIDE, AND CALCIUM CHLORIDE: .6; .31; .03; .02 INJECTION, SOLUTION INTRAVENOUS at 07:50

## 2018-02-14 RX ADMIN — PROPOFOL 200 MG: 10 INJECTION, EMULSION INTRAVENOUS at 08:34

## 2018-02-14 RX ADMIN — GLYCOPYRROLATE 0.1 MG: 0.2 INJECTION, SOLUTION INTRAMUSCULAR; INTRAVENOUS at 08:27

## 2018-02-14 RX ADMIN — EPHEDRINE SULFATE 5 MG: 50 INJECTION, SOLUTION INTRAMUSCULAR; INTRAVENOUS; SUBCUTANEOUS at 08:43

## 2018-02-14 RX ADMIN — ONDANSETRON 4 MG: 2 INJECTION INTRAMUSCULAR; INTRAVENOUS at 08:27

## 2018-02-14 RX ADMIN — DEXAMETHASONE SODIUM PHOSPHATE 10 MG: 10 INJECTION INTRAMUSCULAR; INTRAVENOUS at 08:34

## 2018-02-14 NOTE — ANESTHESIA POSTPROCEDURE EVALUATION
Post-Op Assessment Note      CV Status:  Stable    Mental Status:  Alert and awake    Hydration Status:  Stable    PONV Controlled:  None    Airway Patency:  Patent    Post Op Vitals Reviewed: Yes          Staff: CRNA       Comments: Pt  to Pacu  Fully AAO x3; Course uneventful; VSS; Airway patent            BP      Temp      Pulse     Resp      SpO2

## 2018-02-14 NOTE — OP NOTE
OPERATIVE REPORT  PATIENT NAME: Duglas Garcia    :  1945  MRN: [id number]  Pt Location: [facility] OR ROOM 08    SURGERY DATE: 2018    Surgeon(s) and Role:     * Benedict Melvin MD - Primary    Preop Diagnosis:  Malignant neoplasm of esophagus, unspecified location [facility]) [C15 9]  Status post esophagogastrectomy  Dysphagia     Post-Op Diagnosis Codes:  Malignant neoplasm of esophagus, unspecified location ([facility] [address] ) [C15 9]  Status post esophagogastrectomy  Dysphagia     Procedure(s) (LRB):  ESOPHAGOGASTRODUODENOSCOPY (EGD) WITH DILATION OVER A GUIDEWIRE TO [address] [place]    Specimen(s):  * No specimens in log *    Estimated Blood Loss:   Minimal    Drains:       Anesthesia Type:   General    Operative Indications:  Malignant neoplasm of esophagus, unspecified location [facility]) [C15 9]  Status post esophagogastrectomy  Dysphagia   Mr Any Maki had an esophagogastrectomy approximately 2 years ago  He has intermittent dysphagia and requires dilation  He has had some progressive dysphagia over the last month is brought to the operating room for dilation  Operative Findings:  Mild stricture at the anastomosis    Complications:   None    Procedure and Technique:  Mr Any Maki was brought to the operating room and placed in the supine position  After institution of adequate general anesthesia, fiberoptic endoscopy was performed  The adult esophagus scope would traverse the anastomosis and the gastric conduit appeared normal  The conduit distends normally and the pylorus appears normal   The proximal duodenum appears normal   The scope was brought back into the stomach and a guidewire was placed through the esophagus to lie down in the distal stomach  The scope is removed over the guidewire  Serial dilations up to [address] [place] are performed  The guidewire was then removed and the scope is reinserted  There is evidence of a nice dilation at the anastomosis with no evidence of ongoing bleeding   There is no evidence of perforation  The excess air was suctioned from the GI tract, the scope is removed, the patient is extubated, and brought to the recovery unit in stable condition having tolerated the procedure well  Sponge and instrument counts were correct     I was present for the entire procedure    Patient Disposition:  PACU     SIGNATURE: Serafin May MD  DATE: February 14, 2018  TIME: 9:02 AM

## 2018-02-14 NOTE — DISCHARGE INSTRUCTIONS
No need to follow up with Dr Zia Wong after the procedure, do so as needed  Do not drive after anesthesia

## 2018-03-07 NOTE — PROGRESS NOTES
Dear Blair Guzman,  My name is Araceli Boykin and I am a Registered Nurse and Care Coordinator for [address] [facility]  We recently  spoke on the phone regarding your hospital stay and you opted out of continuing to receive follow-up phone calls from me  Because of the reason that you were in the hospital, Medicare has placed you in a program called [address] [place] [address]  I have enclosed a letter with more information about this program and have included my business card so that you have my contact information in case you would need to contact me in the future      Sincerely,     Araceli Boykin RN  Care Coordinator  [address] [place] [address]  [phone]          Electronically signed Lukas Johnson RN  Apr 19 2017  1:51PM EST Author

## 2018-04-04 PROBLEM — H40.9 GLAUCOMA: Status: ACTIVE | Noted: 2018-04-04

## 2018-04-05 ENCOUNTER — HOSPITAL ENCOUNTER (OUTPATIENT)
Dept: RADIOLOGY | Facility: HOSPITAL | Age: 73
Discharge: HOME | End: 2018-04-05
Attending: THORACIC SURGERY (CARDIOTHORACIC VASCULAR SURGERY)
Payer: MEDICARE

## 2018-04-05 ENCOUNTER — OFFICE VISIT (OUTPATIENT)
Dept: SURGICAL ONCOLOGY | Facility: CLINIC | Age: 73
End: 2018-04-05
Payer: MEDICARE

## 2018-04-05 ENCOUNTER — HOSPITAL ENCOUNTER (OUTPATIENT)
Dept: MRI IMAGING | Facility: HOSPITAL | Age: 73
Discharge: HOME | End: 2018-04-05
Attending: SURGERY
Payer: MEDICARE

## 2018-04-05 VITALS
HEIGHT: 70 IN | RESPIRATION RATE: 14 BRPM | SYSTOLIC BLOOD PRESSURE: 122 MMHG | DIASTOLIC BLOOD PRESSURE: 64 MMHG | WEIGHT: 213 LBS | HEART RATE: 63 BPM | TEMPERATURE: 98.3 F | BODY MASS INDEX: 30.49 KG/M2

## 2018-04-05 DIAGNOSIS — C22.0 CANCER, HEPATOCELLULAR (HCC): ICD-10-CM

## 2018-04-05 DIAGNOSIS — C15.5 MALIGNANT NEOPLASM OF LOWER THIRD OF ESOPHAGUS (HCC): Primary | ICD-10-CM

## 2018-04-05 DIAGNOSIS — C15.9 MALIGNANT NEOPLASM OF ESOPHAGUS, UNSPECIFIED LOCATION (HCC): Primary | ICD-10-CM

## 2018-04-05 DIAGNOSIS — C15.9 MALIGNANT NEOPLASM OF ESOPHAGUS, UNSPECIFIED LOCATION (HCC): ICD-10-CM

## 2018-04-05 PROCEDURE — 99215 OFFICE O/P EST HI 40 MIN: CPT | Performed by: SURGERY

## 2018-04-05 PROCEDURE — 71046 X-RAY EXAM CHEST 2 VIEWS: CPT

## 2018-04-05 PROCEDURE — 74183 MRI ABD W/O CNTR FLWD CNTR: CPT

## 2018-04-05 PROCEDURE — A9585 GADOBUTROL INJECTION: HCPCS | Performed by: SURGERY

## 2018-04-05 RX ORDER — DRONABINOL 2.5 MG/1
2.5 CAPSULE ORAL
Qty: 30 CAPSULE | Refills: 3 | Status: SHIPPED | OUTPATIENT
Start: 2018-04-05 | End: 2018-04-11 | Stop reason: ALTCHOICE

## 2018-04-05 RX ADMIN — GADOBUTROL 9 ML: 604.72 INJECTION INTRAVENOUS at 11:57

## 2018-04-05 NOTE — LETTER
April 5, 2018     Jose Paez MD  [address] [facility] [address]    Patient: Marko Puentes   YOB: 1945   Date of Visit: 4/5/2018       Dear Dr Louis Hanson: Thank you for referring [facility] to me for evaluation  Below are my notes for this consultation  If you have questions, please do not hesitate to call me  I look forward to following your patient along with you  Sincerely,        Destiny Boggs MD        CC: DO Silvia Renteria MD Janelle Holms, MD Cloyd Divers, MD  4/5/2018  2:25 PM  Sign at close encounter               Surgical Oncology Follow Up       Amelia  [address] [facility] [address]  1945  [id number]  [facility]  [facility]ER [facility] SURGICAL ONCOLOGY [address] [address]      Diagnoses and all orders for this visit:    Malignant neoplasm of lower third of esophagus ([address] )    Cancer, hepatocellular ([facility] [address] )  -     MRI abdomen w wo contrast; Future  -     dronabinol (MARINOL) 2 5 mg capsule; Take 1 capsule (2 5 mg total) by mouth 2 (two) times a day before meals  -     BUN; Future  -     Creatinine, serum; Future  -     Ambulatory referral to Radiation Oncology; Future  -     Ambulatory referral to Hematology / Oncology; Future  -     AFP tumor marker; Future      Chief Complaint   Patient presents with    Results     MRI of liver and esophagus ?       Medication Problem     regarding to side affects, Pt concern w cough med w codiene             Esophageal cancer ([facility] [address] )    4/27/2016 Initial Diagnosis     Esophageal cancer ([address] )         6/27/2016 - 7/26/2016 Chemotherapy     Taxol 50 + Carbo AUC 2         6/27/2016 - 8/3/2016 Radiation     Concurrent RT         10/31/2016 Surgery     Esophagectomy           Cancer, hepatocellular ([address] )    10/31/2016 Initial Diagnosis     Cancer, hepatocellular ([address] ) 10/31/2016 Surgery     Resection/ablation of liver - segments 5/6         10/11/2017 Biopsy     Local recurrence         12/13/2017 -  Radiation     SIRSphere treatment            Diagnosis and Staging: [id number] esophageal adenocarcinoma May 2016  [id number] esophageal adenocarcinoma October 2016    [id number] [address]  October 2016   Treatment History: Taxol Carbo with radiation   Sir spheres December 13, 2017  Current Therapy: Observation   Disease Status: Recurrent HCC     History of Present Illness:   Patient returns in follow-up of his [address]  He underwent Sir spheres embolization on December 13, 2017 for recurrent [address]  Follow-up AFP level from March 10, 2018 as increased to 17  6  MRI from today reveals that almost all of his lesions are either stable or have decreased in size  There is only 1 lesion in segment 4 that has increased in size slightly  This now measures 2 2 x 2 5 cm  There is also an enlarging lymph node anterior to the IVC and lateral to the pancreas and the silvestre hepatis  This now measures 2 5 x 2 4 cm  I personally reviewed the films  He comes in now to discuss further therapy  Review of Systems  Complete ROS Surg Onc:   Complete ROS Surg Onc:   Constitutional: The patient denies new or recent history of general fatigue, no recent weight loss, no change in appetite  Eyes: No complaints of visual problems, no scleral icterus  ENT: no complaints of ear pain, no hoarseness, no difficulty swallowing,  no tinnitus and no new masses in head, oral cavity, or neck  Cardiovascular: No complaints of chest pain, no palpitations, no ankle edema  Respiratory: No complaints of shortness of breath, no cough  Gastrointestinal: No complaints of jaundice, no bloody stools, no pale stools  Genitourinary: No complaints of dysuria, no hematuria, no nocturia, no frequent urination, no urethral discharge  Musculoskeletal: No complaints of weakness, paralysis, joint stiffness or arthralgias    Integumentary: No complaints of rash, no new lesions  Neurological: No complaints of convulsions, no seizures, no dizziness  Hematologic/Lymphatic: No complaints of easy bruising  Endocrine:  No hot or cold intolerance  No polydipsia, polyphagia, or polyuria  Allergy/immunology:  No environmental allergies  No food allergies  Not immunocompromised  Skin:  No pallor or rash  No wound          Patient Active Problem List   Diagnosis    Esophageal cancer ([facility] )    Cancer, hepatocellular (HCC)    Atrial fibrillation (HCC)    Acute blood loss anemia    Hypokalemia    Hypomagnesemia    Hypotension    Type 2 diabetes mellitus (HCC)    Vision abnormalities    Dysphagia    Empyema (HCC)    Acute cardioembolic stroke (HCC)    Radiation esophagitis    Restless legs syndrome    Rheumatoid arthritis (HCC)    Sleep apnea    Hyperlipidemia    Hypertension    Peripheral neuropathy    Epidermoid cyst of neck    Glaucoma    Gout     Past Medical History:   Diagnosis Date    Anemia     Arthritis     Coronary artery disease     Diabetes mellitus ([place] [address] )     Dysphagia     Esophageal cancer (HCC)     Gout     Hyperlipidemia     Hypertension     Restless leg syndrome     Rheumatoid arthritis ([place] [address] )     Sleep apnea     Vision abnormalities      Past Surgical History:   Procedure Laterality Date    APPENDECTOMY      CHOLECYSTECTOMY      GASTROJEJUNOSTOMY W/ JEJUNOSTOMY TUBE N/A 8/4/2016    Procedure: INSERTION JEJUNOSTOMY TUBE OPEN;  Surgeon: Misti Gaitan MD;  Location: [facility] MAIN OR;  Service:     KNEE ARTHROSCOPY      LIVER RESECTION N/A 10/31/2016    Procedure: INTRAOPERATIVE ULTRASOUND OF LIVER; LIVER LESION RESECTION/ABLATION ;  Surgeon: Misti Gaitan MD;  Location: [facility] MA[facility] OR;  Service:    Watt Miu PLACEMENT      AL EGD INSERT GUIDE WIRE DILATOR PASSAGE ESOPHAGUS N/A 2/14/2018    Procedure: ESOPHAGOGASTRODUODENOSCOPY (EGD) with dilation;  Surgeon: Steve Shah MD;  Location: [facility] MA[facility] OR;  Service: Thoracic    VA ESOPHAGOGASTRODUODENOSCOPY TRANSORAL DIAGNOSTIC N/A 10/31/2016    Procedure: ESOPHAGOGASTRODUODENOSCOPY (EGD); Surgeon: Sarabjit Ramírez MD;  Location: [facility] MA[facility] OR;  Service: Surgical Oncology    VA ESOPHAGOSCOPY FLEX René Angle <30 MM DIAM N/A 1/6/2017    Procedure: DILATATION ESOPHAGEAL;  Surgeon: Mckenzie Fermin MD;  Location: [facility];  Service: Thoracic    VA ESOPHAGOSCOPY FLEX BALLOON DILAT <30 MM DIAM N/A 12/12/2016    Procedure: DILATATION ESOPHAGEAL, EGD;  Surgeon: Mckenzie Fermin MD;  Location: [facility];  Service: Thoracic    VA ESOPHAGOSCOPY FLEX BALLOON DILAT <30 MM DIAM N/A 5/24/2017    Procedure: EGD WITH DILATION ;  Surgeon: Lexie Sepulveda MD;  Location: [facility];  Service: Thoracic    VA ESOPHAGOSCOPY FLEX René Angle <30 MM DIAM N/A 8/3/2017    Procedure: EGD WITH DILATION;  Surgeon: Mckenzie Fermin MD;  Location: [facility];  Service: Thoracic    VA REMOVAL [address] THORACOTOMY N/A 10/31/2016    Procedure: ESOPHAGECTOMY;  Surgeon: Sarabjit Ramírez MD;  Location: [facility];  Service: Surgical Oncology    TONSILLECTOMY       Family History   Problem Relation Age of Onset    Stroke Mother     Colon cancer Father     Cancer Sister      oral cancer     Social History     Social History    Marital status: /Civil Union     Spouse name: N/A    Number of children: N/A    Years of education: N/A     Occupational History    Not on file       Social History Main Topics    Smoking status: Former Smoker     Packs/day: 3 00     Years: 40 00     Quit date: 4/13/1991    Smokeless tobacco: Never Used      Comment: quit 20 yrs ago    Alcohol use No    Drug use: No    Sexual activity: Not Currently     Other Topics Concern    Not on file     Social History Narrative    No narrative on file       Current Outpatient Prescriptions:     Aclidinium Bromide (TUDORZA PRESSAIR) 400 MCG/ACT AEPB, Inhale 1 Act as needed, Disp: , Rfl:     albuterol (2 5 mg/3 mL) 0 083 % nebulizer solution, Take 1 ampule by nebulization every 4 (four) hours as needed for wheezing  , Disp: , Rfl:     amiodarone 200 mg tablet, Take 200 mg by mouth daily, Disp: , Rfl:     apixaban (ELIQUIS) 5 mg, Take 5 mg by mouth 2 (two) times a day, Disp: , Rfl:     Bisacodyl (LAXATIVE PO), Take by mouth daily as needed, Disp: , Rfl:     Cholecalciferol (VITAMIN D) 2000 units CAPS, Take 2,000 Units by mouth, Disp: , Rfl:     dexlansoprazole (DEXILANT) 60 MG capsule, Take 60 mg by mouth daily, Disp: , Rfl:     diazepam (VALIUM) 5 mg tablet, Take 5 mg by mouth every 6 (six) hours as needed for anxiety, Disp: , Rfl:     dicyclomine (BENTYL) 10 mg capsule, Take 10 mg by mouth 4 (four) times a day (before meals and at bedtime), Disp: , Rfl:     Ferrous Sulfate (IRON) 325 (65 Fe) MG TABS, Take by mouth, Disp: , Rfl:     gabapentin (NEURONTIN) 100 mg capsule, Take by mouth, Disp: , Rfl:     glucose blood (FREESTYLE LITE) test strip, USE 1 STRIP TO TEST Q 4-6 H PRN, Disp: , Rfl:     hydrOXYzine HCL (ATARAX) 25 mg tablet, Take 25 mg by mouth 2 (two) times a day as needed for itching, Disp: , Rfl:     latanoprost (XALATAN) 0 005 % ophthalmic solution, Administer 1 drop to both eyes daily at bedtime  , Disp: , Rfl:     LORazepam (ATIVAN) 1 mg tablet, Take 1 mg by mouth every 8 (eight) hours as needed for anxiety, Disp: , Rfl:     rOPINIRole (REQUIP) 1 mg tablet, Take 3 mg by mouth daily at bedtime  , Disp: , Rfl:     rosuvastatin (CRESTOR) 40 MG tablet, Take 40 mg by mouth daily, Disp: , Rfl:     sertraline (ZOLOFT) 25 mg tablet, Take 25 mg by mouth daily at bedtime  , Disp: , Rfl:     traMADol (ULTRAM) 50 mg tablet, Take by mouth, Disp: , Rfl:     allopurinol (ZYLOPRIM) 100 mg tablet, Take 100 mg by mouth daily, Disp: , Rfl:     aspirin (ECOTRIN LOW STRENGTH) 81 mg EC tablet, Take 1 tablet by mouth daily for 30 days, Disp: 30 tablet, Rfl: 0    atorvastatin (LIPITOR) 80 mg tablet, Take 1 tablet by mouth daily with dinner for 30 days, Disp: 30 tablet, Rfl: 0  No current facility-administered medications for this visit  Allergies   Allergen Reactions    Humira [Adalimumab] Rash    Levaquin [Levofloxacin In D5w] Rash    Levofloxacin Rash    Lovenox [Enoxaparin] Rash     Vitals:    04/05/18 1344   BP: 122/64   Pulse: 63   Resp: 14   Temp: 98 3 °F (36 8 °C)       Physical Exam  Constitutional: General appearance: The Patient is well-developed and well-nourished who appears the stated age in no acute distress  Patient is pleasant and talkative  HEENT:  Normocephalic  Sclerae are anicteric  Mucous membranes are moist  Neck is supple without adenopathy  No JVD  Chest: The lungs are clear to auscultation  Cardiac: Heart is regular rate  Abdomen: Abdomen is soft, non-tender, non-distended and without masses  Extremities: There is no clubbing or cyanosis  There is no edema  Symmetric  Neuro: Grossly nonfocal  Gait is normal      Lymphatic: No evidence of cervical adenopathy bilaterally  No evidence of axillary adenopathy bilaterally  No evidence of inguinal adenopathy bilaterally  Skin: Warm, anicteric  Psych:  Patient is pleasant and talkative  Breasts:        Pathology:      Labs:  AFP level is 17 6  Imaging  Mri Abdomen W Wo Contrast    Result Date: 4/5/2018  Narrative: MRI - ABDOMEN - WITH AND WITHOUT CONTRAST INDICATION:  Hepatic lesion COMPARISON: February 6, 2018 TECHNIQUE:  The following pulse sequences were obtained on a 1 5 T scanner prior to and following the administration of intravenous contrast:     Coronal and axial T2 with TE of 90 and 180 respectively, axial T2 with fat saturation, axial FIESTA fat-sat,  axial T1-weighted in-and-out-of phase, axial DWI/ADC, precontrast axial T1 with fat saturation, post-contrast dynamic axial T1 with fat saturation at 20, 70, and 180 seconds, coronal T1  with fat saturation and 7 minute delayed axial T1 with fat saturation    IV Contrast:  9 mL of gadobutrol injection (MULTI-DOSE) FINDINGS: LIVER: Again noted is a lesion in the left hepatic lobe which demonstrates the marginal enhancement  In the segment 2 and 3  This measures 5 7 x 4 7 cm  On the previous study this was measuring 6 8 x 4 8 cm  The amount of necrosis in this lesion is mildly increased  However there is thick nodular enhancement noted in this lesion An additional lesion seen in the segment 4 posteriorly now measures 3 3 x 3 8 cm  On the previous study this was measuring 4 6 x 3 5 cm  Thickened nodular enhancement is noted in this lesion An additional lesion seen in the segment 4 and 2 junction in the left hepatic lobe, now measures 2 2 x 2 5 cm  On the previous study this was measuring 2 4 x 1 8 cm  An additional lesion seen in segment 5, is stable, doesn't demonstrate any significant thickening nodular enhancement  This measures 4 6 x 3 1 cm  There is visualization of a enlarging nodule located in the upper abdomen anterior to the IVC and lateral to the pancreas in the silvestre hepatis area suggest enlarging lymph node  On the previous study this was measuring 1 3 x 1 5 cm  On the current study this measures 2 5 x 2 4 cm Postsurgical changes in the liver  BILIARY TREE:  There is no intra-hepatic biliary ductal dilatation  The common bile duct is normal in caliber  There is no gross evidence of mass or choledocholithiasis  GALLBLADDER:  Surgically absent PANCREAS: The pancreas is normal in morphology and signal intensity  No cystic or solid mass is identified  The pancreatic duct is normal in caliber  ADRENAL GLANDS:  The adrenal glands are normal in morphology without thickening or focal mass  SPLEEN:  The spleen is normal in size, morphology and signal intensity  No focal mass is identified  KIDNEYS:  The kidneys enhance symmetrically and are normal in morphology  There is no hydronephrosis  No focal cystic or solid renal masses are identified   LOWER CHEST:   No gross evidence of mass or infiltrate  ABDOMINAL CAVITY: There is enlarging lymph node in the upper abdomen below the level of the silvestre hepatis  Mesenteric fat is normal in signal without evidence of stranding or edema  No mesenteric nodularity or focal mass is identified  ABDOMINAL WALL:  No mass or hernia identified  BOWEL:   Limited evaluation of the hollow viscera demonstrates no gross obstruction or mass  OSSEOUS STRUCTURES:  There is no evidence of destructive process  VASCULAR STRUCTURES:  The visualized vascular structures are patent without evidence of thrombosis or external compression  No aneurysm is identified  Impression: There is no new liver lesion  The previously noted dominant lesion in the left hepatic lobe demonstrate increasing necrosis suggest treatment response The segment 4 lesion is decreasing in size The lesion in the segment 5 cm remains stable with thin rim enhancement suggesting no viable tissue Segment 2 lesion is stable Enlarging regional lymph node in the upper abdomen just below the silvestre hepatis which measures 2 5 x 2 4 cm The study was marked in EPIC for significant notification  Workstation performed: [id number]     I reviewed the above laboratory and imaging data  Discussion/Summary:   67-year-old male status post esophagectomy and liver resection  He has undergone Sir spheres embolization  His AFP is still elevated  All of his liver lesions appear to have increased necrosis from his previous imaging, but 1 lesion appears to have increased in size very slightly  Further radiation doses are not likely possible  Bigger concern is the enlarging silvestre hepatis lymph node  If this indeed has [address] , his best options would be systemic therapy or possibly radiation to this area although I believe systemic therapy would be his best treatment    Alternatively, he is relatively asymptomatic and an argument could be made for observation given the efficacy of approved systemic therapies  I have recommended that he see Dr Crescencio Balderas at this time to see if he wishes to initiate systemic therapy  I will also have him see Dr Shi Hoang  I would not recommend any surgical intervention  I will see him again in 3 months with a repeat MRI and AFP level  He is agreeable to this  All his questions were answered

## 2018-04-05 NOTE — PROGRESS NOTES
Surgical Oncology Follow Up       [facility]  [facility] [address]  1945  [id number]  [facility]  [facility] [place] [address]      Diagnoses and all orders for this visit:    Malignant neoplasm of lower third of esophagus ([place] [id number] )    Cancer, hepatocellular ([place] [address] )  -     MRI abdomen w wo contrast; Future  -     dronabinol (MARINOL) 2 5 mg capsule; Take 1 capsule (2 5 mg total) by mouth 2 (two) times a day before meals  -     BUN; Future  -     Creatinine, serum; Future  -     Ambulatory referral to Radiation Oncology; Future  -     Ambulatory referral to Hematology / Oncology; Future  -     AFP tumor marker; Future      Chief Complaint   Patient presents with    Results     MRI of liver and esophagus ?  Medication Problem     regarding to side affects, Pt concern w cough med w codiene             Esophageal cancer ([place] [address] )    4/27/2016 Initial Diagnosis     Esophageal cancer ([place] [address] )         6/27/2016 - 7/26/2016 Chemotherapy     Taxol 50 + Carbo AUC 2         6/27/2016 - 8/3/2016 Radiation     Concurrent RT         10/31/2016 Surgery     Esophagectomy           Cancer, hepatocellular ([place] [address] )    10/31/2016 Initial Diagnosis     Cancer, hepatocellular ([place] [address] )       10/31/2016 Surgery     Resection/ablation of liver - segments 5/6         10/11/2017 Biopsy     Local recurrence         12/13/2017 -  Radiation     SIRSphere treatment            Diagnosis and Staging: [id number] esophageal adenocarcinoma May 2016  [id number] esophageal adenocarcinoma October 2016    [id number] [place] [address]  October 2016   Treatment History: Taxol Carbo with radiation   Sir spheres December 13, 2017  Current Therapy: Observation   Disease Status: Recurrent HCC     History of Present Illness:   Patient returns in follow-up of his [place] [id number]    He underwent Sir spheres embolization on December 13, 2017 for recurrent [address]  Follow-up AFP level from March 10, 2018 as increased to 17  6  MRI from today reveals that almost all of his lesions are either stable or have decreased in size  There is only 1 lesion in segment 4 that has increased in size slightly  This now measures 2 2 x 2 5 cm  There is also an enlarging lymph node anterior to the IVC and lateral to the pancreas and the silvestre hepatis  This now measures 2 5 x 2 4 cm  I personally reviewed the films  He comes in now to discuss further therapy  Review of Systems  Complete ROS Surg Onc:   Complete ROS Surg Onc:   Constitutional: The patient denies new or recent history of general fatigue, no recent weight loss, no change in appetite  Eyes: No complaints of visual problems, no scleral icterus  ENT: no complaints of ear pain, no hoarseness, no difficulty swallowing,  no tinnitus and no new masses in head, oral cavity, or neck  Cardiovascular: No complaints of chest pain, no palpitations, no ankle edema  Respiratory: No complaints of shortness of breath, no cough  Gastrointestinal: No complaints of jaundice, no bloody stools, no pale stools  Genitourinary: No complaints of dysuria, no hematuria, no nocturia, no frequent urination, no urethral discharge  Musculoskeletal: No complaints of weakness, paralysis, joint stiffness or arthralgias  Integumentary: No complaints of rash, no new lesions  Neurological: No complaints of convulsions, no seizures, no dizziness  Hematologic/Lymphatic: No complaints of easy bruising  Endocrine:  No hot or cold intolerance  No polydipsia, polyphagia, or polyuria  Allergy/immunology:  No environmental allergies  No food allergies  Not immunocompromised  Skin:  No pallor or rash  No wound          Patient Active Problem List   Diagnosis    [facility]    [facility]    Atrial fibrillation [facility]    Acute blood loss anemia    Hypokalemia    Hypomagnesemia    Hypotension    Type 2 diabetes mellitus (HCC)    Vision abnormalities    Dysphagia    Empyema (HCC)    Acute cardioembolic stroke (HCC)    Radiation esophagitis    Restless legs syndrome    Rheumatoid arthritis (HCC)    Sleep apnea    Hyperlipidemia    Hypertension    Peripheral neuropathy    Epidermoid cyst of neck    Glaucoma    Gout     Past Medical History:   Diagnosis Date    Anemia     Arthritis     Coronary artery disease     Diabetes mellitus [facility]     Dysphagia     Esophageal cancer (HCC)     Gout     Hyperlipidemia     Hypertension     Restless leg syndrome     Rheumatoid arthritis ([facility]     Sleep apnea     Vision abnormalities      Past Surgical History:   Procedure Laterality Date    APPENDECTOMY      CHOLECYSTECTOMY      GASTROJEJUNOSTOMY W/ JEJUNOSTOMY TUBE N/A 8/4/2016    Procedure: INSERTION JEJUNOSTOMY TUBE OPEN;  Surgeon: Maco Oneil MD;  [facility];  Service:     KNEE ARTHROSCOPY      LIVER RESECTION N/A 10/31/2016    Procedure: INTRAOPERATIVE ULTRASOUND OF LIVER; LIVER LESION RESECTION/ABLATION ;  Surgeon: Maco Oneil MD;  [facility];  Service:    Brunilda Cheese PLACEMENT      IN EGD INSERT GUIDE WIRE DILATOR PASSAGE ESOPHAGUS N/A 2/14/2018    Procedure: ESOPHAGOGASTRODUODENOSCOPY (EGD) with dilation;  Surgeon: Suhas Liang MD;  [facility];  Service: Thoracic    IN ESOPHAGOGASTRODUODENOSCOPY TRANSORAL DIAGNOSTIC N/A 10/31/2016    Procedure: ESOPHAGOGASTRODUODENOSCOPY (EGD);   Surgeon: Maco Oneil MD;  [facility];  Service: Surgical Oncology    IN ESOPHAGOSCOPY FLEX BALLOON DILAT <30 MM DIAM N/A 1/6/2017    Procedure: DILATATION ESOPHAGEAL;  Surgeon: Suhas Liang MD;  [facility];  Service: Thoracic    IN ESOPHAGOSCOPY FLEX BALLOON DILAT <30 MM DIAM N/A 12/12/2016    Procedure: DILATATION ESOPHAGEAL, EGD;  Surgeon: Suhas Liang MD;  [facility];  Service: Thoracic    MT ESOPHAGOSCOPY FLEX BALLOON DILAT <30 MM DIAM N/A 5/24/2017    Procedure: EGD WITH DILATION ;  Surgeon: Marianela Mendiola MD;  [facility];  Service: Thoracic    MT ESOPHAGOSCOPY FLEX Collie Rogue <30 MM DIAM N/A 8/3/2017    Procedure: EGD WITH DILATION;  Surgeon: Jabari Harper MD;  [facility];  Service: Thoracic    MT REMOVAL [address] THORACOTOMY N/A 10/31/2016    Procedure: ESOPHAGECTOMY;  Surgeon: Kristen Gonzales MD;  [facility];  Service: Surgical Oncology    TONSILLECTOMY       Family History   Problem Relation Age of Onset    Stroke Mother     Colon cancer Father     Cancer Sister      oral cancer     Social History     Social History    Marital status: /Civil Union     Spouse name: N/A    Number of children: N/A    Years of education: N/A     Occupational History    Not on file       Social History Main Topics    Smoking status: Former Smoker     Packs/day: 3 00     Years: 40 00     Quit date: 4/13/1991    Smokeless tobacco: Never Used      Comment: quit 20 yrs ago    Alcohol use No    Drug use: No    Sexual activity: Not Currently     Other Topics Concern    Not on file     Social History Narrative    No narrative on file       Current Outpatient Prescriptions:     Aclidinium Bromide (TUDORZA PRESSAIR) 400 MCG/ACT AEPB, Inhale 1 Act as needed, Disp: , Rfl:     albuterol (2 5 mg/3 mL) 0 083 % nebulizer solution, Take 1 ampule by nebulization every 4 (four) hours as needed for wheezing  , Disp: , Rfl:     amiodarone 200 mg tablet, Take 200 mg by mouth daily, Disp: , Rfl:     apixaban (ELIQUIS) 5 mg, Take 5 mg by mouth 2 (two) times a day, Disp: , Rfl:     Bisacodyl (LAXATIVE PO), Take by mouth daily as needed, Disp: , Rfl:     Cholecalciferol (VITAMIN D) 2000 units CAPS, Take 2,000 Units by mouth, Disp: , Rfl:     dexlansoprazole (DEXILANT) 60 MG capsule, Take 60 mg by mouth daily, Disp: , Rfl:     diazepam (VALIUM) 5 mg tablet, Take 5 mg by mouth every 6 (six) hours as needed for anxiety, Disp: , Rfl:     dicyclomine (BENTYL) 10 mg capsule, Take 10 mg by mouth 4 (four) times a day (before meals and at bedtime), Disp: , Rfl:     Ferrous Sulfate (IRON) 325 (65 Fe) MG TABS, Take by mouth, Disp: , Rfl:     gabapentin (NEURONTIN) 100 mg capsule, Take by mouth, Disp: , Rfl:     glucose blood (FREESTYLE LITE) test strip, USE 1 STRIP TO TEST Q 4-6 H PRN, Disp: , Rfl:     hydrOXYzine HCL (ATARAX) 25 mg tablet, Take 25 mg by mouth 2 (two) times a day as needed for itching, Disp: , Rfl:     latanoprost (XALATAN) 0 005 % ophthalmic solution, Administer 1 drop to both eyes daily at bedtime  , Disp: , Rfl:     LORazepam (ATIVAN) 1 mg tablet, Take 1 mg by mouth every 8 (eight) hours as needed for anxiety, Disp: , Rfl:     rOPINIRole (REQUIP) 1 mg tablet, Take 3 mg by mouth daily at bedtime  , Disp: , Rfl:     rosuvastatin (CRESTOR) 40 MG tablet, Take 40 mg by mouth daily, Disp: , Rfl:     sertraline (ZOLOFT) 25 mg tablet, Take 25 mg by mouth daily at bedtime  , Disp: , Rfl:     traMADol (ULTRAM) 50 mg tablet, Take by mouth, Disp: , Rfl:     allopurinol (ZYLOPRIM) 100 mg tablet, Take 100 mg by mouth daily, Disp: , Rfl:     aspirin (ECOTRIN LOW STRENGTH) 81 mg EC tablet, Take 1 tablet by mouth daily for 30 days, Disp: 30 tablet, Rfl: 0    atorvastatin (LIPITOR) 80 mg tablet, Take 1 tablet by mouth daily with dinner for 30 days, Disp: 30 tablet, Rfl: 0  No current facility-administered medications for this visit  Allergies   Allergen Reactions    Humira [Adalimumab] Rash    Levaquin [Levofloxacin In D5w] Rash    Levofloxacin Rash    Lovenox [Enoxaparin] Rash     Vitals:    04/05/18 1344   BP: 122/64   Pulse: 63   Resp: 14   Temp: 98 3 °F (36 8 °C)       Physical Exam  Constitutional: General appearance: The Patient is well-developed and well-nourished who appears the stated age in no acute distress  Patient is pleasant and talkative       HEENT: Normocephalic  Sclerae are anicteric  Mucous membranes are moist  Neck is supple without adenopathy  No JVD  Chest: The lungs are clear to auscultation  Cardiac: Heart is regular rate  Abdomen: Abdomen is soft, non-tender, non-distended and without masses  Extremities: There is no clubbing or cyanosis  There is no edema  Symmetric  Neuro: Grossly nonfocal  Gait is normal      Lymphatic: No evidence of cervical adenopathy bilaterally  No evidence of axillary adenopathy bilaterally  No evidence of inguinal adenopathy bilaterally  Skin: Warm, anicteric  Psych:  Patient is pleasant and talkative  Breasts:        Pathology:      Labs:  AFP level is 17 6  Imaging  Mri Abdomen W Wo Contrast    Result Date: 4/5/2018  Narrative: MRI - ABDOMEN - WITH AND WITHOUT CONTRAST INDICATION:  Hepatic lesion COMPARISON: February 6, 2018 TECHNIQUE:  The following pulse sequences were obtained on a 1 5 T scanner prior to and following the administration of intravenous contrast:     Coronal and axial T2 with TE of 90 and 180 respectively, axial T2 with fat saturation, axial FIESTA fat-sat,  axial T1-weighted in-and-out-of phase, axial DWI/ADC, precontrast axial T1 with fat saturation, post-contrast dynamic axial T1 with fat saturation at 20, 70, and 180 seconds, coronal T1  with fat saturation and 7 minute delayed axial T1 with fat saturation  IV Contrast:  9 mL of gadobutrol injection (MULTI-DOSE) FINDINGS: LIVER: Again noted is a lesion in the left hepatic lobe which demonstrates the marginal enhancement  In the segment 2 and 3  This measures 5 7 x 4 7 cm  On the previous study this was measuring 6 8 x 4 8 cm  The amount of necrosis in this lesion is mildly increased  However there is thick nodular enhancement noted in this lesion An additional lesion seen in the segment 4 posteriorly now measures 3 3 x 3 8 cm  On the previous study this was measuring 4 6 x 3 5 cm    Thickened nodular enhancement is noted in this lesion An additional lesion seen in the segment 4 and 2 junction in the left hepatic lobe, now measures 2 2 x 2 5 cm  On the previous study this was measuring 2 4 x 1 8 cm  An additional lesion seen in segment 5, is stable, doesn't demonstrate any significant thickening nodular enhancement  This measures 4 6 x 3 1 cm  There is visualization of a enlarging nodule located in the upper abdomen anterior to the IVC and lateral to the pancreas in the silvestre hepatis area suggest enlarging lymph node  On the previous study this was measuring 1 3 x 1 5 cm  On the current study this measures 2 5 x 2 4 cm Postsurgical changes in the liver  BILIARY TREE:  There is no intra-hepatic biliary ductal dilatation  The common bile duct is normal in caliber  There is no gross evidence of mass or choledocholithiasis  GALLBLADDER:  Surgically absent PANCREAS: The pancreas is normal in morphology and signal intensity  No cystic or solid mass is identified  The pancreatic duct is normal in caliber  ADRENAL GLANDS:  The adrenal glands are normal in morphology without thickening or focal mass  SPLEEN:  The spleen is normal in size, morphology and signal intensity  No focal mass is identified  KIDNEYS:  The kidneys enhance symmetrically and are normal in morphology  There is no hydronephrosis  No focal cystic or solid renal masses are identified  LOWER CHEST:   No gross evidence of mass or infiltrate  ABDOMINAL CAVITY: There is enlarging lymph node in the upper abdomen below the level of the silvestre hepatis  Mesenteric fat is normal in signal without evidence of stranding or edema  No mesenteric nodularity or focal mass is identified  ABDOMINAL WALL:  No mass or hernia identified  BOWEL:   Limited evaluation of the hollow viscera demonstrates no gross obstruction or mass  OSSEOUS STRUCTURES:  There is no evidence of destructive process   VASCULAR STRUCTURES:  The visualized vascular structures are patent without evidence of thrombosis or external compression  No aneurysm is identified  Impression: There is no new liver lesion  The previously noted dominant lesion in the left hepatic lobe demonstrate increasing necrosis suggest treatment response The segment 4 lesion is decreasing in size The lesion in the segment 5 cm remains stable with thin rim enhancement suggesting no viable tissue Segment 2 lesion is stable Enlarging regional lymph node in the upper abdomen just below the silvestre hepatis which measures 2 5 x 2 4 cm The study was marked in EPIC for significant notification  Workstation performed: [id number]     I reviewed the above laboratory and imaging data  Discussion/Summary:   57-year-old male status post esophagectomy and liver resection  He has undergone Sir spheres embolization  His AFP is still elevated  All of his liver lesions appear to have increased necrosis from his previous imaging, but 1 lesion appears to have increased in size very slightly  Further radiation doses are not likely possible  Bigger concern is the enlarging silvestre hepatis lymph node  If this indeed has [address] , his best options would be systemic therapy or possibly radiation to this area although I believe systemic therapy would be his best treatment  Alternatively, he is relatively asymptomatic and an argument could be made for observation given the efficacy of approved systemic therapies  I have recommended that he see Dr Shira Perez at this time to see if he wishes to initiate systemic therapy  I will also have him see Dr Anglin Frames  I would not recommend any surgical intervention  I will see him again in 3 months with a repeat MRI and AFP level  He is agreeable to this  All his questions were answered

## 2018-04-06 ENCOUNTER — OFFICE VISIT (OUTPATIENT)
Dept: HEMATOLOGY ONCOLOGY | Facility: HOSPITAL | Age: 73
End: 2018-04-06
Payer: MEDICARE

## 2018-04-06 VITALS
RESPIRATION RATE: 16 BRPM | DIASTOLIC BLOOD PRESSURE: 66 MMHG | HEIGHT: 70 IN | WEIGHT: 217.6 LBS | BODY MASS INDEX: 31.15 KG/M2 | SYSTOLIC BLOOD PRESSURE: 114 MMHG | TEMPERATURE: 98.6 F | HEART RATE: 77 BPM

## 2018-04-06 DIAGNOSIS — C22.0 CANCER, HEPATOCELLULAR (HCC): ICD-10-CM

## 2018-04-06 DIAGNOSIS — C15.5 MALIGNANT NEOPLASM OF LOWER THIRD OF ESOPHAGUS (HCC): Primary | ICD-10-CM

## 2018-04-06 PROCEDURE — 99215 OFFICE O/P EST HI 40 MIN: CPT | Performed by: INTERNAL MEDICINE

## 2018-04-06 RX ORDER — GUAIFENESIN 100 MG/5ML
200 SYRUP ORAL EVERY 6 HOURS PRN
COMMUNITY
End: 2018-04-11 | Stop reason: ALTCHOICE

## 2018-04-06 NOTE — PROGRESS NOTES
Roxi Romero  1945  [address] [facility] [address]  [address]  [facility] [address]    Chief Complaint   Patient presents with    Follow-up            Esophageal cancer ([facility] )    4/27/2016 Initial Diagnosis     Esophageal cancer ([facility] )         6/27/2016 - 7/26/2016 Chemotherapy     Taxol 50 + Carbo AUC 2         6/27/2016 - 8/3/2016 Radiation     Concurrent RT         10/31/2016 Surgery     Esophagectomy           Cancer, hepatocellular ([place] [address] )    10/31/2016 Initial Diagnosis     Cancer, hepatocellular ([place] [address] )       10/31/2016 Surgery     Resection/ablation of liver - segments 5/6         10/11/2017 Biopsy     Local recurrence         12/13/2017 -  Radiation     [facility] treatment            History of Present Illness:  -No ref  provider found:  -Previous Therapy (if not listed in Oncology History):  -Current Therapy (if not listed in Oncology History):  -Disease Status:  -Interval History:  -Tumor Markers:    Review of Systems:  Review of Systems   Constitutional: Negative for chills and fever  HENT: Negative for nosebleeds  Eyes: Negative for discharge  Respiratory: Negative for cough and shortness of breath  Cardiovascular: Negative for chest pain  Gastrointestinal: Negative for abdominal pain, constipation and diarrhea  Endocrine: Negative for polydipsia  Genitourinary: Negative for hematuria  Musculoskeletal: Negative for arthralgias  Skin: Negative for color change  Allergic/Immunologic: Negative for immunocompromised state  Neurological: Negative for dizziness and headaches  Hematological: Negative for adenopathy  Psychiatric/Behavioral: Negative for agitation         Patient Active Problem List   Diagnosis    Esophageal cancer ([place] [address] )    Cancer, hepatocellular ([place] [address] )    Atrial fibrillation ([place] [address] )    Acute blood loss anemia    Hypokalemia    Hypomagnesemia    Hypotension    Type 2 diabetes mellitus ([place] [address] )  Vision abnormalities    Dysphagia    Empyema (HCC)    Acute cardioembolic stroke (HCC)    Radiation esophagitis    Restless legs syndrome    Rheumatoid arthritis (HCC)    Sleep apnea    Hyperlipidemia    Hypertension    Peripheral neuropathy    Epidermoid cyst of neck    Glaucoma    Gout     Past Medical History:   Diagnosis Date    Anemia     Arthritis     Coronary artery disease     Diabetes mellitus ([facility] )     Dysphagia     Esophageal cancer (HCC)     Gout     Hyperlipidemia     Hypertension     Restless leg syndrome     Rheumatoid arthritis ([facility] )     Sleep apnea     Vision abnormalities      Past Surgical History:   Procedure Laterality Date    APPENDECTOMY      CHOLECYSTECTOMY      GASTROJEJUNOSTOMY W/ JEJUNOSTOMY TUBE N/A 8/4/2016    Procedure: INSERTION JEJUNOSTOMY TUBE OPEN;  Surgeon: Jamey Yi MD;  [facility];  Service:     KNEE ARTHROSCOPY      LIVER RESECTION N/A 10/31/2016    Procedure: INTRAOPERATIVE ULTRASOUND OF LIVER; LIVER LESION RESECTION/ABLATION ;  Surgeon: Jamey Yi MD;  [facility];  Service:    Marene Go PLACEMENT      RI EGD INSERT GUIDE WIRE DILATOR PASSAGE ESOPHAGUS N/A 2/14/2018    Procedure: ESOPHAGOGASTRODUODENOSCOPY (EGD) with dilation;  Surgeon: Walter Chavez MD;  [facility];  Service: Thoracic    RI ESOPHAGOGASTRODUODENOSCOPY TRANSORAL DIAGNOSTIC N/A 10/31/2016    Procedure: ESOPHAGOGASTRODUODENOSCOPY (EGD);   Surgeon: Jamey Yi MD;  [facility];  Service: Surgical Oncology    RI ESOPHAGOSCOPY FLEX BALLOON DILAT <30 MM DIAM N/A 1/6/2017    Procedure: DILATATION ESOPHAGEAL;  Surgeon: Walter Chavez MD;  [facility];  Service: Thoracic    RI ESOPHAGOSCOPY FLEX BALLOON DILAT <30 MM DIAM N/A 12/12/2016    Procedure: DILATATION ESOPHAGEAL, EGD;  Surgeon: Walter Chavez MD;  [facility];  Service: Thoracic    RI ESOPHAGOSCOPY FLEX Cindia Lyn <30 MM DIAM N/A 5/24/2017 Procedure: EGD WITH DILATION ;  Surgeon: Jason Tay MD;  [facility];  Service: Thoracic    NE ESOPHAGOSCOPY FLEX Adriana Gut <30 MM DIAM N/A 8/3/2017    Procedure: EGD WITH DILATION;  Surgeon: Claudine Arciniega MD;  [facility];  Service: Thoracic    NE REMOVAL Josemanuel Mcgrath [address] THORACOTOMY N/A 10/31/2016    Procedure: ESOPHAGECTOMY;  Surgeon: Yvonne Summers MD;  [facility];  Service: Surgical Oncology    TONSILLECTOMY       Family History   Problem Relation Age of Onset    Stroke Mother     Colon cancer Father     Cancer Sister      oral cancer     Social History     Social History    Marital status: /Civil Union     Spouse name: N/A    Number of children: N/A    Years of education: N/A     Occupational History    Not on file       Social History Main Topics    Smoking status: Former Smoker     Packs/day: 3 00     Years: 40 00     Quit date: 4/13/1991    Smokeless tobacco: Never Used      Comment: quit 20 yrs ago    Alcohol use No    Drug use: No    Sexual activity: Not Currently     Other Topics Concern    Not on file     Social History Narrative    No narrative on file       Current Outpatient Prescriptions:     Aclidinium Bromide (TUDORZA PRESSAIR) 400 MCG/ACT AEPB, Inhale 1 Act as needed, Disp: , Rfl:     albuterol (2 5 mg/3 mL) 0 083 % nebulizer solution, Take 1 ampule by nebulization every 4 (four) hours as needed for wheezing  , Disp: , Rfl:     allopurinol (ZYLOPRIM) 100 mg tablet, Take 100 mg by mouth daily, Disp: , Rfl:     amiodarone 200 mg tablet, Take 200 mg by mouth daily, Disp: , Rfl:     apixaban (ELIQUIS) 5 mg, Take 5 mg by mouth 2 (two) times a day, Disp: , Rfl:     diazepam (VALIUM) 5 mg tablet, Take 5 mg by mouth every 6 (six) hours as needed for anxiety, Disp: , Rfl:     dicyclomine (BENTYL) 10 mg capsule, Take 10 mg by mouth 4 (four) times a day (before meals and at bedtime), Disp: , Rfl:     dronabinol (MARINOL) 2 5 mg capsule, Take 1 capsule (2 5 mg total) by mouth 2 (two) times a day before meals, Disp: 30 capsule, Rfl: 3    Ferrous Sulfate (IRON) 325 (65 Fe) MG TABS, Take by mouth, Disp: , Rfl:     gabapentin (NEURONTIN) 100 mg capsule, Take by mouth, Disp: , Rfl:     guaiFENesin (ROBITUSSIN) 100 mg/5 mL syrup, Take 200 mg by mouth every 6 (six) hours as needed for cough, Disp: , Rfl:     hydrOXYzine HCL (ATARAX) 25 mg tablet, Take 25 mg by mouth 2 (two) times a day as needed for itching, Disp: , Rfl:     insulin aspart (NovoLOG) 100 units/mL injection, Inject under the skin as needed for high blood sugar, Disp: , Rfl:     latanoprost (XALATAN) 0 005 % ophthalmic solution, Administer 1 drop to both eyes daily at bedtime  , Disp: , Rfl:     LORazepam (ATIVAN) 1 mg tablet, Take 1 mg by mouth every 8 (eight) hours as needed for anxiety, Disp: , Rfl:     rOPINIRole (REQUIP) 1 mg tablet, Take 3 mg by mouth daily at bedtime  , Disp: , Rfl:     sertraline (ZOLOFT) 25 mg tablet, Take 25 mg by mouth daily at bedtime  , Disp: , Rfl:     traMADol (ULTRAM) 50 mg tablet, Take by mouth, Disp: , Rfl:     aspirin (ECOTRIN LOW STRENGTH) 81 mg EC tablet, Take 1 tablet by mouth daily for 30 days, Disp: 30 tablet, Rfl: 0    atorvastatin (LIPITOR) 80 mg tablet, Take 1 tablet by mouth daily with dinner for 30 days, Disp: 30 tablet, Rfl: 0    dexlansoprazole (DEXILANT) 60 MG capsule, Take 60 mg by mouth daily, Disp: , Rfl:     rosuvastatin (CRESTOR) 40 MG tablet, Take 40 mg by mouth daily, Disp: , Rfl:   Allergies   Allergen Reactions    Humira [Adalimumab] Rash    Levaquin [Levofloxacin In D5w] Rash    Levofloxacin Rash    Lovenox [Enoxaparin] Rash     Vitals:    04/06/18 1302   BP: 114/66   Pulse: 77   Resp: 16   Temp: 98 6 °F (37 °C)         Physical Exam   Constitutional: He is oriented to person, place, and time  He appears well-developed  HENT:   Head: Normocephalic  Eyes: Pupils are equal, round, and reactive to light     Neck: Neck supple  Cardiovascular: Normal rate and regular rhythm  No murmur heard  Pulmonary/Chest: Breath sounds normal  He has no wheezes  He has no rales  Abdominal: Soft  There is no tenderness  Musculoskeletal: Normal range of motion  He exhibits no edema or tenderness  Lymphadenopathy:     He has no cervical adenopathy  Neurological: He is alert and oriented to person, place, and time  He has normal reflexes  No cranial nerve deficit  Skin: No rash noted  No erythema  Psychiatric: He has a normal mood and affect  His behavior is normal            Performance Status: ECOG/Zubrod/WHO: 2 - Symptomatic, <50% confined to bed    Labs:  CBC, Coags, BMP, Mg, Phos     Imaging  Xr Chest Pa & Lateral    Result Date: 4/5/2018  Narrative: CHEST INDICATION:   C15 9: Malignant neoplasm of esophagus, unspecified  COMPARISON:  April 27, 2017 EXAM PERFORMED/VIEWS:  XR CHEST PA & LATERAL FINDINGS:  Right-sided line seen with tip in the SVC Cardiomediastinal silhouette appears unremarkable  The lungs are clear  No pneumothorax or pleural effusion  Osseous structures appear within normal limits for patient age   Increased as seen in relation to the lower left rib, unchanged    Impression: No acute consolidation congestion Stable chest x-ray Workstation performed: [id number]     Mri Abdomen W Wo Contrast    Result Date: 4/5/2018  Narrative: MRI - ABDOMEN - WITH AND WITHOUT CONTRAST INDICATION:  Hepatic lesion COMPARISON: February 6, 2018 TECHNIQUE:  The following pulse sequences were obtained on a 1 5 T scanner prior to and following the administration of intravenous contrast:     Coronal and axial T2 with TE of 90 and 180 respectively, axial T2 with fat saturation, axial FIESTA fat-sat,  axial T1-weighted in-and-out-of phase, axial DWI/ADC, precontrast axial T1 with fat saturation, post-contrast dynamic axial T1 with fat saturation at 20, 70, and 180 seconds, coronal T1  with fat saturation and 7 minute delayed axial T1 with fat saturation  IV Contrast:  9 mL of gadobutrol injection (MULTI-DOSE) FINDINGS: LIVER: Again noted is a lesion in the left hepatic lobe which demonstrates the marginal enhancement  In the segment 2 and 3  This measures 5 7 x 4 7 cm  On the previous study this was measuring 6 8 x 4 8 cm  The amount of necrosis in this lesion is mildly increased  However there is thick nodular enhancement noted in this lesion An additional lesion seen in the segment 4 posteriorly now measures 3 3 x 3 8 cm  On the previous study this was measuring 4 6 x 3 5 cm  Thickened nodular enhancement is noted in this lesion An additional lesion seen in the segment 4 and 2 junction in the left hepatic lobe, now measures 2 2 x 2 5 cm  On the previous study this was measuring 2 4 x 1 8 cm  An additional lesion seen in segment 5, is stable, doesn't demonstrate any significant thickening nodular enhancement  This measures 4 6 x 3 1 cm  There is visualization of a enlarging nodule located in the upper abdomen anterior to the IVC and lateral to the pancreas in the silvestre hepatis area suggest enlarging lymph node  On the previous study this was measuring 1 3 x 1 5 cm  On the current study this measures 2 5 x 2 4 cm Postsurgical changes in the liver  BILIARY TREE:  There is no intra-hepatic biliary ductal dilatation  The common bile duct is normal in caliber  There is no gross evidence of mass or choledocholithiasis  GALLBLADDER:  Surgically absent PANCREAS: The pancreas is normal in morphology and signal intensity  No cystic or solid mass is identified  The pancreatic duct is normal in caliber  ADRENAL GLANDS:  The adrenal glands are normal in morphology without thickening or focal mass  SPLEEN:  The spleen is normal in size, morphology and signal intensity  No focal mass is identified  KIDNEYS:  The kidneys enhance symmetrically and are normal in morphology  There is no hydronephrosis    No focal cystic or solid renal masses are identified  LOWER CHEST:   No gross evidence of mass or infiltrate  ABDOMINAL CAVITY: There is enlarging lymph node in the upper abdomen below the level of the silvestre hepatis  Mesenteric fat is normal in signal without evidence of stranding or edema  No mesenteric nodularity or focal mass is identified  ABDOMINAL WALL:  No mass or hernia identified  BOWEL:   Limited evaluation of the hollow viscera demonstrates no gross obstruction or mass  OSSEOUS STRUCTURES:  There is no evidence of destructive process  VASCULAR STRUCTURES:  The visualized vascular structures are patent without evidence of thrombosis or external compression  No aneurysm is identified  Impression: There is no new liver lesion  The previously noted dominant lesion in the left hepatic lobe demonstrate increasing necrosis suggest treatment response The segment 4 lesion is decreasing in size The lesion in the segment 5 cm remains stable with thin rim enhancement suggesting no viable tissue Segment 2 lesion is stable Enlarging regional lymph node in the upper abdomen just below the silvestre hepatis which measures 2 5 x 2 4 cm The study was marked in EPIC for significant notification  Workstation performed: [id number]     I reviewed the above laboratory and imaging data  Discussion/Summary:  In summary, this is a 43-year-old male 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  Additionally, he had hepatocellular carcinoma, removed  He had recurrence treated with [employer]  MRI yesterday showed slight decrease in size and increasing necrosis of multifocal intrahepatic disease  Lymph node in the silvestre hepatis increased, previous 1 5 cm, current 2 5 cm  The remainder of the MRI showed no metastatic disease throughout the abdomen  Chest x-ray was normal   We reviewed that he appears to have a local regional recurrence with progression in the lymph node basin    If radiation therapy could be applied to this lesion deferral of systemic treatment initiation could be accomplished  We reviewed potential treatment options for patients with [address]  including Nexavar, Garey Mortimer  Marybelle Holms is only approved for patients in the second-line setting or beyond  We reviewed potential toxicities of each of these medications as well as efficacy rates  The oral medications tend to be poorly tolerated with modest efficacy  Marybelle Holms has modest efficacy although the stable disease rate is proximally 10-15%  This tends to be generally better tolerated with the predominant toxicities including diarrhea, eczematoid rash, hypothyroidism  Toxicities are generally reversible  Intravenous administration is required  I reviewed the above considerations at length with the patient and his wife  We await the results of upcoming radiation therapy consultation

## 2018-04-06 NOTE — LETTER
April 6, 2018     Santhosh Christopher MD  [address] [place] [address]    Patient: James Millan   YOB: 1945   Date of Visit: 4/6/2018       Dear Dr Andrew Haley: Thank you for referring Woodward Barthel to me for evaluation  Below are my notes for this consultation  If you have questions, please do not hesitate to call me  I look forward to following your patient along with you  Sincerely,        Diaz Hu DO        CC: MD María Bhatt MD Darrol [place], DO  4/6/2018  1:42 PM  Sign at close encounter  James Millan  1945  [address] [place]  [address] [facility] [id number]    Chief Complaint   Patient presents with    Follow-up            Esophageal cancer ([facility] )    4/27/2016 Initial Diagnosis     Esophageal cancer ([facility] )         6/27/2016 - 7/26/2016 Chemotherapy     Taxol 50 + Carbo AUC 2         6/27/2016 - 8/3/2016 Radiation     Concurrent RT         10/31/2016 Surgery     Esophagectomy           Cancer, hepatocellular ([facility] )    10/31/2016 Initial Diagnosis     Cancer, hepatocellular ([facility] )       10/31/2016 Surgery     Resection/ablation of liver - segments 5/6         10/11/2017 Biopsy     Local recurrence         12/13/2017 -  Radiation     SIRSphere treatment            History of Present Illness:  -No ref  provider found:  -Previous Therapy (if not listed in Oncology History):  -Current Therapy (if not listed in Oncology History):  -Disease Status:  -Interval History:  -Tumor Markers:    Review of Systems:  Review of Systems   Constitutional: Negative for chills and fever  HENT: Negative for nosebleeds  Eyes: Negative for discharge  Respiratory: Negative for cough and shortness of breath  Cardiovascular: Negative for chest pain  Gastrointestinal: Negative for abdominal pain, constipation and diarrhea  Endocrine: Negative for polydipsia     Genitourinary: Negative for hematuria  Musculoskeletal: Negative for arthralgias  Skin: Negative for color change  Allergic/Immunologic: Negative for immunocompromised state  Neurological: Negative for dizziness and headaches  Hematological: Negative for adenopathy  Psychiatric/Behavioral: Negative for agitation         Patient Active Problem List   Diagnosis    Esophageal cancer ([facility] )    Cancer, hepatocellular (HCC)    Atrial fibrillation (HCC)    Acute blood loss anemia    Hypokalemia    Hypomagnesemia    Hypotension    Type 2 diabetes mellitus (HCC)    Vision abnormalities    Dysphagia    Empyema (HCC)    Acute cardioembolic stroke (HCC)    Radiation esophagitis    Restless legs syndrome    Rheumatoid arthritis (HCC)    Sleep apnea    Hyperlipidemia    Hypertension    Peripheral neuropathy    Epidermoid cyst of neck    Glaucoma    Gout     Past Medical History:   Diagnosis Date    Anemia     Arthritis     Coronary artery disease     Diabetes mellitus ([facility] )     Dysphagia     Esophageal cancer (HCC)     Gout     Hyperlipidemia     Hypertension     Restless leg syndrome     Rheumatoid arthritis ([facility] )     Sleep apnea     Vision abnormalities      Past Surgical History:   Procedure Laterality Date    APPENDECTOMY      CHOLECYSTECTOMY      GASTROJEJUNOSTOMY W/ JEJUNOSTOMY TUBE N/A 8/4/2016    Procedure: INSERTION JEJUNOSTOMY TUBE OPEN;  Surgeon: Whit Taylor MD;  [facility];  Service:     KNEE ARTHROSCOPY      LIVER RESECTION N/A 10/31/2016    Procedure: INTRAOPERATIVE ULTRASOUND OF LIVER; LIVER LESION RESECTION/ABLATION ;  Surgeon: Whit Taylor MD;  [facility];  Service:    Judy Dejenny PLACEMENT      NC EGD INSERT GUIDE WIRE DILATOR PASSAGE ESOPHAGUS N/A 2/14/2018    Procedure: ESOPHAGOGASTRODUODENOSCOPY (EGD) with dilation;  Surgeon: Gin Marie MD;  [facility];  Service: Thoracic    NC ESOPHAGOGASTRODUODENOSCOPY TRANSORAL DIAGNOSTIC N/A 10/31/2016    Procedure: ESOPHAGOGASTRODUODENOSCOPY (EGD); Surgeon: Khari Guy MD;  [facility];  Service: Surgical Oncology    MT ESOPHAGOSCOPY FLEX Bryon Eugenie <30 MM DIAM N/A 1/6/2017    Procedure: DILATATION ESOPHAGEAL;  Surgeon: Myrick Kawasaki, MD;  [facility];  Service: Thoracic    MT ESOPHAGOSCOPY FLEX BALLOON DILAT <30 MM DIAM N/A 12/12/2016    Procedure: DILATATION ESOPHAGEAL, EGD;  Surgeon: Myrick Kawasaki, MD;  [facility];  Service: Thoracic    MT ESOPHAGOSCOPY FLEX BALLOON DILAT <30 MM DIAM N/A 5/24/2017    Procedure: EGD WITH DILATION ;  Surgeon: Mayra Gaytan MD;  [facility];  Service: Thoracic    MT ESOPHAGOSCOPY FLEX Bryon Eugenie <30 MM DIAM N/A 8/3/2017    Procedure: EGD WITH DILATION;  Surgeon: Myrick Kawasaki, MD;  [facility];  Service: Thoracic    MT REMOVAL [address] THORACOTOMY N/A 10/31/2016    Procedure: ESOPHAGECTOMY;  Surgeon: Khari Guy MD;  [facility];  Service: Surgical Oncology    TONSILLECTOMY       Family History   Problem Relation Age of Onset    Stroke Mother     Colon cancer Father     Cancer Sister      oral cancer     Social History     Social History    Marital status: /Civil Union     Spouse name: N/A    Number of children: N/A    Years of education: N/A     Occupational History    Not on file       Social History Main Topics    Smoking status: Former Smoker     Packs/day: 3 00     Years: 40 00     Quit date: 4/13/1991    Smokeless tobacco: Never Used      Comment: quit 20 yrs ago    Alcohol use No    Drug use: No    Sexual activity: Not Currently     Other Topics Concern    Not on file     Social History Narrative    No narrative on file       Current Outpatient Prescriptions:     Aclidinium Bromide (TUDORZA PRESSAIR) 400 MCG/ACT AEPB, Inhale 1 Act as needed, Disp: , Rfl:     albuterol (2 5 mg/3 mL) 0 083 % nebulizer solution, Take 1 ampule by nebulization every 4 (four) hours as needed for wheezing  , Disp: , Rfl:     allopurinol (ZYLOPRIM) 100 mg tablet, Take 100 mg by mouth daily, Disp: , Rfl:     amiodarone 200 mg tablet, Take 200 mg by mouth daily, Disp: , Rfl:     apixaban (ELIQUIS) 5 mg, Take 5 mg by mouth 2 (two) times a day, Disp: , Rfl:     diazepam (VALIUM) 5 mg tablet, Take 5 mg by mouth every 6 (six) hours as needed for anxiety, Disp: , Rfl:     dicyclomine (BENTYL) 10 mg capsule, Take 10 mg by mouth 4 (four) times a day (before meals and at bedtime), Disp: , Rfl:     dronabinol (MARINOL) 2 5 mg capsule, Take 1 capsule (2 5 mg total) by mouth 2 (two) times a day before meals, Disp: 30 capsule, Rfl: 3    Ferrous Sulfate (IRON) 325 (65 Fe) MG TABS, Take by mouth, Disp: , Rfl:     gabapentin (NEURONTIN) 100 mg capsule, Take by mouth, Disp: , Rfl:     guaiFENesin (ROBITUSSIN) 100 mg/5 mL syrup, Take 200 mg by mouth every 6 (six) hours as needed for cough, Disp: , Rfl:     hydrOXYzine HCL (ATARAX) 25 mg tablet, Take 25 mg by mouth 2 (two) times a day as needed for itching, Disp: , Rfl:     insulin aspart (NovoLOG) 100 units/mL injection, Inject under the skin as needed for high blood sugar, Disp: , Rfl:     latanoprost (XALATAN) 0 005 % ophthalmic solution, Administer 1 drop to both eyes daily at bedtime  , Disp: , Rfl:     LORazepam (ATIVAN) 1 mg tablet, Take 1 mg by mouth every 8 (eight) hours as needed for anxiety, Disp: , Rfl:     rOPINIRole (REQUIP) 1 mg tablet, Take 3 mg by mouth daily at bedtime  , Disp: , Rfl:     sertraline (ZOLOFT) 25 mg tablet, Take 25 mg by mouth daily at bedtime  , Disp: , Rfl:     traMADol (ULTRAM) 50 mg tablet, Take by mouth, Disp: , Rfl:     aspirin (ECOTRIN LOW STRENGTH) 81 mg EC tablet, Take 1 tablet by mouth daily for 30 days, Disp: 30 tablet, Rfl: 0    atorvastatin (LIPITOR) 80 mg tablet, Take 1 tablet by mouth daily with dinner for 30 days, Disp: 30 tablet, Rfl: 0    dexlansoprazole (DEXILANT) 60 MG capsule, Take 60 mg by mouth daily, Disp: , Rfl:     rosuvastatin (CRESTOR) 40 MG tablet, Take 40 mg by mouth daily, Disp: , Rfl:   Allergies   Allergen Reactions    Humira [Adalimumab] Rash    Levaquin [Levofloxacin In D5w] Rash    Levofloxacin Rash    Lovenox [Enoxaparin] Rash     Vitals:    04/06/18 1302   BP: 114/66   Pulse: 77   Resp: 16   Temp: 98 6 °F (37 °C)         Physical Exam   Constitutional: He is oriented to person, place, and time  He appears well-developed  HENT:   Head: Normocephalic  Eyes: Pupils are equal, round, and reactive to light  Neck: Neck supple  Cardiovascular: Normal rate and regular rhythm  No murmur heard  Pulmonary/Chest: Breath sounds normal  He has no wheezes  He has no rales  Abdominal: Soft  There is no tenderness  Musculoskeletal: Normal range of motion  He exhibits no edema or tenderness  Lymphadenopathy:     He has no cervical adenopathy  Neurological: He is alert and oriented to person, place, and time  He has normal reflexes  No cranial nerve deficit  Skin: No rash noted  No erythema  Psychiatric: He has a normal mood and affect  His behavior is normal            Performance Status: ECOG/Zubrod/WHO: 2 - Symptomatic, <50% confined to bed    Labs:  CBC, Coags, BMP, Mg, Phos     Imaging  Xr Chest Pa & Lateral    Result Date: 4/5/2018  Narrative: CHEST INDICATION:   C15 9: Malignant neoplasm of esophagus, unspecified  COMPARISON:  April 27, 2017 EXAM PERFORMED/VIEWS:  XR CHEST PA & LATERAL FINDINGS:  Right-sided line seen with tip in the SVC Cardiomediastinal silhouette appears unremarkable  The lungs are clear  No pneumothorax or pleural effusion  Osseous structures appear within normal limits for patient age   Increased as seen in relation to the lower left rib, unchanged    Impression: No acute consolidation congestion Stable chest x-ray Workstation performed: [id number]     Mri Abdomen W Wo Contrast    Result Date: 4/5/2018  Narrative: MRI - ABDOMEN - WITH AND WITHOUT CONTRAST INDICATION:  Hepatic lesion COMPARISON: February 6, 2018 TECHNIQUE:  The following pulse sequences were obtained on a 1 5 T scanner prior to and following the administration of intravenous contrast:     Coronal and axial T2 with TE of 90 and 180 respectively, axial T2 with fat saturation, axial FIESTA fat-sat,  axial T1-weighted in-and-out-of phase, axial DWI/ADC, precontrast axial T1 with fat saturation, post-contrast dynamic axial T1 with fat saturation at 20, 70, and 180 seconds, coronal T1  with fat saturation and 7 minute delayed axial T1 with fat saturation  IV Contrast:  9 mL of gadobutrol injection (MULTI-DOSE) FINDINGS: LIVER: Again noted is a lesion in the left hepatic lobe which demonstrates the marginal enhancement  In the segment 2 and 3  This measures 5 7 x 4 7 cm  On the previous study this was measuring 6 8 x 4 8 cm  The amount of necrosis in this lesion is mildly increased  However there is thick nodular enhancement noted in this lesion An additional lesion seen in the segment 4 posteriorly now measures 3 3 x 3 8 cm  On the previous study this was measuring 4 6 x 3 5 cm  Thickened nodular enhancement is noted in this lesion An additional lesion seen in the segment 4 and 2 junction in the left hepatic lobe, now measures 2 2 x 2 5 cm  On the previous study this was measuring 2 4 x 1 8 cm  An additional lesion seen in segment 5, is stable, doesn't demonstrate any significant thickening nodular enhancement  This measures 4 6 x 3 1 cm  There is visualization of a enlarging nodule located in the upper abdomen anterior to the IVC and lateral to the pancreas in the silvestre hepatis area suggest enlarging lymph node  On the previous study this was measuring 1 3 x 1 5 cm  On the current study this measures 2 5 x 2 4 cm Postsurgical changes in the liver  BILIARY TREE:  There is no intra-hepatic biliary ductal dilatation  The common bile duct is normal in caliber    There is no gross evidence of mass or choledocholithiasis  GALLBLADDER:  Surgically absent PANCREAS: The pancreas is normal in morphology and signal intensity  No cystic or solid mass is identified  The pancreatic duct is normal in caliber  ADRENAL GLANDS:  The adrenal glands are normal in morphology without thickening or focal mass  SPLEEN:  The spleen is normal in size, morphology and signal intensity  No focal mass is identified  KIDNEYS:  The kidneys enhance symmetrically and are normal in morphology  There is no hydronephrosis  No focal cystic or solid renal masses are identified  LOWER CHEST:   No gross evidence of mass or infiltrate  ABDOMINAL CAVITY: There is enlarging lymph node in the upper abdomen below the level of the silvestre hepatis  Mesenteric fat is normal in signal without evidence of stranding or edema  No mesenteric nodularity or focal mass is identified  ABDOMINAL WALL:  No mass or hernia identified  BOWEL:   Limited evaluation of the hollow viscera demonstrates no gross obstruction or mass  OSSEOUS STRUCTURES:  There is no evidence of destructive process  VASCULAR STRUCTURES:  The visualized vascular structures are patent without evidence of thrombosis or external compression  No aneurysm is identified  Impression: There is no new liver lesion  The previously noted dominant lesion in the left hepatic lobe demonstrate increasing necrosis suggest treatment response The segment 4 lesion is decreasing in size The lesion in the segment 5 cm remains stable with thin rim enhancement suggesting no viable tissue Segment 2 lesion is stable Enlarging regional lymph node in the upper abdomen just below the silvestre hepatis which measures 2 5 x 2 4 cm The study was marked in EPIC for significant notification  Workstation performed: [id number]     I reviewed the above laboratory and imaging data        Discussion/Summary:  In summary, this is a 78-year-old male 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  Additionally, he had hepatocellular carcinoma, removed  He had recurrence treated with Sir spheres  MRI yesterday showed slight decrease in size and increasing necrosis of multifocal intrahepatic disease  Lymph node in the silvestre hepatis increased, previous 1 5 cm, current 2 5 cm  The remainder of the MRI showed no metastatic disease throughout the abdomen  Chest x-ray was normal   We reviewed that he appears to have a local regional recurrence with progression in the lymph node basin  If radiation therapy could be applied to this lesion deferral of systemic treatment initiation could be accomplished  We reviewed potential treatment options for patients with [address]  including Nexavar, Michael Peppers  King Dixons is only approved for patients in the second-line setting or beyond  We reviewed potential toxicities of each of these medications as well as efficacy rates  The oral medications tend to be poorly tolerated with modest efficacy  King Dixons has modest efficacy although the stable disease rate is proximally 10-15%  This tends to be generally better tolerated with the predominant toxicities including diarrhea, eczematoid rash, hypothyroidism  Toxicities are generally reversible  Intravenous administration is required  I reviewed the above considerations at length with the patient and his wife  We await the results of upcoming radiation therapy consultation

## 2018-04-11 ENCOUNTER — APPOINTMENT (OUTPATIENT)
Dept: RADIATION ONCOLOGY | Facility: HOSPITAL | Age: 73
End: 2018-04-11
Attending: RADIOLOGY
Payer: MEDICARE

## 2018-04-11 ENCOUNTER — OFFICE VISIT (OUTPATIENT)
Dept: PULMONOLOGY | Facility: CLINIC | Age: 73
End: 2018-04-11
Payer: MEDICARE

## 2018-04-11 ENCOUNTER — RADIATION ONCOLOGY CONSULT (OUTPATIENT)
Dept: RADIATION ONCOLOGY | Facility: HOSPITAL | Age: 73
End: 2018-04-11

## 2018-04-11 VITALS
TEMPERATURE: 97 F | DIASTOLIC BLOOD PRESSURE: 78 MMHG | OXYGEN SATURATION: 97 % | BODY MASS INDEX: 30.78 KG/M2 | SYSTOLIC BLOOD PRESSURE: 120 MMHG | RESPIRATION RATE: 16 BRPM | HEART RATE: 77 BPM | WEIGHT: 215 LBS | HEIGHT: 70 IN

## 2018-04-11 VITALS
HEIGHT: 70 IN | HEART RATE: 66 BPM | BODY MASS INDEX: 30.78 KG/M2 | DIASTOLIC BLOOD PRESSURE: 70 MMHG | SYSTOLIC BLOOD PRESSURE: 110 MMHG | WEIGHT: 215 LBS | TEMPERATURE: 97.3 F | OXYGEN SATURATION: 92 %

## 2018-04-11 DIAGNOSIS — C77.2 SECONDARY MALIGNANT NEOPLASM OF INTRA-ABDOMINAL LYMPH NODES (HCC): Primary | ICD-10-CM

## 2018-04-11 DIAGNOSIS — R05.9 COUGH: ICD-10-CM

## 2018-04-11 DIAGNOSIS — C15.5 MALIGNANT NEOPLASM OF LOWER THIRD OF ESOPHAGUS (HCC): ICD-10-CM

## 2018-04-11 DIAGNOSIS — C22.0 HEPATOCELLULAR CARCINOMA (HCC): ICD-10-CM

## 2018-04-11 PROBLEM — J86.9 EMPYEMA (HCC): Status: RESOLVED | Noted: 2017-03-25 | Resolved: 2018-04-11

## 2018-04-11 PROBLEM — J44.9 MODERATE COPD (CHRONIC OBSTRUCTIVE PULMONARY DISEASE) (HCC): Status: ACTIVE | Noted: 2018-04-11

## 2018-04-11 PROCEDURE — 99213 OFFICE O/P EST LOW 20 MIN: CPT | Performed by: RADIOLOGY

## 2018-04-11 PROCEDURE — 94010 BREATHING CAPACITY TEST: CPT | Performed by: INTERNAL MEDICINE

## 2018-04-11 PROCEDURE — 99205 OFFICE O/P NEW HI 60 MIN: CPT | Performed by: INTERNAL MEDICINE

## 2018-04-11 RX ORDER — HYDROXYZINE HYDROCHLORIDE 25 MG/1
25 TABLET, FILM COATED ORAL 2 TIMES DAILY PRN
COMMUNITY

## 2018-04-11 RX ORDER — TRAMADOL HYDROCHLORIDE 50 MG/1
50 TABLET ORAL EVERY 6 HOURS PRN
COMMUNITY
End: 2018-12-18

## 2018-04-11 RX ORDER — DIPHENHYDRAMINE HCL 25 MG
25 CAPSULE ORAL DAILY
COMMUNITY
End: 2019-02-19 | Stop reason: HOSPADM

## 2018-04-11 RX ORDER — GUAIFENESIN AND CODEINE PHOSPHATE 100; 10 MG/5ML; MG/5ML
5 SOLUTION ORAL 3 TIMES DAILY PRN
Qty: 120 ML | Refills: 0 | Status: SHIPPED | OUTPATIENT
Start: 2018-04-11 | End: 2018-09-20

## 2018-04-11 NOTE — ASSESSMENT & PLAN NOTE
The patient has untreated severe COPD which may be contributing to his "chronic cough "  It is also likely contributing to his dyspnea on exertion  I have asked him to start using Anoro 1 puff once daily  He will use his albuterol nebulizer 2 to 3 times a day depending on the severity of his cough  I also provided him with codeine cough syrup to use at night  At this time we will hold off on pulmonary rehabilitation  He states that he has been walking at home and has a gym membership and will increase his activity level on his own at this time  He is up-to-date on immunizations  I will hold off on repeat CT scan of the chest as he had images in October that showed no significant changes  Most recent chest x-ray again showed no focal infiltrate / mass lesion

## 2018-04-11 NOTE — ASSESSMENT & PLAN NOTE
In addition to untreated COPD, I believe he strong component of his cough is related to dysphagia/reflux status post esophagectomy with recurrent stricture  We discussed the importance of not reclining / lying down within 2-4 hours of eating  He should eat smaller meals multiple times throughout the day instead of a large meal   He will follow regularly with thoracic surgery in regards to dilation of stricture

## 2018-04-11 NOTE — PROGRESS NOTES
Assessment:    Stage 3 severe COPD by GOLD classification ([facility] [address] )    The patient has untreated severe COPD which may be contributing to his "chronic cough "  It is also likely contributing to his dyspnea on exertion  I have asked him to start using Anoro 1 puff once daily  He will use his albuterol nebulizer 2 to 3 times a day depending on the severity of his cough  I also provided him with codeine cough syrup to use at night  At this time we will hold off on pulmonary rehabilitation  He states that he has been walking at home and has a gym membership and will increase his activity level on his own at this time  He is up-to-date on immunizations  I will hold off on repeat CT scan of the chest as he had images in October that showed no significant changes  Most recent chest x-ray again showed no focal infiltrate / mass lesion  Cough    In addition to untreated COPD, I believe he strong component of his cough is related to dysphagia/reflux status post esophagectomy with recurrent stricture  We discussed the importance of not reclining / lying down within 2-4 hours of eating  He should eat smaller meals multiple times throughout the day instead of a large meal   He will follow regularly with thoracic surgery in regards to dilation of stricture  Plan:      Diagnoses and all orders for this visit:    Cough  -     POCT spirometry  -     Umeclidinium-Vilanterol (ANORO ELLIPTA) 62 5-25 MCG/INH AEPB; Inhale 1 puff daily  -     guaifenesin-codeine (GUAIFENESIN AC) 100-10 MG/5ML liquid; Take 5 mL by mouth 3 (three) times a day as needed for cough    Other orders  -     Cancel: Tiotropium Bromide-Olodaterol 2 5-2 5 MCG/ACT AERS; Inhale 2 puffs daily        Follow-up:   2 months      HPI:  The patient reports a cough for at least one year       It gets bad and he will take an antibiotics for a week and it will calm down  He coughs all day long - every 14-15 times he will expectorate salmon colored mucous  No blood  The cough is continuous and it wakes hyim up at night  He then sleeps in the recliner but he still coughs  He has shortness of breath with exertion, like climbing stairs  He does limited cardiovascular exercise  No chest pain  He wheezes a lot  He feels like his lungs just can't expand  He was told that he has chronic bronchitis by someone a long time ago  He has been on inhalers but he is unsure if it helped  He uses a nebulizer 3-5 times a day  It helps to bring out the mucous  It works less effective now than it did in the past   No mucinex in the past   Robitussin didn't help  The cough syrup with codeine helps a lot  He had an esophagectomy Oct 2016  After the procedure he had a right sided pleural effusion giving him severe dyspnea and hypoxia  He had a thoracentesis at [place] and was transferred to [facility] with a chest tube for 1 week for empyema  No recurrence of fluid  He does have significant dysphagia since an esophagectomy  He states that he gets recurrent strictures and can feel a difference in his swallowing now even just 3 weeks after most recent dilation  He eats throughout the day and immediatly before bedtime  He has valium but he only takes that for MRIs  Not using that for sleep  He has sleep apnea but he felt that it was too powerful and he doesn't use the CPAP at all  Review of Systems   Constitutional: Negative for diaphoresis, fatigue, fever and unexpected weight change  HENT: Positive for congestion  Negative for hearing loss, postnasal drip, sore throat and voice change  Eyes: Negative for visual disturbance  Respiratory: Positive for cough, chest tightness and shortness of breath  Negative for apnea and wheezing  Cardiovascular: Negative for chest pain, palpitations and leg swelling           dysphagia   Gastrointestinal: Negative for abdominal distention, abdominal pain, blood in stool, constipation, diarrhea, nausea and vomiting  Endocrine: Negative for polyuria  Genitourinary: Negative for dysuria  Musculoskeletal: Positive for arthralgias and gait problem  Skin: Negative for rash  Allergic/Immunologic: Negative for environmental allergies  Neurological: Negative for dizziness, tremors and numbness  Hematological: Does not bruise/bleed easily  Psychiatric/Behavioral: Positive for sleep disturbance  The patient is not nervous/anxious  Historical Information   Past Medical History:   Diagnosis Date    Anemia     Arthritis     Coronary artery disease     Diabetes mellitus ([facility] )     Dysphagia     Esophageal cancer (HCC)     Gout     Hyperlipidemia     Hypertension     Restless leg syndrome     Rheumatoid arthritis ([facility] )     Sleep apnea     Vision abnormalities      Past Surgical History:   Procedure Laterality Date    APPENDECTOMY      CHOLECYSTECTOMY      GASTROJEJUNOSTOMY W/ JEJUNOSTOMY TUBE N/A 8/4/2016    Procedure: INSERTION JEJUNOSTOMY TUBE OPEN;  Surgeon: Flory Herrera MD;  [facility];  Service:     KNEE ARTHROSCOPY      LIVER RESECTION N/A 10/31/2016    Procedure: INTRAOPERATIVE ULTRASOUND OF LIVER; LIVER LESION RESECTION/ABLATION ;  Surgeon: Flory Herrera MD;  [facility];  Service:    Lisa Certain PLACEMENT      AK EGD INSERT GUIDE WIRE DILATOR PASSAGE ESOPHAGUS N/A 2/14/2018    Procedure: ESOPHAGOGASTRODUODENOSCOPY (EGD) with dilation;  Surgeon: Rohith Hu MD;  [facility];  Service: Thoracic    AK ESOPHAGOGASTRODUODENOSCOPY TRANSORAL DIAGNOSTIC N/A 10/31/2016    Procedure: ESOPHAGOGASTRODUODENOSCOPY (EGD);   Surgeon: Flory Herrera MD;  [facility];  Service: Surgical Oncology    AK ESOPHAGOSCOPY FLEX Calleen Brenda <30 MM DIAM N/A 1/6/2017    Procedure: DILATATION ESOPHAGEAL;  Surgeon: Rohith Hu MD;  [facility];  Service: Thoracic    AK ESOPHAGOSCOPY FLEX BALLOON DILAT <30 MM DIAM N/A 12/12/2016    Procedure: DILATATION ESOPHAGEAL, EGD;  Surgeon: Mounika Frederick MD;  [facility];  Service: Thoracic    MT ESOPHAGOSCOPY FLEX BALLOON DILAT <30 MM DIAM N/A 5/24/2017    Procedure: EGD WITH DILATION ;  Surgeon: Linda Montalvo MD;  [facility];  Service: Thoracic    MT ESOPHAGOSCOPY FLEX BALLOON DILAT <30 MM DIAM N/A 8/3/2017    Procedure: EGD WITH DILATION;  Surgeon: Mounika Frederick MD;  [facility];  Service: Thoracic    MT REMOVAL Josemanuel Mcgrath [address] THORACOTOMY N/A 10/31/2016    Procedure: ESOPHAGECTOMY;  Surgeon: Andi Engle MD;  [facility];  Service: Surgical Oncology    TONSILLECTOMY       Social History   History   Alcohol Use No     History   Smoking Status    Former Smoker    Packs/day: 3 00    Years: 40 00    Types: Cigarettes    Quit date: 4/13/1991   Smokeless Tobacco    Never Used       Family History:   Family History   Problem Relation Age of Onset    Stroke Mother     Colon cancer Father     Other Father      Pneumoconiosis    Cancer Sister      oral cancer       Medications: The patient's active and prehospital medications were reviewed  VITALS:  Vitals:    04/11/18 1225   BP: 110/70   BP Location: Right arm   Patient Position: Sitting   Pulse: 66   Temp: (!) 97 3 °F (36 3 °C)   TempSrc: Tympanic   SpO2: 92%   Weight: 97 5 kg (215 lb)   Height: 5' 10" (1 778 m)       Physical Exam   Constitutional: He is oriented to person, place, and time  He appears well-developed and well-nourished  No distress  HENT:   Head: Normocephalic and atraumatic  Eyes: Pupils are equal, round, and reactive to light  Right eye exhibits no discharge  No scleral icterus  Neck: Normal range of motion  Neck supple  No JVD present  Cardiovascular: Normal rate, regular rhythm and normal heart sounds  Exam reveals no gallop and no friction rub  No murmur heard  Pulmonary/Chest: Effort normal  No stridor  No respiratory distress  He has no wheezes  He has no rales  Decreased breath sounds in all lung fields   Abdominal: Soft  Bowel sounds are normal  There is no tenderness  Musculoskeletal: Normal range of motion  He exhibits no edema or deformity  Neurological: He is alert and oriented to person, place, and time  Skin: Skin is warm and dry  No rash noted  He is not diaphoretic  Psychiatric: He has a normal mood and affect  His behavior is normal    Vitals reviewed  PFT results:   in office spirometry 04/11/2018:    Spirometry:  FEV1/FVC Ratio is 45  FEV1 is 1 21L which is 40% predicted  FVC is 2 69L which is 64% predicted  There is no significant postbronchodilator improvement in FEV1 or FVC  Flow volume loop:    Obstructive pattern    IMPRESSION:   severe obstructive lung disease with decreased forced vital capacity which may be related to air trapping or restrictive lung disease    Diagnostic Data:  CBC:   Lab Results   Component Value Date    WBC 7 69 12/13/2017    HGB 14 9 12/13/2017    HCT 45 6 12/13/2017    MCV 92 12/13/2017     (L) 12/13/2017         CMP:   Lab Results   Component Value Date     12/13/2017    K 5 6 (H) 12/13/2017     12/13/2017    CO2 34 (H) 12/13/2017    ANIONGAP 3 (L) 12/13/2017    BUN 16 12/13/2017    CREATININE 0 82 12/13/2017    GLUCOSE 165 (H) 12/13/2017    GLUF 165 (H) 12/13/2017    CALCIUM 8 6 12/13/2017    AST 43 12/13/2017    ALT 30 12/13/2017    ALKPHOS 141 (H) 12/13/2017    PROT 7 6 12/13/2017    BILITOT 1 07 (H) 12/13/2017    EGFR 87 12/13/2017       Imaging:   chest x-ray 04/05/2018:  No acute consolidations / pneumonia  CT chest October 2017:  LUNGS:  Scattered miniscule pulmonary nodules, all less than 3 mm in size noted without interval change  There is no new or enlarging pulmonary nodule      PLEURA:  Right posterior pleural thickening  On the prior study, and air-fluid collection was present posteriorly    There is complete resolution of the air and no progression of the fluid component      Ck Drop, DO

## 2018-04-11 NOTE — PROGRESS NOTES
Jaylan Smith    No diagnosis found  Esophageal cancer ([facility] [address] )    Staging form: Esophagus - Adenocarcinoma, AJCC 7th Edition    - Pathologic stage from 10/31/2016: Stage IIIA (yT3, N1, cM0, G2) - Unsigned    Hepatocellular carcinoma (HCC)    Staging form: Liver (Excluding Intrahepatic Bile Ducts), AJCC 7th Edition    - Clinical: Stage Unknown (rT2, NX, M0) - Unsigned  Oncology History    68-year-old male status post esophagectomy and liver resection 10/31/16 for esophageal cancer and hepatocellular carcinoma in 2016  Treatment included a course of radiation to the esophagus to a total dose of 4680 cGy with treatments given from 6/27/16 to 8/3/2016  Radiation therapy was administered at [facility] by Dr Bety Watson  Concurrent Taxol and Carboplatin was under the care of Dr Tin Huynh at [facility]  CT scan of the abdomen and MRI of the abdomen in October 2017 revealed a new lesion in the left lobe of the liver measuring 4 1 x 2 7 cm with 1-2 additional lesions  CT-guided biopsy liver lesion was positive for hepatocellular carcinoma on October 11, 2017  Recommendations were made for Sir sphere radiation therapy to the whole liver which was given on December 13, 2017  The patient returns today for follow-up examination  reports he is feeling well and is having no abdominal complaints  He denies any pain  He is still having occasional nausea with some dysphagia and is considering having a repeat esophageal dilation performed  He had repeat blood work including liver function studies performed January 19, 2017 at [facility] but results are not available today  We will call him with these results  He has MRI of the abdomen and follow-up appointment with Dr Gee Arauz on the same day February 6, 2018  Hopefully, this will show good results and response to treatment  Assuming MRI shows favorable response, then we would recommend repeat MRI again in 3 months   The patient states he will be following with Dr Terri Brittle every 3 months  It takes about 1 5 hours to come here from [place], [place] and we will therefore see him here on a p r n  basis  Should he have evidence of progressive disease the future then, we would be happy to re-evaluate him for consideration of additional Sir spheres to the liver  The patient is wife were in agreement with the above plan and they know they can call any time if they have any questions  2/14/18 Had EGD, dialtion of the esophagus  There was a mild stricture at the site of anastomosis  4/5/18 Had MRI of the abd: The previously noted dominant lesion in the left hepatic lobe demonstrate increasing necrosis suggest treatment response The segment 4 lesion is decreasing in size The lesion in the segment 5 cm remains stable with thin rim enhancement suggesting no viable tissue Segment 2 lesion is stable Enlarging regional lymph node in the upper abdomen just below the silvestre hepatis which measures 2 5 x 2 4 cm    Pt saw both surg Onc, and Med onc  On 4/6/18 Pt met with Dr Tricia Girard, who discussed, If radiation therapy could be applied to this lesion deferral of systemic treatment initiation could be accomplished  Systemic treatment options: potential treatment options for patients with [address]  including [employer] lema  Pt had previous esophagus EBRT,8/2016  at [facility] in [place]  Treatment recoreds available in [address]           Esophageal cancer ([address] )    4/27/2016 Initial Diagnosis     Esophageal cancer ([address] )         6/27/2016 - 7/26/2016 Chemotherapy     Taxol 50 + Carbo AUC 2         6/27/2016 - 8/3/2016 Radiation     Concurrent RT Esophagus 4,680 cGy   [facility]         10/31/2016 Surgery     Esophagectomy           Hepatocellular carcinoma ([address] )    10/31/2016 Initial Diagnosis     Cancer, hepatocellular ([address] )       10/31/2016 Surgery     Resection/ablation of liver - segments 5/6         10/11/2017 Biopsy     Local recurrence 12/13/2017 -  Radiation     SIRSphere treatment  Treatment site:  whole liver  Whole liver Dose:  Prescribed: 1 66 GBq, Delivered: 1 61GBq  (Right lobe Dose:   Prescribed: 1 06 GBq,  Delivered: 1 03 GBq)  (Left lobe Dose:   Prescribed: 0 60 GBq,  Delivered: 0 58 GBq)            Interval History:***    GYN History:***    Patient Active Problem List   Diagnosis    Esophageal cancer ([facility] [address] )    Hepatocellular carcinoma ([facility] [address] )    Atrial fibrillation ([facility] [address] )    Acute blood loss anemia    Hypokalemia    Hypomagnesemia    Hypotension    Type 2 diabetes mellitus (HCC)    Vision abnormalities    Dysphagia    Empyema (HCC)    Acute cardioembolic stroke (HCC)    Radiation esophagitis    Restless legs syndrome    Rheumatoid arthritis (HCC)    Sleep apnea    Hyperlipidemia    Hypertension    Peripheral neuropathy    Epidermoid cyst of neck    Glaucoma    Gout    Incisional hernia    Cough     Past Medical History:   Diagnosis Date    Anemia     Arthritis     Coronary artery disease     Diabetes mellitus ([facility] [address] )     Dysphagia     Esophageal cancer (HCC)     Gout     Hyperlipidemia     Hypertension     Restless leg syndrome     Rheumatoid arthritis ([facility] [address] )     Sleep apnea     Vision abnormalities      Past Surgical History:   Procedure Laterality Date    APPENDECTOMY      CHOLECYSTECTOMY      GASTROJEJUNOSTOMY W/ JEJUNOSTOMY TUBE N/A 8/4/2016    Procedure: INSERTION JEJUNOSTOMY TUBE OPEN;  Surgeon: Raul Cuellar MD;  [facility];  Service:     KNEE ARTHROSCOPY      LIVER RESECTION N/A 10/31/2016    Procedure: INTRAOPERATIVE ULTRASOUND OF LIVER; LIVER LESION RESECTION/ABLATION ;  Surgeon: Raul Cuellar MD;  [facility];  Service:    Wright Crews PLACEMENT      MD EGD INSERT GUIDE WIRE DILATOR PASSAGE ESOPHAGUS N/A 2/14/2018    Procedure: ESOPHAGOGASTRODUODENOSCOPY (EGD) with dilation;  Surgeon: Saroj Holden MD;  [facility];  Service: Thoracic    MD ESOPHAGOGASTRODUODENOSCOPY TRANSORAL DIAGNOSTIC N/A 10/31/2016    Procedure: ESOPHAGOGASTRODUODENOSCOPY (EGD);   Surgeon: Khari Guy MD;  [facility];  Service: Surgical Oncology    NM ESOPHAGOSCOPY FLEX Bryon Eugenie <30 MM DIAM N/A 1/6/2017    Procedure: DILATATION ESOPHAGEAL;  Surgeon: Myrick Kawasaki, MD;  [facility];  Service: Thoracic    NM ESOPHAGOSCOPY FLEX BALLOON DILAT <30 MM DIAM N/A 12/12/2016    Procedure: DILATATION ESOPHAGEAL, EGD;  Surgeon: Myrick Kawasaki, MD;  [facility];  Service: Thoracic    NM ESOPHAGOSCOPY FLEX BALLOON DILAT <30 MM DIAM N/A 5/24/2017    Procedure: EGD WITH DILATION ;  Surgeon: Mayra Gaytan MD;  [facility];  Service: Thoracic    NM ESOPHAGOSCOPY FLEX Bryon Eugenie <30 MM DIAM N/A 8/3/2017    Procedure: EGD WITH DILATION;  Surgeon: Myrick Kawasaki, MD;  [facility];  Service: Thoracic    NM REMOVAL [address] THORACOTOMY N/A 10/31/2016    Procedure: ESOPHAGECTOMY;  Surgeon: Khari Guy MD;  [facility];  Service: Surgical Oncology    TONSILLECTOMY       Family History   Problem Relation Age of Onset    Stroke Mother     Colon cancer Father     Other Father      Pneumoconiosis    Cancer Sister      oral cancer     Social History     Social History    Marital status: /Civil Union     Spouse name: N/A    Number of children: N/A    Years of education: N/A     Occupational History          Social History Main Topics    Smoking status: Former Smoker     Packs/day: 3 00     Years: 40 00     Types: Cigarettes     Quit date: 4/13/1991    Smokeless tobacco: Never Used    Alcohol use No    Drug use: No    Sexual activity: Not Currently     Other Topics Concern    Not on file     Social History Narrative    No narrative on file       Current Outpatient Prescriptions:     albuterol (2 5 mg/3 mL) 0 083 % nebulizer solution, Take 1 ampule by nebulization every 4 (four) hours as needed for wheezing  , Disp: , Rfl:     allopurinol (ZYLOPRIM) 100 mg tablet, Take 100 mg by mouth daily, Disp: , Rfl:     amiodarone 200 mg tablet, Take 200 mg by mouth daily, Disp: , Rfl:     apixaban (ELIQUIS) 5 mg, Take 5 mg by mouth 2 (two) times a day, Disp: , Rfl:     dexlansoprazole (DEXILANT) 60 MG capsule, Take 60 mg by mouth daily, Disp: , Rfl:     diazepam (VALIUM) 5 mg tablet, Take 5 mg by mouth every 6 (six) hours as needed for anxiety, Disp: , Rfl:     diphenhydrAMINE (BENADRYL) 25 mg capsule, Take 25 mg by mouth daily, Disp: , Rfl:     gabapentin (NEURONTIN) 100 mg capsule, Take by mouth, Disp: , Rfl:     hydrOXYzine HCL (ATARAX) 25 mg tablet, Take 25 mg by mouth 2 (two) times a day as needed for itching, Disp: , Rfl:     insulin aspart (NovoLOG) 100 units/mL injection, Inject under the skin as needed for high blood sugar, Disp: , Rfl:     latanoprost (XALATAN) 0 005 % ophthalmic solution, Administer 1 drop to both eyes daily at bedtime  , Disp: , Rfl:     LORazepam (ATIVAN) 1 mg tablet, Take 1 mg by mouth every 8 (eight) hours as needed for anxiety, Disp: , Rfl:     rOPINIRole (REQUIP) 1 mg tablet, Take 3 mg by mouth daily at bedtime  , Disp: , Rfl:     sertraline (ZOLOFT) 25 mg tablet, Take 25 mg by mouth daily at bedtime  , Disp: , Rfl:     traMADol (ULTRAM) 50 mg tablet, Take 50 mg by mouth every 6 (six) hours as needed for moderate pain, Disp: , Rfl:     atorvastatin (LIPITOR) 80 mg tablet, Take 1 tablet by mouth daily with dinner for 30 days, Disp: 30 tablet, Rfl: 0  Allergies   Allergen Reactions    Humira [Adalimumab] Rash    Levaquin [Levofloxacin In D5w] Rash    Levofloxacin Rash    Lovenox [Enoxaparin] Rash       Review of Systems:  Review of Systems    Vitals:    04/11/18 1427   BP: 120/78   BP Location: Left arm   Pulse: 77   Resp: 16   Temp: (!) 97 °F (36 1 °C)   SpO2: 97%   Weight: 97 5 kg (215 lb)   Height: 5' 10" (1 778 m)       Imaging:Xr Chest Pa & Lateral    Result Date: 4/5/2018  Narrative: CHEST INDICATION:   C15 9: Malignant neoplasm of esophagus, unspecified  COMPARISON:  April 27, 2017 EXAM PERFORMED/VIEWS:  XR CHEST PA & LATERAL FINDINGS:  Right-sided line seen with tip in the SVC Cardiomediastinal silhouette appears unremarkable  The lungs are clear  No pneumothorax or pleural effusion  Osseous structures appear within normal limits for patient age  Increased as seen in relation to the lower left rib, unchanged    Impression: No acute consolidation congestion Stable chest x-ray Workstation performed: [id number]     Mri Abdomen W Wo Contrast    Result Date: 4/5/2018  Narrative: MRI - ABDOMEN - WITH AND WITHOUT CONTRAST INDICATION:  Hepatic lesion COMPARISON: February 6, 2018 TECHNIQUE:  The following pulse sequences were obtained on a 1 5 T scanner prior to and following the administration of intravenous contrast:     Coronal and axial T2 with TE of 90 and 180 respectively, axial T2 with fat saturation, axial FIESTA fat-sat,  axial T1-weighted in-and-out-of phase, axial DWI/ADC, precontrast axial T1 with fat saturation, post-contrast dynamic axial T1 with fat saturation at 20, 70, and 180 seconds, coronal T1  with fat saturation and 7 minute delayed axial T1 with fat saturation  IV Contrast:  9 mL of gadobutrol injection (MULTI-DOSE) FINDINGS: LIVER: Again noted is a lesion in the left hepatic lobe which demonstrates the marginal enhancement  In the segment 2 and 3  This measures 5 7 x 4 7 cm  On the previous study this was measuring 6 8 x 4 8 cm  The amount of necrosis in this lesion is mildly increased  However there is thick nodular enhancement noted in this lesion An additional lesion seen in the segment 4 posteriorly now measures 3 3 x 3 8 cm  On the previous study this was measuring 4 6 x 3 5 cm    Thickened nodular enhancement is noted in this lesion An additional lesion seen in the segment 4 and 2 junction in the left hepatic lobe, now measures 2 2 x 2 5 cm  On the previous study this was measuring 2 4 x 1 8 cm  An additional lesion seen in segment 5, is stable, doesn't demonstrate any significant thickening nodular enhancement  This measures 4 6 x 3 1 cm  There is visualization of a enlarging nodule located in the upper abdomen anterior to the IVC and lateral to the pancreas in the silvestre hepatis area suggest enlarging lymph node  On the previous study this was measuring 1 3 x 1 5 cm  On the current study this measures 2 5 x 2 4 cm Postsurgical changes in the liver  BILIARY TREE:  There is no intra-hepatic biliary ductal dilatation  The common bile duct is normal in caliber  There is no gross evidence of mass or choledocholithiasis  GALLBLADDER:  Surgically absent PANCREAS: The pancreas is normal in morphology and signal intensity  No cystic or solid mass is identified  The pancreatic duct is normal in caliber  ADRENAL GLANDS:  The adrenal glands are normal in morphology without thickening or focal mass  SPLEEN:  The spleen is normal in size, morphology and signal intensity  No focal mass is identified  KIDNEYS:  The kidneys enhance symmetrically and are normal in morphology  There is no hydronephrosis  No focal cystic or solid renal masses are identified  LOWER CHEST:   No gross evidence of mass or infiltrate  ABDOMINAL CAVITY: There is enlarging lymph node in the upper abdomen below the level of the silvestre hepatis  Mesenteric fat is normal in signal without evidence of stranding or edema  No mesenteric nodularity or focal mass is identified  ABDOMINAL WALL:  No mass or hernia identified  BOWEL:   Limited evaluation of the hollow viscera demonstrates no gross obstruction or mass  OSSEOUS STRUCTURES:  There is no evidence of destructive process  VASCULAR STRUCTURES:  The visualized vascular structures are patent without evidence of thrombosis or external compression  No aneurysm is identified       Impression: There is no new liver lesion  The previously noted dominant lesion in the left hepatic lobe demonstrate increasing necrosis suggest treatment response The segment 4 lesion is decreasing in size The lesion in the segment 5 cm remains stable with thin rim enhancement suggesting no viable tissue Segment 2 lesion is stable Enlarging regional lymph node in the upper abdomen just below the silvestre hepatis which measures 2 5 x 2 4 cm The study was marked in EPIC for significant notification    Workstation performed: [id number]       Teaching:***

## 2018-04-11 NOTE — PROGRESS NOTES
Karyle [place]ington  1945  Mr Natty Brooks is a 74 y o  male    Chief Complaint   Patient presents with    Consult       Esophageal cancer ([facility] [address] )    Staging form: Esophagus - Adenocarcinoma, AJCC 7th Edition    - Pathologic stage from 10/31/2016: Stage IIIA (yT3, N1, cM0, G2) - Unsigned    Hepatocellular carcinoma ([facility] [address] )    Staging form: Liver (Excluding Intrahepatic Bile Ducts), AJCC 7th Edition    - Clinical: Stage Unknown (rT2, NX, M0) - Unsigned    Oncology History    67-year-old male status post esophagectomy and liver resection 10/31/16 for esophageal cancer and hepatocellular carcinoma in 2016  Treatment included a course of radiation to the esophagus to a total dose of 4680 cGy with treatments given from 6/27/16 to 8/3/2016  Radiation therapy was administered at [facility] by Dr Cindy Cage  Concurrent Taxol and Carboplatin was under the care of Dr Joshua Rogers at [facility]  CT scan of the abdomen and MRI of the abdomen in October 2017 revealed a new lesion in the left lobe of the liver measuring 4 1 x 2 7 cm with 1-2 additional lesions  CT-guided biopsy liver lesion was positive for hepatocellular carcinoma on October 11, 2017  Recommendations were made for Sir sphere radiation therapy to the whole liver which was given on December 13, 2017  The patient returns today for follow-up examination  reports he is feeling well and is having no abdominal complaints  He denies any pain  He is still having occasional nausea with some dysphagia and is considering having a repeat esophageal dilation performed  He had repeat blood work including liver function studies performed January 19, 2017 at [facility] but results are not available today  We will call him with these results  He has MRI of the abdomen and follow-up appointment with Dr Yamilet Garcia on the same day February 6, 2018  Hopefully, this will show good results and response to treatment   Assuming MRI shows favorable response, then we would recommend repeat MRI again in 3 months  The patient states he will be following with Dr Loan Saurez every 3 months  It takes about 1 5 hours to come here from [place], [place] and we will therefore see him here on a p r n  basis  Should he have evidence of progressive disease the future then, we would be happy to re-evaluate him for consideration of additional Sir spheres to the liver  The patient is wife were in agreement with the above plan and they know they can call any time if they have any questions  2/14/18 Had EGD, dialtion of the esophagus  There was a mild stricture at the site of anastomosis  4/5/18 Had MRI of the abd: The previously noted dominant lesion in the left hepatic lobe demonstrate increasing necrosis suggest treatment response The segment 4 lesion is decreasing in size The lesion in the segment 5 cm remains stable with thin rim enhancement suggesting no viable tissue Segment 2 lesion is stable Enlarging regional lymph node in the upper abdomen just below the silvestre hepatis which measures 2 5 x 2 4 cm    Pt saw both surg Onc, and Med onc  On 4/6/18 Pt met with Dr Blake Morris, who discussed, If radiation therapy could be applied to this lesion deferral of systemic treatment initiation could be accomplished  Systemic treatment options: potential treatment options for patients with [address]  including Giuliana Guzmán  Pt had previous esophagus EBRT,8/2016  at [facility] in [place]  Treatment recoreds available in [address]           Esophageal cancer ([facility] [address] )    4/27/2016 Initial Diagnosis     Esophageal cancer ([facility] [address] )         6/27/2016 - 7/26/2016 Chemotherapy     Taxol 50 + Carbo AUC 2         6/27/2016 - 8/3/2016 Radiation     Concurrent [place]ophagus 4,680 [place]Gy   [place] [place]         10/31/2016 Surgery     Esophagectomy           Hepatocellular carcinoma ([address] )    10/31/2016 Initial Diagnosis     Cancer, hepatocellular ([address] )       10/31/2016 Surgery Resection/ablation of liver - segments 5/6         10/11/2017 Biopsy     Local recurrence         12/13/2017 -  Radiation     SIRSphere treatment  Treatment site:  whole liver  Whole liver Dose:  Prescribed: 1 66 GBq, Delivered: 1 61GBq  (Right lobe Dose:   Prescribed: 1 06 GBq,  Delivered: 1 03 GBq)  (Left lobe Dose:   Prescribed: 0 60 GBq,  Delivered: 0 58 GBq)                Health Maintenance   Topic Date Due    COLONOSCOPY  1945    Diabetic Foot Exam  03/18/1953    OPHTHALMOLOGY EXAM  03/18/1953    URINE MICROALBUMIN  03/18/1953    Depression Screening PHQ-9  03/18/1955    DTaP,Tdap,and Td Vaccines (1 - Tdap) 03/18/1964    Fall Risk  03/18/2008    ABDOMINAL AORTIC ANEURYSM (AAA) SCREEN  03/18/2008    PNEUMOCOCCAL POLYSACCHARIDE VACCINE AGE 72 AND OVER  03/18/2008    GLAUCOMA SCREENING 67+ YR  03/18/2010    INFLUENZA VACCINE  09/01/2017       Patient Active Problem List   Diagnosis    Esophageal cancer ([facility] [address] )    Hepatocellular carcinoma ([facility] [address] )    Atrial fibrillation ([facility] [address] )    Acute blood loss anemia    Hypokalemia    Hypomagnesemia    Hypotension    Type 2 diabetes mellitus (HCC)    Vision abnormalities    Dysphagia    Empyema (HCC)    Acute cardioembolic stroke (HCC)    Radiation esophagitis    Restless legs syndrome    Rheumatoid arthritis (HCC)    Sleep apnea    Hyperlipidemia    Hypertension    Peripheral neuropathy    Epidermoid cyst of neck    Glaucoma    Gout    Incisional hernia    Cough     Past Medical History:   Diagnosis Date    Anemia     Arthritis     Coronary artery disease     Diabetes mellitus ([address] )     Dysphagia     Esophageal cancer (HCC)     Gout     Hyperlipidemia     Hypertension     Restless leg syndrome     Rheumatoid arthritis ([facility] [address] )     Sleep apnea     Vision abnormalities      Past Surgical History:   Procedure Laterality Date    APPENDECTOMY      CHOLECYSTECTOMY      GASTROJEJUNOSTOMY W/ JEJUNOSTOMY TUBE N/A 8/4/2016    Procedure: INSERTION JEJUNOSTOMY TUBE OPEN;  Surgeon: Jim Huerta MD;  [facility];  Service:     KNEE ARTHROSCOPY      LIVER RESECTION N/A 10/31/2016    Procedure: INTRAOPERATIVE ULTRASOUND OF LIVER; LIVER LESION RESECTION/ABLATION ;  Surgeon: Jim Huerta MD;  [facility];  Service:    Moisés Palma PORTACATH PLACEMENT      WA EGD INSERT GUIDE WIRE DILATOR PASSAGE ESOPHAGUS N/A 2/14/2018    Procedure: ESOPHAGOGASTRODUODENOSCOPY (EGD) with dilation;  Surgeon: Katie Quarles MD;  [facility];  Service: Thoracic    WA ESOPHAGOGASTRODUODENOSCOPY TRANSORAL DIAGNOSTIC N/A 10/31/2016    Procedure: ESOPHAGOGASTRODUODENOSCOPY (EGD);   Surgeon: Jim Huerta MD;  [facility];  Service: Surgical Oncology    WA ESOPHAGOSCOPY FLEX Malika Abel <30 MM DIAM N/A 1/6/2017    Procedure: DILATATION ESOPHAGEAL;  Surgeon: Katie Quarles MD;  [facility];  Service: Thoracic    WA ESOPHAGOSCOPY FLEX BALLOON DILAT <30 MM DIAM N/A 12/12/2016    Procedure: DILATATION ESOPHAGEAL, EGD;  Surgeon: Katie Quarles MD;  [facility];  Service: Thoracic    WA ESOPHAGOSCOPY FLEX BALLOON DILAT <30 MM DIAM N/A 5/24/2017    Procedure: EGD WITH DILATION ;  Surgeon: Abdirahman Modi MD;  [facility];  Service: Thoracic    WA ESOPHAGOSCOPY FLEX Malika Abel <30 MM DIAM N/A 8/3/2017    Procedure: EGD WITH DILATION;  Surgeon: Katie Quarles MD;  [facility];  Service: Thoracic    WA REMOVAL [address] THORACOTOMY N/A 10/31/2016    Procedure: ESOPHAGECTOMY;  Surgeon: Jim Huerta MD;  [facility];  Service: Surgical Oncology    TONSILLECTOMY       Family History   Problem Relation Age of Onset    Stroke Mother     Colon cancer Father     Other Father      Pneumoconiosis    Cancer Sister      oral cancer     Social History     Social History    Marital status: /Civil Union     Spouse name: N/A    Number of children: N/A    Years of education: N/A     Occupational History          Social History Main Topics    Smoking status: Former Smoker     Packs/day: 3 00     Years: 40 00     Types: Cigarettes     Quit date: 4/13/1991    Smokeless tobacco: Never Used    Alcohol use No    Drug use: No    Sexual activity: Not Currently     Other Topics Concern    Not on file     Social History Narrative    No narrative on file       Current Outpatient Prescriptions:     albuterol (2 5 mg/3 mL) 0 083 % nebulizer solution, Take 1 ampule by nebulization every 4 (four) hours as needed for wheezing  , Disp: , Rfl:     allopurinol (ZYLOPRIM) 100 mg tablet, Take 100 mg by mouth daily, Disp: , Rfl:     amiodarone 200 mg tablet, Take 200 mg by mouth daily, Disp: , Rfl:     apixaban (ELIQUIS) 5 mg, Take 5 mg by mouth 2 (two) times a day, Disp: , Rfl:     dexlansoprazole (DEXILANT) 60 MG capsule, Take 60 mg by mouth daily, Disp: , Rfl:     diazepam (VALIUM) 5 mg tablet, Take 5 mg by mouth every 6 (six) hours as needed for anxiety, Disp: , Rfl:     diphenhydrAMINE (BENADRYL) 25 mg capsule, Take 25 mg by mouth daily, Disp: , Rfl:     gabapentin (NEURONTIN) 100 mg capsule, Take by mouth, Disp: , Rfl:     hydrOXYzine HCL (ATARAX) 25 mg tablet, Take 25 mg by mouth 2 (two) times a day as needed for itching, Disp: , Rfl:     insulin aspart (NovoLOG) 100 units/mL injection, Inject under the skin as needed for high blood sugar, Disp: , Rfl:     latanoprost (XALATAN) 0 005 % ophthalmic solution, Administer 1 drop to both eyes daily at bedtime  , Disp: , Rfl:     LORazepam (ATIVAN) 1 mg tablet, Take 1 mg by mouth every 8 (eight) hours as needed for anxiety, Disp: , Rfl:     rOPINIRole (REQUIP) 1 mg tablet, Take 3 mg by mouth daily at bedtime  , Disp: , Rfl:     sertraline (ZOLOFT) 25 mg tablet, Take 25 mg by mouth daily at bedtime  , Disp: , Rfl:     traMADol (ULTRAM) 50 mg tablet, Take 50 mg by mouth every 6 (six) hours as needed for moderate pain, Disp: , Rfl:    atorvastatin (LIPITOR) 80 mg tablet, Take 1 tablet by mouth daily with dinner for 30 days, Disp: 30 tablet, Rfl: 0    Allergies   Allergen Reactions    Humira [Adalimumab] Rash    Levaquin [Levofloxacin In D5w] Rash    Levofloxacin Rash    Lovenox [Enoxaparin] Rash       Review of Systems:  Review of Systems   Constitutional: Positive for appetite change (early satiety  ) and fatigue (varies, up to 10/10, at the worst)  HENT: Positive for postnasal drip (constantly has dripping nose) and trouble swallowing (has required esophageal stretching >5 times  HAs this done by Dr Michelle Miller  )  Eyes:        Glaucoma   Respiratory: Positive for apnea (has CpAp , had not been wearing hs , now will re start using ), choking (notes choking with foods, when he needs esophagus to be stretched  LAst time was 2/14/18 ) and shortness of breath (only with exhertion)  Cardiovascular: Positive for palpitations (occasionally but not sustained  )  Gastrointestinal: Positive for constipation and vomiting (usually occurs in the evening, usually PC and he feels better after that)  Endocrine: Positive for cold intolerance  Genitourinary:        Occasionally has nocturia   Musculoskeletal: Positive for arthralgias (shoulder joints) and myalgias (shoulder muscles)  Skin:        Scratches and occasionally has rash    Neurological: Positive for numbness (fingertips , and below knees numb from neuropathy  uses a cane  no recent falls)  Hematological: Negative  Psychiatric/Behavioral: Positive for sleep disturbance (sleeps poorly)  Vitals:    04/11/18 1427   BP: 120/78   BP Location: Left arm   Pulse: 77   Resp: 16   Temp: (!) 97 °F (36 1 °C)   SpO2: 97%   Weight: 97 5 kg (215 lb)   Height: 5' 10" (1 778 m)         Imaging:Xr Chest Pa & Lateral    Result Date: 4/5/2018  Narrative: CHEST INDICATION:   C15 9: Malignant neoplasm of esophagus, unspecified   COMPARISON:  April 27, 2017 EXAM PERFORMED/VIEWS:  XR CHEST PA & LATERAL FINDINGS:  Right-sided line seen with tip in the SVC Cardiomediastinal silhouette appears unremarkable  The lungs are clear  No pneumothorax or pleural effusion  Osseous structures appear within normal limits for patient age  Increased as seen in relation to the lower left rib, unchanged    Impression: No acute consolidation congestion Stable chest x-ray Workstation performed: [id number]     Mri Abdomen W Wo Contrast    Result Date: 4/5/2018  Narrative: MRI - ABDOMEN - WITH AND WITHOUT CONTRAST INDICATION:  Hepatic lesion COMPARISON: February 6, 2018 TECHNIQUE:  The following pulse sequences were obtained on a 1 5 T scanner prior to and following the administration of intravenous contrast:     Coronal and axial T2 with TE of 90 and 180 respectively, axial T2 with fat saturation, axial FIESTA fat-sat,  axial T1-weighted in-and-out-of phase, axial DWI/ADC, precontrast axial T1 with fat saturation, post-contrast dynamic axial T1 with fat saturation at 20, 70, and 180 seconds, coronal T1  with fat saturation and 7 minute delayed axial T1 with fat saturation  IV Contrast:  9 mL of gadobutrol injection (MULTI-DOSE) FINDINGS: LIVER: Again noted is a lesion in the left hepatic lobe which demonstrates the marginal enhancement  In the segment 2 and 3  This measures 5 7 x 4 7 cm  On the previous study this was measuring 6 8 x 4 8 cm  The amount of necrosis in this lesion is mildly increased  However there is thick nodular enhancement noted in this lesion An additional lesion seen in the segment 4 posteriorly now measures 3 3 x 3 8 cm  On the previous study this was measuring 4 6 x 3 5 cm  Thickened nodular enhancement is noted in this lesion An additional lesion seen in the segment 4 and 2 junction in the left hepatic lobe, now measures 2 2 x 2 5 cm  On the previous study this was measuring 2 4 x 1 8 cm   An additional lesion seen in segment 5, is stable, doesn't demonstrate any significant thickening nodular enhancement  This measures 4 6 x 3 1 cm  There is visualization of a enlarging nodule located in the upper abdomen anterior to the IVC and lateral to the pancreas in the silvestre hepatis area suggest enlarging lymph node  On the previous study this was measuring 1 3 x 1 5 cm  On the current study this measures 2 5 x 2 4 cm Postsurgical changes in the liver  BILIARY TREE:  There is no intra-hepatic biliary ductal dilatation  The common bile duct is normal in caliber  There is no gross evidence of mass or choledocholithiasis  GALLBLADDER:  Surgically absent PANCREAS: The pancreas is normal in morphology and signal intensity  No cystic or solid mass is identified  The pancreatic duct is normal in caliber  ADRENAL GLANDS:  The adrenal glands are normal in morphology without thickening or focal mass  SPLEEN:  The spleen is normal in size, morphology and signal intensity  No focal mass is identified  KIDNEYS:  The kidneys enhance symmetrically and are normal in morphology  There is no hydronephrosis  No focal cystic or solid renal masses are identified  LOWER CHEST:   No gross evidence of mass or infiltrate  ABDOMINAL CAVITY: There is enlarging lymph node in the upper abdomen below the level of the silvestre hepatis  Mesenteric fat is normal in signal without evidence of stranding or edema  No mesenteric nodularity or focal mass is identified  ABDOMINAL WALL:  No mass or hernia identified  BOWEL:   Limited evaluation of the hollow viscera demonstrates no gross obstruction or mass  OSSEOUS STRUCTURES:  There is no evidence of destructive process  VASCULAR STRUCTURES:  The visualized vascular structures are patent without evidence of thrombosis or external compression  No aneurysm is identified  Impression: There is no new liver lesion   The previously noted dominant lesion in the left hepatic lobe demonstrate increasing necrosis suggest treatment response The segment 4 lesion is decreasing in size The lesion in the segment 5 cm remains stable with thin rim enhancement suggesting no viable tissue Segment 2 lesion is stable Enlarging regional lymph node in the upper abdomen just below the silvestre hepatis which measures 2 5 x 2 4 cm The study was marked in EPIC for significant notification  Workstation performed: [id number]       Teaching   If receiving radiation will discuss EBRT

## 2018-04-12 ENCOUNTER — TELEPHONE (OUTPATIENT)
Dept: HEMATOLOGY ONCOLOGY | Facility: CLINIC | Age: 73
End: 2018-04-12

## 2018-04-12 NOTE — TELEPHONE ENCOUNTER
Patient wanted Dr Macie Yoo to know per Dr Chary Núñez, the patient will be going [place] for further radiation  He will be back in touch with Dr Macie Yoo when that is completed

## 2018-04-13 PROBLEM — C77.2 SECONDARY MALIGNANT NEOPLASM OF INTRA-ABDOMINAL LYMPH NODES (HCC): Status: ACTIVE | Noted: 2018-04-13

## 2018-04-13 NOTE — PROGRESS NOTES
Consultation - Radiation Oncology   Castro Mancini 74 y o  male [id number]      Chief Complaint:  Consultation for radiation therapy to an enlarging upper abdominal lymph node  Chief Complaint   Patient presents with    Consult       History of Present Illness   Castro Mancini is a 74y o  year old male with a history of esophageal carcinoma and hepatocellular carcinoma as outlined below  He is seen today for re-evaluation to consider local radiation therapy for an enlarging regional lymph node in the upper abdomen just below the silvestre hepatis  Historical Information   Oncology History    75-year-old male status post esophagectomy and liver resection 10/31/16 for esophageal cancer and hepatocellular carcinoma in 2016  Treatment included a course of radiation to the esophagus to a total dose of 4680 cGy with treatments given from 6/27/16 to 8/3/2016  Radiation therapy was administered at [facility] by Dr Cynthia Burger  Concurrent Taxol and Carboplatin was under the care of Dr Gregory Cushing at [facility]  CT scan of the abdomen and MRI of the abdomen in October 2017 revealed a new lesion in the left lobe of the liver measuring 4 1 x 2 7 cm with 1-2 additional lesions  CT-guided biopsy liver lesion was positive for hepatocellular carcinoma on October 11, 2017  Recommendations were made for Sir sphere radiation therapy to the whole liver which was given on December 13, 2017  The patient returns today for follow-up examination  reports he is feeling well and is having no abdominal complaints  He denies any pain  He is still having occasional nausea with some dysphagia and is considering having a repeat esophageal dilation performed  He had repeat blood work including liver function studies performed January 19, 2017 at [facility] but results are not available today  We will call him with these results   He has MRI of the abdomen and follow-up appointment with Dr Balaji Rios on the same day February 6, 2018  Hopefully, this will show good results and response to treatment  Assuming MRI shows favorable response, then we would recommend repeat MRI again in 3 months  The patient states he will be following with Dr Kya Booth every 3 months  It takes about 1 5 hours to come here from [place], [place] and we will therefore see him here on a p r n  basis  Should he have evidence of progressive disease the future then, we would be happy to re-evaluate him for consideration of additional Sir spheres to the liver  The patient is wife were in agreement with the above plan and they know they can call any time if they have any questions  2/14/18 Had EGD, dialtion of the esophagus  There was a mild stricture at the site of anastomosis  4/5/18 Had MRI of the abd: The previously noted dominant lesion in the left hepatic lobe demonstrate increasing necrosis suggest treatment response The segment 4 lesion is decreasing in size The lesion in the segment 5 cm remains stable with thin rim enhancement suggesting no viable tissue Segment 2 lesion is stable Enlarging regional lymph node in the upper abdomen just below the silvestre hepatis which measures 2 5 x 2 4 cm    Pt saw both surg Onc, and Med onc  On 4/6/18 Pt met with Dr Becki Abraham, who discussed, If radiation therapy could be applied to this lesion deferral of systemic treatment initiation could be accomplished  Systemic treatment options: potential treatment options for patients with [address]  including Laith Guzmán  Pt had previous esophagus EBRT,8/2016  at [facility] in [place]  Treatment recoreds available in [address]           Esophageal cancer ([facility] [address] )    4/27/2016 Initial Diagnosis     Esophageal cancer ([address] )         6/27/2016 - 7/26/2016 Chemotherapy     Taxol 50 + Carbo AUC 2         6/27/2016 - 8/3/2016 Radiation     Concurrent RT Esophagus 4,680 cGy   @ [facility]         10/31/2016 Surgery     Esophagectomy Hepatocellular carcinoma ([place] [address] )    10/31/2016 Initial Diagnosis     Cancer, hepatocellular ([place] [address] )       10/31/2016 Surgery     Resection/ablation of liver - segments 5/6         10/11/2017 Biopsy     Local recurrence         12/13/2017 -  Radiation     SIRSphere treatment  Treatment site:  whole liver  Whole liver Dose:  Prescribed: 1 66 GBq, Delivered: 1 61GBq  (Right lobe Dose:   Prescribed: 1 06 GBq,  Delivered: 1 03 GBq)  (Left lobe Dose:   Prescribed: 0 60 GBq,  Delivered: 0 58 GBq)          Esophageal cancer ([place] [address] )    Staging form: Esophagus - Adenocarcinoma, AJCC 7th Edition    - Pathologic stage from 10/31/2016: Stage IIIA (yT3, N1, cM0, G2) - Unsigned    Hepatocellular carcinoma ([place] [address] )    Staging form: Liver (Excluding Intrahepatic Bile Ducts), AJCC 7th Edition    - Clinical: Stage Unknown (rT2, NX, M0) - Unsigned     Previous Radiation History: See Above    Past Medical History:   Diagnosis Date    Anemia     Arthritis     Coronary artery disease     Diabetes mellitus ([place] [address] )     Dysphagia     Esophageal cancer (HCC)     Gout     Hyperlipidemia     Hypertension     Restless leg syndrome     Rheumatoid arthritis ([place] [address] )     Sleep apnea     Vision abnormalities      Past Surgical History:   Procedure Laterality Date    APPENDECTOMY      CHOLECYSTECTOMY      GASTROJEJUNOSTOMY W/ JEJUNOSTOMY TUBE N/A 8/4/2016    Procedure: INSERTION JEJUNOSTOMY TUBE OPEN;  Surgeon: Darcy Luevano MD;  [facility];  Service:     KNEE ARTHROSCOPY      LIVER RESECTION N/A 10/31/2016    Procedure: INTRAOPERATIVE ULTRASOUND OF LIVER; LIVER LESION RESECTION/ABLATION ;  Surgeon: Darcy Luevano MD;  [facility];  Service:    Kaylin Dial PLACEMENT      MS EGD INSERT GUIDE WIRE DILATOR PASSAGE ESOPHAGUS N/A 2/14/2018    Procedure: ESOPHAGOGASTRODUODENOSCOPY (EGD) with dilation;  Surgeon: Debbie Connor MD;  [facility];  Service: Thoracic    MS ESOPHAGOGASTRODUODENOSCOPY TRANSORAL DIAGNOSTIC N/A 10/31/2016    Procedure: ESOPHAGOGASTRODUODENOSCOPY (EGD);   Surgeon: Raymond Frey MD;  [facility];  Service: Surgical Oncology    HI ESOPHAGOSCOPY FLEX BALLOON DILAT <30 MM DIAM N/A 1/6/2017    Procedure: DILATATION ESOPHAGEAL;  Surgeon: Blaire Rodrigez MD;  [facility];  Service: Thoracic    HI ESOPHAGOSCOPY FLEX BALLOON DILAT <30 MM DIAM N/A 12/12/2016    Procedure: DILATATION ESOPHAGEAL, EGD;  Surgeon: Blaire Rodrigez MD;  [facility];  Service: Thoracic    HI ESOPHAGOSCOPY FLEX BALLOON DILAT <30 MM DIAM N/A 5/24/2017    Procedure: EGD WITH DILATION ;  Surgeon: Rodrigo Patel MD;  [facility];  Service: Thoracic    HI ESOPHAGOSCOPY FLEX BALLOON DILAT <30 MM DIAM N/A 8/3/2017    Procedure: EGD WITH DILATION;  Surgeon: Blaire Rodrigez MD;  [facility];  Service: Thoracic    HI REMOVAL [address] THORACOTOMY N/A 10/31/2016    Procedure: ESOPHAGECTOMY;  Surgeon: Raymond Frey MD;  [facility];  Service: Surgical Oncology    TONSILLECTOMY         Family History   Problem Relation Age of Onset    Stroke Mother     Colon cancer Father     Other Father      Pneumoconiosis    Cancer Sister      oral cancer       Social History   History   Alcohol Use No     History   Drug Use No     History   Smoking Status    Former Smoker    Packs/day: 3 00    Years: 40 00    Types: Cigarettes    Quit date: 4/13/1991   Smokeless Tobacco    Never Used       Meds/Allergies     Current Outpatient Prescriptions:     albuterol (2 5 mg/3 mL) 0 083 % nebulizer solution, Take 1 ampule by nebulization every 4 (four) hours as needed for wheezing  , Disp: , Rfl:     allopurinol (ZYLOPRIM) 100 mg tablet, Take 100 mg by mouth daily, Disp: , Rfl:     amiodarone 200 mg tablet, Take 200 mg by mouth daily, Disp: , Rfl:     apixaban (ELIQUIS) 5 mg, Take 5 mg by mouth 2 (two) times a day, Disp: , Rfl:     dexlansoprazole (DEXILANT) 60 MG capsule, Take 60 mg by mouth daily, Disp: , Rfl:     diazepam (VALIUM) 5 mg tablet, Take 5 mg by mouth every 6 (six) hours as needed for anxiety, Disp: , Rfl:     diphenhydrAMINE (BENADRYL) 25 mg capsule, Take 25 mg by mouth daily, Disp: , Rfl:     gabapentin (NEURONTIN) 100 mg capsule, Take by mouth, Disp: , Rfl:     hydrOXYzine HCL (ATARAX) 25 mg tablet, Take 25 mg by mouth 2 (two) times a day as needed for itching, Disp: , Rfl:     insulin aspart (NovoLOG) 100 units/mL injection, Inject under the skin as needed for high blood sugar, Disp: , Rfl:     latanoprost (XALATAN) 0 005 % ophthalmic solution, Administer 1 drop to both eyes daily at bedtime  , Disp: , Rfl:     LORazepam (ATIVAN) 1 mg tablet, Take 1 mg by mouth every 8 (eight) hours as needed for anxiety, Disp: , Rfl:     rOPINIRole (REQUIP) 1 mg tablet, Take 3 mg by mouth daily at bedtime  , Disp: , Rfl:     sertraline (ZOLOFT) 25 mg tablet, Take 25 mg by mouth daily at bedtime  , Disp: , Rfl:     traMADol (ULTRAM) 50 mg tablet, Take 50 mg by mouth every 6 (six) hours as needed for moderate pain, Disp: , Rfl:     atorvastatin (LIPITOR) 80 mg tablet, Take 1 tablet by mouth daily with dinner for 30 days, Disp: 30 tablet, Rfl: 0    guaifenesin-codeine (GUAIFENESIN AC) 100-10 MG/5ML liquid, Take 5 mL by mouth 3 (three) times a day as needed for cough, Disp: 120 mL, Rfl: 0    Umeclidinium-Vilanterol (ANORO ELLIPTA) 62 5-25 MCG/INH AEPB, Inhale 1 puff daily, Disp: 1 each, Rfl: 0  Allergies   Allergen Reactions    Humira [Adalimumab] Rash    Levaquin [Levofloxacin In D5w] Rash    Levofloxacin Rash    Lovenox [Enoxaparin] Rash       Review of Systems   Constitutional: Positive for appetite change (early satiety  ) and fatigue (varies, up to 10/10, at the worst)  HENT: Positive for postnasal drip (constantly has dripping nose) and trouble swallowing (has required esophageal stretching >5 times  HAs this done by Dr Ani Martin  )      Eyes:        Glaucoma   Respiratory: Positive for apnea (has CpAp , had not been wearing hs , now will re start using ), choking (notes choking with foods, when he needs esophagus to be stretched  LAst time was 18 ) and shortness of breath (only with exhertion)  Cardiovascular: Positive for palpitations (occasionally but not sustained  )  Gastrointestinal: Positive for constipation and vomiting (usually occurs in the evening, usually PC and he feels better after that)  Endocrine: Positive for cold intolerance  Genitourinary:        Occasionally has nocturia   Musculoskeletal: Positive for arthralgias (shoulder joints) and myalgias (shoulder muscles)  Skin:        Scratches and occasionally has rash    Neurological: Positive for numbness (fingertips , and below knees numb from neuropathy  uses a cane  no recent falls)  Hematological: Negative  Psychiatric/Behavioral: Positive for sleep disturbance (sleeps poorly)  Objective   /78 (BP Location: Left arm)   Pulse 77   Temp (!) 97 °F (36 1 °C)   Resp 16   Ht 5' 10" (1 778 m)   Wt 97 5 kg (215 lb)   SpO2 97%   BMI 30 85 kg/m²   ECO - Symptomatic but completely ambulatory    Physical Exam   Constitutional: He is oriented to person, place, and time  He appears well-developed and well-nourished  No distress  HENT:   Head: Normocephalic and atraumatic  Mouth/Throat: No oropharyngeal exudate  Eyes: Conjunctivae and EOM are normal  Pupils are equal, round, and reactive to light  No scleral icterus  Neck: Normal range of motion  Neck supple  No tracheal deviation present  No thyromegaly present  Cardiovascular: Normal rate, regular rhythm and normal heart sounds  Pulmonary/Chest: Effort normal and breath sounds normal  No respiratory distress  He has no wheezes  He has no rales  He exhibits no tenderness  Abdominal: Soft  Bowel sounds are normal  He exhibits no distension and no mass  There is no tenderness  Musculoskeletal: Normal range of motion   He exhibits no edema or tenderness  Lymphadenopathy:     He has no cervical adenopathy  Neurological: He is alert and oriented to person, place, and time  No cranial nerve deficit  Coordination normal    Skin: Skin is warm and dry  No rash noted  He is not diaphoretic  No erythema  No pallor  Psychiatric: He has a normal mood and affect  His behavior is normal  Judgment and thought content normal    Nursing note and vitals reviewed  RESULTS  Lab Results:  Recent Results (from the past 2016 hour(s))   Fingerstick Glucose (POCT)    Collection Time: 02/14/18  6:29 AM   Result Value Ref Range    POC Glucose 169 (H) 65 - 140 mg/dl   Fingerstick Glucose (POCT)    Collection Time: 02/14/18  9:29 AM   Result Value Ref Range    POC Glucose 180 (H) 65 - 140 mg/dl       Imaging Studies:    Xr Chest Pa & Lateral    Result Date: 4/5/2018  Narrative: CHEST INDICATION:   C15 9: Malignant neoplasm of esophagus, unspecified  COMPARISON:  April 27, 2017 EXAM PERFORMED/VIEWS:  XR CHEST PA & LATERAL FINDINGS:  Right-sided line seen with tip in the SVC Cardiomediastinal silhouette appears unremarkable  The lungs are clear  No pneumothorax or pleural effusion  Osseous structures appear within normal limits for patient age   Increased as seen in relation to the lower left rib, unchanged    Impression: No acute consolidation congestion Stable chest x-ray Workstation performed: [id number]     Mri Abdomen W Wo Contrast    Result Date: 4/5/2018  Narrative: MRI - ABDOMEN - WITH AND WITHOUT CONTRAST INDICATION:  Hepatic lesion COMPARISON: February 6, 2018 TECHNIQUE:  The following pulse sequences were obtained on a 1 5 T scanner prior to and following the administration of intravenous contrast:     Coronal and axial T2 with TE of 90 and 180 respectively, axial T2 with fat saturation, axial FIESTA fat-sat,  axial T1-weighted in-and-out-of phase, axial DWI/ADC, precontrast axial T1 with fat saturation, post-contrast dynamic axial T1 with fat saturation at 20, 70, and 180 seconds, coronal T1  with fat saturation and 7 minute delayed axial T1 with fat saturation  IV Contrast:  9 mL of gadobutrol injection (MULTI-DOSE) FINDINGS: LIVER: Again noted is a lesion in the left hepatic lobe which demonstrates the marginal enhancement  In the segment 2 and 3  This measures 5 7 x 4 7 cm  On the previous study this was measuring 6 8 x 4 8 cm  The amount of necrosis in this lesion is mildly increased  However there is thick nodular enhancement noted in this lesion An additional lesion seen in the segment 4 posteriorly now measures 3 3 x 3 8 cm  On the previous study this was measuring 4 6 x 3 5 cm  Thickened nodular enhancement is noted in this lesion An additional lesion seen in the segment 4 and 2 junction in the left hepatic lobe, now measures 2 2 x 2 5 cm  On the previous study this was measuring 2 4 x 1 8 cm  An additional lesion seen in segment 5, is stable, doesn't demonstrate any significant thickening nodular enhancement  This measures 4 6 x 3 1 cm  There is visualization of a enlarging nodule located in the upper abdomen anterior to the IVC and lateral to the pancreas in the silvesrte hepatis area suggest enlarging lymph node  On the previous study this was measuring 1 3 x 1 5 cm  On the current study this measures 2 5 x 2 4 cm Postsurgical changes in the liver  BILIARY TREE:  There is no intra-hepatic biliary ductal dilatation  The common bile duct is normal in caliber  There is no gross evidence of mass or choledocholithiasis  GALLBLADDER:  Surgically absent PANCREAS: The pancreas is normal in morphology and signal intensity  No cystic or solid mass is identified  The pancreatic duct is normal in caliber  ADRENAL GLANDS:  The adrenal glands are normal in morphology without thickening or focal mass  SPLEEN:  The spleen is normal in size, morphology and signal intensity  No focal mass is identified   KIDNEYS:  The kidneys enhance symmetrically and are normal in morphology  There is no hydronephrosis  No focal cystic or solid renal masses are identified  LOWER CHEST:   No gross evidence of mass or infiltrate  ABDOMINAL CAVITY: There is enlarging lymph node in the upper abdomen below the level of the silvestre hepatis  Mesenteric fat is normal in signal without evidence of stranding or edema  No mesenteric nodularity or focal mass is identified  ABDOMINAL WALL:  No mass or hernia identified  BOWEL:   Limited evaluation of the hollow viscera demonstrates no gross obstruction or mass  OSSEOUS STRUCTURES:  There is no evidence of destructive process  VASCULAR STRUCTURES:  The visualized vascular structures are patent without evidence of thrombosis or external compression  No aneurysm is identified  Impression: There is no new liver lesion  The previously noted dominant lesion in the left hepatic lobe demonstrate increasing necrosis suggest treatment response The segment 4 lesion is decreasing in size The lesion in the segment 5 cm remains stable with thin rim enhancement suggesting no viable tissue Segment 2 lesion is stable Enlarging regional lymph node in the upper abdomen just below the silvestre hepatis which measures 2 5 x 2 4 cm The study was marked in EPIC for significant notification  Workstation performed: [id number]     Pathology: See Above    Assessment/Plan    1  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[address] )  Radiation Simulation Treatment   2  Hepatocellular carcinoma ([address] )     3  Malignant neoplasm of lower third of esophagus (HCC)       Discussion/Summary:  Saniya Swanson is a 74y o  year old male with a history of esophageal carcinoma and hepatocellular carcinoma as outlined above  He is seen today for re-evaluation to consider local radiation therapy for an enlarging regional lymph node in the upper abdomen just below the silvestre hepatis    He completed Sir sphere radiation therapy to the entire liver on December 13, 2017 and follow-up MRI of the abdomen April 5, 2018 reveals a good response with increasing necrosis within his hepatic lesions with rim enhancement suggesting no viable tissue  He does have an enlarging regional lymph node measuring 2 5 x 2 4 cm in the upper abdomen just below the silvestre hepatis  This measured 1 5 cm on MRI of the abdomen performed February 6, 2018  His AFP level in late January 2018 radha to 15 7 and his most recent AFP March 10, 2018 is now 17 6  Therefore, the isolated lymph node likely represents metastatic hepatocellular carcinoma  He received preoperative esophageal radiation therapy to a dose of 4680 cGy ending August 3, 2016 in [place], [place] with Dr Kim Tilley  I reviewed his current MRI films along with copy of his treatment plan for his esophagus that went into the upper abdomen and appeared close to the current metastatic lymph node  Since he has no other sites of progressive disease, local radiation therapy could be delivered to this lymph node  This could be best accomplished with IMRT radiation/TomoTherapy in order to minimize overlap with his previous fields of radiation therapy  He lives 1 5 hours from [facility] and it would be best for him to return to see Dr Maxwell Cohen for additional radiation therapy  He is willing to receive additional local therapy with radiation therapy  He was seen by Dr Neville Cardenas on April 6, 2018 who discussed systemic treatment options with him but he would rather not start systemic treatment until he absolutely needs to due to the potential side effects  He was also seen by Dr Johann Izquierdo on April 5, 2018 and he did not recommend any additional surgery  I called Dr Maxwell Cohen today and requested copy of his 2 most recent MRIs so they can be sent along with his medical records to Dr Maxwell Cohen who will be able to see him next week      Ludwig Colon MD  5/86/1180,3:70 PM    Portions of the record may have been created with voice recognition software   Occasional wrong word or "sound a like" substitutions may have occurred due to the inherent limitations of voice recognition software   Read the chart carefully and recognize, using context, where substitutions have occurred

## 2018-04-23 DIAGNOSIS — J44.9 CHRONIC OBSTRUCTIVE PULMONARY DISEASE, UNSPECIFIED COPD TYPE (HCC): Primary | ICD-10-CM

## 2018-04-23 DIAGNOSIS — R05.9 COUGH: ICD-10-CM

## 2018-04-23 RX ORDER — ALBUTEROL SULFATE 90 UG/1
2 AEROSOL, METERED RESPIRATORY (INHALATION) EVERY 6 HOURS PRN
Qty: 1 INHALER | Refills: 11 | Status: SHIPPED | OUTPATIENT
Start: 2018-04-23 | End: 2018-04-24 | Stop reason: SDUPTHER

## 2018-04-23 NOTE — PROGRESS NOTES
I have recently diagnosed Mr Ruma Zuleta with Severe COPD based of spirometry and symptoms  I started him on Anoro and he is having great success  Please provide him with Anoro to use 1 puff once daily and Ventolin to use 2 puffs every 6 hours as needed for cough or dyspnea

## 2018-04-24 DIAGNOSIS — R05.9 COUGH: ICD-10-CM

## 2018-04-24 DIAGNOSIS — J44.9 CHRONIC OBSTRUCTIVE PULMONARY DISEASE, UNSPECIFIED COPD TYPE (HCC): ICD-10-CM

## 2018-04-24 RX ORDER — ALBUTEROL SULFATE 90 UG/1
2 AEROSOL, METERED RESPIRATORY (INHALATION) EVERY 6 HOURS PRN
Qty: 1 INHALER | Refills: 11 | Status: SHIPPED | OUTPATIENT
Start: 2018-04-24 | End: 2018-09-20

## 2018-05-01 ENCOUNTER — APPOINTMENT (EMERGENCY)
Dept: RADIOLOGY | Facility: HOSPITAL | Age: 73
DRG: 501 | End: 2018-05-01
Payer: MEDICARE

## 2018-05-01 ENCOUNTER — HOSPITAL ENCOUNTER (INPATIENT)
Facility: HOSPITAL | Age: 73
LOS: 7 days | Discharge: HOME | DRG: 501 | End: 2018-05-08
Attending: ORTHOPAEDIC SURGERY | Admitting: ORTHOPAEDIC SURGERY
Payer: MEDICARE

## 2018-05-01 DIAGNOSIS — E11.22 TYPE 2 DIABETES MELLITUS WITH DIABETIC CHRONIC KIDNEY DISEASE, UNSPECIFIED CKD STAGE, UNSPECIFIED WHETHER LONG TERM INSULIN USE (HCC): Chronic | ICD-10-CM

## 2018-05-01 DIAGNOSIS — M25.512 ACUTE PAIN OF LEFT SHOULDER: Primary | ICD-10-CM

## 2018-05-01 DIAGNOSIS — C22.0 HEPATOCELLULAR CARCINOMA (HCC): ICD-10-CM

## 2018-05-01 DIAGNOSIS — I48.91 ATRIAL FIBRILLATION, UNSPECIFIED TYPE (HCC): ICD-10-CM

## 2018-05-01 DIAGNOSIS — C15.5 MALIGNANT NEOPLASM OF LOWER THIRD OF ESOPHAGUS (HCC): ICD-10-CM

## 2018-05-01 DIAGNOSIS — E78.5 HYPERLIPIDEMIA, UNSPECIFIED HYPERLIPIDEMIA TYPE: ICD-10-CM

## 2018-05-01 DIAGNOSIS — I15.9 SECONDARY HYPERTENSION: ICD-10-CM

## 2018-05-01 DIAGNOSIS — I63.9 ACUTE CARDIOEMBOLIC STROKE (HCC): ICD-10-CM

## 2018-05-01 DIAGNOSIS — M75.52 BURSITIS OF LEFT SHOULDER: ICD-10-CM

## 2018-05-01 DIAGNOSIS — E87.6 HYPOKALEMIA: ICD-10-CM

## 2018-05-01 DIAGNOSIS — E83.42 HYPOMAGNESEMIA: ICD-10-CM

## 2018-05-01 DIAGNOSIS — J44.9 STAGE 3 SEVERE COPD BY GOLD CLASSIFICATION (HCC): ICD-10-CM

## 2018-05-01 LAB
ANION GAP SERPL CALCULATED.3IONS-SCNC: 4 MMOL/L (ref 4–13)
BASOPHILS # BLD AUTO: 0.01 THOUSANDS/ΜL (ref 0–0.1)
BASOPHILS NFR BLD AUTO: 0 % (ref 0–1)
BUN SERPL-MCNC: 16 MG/DL (ref 5–25)
CALCIUM SERPL-MCNC: 8.7 MG/DL (ref 8.3–10.1)
CHLORIDE SERPL-SCNC: 100 MMOL/L (ref 100–108)
CO2 SERPL-SCNC: 31 MMOL/L (ref 21–32)
CREAT SERPL-MCNC: 0.89 MG/DL (ref 0.6–1.3)
CRP SERPL QL: >90 MG/L
CRYSTALS SNV QL MICRO: NORMAL
EOSINOPHIL # BLD AUTO: 0.04 THOUSAND/ΜL (ref 0–0.61)
EOSINOPHIL NFR BLD AUTO: 1 % (ref 0–6)
ERYTHROCYTE [DISTWIDTH] IN BLOOD BY AUTOMATED COUNT: 13.3 % (ref 11.6–15.1)
ERYTHROCYTE [SEDIMENTATION RATE] IN BLOOD: 84 MM/HOUR (ref 0–10)
GFR SERPL CREATININE-BSD FRML MDRD: 84 ML/MIN/1.73SQ M
GLUCOSE SERPL-MCNC: 200 MG/DL (ref 65–140)
GRAM STN SPEC: NORMAL
HCT VFR BLD AUTO: 41.2 % (ref 36.5–49.3)
HGB BLD-MCNC: 14.1 G/DL (ref 12–17)
LYMPHOCYTES # BLD AUTO: 0.64 THOUSANDS/ΜL (ref 0.6–4.47)
LYMPHOCYTES NFR BLD AUTO: 8 % (ref 14–44)
MCH RBC QN AUTO: 30.7 PG (ref 26.8–34.3)
MCHC RBC AUTO-ENTMCNC: 34.2 G/DL (ref 31.4–37.4)
MCV RBC AUTO: 90 FL (ref 82–98)
MONOCYTES # BLD AUTO: 1.08 THOUSAND/ΜL (ref 0.17–1.22)
MONOCYTES NFR BLD AUTO: 14 % (ref 4–12)
NEUTROPHILS # BLD AUTO: 6.05 THOUSANDS/ΜL (ref 1.85–7.62)
NEUTS SEG NFR BLD AUTO: 77 % (ref 43–75)
NRBC BLD AUTO-RTO: 0 /100 WBCS
PLATELET # BLD AUTO: 177 THOUSANDS/UL (ref 149–390)
PMV BLD AUTO: 11 FL (ref 8.9–12.7)
POTASSIUM SERPL-SCNC: 4.4 MMOL/L (ref 3.5–5.3)
RBC # BLD AUTO: 4.6 MILLION/UL (ref 3.88–5.62)
SODIUM SERPL-SCNC: 135 MMOL/L (ref 136–145)
WBC # BLD AUTO: 7.84 THOUSAND/UL (ref 4.31–10.16)

## 2018-05-01 PROCEDURE — 85652 RBC SED RATE AUTOMATED: CPT | Performed by: ORTHOPAEDIC SURGERY

## 2018-05-01 PROCEDURE — 73030 X-RAY EXAM OF SHOULDER: CPT

## 2018-05-01 PROCEDURE — 89060 EXAM SYNOVIAL FLUID CRYSTALS: CPT | Performed by: ORTHOPAEDIC SURGERY

## 2018-05-01 PROCEDURE — 80048 BASIC METABOLIC PNL TOTAL CA: CPT | Performed by: ORTHOPAEDIC SURGERY

## 2018-05-01 PROCEDURE — 87070 CULTURE OTHR SPECIMN AEROBIC: CPT | Performed by: ORTHOPAEDIC SURGERY

## 2018-05-01 PROCEDURE — 85025 COMPLETE CBC W/AUTO DIFF WBC: CPT | Performed by: ORTHOPAEDIC SURGERY

## 2018-05-01 PROCEDURE — 86140 C-REACTIVE PROTEIN: CPT | Performed by: ORTHOPAEDIC SURGERY

## 2018-05-01 PROCEDURE — 87205 SMEAR GRAM STAIN: CPT | Performed by: ORTHOPAEDIC SURGERY

## 2018-05-01 PROCEDURE — 99284 EMERGENCY DEPT VISIT MOD MDM: CPT

## 2018-05-01 PROCEDURE — 36415 COLL VENOUS BLD VENIPUNCTURE: CPT | Performed by: ORTHOPAEDIC SURGERY

## 2018-05-01 RX ORDER — ALBUTEROL SULFATE 90 UG/1
2 AEROSOL, METERED RESPIRATORY (INHALATION) EVERY 6 HOURS PRN
Status: DISCONTINUED | OUTPATIENT
Start: 2018-05-01 | End: 2018-05-08 | Stop reason: HOSPADM

## 2018-05-01 RX ORDER — DIPHENHYDRAMINE HCL 25 MG
25 TABLET ORAL DAILY
Status: DISCONTINUED | OUTPATIENT
Start: 2018-05-02 | End: 2018-05-08 | Stop reason: HOSPADM

## 2018-05-01 RX ORDER — HYDROXYZINE HYDROCHLORIDE 25 MG/1
25 TABLET, FILM COATED ORAL 2 TIMES DAILY PRN
Status: DISCONTINUED | OUTPATIENT
Start: 2018-05-01 | End: 2018-05-08 | Stop reason: HOSPADM

## 2018-05-01 RX ORDER — ACETAMINOPHEN 325 MG/1
650 TABLET ORAL EVERY 6 HOURS PRN
Status: DISCONTINUED | OUTPATIENT
Start: 2018-05-01 | End: 2018-05-08 | Stop reason: HOSPADM

## 2018-05-01 RX ORDER — LATANOPROST 50 UG/ML
1 SOLUTION/ DROPS OPHTHALMIC
Status: DISCONTINUED | OUTPATIENT
Start: 2018-05-01 | End: 2018-05-08 | Stop reason: HOSPADM

## 2018-05-01 RX ORDER — SERTRALINE HYDROCHLORIDE 25 MG/1
25 TABLET, FILM COATED ORAL
Status: DISCONTINUED | OUTPATIENT
Start: 2018-05-01 | End: 2018-05-08 | Stop reason: HOSPADM

## 2018-05-01 RX ORDER — MORPHINE SULFATE 2 MG/ML
2 INJECTION, SOLUTION INTRAMUSCULAR; INTRAVENOUS EVERY 2 HOUR PRN
Status: DISCONTINUED | OUTPATIENT
Start: 2018-05-01 | End: 2018-05-08 | Stop reason: HOSPADM

## 2018-05-01 RX ORDER — AMIODARONE HYDROCHLORIDE 200 MG/1
200 TABLET ORAL DAILY
Status: DISCONTINUED | OUTPATIENT
Start: 2018-05-02 | End: 2018-05-08 | Stop reason: HOSPADM

## 2018-05-01 RX ORDER — OXYCODONE HYDROCHLORIDE 5 MG/1
5 TABLET ORAL EVERY 4 HOURS PRN
Status: DISCONTINUED | OUTPATIENT
Start: 2018-05-01 | End: 2018-05-08 | Stop reason: HOSPADM

## 2018-05-01 RX ORDER — ROPINIROLE 1 MG/1
3 TABLET, FILM COATED ORAL
Status: DISCONTINUED | OUTPATIENT
Start: 2018-05-01 | End: 2018-05-08 | Stop reason: HOSPADM

## 2018-05-01 RX ORDER — LORAZEPAM 1 MG/1
1 TABLET ORAL EVERY 8 HOURS PRN
Status: DISCONTINUED | OUTPATIENT
Start: 2018-05-01 | End: 2018-05-02

## 2018-05-01 RX ORDER — DIAZEPAM 5 MG/1
5 TABLET ORAL EVERY 6 HOURS PRN
Status: DISCONTINUED | OUTPATIENT
Start: 2018-05-01 | End: 2018-05-05

## 2018-05-01 RX ORDER — OXYCODONE HYDROCHLORIDE 5 MG/1
10 TABLET ORAL EVERY 4 HOURS PRN
Status: DISCONTINUED | OUTPATIENT
Start: 2018-05-01 | End: 2018-05-08 | Stop reason: HOSPADM

## 2018-05-01 RX ORDER — ALBUTEROL SULFATE 2.5 MG/3ML
2.5 SOLUTION RESPIRATORY (INHALATION) EVERY 4 HOURS PRN
Status: DISCONTINUED | OUTPATIENT
Start: 2018-05-01 | End: 2018-05-08 | Stop reason: HOSPADM

## 2018-05-01 RX ORDER — ATORVASTATIN CALCIUM 80 MG/1
80 TABLET, FILM COATED ORAL
Status: DISCONTINUED | OUTPATIENT
Start: 2018-05-01 | End: 2018-05-05

## 2018-05-01 RX ORDER — ALLOPURINOL 100 MG/1
100 TABLET ORAL DAILY
Status: DISCONTINUED | OUTPATIENT
Start: 2018-05-02 | End: 2018-05-08 | Stop reason: HOSPADM

## 2018-05-01 RX ORDER — GABAPENTIN 300 MG/1
300 CAPSULE ORAL
Status: DISCONTINUED | OUTPATIENT
Start: 2018-05-01 | End: 2018-05-05

## 2018-05-01 RX ORDER — TRAMADOL HYDROCHLORIDE 50 MG/1
50 TABLET ORAL EVERY 6 HOURS PRN
Status: DISCONTINUED | OUTPATIENT
Start: 2018-05-01 | End: 2018-05-08 | Stop reason: HOSPADM

## 2018-05-01 RX ADMIN — ATORVASTATIN CALCIUM 80 MG: 80 TABLET, FILM COATED ORAL at 22:16

## 2018-05-01 RX ADMIN — LATANOPROST 1 DROP: 50 SOLUTION OPHTHALMIC at 22:15

## 2018-05-01 RX ADMIN — ROPINIROLE 3 MG: 1 TABLET, FILM COATED ORAL at 22:18

## 2018-05-01 RX ADMIN — SERTRALINE HYDROCHLORIDE 25 MG: 25 TABLET ORAL at 22:17

## 2018-05-01 RX ADMIN — APIXABAN 5 MG: 5 TABLET, FILM COATED ORAL at 22:16

## 2018-05-01 RX ADMIN — GABAPENTIN 300 MG: 300 CAPSULE ORAL at 22:16

## 2018-05-02 ENCOUNTER — APPOINTMENT (INPATIENT)
Dept: RADIOLOGY | Facility: HOSPITAL | Age: 73
DRG: 501 | End: 2018-05-02
Payer: MEDICARE

## 2018-05-02 PROBLEM — F32.A ANXIETY AND DEPRESSION: Status: ACTIVE | Noted: 2018-05-02

## 2018-05-02 PROBLEM — F41.9 ANXIETY AND DEPRESSION: Status: ACTIVE | Noted: 2018-05-02

## 2018-05-02 PROBLEM — M25.512 LEFT SHOULDER PAIN: Status: ACTIVE | Noted: 2018-05-02

## 2018-05-02 PROBLEM — M25.512 ACUTE PAIN OF LEFT SHOULDER: Status: ACTIVE | Noted: 2018-05-01

## 2018-05-02 LAB
AFP-TM SERPL-MCNC: 6.9 NG/ML (ref 0.5–8)
EOSINOPHIL NFR SNV MANUAL: 5 %
EST. AVERAGE GLUCOSE BLD GHB EST-MCNC: 209 MG/DL
GLUCOSE SERPL-MCNC: 181 MG/DL (ref 65–140)
GLUCOSE SERPL-MCNC: 240 MG/DL (ref 65–140)
GLUCOSE SERPL-MCNC: 246 MG/DL (ref 65–140)
GLUCOSE SERPL-MCNC: 395 MG/DL (ref 65–140)
GRAM STN SPEC: NORMAL
GRAM STN SPEC: NORMAL
HBA1C MFR BLD: 8.9 % (ref 4.2–6.3)
LYMPHOCYTES # SNV MANUAL: 9 %
MONOCYTES NFR SNV MANUAL: 2 %
NEUTROPHILS NFR SNV MANUAL: 84 %
RBC # SNV MANUAL: ABNORMAL /UL (ref 0–10)
TOTAL CELLS COUNTED SPEC: 100
WBC # FLD MANUAL: NORMAL /UL

## 2018-05-02 PROCEDURE — 99222 1ST HOSP IP/OBS MODERATE 55: CPT | Performed by: ORTHOPAEDIC SURGERY

## 2018-05-02 PROCEDURE — 20610 DRAIN/INJ JOINT/BURSA W/O US: CPT

## 2018-05-02 PROCEDURE — 99232 SBSQ HOSP IP/OBS MODERATE 35: CPT | Performed by: SURGERY

## 2018-05-02 PROCEDURE — 71260 CT THORAX DX C+: CPT

## 2018-05-02 PROCEDURE — 87070 CULTURE OTHR SPECIMN AEROBIC: CPT | Performed by: STUDENT IN AN ORGANIZED HEALTH CARE EDUCATION/TRAINING PROGRAM

## 2018-05-02 PROCEDURE — 74177 CT ABD & PELVIS W/CONTRAST: CPT

## 2018-05-02 PROCEDURE — 89051 BODY FLUID CELL COUNT: CPT | Performed by: STUDENT IN AN ORGANIZED HEALTH CARE EDUCATION/TRAINING PROGRAM

## 2018-05-02 PROCEDURE — 70450 CT HEAD/BRAIN W/O DYE: CPT

## 2018-05-02 PROCEDURE — 82948 REAGENT STRIP/BLOOD GLUCOSE: CPT

## 2018-05-02 PROCEDURE — 89050 BODY FLUID CELL COUNT: CPT | Performed by: STUDENT IN AN ORGANIZED HEALTH CARE EDUCATION/TRAINING PROGRAM

## 2018-05-02 PROCEDURE — 83036 HEMOGLOBIN GLYCOSYLATED A1C: CPT | Performed by: PHYSICIAN ASSISTANT

## 2018-05-02 PROCEDURE — 99222 1ST HOSP IP/OBS MODERATE 55: CPT | Performed by: PHYSICIAN ASSISTANT

## 2018-05-02 PROCEDURE — 82105 ALPHA-FETOPROTEIN SERUM: CPT | Performed by: SURGERY

## 2018-05-02 PROCEDURE — 76881 US COMPL JOINT R-T W/IMG: CPT

## 2018-05-02 PROCEDURE — 87205 SMEAR GRAM STAIN: CPT | Performed by: STUDENT IN AN ORGANIZED HEALTH CARE EDUCATION/TRAINING PROGRAM

## 2018-05-02 RX ORDER — PANTOPRAZOLE SODIUM 40 MG/1
40 TABLET, DELAYED RELEASE ORAL
Status: DISCONTINUED | OUTPATIENT
Start: 2018-05-02 | End: 2018-05-08 | Stop reason: HOSPADM

## 2018-05-02 RX ORDER — LIDOCAINE HYDROCHLORIDE 10 MG/ML
3 INJECTION, SOLUTION EPIDURAL; INFILTRATION; INTRACAUDAL; PERINEURAL ONCE
Status: COMPLETED | OUTPATIENT
Start: 2018-05-02 | End: 2018-05-02

## 2018-05-02 RX ORDER — LORAZEPAM 1 MG/1
2 TABLET ORAL EVERY 8 HOURS PRN
Status: DISCONTINUED | OUTPATIENT
Start: 2018-05-02 | End: 2018-05-05

## 2018-05-02 RX ORDER — GUAIFENESIN/DEXTROMETHORPHAN 100-10MG/5
10 SYRUP ORAL EVERY 4 HOURS PRN
Status: DISCONTINUED | OUTPATIENT
Start: 2018-05-02 | End: 2018-05-08 | Stop reason: HOSPADM

## 2018-05-02 RX ADMIN — LORAZEPAM 1 MG: 1 TABLET ORAL at 20:57

## 2018-05-02 RX ADMIN — ROPINIROLE 3 MG: 1 TABLET, FILM COATED ORAL at 21:00

## 2018-05-02 RX ADMIN — CEFAZOLIN SODIUM 2000 MG: 10 INJECTION, POWDER, FOR SOLUTION INTRAVENOUS at 09:09

## 2018-05-02 RX ADMIN — APIXABAN 5 MG: 5 TABLET, FILM COATED ORAL at 20:52

## 2018-05-02 RX ADMIN — ALLOPURINOL 100 MG: 100 TABLET ORAL at 09:06

## 2018-05-02 RX ADMIN — APIXABAN 5 MG: 5 TABLET, FILM COATED ORAL at 09:06

## 2018-05-02 RX ADMIN — ATORVASTATIN CALCIUM 80 MG: 80 TABLET, FILM COATED ORAL at 17:29

## 2018-05-02 RX ADMIN — AMIODARONE HYDROCHLORIDE 200 MG: 200 TABLET ORAL at 09:06

## 2018-05-02 RX ADMIN — IOHEXOL 100 ML: 350 INJECTION, SOLUTION INTRAVENOUS at 15:54

## 2018-05-02 RX ADMIN — GABAPENTIN 300 MG: 300 CAPSULE ORAL at 21:00

## 2018-05-02 RX ADMIN — ALBUTEROL SULFATE 2 PUFF: 90 AEROSOL, METERED RESPIRATORY (INHALATION) at 20:42

## 2018-05-02 RX ADMIN — CEFAZOLIN SODIUM 2000 MG: 10 INJECTION, POWDER, FOR SOLUTION INTRAVENOUS at 01:34

## 2018-05-02 RX ADMIN — DIPHENHYDRAMINE HCL 25 MG: 25 TABLET ORAL at 09:06

## 2018-05-02 RX ADMIN — LIDOCAINE HYDROCHLORIDE 5 ML: 10 INJECTION, SOLUTION EPIDURAL; INFILTRATION; INTRACAUDAL; PERINEURAL at 12:00

## 2018-05-02 RX ADMIN — LORAZEPAM 1 MG: 1 TABLET ORAL at 21:28

## 2018-05-02 RX ADMIN — INSULIN LISPRO 3 UNITS: 100 INJECTION, SOLUTION INTRAVENOUS; SUBCUTANEOUS at 06:26

## 2018-05-02 RX ADMIN — CEFAZOLIN SODIUM 2000 MG: 10 INJECTION, POWDER, FOR SOLUTION INTRAVENOUS at 17:29

## 2018-05-02 RX ADMIN — SERTRALINE HYDROCHLORIDE 25 MG: 25 TABLET ORAL at 21:00

## 2018-05-02 RX ADMIN — LATANOPROST 1 DROP: 50 SOLUTION OPHTHALMIC at 21:33

## 2018-05-02 NOTE — ASSESSMENT & PLAN NOTE
-s/p resection and recurrence treated with SIR spheres embolization in 2017, now with possible recurrence   -Oncology following

## 2018-05-02 NOTE — PROGRESS NOTES
Request reviewed for potential ultrasound-guided subdeltoid collection aspiration  Referred to orthopedic physician to our [facility] radiology department for further coordination of care

## 2018-05-02 NOTE — ASSESSMENT & PLAN NOTE
-Follows with cardiologist Dr Roseann Rider at [place] as OP  -Controlled, continue amiodarone and Eliquis

## 2018-05-02 NOTE — PLAN OF CARE
Problem: Potential for Falls  Goal: Patient will remain free of falls  INTERVENTIONS:  - Assess patient frequently for physical needs  -  Identify cognitive and physical deficits and behaviors that affect risk of falls    -  [place] fall precautions as indicated by assessment   - Educate patient/family on patient safety including physical limitations  - Instruct patient to call for assistance with activity based on assessment  - Modify environment to reduce risk of injury  - Consider OT/PT consult to assist with strengthening/mobility   Outcome: Progressing      Problem: PAIN - ADULT  Goal: Verbalizes/displays adequate comfort level or baseline comfort level  Interventions:  - Encourage patient to monitor pain and request assistance  - Assess pain using appropriate pain scale  - Administer analgesics based on type and severity of pain and evaluate response  - Implement non-pharmacological measures as appropriate and evaluate response  - Consider cultural and social influences on pain and pain management  - Notify physician/advanced practitioner if interventions unsuccessful or patient reports new pain   Outcome: Progressing      Problem: INFECTION - ADULT  Goal: Absence or prevention of progression during hospitalization  INTERVENTIONS:  - Assess and monitor for signs and symptoms of infection  - Monitor lab/diagnostic results  - Monitor all insertion sites, i e  indwelling lines, tubes, and drains  - Monitor endotracheal (as able) and nasal secretions for changes in amount and color  - [place] appropriate cooling/warming therapies per order  - Administer medications as ordered  - Instruct and encourage patient and family to use good hand hygiene technique  - Identify and instruct in appropriate isolation precautions for identified infection/condition   Outcome: Progressing    Goal: Absence of fever/infection during neutropenic period  INTERVENTIONS:  - Monitor WBC  - Implement neutropenic guidelines   Outcome: Progressing      Problem: SAFETY ADULT  Goal: Maintain or return to baseline ADL function  INTERVENTIONS:  -  Assess patient's ability to carry out ADLs; assess patient's baseline for ADL function and identify physical deficits which impact ability to perform ADLs (bathing, care of mouth/teeth, toileting, grooming, dressing, etc )  - Assess/evaluate cause of self-care deficits   - Assess range of motion  - Assess patient's mobility; develop plan if impaired  - Assess patient's need for assistive devices and provide as appropriate  - Encourage maximum independence but intervene and supervise when necessary  ¯ Involve family in performance of ADLs  ¯ Assess for home care needs following discharge   ¯ Request OT consult to assist with ADL evaluation and planning for discharge  ¯ Provide patient education as appropriate   Outcome: Progressing    Goal: Maintain or return mobility status to optimal level  INTERVENTIONS:  - Assess patient's baseline mobility status (ambulation, transfers, stairs, etc )    - Identify cognitive and physical deficits and behaviors that affect mobility  - Identify mobility aids required to assist with transfers and/or ambulation (gait belt, sit-to-stand, lift, walker, cane, etc )  - [place] fall precautions as indicated by assessment  - Record patient progress and toleration of activity level on Mobility SBAR; progress patient to next Phase/Stage  - Instruct patient to call for assistance with activity based on assessment  - Request Rehabilitation consult to assist with strengthening/weightbearing, etc    Outcome: Progressing      Problem: DISCHARGE PLANNING  Goal: Discharge to home or other facility with appropriate resources  INTERVENTIONS:  - Identify barriers to discharge w/patient and caregiver  - Arrange for needed discharge resources and transportation as appropriate  - Identify discharge learning needs (meds, wound care, etc )  - Arrange for interpretive services to assist at discharge as needed  - Refer to Case Management Department for coordinating discharge planning if the patient needs post-hospital services based on physician/advanced practitioner order or complex needs related to functional status, cognitive ability, or social support system   Outcome: Progressing      Problem: Knowledge Deficit  Goal: Patient/family/caregiver demonstrates understanding of disease process, treatment plan, medications, and discharge instructions  Complete learning assessment and assess knowledge base    Interventions:  - Provide teaching at level of understanding  - Provide teaching via preferred learning methods   Outcome: Progressing

## 2018-05-02 NOTE — SOCIAL WORK
CM met with the Pt at bedside to explain the CM role and discuss any potential D/C needs  Pt lives with his wife in a 1 story home with 4STE  PTA IADL's, Pt uses SPC for ambulation  Pt has hx of HHC and OutPt PT  No hx of IP rehab, MH diagnosis or D/A abuse  Pt drives  Pt's emergency contact and reported POA is wife Florian Sanabria # [phone]  Pt's home [facility] is [facility] in [place], [place]  Wife at bedside to provide transportation home, Pt will D/C home pending cultures  CM reviewed d/c planning process including the following: identifying help at home, patient preference for d/c planning needs, Discharge Lounge, [facility] Meds to Bed program, availability of treatment team to discuss questions or concerns patient and/or family may have regarding understanding medications and recognizing signs and symptoms once discharged  CM also encouraged patient to follow up with all recommended appointments after discharge  Patient advised of importance for patient and family to participate in managing patients medical well being

## 2018-05-02 NOTE — ASSESSMENT & PLAN NOTE
-Current home meds include anoro ellipta, proventil, albuterol nebs, robitussin AC for cough  -Continue home meds  -Follows with Dr Angely Agarwal as OP

## 2018-05-02 NOTE — PROGRESS NOTES
Orthopedics   James Gins 74 y o  male MRN: [id number]  [facility]      S: L shoulder pain present but  Controlled, in no acute distress; G stain negative from shoulder aspirate      Labs:    0  Lab Value Date/Time   HCT 41 2 05/01/2018 2103   HCT 45 6 12/13/2017 0826   HCT 46 8 11/07/2017 1320   HGB 14 1 05/01/2018 2103   HGB 14 9 12/13/2017 0826   HGB 15 4 11/07/2017 1320   INR 1 03 12/13/2017 0848   WBC 7 84 05/01/2018 2103   WBC 7 69 12/13/2017 0826   WBC 6 47 11/07/2017 1320   ESR 84 (H) 05/01/2018 2103   CRP >90 0 (H) 05/01/2018 2103       Meds:    Current Facility-Administered Medications:     acetaminophen (TYLENOL) tablet 650 mg, 650 mg, Oral, Q6H PRN, Keaton Arenas    albuterol (PROVENTIL HFA,VENTOLIN HFA) inhaler 2 puff, 2 puff, Inhalation, Q6H PRN, Aliza Simon    albuterol inhalation solution 2 5 mg, 2 5 mg, Nebulization, Q4H PRN, Keaton Arenas    allopurinol (ZYLOPRIM) tablet 100 mg, 100 mg, Oral, Daily, Aliza Simon    amiodarone tablet 200 mg, 200 mg, Oral, Daily, Keaton Arenas    apixaban (ELIQUIS) tablet 5 mg, 5 mg, Oral, BID, Aliza Simon, 5 mg at 05/01/18 2216    atorvastatin (LIPITOR) tablet 80 mg, 80 mg, Oral, Daily With Leon Cortez, 80 mg at 05/01/18 2216    ceFAZolin (ANCEF) 2,000 mg in sodium chloride 0 9 % 50 mL IVPB, 2,000 mg, Intravenous, Q8H, Aliza Simon, Last Rate: 100 mL/hr at 05/02/18 0134, 2,000 mg at 05/02/18 0134    dextromethorphan-guaiFENesin (ROBITUSSIN DM)  mg/5 mL oral syrup 10 mL, 10 mL, Oral, Q4H PRN, Chanel Mariano PA-C    diazepam (VALIUM) tablet 5 mg, 5 mg, Oral, Q6H PRN, Keaton Arenas    diphenhydrAMINE (BENADRYL) tablet 25 mg, 25 mg, Oral, Daily, Keaton Arenas    gabapentin (NEURONTIN) capsule 300 mg, 300 mg, Oral, HS, Keaton Arenas, 300 mg at 05/01/18 6106    hydrOXYzine HCL (ATARAX) tablet 25 mg, 25 mg, Oral, BID PRN, Keaton Arenas    insulin lispro (HumaLOG) 100 units/mL subcutaneous injection 1-6 Units, 1-6 Units, Subcutaneous, Q6H [address] **AND** Fingerstick Glucose (POCT), , , Q6H, Abigail Buchanan PA-C    latanoprost (XALATAN) 0 005 % ophthalmic solution 1 drop, 1 drop, Both Eyes, HS, Elvie Andino, 1 drop at 05/01/18 2215    LORazepam (ATIVAN) tablet 1 mg, 1 mg, Oral, Q8H PRN, Elvie Andino    morphine injection 2 mg, 2 mg, Intravenous, Q2H PRN, Keaton Arenas    oxyCODONE (ROXICODONE) immediate release tablet 10 mg, 10 mg, Oral, Q4H PRN, Keaton Arenas    oxyCODONE (ROXICODONE) IR tablet 5 mg, 5 mg, Oral, Q4H PRN, Keaton Arenas    pantoprazole (PROTONIX) EC tablet 40 mg, 40 mg, Oral, Early Morning, Abigail Buchanan PA-C    rOPINIRole (REQUIP) tablet 3 mg, 3 mg, Oral, HS, Elvie Andino, 3 mg at 05/01/18 2218    sertraline (ZOLOFT) tablet 25 mg, 25 mg, Oral, HS, Keaton Arenas, 25 mg at 05/01/18 2217    traMADol (ULTRAM) tablet 50 mg, 50 mg, Oral, Q6H PRN, Elvie Andino    Blood Culture:   No results found for: BLOODCX    Wound Culture:   No results found for: WOUNDCULT    Ins and Outs:  I/O last 24 hours: In: 480 [P O :480]  Out: -           Physical Exam:   /69 (BP Location: Right arm)   Pulse 70   Temp 98 7 °F (37 1 °C) (Axillary)   Resp 18   Ht 5' 10" (1 778 m)   Wt 99 8 kg (220 lb)   SpO2 97%   BMI 31 57 kg/m²     Musculoskeletal: left upper extremity  · Skin pink dry and intact at shoulder  · Swelling deep to deltoid appreciated this morning  · Painful range of motion, but No micromotion tenderness  · Sensation intact to axillary, musculocutaneous, radial, ulna, median nerves  · 4+/5 motor strength to axillary, musculocutaneous, radial, ulna, median nerves  · Extremity is warm and well perfused     Assessment:  74 y  o male with  left shoulder and upper arm pain, likely exacerbation of rotator cuff tear  Underwent aspiration by PCP  No current concern for septic arthritis      Plan:   · Light use left upper extremity in sling for comfort  · Analgesia PRN  · Follow up aspiration cultures and gram stain  · Ancef 2g q8  · Will obtain ultrasound guided aspiration of subdeltoid fluid collection given swelling present on exam   · Dispo: Ortho will follow    Jinny Wall   05/02/18

## 2018-05-02 NOTE — CONSULTS
Consult- Roxi Alcarazjuan m 1945, 74 y o  male MRN: [id number]    Unit/Bed#: [facility] 926-01 Encounter: [id number]    Primary Care Provider: Jeramy London MD   Date and time admitted to hospital: 5/1/2018  8:22 PM      Inpatient consult to Internal Medicine  Consult performed by: Jelena Ham  Consult ordered by: Kain Thompson          * Left shoulder pain   Assessment & Plan    -Mgmt per ortho  -NPO for possible washout tomorrow pending fluid analysis/cultures        Type 2 diabetes mellitus ([place] [address] )   Assessment & Plan    -On novolog insulin at home prn with daily FS  -Last A1c 7/2017 8 0, will recheck  -ISS with q6 FS while NPO, change to sliding scale mealtime insulin with AC FS when no longer NPO          Stage 3 severe COPD by GOLD classification ([place] [address] )   Assessment & Plan    -Current home meds include anoro ellipta, proventil, albuterol nebs, robitussin AC for cough  -Continue home meds  -Follows with Dr Beronica Alfaro as OP        Atrial fibrillation [facility])   [address] with cardiologist Dr Gentry Felix at [facility] as OP  -Controlled, continue amiodarone and Eliquis         Sleep apnea   Assessment & Plan    -CPAP qHS        Hepatocellular carcinoma ([place] [address] )   Assessment & Plan    -s/p resection and recurrence treated with SIR spheres embolization in 2017, now with possible recurrence   -Oncology following         Esophageal cancer ([place] [address] )   Assessment & Plan    -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  -Follows with Dr Simba Levine as OP  -c/b recurrent strictures w/ dysphagia, last dilation 2/2018, follows with thoracic surgery         Anxiety and depression   Assessment & Plan    -Continue home meds Valium 5 mg q6 prn and ativan 1 mg q8 prn, Zoloft 25 mg qHS              VTE Prophylaxis: Eliquis  / Foot pumps    Recommendations for Discharge:  · D/c planning as per ortho     Counseling / Coordination of Care Time: 30 minutes    Greater than 50% of total time spent on patient counseling and coordination of care  Collaboration of Care: Were Recommendations Directly Discussed with Primary Treatment Team? - No     History of Present Illness: Roxi Romero is a 74 y o  male with PMHx esophageal CA s/p resection c/b stricture and dysphagia, hepatocellular CA, hx of cardioembolic stroke, Afib, J2YT, COPD, anxiety/depression, JAZZY, glaucoma, RLS, gout, glaucoma, hld who is originally admitted to the orthopedic service due to left shoulder pain  We are consulted for medical management of multiple medical problems including T2DM, COPD, and AFib  Pt's only complaint currently is L shoulder pain  He reports checking his blood sugars daily and currently uses novolog insulin prn  States his sugars are usually in 150-200 range  Pt follows with cardiologist Dr Gentry Felix as OP for mgmt of chronic Afib, and Dr Beronica Alfaro for severe COPD  Pt reports severe cough prior to 1-2 weeks ago but states this has improved dramatically after starting anoro ellipta inhaler  He is followed by oncology as an OP for hx of esophageal CA and recurrence of HCC  Review of Systems:    Review of Systems   Constitutional: Negative for activity change, chills, fatigue, fever and unexpected weight change  HENT: Negative for congestion and sore throat  Eyes: Negative for photophobia  Respiratory: Negative for cough, choking, chest tightness, shortness of breath and wheezing  Cardiovascular: Negative for chest pain, palpitations and leg swelling  Gastrointestinal: Negative for abdominal distention, abdominal pain, blood in stool, constipation, diarrhea, nausea and vomiting  Genitourinary: Negative for dysuria  Musculoskeletal: Positive for arthralgias  Negative for back pain and neck pain  Skin: Negative for rash  Neurological: Negative for dizziness, seizures, syncope, light-headedness and headaches         Past Medical and Surgical History:     Past Medical History:   Diagnosis Date    Anemia  Arthritis     Coronary artery disease     Diabetes mellitus ([facility] [address] )     Dysphagia     Esophageal cancer (HCC)     Gout     Hyperlipidemia     Hypertension     Restless leg syndrome     Rheumatoid arthritis ([facility] [address] )     Sleep apnea     Vision abnormalities        Past Surgical History:   Procedure Laterality Date    APPENDECTOMY      CHOLECYSTECTOMY      GASTROJEJUNOSTOMY W/ JEJUNOSTOMY TUBE N/A 8/4/2016    Procedure: INSERTION JEJUNOSTOMY TUBE OPEN;  Surgeon: Brandi Yu MD;  [facility];  Service:     KNEE ARTHROSCOPY      LIVER RESECTION N/A 10/31/2016    Procedure: INTRAOPERATIVE ULTRASOUND OF LIVER; LIVER LESION RESECTION/ABLATION ;  Surgeon: Brandi Yu MD;  [facility];  Service:    Joyceaphu Fierro PLACEMENT      [place] EGD INSERT GUIDE WIRE DILATOR PASSAGE ESOPHAGUS N/A 2/14/2018    Procedure: ESOPHAGOGASTRODUODENOSCOPY (EGD) with dilation;  Surgeon: Gordon Valero MD;  [facility];  Service: Thoracic    [place] ESOPHAGOGASTRODUODENOSCOPY TRANSORAL DIAGNOSTIC N/A 10/31/2016    Procedure: ESOPHAGOGASTRODUODENOSCOPY (EGD);   Surgeon: Brandi Yu MD;  [facility];  Service: Surgical Oncology    [place] ESOPHAGOSCOPY FLEX BALLOON DILAT <30 MM DIAM N/A 1/6/2017    Procedure: DILATATION ESOPHAGEAL;  Surgeon: Gordon Valero MD;  [facility];  Service: Thoracic    [place] ESOPHAGOSCOPY FLEX BALLOON DILAT <30 MM DIAM N/A 12/12/2016    Procedure: DILATATION ESOPHAGEAL, EGD;  Surgeon: Gordon Valero MD;  [facility];  Service: Thoracic    [place] ESOPHAGOSCOPY FLEX BALLOON DILAT <30 MM DIAM N/A 5/24/2017    Procedure: EGD WITH DILATION ;  Surgeon: Kelle Camp MD;  [facility];  Service: Thoracic    [place] ESOPHAGOSCOPY FLEX BALLOON DILAT <30 MM DIAM N/A 8/3/2017    Procedure: EGD WITH DILATION;  Surgeon: Gordon Valero MD;  [facility];  Service: Thoracic    [place] REMOVAL [address] THORACOTOMY N/A 10/31/2016    Procedure: ESOPHAGECTOMY; Surgeon: Andi Engle MD;  [facility];  Service: Surgical Oncology    TONSILLECTOMY         Meds/Allergies:    all medications and allergies reviewed    Allergies: Allergies   Allergen Reactions    Humira [Adalimumab] Rash    Levaquin [Levofloxacin In D5w] Rash    Levofloxacin Rash    Lovenox [Enoxaparin] Rash       Social History:     Marital Status: /Civil Union    Substance Use History:   History   Alcohol Use No     History   Smoking Status    Former Smoker    Packs/day: 3 00    Years: 40 00    Types: Cigarettes    Quit date: 4/13/1991   Smokeless Tobacco    Never Used     History   Drug Use No       Family History:    Family History   Problem Relation Age of Onset    Stroke Mother     Colon cancer Father     Other Father      Pneumoconiosis    Cancer Sister      oral cancer       Physical Exam:     Vitals:   Blood Pressure: 110/69 (05/01/18 2300)  Pulse: 70 (05/01/18 2300)  Temperature: 98 7 °F (37 1 °C) (05/01/18 2300)  Temp Source: Axillary (05/01/18 2300)  Respirations: 18 (05/01/18 2300)  Height: 5' 10" (177 8 cm) (05/01/18 2300)  Weight - Scale: 99 8 kg (220 lb) (05/01/18 2300)  SpO2: 97 % (05/01/18 2300)    Physical Exam   Constitutional: He is oriented to person, place, and time  He appears well-developed and well-nourished  No distress  Pleasant and cooperative, wife at bedside   HENT:   Head: Normocephalic  Mouth/Throat: Oropharynx is clear and moist    Eyes: Conjunctivae and EOM are normal  Pupils are equal, round, and reactive to light  Right eye exhibits no discharge  Left eye exhibits no discharge  No scleral icterus  Blind spot left eye, 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   Neck: Normal range of motion  Neck supple  Cardiovascular: Normal rate, regular rhythm, normal heart sounds and intact distal pulses  No murmur heard  Pulses:       Radial pulses are 2+ on the right side, and 2+ on the left side          Dorsalis pedis pulses are 2+ on the right side, and 2+ on the left side  Posterior tibial pulses are 2+ on the right side, and 2+ on the left side  Pulmonary/Chest: Effort normal  No respiratory distress  He has no wheezes  He has no rales  Decreased breath sounds at bases b/l   Abdominal: Soft  Bowel sounds are normal  He exhibits no distension  There is no tenderness  There is no guarding  Musculoskeletal: He exhibits no edema or deformity  L shoulder sling in place   Neurological: He is alert and oriented to person, place, and time  He has normal strength  No cranial nerve deficit or sensory deficit  Coordination normal    Skin: Skin is warm and dry  He is not diaphoretic  Psychiatric: He has a normal mood and affect  Additional Data:     Lab Results: I have personally reviewed pertinent reports  Results from last 7 days  Lab Units 05/01/18  2103   WBC Thousand/uL 7 84   HEMOGLOBIN g/dL 14 1   HEMATOCRIT % 41 2   PLATELETS Thousands/uL 177   NEUTROS PCT % 77*   LYMPHS PCT % 8*   MONOS PCT % 14*   EOS PCT % 1       Results from last 7 days  Lab Units 05/01/18  2103   SODIUM mmol/L 135*   POTASSIUM mmol/L 4 4   CHLORIDE mmol/L 100   CO2 mmol/L 31   BUN mg/dL 16   CREATININE mg/dL 0 89   CALCIUM mg/dL 8 7   GLUCOSE RANDOM mg/dL 200*             Lab Results   Component Value Date/Time    HGBA1C 8 9 (H) 05/02/2018 05:20 AM    HGBA1C 8 0 (H) 07/28/2017 02:50 PM    HGBA1C 7 3 (H) 11/10/2016 05:11 AM       Results from last 7 days  Lab Units 05/02/18  0601   POC GLUCOSE mg/dl 240*       Imaging: I have personally reviewed pertinent reports  XR shoulder 2+ vw left   Final Result by Theresa Colunga DO (05/01 2059)      No acute osseous abnormality  Degenerative changes as above  Workstation performed: [id number]             EKG, Pathology, and Other Studies Reviewed on Admission:   · EKG: n/a    ** Please Note: This note has been constructed using a voice recognition system   **

## 2018-05-02 NOTE — CASE MANAGEMENT
Initial Clinical Review    Admission: Date/Time/Statement: 5/1 @ 1925    Orders Placed This Encounter   Procedures    Inpatient Admission     Standing Status:   Standing     Number of Occurrences:   1     Order Specific Question:   Admitting Physician     Answer:   Isidro Abreu [[id number]]     Order Specific Question:   Level of Care     Answer:   Med Surg [[id number]]     Order Specific Question:   Estimated length of stay     Answer:   More than 2 Midnights     Order Specific Question:   Certification     Answer:   I certify that inpatient services are medically necessary for this patient for a duration of greater than two midnights  See H&P and MD Progress Notes for additional information about the patient's course of treatment  ED: Date/Time/Mode of Arrival:   ED Arrival Information     Expected Arrival Acuity Means of Arrival Escorted By Service Admission Type    5/1/2018 16:05 5/1/2018 20:14 Urgent Walk-In Self Orthopedic Surgery Urgent    Arrival Complaint    SHOULDER INFECTION          Chief Complaint:   Chief Complaint   Patient presents with    Shoulder Pain     pt was seen at PCP today and told to come to ED to have shoulder drained and ABX tx       History of Illness: 74 y  o male complaining of left shoulder pain that started 1 or 2 weeks ago without any acute inciting injury  Pain is located over the anterior upper arm region  Pain is not associated with numbness or tingling  He does have pain with movement and is relieved with rest   Patient is right-hand dominant  He does have a history of left rotator cuff tear is a however, however the pain is new   PCP aspirated fluid from the lateral shoulder and it is sent for fluid analysis and cultures      ED Vital Signs:   ED Triage Vitals [05/01/18 2020]   Temperature Pulse Respirations Blood Pressure SpO2   98 9 °F (37 2 °C) 66 20 130/69 97 %      Temp Source Heart Rate Source Patient Position - Orthostatic VS BP Location FiO2 (%)   Tympanic Monitor Sitting Left arm --      Pain Score       8        Wt Readings from Last 1 Encounters:   05/01/18 99 8 kg (220 lb)       Vital Signs (abnormal): WNL    Abnormal Labs:    05/01/18 2103    CRP <3 0 mg/L >90 0        05/01/18 2103    Sodium 136 - 145 mmol/L 135       Diagnostic Test Results: Xray Left Shoulder - No acute osseous abnormality      CT Chest/ Abd/ Pelvis - pending    ED Treatment:   Medication Administration from 05/01/2018 1604 to 05/01/2018 2301       Date/Time Order Dose Route Action     05/01/2018 2216 apixaban (ELIQUIS) tablet 5 mg 5 mg Oral Given     05/01/2018 2216 atorvastatin (LIPITOR) tablet 80 mg 80 mg Oral Given     05/01/2018 2216 gabapentin (NEURONTIN) capsule 300 mg 300 mg Oral Given     05/01/2018 2218 rOPINIRole (REQUIP) tablet 3 mg 3 mg Oral Given     05/01/2018 2217 sertraline (ZOLOFT) tablet 25 mg 25 mg Oral Given     05/01/2018 2215 latanoprost (XALATAN) 0 005 % ophthalmic solution 1 drop 1 drop Both Eyes Given          Past Medical/Surgical History:    Active Ambulatory Problems     Diagnosis Date Noted    Esophageal cancer ([facility] ) 08/04/2016    Hepatocellular carcinoma ([facility] ) 10/31/2016    Atrial fibrillation ([facility] ) 11/01/2016    Hypotension 11/03/2016    Type 2 diabetes mellitus ([facility] ) 11/03/2016    Vision abnormalities     Dysphagia 12/12/2016    Radiation esophagitis 08/02/2016    Restless legs syndrome 06/15/2016    Rheumatoid arthritis ([facility] ) 06/13/2016    Sleep apnea 02/06/2018    Hyperlipidemia 06/15/2016    Hypertension 06/13/2016    Peripheral neuropathy 07/28/2017    Epidermoid cyst of neck 01/29/2014    Glaucoma 04/04/2018    Gout 06/15/2016    Incisional hernia 12/10/2013    Cough 04/11/2018    Stage 3 severe COPD by GOLD classification ([facility] ) 04/11/2018    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     Resolved Ambulatory Problems     Diagnosis Date Noted    Empyema ([facility] ) 03/25/2017     Past Medical History:   Diagnosis Date    Anemia     Arthritis     Coronary artery disease     Diabetes mellitus ([facility] )     Dysphagia     Esophageal cancer (HCC)     Gout     Hyperlipidemia     Hypertension     Restless leg syndrome     Rheumatoid arthritis (HCC)     Sleep apnea     Vision abnormalities        Admitting Diagnosis: Malignant neoplasm of lower third of esophagus (HCC) [C15 5]  Secondary hypertension [I15 9]  Hypokalemia [E87 6]  Hypomagnesemia [E83 42]  Hepatocellular carcinoma (HCC) [C22 0]  Shoulder pain [M25 519]  Acute cardioembolic stroke ([place] [address] ) [K72 8]  Atrial fibrillation, unspecified type ([place] [address] ) [I48 91]  Hyperlipidemia, unspecified hyperlipidemia type [E78 5]  Stage 3 severe COPD by GOLD classification ([place] [address] ) [J44 9]  Type 2 diabetes mellitus with diabetic chronic kidney disease, unspecified CKD stage, unspecified whether long term insulin use ([place] [address] ) [E11 22]    Age/Sex: 74 y o  male      Assessment:  74 y  o male with  left shoulder and upper arm pain, likely exacerbation of rotator cuff tear  Underwent aspiration by PCP   Low concern for septic arthritis      Plan:   · Non weight bearing left upper extremity in sling for comfort  · Admit overnight for observation  · Analgesia PRN  · Follow up aspiration cultures and gram stain  · NPO at midnight  · Ancef 2g q8  · Dispo: Ortho will follow      Admission Orders:  NPO  US guided abscess drain  Body fluid culture and Gram stain  Internal Medicine cons  Oncology cons    Scheduled Meds:   Current Facility-Administered Medications:  allopurinol 100 mg Oral Daily   amiodarone 200 mg Oral Daily   apixaban 5 mg Oral BID   atorvastatin 80 mg Oral Daily With Dinner   cefazolin 2,000 mg Intravenous Q8H   diphenhydrAMINE 25 mg Oral Daily   gabapentin 300 mg Oral HS   insulin lispro 1-6 Units Subcutaneous TID With Meals   latanoprost 1 drop Both Eyes HS   LORazepam 1 mg Oral Q8H PRN   pantoprazole 40 mg Oral Early Morning   rOPINIRole 3 mg Oral HS   sertraline 25 mg Oral HS     Continuous Infusions:    PRN Meds:     acetaminophen    albuterol    dextromethorphan-guaiFENesin    diazepam    hydrOXYzine HCL    LORazepam    morphine injection    oxyCODONE    traMADol    ------------------------------------------------------------------------    5/2 Internal Medicine cons:   * Left shoulder pain   Assessment & Plan     -Mgmt per ortho  -NPO for possible washout tomorrow pending fluid analysis/cultures          Type 2 diabetes mellitus (HCC)   Assessment & Plan     -On novolog insulin at home prn with daily FS  -Last A1c 7/2017 8 0, will recheck  -ISS with q6 FS while NPO, change to sliding scale mealtime insulin with AC FS when no longer NPO             Stage 3 severe COPD by GOLD classification ([facility] )   Assessment & Plan     -Current home meds include anoro ellipta, proventil, albuterol nebs, robitussin AC for cough  -Continue home meds  -Follows with Dr Candido Henry as OP          Atrial fibrillation [facility])   Assessment & Plan     -Follows with cardiologist Dr Abida Cee at [place] as OP  -Controlled, continue amiodarone and Eliquis           Sleep apnea   Assessment & Plan     -CPAP qHS          Hepatocellular carcinoma ([facility] )   Assessment & Plan     -s/p resection and recurrence treated with SIR spheres embolization in 2017, now with possible recurrence   -Oncology following           Esophageal cancer ([facility] )   Assessment & Plan     -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  -Follows with Dr Maurice Brennan as OP  -c/b recurrent strictures w/ dysphagia, last dilation 2/2018, follows with thoracic surgery           Anxiety and depression   Assessment & Plan     -Continue home meds Valium 5 mg q6 prn and ativan 1 mg q8 prn, Zoloft 25 mg qHS             VTE Prophylaxis: Eliquis  / Foot pumps     Recommendations for Discharge:  · D/c planning as per ortho

## 2018-05-02 NOTE — PLAN OF CARE
Problem: Potential for Falls  Goal: Patient will remain free of falls  INTERVENTIONS:  - Assess patient frequently for physical needs  -  Identify cognitive and physical deficits and behaviors that affect risk of falls    -  Pyatt fall precautions as indicated by assessment   - Educate patient/family on patient safety including physical limitations  - Instruct patient to call for assistance with activity based on assessment  - Modify environment to reduce risk of injury  - Consider OT/PT consult to assist with strengthening/mobility   Outcome: Progressing      Problem: PAIN - ADULT  Goal: Verbalizes/displays adequate comfort level or baseline comfort level  Interventions:  - Encourage patient to monitor pain and request assistance  - Assess pain using appropriate pain scale  - Administer analgesics based on type and severity of pain and evaluate response  - Implement non-pharmacological measures as appropriate and evaluate response  - Consider cultural and social influences on pain and pain management  - Notify physician/advanced practitioner if interventions unsuccessful or patient reports new pain  Outcome: Progressing      Problem: INFECTION - ADULT  Goal: Absence or prevention of progression during hospitalization  INTERVENTIONS:  - Assess and monitor for signs and symptoms of infection  - Monitor lab/diagnostic results  - Monitor all insertion sites, i e  indwelling lines, tubes, and drains  - Monitor endotracheal (as able) and nasal secretions for changes in amount and color  - Pyatt appropriate cooling/warming therapies per order  - Administer medications as ordered  - Instruct and encourage patient and family to use good hand hygiene technique  - Identify and instruct in appropriate isolation precautions for identified infection/condition  Outcome: Progressing    Goal: Absence of fever/infection during neutropenic period  INTERVENTIONS:  - Monitor WBC  - Implement neutropenic guidelines  Outcome: Progressing

## 2018-05-02 NOTE — CONSULTS
Consultation - Surgical Oncology  Andie Carrizales 74 y o  male MRN: [id number]  Unit/Bed#: [facility] 926-01 Encounter: [id number]    Assessment/Plan     Assessment:  75 yo M with recurrent [address]  s/p chemo/radiation Dec 2017, hx esophageal adenoCa s/p transhiatal esophagectomy Oct 2016    Plan:  - diet as tolerated pending ortho eval, pt NPO now per ortho  - antibiotics per orthopedic surgery  - marinol for appetite stimulation when allowed diet  - repeat AFP with AM labs  - outpatient PET scan for evaluation of enlarging segment 4 nodule and intraabdominal lymph node    History of Present Illness     HPI:  Andie Carrizales is a 74 y o  male who presents with left shoulder pain, admitted to ortho for further workup  Patient is well known to surgical oncology service  He was last seen by Dr Uzair Deshpande in the office 4/5/2018, last underwent esophageal dilation by Dr Stevan Barrientos on 2/14/2018  Patient reports continued poor appetite  He reports that he is currently undergoing evaluation for enlarging liver nodule and new intraabdominal lymph node  Currently awaiting PET scan  Per records, last AFP elevated to 17, and plan was continue to trend  Consults    Review of Systems   Constitutional: Positive for appetite change (poor)  HENT: Negative  Eyes: Negative  Respiratory: Negative  Cardiovascular: Negative  Gastrointestinal: Negative  Endocrine: Negative  Genitourinary: Negative  Musculoskeletal:        Left shoulder pain   Skin: Negative  Allergic/Immunologic: Negative  Neurological: Negative  Hematological: Negative  Psychiatric/Behavioral: Negative          Historical Information   Past Medical History:   Diagnosis Date    Anemia     Arthritis     Coronary artery disease     Diabetes mellitus ([address] )     Dysphagia     Esophageal cancer (HCC)     Gout     Hyperlipidemia     Hypertension     Restless leg syndrome     Rheumatoid arthritis (HCC)     Sleep apnea     Vision abnormalities Past Surgical History:   Procedure Laterality Date    APPENDECTOMY      CHOLECYSTECTOMY      GASTROJEJUNOSTOMY W/ JEJUNOSTOMY TUBE N/A 8/4/2016    Procedure: INSERTION JEJUNOSTOMY TUBE OPEN;  Surgeon: Kristen Gonzales MD;  [facility];  Service:     KNEE ARTHROSCOPY      LIVER RESECTION N/A 10/31/2016    Procedure: INTRAOPERATIVE ULTRASOUND OF LIVER; LIVER LESION RESECTION/ABLATION ;  Surgeon: Kristen Goznales MD;  [facility];  Service:    Jonda Minium PLACEMENT      [place] EGD INSERT GUIDE WIRE DILATOR PASSAGE ESOPHAGUS N/A 2/14/2018    Procedure: ESOPHAGOGASTRODUODENOSCOPY (EGD) with dilation;  Surgeon: Jabari Harper MD;  [facility];  Service: Thoracic    NJ ESOPHAGOGASTRODUODENOSCOPY TRANSORAL DIAGNOSTIC N/A 10/31/2016    Procedure: ESOPHAGOGASTRODUODENOSCOPY (EGD);   Surgeon: Kristen Gonzales MD;  [facility];  Service: Surgical Oncology    NJ ESOPHAGOSCOPY FLEX BALLOON DILAT <30 MM DIAM N/A 1/6/2017    Procedure: DILATATION ESOPHAGEAL;  Surgeon: Jabari Harper MD;  [facility];  Service: Thoracic    NJ ESOPHAGOSCOPY FLEX BALLOON DILAT <30 MM DIAM N/A 12/12/2016    Procedure: DILATATION ESOPHAGEAL, EGD;  Surgeon: Jabari Harper MD;  [facility];  Service: Thoracic    NJ ESOPHAGOSCOPY FLEX BALLOON DILAT <30 MM DIAM N/A 5/24/2017    Procedure: EGD WITH DILATION ;  Surgeon: Marianela Mendiola MD;  [facility];  Service: Thoracic    NJ ESOPHAGOSCOPY FLEX BALLOON DILAT <30 MM DIAM N/A 8/3/2017    Procedure: EGD WITH DILATION;  Surgeon: Jabari Harper MD;  [facility];  Service: Thoracic    NJ REMOVAL [address] THORACOTOMY N/A 10/31/2016    Procedure: ESOPHAGECTOMY;  Surgeon: Kristen Gonzales MD;  [facility];  Service: Surgical Oncology    TONSILLECTOMY       Social History   History   Alcohol Use No     History   Drug Use No     History   Smoking Status    Former Smoker    Packs/day: 3 00    Years: 40 00    Types: Cigarettes    Quit date: 4/13/1991   Smokeless Tobacco    Never Used     Family History:   Family History   Problem Relation Age of Onset    Stroke Mother     Colon cancer Father     Other Father      Pneumoconiosis    Cancer Sister      oral cancer       Meds/Allergies   all current active meds have been reviewed, current meds:   Current Facility-Administered Medications   Medication Dose Route Frequency    acetaminophen (TYLENOL) tablet 650 mg  650 mg Oral Q6H PRN    albuterol (PROVENTIL HFA,VENTOLIN HFA) inhaler 2 puff  2 puff Inhalation Q6H PRN    albuterol inhalation solution 2 5 mg  2 5 mg Nebulization Q4H PRN    allopurinol (ZYLOPRIM) tablet 100 mg  100 mg Oral Daily    amiodarone tablet 200 mg  200 mg Oral Daily    apixaban (ELIQUIS) tablet 5 mg  5 mg Oral BID    atorvastatin (LIPITOR) tablet 80 mg  80 mg Oral Daily With Dinner    ceFAZolin (ANCEF) 2,000 mg in sodium chloride 0 9 % 50 mL IVPB  2,000 mg Intravenous Q8H    diazepam (VALIUM) tablet 5 mg  5 mg Oral Q6H PRN    diphenhydrAMINE (BENADRYL) tablet 25 mg  25 mg Oral Daily    gabapentin (NEURONTIN) capsule 300 mg  300 mg Oral HS    hydrOXYzine HCL (ATARAX) tablet 25 mg  25 mg Oral BID PRN    latanoprost (XALATAN) 0 005 % ophthalmic solution 1 drop  1 drop Both Eyes HS    LORazepam (ATIVAN) tablet 1 mg  1 mg Oral Q8H PRN    morphine injection 2 mg  2 mg Intravenous Q2H PRN    oxyCODONE (ROXICODONE) immediate release tablet 10 mg  10 mg Oral Q4H PRN    oxyCODONE (ROXICODONE) IR tablet 5 mg  5 mg Oral Q4H PRN    rOPINIRole (REQUIP) tablet 3 mg  3 mg Oral HS    sertraline (ZOLOFT) tablet 25 mg  25 mg Oral HS    traMADol (ULTRAM) tablet 50 mg  50 mg Oral Q6H PRN    and PTA meds:   Prior to Admission Medications   Prescriptions Last Dose Informant Patient Reported? Taking?    LORazepam (ATIVAN) 1 mg tablet 5/1/2018 at Unknown time  Yes Yes   Sig: Take 1 mg by mouth every 8 (eight) hours as needed for anxiety   Umeclidinium-Vilanterol (ANORO ELLIPTA) 62 5-25 MCG/INH AEPB 5/1/2018 at Unknown time  No Yes   Sig: Inhale 1 puff daily   albuterol (2 5 mg/3 mL) 0 083 % nebulizer solution 5/1/2018 at Unknown time  Yes Yes   Sig: Take 1 ampule by nebulization every 4 (four) hours as needed for wheezing     albuterol (PROVENTIL HFA,VENTOLIN HFA) 90 mcg/act inhaler 5/1/2018 at Unknown time  No Yes   Sig: Inhale 2 puffs every 6 (six) hours as needed for wheezing or shortness of breath (or cough)   allopurinol (ZYLOPRIM) 100 mg tablet 5/1/2018 at Unknown time  Yes Yes   Sig: Take 100 mg by mouth daily   amiodarone 200 mg tablet 5/1/2018 at Unknown time  Yes Yes   Sig: Take 200 mg by mouth daily   apixaban (ELIQUIS) 5 mg 5/1/2018 at Unknown time  Yes Yes   Sig: Take 5 mg by mouth 2 (two) times a day   dexlansoprazole (DEXILANT) 60 MG capsule   Yes No   Sig: Take 60 mg by mouth daily   diazepam (VALIUM) 5 mg tablet   Yes No   Sig: Take 5 mg by mouth every 6 (six) hours as needed for anxiety   diphenhydrAMINE (BENADRYL) 25 mg capsule   Yes No   Sig: Take 25 mg by mouth daily   gabapentin (NEURONTIN) 100 mg capsule 5/1/2018 at Unknown time  Yes Yes   Sig: Take by mouth   guaifenesin-codeine (GUAIFENESIN AC) 100-10 MG/5ML liquid Unknown at Unknown time  No No   Sig: Take 5 mL by mouth 3 (three) times a day as needed for cough   hydrOXYzine HCL (ATARAX) 25 mg tablet 5/1/2018 at Unknown time  Yes Yes   Sig: Take 25 mg by mouth 2 (two) times a day as needed for itching   insulin aspart (NovoLOG) 100 units/mL injection 5/1/2018 at Unknown time  Yes Yes   Sig: Inject under the skin as needed for high blood sugar   latanoprost (XALATAN) 0 005 % ophthalmic solution 5/1/2018 at Unknown time  Yes Yes   Sig: Administer 1 drop to both eyes daily at bedtime     rOPINIRole (REQUIP) 1 mg tablet 5/1/2018 at Unknown time Self Yes Yes   Sig: Take 3 mg by mouth daily at bedtime     sertraline (ZOLOFT) 25 mg tablet 5/1/2018 at Unknown time  Yes Yes   Sig: Take 25 mg by mouth daily at bedtime     traMADol (ULTRAM) 50 mg tablet Unknown at Unknown time  Yes No   Sig: Take 50 mg by mouth every 6 (six) hours as needed for moderate pain      Facility-Administered Medications: None     Allergies   Allergen Reactions    Humira [Adalimumab] Rash    Levaquin [Levofloxacin In D5w] Rash    Levofloxacin Rash    Lovenox [Enoxaparin] Rash       Objective   First Vitals:   Blood Pressure: 130/69 (05/01/18 2020)  Pulse: 66 (05/01/18 2020)  Temperature: 98 9 °F (37 2 °C) (05/01/18 2020)  Temp Source: Tympanic (05/01/18 2020)  Respirations: 20 (05/01/18 2020)  Height: 5' 10" (177 8 cm) (05/01/18 2300)  Weight - Scale: 99 8 kg (220 lb) (05/01/18 2020)  SpO2: 97 % (05/01/18 2020)    Current Vitals:   Blood Pressure: 110/69 (05/01/18 2300)  Pulse: 70 (05/01/18 2300)  Temperature: 98 7 °F (37 1 °C) (05/01/18 2300)  Temp Source: Axillary (05/01/18 2300)  Respirations: 18 (05/01/18 2300)  Height: 5' 10" (177 8 cm) (05/01/18 2300)  Weight - Scale: 99 8 kg (220 lb) (05/01/18 2300)  SpO2: 97 % (05/01/18 2300)    No intake or output data in the 24 hours ending 05/02/18 0112    Invasive Devices     Peripheral Intravenous Line            Peripheral IV 05/01/18 Right Forearm less than 1 day                Physical Exam   Constitutional: He is oriented to person, place, and time  He appears well-developed and well-nourished  HENT:   Head: Normocephalic and atraumatic  Eyes: Pupils are equal, round, and reactive to light  Neck: Normal range of motion  Cardiovascular: Normal rate and regular rhythm  Pulmonary/Chest: Effort normal    Abdominal: Soft  He exhibits no distension  There is no tenderness  Musculoskeletal:   Left shoulder in sling   Neurological: He is alert and oriented to person, place, and time  Skin: Skin is warm and dry         Lab Results:   CBC:   Lab Results   Component Value Date    WBC 7 84 05/01/2018    HGB 14 1 05/01/2018    HCT 41 2 05/01/2018    MCV 90 05/01/2018     05/01/2018 MCH 30 7 05/01/2018    MCHC 34 2 05/01/2018    RDW 13 3 05/01/2018    MPV 11 0 05/01/2018    NRBC 0 05/01/2018   , CMP:   Lab Results   Component Value Date     (L) 05/01/2018    K 4 4 05/01/2018     05/01/2018    CO2 31 05/01/2018    ANIONGAP 4 05/01/2018    BUN 16 05/01/2018    CREATININE 0 89 05/01/2018    GLUCOSE 200 (H) 05/01/2018    CALCIUM 8 7 05/01/2018    EGFR 84 05/01/2018   , Coagulation: No results found for: PT, INR, APTT  Imaging: I have personally reviewed pertinent reports  and I have personally reviewed pertinent films in PACS  EKG, Pathology, and Other Studies: I have personally reviewed pertinent reports  and I have personally reviewed pertinent films in PACS    Counseling / Coordination of Care  Total floor / unit time spent today 20 minutes  Greater than 50% of total time was spent with the patient and / or family counseling and / or coordination of care

## 2018-05-02 NOTE — H&P
Orthopedics   James Millan 74 y o  male MRN: [id number]  Unit/Bed#: X ray      Chief Complaint:   left shoulder pain    HPI:  74 y  o male complaining of left shoulder pain that started 1 or 2 weeks ago without any acute inciting injury  Pain is located over the anterior upper arm region  Pain is not associated with numbness or tingling  He does have pain with movement and is relieved with rest   Patient is right-hand dominant  He does have a history of left rotator cuff tear is a however, however the pain is new   PCP aspirated fluid from the lateral shoulder and it is sent for fluid analysis and cultures    Review Of Systems:   · Skin:   Intact  · Neuro: See HPI  · Musculoskeletal: See HPI  · 14 point review of systems negative except as stated above     Past Medical History:   Past Medical History:   Diagnosis Date    Anemia     Arthritis     Coronary artery disease     Diabetes mellitus [address] [address]     Dysphagia     Esophageal cancer ([facility] [address]     Gout     Hyperlipidemia     Hypertension     Restless leg syndrome     Rheumatoid arthritis ([facility] [address]     Sleep apnea     Vision abnormalities        Past Surgical History:   Past Surgical History:   Procedure Laterality Date    APPENDECTOMY      CHOLECYSTECTOMY      GASTROJEJUNOSTOMY W/ JEJUNOSTOMY TUBE N/A 8/4/2016    Procedure: INSERTION JEJUNOSTOMY TUBE OPEN;  Surgeon: Flory Herrera MD;  [facility];  Service:     KNEE ARTHROSCOPY      LIVER RESECTION N/A 10/31/2016    Procedure: INTRAOPERATIVE ULTRASOUND OF LIVER; LIVER LESION RESECTION/ABLATION ;  Surgeon: Flory Herrera MD;  [facility];  Service:    Lisa Certain PLACEMENT      VT EGD INSERT GUIDE WIRE DILATOR PASSAGE ESOPHAGUS N/A 2/14/2018    Procedure: ESOPHAGOGASTRODUODENOSCOPY (EGD) with dilation;  Surgeon: Rohith Hu MD;  [facility]: [facility];  Service: Thoracic    VT ESOPHAGOGASTRODUODENOSCOPY TRANSORAL DIAGNOSTIC N/A 10/31/2016    Procedure: ESOPHAGOGASTRODUODENOSCOPY (EGD); Surgeon: Destiny Boggs MD;  [facility];  Service: Surgical Oncology    NJ ESOPHAGOSCOPY FLEX Tracy Childes <30 MM DIAM N/A 1/6/2017    Procedure: DILATATION ESOPHAGEAL;  Surgeon: Martina Ambrocio MD;  [facility];  Service: Thoracic    NJ ESOPHAGOSCOPY FLEX BALLOON DILAT <30 MM DIAM N/A 12/12/2016    Procedure: DILATATION ESOPHAGEAL, EGD;  Surgeon: Martina Ambrocio MD;  [facility];  Service: Thoracic    NJ ESOPHAGOSCOPY FLEX Tracy Childes <30 MM DIAM N/A 5/24/2017    Procedure: EGD WITH DILATION ;  Surgeon: Hiren Hager MD;  [facility];  Service: Thoracic    NJ ESOPHAGOSCOPY FLEX Tracy Childes <30 MM DIAM N/A 8/3/2017    Procedure: EGD WITH DILATION;  Surgeon: Martina Ambrocio MD;  [facility];  Service: Thoracic    NJ REMOVAL [address] THORACOTOMY N/A 10/31/2016    Procedure: ESOPHAGECTOMY;  Surgeon: Destiny Boggs MD;  [facility];  Service: Surgical Oncology    TONSILLECTOMY         Family History:  Family history reviewed and non-contributory  Family History   Problem Relation Age of Onset    Stroke Mother     Colon cancer Father     Other Father      Pneumoconiosis    Cancer Sister      oral cancer       Social History:  Social History     Social History    Marital status: /Civil Union     Spouse name: N/A    Number of children: N/A    Years of education: N/A     Occupational History          Social History Main Topics    Smoking status: Former Smoker     Packs/day: 3 00     Years: 40 00     Types: Cigarettes     Quit date: 4/13/1991    Smokeless tobacco: Never Used    Alcohol use No    Drug use: No    Sexual activity: Not Currently     Other Topics Concern    None     Social History Narrative    None       Allergies:    Allergies   Allergen Reactions    Humira [Adalimumab] Rash    Levaquin [Levofloxacin In D5w] Rash    Levofloxacin Rash    Lovenox [Enoxaparin] Rash           Labs:    0  Lab Value Date/Time   HCT 45 6 12/13/2017 0826   HCT 46 8 11/07/2017 1320   HCT 41 0 10/11/2017 0915   HGB 14 9 12/13/2017 0826   HGB 15 4 11/07/2017 1320   HGB 13 4 10/11/2017 0915   INR 1 03 12/13/2017 0848   WBC 7 69 12/13/2017 0826   WBC 6 47 11/07/2017 1320   WBC 5 73 10/11/2017 0915       Meds:    Current Facility-Administered Medications:     albuterol (PROVENTIL HFA,VENTOLIN HFA) inhaler 2 puff, 2 puff, Inhalation, Q6H PRN, Gabrielle Muñiz    albuterol inhalation solution 2 5 mg, 2 5 mg, Nebulization, Q4H PRN, Keaton Arenas    [START ON 5/2/2018] allopurinol (ZYLOPRIM) tablet 100 mg, 100 mg, Oral, Daily, Keaton Arenas    [START ON 5/2/2018] amiodarone tablet 200 mg, 200 mg, Oral, Daily, Keaton Arenas    apixaban (ELIQUIS) tablet 5 mg, 5 mg, Oral, BID, Keaton Arenas    atorvastatin (LIPITOR) tablet 80 mg, 80 mg, Oral, Daily With Dinner, Keaton Arenas    diazepam (VALIUM) tablet 5 mg, 5 mg, Oral, Q6H PRN, Keaton Arenas    [START ON 5/2/2018] diphenhydrAMINE (BENADRYL) capsule 25 mg, 25 mg, Oral, Daily, Keaton Arenas    gabapentin (NEURONTIN) capsule 300 mg, 300 mg, Oral, HS, Keaton Arenas    hydrOXYzine HCL (ATARAX) tablet 25 mg, 25 mg, Oral, BID PRN, Keaton Arenas    latanoprost (XALATAN) 0 005 % ophthalmic solution 1 drop, 1 drop, Both Eyes, HS, Keaton Arenas    LORazepam (ATIVAN) tablet 1 mg, 1 mg, Oral, Q8H PRN, Keaton Arenas    rOPINIRole (REQUIP) tablet 3 mg, 3 mg, Oral, HS, Keaton Arenas    sertraline (ZOLOFT) tablet 25 mg, 25 mg, Oral, HS, Keaton Arenas    traMADol (ULTRAM) tablet 50 mg, 50 mg, Oral, Q6H PRN, Gabrielle Muñiz    Current Outpatient Prescriptions:     albuterol (2 5 mg/3 mL) 0 083 % nebulizer solution, Take 1 ampule by nebulization every 4 (four) hours as needed for wheezing  , Disp: , Rfl:     albuterol (PROVENTIL HFA,VENTOLIN HFA) 90 mcg/act inhaler, Inhale 2 puffs every 6 (six) hours as needed for wheezing or shortness of breath (or cough), Disp: 1 Inhaler, Rfl: 11    allopurinol (ZYLOPRIM) 100 mg tablet, Take 100 mg by mouth daily, Disp: , Rfl:     amiodarone 200 mg tablet, Take 200 mg by mouth daily, Disp: , Rfl:     apixaban (ELIQUIS) 5 mg, Take 5 mg by mouth 2 (two) times a day, Disp: , Rfl:     atorvastatin (LIPITOR) 80 mg tablet, Take 1 tablet by mouth daily with dinner for 30 days, Disp: 30 tablet, Rfl: 0    dexlansoprazole (DEXILANT) 60 MG capsule, Take 60 mg by mouth daily, Disp: , Rfl:     diazepam (VALIUM) 5 mg tablet, Take 5 mg by mouth every 6 (six) hours as needed for anxiety, Disp: , Rfl:     diphenhydrAMINE (BENADRYL) 25 mg capsule, Take 25 mg by mouth daily, Disp: , Rfl:     gabapentin (NEURONTIN) 100 mg capsule, Take by mouth, Disp: , Rfl:     guaifenesin-codeine (GUAIFENESIN AC) 100-10 MG/5ML liquid, Take 5 mL by mouth 3 (three) times a day as needed for cough, Disp: 120 mL, Rfl: 0    hydrOXYzine HCL (ATARAX) 25 mg tablet, Take 25 mg by mouth 2 (two) times a day as needed for itching, Disp: , Rfl:     insulin aspart (NovoLOG) 100 units/mL injection, Inject under the skin as needed for high blood sugar, Disp: , Rfl:     latanoprost (XALATAN) 0 005 % ophthalmic solution, Administer 1 drop to both eyes daily at bedtime  , Disp: , Rfl:     LORazepam (ATIVAN) 1 mg tablet, Take 1 mg by mouth every 8 (eight) hours as needed for anxiety, Disp: , Rfl:     rOPINIRole (REQUIP) 1 mg tablet, Take 3 mg by mouth daily at bedtime  , Disp: , Rfl:     sertraline (ZOLOFT) 25 mg tablet, Take 25 mg by mouth daily at bedtime  , Disp: , Rfl:     traMADol (ULTRAM) 50 mg tablet, Take 50 mg by mouth every 6 (six) hours as needed for moderate pain, Disp: , Rfl:     Umeclidinium-Vilanterol (ANORO ELLIPTA) 62 5-25 MCG/INH AEPB, Inhale 1 puff daily, Disp: 1 each, Rfl: 11    Blood Culture:   No results found for: BLOODCX    Wound Culture:   No results found for: WOUNDCULT    Ins and Outs:  No intake/output data recorded            Physical Exam:   /69 (BP Location: Left arm)   Pulse 66   Temp 98 9 °F (37 2 °C) (Tympanic)   Resp 20 Wt 99 8 kg (220 lb)   SpO2 97%   BMI 31 57 kg/m²   Gen: Alert and oriented to person, place, time  HEENT: EOMI, eyes clear, moist mucus membranes, hearing intact  Respiratory: Bilateral chest rise  No audible wheezing found  Cardiovascular: no palpable arrhythmia  Abdomen: soft nontender/nondistended  Musculoskeletal: left upper extremity  · Skin pink dry and intact  · Painful range of motion  · No micromotion tenderness  · Active ROM 10 degrees abduction and 10 degrees forward flexion  · Passive ROM 90 degrees abduction and 90 degrees forward flexion  · Sensation intact to axillary, musculocutaneous, radial, ulna, median nerves  · 4+/5 motor strength to axillary, musculocutaneous, radial, ulna, median nerves  · Extremity is warm and well perfused     Radiology:   I personally reviewed the films  X-rays of left shoulder shows no acute osseous abnormality    _*_*_*_*_*_*_*_*_*_*_*_*_*_*_*_*_*_*_*_*_*_*_*_*_*_*_*_*_*_*_*_*_*_*_*_*_*_*_*_*_*    Assessment:  74 y  o male with  left shoulder and upper arm pain, likely exacerbation of rotator cuff tear  Underwent aspiration by PCP  Low concern for septic arthritis      Plan:   · Non weight bearing left upper extremity in sling for comfort  · Admit overnight for observation  · Analgesia PRN  · Follow up aspiration cultures and gram stain  · NPO at midnight  · Ancef 2g q8  · Dispo: Ortho will follow    Brayden Leblanc

## 2018-05-02 NOTE — ASSESSMENT & PLAN NOTE
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

## 2018-05-02 NOTE — ASSESSMENT & PLAN NOTE
-On novolog insulin at home prn with daily FS  -Last A1c 7/2017 8 0, will recheck  -ISS with q6 FS while NPO, change to sliding scale mealtime insulin with AC FS when no longer NPO

## 2018-05-02 NOTE — CONSULTS
Patient: Graciela Jamil  Patient MRN: [id number]  Service date: 5/2/2018  Attending Physician:       CHIEF COMPLAIN  Chief Complaint   Patient presents with    Shoulder Pain     pt was seen at PCP today and told to come to ED to have shoulder drained and ABX tx       Heme / Oncology history: Graciela Jamil is a 74 y o  male   1, locally advanced esophageal cancer:  Diagnosed in 2015,  Status post concurrent chemo radiation followed by surgery/tumor resection  Clinically in remission    2, hepatocellular carcinoma :  Diagnosed in 2015 Status post Sir sphere, recently found a new perihepatic lymph node enlargement, suspecting for tumor relapse, patient id under evaluation of Radiation Oncology  Final plan to be made    HISTORY OF PRESENT ILLNESS:  Graciela Jamil is a 74 y o  male who presents with 3 week history of left shoulder pain, worsening, no new numbness tingling , admitted for IV antibiotics administration  Oncology was consulted for cancer history  Patient reported overall stable in his health, there is no neck pain, some mild discomfort in his of right shoulder, similar to left, however in less extent, no new symptoms of lower extremities  Again no focal neuro deficits  No new lumps bumps, bleeding easy bruise fever chills night sweats        PROBLEM LIST:  Patient Active Problem List   Diagnosis    Esophageal cancer ([address]    Hepatocellular carcinoma ([address]    Atrial fibrillation ([address]    Hypotension    Type 2 diabetes mellitus (HCC)    Vision abnormalities    Dysphagia    Radiation esophagitis    Restless legs syndrome    Rheumatoid arthritis ([address]    Sleep apnea    Hyperlipidemia    Hypertension    Peripheral neuropathy    Epidermoid cyst of neck    Glaucoma    Gout    Incisional hernia    Cough    Stage 3 severe COPD by GOLD classification (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)    Anxiety and depression    Left shoulder pain ASSESSMENT/PLAN:  Graciela Jamil is a 74 y o  male with:    1) history of esophageal cancer  - as above in remission  - CT scan chest abdomen pelvic is ordered, to be done today to re-evaluate cancer status    2) hepatocellular carcinoma  - locally relapsed in perihepatic lymph node  Under evaluation of Radiation Oncology as outpatient  Can continue follow up as outpatient no need urgent intervention  3) left shoulder pain  - currently managed by or so takes conservatively IV antibiotics  There is no focal neuro deficits suggesting cord compression  She always has some basic numbness tingling from diabetes  Can continue conservative management  If patient developed neural deficits, will recommend cervical spine for further evaluation  Jessica Pearson MD PhD  Hematology / Oncology                              PAST MEDICAL HISTORY:   has a past medical history of Anemia; Arthritis; Coronary artery disease; Diabetes mellitus ([facility] [address] ); Dysphagia; Esophageal cancer ([facility] [address] ); Gout; Hyperlipidemia; Hypertension; Restless leg syndrome; Rheumatoid arthritis ([facility] [address] ); Sleep apnea; and Vision abnormalities  PAST SURGICAL HISTORY:   has a past surgical history that includes Appendectomy; Knee arthroscopy; Cholecystectomy; Tonsillectomy; Portacath placement; pr esophagoscopy flex balloon dilat <30 mm diam (N/A, 1/6/2017); pr esophagoscopy flex balloon dilat <30 mm diam (N/A, 12/12/2016); pr esophagogastroduodenoscopy transoral diagnostic (N/A, 10/31/2016); pr removal esophagus,no thoracotomy (N/A, 10/31/2016); Liver resection (N/A, 10/31/2016); Gastrojejunostomy w/ jejunostomy tube (N/A, 8/4/2016); pr esophagoscopy flex balloon dilat <30 mm diam (N/A, 5/24/2017); pr esophagoscopy flex balloon dilat <30 mm diam (N/A, 8/3/2017); and pr egd insert guide wire dilator passage esophagus (N/A, 2/14/2018)      CURRENT MEDICATIONS  Scheduled Meds:  Current Facility-Administered Medications:  acetaminophen 650 mg Oral Q6H PRN Carol Ann Gerardo Dagoberto    albuterol 2 puff Inhalation Q6H PRN Keaton Arenas    albuterol 2 5 mg Nebulization Q4H PRN Keaton Rosado    allopurinol 100 mg Oral Daily Keaton Arenas    amiodarone 200 mg Oral Daily Keaton Rosado    apixaban 5 mg Oral BID Keaton Arenas    atorvastatin 80 mg Oral Daily With eBaimee Arenas    cefazolin 2,000 mg Intravenous Q8H Keaton Rosado Last Rate: 2,000 mg (05/02/18 0909)   dextromethorphan-guaiFENesin 10 mL Oral Q4H PRN Munir Kash, PA-C    diazepam 5 mg Oral Q6H PRN Keaton Rosado    diphenhydrAMINE 25 mg Oral Daily Keaton Rosado    gabapentin 300 mg Oral HS Keaton Rosado    hydrOXYzine HCL 25 mg Oral BID PRN Keaton Rosado    insulin lispro 1-6 Units Subcutaneous TID With Meals Munir Kash, PA-C    latanoprost 1 drop Both Eyes HS Keaton Rosado    LORazepam 1 mg Oral Q8H PRN Keaton Rosado    morphine injection 2 mg Intravenous Q2H PRN Keaton Rosado    oxyCODONE 10 mg Oral Q4H PRN Sandy Tay    oxyCODONE 5 mg Oral Q4H PRN Keaton Rosado    pantoprazole 40 mg Oral Early Morning Munir Kash, PA-C    rOPINIRole 3 mg Oral HS Keaton Rosado    sertraline 25 mg Oral HS Keaton Rosado    traMADol 50 mg Oral Q6H PRN Keaton Rosado      Continuous Infusions:   PRN Meds:   acetaminophen    albuterol    albuterol    dextromethorphan-guaiFENesin    diazepam    hydrOXYzine HCL    LORazepam    morphine injection    oxyCODONE    oxyCODONE    traMADol    SOCIAL HISTORY:   reports that he quit smoking about 27 years ago  His smoking use included Cigarettes  He has a 120 00 pack-year smoking history  He has never used smokeless tobacco  He reports that he does not drink alcohol or use drugs  FAMILY HISTORY:  family history includes Cancer in his sister; Colon cancer in his father; Other in his father; Stroke in his mother  ALLERGIES:  is allergic to humira [adalimumab]; levaquin [levofloxacin in d5w]; levofloxacin; and lovenox [enoxaparin]      REVIEW OF SYSTEMS:  Please note that a 14-point review of systems was performed to include Constitutional, HEENT, Respiratory, CVS, GI, , Musculoskeletal, Integumentary, Neurologic, Rheumatologic, Endocrinologic, Psychiatric, Lymphatic, and Hematologic/Oncologic systems were reviewed and are negative unless otherwise stated in HPI  Positive and negative findings pertinent to this evaluation are incorporated into the history of present illness  PHYSICAL EXAMINATION:  Vital Signs: Temp:  [98 1 °F (36 7 °C)-98 9 °F (37 2 °C)] 98 1 °F (36 7 °C)  HR:  [58-71] 58  Resp:  [18-20] 18  BP: (110-130)/(60-69) 110/60  Body mass index is 31 57 kg/m²  Body surface area is 2 17 meters squared  Constitutional: Alert and oriented  HEENT: Anicteric, PERRLA  Chest: Decreased breathing sound bilaterally, No wheezes/rales/rhonchi  CVS: Regular rhythm  Normal rate  Abdomen: Soft, nontender, nondistended  No palpable organomegaly  Extremities: No cyanosis/clubbing/edema  Integumentary: No obvious rashes or bruises  Musculoskeletal: No obvious bony or joint deformities  Psychiatric: Appropriate affect and mood  Lymph Node Survey: No palpable preauricular, submandibular, cervical, supraclavicular, axillary, epitrochlear or inguinal lymphadenopathy  LABS:    Results from last 7 days  Lab Units 05/01/18  2103   WBC Thousand/uL 7 84   HEMATOCRIT % 41 2   PLATELETS Thousands/uL 177   NEUTROS PCT % 77*   MONOS PCT % 14*       Results from last 7 days  Lab Units 05/01/18  2103   SODIUM mmol/L 135*   POTASSIUM mmol/L 4 4   CHLORIDE mmol/L 100   CO2 mmol/L 31   BUN mg/dL 16           Invalid input(s): CA, TPRO            Invalid input(s): TNI,  PCT          Results from last 7 days  Lab Units 05/01/18  2103   SED RATE mm/hour 84*       IMAGING:  XR shoulder 2+ vw left   Final Result      No acute osseous abnormality  Degenerative changes as above        Workstation performed: [id number]         CT head wo contrast    (Results Pending)   US guided abscess drain    (Results Pending)   CT chest abdomen pelvis w contrast    (Results Pending)

## 2018-05-02 NOTE — ED NOTES
2000 medications requested from pharmacy  Lab contacted, as specimen collection labels ask for 3ml lavender x 2, but green/yellow and swab sample of synovial fluid were sent with patient  Lab states they will do "the best we can" with the samples that they were provided         Belgica Almanza RN  05/01/18 3671

## 2018-05-03 ENCOUNTER — ANESTHESIA (INPATIENT)
Dept: PERIOP | Facility: HOSPITAL | Age: 73
DRG: 501 | End: 2018-05-03
Payer: MEDICARE

## 2018-05-03 ENCOUNTER — ANESTHESIA EVENT (INPATIENT)
Dept: PERIOP | Facility: HOSPITAL | Age: 73
DRG: 501 | End: 2018-05-03
Payer: MEDICARE

## 2018-05-03 PROBLEM — Z86.73 HISTORY OF STROKE: Status: ACTIVE | Noted: 2018-05-03

## 2018-05-03 LAB
ABO GROUP BLD: NORMAL
ANION GAP SERPL CALCULATED.3IONS-SCNC: 7 MMOL/L (ref 4–13)
APTT PPP: 36 SECONDS (ref 23–35)
BASOPHILS # BLD AUTO: 0 THOUSANDS/ΜL (ref 0–0.1)
BASOPHILS NFR BLD AUTO: 0 % (ref 0–1)
BLD GP AB SCN SERPL QL: NEGATIVE
BUN SERPL-MCNC: 17 MG/DL (ref 5–25)
CALCIUM SERPL-MCNC: 8.8 MG/DL (ref 8.3–10.1)
CHLORIDE SERPL-SCNC: 99 MMOL/L (ref 100–108)
CO2 SERPL-SCNC: 30 MMOL/L (ref 21–32)
CREAT SERPL-MCNC: 0.92 MG/DL (ref 0.6–1.3)
EOSINOPHIL # BLD AUTO: 0.05 THOUSAND/ΜL (ref 0–0.61)
EOSINOPHIL NFR BLD AUTO: 1 % (ref 0–6)
ERYTHROCYTE [DISTWIDTH] IN BLOOD BY AUTOMATED COUNT: 13.4 % (ref 11.6–15.1)
GFR SERPL CREATININE-BSD FRML MDRD: 81 ML/MIN/1.73SQ M
GLUCOSE SERPL-MCNC: 171 MG/DL (ref 65–140)
GLUCOSE SERPL-MCNC: 193 MG/DL (ref 65–140)
GLUCOSE SERPL-MCNC: 194 MG/DL (ref 65–140)
GLUCOSE SERPL-MCNC: 237 MG/DL (ref 65–140)
GLUCOSE SERPL-MCNC: 435 MG/DL (ref 65–140)
GLUCOSE SERPL-MCNC: 450 MG/DL (ref 65–140)
GLUCOSE SERPL-MCNC: 467 MG/DL (ref 65–140)
HCT VFR BLD AUTO: 44 % (ref 36.5–49.3)
HGB BLD-MCNC: 14.7 G/DL (ref 12–17)
INR PPP: 1.51 (ref 0.86–1.16)
LYMPHOCYTES # BLD AUTO: 0.57 THOUSANDS/ΜL (ref 0.6–4.47)
LYMPHOCYTES NFR BLD AUTO: 9 % (ref 14–44)
MCH RBC QN AUTO: 30.2 PG (ref 26.8–34.3)
MCHC RBC AUTO-ENTMCNC: 33.4 G/DL (ref 31.4–37.4)
MCV RBC AUTO: 90 FL (ref 82–98)
MONOCYTES # BLD AUTO: 0.84 THOUSAND/ΜL (ref 0.17–1.22)
MONOCYTES NFR BLD AUTO: 13 % (ref 4–12)
NEUTROPHILS # BLD AUTO: 5.15 THOUSANDS/ΜL (ref 1.85–7.62)
NEUTS SEG NFR BLD AUTO: 77 % (ref 43–75)
NRBC BLD AUTO-RTO: 0 /100 WBCS
PLATELET # BLD AUTO: 170 THOUSANDS/UL (ref 149–390)
PMV BLD AUTO: 11.2 FL (ref 8.9–12.7)
POTASSIUM SERPL-SCNC: 3.9 MMOL/L (ref 3.5–5.3)
PROTHROMBIN TIME: 18.3 SECONDS (ref 12.1–14.4)
RBC # BLD AUTO: 4.87 MILLION/UL (ref 3.88–5.62)
RH BLD: POSITIVE
SODIUM SERPL-SCNC: 136 MMOL/L (ref 136–145)
SPECIMEN EXPIRATION DATE: NORMAL
WBC # BLD AUTO: 6.64 THOUSAND/UL (ref 4.31–10.16)

## 2018-05-03 PROCEDURE — 85610 PROTHROMBIN TIME: CPT | Performed by: ORTHOPAEDIC SURGERY

## 2018-05-03 PROCEDURE — 86901 BLOOD TYPING SEROLOGIC RH(D): CPT | Performed by: ORTHOPAEDIC SURGERY

## 2018-05-03 PROCEDURE — 99232 SBSQ HOSP IP/OBS MODERATE 35: CPT | Performed by: SURGERY

## 2018-05-03 PROCEDURE — 86900 BLOOD TYPING SEROLOGIC ABO: CPT | Performed by: ORTHOPAEDIC SURGERY

## 2018-05-03 PROCEDURE — 99232 SBSQ HOSP IP/OBS MODERATE 35: CPT | Performed by: INTERNAL MEDICINE

## 2018-05-03 PROCEDURE — 88304 TISSUE EXAM BY PATHOLOGIST: CPT | Performed by: PATHOLOGY

## 2018-05-03 PROCEDURE — 87075 CULTR BACTERIA EXCEPT BLOOD: CPT | Performed by: ORTHOPAEDIC SURGERY

## 2018-05-03 PROCEDURE — 82948 REAGENT STRIP/BLOOD GLUCOSE: CPT

## 2018-05-03 PROCEDURE — 87186 SC STD MICRODIL/AGAR DIL: CPT | Performed by: ORTHOPAEDIC SURGERY

## 2018-05-03 PROCEDURE — 87077 CULTURE AEROBIC IDENTIFY: CPT | Performed by: ORTHOPAEDIC SURGERY

## 2018-05-03 PROCEDURE — 87205 SMEAR GRAM STAIN: CPT | Performed by: ORTHOPAEDIC SURGERY

## 2018-05-03 PROCEDURE — 0MC20ZZ EXTIRPATION OF MATTER FROM LEFT SHOULDER BURSA AND LIGAMENT, OPEN APPROACH: ICD-10-PCS | Performed by: ORTHOPAEDIC SURGERY

## 2018-05-03 PROCEDURE — 80048 BASIC METABOLIC PNL TOTAL CA: CPT | Performed by: ORTHOPAEDIC SURGERY

## 2018-05-03 PROCEDURE — 85025 COMPLETE CBC W/AUTO DIFF WBC: CPT | Performed by: ORTHOPAEDIC SURGERY

## 2018-05-03 PROCEDURE — 87176 TISSUE HOMOGENIZATION CULTR: CPT | Performed by: ORTHOPAEDIC SURGERY

## 2018-05-03 PROCEDURE — 85730 THROMBOPLASTIN TIME PARTIAL: CPT | Performed by: ORTHOPAEDIC SURGERY

## 2018-05-03 PROCEDURE — 87070 CULTURE OTHR SPECIMN AEROBIC: CPT | Performed by: ORTHOPAEDIC SURGERY

## 2018-05-03 PROCEDURE — 0X930ZZ DRAINAGE OF LEFT SHOULDER REGION, OPEN APPROACH: ICD-10-PCS | Performed by: ORTHOPAEDIC SURGERY

## 2018-05-03 PROCEDURE — 86850 RBC ANTIBODY SCREEN: CPT | Performed by: ORTHOPAEDIC SURGERY

## 2018-05-03 PROCEDURE — 87147 CULTURE TYPE IMMUNOLOGIC: CPT | Performed by: ORTHOPAEDIC SURGERY

## 2018-05-03 PROCEDURE — 23030 I&D SHOULDER DEEP ABSC/HMTMA: CPT | Performed by: ORTHOPAEDIC SURGERY

## 2018-05-03 RX ORDER — ONDANSETRON 2 MG/ML
4 INJECTION INTRAMUSCULAR; INTRAVENOUS ONCE AS NEEDED
Status: DISCONTINUED | OUTPATIENT
Start: 2018-05-03 | End: 2018-05-03 | Stop reason: HOSPADM

## 2018-05-03 RX ORDER — MAGNESIUM HYDROXIDE 1200 MG/15ML
LIQUID ORAL AS NEEDED
Status: DISCONTINUED | OUTPATIENT
Start: 2018-05-03 | End: 2018-05-03 | Stop reason: HOSPADM

## 2018-05-03 RX ORDER — FENTANYL CITRATE 50 UG/ML
INJECTION, SOLUTION INTRAMUSCULAR; INTRAVENOUS AS NEEDED
Status: DISCONTINUED | OUTPATIENT
Start: 2018-05-03 | End: 2018-05-03 | Stop reason: SURG

## 2018-05-03 RX ORDER — SODIUM CHLORIDE 9 MG/ML
INJECTION, SOLUTION INTRAVENOUS CONTINUOUS PRN
Status: DISCONTINUED | OUTPATIENT
Start: 2018-05-03 | End: 2018-05-03 | Stop reason: SURG

## 2018-05-03 RX ORDER — ONDANSETRON 2 MG/ML
INJECTION INTRAMUSCULAR; INTRAVENOUS AS NEEDED
Status: DISCONTINUED | OUTPATIENT
Start: 2018-05-03 | End: 2018-05-03 | Stop reason: SURG

## 2018-05-03 RX ORDER — SUCCINYLCHOLINE CHLORIDE 20 MG/ML
INJECTION INTRAMUSCULAR; INTRAVENOUS AS NEEDED
Status: DISCONTINUED | OUTPATIENT
Start: 2018-05-03 | End: 2018-05-03 | Stop reason: SURG

## 2018-05-03 RX ORDER — PROPOFOL 10 MG/ML
INJECTION, EMULSION INTRAVENOUS AS NEEDED
Status: DISCONTINUED | OUTPATIENT
Start: 2018-05-03 | End: 2018-05-03 | Stop reason: SURG

## 2018-05-03 RX ORDER — SODIUM CHLORIDE, SODIUM LACTATE, POTASSIUM CHLORIDE, CALCIUM CHLORIDE 600; 310; 30; 20 MG/100ML; MG/100ML; MG/100ML; MG/100ML
75 INJECTION, SOLUTION INTRAVENOUS CONTINUOUS
Status: DISCONTINUED | OUTPATIENT
Start: 2018-05-03 | End: 2018-05-03

## 2018-05-03 RX ORDER — ROCURONIUM BROMIDE 10 MG/ML
INJECTION, SOLUTION INTRAVENOUS AS NEEDED
Status: DISCONTINUED | OUTPATIENT
Start: 2018-05-03 | End: 2018-05-03 | Stop reason: SURG

## 2018-05-03 RX ORDER — LIDOCAINE HYDROCHLORIDE 10 MG/ML
INJECTION, SOLUTION INFILTRATION; PERINEURAL AS NEEDED
Status: DISCONTINUED | OUTPATIENT
Start: 2018-05-03 | End: 2018-05-03 | Stop reason: SURG

## 2018-05-03 RX ORDER — EPHEDRINE SULFATE 50 MG/ML
INJECTION, SOLUTION INTRAVENOUS AS NEEDED
Status: DISCONTINUED | OUTPATIENT
Start: 2018-05-03 | End: 2018-05-03 | Stop reason: SURG

## 2018-05-03 RX ADMIN — EPHEDRINE SULFATE 5 MG: 50 INJECTION, SOLUTION INTRAMUSCULAR; INTRAVENOUS; SUBCUTANEOUS at 10:06

## 2018-05-03 RX ADMIN — ONDANSETRON 4 MG: 2 INJECTION INTRAMUSCULAR; INTRAVENOUS at 09:49

## 2018-05-03 RX ADMIN — GABAPENTIN 300 MG: 300 CAPSULE ORAL at 21:26

## 2018-05-03 RX ADMIN — HYDROMORPHONE HYDROCHLORIDE 0.4 MG: 1 INJECTION, SOLUTION INTRAMUSCULAR; INTRAVENOUS; SUBCUTANEOUS at 11:26

## 2018-05-03 RX ADMIN — APIXABAN 5 MG: 5 TABLET, FILM COATED ORAL at 08:36

## 2018-05-03 RX ADMIN — LATANOPROST 1 DROP: 50 SOLUTION OPHTHALMIC at 22:56

## 2018-05-03 RX ADMIN — CEFAZOLIN SODIUM 2000 MG: 10 INJECTION, POWDER, FOR SOLUTION INTRAVENOUS at 17:52

## 2018-05-03 RX ADMIN — Medication 2000 MG: at 10:19

## 2018-05-03 RX ADMIN — PANTOPRAZOLE SODIUM 40 MG: 40 TABLET, DELAYED RELEASE ORAL at 06:05

## 2018-05-03 RX ADMIN — AMIODARONE HYDROCHLORIDE 200 MG: 200 TABLET ORAL at 08:36

## 2018-05-03 RX ADMIN — INSULIN LISPRO 6 UNITS: 100 INJECTION, SOLUTION INTRAVENOUS; SUBCUTANEOUS at 17:51

## 2018-05-03 RX ADMIN — APIXABAN 5 MG: 5 TABLET, FILM COATED ORAL at 21:26

## 2018-05-03 RX ADMIN — SODIUM CHLORIDE: 0.9 INJECTION, SOLUTION INTRAVENOUS at 09:35

## 2018-05-03 RX ADMIN — INSULIN LISPRO 6 UNITS: 100 INJECTION, SOLUTION INTRAVENOUS; SUBCUTANEOUS at 21:37

## 2018-05-03 RX ADMIN — LIDOCAINE HYDROCHLORIDE 50 MG: 10 INJECTION, SOLUTION INFILTRATION; PERINEURAL at 09:44

## 2018-05-03 RX ADMIN — EPHEDRINE SULFATE 5 MG: 50 INJECTION, SOLUTION INTRAMUSCULAR; INTRAVENOUS; SUBCUTANEOUS at 10:00

## 2018-05-03 RX ADMIN — DEXAMETHASONE SODIUM PHOSPHATE 4 MG: 10 INJECTION INTRAMUSCULAR; INTRAVENOUS at 09:49

## 2018-05-03 RX ADMIN — HYDROMORPHONE HYDROCHLORIDE 0.4 MG: 1 INJECTION, SOLUTION INTRAMUSCULAR; INTRAVENOUS; SUBCUTANEOUS at 11:15

## 2018-05-03 RX ADMIN — EPHEDRINE SULFATE 10 MG: 50 INJECTION, SOLUTION INTRAMUSCULAR; INTRAVENOUS; SUBCUTANEOUS at 09:50

## 2018-05-03 RX ADMIN — CEFAZOLIN SODIUM 2000 MG: 10 INJECTION, POWDER, FOR SOLUTION INTRAVENOUS at 08:36

## 2018-05-03 RX ADMIN — HYDROMORPHONE HYDROCHLORIDE 0.4 MG: 1 INJECTION, SOLUTION INTRAMUSCULAR; INTRAVENOUS; SUBCUTANEOUS at 11:05

## 2018-05-03 RX ADMIN — CEFAZOLIN SODIUM 2000 MG: 10 INJECTION, POWDER, FOR SOLUTION INTRAVENOUS at 00:05

## 2018-05-03 RX ADMIN — ALLOPURINOL 100 MG: 100 TABLET ORAL at 08:36

## 2018-05-03 RX ADMIN — ROPINIROLE 3 MG: 1 TABLET, FILM COATED ORAL at 21:25

## 2018-05-03 RX ADMIN — OXYCODONE HYDROCHLORIDE 5 MG: 5 TABLET ORAL at 12:28

## 2018-05-03 RX ADMIN — ROCURONIUM BROMIDE 5 MG: 10 INJECTION INTRAVENOUS at 09:44

## 2018-05-03 RX ADMIN — PROPOFOL 150 MG: 10 INJECTION, EMULSION INTRAVENOUS at 09:44

## 2018-05-03 RX ADMIN — FENTANYL CITRATE 100 MCG: 50 INJECTION, SOLUTION INTRAMUSCULAR; INTRAVENOUS at 09:44

## 2018-05-03 RX ADMIN — DIPHENHYDRAMINE HCL 25 MG: 25 TABLET ORAL at 08:35

## 2018-05-03 RX ADMIN — ROCURONIUM BROMIDE 15 MG: 10 INJECTION INTRAVENOUS at 10:11

## 2018-05-03 RX ADMIN — SUCCINYLCHOLINE CHLORIDE 100 MG: 20 INJECTION, SOLUTION INTRAMUSCULAR; INTRAVENOUS at 09:44

## 2018-05-03 RX ADMIN — SERTRALINE HYDROCHLORIDE 25 MG: 25 TABLET ORAL at 21:27

## 2018-05-03 RX ADMIN — ATORVASTATIN CALCIUM 80 MG: 80 TABLET, FILM COATED ORAL at 17:51

## 2018-05-03 NOTE — ANESTHESIA PREPROCEDURE EVALUATION
Review of Systems/Medical History  Patient summary reviewed  Chart reviewed  No history of anesthetic complications (grade 2b with Mac 3 2/14/18, grade 1 view with Quiñones 3 12/13/17)     Cardiovascular  EKG reviewed, Exercise tolerance: poor,  Hyperlipidemia, Hypertension , Dysrhythmias (paroxysmal afib) ,    Pulmonary  Not a smoker (former smoker) , Sleep apnea (does not use CPAP) ,   Comment: Hx of empyema     GI/Hepatic    Liver disease (hepatocellular carcinoma) , GI malignancy (esophageal CA s/p esophagectomy),   Comment: Confirmed NPO appropriate  Patient does have hx of aspiration     Negative  ROS        Endo/Other  Diabetes (Hgb A1c 8 9) poorly controlled type 2 Insulin,      GYN       Hematology  Anemia ,     Musculoskeletal  Rheumatoid arthritis , Gout,   Arthritis     Neurology    CVA (s/p esophagectomy, likely 2/2 new onset Afib at the time, left visual field deficit, otherwise no nubmness/tingling/weakness ) , Diabetic neuropathy,    Psychology           Physical Exam    Airway    Mallampati score: III  TM Distance: <3 FB  Neck ROM: full     Dental   Comment: Poor dentition with all upper teeth to right of #8 chipped, multiple missing teeth, no loose teeth,     Cardiovascular  Rhythm: regular, Rate: normal,     Pulmonary      Other Findings    11/9/16 TTE:  LEFT VENTRICLE:  Systolic function was at the lower limits of normal  Ejection fraction was  estimated to be 50 % - 55%  This study was inadequate for the evaluation of regional wall motion      VENTRICULAR SEPTUM:  There was paradoxical motion  Anesthesia Plan  ASA Score- 3     Anesthesia Type- general with ASA Monitors  Additional Monitors:   Airway Plan: ETT  Plan Factors-    Induction- intravenous  Postoperative Plan- Plan for postoperative opioid use  Planned trial extubation    Informed Consent- Anesthetic plan and risks discussed with patient

## 2018-05-03 NOTE — OP NOTE
OPERATIVE REPORT  PATIENT NAME: Marquita Farr    :  1945  MRN: [id number]  Pt Location: [facility] OR ROOM 04    SURGERY DATE: 5/3/2018    Surgeon(s) and Role:     * Louise Crisostomo MD - Primary     * GLO Avila-JAROCHO - Colin Carter MD - Assisting    Preop Diagnosis:  Acute pain of left shoulder [M25 512]  Subdeltoid fluid collection     Post-Op Diagnosis Codes:     * Acute pain of left shoulder [M25 512]  Subdeltoid fluid collection    Procedure(s) (LRB):  DEBRIDEMENT UPPER EXTREMITY ([address] OUT) left shoulder (Left)    Specimen(s):  ID Type Source Tests Collected by Time Destination   A : Subdeltoid fluid collection Tissue Other ANAEROBIC CULTURE AND GRAM STAIN, CULTURE, TISSUE AND 2 [place] MD Araceli 5/3/2018 10:19 AM    B : Subdeltoid fluid collection Tissue Other ANAEROBIC CULTURE AND GRAM STAIN, CULTURE, TISSUE AND 2 [place] MD Araceli 5/3/2018 10:21 AM        Estimated Blood Loss:   Minimal    Drains:  Closed/Suction Drain Left Shoulder Bulb 7 Fr  (Active)   Number of days: 0       Anesthesia Type:   General    Operative Indications:  Acute pain of left shoulder [M25 512]  Subdeltoid fluid collection confirmed on ultrasound  No joint effusion    Operative Findings:  Large subdeltoid fluid collection, seropurulent, over 30 cc    Complications:   None    Procedure and Technique:  Left shoulder incision and drainage subdeltoid fluid collection  Patient was correctly identified by both the anesthesia department and myself  The left upper extremity was marked  He was taken to the operating room with general anesthesia was induced  SCDs were attached was legs  Antibiotics were held  He was positioned supine  The left shoulder and upper extremity were prepped and draped in the usual sterile fashion  An anterior approach was utilized  A 5 cm longitudinal incision was made over the anterior shoulder with the use of a 10 blade     The deltopectoral interval was developed with the use of Metzenbaum scissors  The cephalic vein was identified and protected  This was retracted laterally with the deltoid  With blunt dissection a significant fluid collection was and countered deep to the deltoid  Cultures and specimen were taken and sent to the lab  IV antibiotics were dosed  The collection was completely evacuated with blunt dissection and suction  The wound was then copiously irrigated with over 2 L of normal saline solution  Hemostasis was achieved with the use of the Bovie electric cautery to muscle bleeders  A Silas-Santos flat drain was placed deep to the deltoid  The deltopectoral interval was reapproximated using 0 Vicryl suture  The deep subcutaneous layer was closed using 0 Vicryl suture  The superficial subcutaneous layer was closed using 2 Vicryl suture  The skin was reapproximated with staples  The drain was sutured in place with 3 nylon suture and attached to suction  Sterile dressings were applied to the wound  Anesthesia was reversed and patient was taken to recovery room       I was present for the entire procedure    Patient Disposition:  PACU     SIGNATURE: Shari Harley MD  DATE: May 3, 2018  TIME: 10:40 AM

## 2018-05-03 NOTE — ASSESSMENT & PLAN NOTE
· Follows with cardiologist Dr Magdalena Ko at [facility] as OP  · Controlled, on amiodarone and Eliquis

## 2018-05-03 NOTE — ASSESSMENT & PLAN NOTE
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

## 2018-05-03 NOTE — DISCHARGE INSTRUCTIONS
Discharge Instructions - Orthopedics  Pema Johnson 74 y o  male MRN: [id number]  Unit/Bed#: PACU 09    Weight Bearing Status:                                           Non-weight bearing to the left arm    DVT prophylaxis:  Patient is on Eliquis    Pain:  Continue analgesics as directed    Dressing Instructions:   Keep dressing clean, dry and intact until follow up appointment  Do not shower until 3-4 days after the procedure  PT/OT:  Continue PT/OT on outpatient basis as directed    Appt Instructions: If you do not have your appointment, please call the clinic at [phone] to f/u with Dr Houston Urrutia in the office in 1 week  Contact the office sooner if you experience any increased numbness/tingling in the extremities

## 2018-05-03 NOTE — ASSESSMENT & PLAN NOTE
· Current home meds include anoro ellipta, proventil, albuterol nebs, robitussin AC for cough   Continue home meds  · Follows with Dr Cristi Angeles as OP

## 2018-05-03 NOTE — PROGRESS NOTES
Progress Note - Graciela Jamil 1945, 74 y o  male MRN: [id number]    Unit/Bed#: [facility] 926-01 Encounter: [id number]    Primary Care Provider: Yno Da Silva MD   Date and time admitted to hospital: 5/1/2018  8:22 PM        Acute pain of left shoulder   Assessment & Plan    · Management per primary service, ortho  · XR from 5/1/18 showed no acute osseous abnormality  · Patient underwent ultrasound-guided left subdeltoid fluid collection aspiration which was successful per radiology report  · No bacteria seen on culture or gram stain however >200,000 WBCs  · WBAT and sling per ortho  · Patient underwent debridement/washout of the left shoulder with ortho today, 5/3/18  · Pain control per primary service        Type 2 diabetes mellitus ([facility] )   Assessment & Plan    · HbA1c 8 9  · Continue sliding scale insulin - will add bedtime coverage  · Change diet to diabetic           Stage 3 severe COPD by GOLD classification ([facility] )   Assessment & Plan    · Current home meds include anoro ellipta, proventil, albuterol nebs, robitussin AC for cough   Continue home meds  · Follows with Dr Nara Babin as OP        History of stroke   Assessment & Plan    · Patient known to Dr Jordin Calero  · On Eliquis        Atrial fibrillation [facility])   Assessment & Plan    · Follows with cardiologist Dr Sena Martinez at [place] as OP  · Controlled, on amiodarone and Eliquis         Esophageal cancer [facility])   Assessment & Plan    · 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  · Follows with Dr Lacy Montes as OP  · Heme/onc following        Hepatocellular carcinoma [facility])   Assessment & Plan    · s/p resection and recurrence treated with SIR spheres embolization in 2017, now with possible recurrence   · Heme/Oncology and surg/onc following         Anxiety and depression   Assessment & Plan    · Continue home meds          VTE Pharmacologic Prophylaxis:   Pharmacologic: Apixaban (Eliquis)  Mechanical VTE Prophylaxis in Place: Yes    Patient Centered Rounds: I have performed bedside rounds with nursing staff today  Discussions with Specialists or Other Care Team Provider: Nursing    Education and Discussions with Family / Patient: Patient and wife at bedside    Time Spent for Care: 30 minutes  More than 50% of total time spent on counseling and coordination of care as described above  Current Length of Stay: 2 day(s)    Current Patient Status: Inpatient   Certification Statement: per primary, ortho    Discharge Plan: Per primary, ortho    Code Status: Level 1 - Full Code      Subjective:   Mr Davon Copeland reports that currently he has no pain in his shoulder or anywhere else  He denies CP, SOB, abdominal pain  No difficulty eating  He denies fever or chills  Objective:     Vitals:   Temp (24hrs), Av 6 °F (36 4 °C), Min:97 2 °F (36 2 °C), Max:98 9 °F (37 2 °C)    HR:  [55-77] 77  Resp:  [14-20] 20  BP: (118-139)/(59-78) 119/65  SpO2:  [93 %-99 %] 97 %  Body mass index is 31 57 kg/m²  Input and Output Summary (last 24 hours): Intake/Output Summary (Last 24 hours) at 18 1416  Last data filed at 18 1141   Gross per 24 hour   Intake              600 ml   Output               50 ml   Net              550 ml       Physical Exam:     Physical Exam   Constitutional: He is oriented to person, place, and time  Patient seen sitting upright in bed comfortably resting with wife and nursing at bedside  Very pleasant and cooperative   Cardiovascular: Normal rate and regular rhythm  Pulmonary/Chest: Effort normal and breath sounds normal  No respiratory distress  He has no wheezes  Abdominal: Soft  Bowel sounds are normal  There is no tenderness  Musculoskeletal: He exhibits no edema  LUE in sling   Neurological: He is alert and oriented to person, place, and time  Skin: Skin is warm  Vitals reviewed          Additional Data:     Labs:      Results from last 7 days  Lab Units 18  0510   WBC Thousand/uL 6  64   HEMOGLOBIN g/dL 14 7   HEMATOCRIT % 44 0   PLATELETS Thousands/uL 170   NEUTROS PCT % 77*   LYMPHS PCT % 9*   MONOS PCT % 13*   EOS PCT % 1       Results from last 7 days  Lab Units 05/03/18  0510   SODIUM mmol/L 136   POTASSIUM mmol/L 3 9   CHLORIDE mmol/L 99*   CO2 mmol/L 30   BUN mg/dL 17   CREATININE mg/dL 0 92   CALCIUM mg/dL 8 8   GLUCOSE RANDOM mg/dL 237*       Results from last 7 days  Lab Units 05/03/18  0510   INR  1 51*       Results from last 7 days  Lab Units 05/03/18  1200 05/03/18  1107 05/03/18  0737 05/02/18  2330 05/02/18  1727 05/02/18  1321 05/02/18  0601   POC GLUCOSE mg/dl 194* 171* 193* 395* 181* 246* 240*       Results from last 7 days  Lab Units 05/02/18  0520   HEMOGLOBIN A1C % 8 9*         * I Have Reviewed All Lab Data Listed Above  * Additional Pertinent Lab Tests Reviewed:  All Labs Within Last 24 Hours Reviewed    Imaging:    Imaging Reports Reviewed Today Include: as above  Imaging Personally Reviewed by Myself Includes:  None    Recent Cultures (last 7 days):       Results from last 7 days  Lab Units 05/02/18  1102 05/02/18  1101 05/01/18  2149 05/01/18  2148 05/01/18  2104 05/01/18  2103   GRAM STAIN RESULT  3+ Polys  No bacteria seen 3+ Polys  No bacteria seen 4+ RBC's  4+ Polys  No bacteria seen 2+ Polys  No organisms seen 2+ Polys  No bacteria seen 4+ Polys  1+ Mononuclear Cells  No bacteria seen       Last 24 Hours Medication List:     Current Facility-Administered Medications:  acetaminophen 650 mg [facility]H [facility]    albuterol 2 puff Inhalation Q6H PRN [facility]    albuterol 2 5 mg Nebulization Q4H PRN [facility]    allopurinol 100 mg Oral Daily [facility]    amiodarone 200 mg Oral Daily [facility]    apixaban 5 mg Oral BID [facility]    atorvastatin 80 mg Oral Daily With Dean Arenas    cefazolin 2,000 mg Intravenous Q8H [facility] Last Rate: 2,000 mg (05/03/18 0836)   dextromethorphan-guaiFENesin 10 mL Oral Q4H PRN Jerinataliya Majano PA-C    diazepam 5 mg Oral Q6H PRN Keaton Rosadohi    diphenhydrAMINE 25 mg Oral Daily Keaton Yu    gabapentin 300 mg Oral HS Keaton Yu    hydrOXYzine HCL 25 mg Oral BID PRN Keaton Ashleehi    insulin lispro 1-6 Units Subcutaneous TID AC Anat Ohara PA-C    insulin lispro 1-6 Units Subcutaneous HS Anat Ohara PA-C    lactated ringers 75 mL/hr Intravenous Continuous Barb Hood MD    latanoprost 1 drop Both Eyes HS Keaton Yu    LORazepam 2 mg Oral Q8H PRN Celi Arredondo MD    morphine injection 2 mg Intravenous Q2H PRN Keaton Gonzalez    oxyCODONE 10 mg Oral Q4H PRN Pauline Henson    oxyCODONE 5 mg Oral Q4H PRN Keaton Arenas    pantoprazole 40 mg Oral Early Morning Cloteal JACEY Solomon    rOPINIRole 3 mg Oral HS Keaton Yu    sertraline 25 mg Oral HS Keaton Yu    traMADol 50 mg Oral Q6H PRN Pauline Henson         Today, Patient Was Seen By: Anat Ohara PA-C    ** Please Note: Dictation voice to text software may have been used in the creation of this document   **

## 2018-05-03 NOTE — PHYSICIAN ADVISOR
Current patient class: Inpatient  The patient is currently on Hospital Day: 3      The patient was admitted to the hospital at 99 [id number] on 5/1/18 for the following diagnosis:  Malignant neoplasm of lower third of esophagus ([facility] ) [C15 5]  Secondary hypertension [I15 9]  Hypokalemia [E87 6]  Hypomagnesemia [E83 42]  Hepat[facility] carcinoma ([facility] ) [C22 0]  Shoulder pain [M25 519]  Acute cardioembolic stroke ([facility] ) [Z28 6]  Atrial fibrillation, unspecified type ([facility] ) [I48 91]  Hyperlipidemia, unspecified hyperlipidemia type [E78 5]  Stage 3 severe COPD by GOLD classification ([place] [address] ) [J44 9]  Type 2 diabetes mellitus with diabetic chronic kidney disease, unspecified CKD stage, unspecified whether long term insulin use ([facility] ) [E11 22]       There is documentation in the medical record of an expected length of stay of at least 2 midnights  The patient is therefore expected to satisfy the 2 midnight benchmark and given the 2 midnight presumption is appropriate for INPATIENT ADMISSION  Given this expectation of a satisfying stay, CMS instructs us that the patient is most often appropriate for inpatient admission under part A provided medical necessity is documented in the chart  After review of the relevant documentation, labs, vital signs and test results, the patient is appropriate for INPATIENT ADMISSION  Admission to the hospital as an inpatient is a complex decision making process which requires the practitioner to consider the patients presenting complaint, history and physical examination and all relevant testing  With this in mind, in this case, the patient was deemed appropriate for INPATIENT ADMISSION  After review of the documentation and testing available at the time of the admission I concur with this clinical determination of medical necessity  Rationale is as follows:     The patient is a 74 yrs old Male who presented to the ED at 5/1/2018  8:22 PM with a chief complaint of Shoulder Pain (pt was seen at PCP today and told to come to ED to have shoulder drained and ABX tx)     The patient has been having left sided shoulder pain for the past 1-2 weeks without antecedent injury  The pain is located over the anterior aspect of the upper arm  Fluid was aspirated from the left arm  The patient is being admitted for left shoulder pain and LUE pain; ddx include exacerbation of rotator cuff tear and possible septic arthritis  The plan of care includes NWB, IV abx npo  The patient went to the OR this admission for LUE debridement and washout  This patient is appropriate for INPATIENT admission; continued hospitalization is necessary to ensure stabilization of his clinical status         The patients vitals on arrival were ED Triage Vitals [05/01/18 2020]   Temperature Pulse Respirations Blood Pressure SpO2   98 9 °F (37 2 °C) 66 20 130/69 97 %      Temp Source Heart Rate Source Patient Position - Orthostatic VS BP Location FiO2 (%)   Tympanic Monitor Sitting Left arm --      Pain Score       8           Past Medical History:   Diagnosis Date    Anemia     Arthritis     Coronary artery disease     Diabetes mellitus [facility] )     Dysphagia     Esophageal cancer (HCC)     Gout     Hyperlipidemia     Hypertension     Restless leg syndrome     Rheumatoid arthritis ([facility] )     Sleep apnea     Vision abnormalities      Past Surgical History:   Procedure Laterality Date    APPENDECTOMY      CHOLECYSTECTOMY      GASTROJEJUNOSTOMY W/ JEJUNOSTOMY TUBE N/A 8/4/2016    Procedure: INSERTION JEJUNOSTOMY TUBE OPEN;  Surgeon: Jocelynn Melgar MD;  [facility];  Service:     KNEE ARTHROSCOPY      LIVER RESECTION N/A 10/31/2016    Procedure: INTRAOPERATIVE ULTRASOUND OF LIVER; LIVER LESION RESECTION/ABLATION ;  Surgeon: Jocelynn Melgar MD;  [facility];  Service:    Levora Ornelas PORTACATH PLACEMENT      LA EGD INSERT GUIDE WIRE DILATOR PASSAGE ESOPHAGUS N/A 2/14/2018    Procedure: ESOPHAGOGASTRODUODENOSCOPY (EGD) with dilation; Surgeon: Sheree Schwartz MD;  [facility];  Service: Thoracic    WA ESOPHAGOGASTRODUODENOSCOPY TRANSORAL DIAGNOSTIC N/A 10/31/2016    Procedure: ESOPHAGOGASTRODUODENOSCOPY (EGD);   Surgeon: Rosales Yuen MD;  [facility];  Service: Surgical Oncology    WA ESOPHAGOSCOPY FLEX BALLOON DILAT <30 MM DIAM N/A 1/6/2017    Procedure: DILATATION ESOPHAGEAL;  Surgeon: Sheree Schwartz MD;  [facility];  Service: Thoracic    WA ESOPHAGOSCOPY FLEX BALLOON DILAT <30 MM DIAM N/A 12/12/2016    Procedure: DILATATION ESOPHAGEAL, EGD;  Surgeon: Sheree Schwartz MD;  [facility];  Service: Thoracic    WA ESOPHAGOSCOPY FLEX BALLOON DILAT <30 MM DIAM N/A 5/24/2017    Procedure: EGD WITH DILATION ;  Surgeon: Kar Lechuga MD;  [facility];  Service: Thoracic    WA ESOPHAGOSCOPY FLEX BALLOON DILAT <30 MM DIAM N/A 8/3/2017    Procedure: EGD WITH DILATION;  Surgeon: Sheree Schwartz MD;  [facility];  Service: Thoracic    WA REMOVAL [address] THORACOTOMY N/A 10/31/2016    Procedure: ESOPHAGECTOMY;  Surgeon: Rosales Yuen MD;  [facility];  Service: Surgical Oncology    TONSILLECTOMY             Consults have been placed to:   IP CONSULT TO CASE MANAGEMENT  IP CONSULT TO INTERNAL MEDICINE  IP CONSULT TO ONCOLOGY  IP CONSULT TO ONCOLOGY    Vitals:    05/03/18 1230 05/03/18 1335 05/03/18 1430 05/03/18 1530   BP: 122/67 119/65 128/61 115/64   BP Location: Right arm Right arm Right arm Right arm   Pulse: 75 77 80 70   Resp: 20 20 16 16   Temp: (!) 97 2 °F (36 2 °C) (!) 97 3 °F (36 3 °C) 97 6 °F (36 4 °C) (!) 97 4 °F (36 3 °C)   TempSrc: Oral Axillary Oral Oral   SpO2: 98% 97% 95% 97%   Weight:       Height:           Most recent labs:    Recent Labs      05/03/18   0510   WBC  6 64   HGB  14 7   HCT  44 0   PLT  170   K  3 9   NA  136   CALCIUM  8 8   BUN  17   CREATININE  0 92   INR  1 51*       Scheduled Meds:  Current Facility-Administered Medications:  acetaminophen 650 mg Oral Q6H PRN Keaton Rosado    albuterol 2 puff Inhalation Q6H PRN Keaton Rosado    albuterol 2 5 mg Nebulization Q4H PRN Keaton Rosado    allopurinol 100 mg Oral Daily Keaton Rosado    amiodarone 200 mg Oral Daily Keaton Rosado    apixaban 5 mg Oral BID Keaton Rosado    atorvastatin 80 mg Oral Daily With eBay Yuhi    cefazolin 2,000 mg Intravenous Q8H Keaton Ashlee Last Rate: 2,000 mg (05/03/18 0836)   dextromethorphan-guaiFENesin 10 mL Oral Q4H PRN Yanely Enamorado PA-C    diazepam 5 mg Oral Q6H PRN Keaton Rosado    diphenhydrAMINE 25 mg Oral Daily Keaton Rosado    gabapentin 300 mg Oral HS Keaton Rosado    hydrOXYzine HCL 25 mg Oral BID PRN Keaton Rosado    insulin lispro 1-6 Units Subcutaneous TID AC Alfonso Woodard PA-C    insulin lispro 1-6 Units Subcutaneous HS Alfonso Woodard PA-C    lactated ringers 75 mL/hr Intravenous Continuous Anette Cadena MD    latanoprost 1 drop Both Eyes HS Keaton Rosado    LORazepam 2 mg Oral Q8H PRN Caty Rios MD    morphine injection 2 mg Intravenous Q2H PRN Keaton Pat    oxyCODONE 10 mg Oral Q4H PRN Tali Actis    oxyCODONE 5 mg Oral Q4H PRN Keaton Rosado    pantoprazole 40 mg Oral Early Morning Yanely Enamorado PA-C    rOPINIRole 3 mg Oral HS Keaton Yu    sertraline 25 mg Oral HS Keaton Rosado    traMADol 50 mg Oral Q6H PRN Keaton Rosado      Continuous Infusions:  lactated ringers 75 mL/hr     PRN Meds:   acetaminophen    albuterol    albuterol    dextromethorphan-guaiFENesin    diazepam    hydrOXYzine HCL    LORazepam    morphine injection    oxyCODONE    oxyCODONE    traMADol    Surgical procedures (if appropriate):  Procedure(s):  DEBRIDEMENT UPPER EXTREMITY ([address]) left shoulder

## 2018-05-03 NOTE — PROGRESS NOTES
Was contacted by nursing that patient's dinner-time blood sugar was 435  Patient had 6 units of HumaLog given according to sliding scale  Repeat sugar noted to be 467 two hours later  Per reviewing sugars, bedtime sugar was 395 last night, and patient was not given any insulin and AM insulin subsequently improved to 193 this AM and remained 170s-190s throughout the day  I discussed with the patient  He reports that prior to his cancer (fall 2016) he was on 70/30 BID and short acting with lunch and dinner but most recently had only been using his insulin 1-2x per week  Patient was placed on a diabetic diet earlier today  Patient reports that he has drank 5-6 cups of cranberry juice today (large styrofoam floor cups) and also has candy bars and 2 bottles of soda on bedside table  I discussed with the patient that it is very important to avoid these foods as this will make his sugars very difficult to control  Patient denies any complaints at this time  He verbalizes understanding of avoiding these foods and that his wife is going to bring him water to drink tomorrow  Discussed with attending  Signed out to night colleague  Q2hr sugars overnight

## 2018-05-03 NOTE — PLAN OF CARE
Problem: Potential for Falls  Goal: Patient will remain free of falls  INTERVENTIONS:  - Assess patient frequently for physical needs  -  Identify cognitive and physical deficits and behaviors that affect risk of falls    -  [place] fall precautions as indicated by assessment   - Educate patient/family on patient safety including physical limitations  - Instruct patient to call for assistance with activity based on assessment  - Modify environment to reduce risk of injury  - Consider OT/PT consult to assist with strengthening/mobility   Outcome: Progressing      Problem: PAIN - ADULT  Goal: Verbalizes/displays adequate comfort level or baseline comfort level  Interventions:  - Encourage patient to monitor pain and request assistance  - Assess pain using appropriate pain scale  - Administer analgesics based on type and severity of pain and evaluate response  - Implement non-pharmacological measures as appropriate and evaluate response  - Consider cultural and social influences on pain and pain management  - Notify physician/advanced practitioner if interventions unsuccessful or patient reports new pain   Outcome: Progressing      Problem: INFECTION - ADULT  Goal: Absence or prevention of progression during hospitalization  INTERVENTIONS:  - Assess and monitor for signs and symptoms of infection  - Monitor lab/diagnostic results  - Monitor all insertion sites, i e  indwelling lines, tubes, and drains  - Monitor endotracheal (as able) and nasal secretions for changes in amount and color  - [place] appropriate cooling/warming therapies per order  - Administer medications as ordered  - Instruct and encourage patient and family to use good hand hygiene technique  - Identify and instruct in appropriate isolation precautions for identified infection/condition   Outcome: Progressing    Goal: Absence of fever/infection during neutropenic period  INTERVENTIONS:  - Monitor WBC  - Implement neutropenic guidelines   Outcome: Progressing      Problem: SAFETY ADULT  Goal: Maintain or return to baseline ADL function  INTERVENTIONS:  -  Assess patient's ability to carry out ADLs; assess patient's baseline for ADL function and identify physical deficits which impact ability to perform ADLs (bathing, care of mouth/teeth, toileting, grooming, dressing, etc )  - Assess/evaluate cause of self-care deficits   - Assess range of motion  - Assess patient's mobility; develop plan if impaired  - Assess patient's need for assistive devices and provide as appropriate  - Encourage maximum independence but intervene and supervise when necessary  ¯ Involve family in performance of ADLs  ¯ Assess for home care needs following discharge   ¯ Request OT consult to assist with ADL evaluation and planning for discharge  ¯ Provide patient education as appropriate   Outcome: Progressing    Goal: Maintain or return mobility status to optimal level  INTERVENTIONS:  - Assess patient's baseline mobility status (ambulation, transfers, stairs, etc )    - Identify cognitive and physical deficits and behaviors that affect mobility  - Identify mobility aids required to assist with transfers and/or ambulation (gait belt, sit-to-stand, lift, walker, cane, etc )  - [place] fall precautions as indicated by assessment  - Record patient progress and toleration of activity level on Mobility SBAR; progress patient to next Phase/Stage  - Instruct patient to call for assistance with activity based on assessment  - Request Rehabilitation consult to assist with strengthening/weightbearing, etc    Outcome: Progressing      Problem: DISCHARGE PLANNING  Goal: Discharge to home or other facility with appropriate resources  INTERVENTIONS:  - Identify barriers to discharge w/patient and caregiver  - Arrange for needed discharge resources and transportation as appropriate  - Identify discharge learning needs (meds, wound care, etc )  - Arrange for interpretive services to assist at discharge as needed  - Refer to Case Management Department for coordinating discharge planning if the patient needs post-hospital services based on physician/advanced practitioner order or complex needs related to functional status, cognitive ability, or social support system   Outcome: Progressing      Problem: Knowledge Deficit  Goal: Patient/family/caregiver demonstrates understanding of disease process, treatment plan, medications, and discharge instructions  Complete learning assessment and assess knowledge base    Interventions:  - Provide teaching at level of understanding  - Provide teaching via preferred learning methods   Outcome: Progressing      Problem: DISCHARGE PLANNING - CARE MANAGEMENT  Goal: Discharge to post-acute care or home with appropriate resources  INTERVENTIONS:  - Conduct assessment to determine patient/family and health care team treatment goals, and need for post-acute services based on payer coverage, community resources, and patient preferences, and barriers to discharge  - Address psychosocial, clinical, and financial barriers to discharge as identified in assessment in conjunction with the patient/family and health care team  - Arrange appropriate level of post-acute services according to patients   needs and preference and payer coverage in collaboration with the physician and health care team  - Communicate with and update the patient/family, physician, and health care team regarding progress on the discharge plan  - Arrange appropriate transportation to post-acute venues   Outcome: Progressing

## 2018-05-03 NOTE — PROGRESS NOTES
Orthopedics   Leah Bhat 74 y o  male MRN: [id number]  [facility]      S: L shoulder pain improved since aspiration yesterday; G stain negative from shoulder aspirate and subdeltoid bursa, WBC count over 200K from yesterday's aspirate      Labs:    0  Lab Value Date/Time   HCT 44 0 05/03/2018 0510   HCT 41 2 05/01/2018 2103   HCT 45 6 12/13/2017 0826   HGB 14 7 05/03/2018 0510   HGB 14 1 05/01/2018 2103   HGB 14 9 12/13/2017 0826   INR 1 03 12/13/2017 0848   WBC 6 64 05/03/2018 0510   WBC 7 84 05/01/2018 2103   WBC 7 69 12/13/2017 0826   ESR 84 (H) 05/01/2018 2103   CRP >90 0 (H) 05/01/2018 2103       Meds:    Current Facility-Administered Medications:     acetaminophen (TYLENOL) tablet 650 mg, 650 mg, Oral, Q6H PRN, Keaton Arenas    albuterol (PROVENTIL HFA,VENTOLIN HFA) inhaler 2 puff, 2 puff, Inhalation, Q6H PRN, Shen Fake, 2 puff at 05/02/18 2042    albuterol inhalation solution 2 5 mg, 2 5 mg, Nebulization, Q4H PRN, Keaton Arenas    allopurinol (ZYLOPRIM) tablet 100 mg, 100 mg, Oral, Daily, Shen Fake, 100 mg at 05/02/18 4505    amiodarone tablet 200 mg, 200 mg, Oral, Daily, Shen Fake, 200 mg at 05/02/18 4096    apixaban (ELIQUIS) tablet 5 mg, 5 mg, Oral, BID, Shen Fake, 5 mg at 05/02/18 2052    atorvastatin (LIPITOR) tablet 80 mg, 80 mg, Oral, Daily With Fran Soto, 80 mg at 05/02/18 1729    ceFAZolin (ANCEF) 2,000 mg in sodium chloride 0 9 % 50 mL IVPB, 2,000 mg, Intravenous, Q8H, Ac Cyr, Last Rate: 100 mL/hr at 05/03/18 0005, 2,000 mg at 05/03/18 0005    dextromethorphan-guaiFENesin (ROBITUSSIN DM)  mg/5 mL oral syrup 10 mL, 10 mL, Oral, Q4H PRN, Maddie Medina PA-C    diazepam (VALIUM) tablet 5 mg, 5 mg, Oral, Q6H PRN, Keaton Arenas    diphenhydrAMINE (BENADRYL) tablet 25 mg, 25 mg, Oral, Daily, Keaton Arenas, 25 mg at 05/02/18 0906    gabapentin (NEURONTIN) capsule 300 mg, 300 mg, Oral, HS, Keaton Arenas, 300 mg at 05/02/18 2100    hydrOXYzine HCL (ATARAX) tablet 25 mg, 25 mg, Oral, BID PRN, Louie Boone    insulin lispro (HumaLOG) 100 units/mL subcutaneous injection 1-6 Units, 1-6 Units, Subcutaneous, TID With Meals, 1 Units at 05/02/18 1730 **AND** Fingerstick Glucose (POCT), , , TID AC, Fransisca Willson PA-C    latanoprost (XALATAN) 0 005 % ophthalmic solution 1 drop, 1 drop, Both Eyes, HS, Keaton Arenas, 1 drop at 05/02/18 2133    LORazepam (ATIVAN) tablet 2 mg, 2 mg, Oral, Q8H PRN, Elena Chang MD, 1 mg at 05/02/18 2128    morphine injection 2 mg, 2 mg, Intravenous, Q2H PRN, Keaton Arenas    oxyCODONE (ROXICODONE) immediate release tablet 10 mg, 10 mg, Oral, Q4H PRN, Keaton Arenas    oxyCODONE (ROXICODONE) IR tablet 5 mg, 5 mg, Oral, Q4H PRN, Keaton Arenas    pantoprazole (PROTONIX) EC tablet 40 mg, 40 mg, Oral, Early Morning, Preston Finnegan PA-C, 40 mg at 05/03/18 0605    rOPINIRole (REQUIP) tablet 3 mg, 3 mg, Oral, HS, Louie Boone, 3 mg at 05/02/18 2100    sertraline (ZOLOFT) tablet 25 mg, 25 mg, Oral, HS, Keaton Arenas, 25 mg at 05/02/18 2100    traMADol (ULTRAM) tablet 50 mg, 50 mg, Oral, Q6H PRN, Louie Boone    Blood Culture:   No results found for: BLOODCX    Wound Culture:   No results found for: WOUNDCULT    Ins and Outs:  I/O last 24 hours:   In: 1005 [P O :1005]  Out: -           Physical Exam:   /59 (BP Location: Right arm)   Pulse 69   Temp 98 9 °F (37 2 °C) (Oral)   Resp 17   Ht 5' 10" (1 778 m)   Wt 99 8 kg (220 lb)   SpO2 94%   BMI 31 57 kg/m²     Musculoskeletal: left upper extremity  · Skin pink dry and intact at shoulder  · No pain with palpation of deltoid/bursa  · Painful range of motion slightly improved from yesterday, but No micromotion tenderness  · Sensation intact to axillary, musculocutaneous, radial, ulna, median nerves  · Unable to actively forward flex/externally rotate secondary to weakness, able to passively FF and ER  · MEdian, ulnar, axillary, radial nerve motor and sensory functions intact  · Extremity is warm and well perfused Assessment:  74 y  o male with  left shoulder and upper arm pain, with weakness from known rotator cuff tear on antibiotics for infected subdeltoid bursa collection  No current concern for septic arthritis      Plan:   · WBAT left upper extremity in sling for comfort  · Analgesia PRN  · Follow up aspiration cultures and gram stain  · Ancef 2g q8 to continue  · Remain NPO for now  · To OR for operative washout and debridement of left shoulder   · Dispo: Ortho will follow    Gabriela Duenas   05/03/18

## 2018-05-03 NOTE — ASSESSMENT & PLAN NOTE
· Management per primary service, ortho  · XR from 5/1/18 showed no acute osseous abnormality  · Patient underwent ultrasound-guided left subdeltoid fluid collection aspiration which was successful per radiology report  · No bacteria seen on culture or gram stain however >200,000 WBCs  · WBAT and sling per ortho  · Patient underwent debridement/washout of the left shoulder with ortho today, 5/3/18  · Pain control per primary service

## 2018-05-03 NOTE — PROGRESS NOTES
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 awaiting radiation treatment    Admitted for L arm weakness  -ortho planning u/s aspiration of subdeltoid collection    -CT c/a/p: yesterday showed normal brain but worsening metastatic disease in lung and dorian caval nodes  Plan:    -med/onc following  -if arm workup normal from orthopaedic standpoint may need to consider MRI brain/Cspine to eval for mets    Subjective/Objective     Chief Complaint:     Subjective:       Objective:     Vitals: Blood pressure 118/63, pulse 55, temperature (!) 97 3 °F (36 3 °C), temperature source Oral, resp  rate 16, height 5' 10" (1 778 m), weight 99 8 kg (220 lb), SpO2 95 %  ,Body mass index is 31 57 kg/m²  I/O       05/01 0701 - 05/02 0700 05/02 0701 - 05/03 0700 05/03 0701 - 05/04 0700    P  O  480 1005     Total Intake(mL/kg) 480 (4 8) 1005 (10 1)     Net +480 +1005             Unmeasured Urine Occurrence 1 x 1 x           Physical Exam:       L arm in sling  Sleeping       Lab, Imaging and other studies:   CBC with diff:   Lab Results   Component Value Date    WBC 6 64 05/03/2018    HGB 14 7 05/03/2018    HCT 44 0 05/03/2018    MCV 90 05/03/2018     05/03/2018    MCH 30 2 05/03/2018    MCHC 33 4 05/03/2018    RDW 13 4 05/03/2018    MPV 11 2 05/03/2018    NRBC 0 05/03/2018   , BMP/CMP:   Lab Results   Component Value Date     05/03/2018    K 3 9 05/03/2018    CL 99 (L) 05/03/2018    CO2 30 05/03/2018    ANIONGAP 7 05/03/2018    BUN 17 05/03/2018    CREATININE 0 92 05/03/2018    GLUCOSE 237 (H) 05/03/2018    CALCIUM 8 8 05/03/2018    EGFR 81 05/03/2018   , Magnesium: No results found for: MAG, Coags: No results found for: PT, PTT, INR, Blood Culture: No results found for: BLOODCX, Urine Culture: No results found for: URINECX, Wound Culure: No results found for: WOUNDCULT  VTE Pharmacologic Prophylaxis: Heparin  VTE Mechanical Prophylaxis: sequential compression device

## 2018-05-03 NOTE — ASSESSMENT & PLAN NOTE
· HbA1c 8 9  · Continue sliding scale insulin - will add bedtime coverage  · Change diet to diabetic

## 2018-05-03 NOTE — ANESTHESIA POSTPROCEDURE EVALUATION
Post-Op Assessment Note      CV Status:  Stable    Mental Status:  Alert and awake    Hydration Status:  Stable    PONV Controlled:  Controlled    Airway Patency:  Patent    Post Op Vitals Reviewed: Yes          Staff: Anesthesiologist, CRNA           /78 (05/03/18 1059)    Temp 97 9 °F (36 6 °C) (05/03/18 1059)    Pulse 70 (05/03/18 1059)   Resp 16 (05/03/18 1059)    SpO2   100

## 2018-05-03 NOTE — PROGRESS NOTES
Spoke to Sandoval Dave with [facility] regarding patient blood sugar of 435    Give 6 units lispro now and recheck glucose in one hour

## 2018-05-04 LAB
GLUCOSE SERPL-MCNC: 155 MG/DL (ref 65–140)
GLUCOSE SERPL-MCNC: 176 MG/DL (ref 65–140)
GLUCOSE SERPL-MCNC: 223 MG/DL (ref 65–140)
GLUCOSE SERPL-MCNC: 227 MG/DL (ref 65–140)
GLUCOSE SERPL-MCNC: 229 MG/DL (ref 65–140)
GLUCOSE SERPL-MCNC: 229 MG/DL (ref 65–140)
GLUCOSE SERPL-MCNC: 268 MG/DL (ref 65–140)
GLUCOSE SERPL-MCNC: 316 MG/DL (ref 65–140)
GLUCOSE SERPL-MCNC: 423 MG/DL (ref 65–140)
GLUCOSE SERPL-MCNC: 434 MG/DL (ref 65–140)
GLUCOSE SERPL-MCNC: 466 MG/DL (ref 65–140)

## 2018-05-04 PROCEDURE — 99232 SBSQ HOSP IP/OBS MODERATE 35: CPT | Performed by: INTERNAL MEDICINE

## 2018-05-04 PROCEDURE — 97166 OT EVAL MOD COMPLEX 45 MIN: CPT

## 2018-05-04 PROCEDURE — G8989 SELF CARE D/C STATUS: HCPCS

## 2018-05-04 PROCEDURE — 99024 POSTOP FOLLOW-UP VISIT: CPT | Performed by: ORTHOPAEDIC SURGERY

## 2018-05-04 PROCEDURE — G8980 MOBILITY D/C STATUS: HCPCS

## 2018-05-04 PROCEDURE — 82948 REAGENT STRIP/BLOOD GLUCOSE: CPT

## 2018-05-04 PROCEDURE — 97162 PT EVAL MOD COMPLEX 30 MIN: CPT

## 2018-05-04 PROCEDURE — G8978 MOBILITY CURRENT STATUS: HCPCS

## 2018-05-04 PROCEDURE — G8979 MOBILITY GOAL STATUS: HCPCS

## 2018-05-04 PROCEDURE — G8987 SELF CARE CURRENT STATUS: HCPCS

## 2018-05-04 PROCEDURE — G8988 SELF CARE GOAL STATUS: HCPCS

## 2018-05-04 PROCEDURE — 99232 SBSQ HOSP IP/OBS MODERATE 35: CPT | Performed by: SURGERY

## 2018-05-04 RX ORDER — INSULIN GLARGINE 100 [IU]/ML
8 INJECTION, SOLUTION SUBCUTANEOUS
Status: DISCONTINUED | OUTPATIENT
Start: 2018-05-04 | End: 2018-05-05

## 2018-05-04 RX ADMIN — ALLOPURINOL 100 MG: 100 TABLET ORAL at 09:41

## 2018-05-04 RX ADMIN — CEFAZOLIN SODIUM 2000 MG: 10 INJECTION, POWDER, FOR SOLUTION INTRAVENOUS at 20:15

## 2018-05-04 RX ADMIN — INSULIN LISPRO 2 UNITS: 100 INJECTION, SOLUTION INTRAVENOUS; SUBCUTANEOUS at 08:59

## 2018-05-04 RX ADMIN — LATANOPROST 1 DROP: 50 SOLUTION OPHTHALMIC at 22:22

## 2018-05-04 RX ADMIN — GABAPENTIN 300 MG: 300 CAPSULE ORAL at 22:22

## 2018-05-04 RX ADMIN — INSULIN HUMAN 8 UNITS: 100 INJECTION, SOLUTION PARENTERAL at 03:26

## 2018-05-04 RX ADMIN — INSULIN LISPRO 12 UNITS: 100 INJECTION, SOLUTION INTRAVENOUS; SUBCUTANEOUS at 08:59

## 2018-05-04 RX ADMIN — INSULIN LISPRO 12 UNITS: 100 INJECTION, SOLUTION INTRAVENOUS; SUBCUTANEOUS at 01:21

## 2018-05-04 RX ADMIN — CEFAZOLIN SODIUM 2000 MG: 10 INJECTION, POWDER, FOR SOLUTION INTRAVENOUS at 09:41

## 2018-05-04 RX ADMIN — ROPINIROLE 3 MG: 1 TABLET, FILM COATED ORAL at 22:22

## 2018-05-04 RX ADMIN — INSULIN LISPRO 3 UNITS: 100 INJECTION, SOLUTION INTRAVENOUS; SUBCUTANEOUS at 22:26

## 2018-05-04 RX ADMIN — AMIODARONE HYDROCHLORIDE 200 MG: 200 TABLET ORAL at 09:41

## 2018-05-04 RX ADMIN — INSULIN HUMAN 5 UNITS: 100 INJECTION, SOLUTION PARENTERAL at 05:07

## 2018-05-04 RX ADMIN — CEFAZOLIN SODIUM 2000 MG: 10 INJECTION, POWDER, FOR SOLUTION INTRAVENOUS at 00:58

## 2018-05-04 RX ADMIN — INSULIN LISPRO 2 UNITS: 100 INJECTION, SOLUTION INTRAVENOUS; SUBCUTANEOUS at 19:05

## 2018-05-04 RX ADMIN — OXYCODONE HYDROCHLORIDE 5 MG: 5 TABLET ORAL at 23:27

## 2018-05-04 RX ADMIN — INSULIN GLARGINE 8 UNITS: 100 INJECTION, SOLUTION SUBCUTANEOUS at 22:22

## 2018-05-04 RX ADMIN — ATORVASTATIN CALCIUM 80 MG: 80 TABLET, FILM COATED ORAL at 19:04

## 2018-05-04 RX ADMIN — PANTOPRAZOLE SODIUM 40 MG: 40 TABLET, DELAYED RELEASE ORAL at 05:07

## 2018-05-04 RX ADMIN — SERTRALINE HYDROCHLORIDE 25 MG: 25 TABLET ORAL at 22:22

## 2018-05-04 RX ADMIN — APIXABAN 5 MG: 5 TABLET, FILM COATED ORAL at 19:05

## 2018-05-04 RX ADMIN — DIPHENHYDRAMINE HCL 25 MG: 25 TABLET ORAL at 09:41

## 2018-05-04 RX ADMIN — APIXABAN 5 MG: 5 TABLET, FILM COATED ORAL at 09:41

## 2018-05-04 NOTE — PROGRESS NOTES
Progress Note - Surgical Onclogy   Acosta Prince 74 y o  male MRN: [id number]  Unit/Bed#: [facility] 926-01 Encounter: [id number]    Assessment:      75yoM with history of esophageal cancer s/p resection now in remission    History of HCC s/p sir shaun with portahepatis adenopathy awaiting radiation treatment    Admitted for L arm weakness  Plan:  - washouts/abx per ortho  - no further intervention f rom surg onc  - out patient follow up  - please call with questions or concerns      Subjective/Objective     Chief Complaint:     Subjective:       Objective:     Vitals: Blood pressure 110/74, pulse 60, temperature 97 5 °F (36 4 °C), temperature source Oral, resp  rate 18, height 5' 10" (1 778 m), weight 99 8 kg (220 lb), SpO2 97 %  ,Body mass index is 31 57 kg/m²  I/O       05/01 0701 - 05/02 0700 05/02 0701 - 05/03 0700 05/03 0701 - 05/04 0700    P  O  480 1005     Total Intake(mL/kg) 480 (4 8) 1005 (10 1)     Net +480 +1005             Unmeasured Urine Occurrence 1 x 1 x           Physical Exam:   NAD  Norm resp effort  L shoulder dressing cdi  RRR  Abdsoft, NT ND      Lab, Imaging and other studies:   CBC with diff:   No results found for: WBC, HGB, HCT, MCV, PLT, ADJUSTEDWBC, MCH, MCHC, RDW, MPV, NRBC, BMP/CMP:   No results found for: NA, K, CL, CO2, ANIONGAP, BUN, CREATININE, GLUCOSE, CALCIUM, AST, ALT, ALKPHOS, PROT, ALBUMIN, BILITOT, EGFR, Magnesium: No results found for: MAG, Coags: No results found for: PT, PTT, INR, Blood Culture: No results found for: BLOODCX, Urine Culture: No results found for: URINECX, Wound Culure: No results found for: WOUNDCULT  VTE Pharmacologic Prophylaxis: Heparin  VTE Mechanical Prophylaxis: sequential compression device

## 2018-05-04 NOTE — PLAN OF CARE
Problem: Potential for Falls  Goal: Patient will remain free of falls  INTERVENTIONS:  - Assess patient frequently for physical needs  -  Identify cognitive and physical deficits and behaviors that affect risk of falls    -  [place] fall precautions as indicated by assessment   - Educate patient/family on patient safety including physical limitations  - Instruct patient to call for assistance with activity based on assessment  - Modify environment to reduce risk of injury  - Consider OT/PT consult to assist with strengthening/mobility   Outcome: Progressing      Problem: PAIN - ADULT  Goal: Verbalizes/displays adequate comfort level or baseline comfort level  Interventions:  - Encourage patient to monitor pain and request assistance  - Assess pain using appropriate pain scale  - Administer analgesics based on type and severity of pain and evaluate response  - Implement non-pharmacological measures as appropriate and evaluate response  - Consider cultural and social influences on pain and pain management  - Notify physician/advanced practitioner if interventions unsuccessful or patient reports new pain   Outcome: Progressing      Problem: INFECTION - ADULT  Goal: Absence or prevention of progression during hospitalization  INTERVENTIONS:  - Assess and monitor for signs and symptoms of infection  - Monitor lab/diagnostic results  - Monitor all insertion sites, i e  indwelling lines, tubes, and drains  - Monitor endotracheal (as able) and nasal secretions for changes in amount and color  - [place] appropriate cooling/warming therapies per order  - Administer medications as ordered  - Instruct and encourage patient and family to use good hand hygiene technique  - Identify and instruct in appropriate isolation precautions for identified infection/condition   Outcome: Progressing    Goal: Absence of fever/infection during neutropenic period  INTERVENTIONS:  - Monitor WBC  - Implement neutropenic guidelines   Outcome: Progressing      Problem: SAFETY ADULT  Goal: Maintain or return to baseline ADL function  INTERVENTIONS:  -  Assess patient's ability to carry out ADLs; assess patient's baseline for ADL function and identify physical deficits which impact ability to perform ADLs (bathing, care of mouth/teeth, toileting, grooming, dressing, etc )  - Assess/evaluate cause of self-care deficits   - Assess range of motion  - Assess patient's mobility; develop plan if impaired  - Assess patient's need for assistive devices and provide as appropriate  - Encourage maximum independence but intervene and supervise when necessary  ¯ Involve family in performance of ADLs  ¯ Assess for home care needs following discharge   ¯ Request OT consult to assist with ADL evaluation and planning for discharge  ¯ Provide patient education as appropriate   Outcome: Progressing    Goal: Maintain or return mobility status to optimal level  INTERVENTIONS:  - Assess patient's baseline mobility status (ambulation, transfers, stairs, etc )    - Identify cognitive and physical deficits and behaviors that affect mobility  - Identify mobility aids required to assist with transfers and/or ambulation (gait belt, sit-to-stand, lift, walker, cane, etc )  - [place] fall precautions as indicated by assessment  - Record patient progress and toleration of activity level on Mobility SBAR; progress patient to next Phase/Stage  - Instruct patient to call for assistance with activity based on assessment  - Request Rehabilitation consult to assist with strengthening/weightbearing, etc    Outcome: Progressing      Problem: DISCHARGE PLANNING  Goal: Discharge to home or other facility with appropriate resources  INTERVENTIONS:  - Identify barriers to discharge w/patient and caregiver  - Arrange for needed discharge resources and transportation as appropriate  - Identify discharge learning needs (meds, wound care, etc )  - Arrange for interpretive services to assist at discharge as needed  - Refer to Case Management Department for coordinating discharge planning if the patient needs post-hospital services based on physician/advanced practitioner order or complex needs related to functional status, cognitive ability, or social support system   Outcome: Progressing      Problem: Knowledge Deficit  Goal: Patient/family/caregiver demonstrates understanding of disease process, treatment plan, medications, and discharge instructions  Complete learning assessment and assess knowledge base  Interventions:  - Provide teaching at level of understanding  - Provide teaching via preferred learning methods   Outcome: Progressing      Problem: DISCHARGE PLANNING - CARE MANAGEMENT  Goal: Discharge to post-acute care or home with appropriate resources  INTERVENTIONS:  - Conduct assessment to determine patient/family and health care team treatment goals, and need for post-acute services based on payer coverage, community resources, and patient preferences, and barriers to discharge  - Address psychosocial, clinical, and financial barriers to discharge as identified in assessment in conjunction with the patient/family and health care team  - Arrange appropriate level of post-acute services according to patients   needs and preference and payer coverage in collaboration with the physician and health care team  - Communicate with and update the patient/family, physician, and health care team regarding progress on the discharge plan  - Arrange appropriate transportation to post-acute venues   Outcome: Progressing      Problem: Nutrition/Hydration-ADULT  Goal: Nutrient/Hydration intake appropriate for improving, restoring or maintaining nutritional needs  Monitor and assess patient's nutrition/hydration status for malnutrition (ex- brittle hair, bruises, dry skin, pale skin and conjunctiva, muscle wasting, smooth red tongue, and disorientation)   Collaborate with interdisciplinary team and initiate plan and interventions as ordered  Monitor patient's weight and dietary intake as ordered or per policy  Utilize nutrition screening tool and intervene per policy  Determine patient's food preferences and provide high-protein, high-caloric foods as appropriate       INTERVENTIONS:  - Monitor oral intake, urinary output, labs, and treatment plans  - Assess nutrition and hydration status and recommend course of action  - Evaluate amount of meals eaten  - Assist patient with eating if necessary   - Allow adequate time for meals  - Recommend/ encourage appropriate diets, oral nutritional supplements, and vitamin/mineral supplements  - Order, calculate, and assess calorie counts as needed  - Recommend, monitor, and adjust tube feedings and TPN/PPN based on assessed needs  - Assess need for intravenous fluids  - Provide specific nutrition/hydration education as appropriate  - Include patient/family/caregiver in decisions related to nutrition   Outcome: Progressing

## 2018-05-04 NOTE — PHYSICAL THERAPY NOTE
Physical Therapy Evaluation     Patient's Name: Pleasant Plant    Admitting Diagnosis  Malignant neoplasm of lower third of esophagus ([address] ) [C15 5]  Secondary hypertension [I15 9]  Hypokalemia [E87 6]  Hypomagnesemia [E83 42]  Hepatocellular carcinoma ([address] ) [C22 0]  Shoulder pain [M25 519]  Acute cardioembolic stroke ([address] ) [G00 0]  Atrial fibrillation, unspecified type ([address] ) [I48 91]  Hyperlipidemia, unspecified hyperlipidemia type [E78 5]  Stage 3 severe COPD by GOLD classification ([facility] ) [J44 9]  Type 2 diabetes mellitus with diabetic chronic kidney disease, unspecified CKD stage, unspecified whether long term insulin use ([facility] [address] [facility] ) [E11 22]    Problem List  Patient Active Problem List   Diagnosis    Esophageal cancer ([address] )    Hepatocellular carcinoma ([address] )    Atrial fibrillation ([address] )    Hypotension    Type 2 diabetes mellitus ([address] )    Vision abnormalities    Dysphagia    Radiation esophagitis    Restless legs syndrome    Rheumatoid arthritis ([address] )    Sleep apnea    Hyperlipidemia    Hypertension    Peripheral neuropathy    Epidermoid cyst of neck    Glaucoma    Gout    Incisional hernia    Cough    Stage 3 severe COPD by GOLD classification ([address] )    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)    Anxiety and depression    Left shoulder pain    Acute pain of left shoulder    History of stroke       Past Medical History  Past Medical History:   Diagnosis Date    Anemia     Arthritis     Coronary artery disease     Diabetes mellitus ([address] )     Dysphagia     Esophageal cancer (HCC)     Gout     Hyperlipidemia     Hypertension     Restless leg syndrome     Rheumatoid arthritis ([address] )     Sleep apnea     Vision abnormalities        Past Surgical History  Past Surgical History:   Procedure Laterality Date    APPENDECTOMY      CHOLECYSTECTOMY      GASTROJEJUNOSTOMY W/ JEJUNOSTOMY TUBE N/A 8/4/2016    Procedure: INSERTION JEJUNOSTOMY TUBE OPEN;  Surgeon: Dustin Lewis Su Marina MD;  [facility];  Service:    Sheree Moreno KNEE ARTHROSCOPY      LIVER RESECTION N/A 10/31/2016    Procedure: INTRAOPERATIVE ULTRASOUND OF LIVER; LIVER LESION RESECTION/ABLATION ;  Surgeon: Sadi España MD;  [facility];  Service:    Sheree Moreno PORTACATH PLACEMENT      NV EGD INSERT GUIDE WIRE DILATOR PASSAGE ESOPHAGUS N/A 2/14/2018    Procedure: ESOPHAGOGASTRODUODENOSCOPY (EGD) with dilation;  Surgeon: Wero Rose MD;  [facility];  Service: Thoracic    NV ESOPHAGOGASTRODUODENOSCOPY TRANSORAL DIAGNOSTIC N/A 10/31/2016    Procedure: ESOPHAGOGASTRODUODENOSCOPY (EGD);   Surgeon: Sadi España MD;  [facility];  Service: Surgical Oncology    NV ESOPHAGOSCOPY FLEX BALLOON DILAT <30 MM DIAM N/A 1/6/2017    Procedure: DILATATION ESOPHAGEAL;  Surgeon: Wero Rose MD;  [facility];  Service: Thoracic    NV ESOPHAGOSCOPY FLEX BALLOON DILAT <30 MM DIAM N/A 12/12/2016    Procedure: DILATATION ESOPHAGEAL, EGD;  Surgeon: Wero Rose MD;  [facility];  Service: Thoracic    NV ESOPHAGOSCOPY FLEX BALLOON DILAT <30 MM DIAM N/A 5/24/2017    Procedure: EGD WITH DILATION ;  Surgeon: Lissette Garcia MD;  [facility];  Service: Thoracic    NV ESOPHAGOSCOPY FLEX BALLOON DILAT <30 MM DIAM N/A 8/3/2017    Procedure: EGD WITH DILATION;  Surgeon: Wero Rose MD;  [facility];  Service: Thoracic    NV REMOVAL [address] THORACOTOMY N/A 10/31/2016    Procedure: ESOPHAGECTOMY;  Surgeon: Sadi España MD;  [facility];  Service: Surgical Oncology    TONSILLECTOMY      WOUND DEBRIDEMENT Left 5/3/2018    Procedure: DEBRIDEMENT UPPER EXTREMITY ([address]) left shoulder;  Surgeon: Nadja Flores MD;  [facility];  Service: Orthopedics        05/04/18 1516   Note Type   Note type Eval only   Pain Assessment   Pain Assessment No/denies pain   Pain Score No Pain   Home Living   Type of [address] One level;Stairs to enter with rails; Ramped entrance  (4 SHELL vs ramped entrance under carport)   Bathroom Shower/Tub Walk-in shower   [address]   Additional Comments Pt uses a SPC at baseline 2* neuropathy    Prior Function   Level of [place] Independent with ADLs and functional mobility   Lives With Spouse   Receives Help From Family   ADL Assistance Independent   IADLs Independent   Falls in the last 6 months 0   Vocational Retired   Comments Pt reporrts wife can assist with needs at d/c  Restrictions/Precautions   Weight Bearing Precautions Per Order Yes  (up with assistance orders)   LUE Weight Bearing Per Order NWB  (sling for comfort )   Braces or Orthoses Sling   Other Precautions WBS   General   Additional Pertinent History 05/03/18 DEBRIDEMENT UPPER EXTREMITY ([address]) left shoulder    Family/Caregiver Present No   Cognition   Overall Cognitive Status WFL   Arousal/Participation Alert   Attention Within functional limits   Orientation Level Oriented X4   Memory Within functional limits   Following Commands Follows all commands and directions without difficulty   Comments Pt is pleasant and cooperative   Able to converse beyond basic needs   RUE Assessment   RUE Assessment WFL   RUE Strength   RUE Overall Strength Within Functional Limits - strength 5/5   LUE Assessment   LUE Assessment (NBT NWB in sling )   RLE Assessment   RLE Assessment WFL   LLE Assessment   LLE Assessment WFL   Coordination   Movements are Fluid and Coordinated 1   Sensation WFL   Light Touch   RLE Light Touch Grossly intact   LLE Light Touch Grossly intact   Proprioception   RLE Proprioception Impaired  (baseline neuropathy )   LLE Proprioception Impaired  (baseline neuropathy )   Bed Mobility   Additional Comments OOB in chair upon arrival    Transfers   Sit to Stand 6  Modified independent   Additional items Increased time required   Stand to Sit 6  Modified independent   Additional items Increased time required   Ambulation/Elevation   Gait pattern Decreased foot clearance   Gait Assistance 6  Modified independent   Additional items Verbal cues   Assistive Device Straight cane   Distance 200'   Stair Management Assistance 5  Supervision   Additional items Verbal cues   Stair Management Technique With cane; Step to pattern; Foreward   Number of Stairs 5  ( steps )   Balance   Static Sitting Good   Dynamic Sitting Good   Static Standing Good   Dynamic Standing Fair   Ambulatory Fair   Endurance Deficit   Endurance Deficit No   Activity Tolerance   Activity Tolerance Patient tolerated treatment well   Medical Staff Made Aware Gui OT   Nurse Made Aware appropriate to see per RN    Assessment   Prognosis Good   Problem List Decreased strength;Decreased range of motion;Decreased mobility; Decreased skin integrity;Orthopedic restrictions;Pain   Assessment Pt is a 74year old male presenting with L shoulder pain that started appx 1-2 weeks ago  Pt underwent DEBRIDEMENT UPPER EXTREMITY ([address]) left shoulder 5/3/18  PT consulted to assess functional mobility and assist with safe d/c planning  PT eval performed POD # 1 with NWB LUE in sling for comfort and with up with assistance orders  PT with a problem list which includes malignant neoplasm of lower 3rd esophagus, HTN, hypokalemia, hypo magnesium, shoulder pain, hepatocellular carcinoma, a-fib, DM II and COPD  PMH includes gout, glaucoma, hernia, sleep apnea and RA  Please see above for a more comprehensive list  PTA, pt living at home with his spouse in a 1 floor ranch with SHELL vs ramp  Pt uses a SPC at baseline and denies any recent falls  On eval, pt presenting with limited ROM and strength in LUE but was able to perform all functional tasks with no physical assistance  NO skilled IP PT needs identified at this time  Home with family support and OPPT recommended at this time      Goals   Patient Goals TO GO HOME    Recommendation Recommendation Home with family support; Outpatient PT   Equipment Recommended Other (Comment)  (PT OWNS A SPC )   PT - OK to Discharge Yes   Additional Comments OOB IN CHAIR WITH ALL NEEDS WITHIN REACH    Barthel Index   Feeding 5   Bathing 5   Grooming Score 0   Dressing Score 5   Bladder Score 10   Bowels Score 10   Toilet Use Score 10   Transfers (Bed/Chair) Score 15   Mobility (Level Surface) Score 15   Stairs Score 10   Barthel Index Score 85         Thu Henderson, PT

## 2018-05-04 NOTE — SOCIAL WORK
CM reviewed Pt in care coordination rounds, POD1 S/p open washout L shoulder; PT/OT Eval today  Pt will require inpatient stay to evaluate final cultures and antibiotic treatment

## 2018-05-04 NOTE — OCCUPATIONAL THERAPY NOTE
[address]  Evaluation     Patient Name: Jyotsna Wilson  KSFTX'R Date: 5/4/2018  Problem List  Patient Active Problem List   Diagnosis    Esophageal cancer [facility] )    Hepatocellular carcinoma ([facility] )    Atrial fibrillation ([facility] )    Hypotension    Type 2 diabetes mellitus ([facility] )    Vision abnormalities    Dysphagia    Radiation esophagitis    Restless legs syndrome    Rheumatoid arthritis ([facility] )    Sleep apnea    Hyperlipidemia    Hypertension    Peripheral neuropathy    Epidermoid cyst of neck    Glaucoma    Gout    Incisional hernia    Cough    Stage 3 severe COPD by GOLD classification ([facility] )    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)    Anxiety and depression    Left shoulder pain    Acute pain of left shoulder    History of stroke     Past Medical History  Past Medical History:   Diagnosis Date    Anemia     Arthritis     Coronary artery disease     [facility]     Dysphagia     Esophageal cancer (HCC)     Gout     Hyperlipidemia     Hypertension     Restless leg syndrome     Rheumatoid arthritis ([facility] )     Sleep apnea     Vision abnormalities      Past Surgical History  Past Surgical History:   Procedure Laterality Date    APPENDECTOMY      CHOLECYSTECTOMY      GASTROJEJUNOSTOMY W/ JEJUNOSTOMY TUBE N/A 8/4/2016    Procedure: INSERTION JEJUNOSTOMY TUBE OPEN;  Surgeon: Janessa George MD;  [facility];  Service:     KNEE ARTHROSCOPY      LIVER RESECTION N/A 10/31/2016    Procedure: INTRAOPERATIVE ULTRASOUND OF LIVER; LIVER LESION RESECTION/ABLATION ;  Surgeon: Janessa George MD;  [facility];  Service:    Gabriela Lout PLACEMENT      AK EGD INSERT GUIDE WIRE DILATOR PASSAGE ESOPHAGUS N/A 2/14/2018    Procedure: ESOPHAGOGASTRODUODENOSCOPY (EGD) with dilation;  Surgeon: Yeimi Wynne MD;  [facility];  Service: Thoracic    AK ESOPHAGOGASTRODUODENOSCOPY TRANSORAL DIAGNOSTIC N/A 10/31/2016    Procedure: ESOPHAGOGASTRODUODENOSCOPY (EGD); Surgeon: Raul Cuellar MD;  [facility];  Service: Surgical Oncology    LA ESOPHAGOSCOPY FLEX BALLOON DILAT <30 MM DIAM N/A 1/6/2017    Procedure: DILATATION ESOPHAGEAL;  Surgeon: Saroj Holden MD;  [facility];  Service: Thoracic    LA ESOPHAGOSCOPY FLEX BALLOON DILAT <30 MM DIAM N/A 12/12/2016    Procedure: DILATATION ESOPHAGEAL, EGD;  Surgeon: Saroj Holden MD;  [facility];  Service: Thoracic    LA ESOPHAGOSCOPY FLEX BALLOON DILAT <30 MM DIAM N/A 5/24/2017    Procedure: EGD WITH DILATION ;  Surgeon: Naomy Whiting MD;  [facility];  Service: Thoracic    LA ESOPHAGOSCOPY FLEX BALLOON DILAT <30 MM DIAM N/A 8/3/2017    Procedure: EGD WITH DILATION;  Surgeon: Saroj Holden MD;  [facility];  Service: Thoracic    LA REMOVAL [address] THORACOTOMY N/A 10/31/2016    Procedure: ESOPHAGECTOMY;  Surgeon: Raul Cuellar MD;  [facility];  Service: Surgical Oncology    TONSILLECTOMY      WOUND DEBRIDEMENT Left 5/3/2018    Procedure: DEBRIDEMENT UPPER EXTREMITY ([address]) left shoulder;  Surgeon: Giuliana Pelayo MD;  [facility];  Service: Orthopedics         05/04/18 1515   Note Type   Note type Eval only   Restrictions/Precautions   Weight Bearing Precautions Per Order Yes   LUE Weight Bearing Per Order NWB   Braces or Orthoses Sling  (LUE SLING FOR COMFORT )   Other Precautions WBS   Pain Assessment   Pain Assessment No/denies pain   Pain Score No Pain   Home Living   Type of [address] [place] [address] One level;Stairs to enter with rails; Ramped entrance  (4 SHELL VS RAMP)   Bathroom Shower/Tub Walk-in shower   Bathroom Toilet Raised   Bathroom Accessibility Accessible   Home Equipment Cane   Additional Comments PT REPORTS USE OF SPC AT BASELINE    Prior Function   Level of [place] Independent with ADLs and functional mobility   Lives With Spouse   Receives Help From Family   ADL Assistance Independent IADLs Independent   Falls in the last 6 months 0   Vocational Retired   Lifestyle   Autonomy  [address], SPOUSE ASSISTS AS NEEDED 2' RECENT LIMITED LUE ROM/PAIN      Reciprocal Relationships LIVES WITH SUPPORTIVE SPOUSE WHO WORKS HOWEVER, IS ABLE TO ASSIST AS NEEDED    Service to Others RETIRED   Intrinsic Gratification ENJOYS TALKING/JOKING AROUND   Psychosocial   Psychosocial (WDL) WDL   ADL   Eating Assistance 6  Modified independent   Grooming Assistance 4  Minimal Assistance   UB Bathing Assistance 4  Minimal Assistance   LB Bathing Assistance 5  Supervision/Setup   UB Dressing Assistance 4  Minimal Assistance   LB Dressing Assistance 5  Supervision/Setup   Toileting Assistance  6  Modified independent   [address]  Supervision/Setup   Bed Mobility   Additional Comments PT SITTING OOB UPON ARRIVAL    Transfers   Sit to Stand 6  Modified independent   Additional items Increased time required   Stand to Sit 6  Modified independent   Additional items Increased time required   Functional Mobility   Functional Mobility 6  Modified independent   Additional items SPC   Balance   Static Sitting Good   Static Standing Good   Ambulatory Fair +   Activity Tolerance   Activity Tolerance Patient tolerated treatment well   Medical Staff Made Aware MARLON FROM PT  SPOKE TO KHOA FROM CM REGARDING D/C PLAN    Nurse Made Aware APPROPRIATE TO SEE    RUE Assessment   RUE Assessment WFL   RUE Strength   RUE Overall Strength Within Functional Limits - able to perform ADL tasks with strength   LUE Assessment   LUE Assessment X  (NWB IN SLING  LIMITED ROM/PAIN)   Hand Function   Gross Motor Coordination Functional   Fine Motor Coordination Functional   Sensation   Light Touch Partial deficits in the RUE;Partial deficits in the LUE;Partial deficits in the RLE;Partial deficits in the LLE  (2' NEUROPATHY PER PT )   Cognition   Overall Cognitive Status [facility]   Arousal/Participation Alert; Cooperative   Attention Within functional limits   Orientation Level Oriented X4   Memory Within functional limits   Following Commands Follows all commands and directions without difficulty   Comments PT IS VERY PLEASANT AND COOPERATIVE  Assessment   Assessment 74 YO MALE SEEN FOR INITIAL OT EVAL FOLLOWING ADMISSION TO SLB FOR L SHOULDER PAIN W/ INFECTED SUBDELTOID FLUID COLLECTION  PT IS S/P L SHOULD OPEN WASHOUT  PT IS NWB ON LUE IN SLING  PT WITH PROBLEMS LIST INCLUDING COPD, H/O CVA, A-FIB AND DM WITH NEUROPATHY  PT REPORTS BEING OVERALL INDEPENDENT AT BASELINE WITH RECENT ADDITIONAL ASSIST FROM SPOUSE AS NEEDED 2' LIMITED LUE ROM/PAIN  PT CURRENTLY REQUIRES OVERALL MIN A FOR UB ADLS, AND IS MOD I FOR FUNCTIONAL MOBILITY WITH TRANSFERS/FUNCTIONAL MOBILITY WITH USE OF SPC  PT IS EXPERIENCING EXPECTED LIMITATIONS 2' OCCASIONAL PAIN, LIMITED L-SHOULDER ROM, LUE NWB STATUS AND LIMITED ACTIVITY TOLERANCE  PT EDUCATED ON ONE-HANDED DRESSING TECHNIQUES, SLING MANAGEMENT, CARRY OVER OF LUE NWB STATUS AND ENERGY CONSERVATION TECHNIQUES TO CARRY OVER UPON D/C  ALL QUESTIONS/CONCERNS ADDRESSED AND PT REPORTS HE HAS CURRENT LEVEL OF ASSIST AVAILABLE UPON D/C  PT APPRECIATIVE OF THERAPIST AND EAGER TO RETURN HOME  FROM AN OT PERSPECTIVE, PT CAN RETURN HOME WITH INCREASED FAMILY SUPPORT + REC F/U WITH OUTPT THERAPY WHEN APPROPRIATE  NO ADDITIONAL ACUTE CARE OT NEEDS  D/C OT      Goals   Patient Goals TO RETURN HOME TODAY    Recommendation   OT Discharge Recommendation Home with family support  (+ RECOMMEND F/U WITH OUTPT THERAPY SERVICES WHEN APPROPRIATE)   OT - OK to Discharge Yes  (WHEN MEDICALLY CLEARED  )   Barthel Index   Feeding 5   Bathing 5   Grooming Score 0   Dressing Score 5   Bladder Score 10   Bowels Score 10   Toilet Use Score 10   Transfers (Bed/Chair) Score 15   Mobility (Level Surface) Score 15   Stairs Score 10   Barthel Index Score 85   Modified Felix Scale   Modified Felix Scale 3 Documentation completed by Db Vega, MOT, OTR/L

## 2018-05-04 NOTE — PROGRESS NOTES
Orthopedics   Precious Quarles 74 y o  male MRN: [id number]  [facility]      S: POD1 sp L shoulder open washout   G stain negative from OR culture; pain controlled, denies motor/sensory deficit      Labs:    0  Lab Value Date/Time   HCT 44 0 05/03/2018 0510   HCT 41 2 05/01/2018 2103   HCT 45 6 12/13/2017 0826   HGB 14 7 05/03/2018 0510   HGB 14 1 05/01/2018 2103   HGB 14 9 12/13/2017 0826   INR 1 51 (H) 05/03/2018 0510   WBC 6 64 05/03/2018 0510   WBC 7 84 05/01/2018 2103   WBC 7 69 12/13/2017 0826   ESR 84 (H) 05/01/2018 2103   CRP >90 0 (H) 05/01/2018 2103       Meds:    Current Facility-Administered Medications:     acetaminophen (TYLENOL) tablet 650 mg, 650 mg, Oral, Q6H PRN, Keaton Arenas    albuterol (PROVENTIL HFA,VENTOLIN HFA) inhaler 2 puff, 2 puff, Inhalation, Q6H PRN, Kim Arriaga, 2 puff at 05/02/18 2042    albuterol inhalation solution 2 5 mg, 2 5 mg, Nebulization, Q4H PRN, Keaton Arenas    allopurinol (ZYLOPRIM) tablet 100 mg, 100 mg, Oral, Daily, Keaton Arenas, 100 mg at 05/03/18 5802    amiodarone tablet 200 mg, 200 mg, Oral, Daily, Kim Arriaga, 200 mg at 05/03/18 5185    apixaban (ELIQUIS) tablet 5 mg, 5 mg, Oral, BID, iKm Arriaga, 5 mg at 05/03/18 2126    atorvastatin (LIPITOR) tablet 80 mg, 80 mg, Oral, Daily With Luba Olszewski, 80 mg at 05/03/18 1751    ceFAZolin (ANCEF) 2,000 mg in sodium chloride 0 9 % 50 mL IVPB, 2,000 mg, Intravenous, Q8H, Kim Arriaga, Last Rate: 100 mL/hr at 05/04/18 0058, 2,000 mg at 05/04/18 0058    dextromethorphan-guaiFENesin (ROBITUSSIN DM)  mg/5 mL oral syrup 10 mL, 10 mL, Oral, Q4H PRN, [employer], PA-C    diazepam (VALIUM) tablet 5 mg, 5 mg, Oral, Q6H PRN, Keaton Arenas    diphenhydrAMINE (BENADRYL) tablet 25 mg, 25 mg, Oral, Daily, Keaton Arenas, 25 mg at 05/03/18 9863    gabapentin (NEURONTIN) capsule 300 mg, 300 mg, Oral, HS, Keaton Arenas, 300 mg at 05/03/18 2126    hydrOXYzine HCL (ATARAX) tablet 25 mg, 25 mg, Oral, BID PRN, Keaton Arenas    insulin lispro (HumaLOG) 100 units/mL subcutaneous injection 1-6 Units, 1-6 Units, Subcutaneous, TID AC, 6 Units at 05/03/18 1751 **AND** Fingerstick Glucose (POCT), , , TID AC, Madan Oneil PA-C    insulin lispro (HumaLOG) 100 units/mL subcutaneous injection 1-6 Units, 1-6 Units, Subcutaneous, HS, Madan Oneil PA-C, 6 Units at 05/03/18 2137    insulin lispro (HumaLOG) 100 units/mL subcutaneous injection 12 Units, 12 Units, Subcutaneous, TID With Meals, Vale Nichols PA-C, 12 Units at 05/04/18 0121    latanoprost (XALATAN) 0 005 % ophthalmic solution 1 drop, 1 drop, Both Eyes, HS, Keaton Arenas, 1 drop at 05/03/18 2256    LORazepam (ATIVAN) tablet 2 mg, 2 mg, Oral, Q8H PRN, Chanel Nava MD, 1 mg at 05/02/18 2128    morphine injection 2 mg, 2 mg, Intravenous, Q2H PRN, Keaton Arenas    oxyCODONE (ROXICODONE) immediate release tablet 10 mg, 10 mg, Oral, Q4H PRN, Keaton Arenas    oxyCODONE (ROXICODONE) IR tablet 5 mg, 5 mg, Oral, Q4H PRN, Dillon Mane, 5 mg at 05/03/18 1228    pantoprazole (PROTONIX) EC tablet 40 mg, 40 mg, Oral, Early Morning, Sarah Baig PA-C, 40 mg at 05/04/18 0507    rOPINIRole (REQUIP) tablet 3 mg, 3 mg, Oral, HS, Keaton Arenas, 3 mg at 05/03/18 2125    sertraline (ZOLOFT) tablet 25 mg, 25 mg, Oral, HS, Keaton Arenas, 25 mg at 05/03/18 2127    traMADol (ULTRAM) tablet 50 mg, 50 mg, Oral, Q6H PRN, Dillon Mane    Umeclidinium-Vilanterol 62 5-25 MCG/INH AEPB, , Inhalation, Daily, Vale Nichols PA-C    Blood Culture:   No results found for: BLOODCX    Wound Culture:   No results found for: WOUNDCULT    Ins and Outs:  I/O last 24 hours: In: 082 [P O :400;  I V :300; NG/GT:15; IV Piggyback:50]  Out: 50 [Blood:50]          Physical Exam:   /74 (BP Location: Right arm)   Pulse 60   Temp 97 5 °F (36 4 °C) (Oral)   Resp 18   Ht 5' 10" (1 778 m)   Wt 99 8 kg (220 lb)   SpO2 97%   BMI 31 57 kg/m²     Musculoskeletal: left upper extremity  · Mepilex dressing intact, clean/dry  · Painful range of motion improved from yesterday, but No micromotion tenderness  · Sensation intact to axillary, musculocutaneous, radial, ulna, median nerves  · Unable to actively forward flex/externally rotate secondary to weakness, able to passively FF and ER  · MEdian, ulnar, axillary, radial nerve motor and sensory functions intact  · Extremity is warm and well perfused     Assessment:  74 y  o male with  left shoulder and upper arm pain, POD1 s/p open washout L shoulder    Plan:   · WBAT left upper extremity in sling for comfort  · Analgesia PRN  · Follow up aspiration cultures and gram stain  · Ancef 2g q8 to continue pending culture results  · Dispo: Ortho will follow, patient will require continued inpatient stay to evaluate cultures and fine tune antibiotic treatment    Nayeli Pro   05/04/18

## 2018-05-04 NOTE — PROGRESS NOTES
Attestation of note by Kervin Tripp on 5/3/18(2:16 pm)  Patient seen and examined  Agree with Alyssa Price's findings and plan  1  Left shoulder pain with infected subdeltoid fluid collection - s/p washout  On Cefazolin  2  COPD - no exacerbation  Ct home meds  3  H/o CVA - ct Eliquis  4  Afib - ct Eliquis, Amiodarone  5  Type 2 diabetes - ct SSI

## 2018-05-05 LAB
CRP SERPL QL: 35.8 MG/L
ERYTHROCYTE [SEDIMENTATION RATE] IN BLOOD: 63 MM/HOUR (ref 0–10)
GLUCOSE SERPL-MCNC: 128 MG/DL (ref 65–140)
GLUCOSE SERPL-MCNC: 158 MG/DL (ref 65–140)
GLUCOSE SERPL-MCNC: 190 MG/DL (ref 65–140)
GLUCOSE SERPL-MCNC: 282 MG/DL (ref 65–140)

## 2018-05-05 PROCEDURE — 85652 RBC SED RATE AUTOMATED: CPT | Performed by: ORTHOPAEDIC SURGERY

## 2018-05-05 PROCEDURE — 99223 1ST HOSP IP/OBS HIGH 75: CPT | Performed by: INTERNAL MEDICINE

## 2018-05-05 PROCEDURE — 99024 POSTOP FOLLOW-UP VISIT: CPT | Performed by: ORTHOPAEDIC SURGERY

## 2018-05-05 PROCEDURE — 86140 C-REACTIVE PROTEIN: CPT | Performed by: ORTHOPAEDIC SURGERY

## 2018-05-05 PROCEDURE — 82948 REAGENT STRIP/BLOOD GLUCOSE: CPT

## 2018-05-05 PROCEDURE — 99232 SBSQ HOSP IP/OBS MODERATE 35: CPT | Performed by: INTERNAL MEDICINE

## 2018-05-05 RX ORDER — INSULIN GLARGINE 100 [IU]/ML
4 INJECTION, SOLUTION SUBCUTANEOUS
Status: DISCONTINUED | OUTPATIENT
Start: 2018-05-05 | End: 2018-05-06

## 2018-05-05 RX ORDER — LORAZEPAM 1 MG/1
2 TABLET ORAL
Status: DISCONTINUED | OUTPATIENT
Start: 2018-05-05 | End: 2018-05-08 | Stop reason: HOSPADM

## 2018-05-05 RX ORDER — ROPINIROLE 1 MG/1
1 TABLET, FILM COATED ORAL DAILY
Status: DISCONTINUED | OUTPATIENT
Start: 2018-05-06 | End: 2018-05-08 | Stop reason: HOSPADM

## 2018-05-05 RX ORDER — GABAPENTIN 100 MG/1
100 CAPSULE ORAL DAILY
Status: DISCONTINUED | OUTPATIENT
Start: 2018-05-06 | End: 2018-05-08 | Stop reason: HOSPADM

## 2018-05-05 RX ORDER — INSULIN GLARGINE 100 [IU]/ML
4 INJECTION, SOLUTION SUBCUTANEOUS ONCE
Status: DISCONTINUED | OUTPATIENT
Start: 2018-05-05 | End: 2018-05-07

## 2018-05-05 RX ORDER — INSULIN GLARGINE 100 [IU]/ML
4 INJECTION, SOLUTION SUBCUTANEOUS
Status: DISCONTINUED | OUTPATIENT
Start: 2018-05-06 | End: 2018-05-05

## 2018-05-05 RX ORDER — DIAZEPAM 5 MG/1
5 TABLET ORAL DAILY PRN
Status: DISCONTINUED | OUTPATIENT
Start: 2018-05-05 | End: 2018-05-08 | Stop reason: HOSPADM

## 2018-05-05 RX ADMIN — LORAZEPAM 2 MG: 1 TABLET ORAL at 21:47

## 2018-05-05 RX ADMIN — ACETAMINOPHEN 650 MG: 325 TABLET, FILM COATED ORAL at 17:09

## 2018-05-05 RX ADMIN — SERTRALINE HYDROCHLORIDE 25 MG: 25 TABLET ORAL at 21:47

## 2018-05-05 RX ADMIN — ACETAMINOPHEN 650 MG: 325 TABLET, FILM COATED ORAL at 09:41

## 2018-05-05 RX ADMIN — CEFAZOLIN SODIUM 2000 MG: 10 INJECTION, POWDER, FOR SOLUTION INTRAVENOUS at 01:47

## 2018-05-05 RX ADMIN — INSULIN LISPRO 2 UNITS: 100 INJECTION, SOLUTION INTRAVENOUS; SUBCUTANEOUS at 22:05

## 2018-05-05 RX ADMIN — AMIODARONE HYDROCHLORIDE 200 MG: 200 TABLET ORAL at 08:43

## 2018-05-05 RX ADMIN — ALLOPURINOL 100 MG: 100 TABLET ORAL at 08:43

## 2018-05-05 RX ADMIN — CEFAZOLIN SODIUM 2000 MG: 10 INJECTION, POWDER, FOR SOLUTION INTRAVENOUS at 12:19

## 2018-05-05 RX ADMIN — APIXABAN 5 MG: 5 TABLET, FILM COATED ORAL at 21:47

## 2018-05-05 RX ADMIN — PANTOPRAZOLE SODIUM 40 MG: 40 TABLET, DELAYED RELEASE ORAL at 06:50

## 2018-05-05 RX ADMIN — LATANOPROST 1 DROP: 50 SOLUTION OPHTHALMIC at 21:47

## 2018-05-05 RX ADMIN — INSULIN LISPRO 4 UNITS: 100 INJECTION, SOLUTION INTRAVENOUS; SUBCUTANEOUS at 12:59

## 2018-05-05 RX ADMIN — CEFAZOLIN SODIUM 2000 MG: 10 INJECTION, POWDER, FOR SOLUTION INTRAVENOUS at 17:10

## 2018-05-05 RX ADMIN — ROPINIROLE 3 MG: 1 TABLET, FILM COATED ORAL at 21:47

## 2018-05-05 RX ADMIN — INSULIN LISPRO 1 UNITS: 100 INJECTION, SOLUTION INTRAVENOUS; SUBCUTANEOUS at 08:43

## 2018-05-05 RX ADMIN — APIXABAN 5 MG: 5 TABLET, FILM COATED ORAL at 08:43

## 2018-05-05 RX ADMIN — DIPHENHYDRAMINE HCL 25 MG: 25 TABLET ORAL at 08:43

## 2018-05-05 RX ADMIN — INSULIN GLARGINE 4 UNITS: 100 INJECTION, SOLUTION SUBCUTANEOUS at 22:10

## 2018-05-05 NOTE — CONSULTS
Consultation - Infectious Disease   Nurys Timmons 74 y o  male MRN: [id number]  Unit/Bed#: [facility] 926-01 Encounter: [id number]      IMPRESSION & RECOMMENDATIONS:   1  Left subdeltoid bursitis-possibly septic  Status post I and D of the sub deltoid bursa  Thus far the cultures are negative  The clinical and radiographic findings do not support a septic joint as there is no synovial effusion appreciated  The patient seems to be tolerating the antibiotics well without difficulty  -continue cefazolin for now at current dose  -asked the microbiology lab to hold the cultures for longer in case this is a Propionobacterium acnes  -follow up wound cultures and adjust antibiotics as needed  -monitor CBC with diff and creatinine  -close orthopedics follow-up    2  Esophageal carcinoma-status post remission after resection  3   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  Patient is very concerned that his pet scan has been delayed and he wants to get started on radiation therapy as ASAP  -surgical oncology follow-up  -follow-up pet scan which is scheduled for early this week  -explained to the patient that unless this infection gets controlled, he is unlikely to get radiation therapy    4  Diabetes mellitus-with hyperglycemia    5  Disposition-the patient insists he wants to leave the hospital today  Stressed with the patient that he is not ready to be discharged yet  Awaiting final cultures to select correct antibiotics  Discussed in detail with Orthopedics  HISTORY OF PRESENT ILLNESS:  Reason for Consult:  left subdeltoid bursitis  HPI: Nurys Timmons is a 74y o  year old male with a history of gout and hepatocellular carcinoma as well as esophageal carcinoma admitted to [facility] in [place] on the 1st of May with worsening left shoulder pain who I am asked to assist with management of subdeltoid bursitis    Patient apparently developed a left shoulder pain in the few weeks prior to admission  It became quite severe and therefore patient was seen by her primary care physician who aspirated the fluid for analysis  He was admitted and started on cefazolin and underwent ultrasound guided guided aspiration which revealed thick seropurulent fluid  The patient was taken to the OR on the 3rd of May and underwent incision and drainage  Of note no synovial effusion has been appreciated clinically or radiographically  During the patient's brief hospital stay he has remained afebrile and hemodynamically stable  He denies any other joint pains  Does suffer from recurrent gout but has not had a gout flare for more than a year  His gout flares usually involve his right foot  He denies any nausea vomiting or diarrhea, denies any cough or shortness of breath, denies any dysuria or hematuria  REVIEW OF SYSTEMS:  A complete 12 point system-based review of systems is otherwise negative      PAST MEDICAL HISTORY:  Past Medical History:   Diagnosis Date    Anemia     Arthritis     Coronary artery disease     Diabetes mellitus ([facility] )     Dysphagia     Esophageal cancer (HCC)     Gout     Hyperlipidemia     Hypertension     Restless leg syndrome     Rheumatoid arthritis ([facility] [facility]     Sleep apnea     Vision abnormalities      Past Surgical History:   Procedure Laterality Date    APPENDECTOMY      CHOLECYSTECTOMY      GASTROJEJUNOSTOMY W/ JEJUNOSTOMY TUBE N/A 8/4/2016    Procedure: INSERTION JEJUNOSTOMY TUBE OPEN;  Surgeon: Kathy Waller MD;  [facility];  Service:     KNEE ARTHROSCOPY      LIVER RESECTION N/A 10/31/2016    Procedure: INTRAOPERATIVE ULTRASOUND OF LIVER; LIVER LESION RESECTION/ABLATION ;  Surgeon: Kathy Waller MD;  [facility];  Service:    Tomer Huntley PLACEMENT      MO EGD INSERT GUIDE WIRE DILATOR PASSAGE ESOPHAGUS N/A 2/14/2018    Procedure: ESOPHAGOGASTRODUODENOSCOPY (EGD) with dilation;  Surgeon: Cherie Pierre MD; [facility];  Service: Thoracic    IL ESOPHAGOGASTRODUODENOSCOPY TRANSORAL DIAGNOSTIC N/A 10/31/2016    Procedure: ESOPHAGOGASTRODUODENOSCOPY (EGD); Surgeon: Tristan Clayton MD;  [facility];  Service: Surgical Oncology    IL ESOPHAGOSCOPY FLEX [place] <30 MM DIAM N/A 1/6/2017    Procedure: DILATATION ESOPHAGEAL;  Surgeon: Raji Hicks MD;  [facility];  Service: Thoracic    IL ESOPHAGOSCOPY FLEX BALLOON DILAT <30 MM DIAM N/A 12/12/2016    Procedure: DILATATION ESOPHAGEAL, EGD;  Surgeon: Raji Hicks MD;  [facility];  Service: Thoracic    IL ESOPHAGOSCOPY FLEX BALLOON DILAT <30 MM DIAM N/A 5/24/2017    Procedure: EGD WITH DILATION ;  Surgeon: Rodrigo Patel MD;  [facility];  Service: Thoracic    IL ESOPHAGOSCOPY FLEX BALLOON DILAT <30 MM DIAM N/A 8/3/2017    Procedure: EGD WITH DILATION;  Surgeon: Raji Hicks MD;  [facility];  Service: Thoracic    IL REMOVAL [address] THORACOTOMY N/A 10/31/2016    Procedure: ESOPHAGECTOMY;  Surgeon: Tristan Clayton MD;  [facility];  Service: Surgical Oncology    TONSILLECTOMY      WOUND DEBRIDEMENT Left 5/3/2018    Procedure: DEBRIDEMENT UPPER EXTREMITY ([address]) left shoulder;  Surgeon: Sharon Francis MD;  [facility];  Service: Orthopedics       FAMILY HISTORY:  Non-contributory    SOCIAL HISTORY:  Social History   History   Alcohol Use No     History   Drug Use No     History   Smoking Status    Former Smoker    Packs/day: 3 00    Years: 40 00    Types: Cigarettes    Quit date: 4/13/1991   Smokeless Tobacco    Never Used       ALLERGIES:  Allergies   Allergen Reactions    Humira [Adalimumab] Rash    Levaquin [Levofloxacin In D5w] Rash    Levofloxacin Rash    Lovenox [Enoxaparin] Rash       MEDICATIONS:  All current active medications have been reviewed    Antibiotics:  Cefazolin 5, postop day 2    PHYSICAL EXAM:  HR:  [51-70] 55  Resp:  [18] 18  BP: (114-135)/(62-74) 133/65  SpO2:  [97 %-98 %] 98 %  Temp (24hrs), Av 8 °F (36 6 °C), Min:97 4 °F (36 3 °C), Max:98 2 °F (36 8 °C)  Current: Temperature: 97 7 °F (36 5 °C)    Intake/Output Summary (Last 24 hours) at 18 1146  Last data filed at 18 0900   Gross per 24 hour   Intake              720 ml   Output              300 ml   Net              420 ml       General Appearance:  Appearing well, nontoxic, and in no distress   Head:  Normocephalic, without obvious abnormality, atraumatic   Eyes:  Conjunctiva pink and sclera anicteric, both eyes   Nose: Nares normal, mucosa normal, no drainage   Throat: Oropharynx moist without lesions   Neck: Supple, symmetrical, no adenopathy, no tenderness/mass/nodules   Back:   Symmetric, no curvature, ROM normal, no CVA tenderness   Lungs:   Clear to auscultation bilaterally, respirations unlabored   Chest Wall:  No tenderness or deformity   Heart:  Bradycardia; no murmur, rub or gallop   Abdomen:   Soft, non-tender, non-distended, positive bowel sounds    Extremities: No cyanosis, clubbing  Left shoulder dressed with a dry dressing in place  Skin: No rashes or lesions  No draining wounds noted  Lymph nodes: Cervical, supraclavicular nodes normal   Neurologic: Alert and oriented times 3, extremity strength 5/5 and symmetric       LABS, IMAGING, & OTHER STUDIES:  Lab Results:  I have personally reviewed pertinent labs      Results from last 7 days  Lab Units 18  0510 18  2103   WBC Thousand/uL 6 64 7 84   HEMOGLOBIN g/dL 14 7 14 1   PLATELETS Thousands/uL 170 177       Results from last 7 days  Lab Units 18  0510 18  2103   SODIUM mmol/L 136 135*   POTASSIUM mmol/L 3 9 4 4   CHLORIDE mmol/L 99* 100   CO2 mmol/L 30 31   ANION GAP mmol/L 7 4   BUN mg/dL 17 16   CREATININE mg/dL 0 92 0 89   EGFR ml/min/1 73sq m 81 84   GLUCOSE RANDOM mg/dL 237* 200*   CALCIUM mg/dL 8 8 8 7       Results from last 7 days  Lab Units 18  1021 05/03/18  1019 05/02/18  1102 05/02/18  1101 05/01/18  2149 05/01/18  2148 05/01/18  2104   GRAM STAIN RESULT  Rare Polys  No bacteria seen Rare Polys  No bacteria seen 3+ Polys  No bacteria seen 3+ Polys  No bacteria seen 4+ RBC's  4+ Polys  No bacteria seen 2+ Polys  No organisms seen 2+ Polys  No bacteria seen   BODY FLUID CULTURE, STERILE   --   --   --  No growth  --  No growth No growth       Imaging Studies:   I have personally reviewed pertinent imaging study reports and images in PACS  X-ray left shoulder-No acute osseous abnormality  Degenerative changes as above

## 2018-05-05 NOTE — SOCIAL WORK
HERLINDA met with pt  Pt states that on Monday, [place] [facility] [place] [address] [place] [address] Ave [phone]  CM contacted them to change the pt's appointment as he will likely still be in the hospital at that time  The office was closed and HERLINDA was unable to speak to an on call person  HERLINDA then contacted [facility] [phone], which is where Dr Austin Camacho is located (pt's doctor) in an attempt to reschedule the pt's PET scan   HERLINDA spoke to On-call who will reach out to the doctor and then call CM back to discuss

## 2018-05-05 NOTE — PROGRESS NOTES
[facility] 74 [facility] MRN: [id number]  Unit/Bed#: [facility] 926-01      S: POD 2 sp L shoulder open washout   Pain improving      Labs:    0  Lab Value Date/Time   HCT 44 0 05/03/2018 0510   HCT 41 2 05/01/2018 2103   HCT 45 6 12/13/2017 0826   HGB 14 7 05/03/2018 0510   HGB 14 1 05/01/2018 2103   HGB 14 9 12/13/2017 0826   INR 1 51 (H) 05/03/2018 0510   WBC 6 64 05/03/2018 0510   WBC 7 84 05/01/2018 2103   WBC 7 69 12/13/2017 0826   ESR 84 (H) 05/01/2018 2103   CRP >90 0 (H) 05/01/2018 2103       Meds:    Current Facility-Administered Medications:     acetaminophen (TYLENOL) tablet 650 mg, 650 mg, Oral, Q6H PRN, Keaton Arenas    albuterol (PROVENTIL HFA,VENTOLIN HFA) inhaler 2 puff, 2 puff, Inhalation, Q6H PRN, Kim Arriaga, 2 puff at 05/02/18 2042    albuterol inhalation solution 2 5 mg, 2 5 mg, Nebulization, Q4H PRN, Keaton Arenas    allopurinol (ZYLOPRIM) tablet 100 mg, 100 mg, Oral, Daily, Keaton Arenas, 100 mg at 05/04/18 0941    amiodarone tablet 200 mg, 200 mg, Oral, Daily, Kim Arriaga, 200 mg at 05/04/18 0941    apixaban (ELIQUIS) tablet 5 mg, 5 mg, Oral, BID, Kim Arriaga, 5 mg at 05/04/18 1905    atorvastatin (LIPITOR) tablet 80 mg, 80 mg, Oral, Daily With Luba Olszewski, 80 mg at 05/04/18 1904    ceFAZolin (ANCEF) 2,000 mg in sodium chloride 0 9 % 50 mL IVPB, 2,000 mg, Intravenous, Q8H, Kim Arriaga, Last Rate: 100 mL/hr at 05/05/18 0147, 2,000 mg at 05/05/18 0147    dextromethorphan-guaiFENesin (ROBITUSSIN DM)  mg/5 mL oral syrup 10 mL, 10 mL, Oral, Q4H PRN, Joaquim Calero PA-C    diazepam (VALIUM) tablet 5 mg, 5 mg, Oral, Q6H PRN, Keaton Arenas    diphenhydrAMINE (BENADRYL) tablet 25 mg, 25 mg, Oral, Daily, Keaton Arenas, 25 mg at 05/04/18 0941    gabapentin (NEURONTIN) capsule 300 mg, 300 mg, Oral, HS, Keaton Arenas, 300 mg at 05/04/18 2222    hydrOXYzine HCL (ATARAX) tablet 25 mg, 25 mg, Oral, BID PRN, Kim Arriaga    insulin glargine (LANTUS) subcutaneous injection 8 Units 0 08 mL, 8 Units, Subcutaneous, HS, Leslie Mathur MD, 8 Units at 05/04/18 2222    insulin lispro (HumaLOG) 100 units/mL subcutaneous injection 1-6 Units, 1-6 Units, Subcutaneous, TID AC, 2 Units at 05/04/18 1905 **AND** Fingerstick Glucose (POCT), , , TID AC, Dillon Henry PA-C    insulin lispro (HumaLOG) 100 units/mL subcutaneous injection 1-6 Units, 1-6 Units, Subcutaneous, HS, Dillon Henry PA-C, 3 Units at 05/04/18 2226    insulin lispro (HumaLOG) 100 units/mL subcutaneous injection 6 Units, 6 Units, Subcutaneous, TID With Meals, Leslie Mathur MD, 6 Units at 05/04/18 1905    latanoprost (XALATAN) 0 005 % ophthalmic solution 1 drop, 1 drop, Both Eyes, HS, Phoenix Maidens, 1 drop at 05/04/18 2222    LORazepam (ATIVAN) tablet 2 mg, 2 mg, Oral, Q8H PRN, Kelby Salmeron MD, 1 mg at 05/02/18 2128    morphine injection 2 mg, 2 mg, Intravenous, Q2H PRN, Keaton Arenas    oxyCODONE (ROXICODONE) immediate release tablet 10 mg, 10 mg, Oral, Q4H PRN, Keaton Arenas    oxyCODONE (ROXICODONE) IR tablet 5 mg, 5 mg, Oral, Q4H PRN, Shelia Tay, 5 mg at 05/04/18 2327    pantoprazole (PROTONIX) EC tablet 40 mg, 40 mg, Oral, Early Morning, Munir Hewitt PA-C, 40 mg at 05/05/18 0112    rOPINIRole (REQUIP) tablet 3 mg, 3 mg, Oral, HS, Shelia Tay, 3 mg at 05/04/18 2222    sertraline (ZOLOFT) tablet 25 mg, 25 mg, Oral, HS, Keaton Arenas, 25 mg at 05/04/18 2222    traMADol (ULTRAM) tablet 50 mg, 50 mg, Oral, Q6H PRN, Shelia Maidens    Umeclidinium-Vilanterol 62 5-25 MCG/INH AEPB, , Inhalation, Daily, Vale Nichols PA-C    Blood Culture:   No results found for: BLOODCX    Wound Culture:   No results found for: WOUNDCULT    Ins and Outs:  I/O last 24 hours:   In: 720 [P O :720]  Out: 300 [Urine:300]          Physical Exam:   /65 (BP Location: Right arm)   Pulse 55   Temp 97 7 °F (36 5 °C) (Oral)   Resp 18   Ht 5' 10" (1 778 m)   Wt 99 8 kg (220 lb)   SpO2 98%   BMI 31 57 kg/m²     Musculoskeletal: left upper extremity  · Mepilex dressing C/D/I  · ROM improving  · Sensation intact to axillary, musculocutaneous, radial, ulna, median nerves  · MEdian, ulnar, axillary, radial nerve motor and sensory functions intact  · Extremity is warm and well perfused     Assessment:  74 y  o male with  left shoulder and upper arm pain, POD2 s/p open washout L shoulder    Plan:   · WBAT left upper extremity in sling for comfort  · Analgesia PRN  · Follow up aspiration cultures and gram stain and tissue path  · Ancef 2g q8 to continue pending culture results  · Dispo: Ortho will follow    Lukas Keller MD   05/05/18

## 2018-05-05 NOTE — SOCIAL WORK
HERLINDA called a second time to [address] [facility] [phone]   They informed HERLINDA that the oncall doctor would call back

## 2018-05-06 LAB
CHOLEST SERPL-MCNC: 107 MG/DL (ref 50–200)
GLUCOSE SERPL-MCNC: 155 MG/DL (ref 65–140)
GLUCOSE SERPL-MCNC: 216 MG/DL (ref 65–140)
GLUCOSE SERPL-MCNC: 241 MG/DL (ref 65–140)
GLUCOSE SERPL-MCNC: 270 MG/DL (ref 65–140)
HDLC SERPL-MCNC: 41 MG/DL (ref 40–60)
LDLC SERPL CALC-MCNC: 52 MG/DL (ref 0–100)
NONHDLC SERPL-MCNC: 66 MG/DL
TRIGL SERPL-MCNC: 72 MG/DL

## 2018-05-06 PROCEDURE — 80061 LIPID PANEL: CPT | Performed by: INTERNAL MEDICINE

## 2018-05-06 PROCEDURE — 99232 SBSQ HOSP IP/OBS MODERATE 35: CPT | Performed by: INTERNAL MEDICINE

## 2018-05-06 PROCEDURE — 99024 POSTOP FOLLOW-UP VISIT: CPT | Performed by: ORTHOPAEDIC SURGERY

## 2018-05-06 PROCEDURE — 82948 REAGENT STRIP/BLOOD GLUCOSE: CPT

## 2018-05-06 RX ORDER — SENNOSIDES 8.6 MG
1 TABLET ORAL
Status: DISCONTINUED | OUTPATIENT
Start: 2018-05-06 | End: 2018-05-08 | Stop reason: HOSPADM

## 2018-05-06 RX ORDER — DOCUSATE SODIUM 100 MG/1
100 CAPSULE, LIQUID FILLED ORAL 2 TIMES DAILY
Status: DISCONTINUED | OUTPATIENT
Start: 2018-05-06 | End: 2018-05-08 | Stop reason: HOSPADM

## 2018-05-06 RX ORDER — POLYETHYLENE GLYCOL 3350 17 G/17G
17 POWDER, FOR SOLUTION ORAL DAILY
Status: DISCONTINUED | OUTPATIENT
Start: 2018-05-06 | End: 2018-05-08 | Stop reason: HOSPADM

## 2018-05-06 RX ORDER — INSULIN GLARGINE 100 [IU]/ML
8 INJECTION, SOLUTION SUBCUTANEOUS
Status: DISCONTINUED | OUTPATIENT
Start: 2018-05-06 | End: 2018-05-07

## 2018-05-06 RX ADMIN — ALLOPURINOL 100 MG: 100 TABLET ORAL at 08:56

## 2018-05-06 RX ADMIN — CEFAZOLIN SODIUM 2000 MG: 10 INJECTION, POWDER, FOR SOLUTION INTRAVENOUS at 01:04

## 2018-05-06 RX ADMIN — SERTRALINE HYDROCHLORIDE 25 MG: 25 TABLET ORAL at 21:22

## 2018-05-06 RX ADMIN — ROPINIROLE 3 MG: 1 TABLET, FILM COATED ORAL at 21:22

## 2018-05-06 RX ADMIN — DOCUSATE SODIUM 100 MG: 100 CAPSULE, LIQUID FILLED ORAL at 12:49

## 2018-05-06 RX ADMIN — POLYETHYLENE GLYCOL 3350 17 G: 17 POWDER, FOR SOLUTION ORAL at 12:49

## 2018-05-06 RX ADMIN — OXYCODONE HYDROCHLORIDE 10 MG: 10 TABLET ORAL at 21:31

## 2018-05-06 RX ADMIN — APIXABAN 5 MG: 5 TABLET, FILM COATED ORAL at 08:55

## 2018-05-06 RX ADMIN — LORAZEPAM 2 MG: 1 TABLET ORAL at 21:22

## 2018-05-06 RX ADMIN — ROPINIROLE 1 MG: 1 TABLET, FILM COATED ORAL at 08:56

## 2018-05-06 RX ADMIN — INSULIN LISPRO 4 UNITS: 100 INJECTION, SOLUTION INTRAVENOUS; SUBCUTANEOUS at 21:34

## 2018-05-06 RX ADMIN — INSULIN LISPRO 2 UNITS: 100 INJECTION, SOLUTION INTRAVENOUS; SUBCUTANEOUS at 12:44

## 2018-05-06 RX ADMIN — APIXABAN 5 MG: 5 TABLET, FILM COATED ORAL at 21:22

## 2018-05-06 RX ADMIN — DOCUSATE SODIUM 100 MG: 100 CAPSULE, LIQUID FILLED ORAL at 17:27

## 2018-05-06 RX ADMIN — LATANOPROST 1 DROP: 50 SOLUTION OPHTHALMIC at 21:23

## 2018-05-06 RX ADMIN — SENNOSIDES 8.6 MG: 8.6 TABLET ORAL at 21:22

## 2018-05-06 RX ADMIN — GABAPENTIN 100 MG: 100 CAPSULE ORAL at 08:55

## 2018-05-06 RX ADMIN — INSULIN GLARGINE 8 UNITS: 100 INJECTION, SOLUTION SUBCUTANEOUS at 21:31

## 2018-05-06 RX ADMIN — INSULIN LISPRO 3 UNITS: 100 INJECTION, SOLUTION INTRAVENOUS; SUBCUTANEOUS at 08:56

## 2018-05-06 RX ADMIN — ACETAMINOPHEN 650 MG: 325 TABLET, FILM COATED ORAL at 10:50

## 2018-05-06 RX ADMIN — CEFAZOLIN SODIUM 2000 MG: 10 INJECTION, POWDER, FOR SOLUTION INTRAVENOUS at 10:50

## 2018-05-06 RX ADMIN — CEFAZOLIN SODIUM 2000 MG: 10 INJECTION, POWDER, FOR SOLUTION INTRAVENOUS at 17:27

## 2018-05-06 RX ADMIN — DIPHENHYDRAMINE HCL 25 MG: 25 TABLET ORAL at 08:55

## 2018-05-06 RX ADMIN — AMIODARONE HYDROCHLORIDE 200 MG: 200 TABLET ORAL at 08:56

## 2018-05-06 RX ADMIN — PANTOPRAZOLE SODIUM 40 MG: 40 TABLET, DELAYED RELEASE ORAL at 05:51

## 2018-05-06 NOTE — PROGRESS NOTES
[facility] Internal Medicine Progress Note  Patient: Christal Melendez 74 y o  male   MRN: [id number]  PCP: Zoey Clemente MD  Unit/Bed#: [facility] 926-01 Encounter: [id number]  Date Of Visit: 18    Assessment/plan:  1  Left shoulder and upper arm pain - possible septic left subdeltoid bursitis  S/p I&D of the subdeltoid bursa  Cultures negative so far  Treatment per ID/ortho  On Cefazolin(D#6, postop D#3)  F/u wound cultures  2  Type 2 diabetes with hyperglycemia - insulin adjusted  3  COPD - no exacerbation  Ct Anoro  4  H/o CVA - ct Eliquis  5  Afib - ct Eliquis, Amiodarone  6  JAZZY - CPAP hs  7  Insomnia - ct home medication Lorazepam 2 mg hs  8  RLS - ct home meds  9  History of gout - ct Allopurinol  10  Glaucoma - eye drops  11  Locally advanced esophageal cancer - s/p concurrent chemoradiation followed by resection  Clinically in remission  12  Hepatocellular carcinoma - status post Sir sphere  Recently detected new perihepatic lymph node enlargement with suspicion for tumor relapse  PET-CT scheduled as outpatient - case management trying to contact radiology to reschedule PET-CT which was scheduled for 18  VTE Pharmacologic Prophylaxis:   Pharmacologic: Apixaban (Eliquis)  Mechanical: Mechanical VTE prophylaxis in place  Education and Discussions with Family / Patient:  Discussed with wife at the bedside    Time Spent for Care: 20 minutes  More than 50% of total time spent on counseling and coordination of care as described above  Current Length of Stay: 5 day(s)    Current Patient Status: Inpatient     Code Status: Level 1 - Full Code    Subjective:   Left shoulder pain controlled  No fever  No nausea, vomiting or diarrhea  Objective:     Vitals:   Temp (24hrs), Av 8 °F (36 6 °C), Min:97 4 °F (36 3 °C), Max:98 3 °F (36 8 °C)    HR:  [52-57] 52  Resp:  [18] 18  BP: (107-134)/(60-74) 107/60  SpO2:  [96 %-98 %] 97 %  Body mass index is 31 57 kg/m²       Physical Exam: Physical Exam   Constitutional: He is oriented to person, place, and time  HENT:   Head: Atraumatic  Eyes: EOM are normal    Neck: Neck supple  No tracheal deviation present  No thyromegaly present  Cardiovascular: Normal rate, regular rhythm and normal heart sounds  Pulmonary/Chest: Effort normal and breath sounds normal  No respiratory distress  He has no wheezes  He has no rales  Abdominal: Soft  Bowel sounds are normal  He exhibits no distension  There is no tenderness  There is no rebound  Musculoskeletal: He exhibits no edema  Neurological: He is alert and oriented to person, place, and time  Skin:   Left shoulder dressing dry   Psychiatric: He has a normal mood and affect  Additional Data:     Labs:      Results from last 7 days  Lab Units 05/03/18  0510   WBC Thousand/uL 6 64   HEMOGLOBIN g/dL 14 7   HEMATOCRIT % 44 0   PLATELETS Thousands/uL 170   NEUTROS PCT % 77*   LYMPHS PCT % 9*   MONOS PCT % 13*   EOS PCT % 1       Results from last 7 days  Lab Units 05/03/18  0510   SODIUM mmol/L 136   POTASSIUM mmol/L 3 9   CHLORIDE mmol/L 99*   CO2 mmol/L 30   BUN mg/dL 17   CREATININE mg/dL 0 92   CALCIUM mg/dL 8 8   GLUCOSE RANDOM mg/dL 237*       Results from last 7 days  Lab Units 05/03/18  0510   INR  1 51*       * I Have Reviewed All Lab Data Listed Above  * Additional Pertinent Lab Tests Reviewed:  [facility]      Last 24 Hours Medication List:     Current Facility-Administered Medications:  acetaminophen 650 mg Oral Q6H PRN [facility]    albuterol 2 puff Inhalation Q6H PRN [facility]    albuterol 2 5 mg Nebulization Q4H PRN [facility]    allopurinol 100 mg Oral Daily [facility]    amiodarone 200 mg Oral Daily [facility]    apixaban 5 mg Oral BID [facility]    cefazolin 2,000 mg Intravenous Q8H [facility] Last Rate: 2,000 mg (05/06/18 1727)   dextromethorphan-guaiFENesin 10 mL Oral Q4H PRN Fredo Chaudhari PA-C    diazepam 5 mg Oral Daily PRN Kely Beckwith MD    diphenhydrAMINE 25 mg Oral Daily Keaton Arenas    docusate sodium 100 mg Oral BID Kely Beckwith MD    gabapentin 100 mg Oral Daily Kely Beckwith MD    hydrOXYzine HCL 25 mg Oral BID PRN Keaton Arenas    insulin glargine 4 Units Subcutaneous Once Jeri Melecio, PA-C    insulin glargine 8 Units Subcutaneous HS Kely Beckwith MD    insulin lispro 1-6 Units Subcutaneous TID AC GLO Day-JAROCHO    insulin lispro 1-6 Units Subcutaneous HS GLO Day-JAROCHO    insulin lispro 6 Units Subcutaneous TID With Meals Kely Beckwith MD    latanoprost 1 drop Both Eyes HS Keaton Arenas    LORazepam 2 mg Oral HS Kely Beckwith MD    morphine injection 2 mg Intravenous Q2H PRN Keaton Ortiz Gens    oxyCODONE 10 mg Oral Q4H PRN Ck Bell    oxyCODONE 5 mg Oral Q4H PRN Keaton Arenas    pantoprazole 40 mg Oral Early Morning [place] JACEY Majano    polyethylene glycol 17 g Oral Daily Kely Beckwith MD    rOPINIRole 1 mg Oral Daily Kely Beckwith MD    rOPINIRole 3 mg Oral HS Keaton Arenas    senna 1 tablet Oral HS Kely Beckwith MD    sertraline 25 mg Oral HS Keaton Arenas    traMADol 50 mg Oral Q6H PRN Ck Bell    Umeclidinium-Vilanterol  Inhalation Daily Vale Nichols PA-C         Today, Patient Was Seen By: Kely Beckwith MD    ** Please Note: Dragon 360 Dictation voice to text software may have been used in the creation of this document   **

## 2018-05-06 NOTE — PROGRESS NOTES
[facility] 74 [facility]  [facility] MRN: [id number]  Unit/Bed#: [facility] 926-01      S: POD 3 sp L shoulder open washout   No O/N events      Labs:    0  Lab Value Date/Time   HCT 44 0 05/03/2018 0510   HCT 41 2 05/01/2018 2103   HCT 45 6 12/13/2017 0826   HGB 14 7 05/03/2018 0510   HGB 14 1 05/01/2018 2103   HGB 14 9 12/13/2017 0826   INR 1 51 (H) 05/03/2018 0510   WBC 6 64 05/03/2018 0510   WBC 7 84 05/01/2018 2103   WBC 7 69 12/13/2017 0826   ESR 63 (H) 05/05/2018 1106   CRP 35 8 (H) 05/05/2018 1106       Meds:    Current Facility-Administered Medications:     acetaminophen (TYLENOL) tablet 650 mg, 650 mg, Oral, Q6H PRN, Caio Merck, 650 mg at 05/05/18 1709    albuterol (PROVENTIL HFA,VENTOLIN HFA) inhaler 2 puff, 2 puff, Inhalation, Q6H PRN, Caio Merck, 2 puff at 05/02/18 2042    albuterol inhalation solution 2 5 mg, 2 5 mg, Nebulization, Q4H PRN, Keaton Arenas    allopurinol (ZYLOPRIM) tablet 100 mg, 100 mg, Oral, Daily, Keaton Arenas, 100 mg at 05/05/18 0268    amiodarone tablet 200 mg, 200 mg, Oral, Daily, Caio [facility], 200 mg at 05/05/18 1653    apixaban (ELIQUIS) tablet 5 mg, 5 mg, Oral, BID, Caio [facility], 5 mg at 05/05/18 2147    ceFAZolin (ANCEF) 2,000 mg in sodium chloride 0 9 % 50 mL IVPB, 2,000 mg, Intravenous, Q8H, Caio [facility], Last Rate: 100 mL/hr at 05/06/18 0104, 2,000 mg at 05/06/18 0104    dextromethorphan-guaiFENesin (ROBITUSSIN DM)  mg/5 mL oral syrup 10 mL, 10 mL, Oral, Q4H PRN, Evonnie Dance, PA-C    diazepam (VALIUM) tablet 5 mg, 5 mg, Oral, Daily PRN, Theresa Weinstein MD    diphenhydrAMINE (BENADRYL) tablet 25 mg, 25 mg, Oral, Daily, Keaton Arenas, 25 mg at 05/05/18 3170    gabapentin (NEURONTIN) capsule 100 mg, 100 mg, Oral, Daily, Theresa Weinstein MD    hydrOXYzine HCL (ATARAX) tablet 25 mg, 25 mg, Oral, BID PRN, Caio Corcoran    insulin glargine (LANTUS) subcutaneous injection 4 Units 0 04 mL, 4 Units, Subcutaneous, HS, Theresa Weinstein MD, 4 Units at 05/05/18 2210    insulin glargine (LANTUS) subcutaneous injection 4 Units 0 04 mL, 4 Units, Subcutaneous, Once, Fransisca Willson PA-C    insulin lispro (HumaLOG) 100 units/mL subcutaneous injection 1-6 Units, 1-6 Units, Subcutaneous, TID AC, 4 Units at 05/05/18 1259 **AND** Fingerstick Glucose (POCT), , , TID AC, Thiago Casey PA-C    insulin lispro (HumaLOG) 100 units/mL subcutaneous injection 1-6 Units, 1-6 Units, Subcutaneous, HS, Thiago Casey PA-C, 2 Units at 05/05/18 2205    insulin lispro (HumaLOG) 100 units/mL subcutaneous injection 6 Units, 6 Units, Subcutaneous, TID With Meals, Sasha López MD, 6 Units at 05/05/18 1259    latanoprost (XALATAN) 0 005 % ophthalmic solution 1 drop, 1 drop, Both Eyes, HS, Deion Michaels, 1 drop at 05/05/18 2147    LORazepam (ATIVAN) tablet 2 mg, 2 mg, Oral, HS, Sasha López MD, 2 mg at 05/05/18 2147    morphine injection 2 mg, 2 mg, Intravenous, Q2H PRN, Keaton Arenas    oxyCODONE (ROXICODONE) immediate release tablet 10 mg, 10 mg, Oral, Q4H PRN, Keaton Arenas    oxyCODONE (ROXICODONE) IR tablet 5 mg, 5 mg, Oral, Q4H PRN, Deion Albatool, 5 mg at 05/04/18 2327    pantoprazole (PROTONIX) EC tablet 40 mg, 40 mg, Oral, Early Morning, Nina Gray PA-C, 40 mg at 05/06/18 0551    rOPINIRole (REQUIP) tablet 1 mg, 1 mg, Oral, Daily, Sasha López MD    rOPINIRole (REQUIP) tablet 3 mg, 3 mg, Oral, HS, Deion Albatool, 3 mg at 05/05/18 2147    sertraline (ZOLOFT) tablet 25 mg, 25 mg, Oral, HS, Keaton Arenas, 25 mg at 05/05/18 2147    traMADol (ULTRAM) tablet 50 mg, 50 mg, Oral, Q6H PRN, Deion Michaels    Umeclidinium-Vilanterol 62 5-25 MCG/INH AEPB, , Inhalation, Daily, Vale Nichols PA-C    Blood Culture:   No results found for: BLOODCX    Wound Culture:   No results found for: WOUNDCULT    Ins and Outs:  I/O last 24 hours:   In: 1570 [P O :1520; IV Piggyback:50]  Out: -           Physical Exam:   /74 (BP Location: Right arm)   Pulse 57   Temp (!) 97 4 °F (36 3 °C) (Oral)   Resp 18   Ht 5' 10" (1 778 m)   Wt 99 8 kg (220 lb)   SpO2 98%   BMI 31 57 kg/m²     Musculoskeletal: left upper extremity  · Incision well approximated  No erythema no drainage  · Sling in place  · Sensation intact to axillary, musculocutaneous, radial, ulna, median nerves  · MEdian, ulnar, axillary, radial nerve motor and sensory functions intact  · Extremity is warm and well perfused     Assessment:  74 y  o male with  left shoulder and upper arm pain, POD 3 s/p open washout L shoulder    Plan:   · WBAT left upper extremity in sling for comfort  · Analgesia PRN  · Follow up aspiration cultures and gram stain and tissue path  · abx: per ID  · Dispo: Ortho will follow    Misha Lorenzo MD   05/06/18

## 2018-05-06 NOTE — PROGRESS NOTES
Progress Note - Infectious Disease   Salvador España 74 y o  male MRN: [id number]  Unit/Bed#: [facility] 926-01 Encounter: [id number]      Impression/Plan:  1  Left subdeltoid bursitis-possibly septic  Status post I and D of the sub deltoid bursa  Thus far the cultures are negative  The clinical and radiographic findings do not support a septic joint as there is no synovial effusion appreciated  The patient seems to be tolerating the antibiotics well without difficulty  -continue cefazolin for now at current dose  -follow up wound cultures which are being held and reassessed by microbiology  -recheck CBC with diff and creatinine tomorrow  -close orthopedics follow-up     2   Esophageal carcinoma-status post remission after resection      3  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  Patient is very concerned that his pet scan has been delayed and he wants to get started on radiation therapy as ASAP  -surgical oncology follow-up  -follow-up pet scan which is scheduled for early this week  -explained to the patient that unless this infection gets controlled, he is unlikely to get radiation therapy     4  Diabetes mellitus-with hyperglycemia     5  Disposition-patient no agreeable to stay in the hospital waiting more data  Discussed in detail with Orthopedics    Antibiotics:  Cefazolin 6  Postop day 3    Subjective:  Patient has no fever, chills, sweats; no nausea, vomiting, diarrhea; no cough, shortness of breath; no increase pain  No new symptoms  Objective:  Vitals:  HR:  [52-57] 55  Resp:  [18] 18  BP: (105-134)/(61-74) 120/61  SpO2:  [96 %-98 %] 98 %  Temp (24hrs), Av 8 °F (36 6 °C), Min:97 4 °F (36 3 °C), Max:98 3 °F (36 8 °C)  Current: Temperature: 98 3 °F (36 8 °C)    Physical Exam:   General Appearance:  Alert, interactive, nontoxic, no acute distress  Throat: Oropharynx moist without lesions      Lungs:   Clear to auscultation bilaterally; no wheezes, rhonchi or rales; respirations unlabored   Heart:  RRR; no murmur, rub or gallop   Abdomen:   Soft, non-tender, non-distended, positive bowel sounds  Extremities: No clubbing, cyanosis  Left shoulder incision without drainage or erythema  Decreased range of motion   Skin: No new rashes or lesions  No draining wounds noted         Labs, Imaging, & Other studies:   All pertinent labs and imaging studies were personally reviewed    Results from last 7 days  Lab Units 05/03/18  0510 05/01/18  2103   WBC Thousand/uL 6 64 7 84   HEMOGLOBIN g/dL 14 7 14 1   PLATELETS Thousands/uL 170 177       Results from last 7 days  Lab Units 05/03/18  0510 05/01/18  2103   SODIUM mmol/L 136 135*   POTASSIUM mmol/L 3 9 4 4   CHLORIDE mmol/L 99* 100   CO2 mmol/L 30 31   ANION GAP mmol/L 7 4   BUN mg/dL 17 16   CREATININE mg/dL 0 92 0 89   EGFR ml/min/1 73sq m 81 84   GLUCOSE RANDOM mg/dL 237* 200*   CALCIUM mg/dL 8 8 8 7       Results from last 7 days  Lab Units 05/03/18  1021 05/03/18  1019 05/02/18  1102 05/02/18  1101 05/01/18  2149 05/01/18  2148 05/01/18  2104   GRAM STAIN RESULT  Rare Polys  No bacteria seen Rare Polys  No bacteria seen 3+ Polys  No bacteria seen 3+ Polys  No bacteria seen 4+ RBC's  4+ Polys  No bacteria seen 2+ Polys  No organisms seen 2+ Polys  No bacteria seen   BODY FLUID CULTURE, STERILE   --   --   --  No growth  --  No growth No growth

## 2018-05-06 NOTE — PROGRESS NOTES
[facility] Internal Medicine Progress Note  Patient: Dave Morrison 74 y o  male   MRN: [id number]  PCP: Francisco Rivera MD  Unit/Bed#: [facility] 926-01 Encounter: [id number]  Date Of Visit: 18    Assessment/plan:  1  Left shoulder and upper arm pain - possible septic left subdeltoid bursitis  S/p I&D  Treatment per ID/ortho  On Cefazolin  F/u wound cultures  2  Type 2 diabetes with hyperglycemia - blood sugars improved  Ct current regimen  3  COPD - no exacerbation  Ct Anoro  4  H/o CVA - ct Eliquis  5  Afib - ct Eliquis, Amiodarone  6  JAZZY - CPAP hs  7  Insomnia - ct home medication Lorazepam 2 mg hs  8  RLS - ct home meds  9  History of gout - ct Allopurinol  10  Glaucoma - eye drops  11  Locally advanced esophageal cancer - s/p concurrent chemoradiation followed by resection  Clinically in remission  12  Hepatocellular carcinoma - status post Sir sphere  Recently detected new perihepatic lymph node enlargement with suspicion for tumor relapse  PET-CT scheduled as outpatient    VTE Pharmacologic Prophylaxis:   Pharmacologic: Apixaban (Eliquis)  Mechanical: Mechanical VTE prophylaxis in place  Time Spent for Care: 20 minutes  More than 50% of total time spent on counseling and coordination of care as described above  Current Length of Stay: 4 day(s)    Current Patient Status: Inpatient     Code Status: Level 1 - Full Code    Subjective:   No fever  Left shoulder pain controlled  No nausea, vomiting or diarrhea  Objective:     Vitals:   Temp (24hrs), Av 7 °F (36 5 °C), Min:97 6 °F (36 4 °C), Max:97 8 °F (36 6 °C)    HR:  [52-55] 52  Resp:  [18] 18  BP: (105-133)/(61-65) 105/61  SpO2:  [97 %-98 %] 97 %  Body mass index is 31 57 kg/m²  Physical Exam:     Physical Exam   Constitutional: He is oriented to person, place, and time  HENT:   Head: Atraumatic  Eyes: EOM are normal    Neck: Neck supple  No JVD present  No tracheal deviation present  No thyromegaly present  Cardiovascular: Normal rate, regular rhythm and normal heart sounds  Pulmonary/Chest: Effort normal and breath sounds normal  No respiratory distress  He has no wheezes  He has no rales  Abdominal: Soft  Bowel sounds are normal  He exhibits no distension  There is no tenderness  There is no rebound  Musculoskeletal: He exhibits no edema  Neurological: He is alert and oriented to person, place, and time  Skin:   Left shoulder dressing dry   Psychiatric: He has a normal mood and affect  Additional Data:     Labs:      Results from last 7 days  Lab Units 05/03/18  0510   WBC Thousand/uL 6 64   HEMOGLOBIN g/dL 14 7   HEMATOCRIT % 44 0   PLATELETS Thousands/uL 170   NEUTROS PCT % 77*   LYMPHS PCT % 9*   MONOS PCT % 13*   EOS PCT % 1       Results from last 7 days  Lab Units 05/03/18  0510   SODIUM mmol/L 136   POTASSIUM mmol/L 3 9   CHLORIDE mmol/L 99*   CO2 mmol/L 30   BUN mg/dL 17   CREATININE mg/dL 0 92   CALCIUM mg/dL 8 8   GLUCOSE RANDOM mg/dL 237*       Results from last 7 days  Lab Units 05/03/18  0510   INR  1 51*       * I Have Reviewed All Lab Data Listed Above  * Additional Pertinent Lab Tests Reviewed:  Kiersten 64 Admission Reviewed      Last 24 Hours Medication List:     Current Facility-Administered Medications:  acetaminophen 650 mg Oral Q6H PRN [facility]    albuterol 2 puff Inhalation Q6H PRN [facility]    albuterol 2 5 mg Nebulization Q4H PRN [facility]    allopurinol 100 mg Oral Daily [facility]    amiodarone 200 mg Oral Daily [facility]    apixaban 5 mg Oral BID [facility]    cefazolin 2,000 mg Intravenous Q8H [facility] Last Rate: 2,000 mg (05/05/18 1710)   dextromethorphan-guaiFENesin 10 mL Oral Q4H PRN Maddie Medina PA-C    diazepam 5 mg Oral Daily PRN Markel Vivas MD    diphenhydrAMINE 25 mg Oral Daily [facility]    [START ON 5/6/2018] gabapentin 100 mg Oral Daily Markel Vivas MD    hydrOXYzine HCL 25 mg Oral BID PRN [facility]    insulin glargine 8 Units Subcutaneous HS Cortney Ferris MD    insulin lispro 1-6 Units Subcutaneous TID AC Gerry Osei PA-C    insulin lispro 1-6 Units Subcutaneous HS Gerry Osei PA-C    insulin lispro 6 Units Subcutaneous TID With Meals Cortney Ferris MD    latanoprost 1 drop Both Eyes HS Keaton Arenas    LORazepam 2 mg Oral HS Cortney Ferris MD    morphine injection 2 mg Intravenous Q2H PRN Keaton Bourgeois Sparquin    oxyCODONE 10 mg Oral Q4H PRN Reggie Sneddon    oxyCODONE 5 mg Oral Q4H PRN Keaton Arenas    pantoprazole 40 mg Oral Early Morning Carol Rogers PA-C    rOPINIRole 3 mg Oral HS Keaton Arenas    sertraline 25 mg Oral HS Keaton Arenas    traMADol 50 mg Oral Q6H PRN Reggie Sneddon    Umeclidinium-Vilanterol  Inhalation Daily Vale Nichols PA-C         Today, Patient Was Seen By: Cortney Ferris MD    ** Please Note: Dragon 360 Dictation voice to text software may have been used in the creation of this document   **

## 2018-05-07 LAB
ANION GAP SERPL CALCULATED.3IONS-SCNC: 6 MMOL/L (ref 4–13)
BASOPHILS # BLD AUTO: 0.01 THOUSANDS/ΜL (ref 0–0.1)
BASOPHILS NFR BLD AUTO: 0 % (ref 0–1)
BUN SERPL-MCNC: 17 MG/DL (ref 5–25)
CALCIUM SERPL-MCNC: 8.6 MG/DL (ref 8.3–10.1)
CHLORIDE SERPL-SCNC: 103 MMOL/L (ref 100–108)
CO2 SERPL-SCNC: 26 MMOL/L (ref 21–32)
CREAT SERPL-MCNC: 0.74 MG/DL (ref 0.6–1.3)
EOSINOPHIL # BLD AUTO: 0.06 THOUSAND/ΜL (ref 0–0.61)
EOSINOPHIL NFR BLD AUTO: 1 % (ref 0–6)
ERYTHROCYTE [DISTWIDTH] IN BLOOD BY AUTOMATED COUNT: 13.1 % (ref 11.6–15.1)
GFR SERPL CREATININE-BSD FRML MDRD: 90 ML/MIN/1.73SQ M
GLUCOSE SERPL-MCNC: 150 MG/DL (ref 65–140)
GLUCOSE SERPL-MCNC: 173 MG/DL (ref 65–140)
GLUCOSE SERPL-MCNC: 177 MG/DL (ref 65–140)
GLUCOSE SERPL-MCNC: 177 MG/DL (ref 65–140)
GLUCOSE SERPL-MCNC: 185 MG/DL (ref 65–140)
GLUCOSE SERPL-MCNC: 203 MG/DL (ref 65–140)
HCT VFR BLD AUTO: 44.4 % (ref 36.5–49.3)
HGB BLD-MCNC: 14.3 G/DL (ref 12–17)
LYMPHOCYTES # BLD AUTO: 0.82 THOUSANDS/ΜL (ref 0.6–4.47)
LYMPHOCYTES NFR BLD AUTO: 8 % (ref 14–44)
MCH RBC QN AUTO: 29.7 PG (ref 26.8–34.3)
MCHC RBC AUTO-ENTMCNC: 32.2 G/DL (ref 31.4–37.4)
MCV RBC AUTO: 92 FL (ref 82–98)
MONOCYTES # BLD AUTO: 0.73 THOUSAND/ΜL (ref 0.17–1.22)
MONOCYTES NFR BLD AUTO: 7 % (ref 4–12)
NEUTROPHILS # BLD AUTO: 8.27 THOUSANDS/ΜL (ref 1.85–7.62)
NEUTS SEG NFR BLD AUTO: 84 % (ref 43–75)
NRBC BLD AUTO-RTO: 0 /100 WBCS
PLATELET # BLD AUTO: 199 THOUSANDS/UL (ref 149–390)
PMV BLD AUTO: 10.7 FL (ref 8.9–12.7)
POTASSIUM SERPL-SCNC: 4.2 MMOL/L (ref 3.5–5.3)
RBC # BLD AUTO: 4.82 MILLION/UL (ref 3.88–5.62)
SODIUM SERPL-SCNC: 135 MMOL/L (ref 136–145)
WBC # BLD AUTO: 9.92 THOUSAND/UL (ref 4.31–10.16)

## 2018-05-07 PROCEDURE — 82948 REAGENT STRIP/BLOOD GLUCOSE: CPT

## 2018-05-07 PROCEDURE — 80048 BASIC METABOLIC PNL TOTAL CA: CPT | Performed by: INTERNAL MEDICINE

## 2018-05-07 PROCEDURE — 99232 SBSQ HOSP IP/OBS MODERATE 35: CPT | Performed by: INTERNAL MEDICINE

## 2018-05-07 PROCEDURE — 99024 POSTOP FOLLOW-UP VISIT: CPT | Performed by: PHYSICIAN ASSISTANT

## 2018-05-07 PROCEDURE — 85025 COMPLETE CBC W/AUTO DIFF WBC: CPT | Performed by: INTERNAL MEDICINE

## 2018-05-07 RX ORDER — INSULIN GLARGINE 100 [IU]/ML
10 INJECTION, SOLUTION SUBCUTANEOUS
Status: DISCONTINUED | OUTPATIENT
Start: 2018-05-07 | End: 2018-05-08 | Stop reason: HOSPADM

## 2018-05-07 RX ADMIN — APIXABAN 5 MG: 5 TABLET, FILM COATED ORAL at 21:57

## 2018-05-07 RX ADMIN — ROPINIROLE 1 MG: 1 TABLET, FILM COATED ORAL at 08:29

## 2018-05-07 RX ADMIN — APIXABAN 5 MG: 5 TABLET, FILM COATED ORAL at 08:29

## 2018-05-07 RX ADMIN — DIPHENHYDRAMINE HCL 25 MG: 25 TABLET ORAL at 08:29

## 2018-05-07 RX ADMIN — SERTRALINE HYDROCHLORIDE 25 MG: 25 TABLET ORAL at 21:57

## 2018-05-07 RX ADMIN — LORAZEPAM 2 MG: 1 TABLET ORAL at 21:57

## 2018-05-07 RX ADMIN — SENNOSIDES 8.6 MG: 8.6 TABLET ORAL at 21:57

## 2018-05-07 RX ADMIN — INSULIN LISPRO 1 UNITS: 100 INJECTION, SOLUTION INTRAVENOUS; SUBCUTANEOUS at 13:40

## 2018-05-07 RX ADMIN — GABAPENTIN 100 MG: 100 CAPSULE ORAL at 08:29

## 2018-05-07 RX ADMIN — CEFAZOLIN SODIUM 2000 MG: 10 INJECTION, POWDER, FOR SOLUTION INTRAVENOUS at 02:29

## 2018-05-07 RX ADMIN — ALLOPURINOL 100 MG: 100 TABLET ORAL at 08:29

## 2018-05-07 RX ADMIN — INSULIN LISPRO 1 UNITS: 100 INJECTION, SOLUTION INTRAVENOUS; SUBCUTANEOUS at 21:58

## 2018-05-07 RX ADMIN — POLYETHYLENE GLYCOL 3350 17 G: 17 POWDER, FOR SOLUTION ORAL at 08:30

## 2018-05-07 RX ADMIN — AMIODARONE HYDROCHLORIDE 200 MG: 200 TABLET ORAL at 08:29

## 2018-05-07 RX ADMIN — DOCUSATE SODIUM 100 MG: 100 CAPSULE, LIQUID FILLED ORAL at 08:29

## 2018-05-07 RX ADMIN — INSULIN GLARGINE 10 UNITS: 100 INJECTION, SOLUTION SUBCUTANEOUS at 21:57

## 2018-05-07 RX ADMIN — INSULIN LISPRO 1 UNITS: 100 INJECTION, SOLUTION INTRAVENOUS; SUBCUTANEOUS at 08:30

## 2018-05-07 RX ADMIN — PANTOPRAZOLE SODIUM 40 MG: 40 TABLET, DELAYED RELEASE ORAL at 05:32

## 2018-05-07 RX ADMIN — INSULIN LISPRO 1 UNITS: 100 INJECTION, SOLUTION INTRAVENOUS; SUBCUTANEOUS at 18:48

## 2018-05-07 RX ADMIN — CEFAZOLIN SODIUM 2000 MG: 10 INJECTION, POWDER, FOR SOLUTION INTRAVENOUS at 10:53

## 2018-05-07 RX ADMIN — OXYCODONE HYDROCHLORIDE 5 MG: 5 TABLET ORAL at 05:42

## 2018-05-07 RX ADMIN — CEFAZOLIN SODIUM 2000 MG: 10 INJECTION, POWDER, FOR SOLUTION INTRAVENOUS at 17:50

## 2018-05-07 RX ADMIN — LATANOPROST 1 DROP: 50 SOLUTION OPHTHALMIC at 21:58

## 2018-05-07 RX ADMIN — ROPINIROLE 3 MG: 1 TABLET, FILM COATED ORAL at 21:57

## 2018-05-07 NOTE — SOCIAL WORK
CM received voicemail form Dr Lukas Vaz [phone], in order to reschedule pt's PET scan today   CM will inform pt's CM to make the change

## 2018-05-07 NOTE — SOCIAL WORK
CM spoke to Dr Delgado [facility] [phone]   He is requesting CM contact the [facility] in [facility] [phone] in order to reschedule the pt's PET scan which was supposed to occur today

## 2018-05-07 NOTE — PROGRESS NOTES
Progress Note - Infectious Disease   Geeta Landin 74 y o  male MRN: [id number]  Unit/Bed#: [facility] 926-01 Encounter: [id number]      Impression/Plan:  1   Left subdeltoid bursitis-possibly septic   Status post I and D of the sub deltoid bursa   Thus far the cultures are negative   The clinical and radiographic findings do not support a septic joint as there is no synovial effusion appreciated   The patient seems to be tolerating the antibiotics well without difficulty  -continue cefazolin for now at current dose  -follow up wound cultures which are being held and reassessed by microbiology  -likely to oral antibiotics tomorrow if continues to improve and the cultures allow  -plan 2 weeks of antibiotics total  -close orthopedics follow-up     2   Esophageal carcinoma-status post remission after resection      3   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  Delfina See is very concerned that his pet scan has been delayed and he wants to get started on radiation therapy as ASAP  -surgical oncology follow-up  -follow-up pet scan which is scheduled for early this week  -explained to the patient that unless this infection gets controlled, he is unlikely to get radiation therapy     4   Diabetes mellitus-with hyperglycemia     5   Disposition-if continues to do well, likely transition to oral antibiotics tomorrow  Discussed in detail with Orthopedics    Antibiotics:  Cefazolin 7  Postop day 4    Subjective:  Patient has no fever, chills, sweats; no nausea, vomiting, diarrhea; no cough, shortness of breath; decreased left shoulder pain  No new symptoms  Objective:  Vitals:  HR:  [52-70] 62  Resp:  [16-18] 16  BP: (107-137)/(60-66) 114/63  SpO2:  [95 %-97 %] 95 %  Temp (24hrs), Av 9 °F (36 6 °C), Min:97 6 °F (36 4 °C), Max:98 5 °F (36 9 °C)  Current: Temperature: 98 5 °F (36 9 °C)    Physical Exam:   General Appearance:  Alert, interactive, nontoxic, no acute distress     Throat: Oropharynx moist without lesions  Lungs:   Clear to auscultation bilaterally; no wheezes, rhonchi or rales; respirations unlabored   Heart:  RRR; no murmur, rub or gallop   Abdomen:   Soft, non-tender, non-distended, positive bowel sounds  Extremities: No clubbing, cyanosis or edema  Left shoulder with a dry dressing in place  Decreased range of motion  Skin: No new rashes or lesions  No draining wounds noted         Labs, Imaging, & Other studies:   All pertinent labs and imaging studies were personally reviewed    Results from last 7 days  Lab Units 05/07/18  0532 05/03/18  0510 05/01/18  2103   WBC Thousand/uL 9 92 6 64 7 84   HEMOGLOBIN g/dL 14 3 14 7 14 1   PLATELETS Thousands/uL 199 170 177       Results from last 7 days  Lab Units 05/07/18  0532 05/03/18  0510 05/01/18  2103   SODIUM mmol/L 135* 136 135*   POTASSIUM mmol/L 4 2 3 9 4 4   CHLORIDE mmol/L 103 99* 100   CO2 mmol/L 26 30 31   ANION GAP mmol/L 6 7 4   BUN mg/dL 17 17 16   CREATININE mg/dL 0 74 0 92 0 89   EGFR ml/min/1 73sq m 90 81 84   GLUCOSE RANDOM mg/dL 173* 237* 200*   CALCIUM mg/dL 8 6 8 8 8 7       Results from last 7 days  Lab Units 05/03/18  1021 05/03/18  1019 05/02/18  1102 05/02/18  1101 05/01/18  2149 05/01/18  2148 05/01/18  2104   GRAM STAIN RESULT  Rare Polys  No bacteria seen Rare Polys  No bacteria seen 3+ Polys  No bacteria seen 3+ Polys  No bacteria seen 4+ RBC's  4+ Polys  No bacteria seen 2+ Polys  No organisms seen 2+ Polys  No bacteria seen   BODY FLUID CULTURE, STERILE   --   --   --  No growth  --  No growth No growth

## 2018-05-07 NOTE — SOCIAL WORK
CM following up on CM Will's effort to reschedule Pet Scan for patient  CM contacted [facility] in [address] [place] [address] and spoke with Mike Bianchi in central scheduling  Per Mike Bianchi an appointment was arranged by physician's office for Thursday May 10th  Pt to report for Pet Scan registration at 7:30 am     No other CM needs identified at this time

## 2018-05-07 NOTE — CASE MANAGEMENT
Continued Stay Review    Date: 5/5    Vital Signs: /65 (BP Location: Right arm)   Pulse 55   Temp 97 7 °F (36 5 °C) (Oral)   Resp 18   Ht 5' 10" (1 778 m)   Wt 99 8 kg (220 lb)   SpO2 98%   BMI 31 57 kg/m²        Medications:   Scheduled Meds:   Current Facility-Administered Medications:  allopurinol 100 mg Oral Daily   amiodarone 200 mg Oral Daily   apixaban 5 mg Oral BID   cefazolin 2,000 mg Intravenous Q8H   diphenhydrAMINE 25 mg Oral Daily   docusate sodium 100 mg Oral BID   gabapentin 100 mg Oral Daily   insulin glargine 10 Units Subcutaneous HS   insulin lispro 1-6 Units Subcutaneous TID AC   insulin lispro 1-6 Units Subcutaneous HS   insulin lispro 8 Units Subcutaneous TID With Meals   latanoprost 1 drop Both Eyes HS   LORazepam 2 mg Oral HS   pantoprazole 40 mg Oral Early Morning   polyethylene glycol 17 g Oral Daily   rOPINIRole 1 mg Oral Daily   rOPINIRole 3 mg Oral HS   senna 1 tablet Oral HS   sertraline 25 mg Oral HS   Umeclidinium-Vilanterol  Inhalation Daily     Continuous Infusions:    PRN Meds:     Acetaminophen po x2    albuterol    albuterol    dextromethorphan-guaiFENesin    diazepam    hydrOXYzine HCL    morphine injection    oxyCODONE    traMADol    Abnormal Labs/Diagnostic Results:   No new    Age/Sex: 74 y o  male     Assessment:  74 y  o male with  left shoulder and upper arm pain, POD2 s/p open washout L shoulder     Plan:   · WBAT left upper extremity in sling for comfort  · Analgesia PRN  · Follow up aspiration cultures and gram stain and tissue path  · Ancef 2g q8 to continue pending culture results  · Dispo: Ortho will follow      Discharge Plan: Home when medically cleared

## 2018-05-07 NOTE — PROGRESS NOTES
Orthopedics   Jan Terrell 74 y o  male MRN: [id number]  [facility]26[facility]      Subjective:  74 y  o male post operative day 4 left upper extremity Incision and Drainage Pt doing well  Pain controlled  No fevers, chills, or any other s/s of an infection  NO overnight events  No other acute/associated complaints       Labs:    0  Lab Value Date/Time   HCT 44 4 05/07/2018 0532   HCT 44 0 05/03/2018 0510   HCT 41 2 05/01/2018 2103   HGB 14 3 05/07/2018 0532   HGB 14 7 05/03/2018 0510   HGB 14 1 05/01/2018 2103   INR 1 51 (H) 05/03/2018 0510   WBC 9 92 05/07/2018 0532   WBC 6 64 05/03/2018 0510   WBC 7 84 05/01/2018 2103   ESR 63 (H) 05/05/2018 1106   CRP 35 8 (H) 05/05/2018 1106       Meds:    Current Facility-Administered Medications:     acetaminophen (TYLENOL) tablet 650 mg, 650 mg, Oral, Q6H PRN, Jeannetta Saltness, 650 mg at 05/06/18 1050    albuterol (PROVENTIL HFA,VENTOLIN HFA) inhaler 2 puff, 2 puff, Inhalation, Q6H PRN, Jechrisetta Saltness, 2 puff at 05/02/18 2042    albuterol inhalation solution 2 5 mg, 2 5 mg, Nebulization, Q4H PRN, Keaton Arenas    allopurinol (ZYLOPRIM) tablet 100 mg, 100 mg, Oral, Daily, Keaton Arenas, 100 mg at 05/06/18 0456    amiodarone tablet 200 mg, 200 mg, Oral, Daily, Tiffanie Saltness, 200 mg at 05/06/18 0856    apixaban (ELIQUIS) tablet 5 mg, 5 mg, Oral, BID, Lauraetta Saltness, 5 mg at 05/06/18 2122    ceFAZolin (ANCEF) 2,000 mg in sodium chloride 0 9 % 50 mL IVPB, 2,000 mg, Intravenous, Q8H, Tiffanie Hendricks, Last Rate: 100 mL/hr at 05/07/18 0229, 2,000 mg at 05/07/18 0229    dextromethorphan-guaiFENesin (ROBITUSSIN DM)  mg/5 mL oral syrup 10 mL, 10 mL, Oral, Q4H PRN, Odin Lancaster PA-C    diazepam (VALIUM) tablet 5 mg, 5 mg, Oral, Daily PRN, Janeth Davalos MD    diphenhydrAMINE (BENADRYL) tablet 25 mg, 25 mg, Oral, Daily, Keaton Arenas, 25 mg at 05/06/18 0855    docusate sodium (COLACE) capsule 100 mg, 100 mg, Oral, BID, Janeth Davalos MD, 100 mg at 05/06/18 1727    gabapentin (NEURONTIN) capsule 100 mg, 100 mg, Oral, Daily, Leon Santillan MD, 100 mg at 05/06/18 0855    hydrOXYzine HCL (ATARAX) tablet 25 mg, 25 mg, Oral, BID PRN, Deonna Bingham    insulin glargine (LANTUS) subcutaneous injection 4 Units 0 04 mL, 4 Units, Subcutaneous, Once, Fransisca Willson PA-C    insulin glargine (LANTUS) subcutaneous injection 8 Units 0 08 mL, 8 Units, Subcutaneous, HS, Leon Santillan MD, 8 Units at 05/06/18 2131    insulin lispro (HumaLOG) 100 units/mL subcutaneous injection 1-6 Units, 1-6 Units, Subcutaneous, TID AC, 2 Units at 05/06/18 1244 **AND** Fingerstick Glucose (POCT), , , TID AC, Mariann Corral PA-C    insulin lispro (HumaLOG) 100 units/mL subcutaneous injection 1-6 Units, 1-6 Units, Subcutaneous, HS, Mariann Corral PA-C, 4 Units at 05/06/18 2134    insulin lispro (HumaLOG) 100 units/mL subcutaneous injection 6 Units, 6 Units, Subcutaneous, TID With Meals, Leon Santillan MD, 6 Units at 05/06/18 1253    latanoprost (XALATAN) 0 005 % ophthalmic solution 1 drop, 1 drop, Both Eyes, HS, Deonna Bingham, 1 drop at 05/06/18 2123    LORazepam (ATIVAN) tablet 2 mg, 2 mg, Oral, HS, Leon Santillan MD, 2 mg at 05/06/18 2122    morphine injection 2 mg, 2 mg, Intravenous, Q2H PRN, Deonna Bingham    oxyCODONE (ROXICODONE) immediate release tablet 10 mg, 10 mg, Oral, Q4H PRN, Deonna Ambrosia, 10 mg at 05/06/18 2131    oxyCODONE (ROXICODONE) IR tablet 5 mg, 5 mg, Oral, Q4H PRN, Deonna Ambrosia, 5 mg at 05/07/18 0542    pantoprazole (PROTONIX) EC tablet 40 mg, 40 mg, Oral, Early Morning, Fransisca Willson PA-C, 40 mg at 05/07/18 0532    polyethylene glycol (MIRALAX) packet 17 g, 17 g, Oral, Daily, Leon Santillan MD, 17 g at 05/06/18 1249    rOPINIRole (REQUIP) tablet 1 mg, 1 mg, Oral, Daily, Leon Santillan MD, 1 mg at 05/06/18 0856    rOPINIRole (REQUIP) tablet 3 mg, 3 mg, Oral, HS, Deonna Bingham, 3 mg at 05/06/18 2122    senna (SENOKOT) tablet 8 6 mg, 1 tablet, Oral, HS, Leon Santillan MD, 8 6 mg at 05/06/18 2122   sertraline (ZOLOFT) tablet 25 mg, 25 mg, Oral, HS, Keaton Arenas, 25 mg at 05/06/18 2122    traMADol (ULTRAM) tablet 50 mg, 50 mg, Oral, Q6H PRN, Lester Rene    Umeclidinium-Vilanterol 62 5-25 MCG/INH AEPB, , Inhalation, Daily, Vale Nichols PA-C    Blood Culture:   No results found for: BLOODCX    Wound Culture:   No results found for: WOUNDCULT    Ins and Outs:  I/O last 24 hours: In: 918 [P O :918]  Out: -           Physical Exam:  Vitals:    05/06/18 2340   BP: 137/66   Pulse: 70   Resp: 18   Temp: 97 7 °F (36 5 °C)   SpO2: 96%     left upper extremity  · Dressing C/D/I, no surrounding erythema  · Sensation intact Axillary, Musculocutaneous, Radial, Ulna, Median nerves  · Motor intact to Axillary, Musculocutaneous, Radial, Ulna, Median nerves  · 2+ radial pulse    _*_*_*_*_*_*_*_*_*_*_*_*_*_*_*_*_*_*_*_*_*_*_*_*_*_*_*_*_*_*_*_*_*_*_*_*_*_*_*_*_*    Assessment: 74 y  o male post operative day 4 left upper extremity Incision and Drainage   Doing well    Plan:   · Up and out of bed  · nonweight bearing to left upper extremity  · PT/OT  · DVT prophylaxis  · Analgesics  · D/C planning  · Pending final cx  · Will continue to assess for acute blood loss anemia        Duy Saenz PA-C

## 2018-05-07 NOTE — PROGRESS NOTES
[facility] Internal Medicine Progress Note  Patient: Lucía Lira 74 y o  male   MRN: [id number]  PCP: Jose Manuel Guzman MD  Unit/Bed#: [facility] 926-01 Encounter: [id number]  Date Of Visit: 18    Assessment/plan:  1  Left subdeltoid bursitis - status post I&D  Treatment per ID/ortho  On Cefazolin(D#7, postop D#4)  Cultures negative so far  F/u wound cultures - being reassessed by microbiology  2  Type 2 diabetes with hyperglycemia - was on NovoLog 70/30 at home which he used on a sliding scale regimen  Used to take insulin only few times a week  HbA1c 8 9  Refuses insulin intermittently  Levels fluctuating  Plan: -  Increase Lantus to 10 hs, ct Humalog 8 TID, SSI  3  COPD - no exacerbation  Ct Anoro     4  H/o CVA - ct Eliquis  5  Afib - ct Eliquis, Amiodarone  6  JAZZY - CPAP hs  7  Insomnia - ct home medication Lorazepam 2 mg hs  8  RLS - ct home meds  9  History of gout - ct Allopurinol  10  Glaucoma - eye drops  11  Locally advanced esophageal cancer - s/p concurrent chemoradiation followed by resection   Clinically in remission  12   Hepatocellular carcinoma - status post Sir sphere   Recently detected new perihepatic lymph node enlargement with suspicion for tumor relapse   PET-CT scheduled as outpatient - case management trying to contact radiology to reschedule PET-CT which was scheduled for 18  VTE Pharmacologic Prophylaxis:   Pharmacologic: Apixaban (Eliquis)  Mechanical: Mechanical VTE prophylaxis in place  Time Spent for Care: 20 minutes  More than 50% of total time spent on counseling and coordination of care as described above  Current Length of Stay: 6 day(s)    Current Patient Status: Inpatient     Code Status: Level 1 - Full Code    Subjective:   Left shoulder pain controlled  No fever  No nausea, vomiting or diarrhea      Objective:     Vitals:   Temp (24hrs), Av 4 °F (36 9 °C), Min:97 7 °F (36 5 °C), Max:98 9 °F (37 2 °C)    HR:  [62-78] 78  Resp:  [16-18] 16  BP: (114-137)/(63-68) 122/68  SpO2:  [95 %-97 %] 97 %  Body mass index is 31 57 kg/m²  Physical Exam:     Physical Exam   Constitutional: He is oriented to person, place, and time  HENT:   Head: Atraumatic  Eyes: EOM are normal    Neck: Neck supple  No JVD present  No tracheal deviation present  No thyromegaly present  Cardiovascular: Normal rate, regular rhythm and normal heart sounds  Pulmonary/Chest: Effort normal and breath sounds normal  No respiratory distress  He has no wheezes  He has no rales  Abdominal: Soft  Bowel sounds are normal  He exhibits no distension  There is no tenderness  There is no rebound  Musculoskeletal: He exhibits no edema  Neurological: He is alert and oriented to person, place, and time  Skin:   Left shoulder dressing dry   Psychiatric: He has a normal mood and affect  Additional Data:     Labs:      Results from last 7 days  Lab Units 05/07/18  0532   WBC Thousand/uL 9 92   HEMOGLOBIN g/dL 14 3   HEMATOCRIT % 44 4   PLATELETS Thousands/uL 199   NEUTROS PCT % 84*   LYMPHS PCT % 8*   MONOS PCT % 7   EOS PCT % 1       Results from last 7 days  Lab Units 05/07/18  0532   SODIUM mmol/L 135*   POTASSIUM mmol/L 4 2   CHLORIDE mmol/L 103   CO2 mmol/L 26   BUN mg/dL 17   CREATININE mg/dL 0 74   CALCIUM mg/dL 8 6   GLUCOSE RANDOM mg/dL 173*       Results from last 7 days  Lab Units 05/03/18  0510   INR  1 51*       * I Have Reviewed All Lab Data Listed Above  * Additional Pertinent Lab Tests Reviewed:  [facility]    Last 24 Hours Medication List:     Current Facility-Administered Medications:  acet[facility] 650 [facility]    albuterol 2 puff Inhalation Q6H [facility]    albuterol 2 5 mg Nebulization Q4H [facility]    [facility]opurinol 100 mg Oral Daily [facility]    amiodarone 200 mg Oral Daily [facility]    apixaban 5 mg Oral BID Keaton [facility]    cefazolin 2,000 mg Intravenous Q8H Keaton [facility] Last Rate: 2,000 mg (05/07/18 1750)   dextromethorphan-guaiFENesin 10 mL Oral Q4H PRN Jeri Majano PA-C    diazepam 5 mg Oral Daily PRN Cal Ye MD    diphenhydrAMINE 25 mg Oral Daily Keaton Arenas    docusate sodium 100 mg Oral BID Cal Ye MD    gabapentin 100 mg Oral Daily Cal Ye MD    hydrOXYzine HCL 25 mg Oral BID PRN Keaton Arenas    insulin glargine 10 Units Subcutaneous HS Cal Ye MD    insulin lispro 1-6 Units Subcutaneous TID AC Olena Aguilera PA-C    insulin lispro 1-6 Units Subcutaneous HS Olena Aguilera PA-C    insulin lispro 8 Units Subcutaneous TID With Meals Cal Ye MD    latanoprost 1 drop Both Eyes HS Keaton Arenas    LORazepam 2 mg Oral HS Cal Ye MD    morphine injection 2 mg Intravenous Q2H PRN Keaton Alcantar Gens    oxyCODONE 10 mg Oral Q4H PRN Ck Bell    oxyCODONE 5 mg Oral Q4H PRN Keaton Arenas    pantoprazole 40 mg Oral Early Morning Deputynataliya Majano PA-C    polyethylene glycol 17 g Oral Daily Cal Ye MD    rOPINIRole 1 mg Oral Daily Cal Ye MD    rOPINIRole 3 mg Oral HS Keaton Arenas    senna 1 tablet Oral HS Cal Ye MD    sertraline 25 mg Oral HS Keaton Arenas    traMADol 50 mg Oral Q6H PRN Ck Bell    Umeclidinium-Vilanterol  Inhalation Daily Vale Nichols PA-C         Today, Patient Was Seen By: Cal Ye MD    ** Please Note: Dragon 360 Dictation voice to text software may have been used in the creation of this document   **

## 2018-05-08 VITALS
OXYGEN SATURATION: 94 % | SYSTOLIC BLOOD PRESSURE: 106 MMHG | RESPIRATION RATE: 18 BRPM | HEART RATE: 66 BPM | DIASTOLIC BLOOD PRESSURE: 61 MMHG | BODY MASS INDEX: 31.5 KG/M2 | TEMPERATURE: 97.6 F | HEIGHT: 70 IN | WEIGHT: 220 LBS

## 2018-05-08 DIAGNOSIS — C15.5 MALIGNANT NEOPLASM OF LOWER THIRD OF ESOPHAGUS (HCC): ICD-10-CM

## 2018-05-08 DIAGNOSIS — C22.0 HEPATOCELLULAR CARCINOMA (HCC): ICD-10-CM

## 2018-05-08 LAB
GLUCOSE SERPL-MCNC: 162 MG/DL (ref 65–140)
GLUCOSE SERPL-MCNC: 165 MG/DL (ref 65–140)

## 2018-05-08 PROCEDURE — 99232 SBSQ HOSP IP/OBS MODERATE 35: CPT | Performed by: INTERNAL MEDICINE

## 2018-05-08 PROCEDURE — 82948 REAGENT STRIP/BLOOD GLUCOSE: CPT

## 2018-05-08 PROCEDURE — 99024 POSTOP FOLLOW-UP VISIT: CPT | Performed by: ORTHOPAEDIC SURGERY

## 2018-05-08 RX ORDER — CEPHALEXIN 500 MG/1
500 CAPSULE ORAL 4 TIMES DAILY
Qty: 56 CAPSULE | Refills: 0 | Status: SHIPPED | OUTPATIENT
Start: 2018-05-08 | End: 2018-05-22

## 2018-05-08 RX ADMIN — AMIODARONE HYDROCHLORIDE 200 MG: 200 TABLET ORAL at 08:41

## 2018-05-08 RX ADMIN — ROPINIROLE 1 MG: 1 TABLET, FILM COATED ORAL at 08:41

## 2018-05-08 RX ADMIN — PANTOPRAZOLE SODIUM 40 MG: 40 TABLET, DELAYED RELEASE ORAL at 06:31

## 2018-05-08 RX ADMIN — ALLOPURINOL 100 MG: 100 TABLET ORAL at 08:42

## 2018-05-08 RX ADMIN — APIXABAN 5 MG: 5 TABLET, FILM COATED ORAL at 08:41

## 2018-05-08 RX ADMIN — INSULIN LISPRO 1 UNITS: 100 INJECTION, SOLUTION INTRAVENOUS; SUBCUTANEOUS at 08:42

## 2018-05-08 RX ADMIN — GABAPENTIN 100 MG: 100 CAPSULE ORAL at 08:41

## 2018-05-08 RX ADMIN — DOCUSATE SODIUM 100 MG: 100 CAPSULE, LIQUID FILLED ORAL at 08:41

## 2018-05-08 RX ADMIN — INSULIN LISPRO 1 UNITS: 100 INJECTION, SOLUTION INTRAVENOUS; SUBCUTANEOUS at 12:27

## 2018-05-08 RX ADMIN — OXYCODONE HYDROCHLORIDE 5 MG: 5 TABLET ORAL at 15:48

## 2018-05-08 RX ADMIN — DIPHENHYDRAMINE HCL 25 MG: 25 TABLET ORAL at 08:41

## 2018-05-08 RX ADMIN — CEFAZOLIN SODIUM 2000 MG: 10 INJECTION, POWDER, FOR SOLUTION INTRAVENOUS at 10:08

## 2018-05-08 RX ADMIN — POLYETHYLENE GLYCOL 3350 17 G: 17 POWDER, FOR SOLUTION ORAL at 08:40

## 2018-05-08 RX ADMIN — CEFAZOLIN SODIUM 2000 MG: 10 INJECTION, POWDER, FOR SOLUTION INTRAVENOUS at 01:17

## 2018-05-08 NOTE — PLAN OF CARE
Discharge to home or other facility with appropriate resources Progressing      Discharge to post-acute care or home with appropriate resources Progressing      Absence or prevention of progression during hospitalization Progressing      Absence of fever/infection during neutropenic period Progressing      Patient/family/caregiver demonstrates understanding of disease process, treatment plan, medications, and discharge instructions Progressing      Maintain or return mobility to safest level of function Progressing      Maintain proper alignment of affected body part Progressing      Nutrient/Hydration intake appropriate for improving, restoring or maintaining nutritional needs Progressing      Verbalizes/displays adequate comfort level or baseline comfort level Progressing      Patient will remain free of falls Progressing      Maintain or return to baseline ADL function Progressing      Maintain or return mobility status to optimal level Progressing

## 2018-05-08 NOTE — ASSESSMENT & PLAN NOTE
· left subdeltoid Bursitis s/p aspiration   · No bacteria seen on culture or gram stain however >200,000 WBCs  · Patient underwent debridement/washout of the left shoulder with ortho, 5/3/18  · PO ABx per ID and anticipated discharge today

## 2018-05-08 NOTE — ASSESSMENT & PLAN NOTE
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

## 2018-05-08 NOTE — PROGRESS NOTES
Progress Note - Infectious Disease   [place] Natalee 74 y o  male MRN: [id number]  Unit/Bed#: [facility] 926-01 Encounter: [id number]      Impression/Plan:  1   Left subdeltoid bursitis-possibly septic   Status post I and D of the sub deltoid bursa   Thus far the cultures are negative   The clinical and radiographic findings do not support a septic joint as there is no synovial effusion appreciated   The patient seems to be tolerating the antibiotics well without difficulty  The coagulase-negative Staph in 1 colony is likely contaminant  I confirmed with Orthopedics that the collection was not in the joint space   -discontinue cefazolin  -Keflex 500 mg p o  q i d  through 2018 to complete 2 weeks total  -microbiology will call me with any updates as far as the cultures  -plan 2 weeks of antibiotics total  -close orthopedics follow-up     2   Esophageal carcinoma-status post remission after resection      3   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  Kiera Hillaryquin is very concerned that his pet scan has been delayed and he wants to get started on radiation therapy as ASAP  -surgical oncology follow-up  -follow-up pet scan      4   Diabetes mellitus-with hyperglycemia     5   Disposition-okay to discharge from infectious disease standpoint  Follow up with Orthopedics    Antibiotics:  Cefazolin 8  Postop day 5    Subjective:  Patient has no fever, chills, sweats; no nausea, vomiting, diarrhea; no cough, shortness of breath; no increased pain  No new symptoms  Objective:  Vitals:  HR:  [58-78] 58  Resp:  [16-18] 18  BP: (122-131)/(60-68) 131/60  SpO2:  [96 %-97 %] 96 %  Temp (24hrs), Av 5 °F (36 9 °C), Min:98 °F (36 7 °C), Max:98 9 °F (37 2 °C)  Current: Temperature: 98 °F (36 7 °C)    Physical Exam:   General Appearance:  Alert, interactive, nontoxic, no acute distress  Throat: Oropharynx moist without lesions      Lungs:   Clear to auscultation bilaterally; no wheezes, rhonchi or rales; respirations unlabored   Heart:  RRR; no murmur, rub or gallop   Abdomen:   Soft, non-tender, non-distended, positive bowel sounds  Extremities: No clubbing, cyanosis  Left shoulder incision dressed with a dry dressing in place  Skin: No new rashes or lesions  No draining wounds noted         Labs, Imaging, & Other studies:   All pertinent labs and imaging studies were personally reviewed    Results from last 7 days  Lab Units 05/07/18  0532 05/03/18  0510 05/01/18  2103   WBC Thousand/uL 9 92 6 64 7 84   HEMOGLOBIN g/dL 14 3 14 7 14 1   PLATELETS Thousands/uL 199 170 177       Results from last 7 days  Lab Units 05/07/18  0532 05/03/18  0510 05/01/18  2103   SODIUM mmol/L 135* 136 135*   POTASSIUM mmol/L 4 2 3 9 4 4   CHLORIDE mmol/L 103 99* 100   CO2 mmol/L 26 30 31   ANION GAP mmol/L 6 7 4   BUN mg/dL 17 17 16   CREATININE mg/dL 0 74 0 92 0 89   EGFR ml/min/1 73sq m 90 81 84   GLUCOSE RANDOM mg/dL 173* 237* 200*   CALCIUM mg/dL 8 6 8 8 8 7       Results from last 7 days  Lab Units 05/03/18  1021 05/03/18  1019 05/02/18  1102 05/02/18  1101 05/01/18  2149 05/01/18  2148 05/01/18  2104   GRAM STAIN RESULT  Rare Polys  No bacteria seen Rare Polys  No bacteria seen 3+ Polys  No bacteria seen 3+ Polys  No bacteria seen 4+ RBC's  4+ Polys  No bacteria seen 2+ Polys  No organisms seen 2+ Polys  No bacteria seen   BODY FLUID CULTURE, STERILE   --   --   --  No growth  --  No growth No growth

## 2018-05-08 NOTE — DISCHARGE SUMMARY
Discharge Summary - Orthopedics   Loraine Kyle 74 y o  male MRN: [id number]  Unit/Bed#: [facility] 926-01    Attending Physician: Lee Mccarthy    Admitting diagnosis: Malignant neoplasm of lower third of esophagus ([address] ) [C15 5]  Secondary hypertension [I15 9]  Hypokalemia [E87 6]  Hypomagnesemia [E83 42]  Hepatocellular carcinoma ([address] ) [C22 0]  Shoulder pain [M25 519]  Acute cardioembolic stroke ([address] ) [U97 5]  Atrial fibrillation, unspecified type ([address] ) [I48 91]  Hyperlipidemia, unspecified hyperlipidemia type [E78 5]  Stage 3 severe COPD by GOLD classification ([address] ) [J44 9]  Type 2 diabetes mellitus with diabetic chronic kidney disease, unspecified CKD stage, unspecified whether long term insulin use ([address] ) [E11 22]    Discharge diagnosis: Malignant neoplasm of lower third of esophagus ([address] ) [C15 5]  Secondary hypertension [I15 9]  Hypokalemia [E87 6]  Hypomagnesemia [E83 42]  Hepatocellular carcinoma ([address] ) [C22 0]  Shoulder pain [M25 519]  Acute cardioembolic stroke ([address] ) [B20 3]  Atrial fibrillation, unspecified type ([address] ) [I48 91]  Hyperlipidemia, unspecified hyperlipidemia type [E78 5]  Stage 3 severe COPD by GOLD classification ([address] ) [J44 9]  Type 2 diabetes mellitus with diabetic chronic kidney disease, unspecified CKD stage, unspecified whether long term insulin use ([address] ) [E11 22]    Date of admission: 5/1/2018    Date of discharge: 05/08/18    Procedure: Incisional debridement of left subdeltoid bursa      HPI & Hospital course:  74 y o  male who presented to the ED with a history of metastatic carcinoma who presented with a left subdeltoid bursitis  Pt was admitted to the orthopaedic service and taken to the OR on 5/3/2018 for incisional debridement of left subdeltoid bursa  Prior to surgery the risks and benefits of surgery were explained and informed consent was obtained  Surgery went without complications and pt was discharged to the PACU in a stable condition and was transferred to the floor    Post operative course was uneventful  On discharge date pt was cleared by PT and the medicine team and determined to be safe for discharge  Daily discussion was had with the patient, nursing staff, orthopaedic team, and family members if present  All questions were answered to the patients satisifaction  0  Lab Value Date/Time   HGB 14 3 05/07/2018 0532   HGB 14 7 05/03/2018 0510   HGB 14 1 05/01/2018 2103   HGB 14 9 12/13/2017 0826   HGB 15 4 11/07/2017 1320   HGB 13 4 10/11/2017 0915   HGB 15 0 07/28/2017 1450   HGB 8 5 (L) 03/27/2017 0434   HGB 9 1 (L) 03/26/2017 0649   HGB 9 9 (L) 03/25/2017 0505   HGB 8 7 (L) 11/11/2016 0626   HGB 7 2 (L) 11/10/2016 0511   HGB 7 9 (L) 11/09/2016 0441   HGB 8 5 (L) 11/08/2016 0533   HGB 7 6 (L) 11/07/2016 0502   HGB 7 4 (L) 11/05/2016 0611   HGB 7 3 (L) 11/04/2016 0513   HGB 8 3 (L) 11/03/2016 0423   HGB 7 5 (L) 11/02/2016 0438   HGB 7 5 (L) 11/02/2016 0300   HGB 6 8 (LL) 11/02/2016 0257   HGB 8 0 (L) 11/01/2016 2327   HGB 8 9 (L) 11/01/2016 0435   HGB 9 4 (L) 11/01/2016 0024   HGB 9 9 (L) 10/31/2016 1545   HGB 8 8 (L) 10/31/2016 1410   HGB 9 9 (L) 10/31/2016 1334   HGB 11 9 (L) 10/31/2016 1226   HGB 11 6 (L) 10/31/2016 1046   HGB 8 5 (L) 10/31/2016 1040   HGB 11 4 (L) 09/13/2016 1259   HGB 12 2 08/03/2016 1326   HGB 12 6 06/27/2016 1054   HGB 12 3 06/22/2016 1124       Discharge Instructions:   · NWB LUE in sling  · Keep dressings clean and dry at all times   · Complete DVT prophylaxis as prescribed   · Physical therapy  · Follow-up as scheduled, otherwise call for appt  Discharge Medications: For the complete list of discharge medications, please refer to the patient's medication reconciliation

## 2018-05-08 NOTE — PROGRESS NOTES
Consult Progress Note - Yaw Lares 1945, 74 y o  male MRN: [id number]    Unit/Bed#: DISCHARGE POOL Encounter: [id number]    Primary Care Provider: Ming Valero MD   Date and time admitted to hospital: 2018  8:22 PM        Type 2 diabetes mellitus ([facility] )   Assessment & Plan    · HbA1c 8 9  · Uses only SSI at home - continue  · Advised see PCP in next 1-2 weeks          Atrial fibrillation [facility])   Assessment & Plan    · Follows with cardiologist Dr Chanel Blackwell at [place] as OP  · Controlled, on amiodarone and Eliquis         Hepatocellular carcinoma ([place] [address] )   Assessment & Plan    · s/p resection and recurrence treated with SIR spheres embolization in 2017, now with possible recurrence   · Heme/Oncology and surg/onc following         Esophageal cancer ([place] [address] )   Assessment & Plan    · 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  · Follows with Dr Thea Moon as OP  · Heme/onc following        * Acute pain of left shoulder   Assessment & Plan    · left subdeltoid Bursitis s/p aspiration   · No bacteria seen on culture or gram stain however >200,000 WBCs  · Patient underwent debridement/washout of the left shoulder with ortho, 5/3/18  · PO ABx per ID and anticipated discharge today            Subjective: nil acute; BS slightly higher side    Objective:     Vitals:   Temp (24hrs), Av 2 °F (36 8 °C), Min:97 6 °F (36 4 °C), Max:98 9 °F (37 2 °C)    HR:  [58-78] 66  Resp:  [16-18] 18  BP: (106-131)/(60-68) 106/61  SpO2:  [94 %-97 %] 94 %  Body mass index is 31 57 kg/m²  Input and Output Summary (last 24 hours): Intake/Output Summary (Last 24 hours) at 18 1340  Last data filed at 18 1039   Gross per 24 hour   Intake              650 ml   Output              300 ml   Net              350 ml       Physical Exam   HENT:   Head: Normocephalic  Eyes: Pupils are equal, round, and reactive to light  Cardiovascular: Normal heart sounds      Pulmonary/Chest: Effort normal    Abdominal: Soft  Neurological: He is alert  Skin: There is pallor  Additional Data:     Labs:      Results from last 7 days  Lab Units 05/07/18  0532   WBC Thousand/uL 9 92   HEMOGLOBIN g/dL 14 3   HEMATOCRIT % 44 4   PLATELETS Thousands/uL 199   NEUTROS PCT % 84*   LYMPHS PCT % 8*   MONOS PCT % 7   EOS PCT % 1       Results from last 7 days  Lab Units 05/07/18  0532   SODIUM mmol/L 135*   POTASSIUM mmol/L 4 2   CHLORIDE mmol/L 103   CO2 mmol/L 26   BUN mg/dL 17   CREATININE mg/dL 0 74   CALCIUM mg/dL 8 6   GLUCOSE RANDOM mg/dL 173*       Results from last 7 days  Lab Units 05/03/18  0510   INR  1 51*       * I Have Reviewed All Lab Data Listed Above      Imaging:  Imaging Personally Reviewed by Myself Includes:     Cultures:   Blood Culture: No results found for: BLOODCX  Urine Culture: No results found for: URINECX  Sputum Culture: No components found for: SPUTUMCX  Wound Culture: No results found for: WOUNDCULT    Last 24 Hours Medication List:     Current Facility-Administered Medications:  acetaminophen 650 mg Oral Q6H PRN [facility]    albuterol 2 puff Inhalation Q6H PRN [facility]    albuterol 2 5 mg Nebulization Q4H PRN [facility]    allopurinol 100 mg Oral Daily [facility]    amiodarone 200 mg Oral Daily [facility]    apixaban 5 mg Oral BID [facility]    cefazolin 2,000 mg Intravenous Q8H [facility] Last Rate: 2,000 mg (05/08/18 1008)   dextromethorphan-guaiFENesin 10 mL Oral Q4H PRN Jose Christopher PA-C    diazepam 5 mg Oral Daily PRN Vanessa Xie MD    diphenhydrAMINE 25 mg Oral Daily [facility]    docusate sodium 100 mg Oral BID Vanessa Xie MD    gabapentin 100 mg Oral Daily Vanessa Xie MD    hydrOXYzine HCL 25 mg Oral BID PRN [facility]    insulin glargine 10 Units Subcutaneous HS Vanessa Xie MD    insulin lispro 1-6 Units Subcutaneous TID AC Osvaldo Rueda PA-C    insulin lispro 1-6 Units Subcutaneous HS Osvaldo Rueda PA-C    insulin lispro 8 Units Subcutaneous TID With Meals Zoey Vargas MD    latanoprost 1 drop Both Eyes HS Keaton Arenas    LORazepam 2 mg Oral HS Zoey Vargas MD    morphine injection 2 mg Intravenous Q2H PRN Hernández Reveal    oxyCODONE 10 mg Oral Q4H PRN Hernández Reveal    oxyCODONE 5 mg Oral Q4H PRN Keaton Arenas    pantoprazole 40 mg Oral Early Morning Divya Noonan PA-C    polyethylene glycol 17 g Oral Daily Zoey Vargas MD    rOPINIRole 1 mg Oral Daily Zoey Vargas MD    rOPINIRole 3 mg Oral HS Keaton Arenas    senna 1 tablet Oral HS Zoey Vargas MD    sertraline 25 mg Oral HS Keaton Arenas    traMADol 50 mg Oral Q6H PRN Hernández Reveal    Umeclidinium-Vilanterol  Inhalation Daily Vale Nichols PA-C         Today, Patient Was Seen By: Emilie Gomez MD    ** Please Note: Dragon 360 Dictation voice to text software may have been used in the creation of this document   **

## 2018-05-08 NOTE — PLAN OF CARE
Discharge to home or other facility with appropriate resources Adequate for Discharge      Discharge to post-acute care or home with appropriate resources Adequate for Discharge      Absence or prevention of progression during hospitalization Adequate for Discharge      Absence of fever/infection during neutropenic period Adequate for Discharge      Patient/family/caregiver demonstrates understanding of disease process, treatment plan, medications, and discharge instructions Adequate for Discharge      Maintain or return mobility to safest level of function Adequate for Discharge      Maintain proper alignment of affected body part Adequate for Discharge      Nutrient/Hydration intake appropriate for improving, restoring or maintaining nutritional needs Adequate for Discharge      Verbalizes/displays adequate comfort level or baseline comfort level Adequate for Discharge      Patient will remain free of falls Adequate for Discharge      Maintain or return to baseline ADL function Adequate for Discharge      Maintain or return mobility status to optimal level Adequate for Discharge

## 2018-05-08 NOTE — PROGRESS NOTES
Orthopedics   Marko Puentes 74 y o  male MRN: [id number]  Unit/Bed#: [facility] 926-01      Subjective:  74 y  o male post operative day 5 left upper extremity Incision and Drainage Pt doing well  Pain controlled       Labs:    0  Lab Value Date/Time   HCT 44 4 05/07/2018 0532   HCT 44 0 05/03/2018 0510   HCT 41 2 05/01/2018 2103   HGB 14 3 05/07/2018 0532   HGB 14 7 05/03/2018 0510   HGB 14 1 05/01/2018 2103   INR 1 51 (H) 05/03/2018 0510   WBC 9 92 05/07/2018 0532   WBC 6 64 05/03/2018 0510   WBC 7 84 05/01/2018 2103   ESR 63 (H) 05/05/2018 1106   CRP 35 8 (H) 05/05/2018 1106       Meds:    Current Facility-Administered Medications:     acetaminophen (TYLENOL) tablet 650 mg, 650 mg, Oral, Q6H PRN, Reggie Sneddon, 650 mg at 05/06/18 1050    albuterol (PROVENTIL HFA,VENTOLIN HFA) inhaler 2 puff, 2 puff, Inhalation, Q6H PRN, Reggie Sneddon, 2 puff at 05/02/18 2042    albuterol inhalation solution 2 5 mg, 2 5 mg, Nebulization, Q4H PRN, Keaton Dagoberto    allopurinol (ZYLOPRIM) tablet 100 mg, 100 mg, Oral, Daily, Keaton Yuh, 100 mg at 05/07/18 1418    amiodarone tablet 200 mg, 200 mg, Oral, Daily, Reggie Sneddon, 200 mg at 05/07/18 5909    apixaban (ELIQUIS) tablet 5 mg, 5 mg, Oral, BID, Reggie Sneddon, 5 mg at 05/07/18 2157    ceFAZolin (ANCEF) 2,000 mg in sodium chloride 0 9 % 50 mL IVPB, 2,000 mg, Intravenous, Q8H, Reggie Sneddon, Last Rate: 100 mL/hr at 05/08/18 0117, 2,000 mg at 05/08/18 0117    dextromethorphan-guaiFENesin (ROBITUSSIN DM)  mg/5 mL oral syrup 10 mL, 10 mL, Oral, Q4H PRN, Carol Rogers PA-C    diazepam (VALIUM) tablet 5 mg, 5 mg, Oral, Daily PRN, Cortney Ferris MD    diphenhydrAMINE (BENADRYL) tablet 25 mg, 25 mg, Oral, Daily, Keaton Arenas, 25 mg at 05/07/18 4433    docusate sodium (COLACE) capsule 100 mg, 100 mg, Oral, BID, Cortney Ferris MD, 100 mg at 05/07/18 3447    gabapentin (NEURONTIN) capsule 100 mg, 100 mg, Oral, Daily, Cortney Ferris MD, 100 mg at 05/07/18 0829    hydrOXYzine HCL (ATARAX) tablet 25 mg, 25 mg, Oral, BID PRN, Kim Arriaga    insulin glargine (LANTUS) subcutaneous injection 10 Units 0 1 mL, 10 Units, Subcutaneous, HS, Jennifer Cary MD, 10 Units at 05/07/18 2157    insulin lispro (HumaLOG) 100 units/mL subcutaneous injection 1-6 Units, 1-6 Units, Subcutaneous, TID AC, 1 Units at 05/07/18 1848 **AND** Fingerstick Glucose (POCT), , , TID AC, Can Smith PA-C    insulin lispro (HumaLOG) 100 units/mL subcutaneous injection 1-6 Units, 1-6 Units, Subcutaneous, HS, Can Smith PA-C, 1 Units at 05/07/18 2158    insulin lispro (HumaLOG) 100 units/mL subcutaneous injection 8 Units, 8 Units, Subcutaneous, TID With Meals, Jennifer Cary MD, 8 Units at 05/07/18 1849    latanoprost (XALATAN) 0 005 % ophthalmic solution 1 drop, 1 drop, Both Eyes, HS, Kim Arriaga, 1 drop at 05/07/18 2158    LORazepam (ATIVAN) tablet 2 mg, 2 mg, Oral, HS, Jennifer Cary MD, 2 mg at 05/07/18 2157    morphine injection 2 mg, 2 mg, Intravenous, Q2H PRN, Kim Arriaga    oxyCODONE (ROXICODONE) immediate release tablet 10 mg, 10 mg, Oral, Q4H PRN, Kim Arriaga, 10 mg at 05/06/18 2131    oxyCODONE (ROXICODONE) IR tablet 5 mg, 5 mg, Oral, Q4H PRN, Kim Arriaga, 5 mg at 05/07/18 0542    pantoprazole (PROTONIX) EC tablet 40 mg, 40 mg, Oral, Early Morning, Fransisca Willson PA-C, 40 mg at 05/07/18 0532    polyethylene glycol (MIRALAX) packet 17 g, 17 g, Oral, Daily, Jennifer Cary MD, 17 g at 05/07/18 0830    rOPINIRole (REQUIP) tablet 1 mg, 1 mg, Oral, Daily, Jennifer Cary MD, 1 mg at 05/07/18 0829    rOPINIRole (REQUIP) tablet 3 mg, 3 mg, Oral, HS, Kim Arriaga, 3 mg at 05/07/18 2157    senna (SENOKOT) tablet 8 6 mg, 1 tablet, Oral, HS, Jennifer Cary MD, 8 6 mg at 05/07/18 2157    sertraline (ZOLOFT) tablet 25 mg, 25 mg, Oral, HS, Keaton Arenas, 25 mg at 05/07/18 2157    traMADol (ULTRAM) tablet 50 mg, 50 mg, Oral, Q6H PRN, Kim Arriaga    Umeclidinium-Vilanterol 62 5-25 MCG/INH AEPB, , Inhalation, Daily, Vale Nichols, JACEY    Blood Culture:   No results found for: BLOODCX    Wound Culture:   No results found for: WOUNDCULT    Ins and Outs:  I/O last 24 hours: In: 1110 [P O :1060; IV Piggyback:50]  Out: -           Physical Exam:  Vitals:    05/07/18 2326   BP: 131/60   Pulse: 58   Resp: 18   Temp: 98 °F (36 7 °C)   SpO2: 96%     left upper extremity  · Mepilex Dressing C/D/I, no surrounding erythema  · Sensation intact Axillary, Musculocutaneous, Radial, Ulna, Median nerves  · Motor intact to Axillary, Musculocutaneous, Radial, Ulna, Median nerves  · 2+ radial pulse    _*_*_*_*_*_*_*_*_*_*_*_*_*_*_*_*_*_*_*_*_*_*_*_*_*_*_*_*_*_*_*_*_*_*_*_*_*_*_*_*_*    Assessment: 74 y  o male post operative day 5 left upper extremity Incision and Drainage   Doing well    Plan:   · Up and out of bed  · Light use left upper extremity in sling for comfort only  · PT/OT  · DVT prophylaxis - mechanical  · Analgesics  · D/C planning - likely discharge today pending final ID plan  · Will continue to assess for acute blood loss anemia        Gabriela Duenas  05/08/18

## 2018-05-08 NOTE — SOCIAL WORK
CM met with the Pt at bedside to discuss D/C needs  Pt states his wife can provide transportation home this evening, CM made Pt aware of D/C lounge  Pt prefers to have medications filled with [facility] program, prescriptions sent  OutPt PT script and location list provided  RN aware

## 2018-05-08 NOTE — ASSESSMENT & PLAN NOTE
· Follows with cardiologist Dr Chanel Blackwell at [place] as OP  · Controlled, on amiodarone and Eliquis

## 2018-05-10 LAB
BACTERIA SPEC ANAEROBE CULT: NO GROWTH
BACTERIA SPEC BFLD CULT: NO GROWTH
BACTERIA SPEC BFLD CULT: NO GROWTH
BACTERIA TISS AEROBE CULT: ABNORMAL
BACTERIA TISS AEROBE CULT: NO GROWTH
GRAM STN SPEC: ABNORMAL
GRAM STN SPEC: ABNORMAL
GRAM STN SPEC: NORMAL

## 2018-05-12 LAB
BACTERIA SPEC ANAEROBE CULT: NO GROWTH
BACTERIA SPEC BFLD CULT: NO GROWTH
GRAM STN SPEC: NORMAL
GRAM STN SPEC: NORMAL

## 2018-05-14 ENCOUNTER — TELEPHONE (OUTPATIENT)
Dept: NEUROLOGY | Facility: CLINIC | Age: 73
End: 2018-05-14

## 2018-05-23 ENCOUNTER — OFFICE VISIT (OUTPATIENT)
Dept: OBGYN CLINIC | Facility: CLINIC | Age: 73
End: 2018-05-23

## 2018-05-23 VITALS
HEART RATE: 65 BPM | WEIGHT: 210 LBS | DIASTOLIC BLOOD PRESSURE: 74 MMHG | BODY MASS INDEX: 30.06 KG/M2 | SYSTOLIC BLOOD PRESSURE: 124 MMHG | HEIGHT: 70 IN

## 2018-05-23 DIAGNOSIS — M25.512 ACUTE PAIN OF LEFT SHOULDER: ICD-10-CM

## 2018-05-23 DIAGNOSIS — Z48.89 AFTERCARE FOLLOWING SURGERY: Primary | ICD-10-CM

## 2018-05-23 PROCEDURE — 99024 POSTOP FOLLOW-UP VISIT: CPT | Performed by: ORTHOPAEDIC SURGERY

## 2018-05-23 NOTE — PROGRESS NOTES
Assessment/Plan:    Left shoulder pain  Patient is roughly 3 weeks status post I and D washout of left subdeltoid fluid collection  He was found to have infection, Staph epidermidis  He has been on oral antibiotics for the last 3 weeks with complete resolution of symptoms  He did have pre-existing rotator cuff arthropathy and limited range of motion  He does report his symptoms are significantly improved  Start physical therapy and follow up p r n  Problem List Items Addressed This Visit     Left shoulder pain     Patient is roughly 3 weeks status post I and D washout of left subdeltoid fluid collection  He was found to have infection, Staph epidermidis  He has been on oral antibiotics for the last 3 weeks with complete resolution of symptoms  He did have pre-existing rotator cuff arthropathy and limited range of motion  He does report his symptoms are significantly improved  Start physical therapy and follow up p r n  Other Visit Diagnoses     Aftercare following surgery    -  Primary    Relevant Orders    Ambulatory referral to Physical Therapy            Subjective:      Patient ID: Zahida Santos is a 74 y o  male  HPI  Patient presents to the office roughly 3 weeks status post left shoulder open washout on 05/03/2018  He is just finished his course of antibiotics  He experiences some pain with motion  He is overall doing well and wishes to start physical therapy  The following portions of the patient's history were reviewed and updated as appropriate: current medications, past family history, past medical history, past social history, past surgical history and problem list     Review of Systems   Constitutional: Negative for chills and fever  Eyes: Negative for visual disturbance  Respiratory: Negative for shortness of breath  Cardiovascular: Negative for chest pain  Gastrointestinal: Negative for constipation and diarrhea  Skin: Negative for rash     Neurological: Negative for weakness and numbness  Psychiatric/Behavioral: Negative for behavioral problems  The patient is not nervous/anxious  Objective:      /74   Pulse 65   Ht 5' 10" (1 778 m)   Wt 95 3 kg (210 lb)   BMI 30 13 kg/m²          Physical Exam   Constitutional: He is oriented to person, place, and time  He appears well-developed and well-nourished  HENT:   Head: Normocephalic and atraumatic  Eyes: Pupils are equal, round, and reactive to light  Cardiovascular: Intact distal pulses  Pulmonary/Chest: Breath sounds normal    Neurological: He is alert and oriented to person, place, and time  Skin: Skin is warm and dry  Psychiatric: He has a normal mood and affect   His behavior is normal        Patient ambulates without assistance  Incision clean, dry and intact  Staples removed today

## 2018-05-23 NOTE — ASSESSMENT & PLAN NOTE
Patient is roughly 3 weeks status post I and D washout of left subdeltoid fluid collection  He was found to have infection, Staph epidermidis  He has been on oral antibiotics for the last 3 weeks with complete resolution of symptoms  He did have pre-existing rotator cuff arthropathy and limited range of motion  He does report his symptoms are significantly improved  Start physical therapy and follow up p r n

## 2018-05-29 ENCOUNTER — TELEPHONE (OUTPATIENT)
Dept: NEUROLOGY | Facility: CLINIC | Age: 73
End: 2018-05-29

## 2018-05-29 NOTE — TELEPHONE ENCOUNTER
He is OK to eat Liver  Rosella Goldmann Rosella Goldmann Also there was a pending message to have his appointment scheduled with me on 6/19 as he travels from [facility] to come down here and needs to coordinate appointments  Do you know if that was done?

## 2018-05-29 NOTE — TELEPHONE ENCOUNTER
pt called and is asking if he can eat liver  he states that the [place] pharmacist told him not to eat liver  he believes it is because he on eliquis  he states that he only eats liver once in a while  eats liver maybe 2-3 times a year  please advise     [phone] to leave detailed message

## 2018-06-19 ENCOUNTER — OFFICE VISIT (OUTPATIENT)
Dept: PULMONOLOGY | Facility: CLINIC | Age: 73
End: 2018-06-19
Payer: MEDICARE

## 2018-06-19 ENCOUNTER — OFFICE VISIT (OUTPATIENT)
Dept: NEUROLOGY | Facility: CLINIC | Age: 73
End: 2018-06-19
Payer: MEDICARE

## 2018-06-19 VITALS
HEIGHT: 70 IN | SYSTOLIC BLOOD PRESSURE: 120 MMHG | BODY MASS INDEX: 30.46 KG/M2 | DIASTOLIC BLOOD PRESSURE: 70 MMHG | WEIGHT: 212.8 LBS

## 2018-06-19 VITALS
HEART RATE: 50 BPM | WEIGHT: 212.8 LBS | OXYGEN SATURATION: 97 % | BODY MASS INDEX: 30.46 KG/M2 | HEIGHT: 70 IN | DIASTOLIC BLOOD PRESSURE: 60 MMHG | SYSTOLIC BLOOD PRESSURE: 100 MMHG | TEMPERATURE: 97.1 F

## 2018-06-19 DIAGNOSIS — D53.9 NUTRITIONAL ANEMIA: ICD-10-CM

## 2018-06-19 DIAGNOSIS — Z79.4 TYPE 2 DIABETES MELLITUS WITH HYPERGLYCEMIA, WITH LONG-TERM CURRENT USE OF INSULIN (HCC): Chronic | ICD-10-CM

## 2018-06-19 DIAGNOSIS — E11.65 TYPE 2 DIABETES MELLITUS WITH HYPERGLYCEMIA, WITH LONG-TERM CURRENT USE OF INSULIN (HCC): Chronic | ICD-10-CM

## 2018-06-19 DIAGNOSIS — Z86.73 HISTORY OF STROKE: ICD-10-CM

## 2018-06-19 DIAGNOSIS — G47.33 OBSTRUCTIVE SLEEP APNEA SYNDROME: ICD-10-CM

## 2018-06-19 DIAGNOSIS — I48.91 ATRIAL FIBRILLATION, UNSPECIFIED TYPE (HCC): Primary | ICD-10-CM

## 2018-06-19 DIAGNOSIS — E63.9 NUTRITIONAL DEFICIENCY: ICD-10-CM

## 2018-06-19 DIAGNOSIS — G31.84 MILD COGNITIVE IMPAIRMENT: ICD-10-CM

## 2018-06-19 DIAGNOSIS — I15.9 SECONDARY HYPERTENSION: ICD-10-CM

## 2018-06-19 DIAGNOSIS — G25.81 RESTLESS LEGS SYNDROME: ICD-10-CM

## 2018-06-19 DIAGNOSIS — J44.9 STAGE 3 SEVERE COPD BY GOLD CLASSIFICATION (HCC): Primary | ICD-10-CM

## 2018-06-19 PROCEDURE — 99215 OFFICE O/P EST HI 40 MIN: CPT | Performed by: PSYCHIATRY & NEUROLOGY

## 2018-06-19 PROCEDURE — 99213 OFFICE O/P EST LOW 20 MIN: CPT | Performed by: INTERNAL MEDICINE

## 2018-06-19 RX ORDER — CEPHALEXIN 500 MG/1
500 CAPSULE ORAL 4 TIMES DAILY
COMMUNITY
End: 2018-09-20

## 2018-06-19 NOTE — LETTER
June 21, 2018     Burke Stout MD  [address] [place] [id number]    Patient: Saniya Swanson   YOB: 1945   Date of Visit: 6/19/2018       Dear Dr Octavia Goldberg: Thank you for referring Ricardo Mora to me for evaluation  Below are my notes for this consultation  He has had a significant clinical response to Anoro once daily  His cough has essentially resolved  He has moderated COPD and I believe he needs dual therapy with Anti-cholinergic/LABA  If you have questions, please do not hesitate to call me  I look forward to following your patient along with you  Sincerely,        Venkat Paz DO        CC: No Recipients  Venkat Paz DO  6/21/2018  2:40 PM  Sign at close encounter  Assessment:    Sleep apnea  Continue to use CPAP on a nightly basis    Stage 3 severe COPD by GOLD classification ([address] )  He will continue on Anoro once daily as he has had significant response to the medication and he does have severe COPD on testing  He will work with the [place] to obtain this prescription  If he is unable to, we could attempt to place him on Spiriva Respimat 2 sprays once daily  Hopefully he would also have good response  He does not require supplemental oxygen  He will continue to increase physical activity as tolerated  He is up-to-date on his immunizations  Plan:    Diagnoses and all orders for this visit:    Stage 3 severe COPD by GOLD classification ([address] )    Obstructive sleep apnea syndrome        Follow-up: 6 months      HPI:  His cough has resolved since starting Anoro  He stated that immediately after the 1st dose, his cough resolved and has not returned  He is no longer using his rescue inhaler  He feels 100% better  No significant phlegm production  Occasionally dyspnea on exertion however this has also improved  No chest pain or wheezing        Review of Systems    The following portions of the patient's history were reviewed and updated as appropriate: allergies, current medications, past family history, past medical history, past social history, past surgical history and problem list     VITALS:  Vitals:    06/19/18 1542   BP: 100/60   BP Location: Left arm   Patient Position: Sitting   Pulse: (!) 50   Temp: (!) 97 1 °F (36 2 °C)   TempSrc: Tympanic   SpO2: 97%   Weight: 96 5 kg (212 lb 12 8 oz)   Height: 5' 10" (1 778 m)       Physical Exam  General:  Patient is awake, alert, non-toxic and in no acute respiratory distress  Neck: No JVD  CV:  Regular, +S1 and S2, No murmurs, gallops or rubs appreciated  Lungs:  Slightly decreased breath sounds bilateral without wheeze  Abdomen: Soft, +BS, Non-tender, non-distended  Extremities: No clubbing, cyanosis or edema  Neuro: No focal deficits        Diagnostic Testing:    CBC:  Lab Results   Component Value Date    WBC 9 92 05/07/2018    HGB 14 3 05/07/2018    HCT 44 4 05/07/2018    MCV 92 05/07/2018     05/07/2018         BMP:   Lab Results   Component Value Date     (L) 05/07/2018    K 4 2 05/07/2018     05/07/2018    CO2 26 05/07/2018    ANIONGAP 6 05/07/2018    BUN 17 05/07/2018    CREATININE 0 74 05/07/2018    GLUCOSE 173 (H) 05/07/2018    GLUF 165 (H) 12/13/2017    CALCIUM 8 6 05/07/2018    AST 43 12/13/2017    ALT 30 12/13/2017    ALKPHOS 141 (H) 12/13/2017    PROT 7 6 12/13/2017    BILITOT 1 07 (H) 12/13/2017    EGFR 90 05/07/2018       Cholo Yi DO

## 2018-06-19 NOTE — PROGRESS NOTES
Patient ID: Mark Champion is a 74 y o  male  Assessment/Plan:    No problem-specific Assessment & Plan notes found for this encounter  Diagnoses and all orders for this visit:    Atrial fibrillation, unspecified type ([address] )    Secondary hypertension    History of stroke    Restless legs syndrome    Type 2 diabetes mellitus with hyperglycemia, with long-term current use of insulin ([facility])    Mild cognitive impairment  -     TSH, 3rd generation with Free T4 reflex; Future  -     Vitamin B12; Future  -     Folate; Future  -     Methylmalonic acid, serum; Future  -     Sedimentation rate, automated; Future  -     C-reactive protein; Future  -     CBC and differential; Future    Nutritional anemia     Nutritional deficiency  -     TSH, 3rd generation with Free T4 reflex; Future  -     Vitamin B12; Future  -     Folate; Future  -     Methylmalonic acid, serum; Future  -     Sedimentation rate, automated; Future  -     C-reactive protein; Future  -     CBC and differential; Future    Other orders  -     cephalexin (KEFLEX) 500 mg capsule; Take 500 mg by mouth 4 (four) times a day         Patient Instructions   Stroke: Savana Amador presents for follow-up with regard to his prior stroke  He does not endorse any symptoms that are consistent with recurrent TIA or stroke  He has been transitioned back to Eliquis since her last visit in the office and reports good medication compliance with no significant bleeding issues  - for secondary stroke prevention we will plan to continue Eliquis and appropriate blood pressure control  -I will defer to the good judgment of his family physician for monitoring of his blood sugars  - from my standpoint, considering his liver cancer, he is not a candidate for statin therapy    Restless leg syndrome, neuropathy:  He reports that his neuropathy and RLS symptoms are reasonably well controlled    His sleep patterns to fluctuate to a degree but he reports that he is able to sleep easily well and that the RLS is not particularly keeping him awake  Mild cognitive impairment:  He reports ongoing lapses in memory most consistent with significant distractibility  Elton cognitive Assessment in the office today was 24/30 which is in the mild cognitive impairment range  I would suggest that this is most likely the result of multiple recent medical illnesses including the esophageal cancer and infected collection near the right shoulder   - we discussed in the office today the importance of avoiding distractions and focusing on particular tasks including putting objects down and performing specific chores, as this will help to reinforce the memory in his mind  - at this point we will not begin Aricept /Namenda but I would like for him to return to the office in 3-4 months time for a repeat evaluation  He should also keep track of those activities that are particularly difficult for him at home and choose 1 per week to work on by either changing the organization  In his environment or changing his own mental focus and organization  -considering nutritional changes associated with the treatment of his cancer would be reasonable to check appropriate lab values including B12, folate, methylmalonic acid, TSH, and a CBC to monitor for anemia  I will plan for him to return to the office in 3-4 months time but I would be happy to see him sooner if the need should   Arise  If he has any stroke-like symptoms including sudden painless loss of vision, difficulty with speaking or swallowing, vertigo/room spinning that does not quickly resolve, or weakness / numbness affecting 1 side of the body; or if he were to fall and strike his head and have residual symptoms or to developed a sudden extremely severe unusual headache he should proceed to the nearest emergency room immediately  Subjective:    HPI  [facility]   Presents for follow-up with regard to his prior stroke    In the interval since his last visit to the office he has noted some ongoing memory issues  He describes that he has difficulties with remembering conversations with people in keeping track of stories that he is telling if he gets distracted  He also reports difficulties including forgetting why he is going into a room when he enters, or losing track of things that he puts down  He reports some frustration with forgetting things but otherwise that this is not overly bothersome/ anger in  He continues to take specific air while driving considering his vision changes after his prior stroke  He did not endorse any symptoms concerning for recurrent TIA or stroke  Subsequent to his prior visit in the office he experienced a hospitalization for a planned esophagectomy, as well as a subsequent hospitalization related to a subdeltoid collectin in   The left shoulder  Subsequent to those episodes he was transitioned from warfarin to Eliquis, which I agree is appropriate for him  He denies any severe bleeding or bruising issues but does note some scratches about his legs, mostly from working outside  He does report that he has been more active in working outside lately  He notes some fluctuation in his sleep schedule in that  His baseline involve going to bed at approximately midnight and getting up between 4 and 0500 hours, but lately he has had some nights when he is able to sleep quite well  He no longer uses Marinol but does have a canabanoid  Which may help with appetite  He reports that his neuropathy and restless leg syndrome symptoms are reasonably well controlled with no severe burning pain  He has not had any falls but he has had some episodes of loss of balance  Objective:    Blood pressure 120/70, height 5' 10" (1 778 m), weight 96 5 kg (212 lb 12 8 oz)  Physical Exam    Neurological Exam        In the office today he was awake, alert, and in no distress  He was an excellent historian    Cranial nerves 2-12 were symmetrically intact  Motor testing reveals 4+/ 5 strength will biceps testing in the bilateral upper extremities and intact right deltoid strength  Testing of arm extension or of the left deltoid were both significantly pain limited   ( He is currently in therapy )  Bilateral lower extremity strength is 5/5  There are no obvious sensory deficits  His gait is stable with the assistance of his cane  He has no obvious tremor  His speech is neither bradykinetic nor hypophonic  ROS:    Review of Systems   Constitutional: Negative  Negative for appetite change and fever  HENT: Negative  Negative for hearing loss, tinnitus, trouble swallowing and voice change  Eyes: Negative  Negative for photophobia and pain  Respiratory: Negative  Negative for shortness of breath  Cardiovascular: Negative  Negative for palpitations  Gastrointestinal: Negative  Negative for nausea and vomiting  Endocrine: Negative  Negative for cold intolerance and heat intolerance  Genitourinary: Negative  Negative for dysuria, frequency and urgency  Musculoskeletal: Negative  Negative for myalgias and neck pain  Skin: Negative  Negative for rash  Neurological: Negative  Negative for dizziness, tremors, seizures, syncope, facial asymmetry, speech difficulty, weakness, light-headedness, numbness and headaches  Hematological: Negative  Does not bruise/bleed easily  Psychiatric/Behavioral: Positive for confusion  Negative for hallucinations and sleep disturbance

## 2018-06-19 NOTE — PROGRESS NOTES
Assessment:    Sleep apnea  Continue to use CPAP on a nightly basis    Stage 3 severe COPD by GOLD classification ([address] )  He will continue on Anoro once daily as he has had significant response to the medication and he does have severe COPD on testing  He will work with the [place] to obtain this prescription  If he is unable to, we could attempt to place him on Spiriva Respimat 2 sprays once daily  Hopefully he would also have good response  He does not require supplemental oxygen  He will continue to increase physical activity as tolerated  He is up-to-date on his immunizations  Plan:    Diagnoses and all orders for this visit:    Stage 3 severe COPD by GOLD classification ([address] )    Obstructive sleep apnea syndrome        Follow-up: 6 months      HPI:  His cough has resolved since starting Anoro  He stated that immediately after the 1st dose, his cough resolved and has not returned  He is no longer using his rescue inhaler  He feels 100% better  No significant phlegm production  Occasionally dyspnea on exertion however this has also improved  No chest pain or wheezing  Review of Systems    The following portions of the patient's history were reviewed and updated as appropriate: allergies, current medications, past family history, past medical history, past social history, past surgical history and problem list     VITALS:  Vitals:    06/19/18 1542   BP: 100/60   BP Location: Left arm   Patient Position: Sitting   Pulse: (!) 50   Temp: (!) 97 1 °F (36 2 °C)   TempSrc: Tympanic   SpO2: 97%   Weight: 96 5 kg (212 lb 12 8 oz)   Height: 5' 10" (1 778 m)       Physical Exam  General:  Patient is awake, alert, non-toxic and in no acute respiratory distress  Neck: No JVD  CV:  Regular, +S1 and S2, No murmurs, gallops or rubs appreciated  Lungs:  Slightly decreased breath sounds bilateral without wheeze  Abdomen: Soft, +BS, Non-tender, non-distended  Extremities: No clubbing, cyanosis or edema  Neuro:  No focal deficits        Diagnostic Testing:    CBC:  Lab Results   Component Value Date    WBC 9 92 05/07/2018    HGB 14 3 05/07/2018    HCT 44 4 05/07/2018    MCV 92 05/07/2018     05/07/2018         BMP:   Lab Results   Component Value Date     (L) 05/07/2018    K 4 2 05/07/2018     05/07/2018    CO2 26 05/07/2018    ANIONGAP 6 05/07/2018    BUN 17 05/07/2018    CREATININE 0 74 05/07/2018    GLUCOSE 173 (H) 05/07/2018    GLUF 165 (H) 12/13/2017    CALCIUM 8 6 05/07/2018    AST 43 12/13/2017    ALT 30 12/13/2017    ALKPHOS 141 (H) 12/13/2017    PROT 7 6 12/13/2017    BILITOT 1 07 (H) 12/13/2017    EGFR 90 05/07/2018       Ariana Skaggs, DO

## 2018-06-19 NOTE — PATIENT INSTRUCTIONS
Stroke: Siomara Gutierrez presents for follow-up with regard to his prior stroke  He does not endorse any symptoms that are consistent with recurrent TIA or stroke  He has been transitioned back to Eliquis since her last visit in the office and reports good medication compliance with no significant bleeding issues  - for secondary stroke prevention we will plan to continue Eliquis and appropriate blood pressure control  -I will defer to the good judgment of his family physician for monitoring of his blood sugars  - from my standpoint, considering his liver cancer, he is not a candidate for statin therapy    Restless leg syndrome, neuropathy:  He reports that his neuropathy and RLS symptoms are reasonably well controlled  His sleep patterns to fluctuate to a degree but he reports that he is able to sleep easily well and that the RLS is not particularly keeping him awake  Mild cognitive impairment:  He reports ongoing lapses in memory most consistent with significant distractibility  [facility] cognitive Assessment in the office today was 24/30 which is in the mild cognitive impairment range  I would suggest that this is most likely the result of multiple recent medical illnesses including the esophageal cancer and infected collection near the right shoulder   - we discussed in the office today the importance of avoiding distractions and focusing on particular tasks including putting objects down and performing specific chores, as this will help to reinforce the memory in his mind  - at this point we will not begin Aricept /Namenda but I would like for him to return to the office in 3-4 months time for a repeat evaluation    He should also keep track of those activities that are particularly difficult for him at home and choose 1 per week to work on by either changing the organization  In his environment or changing his own mental focus and organization  -considering nutritional changes associated with the treatment of his cancer would be reasonable to check appropriate lab values including B12, folate, methylmalonic acid, TSH, and a CBC to monitor for anemia  I will plan for him to return to the office in 3-4 months time but I would be happy to see him sooner if the need should   Arise  If he has any stroke-like symptoms including sudden painless loss of vision, difficulty with speaking or swallowing, vertigo/room spinning that does not quickly resolve, or weakness / numbness affecting 1 side of the body; or if he were to fall and strike his head and have residual symptoms or to developed a sudden extremely severe unusual headache he should proceed to the nearest emergency room immediately

## 2018-06-21 PROBLEM — M25.512 LEFT SHOULDER PAIN: Status: RESOLVED | Noted: 2018-05-02 | Resolved: 2018-06-21

## 2018-06-21 NOTE — ASSESSMENT & PLAN NOTE
He will continue on Anoro once daily as he has had significant response to the medication and he does have severe COPD on testing  He will work with the [place] to obtain this prescription  If he is unable to, we could attempt to place him on Spiriva Respimat 2 sprays once daily  Hopefully he would also have good response  He does not require supplemental oxygen  He will continue to increase physical activity as tolerated  He is up-to-date on his immunizations

## 2018-06-22 DIAGNOSIS — C22.0 HEPATOCELLULAR CARCINOMA (HCC): Primary | ICD-10-CM

## 2018-07-05 ENCOUNTER — HOSPITAL ENCOUNTER (OUTPATIENT)
Dept: MRI IMAGING | Facility: HOSPITAL | Age: 73
Discharge: HOME | End: 2018-07-05
Attending: SURGERY
Payer: MEDICARE

## 2018-07-05 ENCOUNTER — HOSPITAL ENCOUNTER (OUTPATIENT)
Dept: CT IMAGING | Facility: HOSPITAL | Age: 73
Discharge: HOME | End: 2018-07-05
Attending: SURGERY
Payer: MEDICARE

## 2018-07-05 ENCOUNTER — OFFICE VISIT (OUTPATIENT)
Dept: SURGICAL ONCOLOGY | Facility: CLINIC | Age: 73
End: 2018-07-05
Payer: MEDICARE

## 2018-07-05 VITALS
RESPIRATION RATE: 18 BRPM | SYSTOLIC BLOOD PRESSURE: 118 MMHG | TEMPERATURE: 98.6 F | HEART RATE: 60 BPM | WEIGHT: 212 LBS | BODY MASS INDEX: 30.35 KG/M2 | DIASTOLIC BLOOD PRESSURE: 72 MMHG | HEIGHT: 70 IN

## 2018-07-05 DIAGNOSIS — C15.5 MALIGNANT NEOPLASM OF LOWER THIRD OF ESOPHAGUS (HCC): ICD-10-CM

## 2018-07-05 DIAGNOSIS — C22.0 HEPATOCELLULAR CARCINOMA (HCC): ICD-10-CM

## 2018-07-05 DIAGNOSIS — C22.0 CANCER, HEPATOCELLULAR (HCC): ICD-10-CM

## 2018-07-05 DIAGNOSIS — C22.0 HEPATOCELLULAR CARCINOMA (HCC): Primary | ICD-10-CM

## 2018-07-05 PROBLEM — I25.10 CORONARY ARTERY DISEASE: Status: ACTIVE | Noted: 2018-07-05

## 2018-07-05 PROCEDURE — 71250 CT THORAX DX C-: CPT

## 2018-07-05 PROCEDURE — 74183 MRI ABD W/O CNTR FLWD CNTR: CPT

## 2018-07-05 PROCEDURE — 99215 OFFICE O/P EST HI 40 MIN: CPT | Performed by: SURGERY

## 2018-07-05 PROCEDURE — A9585 GADOBUTROL INJECTION: HCPCS | Performed by: SURGERY

## 2018-07-05 RX ORDER — LEVOFLOXACIN 25 MG/ML
40 SOLUTION ORAL DAILY
COMMUNITY
End: 2018-07-05 | Stop reason: CLARIF

## 2018-07-05 RX ADMIN — GADOBUTROL 9 ML: 604.72 INJECTION INTRAVENOUS at 11:38

## 2018-07-05 NOTE — LETTER
July 5, 2018     Jay Harrell MD  [address] [place] [address]    Patient: Mark Champion   YOB: 1945   Date of Visit: 7/5/2018       Dear Dr Sandro Agarwal: Thank you for referring Justyna Hurtado to me for evaluation  Below are my notes for this consultation  If you have questions, please do not hesitate to call me  I look forward to following your patient along with you  Sincerely,        Kathy Waller MD        CC: DO Alexi Jimenez MD Jennelle Casa, MD  7/5/2018  1:51 PM  Incomplete               Surgical Oncology Follow Up       [facility]  [facility]  [address] [facility] [address]  1945  [id number]  [facility]  [facility]  [address] [place] [id number]    Diagnoses and all orders for this visit:    Malignant neoplasm of lower third of esophagus ([facility] )    Hepatocellular carcinoma ([facility] )    Other orders  -     Discontinue: levofloxacin (LEVAQUIN) 25 mg/mL solution; Take 40 mg by mouth daily        Chief Complaint   Patient presents with    Follow-up     Pt here for f/u with labs, CT and MRI  No Follow-up on file  Oncology History    38-year-old male status post esophagectomy and liver resection 10/31/16 for esophageal cancer and hepatocellular carcinoma in 2016  Treatment included a course of radiation to the esophagus to a total dose of 4680 cGy with treatments given from 6/27/16 to 8/3/2016  Radiation therapy was administered at [facility] by Dr Carolyn Borden  Concurrent Taxol and Carboplatin was under the care of Dr Shira Perez at [facility]  CT scan of the abdomen and MRI of the abdomen in October 2017 revealed a new lesion in the left lobe of the liver measuring 4 1 x 2 7 cm with 1-2 additional lesions   CT-guided biopsy liver lesion was positive for hepatocellular carcinoma on October 11, 2017  Recommendations were made for Sir sphere radiation therapy to the whole liver which was given on December 13, 2017  The patient returns today for follow-up examination  reports he is feeling well and is having no abdominal complaints  He denies any pain  He is still having occasional nausea with some dysphagia and is considering having a repeat esophageal dilation performed  He had repeat blood work including liver function studies performed January 19, 2017 at [facility] but results are not available today  We will call him with these results  He has MRI of the abdomen and follow-up appointment with Dr Nash Cruz on the same day February 6, 2018  Hopefully, this will show good results and response to treatment  Assuming MRI shows favorable response, then we would recommend repeat MRI again in 3 months  The patient states he will be following with Dr Nash Cruz every 3 months  It takes about 1 5 hours to come here from [place], [place] and we will therefore see him here on a p r n  basis  Should he have evidence of progressive disease the future then, we would be happy to re-evaluate him for consideration of additional Sir spheres to the liver  The patient is wife were in agreement with the above plan and they know they can call any time if they have any questions  2/14/18 Had EGD, dialtion of the esophagus  There was a mild stricture at the site of anastomosis  4/5/18 Had MRI of the abd: The previously noted dominant lesion in the left hepatic lobe demonstrate increasing necrosis suggest treatment response The segment 4 lesion is decreasing in size The lesion in the segment 5 cm remains stable with thin rim enhancement suggesting no viable tissue Segment 2 lesion is stable Enlarging regional lymph node in the upper abdomen just below the silvestre hepatis which measures 2 5 x 2 4 cm    Pt saw both surg Onc, and Med onc     On 4/6/18 Pt met with Dr Catalina Goetz, who discussed, If radiation therapy could be applied to this lesion deferral of systemic treatment initiation could be accomplished  Systemic treatment options: potential treatment options for patients with [address]  including Nex[facility]ar, Logan Gambles  Pt had previous esophagus [facility] [place]  Treatment recoreds available in [address]  Esophageal cancer ([address] )    4/27/2016 Initial Diagnosis     Esophageal cancer ([facility] [address] )         6/27/2016 - 7/26/2016 Chemotherapy     Taxol 50 + Carbo AUC 2         6/27/2016 - 8/3/2016 Radiation     Concurrent RT Esophagus 4,680 cGy   [place]         10/31/2016 Surgery     Esophagectomy           Hepatocellular carcinoma ([facility] )    10/31/2016 Initial Diagnosis     Cancer, hepatocellular ([facility] )       10/31/2016 Surgery     Resection/ablation of liver - segments 5/6         10/11/2017 Biopsy     Local recurrence         12/13/2017 -  Radiation     SIRSphere treatment  Treatment site:  whole liver  Whole liver Dose:  Prescribed: 1 66 GBq, Delivered: 1 61GBq  (Right lobe Dose:   Prescribed: 1 06 GBq,  Delivered: 1 03 GBq)  (Left lobe Dose:   Prescribed: 0 60 GBq,  Delivered: 0 58 GBq)            Diagnosis and Staging: [id number] esophageal adenocarcinoma May 2016  [id number] esophageal adenocarcinoma October 2016    [id number] [address]  October 2016   Treatment History: Taxol Carbo with radiation   Sir spheres December 13, 2017  Current Therapy: Observation   Disease Status: Recurrent HCC     History of Present Illness:   Patient returns in follow-up of his [address]  He underwent Sir spheres embolization on December 13, 2017 for recurrent [address]  Follow-up AFP level from March 10, 2018 as increased to 17 6  His recent AFP level was not drawn by his lab  He sought medical oncology and no adjuvant therapy was offered  He underwent radiation therapy to the enlarging silvestre hepatis lymph node  Follow-up imaging from earlier today revealed progression of several of his lung nodules    These are all still smaller than 1 cm  There is also some enlargement of the left adrenal gland suggestive of progression  MRI of the liver reveals a new lesion in segment 4 and progression of his node lesions in the liver  I personally reviewed his films  He comes in now to discuss further therapy  He is currently feeling well  He denies any abdominal pain, nausea or vomiting  No change in appetite and no unintentional weight loss  No fatigue  Review of Systems  Complete ROS Surg Onc:   Complete ROS Surg Onc:   Constitutional: The patient denies new or recent history of general fatigue, no recent weight loss, no change in appetite  Eyes: No complaints of visual problems, no scleral icterus  ENT: no complaints of ear pain, no hoarseness, no difficulty swallowing,  no tinnitus and no new masses in head, oral cavity, or neck  Cardiovascular: No complaints of chest pain, no palpitations, no ankle edema  Respiratory: No complaints of shortness of breath, no cough  Gastrointestinal: No complaints of jaundice, no bloody stools, no pale stools  Genitourinary: No complaints of dysuria, no hematuria, no nocturia, no frequent urination, no urethral discharge  Musculoskeletal: No complaints of weakness, paralysis, joint stiffness or arthralgias  Integumentary: No complaints of rash, no new lesions  Neurological: No complaints of convulsions, no seizures, no dizziness  Hematologic/Lymphatic: No complaints of easy bruising  Endocrine:  No hot or cold intolerance  No polydipsia, polyphagia, or polyuria  Allergy/immunology:  No environmental allergies  No food allergies  Not immunocompromised  Skin:  No pallor or rash  No wound          Patient Active Problem List   Diagnosis    Esophageal cancer ([facility] )    Hepatocellular carcinoma ([facility] )    Atrial fibrillation (HCC)    Hypotension    Type 2 diabetes mellitus (HCC)    Vision abnormalities    Dysphagia    Radiation esophagitis    Restless legs syndrome  Rheumatoid arthritis ([facility] )    Sleep apnea    Hyperlipidemia    Hypertension    Peripheral neuropathy    Epidermoid cyst of neck    Glaucoma    Gout    Incisional hernia    Cough    Stage 3 severe COPD by GOLD classification (HCC)    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)    Anxiety and depression    Acute pain of left shoulder    History of stroke    Mild cognitive impairment    Nutritional deficiency    Coronary artery disease     Past Medical History:   Diagnosis Date    Anemia     Coronary artery disease     Dysphagia     Esophageal cancer (HCC)     Gout     Hyperlipidemia     Hypertension     Restless leg syndrome     Rheumatoid arthritis ([facility] )     Sleep apnea     Vision abnormalities      Past Surgical History:   Procedure Laterality Date    APPENDECTOMY      CHOLECYSTECTOMY      GASTROJEJUNOSTOMY W/ JEJUNOSTOMY TUBE N/A 8/4/2016    Procedure: INSERTION JEJUNOSTOMY TUBE OPEN;  Surgeon: Maco Oneil MD;  [facility];  Service:     KNEE ARTHROSCOPY      LIVER RESECTION N/A 10/31/2016    Procedure: INTRAOPERATIVE ULTRASOUND OF LIVER; LIVER LESION RESECTION/ABLATION ;  Surgeon: Maco Oneil MD;  [facility];  Service:    Brunilda Cheese PLACEMENT      MD EGD INSERT GUIDE WIRE DILATOR PASSAGE ESOPHAGUS N/A 2/14/2018    Procedure: ESOPHAGOGASTRODUODENOSCOPY (EGD) with dilation;  Surgeon: Suhas Liang MD;  [facility];  Service: Thoracic    MD ESOPHAGOGASTRODUODENOSCOPY TRANSORAL DIAGNOSTIC N/A 10/31/2016    Procedure: ESOPHAGOGASTRODUODENOSCOPY (EGD);   Surgeon: Maco Oneil MD;  [facility];  Service: Surgical Oncology    MD ESOPHAGOSCOPY FLEX BALLOON DILAT <30 MM DIAM N/A 1/6/2017    Procedure: DILATATION ESOPHAGEAL;  Surgeon: Suhas Liang MD;  [facility];  Service: Thoracic    MD ESOPHAGOSCOPY FLEX BALLOON DILAT <30 MM DIAM N/A 12/12/2016    Procedure: DILATATION ESOPHAGEAL, EGD;  Surgeon: Martina Ambrocio MD;  [facility];  Service: Thoracic    CO ESOPHAGOSCOPY FLEX BALLOON DILAT <30 MM DIAM N/A 5/24/2017    Procedure: EGD WITH DILATION ;  Surgeon: Hiren Hager MD;  [facility];  Service: Thoracic    CO ESOPHAGOSCOPY FLEX BALLOON DILAT <30 MM DIAM N/A 8/3/2017    Procedure: EGD WITH DILATION;  Surgeon: Martina Ambrocio MD;  [facility];  Service: Thoracic    CO REMOVAL [address] THORACOTOMY N/A 10/31/2016    Procedure: ESOPHAGECTOMY;  Surgeon: Destiny Boggs MD;  [facility];  Service: Surgical Oncology    TONSILLECTOMY      WOUND DEBRIDEMENT Left 5/3/2018    Procedure: DEBRIDEMENT UPPER EXTREMITY ([address]) left shoulder;  Surgeon: Ashleigh Fuentes MD;  [facility];  Service: Orthopedics     Family History   Problem Relation Age of Onset    Stroke Mother     Colon cancer Father     Other Father         Pneumoconiosis    Cancer Sister         oral cancer     Social History     Social History    Marital status: /Civil Union     Spouse name: N/A    Number of children: N/A    Years of education: N/A     Occupational History          Social History Main Topics    Smoking status: Former Smoker     Packs/day: 3 00     Years: 40 00     Types: Cigarettes     Quit date: 4/13/1991    Smokeless tobacco: Never Used    Alcohol use No    Drug use: No    Sexual activity: Not Currently     Other Topics Concern    Not on file     Social History Narrative    No narrative on file       Current Outpatient Prescriptions:     albuterol (2 5 mg/3 mL) 0 083 % nebulizer solution, Take 1 ampule by nebulization every 4 (four) hours as needed for wheezing  , Disp: , Rfl:     albuterol (PROVENTIL HFA,VENTOLIN HFA) 90 mcg/act inhaler, Inhale 2 puffs every 6 (six) hours as needed for wheezing or shortness of breath (or cough), Disp: 1 Inhaler, Rfl: 11    allopurinol (ZYLOPRIM) 100 mg tablet, Take 100 mg by mouth daily, Disp: , Rfl:    amiodarone 200 mg tablet, Take 200 mg by mouth daily, Disp: , Rfl:     apixaban (ELIQUIS) 5 mg, Take 5 mg by mouth 2 (two) times a day, Disp: , Rfl:     cephalexin (KEFLEX) 500 mg capsule, Take 500 mg by mouth 4 (four) times a day, Disp: , Rfl:     dexlansoprazole (DEXILANT) 60 MG capsule, Take 60 mg by mouth daily, Disp: , Rfl:     diazepam (VALIUM) 5 mg tablet, Take 5 mg by mouth every 6 (six) hours as needed for anxiety, Disp: , Rfl:     diphenhydrAMINE (BENADRYL) 25 mg capsule, Take 25 mg by mouth daily, Disp: , Rfl:     gabapentin (NEURONTIN) 100 mg capsule, Take 100 mg by mouth daily  , Disp: , Rfl:     guaifenesin-codeine (GUAIFENESIN AC) 100-10 MG/5ML liquid, Take 5 mL by mouth 3 (three) times a day as needed for cough, Disp: 120 mL, Rfl: 0    hydrOXYzine HCL (ATARAX) 25 mg tablet, Take 25 mg by mouth 2 (two) times a day as needed for itching, Disp: , Rfl:     insulin aspart (NovoLOG) 100 units/mL injection, Inject under the skin as needed for high blood sugar, Disp: , Rfl:     latanoprost (XALATAN) 0 005 % ophthalmic solution, Administer 1 drop to both eyes daily at bedtime  , Disp: , Rfl:     LORazepam (ATIVAN) 1 mg tablet, Take 2 mg by mouth daily at bedtime  , Disp: , Rfl:     rOPINIRole (REQUIP) 1 mg tablet, Take 3 mg by mouth daily at bedtime Take 1mg in am and 3mg at bedtime , Disp: , Rfl:     sertraline (ZOLOFT) 25 mg tablet, Take 25 mg by mouth daily at bedtime  , Disp: , Rfl:     traMADol (ULTRAM) 50 mg tablet, Take 50 mg by mouth every 6 (six) hours as needed for moderate pain, Disp: , Rfl:     Umeclidinium-Vilanterol (ANORO ELLIPTA) 62 5-25 MCG/INH AEPB, Inhale 1 puff daily, Disp: 1 each, Rfl: 11  No current facility-administered medications for this visit     Allergies   Allergen Reactions    Humira [Adalimumab] Rash     itchy    Levaquin [Levofloxacin In D5w] Rash    Levofloxacin Rash    Lovenox [Enoxaparin] Rash and Hives     Vitals:    07/05/18 1303   BP: 118/72   Pulse: 60 Resp: 18   Temp: 98 6 °F (37 °C)       Physical Exam  Constitutional: General appearance: The Patient is well-developed and well-nourished who appears the stated age in no acute distress  Patient is pleasant and talkative  HEENT:  Normocephalic  Sclerae are anicteric  Mucous membranes are moist  Neck is supple without adenopathy  No JVD  Chest: The lungs are clear to auscultation  Cardiac: Heart is regular rate  Abdomen: Abdomen is soft, non-tender, non-distended and without masses  Extremities: There is no clubbing or cyanosis  There is no edema  Symmetric  Neuro: Grossly nonfocal  Gait is normal      Lymphatic: No evidence of cervical adenopathy bilaterally  No evidence of axillary adenopathy bilaterally  No evidence of inguinal adenopathy bilaterally  Skin: Warm, anicteric  Psych:  Patient is pleasant and talkative  Breasts:        Pathology:      Labs:      Imaging  Ct Chest Wo Contrast    Result Date: 7/5/2018  Narrative: CT CHEST WITHOUT IV CONTRAST INDICATION:   C15 5: Malignant neoplasm of lower third of esophagus C22 0: Liver cell carcinoma  COMPARISON: None  TECHNIQUE: CT examination of the chest was performed without intravenous contrast   Axial, sagittal, and coronal 2D reformatted images were created from the source data and submitted for interpretation  Radiation dose length product (DLP) for this visit:  412 mGy-cm   This examination, like all CT scans performed in the [facility], was performed utilizing techniques to minimize radiation dose exposure, including the use of iterative reconstruction and automated exposure control  FINDINGS: LUNGS:  Again noted is a right upper lobe lung nodule which measures 5 mm  This has become larger from the previous study    On the previous study this was measuring about 2 mm An additional lung nodule seen at the level of the arch of the aorta which measures about 5 mm, larger from the previous study Additional lung nodule seen in the right right upper lobe anteriorly, mildly larger from the previous study A lung nodule seen in the right lower lobe just below the level of foot fissure, measuring 5 mm, larger from the previous study Left lingular region nodules are seen, measuring 6 to 7 mm  The previous study A nodule seen in the subpleural region, measuring about 3 mm PLEURA:  Unremarkable  HEART/GREAT VESSELS:  Unremarkable for patient's age  MEDIASTINUM AND JORGE:  No contour deforming hilar lymph node enlargement seen CHEST WALL AND LOWER NECK:   Unremarkable  VISUALIZED STRUCTURES IN THE UPPER ABDOMEN:  Spleen, pancreas appears unremarkable There is nodular enlargement of the left adrenal gland with left adrenal nodule measuring 2 1 x 1 6 cm, new from the previous study Multiple liver lesions noted, better assessed on the MRI OSSEOUS STRUCTURES:  No acute fracture or destructive osseous lesion  Impression: There is a progression of disease The previously noted nonmeasurable nodules are larger though measuring  less than 1 cm There is new nodular enlargement of the left adrenal gland suggest new adrenal metastatic disease The study was marked in EPIC for immediate notification  Workstation performed: [id number]     Mri Abdomen W Wo Contrast    Result Date: 7/5/2018  Narrative: MRI - ABDOMEN - WITH AND WITHOUT CONTRAST INDICATION:  Liver lesion COMPARISON: April 5, 2018 TECHNIQUE:  The following pulse sequences were obtained on a 1 5 T scanner prior to and following the administration of intravenous contrast:     Coronal and axial T2 with TE of 90 and 180 respectively, axial T2 with fat saturation, axial FIESTA fat-sat,  axial T1-weighted in-and-out-of phase, axial DWI/ADC, precontrast axial T1 with fat saturation, post-contrast dynamic axial T1 with fat saturation at 20, 70, and 180 seconds, coronal T1  with fat saturation and 7 minute delayed axial T1 with fat saturation    IV Contrast:  9 mL of gadobutrol injection (MULTI-DOSE) FINDINGS: LIVER:  Again noted is a lesion in the left hepatic lobe  This lesion now measures 7 2 x 4 8 cm  On the previous study this was measuring 6 6 x 5 cm Central area of necrosis and T2 hyperintensity which was noted on the previous study is decreasing  There is a nodular area of peripheral enhancement which is a demonstrating diffusion restriction  This is increasing  Additional lesion seen in the segment 4 at its the posterior aspect is also demonstrating decrease in the central diagnosis and increasing nodular enhancement  This lesion now measures 4 cm x 3 3 cm  On the previous study this lesion was measuring 3 2 x 3 5 cm  There is a new lesion which demonstrates enhancement anterior to the segment 4 lesion  This measures 2 1 x 1 7 cm A hyperintense area noted in segment 5, measuring about 1 8 cm x 1 cm is larger  On the previous study this was measuring 11 mm x 9 mm Additional lesion seen adjacent to the gallbladder fossa segment 5 measuring about 2 1 cm x 1 9 cm on the current study was measuring 1 6 x 1 1 cm on the previous study BILIARY TREE:  There is no intra-hepatic biliary ductal dilatation  The common bile duct is normal in caliber  There is no gross evidence of mass or choledocholithiasis  GALLBLADDER:  The gallbladder is normal in size and morphology  There is no evidence of sludge or stones  There is no gallbladder wall thickening or pericholecystic fluid  No gallbladder mass is identified  PANCREAS: Fatty infiltration of the pancreas is noted ADRENAL GLANDS: There is new nodular enlargement of the left adrenal gland measures 2 5 x 1 8 cm SPLEEN:  The spleen is normal in size, morphology and signal intensity  No focal mass is identified  KIDNEYS:  The kidneys enhance symmetrically and are normal in morphology  There is no hydronephrosis  No focal cystic or solid renal masses are identified   LOWER CHEST:   Postsurgical changes as noted from esophageal surgery Right basal density seen ABDOMINAL CAVITY: Again noted is a lymph node in the portacaval region disclosed level of the silvestre hepatis  This has become larger, measuring 2 9 x 3 3 cm  On the previous study this was measuring 2 cm x 2 5 cm  Mesenteric fat is normal in signal without evidence of stranding or edema  No mesenteric nodularity or focal mass is identified  ABDOMINAL WALL:  No mass or hernia identified  BOWEL:   Limited evaluation of the hollow viscera demonstrates no gross obstruction or mass  OSSEOUS STRUCTURES:  There is no evidence of destructive process  VASCULAR STRUCTURES:  The celiac trunk, SMA are patent The portal vein is patent     Impression: There is progression of disease with enlargement of the previously noted hepatic lesion which demonstrate increasing thick nodular enhancement and relative filling in of the previously noted necrosis  There is a new lesion in the segment 4 anteriorly   The previously noted the lymph node in the portacaval region just below the level of the silvestre hepatis is enlarging New left adrenal metastatic disease The study was marked in EPIC for immediate notification  Workstation performed: [id number]     I reviewed the above laboratory and imaging data  Discussion/Summary:   56-year-old male status post esophagectomy and liver resection  He has undergone Sir spheres embolization  His AFP is still elevated  We will repeat his AFP level now  There appears to be progression of his liver and lung lesions  I have recommended that he see Dr Krzysztof Vegas at this time to see if he wishes to initiate systemic therapy  I would not recommend any surgical intervention  I will see him again in 3 months with a repeat MRI and AFP level  He is agreeable to this  All his questions were answered

## 2018-07-05 NOTE — PROGRESS NOTES
Surgical Oncology Follow Up       [facility]  [facility] [place]  [address] [facility] [address]  1945  [phone]  [facility]  [facility] [place]  [address] [place] [id number]    Diagnoses and all orders for this visit:    Hepatocellular carcinoma ([address] )  -     MRI abdomen w wo contrast; Future  -     CT chest wo contrast; Future  -     BUN; Future  -     AFP tumor marker; Standing  -     Creatinine, serum; Future  -     Ambulatory referral to Palliative Care; Future  -     Ambulatory referral to Hematology / Oncology; Future  -     AFP tumor marker    Malignant neoplasm of lower third of esophagus (HCC)    Other orders  -     Discontinue: levofloxacin (LEVAQUIN) 25 mg/mL solution; Take 40 mg by mouth daily        Chief Complaint   Patient presents with    Follow-up     Pt here for f/u with labs, CT and MRI  Return in about 3 months (around 10/5/2018) for Office Visit, Imaging - See orders, Labs - See Treatment Plan  Oncology History    77-year-old male status post esophagectomy and liver resection 10/31/16 for esophageal cancer and hepatocellular carcinoma in 2016  Treatment included a course of radiation to the esophagus to a total dose of 4680 cGy with treatments given from 6/27/16 to 8/3/2016  Radiation therapy was administered at [facility] by Dr Sherry Isbell  Concurrent Taxol and Carboplatin was under the care of Dr Crescencio Balderas at [facility]  CT scan of the abdomen and MRI of the abdomen in October 2017 revealed a new lesion in the left lobe of the liver measuring 4 1 x 2 7 cm with 1-2 additional lesions  CT-guided biopsy liver lesion was positive for hepatocellular carcinoma on October 11, 2017  Recommendations were made for Sir sphere radiation therapy to the whole liver which was given on December 13, 2017   The patient returns today for follow-up examination  reports he is feeling well and is having no abdominal complaints  He denies any pain  He is still having occasional nausea with some dysphagia and is considering having a repeat esophageal dilation performed  He had repeat blood work including liver function studies performed January 19, 2017 at [facility] but results are not available today  We will call him with these results  He has MRI of the abdomen and follow-up appointment with Dr Juan F Heath on the same day February 6, 2018  Hopefully, this will show good results and response to treatment  Assuming MRI shows favorable response, then we would recommend repeat MRI again in 3 months  The patient states he will be following with Dr Juan F Heath every 3 months  It takes about 1 5 hours to come here from [place], [place] and we will therefore see him here on a p r n  basis  Should he have evidence of progressive disease the future then, we would be happy to re-evaluate him for consideration of additional Sir spheres to the liver  The patient is wife were in agreement with the above plan and they know they can call any time if they have any questions  2/14/18 Had EGD, dialtion of the esophagus  There was a mild stricture at the site of anastomosis  4/5/18 Had MRI of the abd: The previously noted dominant lesion in the left hepatic lobe demonstrate increasing necrosis suggest treatment response The segment 4 lesion is decreasing in size The lesion in the segment 5 cm remains stable with thin rim enhancement suggesting no viable tissue Segment 2 lesion is stable Enlarging regional lymph node in the upper abdomen just below the slivestre hepatis which measures 2 5 x 2 4 cm    Pt saw both surg Onc, and Med onc  On 4/6/18 Pt met with Dr Simba Levine, who discussed, If radiation therapy could be applied to this lesion deferral of systemic treatment initiation could be accomplished    Systemic treatment options: potential treatment options for patients with [address]  including [place], [address], [address] [address]  Pt had previous esophagus EBRT,8/2016  at [facility] in [place]  Treatment recoreds available in [address]  Esophageal cancer ([facility] )    4/27/2016 Initial Diagnosis     Esophageal cancer ([facility] )         6/27/2016 - 7/26/2016 Chemotherapy     Taxol 50 + Carbo AUC 2         6/27/2016 - 8/3/2016 Radiation     [place] 4,[place]         10/31/2016 Surgery     Esophagectomy           Hepatocellular carcinoma ([facility] )    10/31/2016 Initial Diagnosis     Can[facility] )       10/31/2016 Surgery     Resection/ablation of liver - segments 5/6         10/11/2017 Biopsy     Local recurrence         12/13/2017 -  Radiation     SIRSphere treatment  Treatment site:  whole liver  Whole liver Dose:  Prescribed: 1 66 GBq, Delivered: 1 61GBq  (Right lobe Dose:   Prescribed: 1 06 GBq,  Delivered: 1 03 GBq)  (Left lobe Dose:   Prescribed: 0 60 GBq,  Delivered: 0 58 GBq)            Diagnosis and Staging: [id number] esophageal adenocarcinoma May 2016  [id number] esophageal adenocarcinoma October 2016    [id number] [address]  October 2016   Treatment History: Taxol Carbo with radiation   Sir spheres December 13, 2017  Current Therapy: Observation   Disease Status: Recurrent HCC     History of Present Illness:   Patient returns in follow-up of [facility]  He underwent Sir spheres embolization on December 13, 2017 for recurrent [facility]  Follow-up AFP level from March 10, 2018 as increased to 17 6  His recent AFP level was not drawn by his lab  He sought medical oncology and no adjuvant therapy was offered  He underwent radiation therapy to the enlarging silvestre hepatis lymph node  Follow-up imaging from earlier today revealed progression of several of his lung nodules  These are all still smaller than 1 cm  There is also some enlargement of the left adrenal gland suggestive of progression    MRI of the liver reveals a new lesion in segment 4 and progression of his node lesions in the liver  I personally reviewed his films  He comes in now to discuss further therapy  He is currently feeling well  He denies any abdominal pain, nausea or vomiting  No change in appetite and no unintentional weight loss  No fatigue  Review of Systems  Complete ROS Surg Onc:   Complete ROS Surg Onc:   Constitutional: The patient denies new or recent history of general fatigue, no recent weight loss, no change in appetite  Eyes: No complaints of visual problems, no scleral icterus  ENT: no complaints of ear pain, no hoarseness, no difficulty swallowing,  no tinnitus and no new masses in head, oral cavity, or neck  Cardiovascular: No complaints of chest pain, no palpitations, no ankle edema  Respiratory: No complaints of shortness of breath, no cough  Gastrointestinal: No complaints of jaundice, no bloody stools, no pale stools  Genitourinary: No complaints of dysuria, no hematuria, no nocturia, no frequent urination, no urethral discharge  Musculoskeletal: No complaints of weakness, paralysis, joint stiffness or arthralgias  Integumentary: No complaints of rash, no new lesions  Neurological: No complaints of convulsions, no seizures, no dizziness  Hematologic/Lymphatic: No complaints of easy bruising  Endocrine:  No hot or cold intolerance  No polydipsia, polyphagia, or polyuria  Allergy/immunology:  No environmental allergies  No food allergies  Not immunocompromised  Skin:  No pallor or rash  No wound          Patient Active Problem List   Diagnosis    [facility]    Hepatocellular carcinoma (HCC)    Atrial fibrillation (HCC)    Hypotension    Type 2 diabetes mellitus (HCC)    Vision abnormalities    Dysphagia    Radiation esophagitis    Restless legs syndrome    Rheumatoid arthritis (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)    Anxiety and depression    Acute pain of left shoulder    History of stroke    Mild cognitive impairment    Nutritional deficiency    Coronary artery disease     Past Medical History:   Diagnosis Date    Anemia     Coronary artery disease     Dysphagia     Esophageal cancer (HCC)     Gout     Hyperlipidemia     Hypertension     Restless leg syndrome     Rheumatoid arthritis ([address] )     Sleep apnea     Vision abnormalities      Past Surgical History:   Procedure Laterality Date    APPENDECTOMY      CHOLECYSTECTOMY      GASTROJEJUNOSTOMY W/ JEJUNOSTOMY TUBE N/A 8/4/2016    Procedure: INSERTION JEJUNOSTOMY TUBE OPEN;  Surgeon: Bismark Rios MD;  [facility];  Service:     KNEE ARTHROSCOPY      LIVER RESECTION N/A 10/31/2016    Procedure: INTRAOPERATIVE ULTRASOUND OF LIVER; LIVER LESION RESECTION/ABLATION ;  Surgeon: Bismark Rios MD;  [facility];  Service:    [place]ores PLACEMENT      PA EGD INSERT GUIDE WIRE DILATOR PASSAGE ESOPHAGUS N/A 2/14/2018    Procedure: ESOPHAGOGASTRODUODENOSCOPY (EGD) with dilation;  Surgeon: Omid Wiggins MD;  [facility];  Service: Thoracic    PA ESOPHAGOGASTRODUODENOSCOPY TRANSORAL DIAGNOSTIC N/A 10/31/2016    Procedure: ESOPHAGOGASTRODUODENOSCOPY (EGD);   Surgeon: Bismark Rios MD;  [facility];  Service: Surgical Oncology    PA ESOPHAGOSCOPY FLEX BALLOON DILAT <30 MM DIAM N/A 1/6/2017    Procedure: DILATATION ESOPHAGEAL;  Surgeon: Omid Wiggins MD;  [facility];  Service: Thoracic    PA ESOPHAGOSCOPY FLEX BALLOON DILAT <30 MM DIAM N/A 12/12/2016    Procedure: DILATATION ESOPHAGEAL, EGD;  Surgeon: Omid Wiggins MD;  [facility];  Service: Thoracic    PA ESOPHAGOSCOPY FLEX BALLOON DILAT <30 MM DIAM N/A 5/24/2017    Procedure: EGD WITH DILATION ;  Surgeon: Naomy Whiting MD;  [facility];  Service: Thoracic    NE ESOPHAGOSCOPY FLEX BALLOON DILAT <30 MM DIAM N/A 8/3/2017    Procedure: EGD WITH DILATION;  Surgeon: Saroj Holden MD;  [facility];  Service: Thoracic    NE REMOVAL [address] THORACOTOMY N/A 10/31/2016    Procedure: ESOPHAGECTOMY;  Surgeon: Raul Cuellar MD;  [facility];  Service: Surgical Oncology    TONSILLECTOMY      WOUND DEBRIDEMENT Left 5/3/2018    Procedure: DEBRIDEMENT UPPER EXTREMITY ([address]) left shoulder;  Surgeon: Giuliana Pelayo MD;  [facility];  Service: Orthopedics     Family History   Problem Relation Age of Onset    Stroke Mother     Colon cancer Father     Other Father         Pneumoconiosis    Cancer Sister         oral cancer     Social History     Social History    Marital status: /Civil Union     Spouse name: N/A    Number of children: N/A    Years of education: N/A     Occupational History          Social History Main Topics    Smoking status: Former Smoker     Packs/day: 3 00     Years: 40 00     Types: Cigarettes     Quit date: 4/13/1991    Smokeless tobacco: Never Used    Alcohol use No    Drug use: No    Sexual activity: Not Currently     Other Topics Concern    Not on file     Social History Narrative    No narrative on file       Current Outpatient Prescriptions:     albuterol (2 5 mg/3 mL) 0 083 % nebulizer solution, Take 1 ampule by nebulization every 4 (four) hours as needed for wheezing  , Disp: , Rfl:     albuterol (PROVENTIL HFA,VENTOLIN HFA) 90 mcg/act inhaler, Inhale 2 puffs every 6 (six) hours as needed for wheezing or shortness of breath (or cough), Disp: 1 Inhaler, Rfl: 11    allopurinol (ZYLOPRIM) 100 mg tablet, Take 100 mg by mouth daily, Disp: , Rfl:     amiodarone 200 mg tablet, Take 200 mg by mouth daily, Disp: , Rfl:     apixaban (ELIQUIS) 5 mg, Take 5 mg by mouth 2 (two) times a day, Disp: , Rfl:   cephalexin (KEFLEX) 500 mg capsule, Take 500 mg by mouth 4 (four) times a day, Disp: , Rfl:     dexlansoprazole (DEXILANT) 60 MG capsule, Take 60 mg by mouth daily, Disp: , Rfl:     diazepam (VALIUM) 5 mg tablet, Take 5 mg by mouth every 6 (six) hours as needed for anxiety, Disp: , Rfl:     diphenhydrAMINE (BENADRYL) 25 mg capsule, Take 25 mg by mouth daily, Disp: , Rfl:     gabapentin (NEURONTIN) 100 mg capsule, Take 100 mg by mouth daily  , Disp: , Rfl:     guaifenesin-codeine (GUAIFENESIN AC) 100-10 MG/5ML liquid, Take 5 mL by mouth 3 (three) times a day as needed for cough, Disp: 120 mL, Rfl: 0    hydrOXYzine HCL (ATARAX) 25 mg tablet, Take 25 mg by mouth 2 (two) times a day as needed for itching, Disp: , Rfl:     insulin aspart (NovoLOG) 100 units/mL injection, Inject under the skin as needed for high blood sugar, Disp: , Rfl:     latanoprost (XALATAN) 0 005 % ophthalmic solution, Administer 1 drop to both eyes daily at bedtime  , Disp: , Rfl:     LORazepam (ATIVAN) 1 mg tablet, Take 2 mg by mouth daily at bedtime  , Disp: , Rfl:     rOPINIRole (REQUIP) 1 mg tablet, Take 3 mg by mouth daily at bedtime Take 1mg in am and 3mg at bedtime , Disp: , Rfl:     sertraline (ZOLOFT) 25 mg tablet, Take 25 mg by mouth daily at bedtime  , Disp: , Rfl:     traMADol (ULTRAM) 50 mg tablet, Take 50 mg by mouth every 6 (six) hours as needed for moderate pain, Disp: , Rfl:     Umeclidinium-Vilanterol (ANORO ELLIPTA) 62 5-25 MCG/INH AEPB, Inhale 1 puff daily, Disp: 1 each, Rfl: 11  No current facility-administered medications for this visit  Allergies   Allergen Reactions    Humira [Adalimumab] Rash     itchy    Levaquin [Levofloxacin In D5w] Rash    Levofloxacin Rash    Lovenox [Enoxaparin] Rash and Hives     Vitals:    07/05/18 1303   BP: 118/72   Pulse: 60   Resp: 18   Temp: 98 6 °F (37 °C)       Physical Exam  Constitutional: General appearance:  The Patient is well-developed and well-nourished who appears the stated age in no acute distress  Patient is pleasant and talkative  HEENT:  Normocephalic  Sclerae are anicteric  Mucous membranes are moist  Neck is supple without adenopathy  No JVD  Chest: The lungs are clear to auscultation  Cardiac: Heart is regular rate  Abdomen: Abdomen is soft, non-tender, non-distended and without masses  Extremities: There is no clubbing or cyanosis  There is no edema  Symmetric  Neuro: Grossly nonfocal  Gait is normal      Lymphatic: No evidence of cervical adenopathy bilaterally  No evidence of axillary adenopathy bilaterally  No evidence of inguinal adenopathy bilaterally  Skin: Warm, anicteric  Psych:  Patient is pleasant and talkative  Breasts:        Pathology:      Labs:      Imaging  Ct Chest Wo Contrast    Result Date: 7/5/2018  Narrative: CT CHEST WITHOUT IV CONTRAST INDICATION:   C15 5: Malignant neoplasm of lower third of esophagus C22 0: Liver cell carcinoma  COMPARISON: None  TECHNIQUE: CT examination of the chest was performed without intravenous contrast   Axial, sagittal, and coronal 2D reformatted images were created from the source data and submitted for interpretation  Radiation dose length product (DLP) for this visit:  412 mGy-cm   This examination, like all CT scans performed in the [facility], was performed utilizing techniques to minimize radiation dose exposure, including the use of iterative reconstruction and automated exposure control  FINDINGS: LUNGS:  Again noted is a right upper lobe lung nodule which measures 5 mm  This has become larger from the previous study    On the previous study this was measuring about 2 mm An additional lung nodule seen at the level of the arch of the aorta which measures about 5 mm, larger from the previous study Additional lung nodule seen in the right right upper lobe anteriorly, mildly larger from the previous study A lung nodule seen in the right lower lobe just below the level of foot fissure, measuring 5 mm, larger from the previous study Left lingular region nodules are seen, measuring 6 to 7 mm  The previous study A nodule seen in the subpleural region, measuring about 3 mm PLEURA:  Unremarkable  HEART/GREAT VESSELS:  Unremarkable for patient's age  MEDIASTINUM AND JORGE:  No contour deforming hilar lymph node enlargement seen CHEST WALL AND LOWER NECK:   Unremarkable  VISUALIZED STRUCTURES IN THE UPPER ABDOMEN:  Spleen, pancreas appears unremarkable There is nodular enlargement of the left adrenal gland with left adrenal nodule measuring 2 1 x 1 6 cm, new from the previous study Multiple liver lesions noted, better assessed on the MRI OSSEOUS STRUCTURES:  No acute fracture or destructive osseous lesion  Impression: There is a progression of disease The previously noted nonmeasurable nodules are larger though measuring  less than 1 cm There is new nodular enlargement of the left adrenal gland suggest new adrenal metastatic disease The study was marked in EPIC for immediate notification  Workstation performed: [id number]     Mri Abdomen W Wo Contrast    Result Date: 7/5/2018  Narrative: MRI - ABDOMEN - WITH AND WITHOUT CONTRAST INDICATION:  Liver lesion COMPARISON: April 5, 2018 TECHNIQUE:  The following pulse sequences were obtained on a 1 5 T scanner prior to and following the administration of intravenous contrast:     Coronal and axial T2 with TE of 90 and 180 respectively, axial T2 with fat saturation, axial FIESTA fat-sat,  axial T1-weighted in-and-out-of phase, axial DWI/ADC, precontrast axial T1 with fat saturation, post-contrast dynamic axial T1 with fat saturation at 20, 70, and 180 seconds, coronal T1  with fat saturation and 7 minute delayed axial T1 with fat saturation  IV Contrast:  9 mL of gadobutrol injection (MULTI-DOSE) FINDINGS: LIVER:  Again noted is a lesion in the left hepatic lobe  This lesion now measures 7 2 x 4 8 cm   On the previous study this was measuring 6 6 x 5 cm Central area of necrosis and T2 hyperintensity which was noted on the previous study is decreasing  There is a nodular area of peripheral enhancement which is a demonstrating diffusion restriction  This is increasing  Additional lesion seen in the segment 4 at its the posterior aspect is also demonstrating decrease in the central diagnosis and increasing nodular enhancement  This lesion now measures 4 cm x 3 3 cm  On the previous study this lesion was measuring 3 2 x 3 5 cm  There is a new lesion which demonstrates enhancement anterior to the segment 4 lesion  This measures 2 1 x 1 7 cm A hyperintense area noted in segment 5, measuring about 1 8 cm x 1 cm is larger  On the previous study this was measuring 11 mm x 9 mm Additional lesion seen adjacent to the gallbladder fossa segment 5 measuring about 2 1 cm x 1 9 cm on the current study was measuring 1 6 x 1 1 cm on the previous study BILIARY TREE:  There is no intra-hepatic biliary ductal dilatation  The common bile duct is normal in caliber  There is no gross evidence of mass or choledocholithiasis  GALLBLADDER:  The gallbladder is normal in size and morphology  There is no evidence of sludge or stones  There is no gallbladder wall thickening or pericholecystic fluid  No gallbladder mass is identified  PANCREAS: Fatty infiltration of the pancreas is noted ADRENAL GLANDS: There is new nodular enlargement of the left adrenal gland measures 2 5 x 1 8 cm SPLEEN:  The spleen is normal in size, morphology and signal intensity  No focal mass is identified  KIDNEYS:  The kidneys enhance symmetrically and are normal in morphology  There is no hydronephrosis  No focal cystic or solid renal masses are identified  LOWER CHEST:   Postsurgical changes as noted from esophageal surgery Right basal density seen ABDOMINAL CAVITY: Again noted is a lymph node in the portacaval region disclosed level of the silvestre hepatis    This has become larger, measuring 2 9 x 3 3 cm  On the previous study this was measuring 2 cm x 2 5 cm  Mesenteric fat is normal in signal without evidence of stranding or edema  No mesenteric nodularity or focal mass is identified  ABDOMINAL WALL:  No mass or hernia identified  BOWEL:   Limited evaluation of the hollow viscera demonstrates no gross obstruction or mass  OSSEOUS STRUCTURES:  There is no evidence of destructive process  VASCULAR STRUCTURES:  The celiac trunk, SMA are patent The portal vein is patent     Impression: There is progression of disease with enlargement of the previously noted hepatic lesion which demonstrate increasing thick nodular enhancement and relative filling in of the previously noted necrosis  There is a new lesion in the segment 4 anteriorly   The previously noted the lymph node in the portacaval region just below the level of the silvestre hepatis is enlarging New left adrenal metastatic disease The study was marked in EPIC for immediate notification  Workstation performed: [id number]     I reviewed the above laboratory and imaging data  Discussion/Summary:   49-year-old male status post esophagectomy and liver resection  He has undergone Sir spheres embolization  His AFP is still elevated  We will repeat his AFP level now  There appears to be progression of his liver and lung lesions  I have recommended that he see Dr Juan Daniel Meneses at this time to see if he wishes to initiate systemic therapy  I would not recommend any surgical intervention  I have also recommended that he see palliative care at this time  He was inquiring regarding clinical trials  I did recommend that he be evaluated at [facility] for this  We have no open trials for [facility]  I will see him again in 3 months with a repeat MRI and AFP level to assess the progression of disease  He is agreeable to this  All his questions were answered

## 2018-07-12 ENCOUNTER — TELEPHONE (OUTPATIENT)
Dept: HEMATOLOGY ONCOLOGY | Facility: CLINIC | Age: 73
End: 2018-07-12

## 2018-07-12 NOTE — TELEPHONE ENCOUNTER
Spoke with patient today - he will be seeing Dr Raleigh Martinez tomorrow at [facility] @ [phone]

## 2018-07-13 ENCOUNTER — DOCUMENTATION (OUTPATIENT)
Dept: HEMATOLOGY ONCOLOGY | Facility: CLINIC | Age: 73
End: 2018-07-13

## 2018-07-13 ENCOUNTER — OFFICE VISIT (OUTPATIENT)
Dept: HEMATOLOGY ONCOLOGY | Facility: HOSPITAL | Age: 73
End: 2018-07-13
Payer: MEDICARE

## 2018-07-13 VITALS
TEMPERATURE: 95.1 F | WEIGHT: 214.2 LBS | DIASTOLIC BLOOD PRESSURE: 66 MMHG | SYSTOLIC BLOOD PRESSURE: 118 MMHG | OXYGEN SATURATION: 98 % | BODY MASS INDEX: 29.99 KG/M2 | HEIGHT: 71 IN | HEART RATE: 53 BPM | RESPIRATION RATE: 17 BRPM

## 2018-07-13 DIAGNOSIS — C22.0 HEPATOCELLULAR CARCINOMA (HCC): Primary | ICD-10-CM

## 2018-07-13 PROCEDURE — 99215 OFFICE O/P EST HI 40 MIN: CPT | Performed by: INTERNAL MEDICINE

## 2018-07-13 RX ORDER — SORAFENIB 200 MG/1
TABLET, FILM COATED ORAL
Qty: 60 TABLET | Refills: 3 | Status: SHIPPED | OUTPATIENT
Start: 2018-07-13 | End: 2018-10-29 | Stop reason: ALTCHOICE

## 2018-07-13 NOTE — PROGRESS NOTES
[address] [facility] [place]  [address]  [place] [id number]    Zari WALTER,[birth year], [id number]  07/13/18    Discussion:   In summary, this is a 74year old male history of early stage esophageal cancer as well as more recent hepatocellular carcinoma  He underwent radiation therapy to a retroperitoneal node  Recent imaging shows progression of pulmonary nodules, adrenal nodule and silvestre hepatis lymphadenopathy, as well as MRI showing increase in the size of previous [address]  lesion and a new lesion in the liver  All of this, concomitantly, suggests progression of HCC  He had had AFP in May 2018 which was normal   Repeat is requested  We reviewed potential for treatment for his systemic progression of HC C  Nexavar is considered standard first-line therapy for patients with [address]  We reviewed potential toxicities including but not limited to fatigue, diarrhea, rash, hand-foot syndrome, thrombocytopenia  Standard doses 400 mg q 12  I would start at 200 mg p o  Q 12 and increase by 200 mg each month, as tolerated  CBC and chemistry to be accomplished every 2 weeks  Baseline AFP is requested  We reviewed that the goal of therapy is disease control, palliation, survival benefit but that cure is quite unlikely  Kenji Tyler is approved in the second-line setting with mild/modest efficacy but long durations of response in patients who have responding disease  We reviewed the above considerations at length today  Our chemotherapy nurse reviewed information regarding Nexavar as well as providing written information in this regard  I discussed the above with the patient    The patient and his wife voiced understanding and agreement   ______________________________________________________________________    Chief Complaint   Patient presents with    Follow-up       HPI:  Oncology History    77-year-old male status post esophagectomy and liver resection 10/31/16 for esophageal cancer and hepatocellular carcinoma in 2016  Treatment included a course of radiation to the esophagus to a total dose of 4680 cGy with treatments given from 6/27/16 to 8/3/2016  Radiation therapy was administered at [facility] by Dr Obdulio Winn  Concurrent Taxol and Carboplatin was under the care of Dr Raleigh Martinez at [address]  CT scan of the abdomen and MRI of the abdomen in October 2017 revealed a new lesion in the left lobe of the liver measuring 4 1 x 2 7 cm with 1-2 additional lesions  CT-guided biopsy liver lesion was positive for hepatocellular carcinoma on October 11, 2017  Recommendations were made for Sir sphere radiation therapy to the whole liver which was given on December 13, 2017  The patient returns today for follow-up examination  reports he is feeling well and is having no abdominal complaints  He denies any pain  He is still having occasional nausea with some dysphagia and is considering having a repeat esophageal dilation performed  He had repeat blood work including liver function studies performed January 19, 2017 at [facility] but results are not available today  We will call him with these results  He has MRI of the abdomen and follow-up appointment with Dr Sai Mesa on the same day February 6, 2018  Hopefully, this will show good results and response to treatment  Assuming MRI shows favorable response, then we would recommend repeat MRI again in 3 months  The patient states he will be following with Dr Sai Mesa every 3 months  It takes about 1 5 hours to come here from [place], [place] and we will therefore see him here on a p r n  basis  Should he have evidence of progressive disease the future then, we would be happy to re-evaluate him for consideration of additional Sir spheres to the liver  The patient is wife were in agreement with the above plan and they know they can call any time if they have any questions  2/14/18 Had EGD, dialtion of the esophagus  There was a mild stricture at the site of anastomosis  4/5/18 Had MRI of the abd: The previously noted dominant lesion in the left hepatic lobe demonstrate increasing necrosis suggest treatment response The segment 4 lesion is decreasing in size The lesion in the segment 5 cm remains stable with thin rim enhancement suggesting no viable tissue Segment 2 lesion is stable Enlarging regional lymph node in the upper abdomen just below the silvestre hepatis which measures 2 5 x 2 4 cm    Pt saw both surg Onc, and Med onc  On 4/6/18 Pt met with Dr Radha Maciel, who discussed, If radiation therapy could be applied to this lesion deferral of systemic treatment initiation could be accomplished  Systemic treatment options: potential treatment options for patients with [address]  including Areli Guzmán  Pt had previous esophagus EBRT,8/2016  at [facility] in [place]  Treatment recoreds available in [facility] [facility]  Esophageal cancer ([facility] )    4/27/2016 Initial Diagnosis     Esophageal cancer ([facility] )         6/27/2016 - 7/26/2016 Chemotherapy     Taxol 50 + Carbo AUC 2         6/27/2016 - 8/3/2016 Radiation     Concurrent RT Esophagus 4,680 cGy   [place]         10/31/2016 Surgery     Esophagectomy           Hepatocellular carcinoma ([facility] )    10/31/2016 Initial Diagnosis     Cancer, hepatocellular ([facility] )       10/31/2016 Surgery     Resection/ablation of liver - segments 5/6         10/11/2017 Biopsy     Local recurrence         12/13/2017 -  Radiation     SIRSphere treatment  Treatment site:  whole liver  Whole liver Dose:  Prescribed: 1 66 GBq, Delivered: 1 61GBq  (Right lobe Dose:   Prescribed: 1 06 GBq,  Delivered: 1 03 GBq)  (Left lobe Dose:   Prescribed: 0 60 GBq,  Delivered: 0 58 GBq)            Interval History:  Clinically stable  1 - Symptomatic but completely ambulatory    Review of Systems   Constitutional: Negative for chills and fever  HENT: Negative for nosebleeds  Eyes: Negative for discharge  Respiratory: Negative for cough and shortness of breath  Cardiovascular: Negative for chest pain  Gastrointestinal: Negative for abdominal pain, constipation and diarrhea  Endocrine: Negative for polydipsia  Genitourinary: Negative for hematuria  Musculoskeletal: Negative for arthralgias  Skin: Negative for color change  Allergic/Immunologic: Negative for immunocompromised state  Neurological: Negative for dizziness and headaches  Hematological: Negative for adenopathy  Psychiatric/Behavioral: Negative for agitation         Past Medical History:   Diagnosis Date    Anemia     Coronary artery disease     Dysphagia     Esophageal cancer (HCC)     Gout     Hyperlipidemia     Hypertension     Restless leg syndrome     Rheumatoid arthritis (HCC)     Sleep apnea     Vision abnormalities      Patient Active Problem List   Diagnosis    [facility]    [facility]at[facility] ([facility]    Atrial fibrillation (HCC)    Hypotension    Type 2 diabetes mellitus (HCC)    Vision abnormalities    Dysphagia    Radiation esophagitis    Restless legs syndrome    Rheumatoid arthritis (HCC)    Sleep apnea    Hyperlipidemia    Hypertension    Peripheral neuropathy    Epidermoid cyst of neck    Glaucoma    Gout    Incisional hernia    Cough    Stage 3 severe COPD by GOLD classification (HCC)    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)    Anxiety and depression    Acute pain of left shoulder    History of stroke    Mild cognitive impairment    Nutritional deficiency    Coronary artery disease       Current Outpatient Prescriptions:     albuterol (2 5 mg/3 mL) 0 083 % nebulizer solution, Take 1 ampule by nebulization every 4 (four) hours as needed for wheezing  , Disp: , Rfl:     albuterol (PROVENTIL HFA,VENTOLIN HFA) 90 mcg/act inhaler, Inhale 2 puffs every 6 (six) hours as needed for wheezing or shortness of breath (or cough), Disp: 1 Inhaler, Rfl: 11    allopurinol (ZYLOPRIM) 100 mg tablet, Take 100 mg by mouth daily, Disp: , Rfl:     amiodarone 200 mg tablet, Take 200 mg by mouth daily, Disp: , Rfl:     apixaban (ELIQUIS) 5 mg, Take 5 mg by mouth 2 (two) times a day, Disp: , Rfl:     cephalexin (KEFLEX) 500 mg capsule, Take 500 mg by mouth 4 (four) times a day, Disp: , Rfl:     dexlansoprazole (DEXILANT) 60 MG capsule, Take 60 mg by mouth daily, Disp: , Rfl:     diazepam (VALIUM) 5 mg tablet, Take 5 mg by mouth every 6 (six) hours as needed for anxiety, Disp: , Rfl:     diphenhydrAMINE (BENADRYL) 25 mg capsule, Take 25 mg by mouth daily, Disp: , Rfl:     gabapentin (NEURONTIN) 100 mg capsule, Take 100 mg by mouth daily  , Disp: , Rfl:     guaifenesin-codeine (GUAIFENESIN AC) 100-10 MG/5ML liquid, Take 5 mL by mouth 3 (three) times a day as needed for cough, Disp: 120 mL, Rfl: 0    hydrOXYzine HCL (ATARAX) 25 mg tablet, Take 25 mg by mouth 2 (two) times a day as needed for itching, Disp: , Rfl:     insulin aspart (NovoLOG) 100 units/mL injection, Inject under the skin as needed for high blood sugar, Disp: , Rfl:     latanoprost (XALATAN) 0 005 % ophthalmic solution, Administer 1 drop to both eyes daily at bedtime  , Disp: , Rfl:     LORazepam (ATIVAN) 1 mg tablet, Take 2 mg by mouth daily at bedtime  , Disp: , Rfl:     rOPINIRole (REQUIP) 1 mg tablet, Take 3 mg by mouth daily at bedtime Take 1mg in am and 3mg at bedtime , Disp: , Rfl:     sertraline (ZOLOFT) 25 mg tablet, Take 25 mg by mouth daily at bedtime  , Disp: , Rfl:     traMADol (ULTRAM) 50 mg tablet, Take 50 mg by mouth every 6 (six) hours as needed for moderate pain, Disp: , Rfl:     Umeclidinium-Vilanterol (ANORO ELLIPTA) 62 5-25 MCG/INH AEPB, Inhale 1 puff daily, Disp: 1 each, Rfl: 11  Allergies   Allergen Reactions    Humira [Adalimumab] Rash     itchy    Levaquin [Levofloxacin In D5w] Rash    Levofloxacin Rash    Lovenox [Enoxaparin] Rash and Hives     Past Surgical History:   Procedure Laterality Date    APPENDECTOMY      CHOLECYSTECTOMY      GASTROJEJUNOSTOMY W/ JEJUNOSTOMY TUBE N/A 8/4/2016    Procedure: INSERTION JEJUNOSTOMY TUBE OPEN;  Surgeon: Raymond Frey MD;  [facility];  Service:     KNEE ARTHROSCOPY      LIVER RESECTION N/A 10/31/2016    Procedure: INTRAOPERATIVE ULTRASOUND OF LIVER; LIVER LESION RESECTION/ABLATION ;  Surgeon: Raymond Frey MD;  [facility];  Service:    Barbara [place] PLACEMENT      CA EGD INSERT GUIDE WIRE DILATOR PASSAGE ESOPHAGUS N/A 2/14/2018    Procedure: ESOPHAGOGASTRODUODENOSCOPY (EGD) with dilation;  Surgeon: Blaire Rodrigez MD;  [facility];  Service: Thoracic    CA ESOPHAGOGASTRODUODENOSCOPY TRANSORAL DIAGNOSTIC N/A 10/31/2016    Procedure: ESOPHAGOGASTRODUODENOSCOPY (EGD);   Surgeon: Raymond Frey MD;  [facility];  Service: Surgical Oncology    CA ESOPHAGOSCOPY FLEX [place] <30 MM DIAM N/A 1/6/2017    Procedure: DILATATION ESOPHAGEAL;  Surgeon: Blaire Rodrigez MD;  [facility];  Service: Thoracic    CA ESOPHAGOSCOPY FLEX BALLOON DILAT <30 MM DIAM N/A 12/12/2016    Procedure: DILATATION ESOPHAGEAL, EGD;  Surgeon: Blaire Rodrigez MD;  [facility];  Service: Thoracic    CA ESOPHAGOSCOPY FLEX BALLOON DILAT <30 MM DIAM N/A 5/24/2017    Procedure: EGD WITH DILATION ;  Surgeon: Rodrigo Patel MD;  [facility];  Service: Thoracic    CA ESOPHAGOSCOPY FLEX BALLOON DILAT <30 MM DIAM N/A 8/3/2017    Procedure: EGD WITH DILATION;  Surgeon: Blaire Rodrigez MD;  [facility];  Service: Thoracic    CA REMOVAL [address] THORACOTOMY N/A 10/31/2016    Procedure: ESOPHAGECTOMY;  Surgeon: Raymond Frey MD;  [facility];  Service: Surgical Oncology    TONSILLECTOMY      WOUND DEBRIDEMENT Left 5/3/2018    Procedure: DEBRIDEMENT UPPER EXTREMITY ([address]) left shoulder;  Surgeon: Shell Young MD Luis Fernando;  [facility];  Service: Orthopedics     Social History     Objective:  Vitals:    07/13/18 1016   BP: 118/66   BP Location: Left arm   Patient Position: Sitting   Cuff Size: Standard   Pulse: (!) 53   Resp: 17   Temp: (!) 95 1 °F (35 1 °C)   TempSrc: Tympanic   SpO2: 98%   Weight: 97 2 kg (214 lb 3 2 oz)   Height: 5' 10 5" (1 791 m)     Physical Exam   Constitutional: He is oriented to person, place, and time  He appears well-developed  HENT:   Head: Normocephalic  Eyes: Pupils are equal, round, and reactive to light  Neck: Neck supple  Cardiovascular: Normal rate and regular rhythm  No murmur heard  Pulmonary/Chest: Breath sounds normal  He has no wheezes  He has no rales  Abdominal: Soft  There is no tenderness  Musculoskeletal: Normal range of motion  He exhibits no edema or tenderness  Lymphadenopathy:     He has no cervical adenopathy  Neurological: He is alert and oriented to person, place, and time  He has normal reflexes  No cranial nerve deficit  Skin: No rash noted  No erythema  Psychiatric: He has a normal mood and affect  His behavior is normal          Labs: I personally reviewed the labs and imaging pertinent to this patient care

## 2018-07-13 NOTE — LETTER
July 13, 2018     Ann Mistry MD  [facility] [address] [address]    Patient: Jan Terrell   YOB: 1945   Date of Visit: 7/13/2018       Dear Dr Dar Tellez: Thank you for referring Kenny King to me for evaluation  Below are my notes for this consultation  If you have questions, please do not hesitate to call me  I look forward to following your patient along with you  Sincerely,        Sara Lovell DO        CC: MD Lissette Castillo MD Annabell Pol, DO  7/13/2018 11:08 AM  Sign at close encounter  [address] [facility] [address]  [id number]  [place] [address] [address]    Velvet Dietz PXLCB,[birth year], [phone]  07/13/18    Discussion:   In summary, this is a 74year old male history of early stage esophageal cancer as well as more recent hepatocellular carcinoma  He underwent radiation therapy to a retroperitoneal node  Recent imaging shows progression of pulmonary nodules, adrenal nodule and silvestre hepatis lymphadenopathy, as well as MRI showing increase in the size of previous [address]  lesion and a new lesion in the liver  All of this, concomitantly, suggests progression of HCC  He had had AFP in May 2018 which was normal   Repeat is requested  We reviewed potential for treatment for his systemic progression of HC C  Nexavar is considered standard first-line therapy for patients with [address]  We reviewed potential toxicities including but not limited to fatigue, diarrhea, rash, hand-foot syndrome, thrombocytopenia  Standard doses 400 mg q 12  I would start at 200 mg p o  Q 12 and increase by 200 mg each month, as tolerated  CBC and chemistry to be accomplished every 2 weeks  Baseline AFP is requested  We reviewed that the goal of therapy is disease control, palliation, survival benefit but that cure is quite unlikely    [place] is approved in the second-line setting with mild/modest efficacy but long durations of response in patients who have responding disease  We reviewed the above considerations at length today  Our chemotherapy nurse reviewed information regarding Nexavar as well as providing written information in this regard  I discussed the above with the patient  The patient and his wife voiced understanding and agreement   ______________________________________________________________________    Chief Complaint   Patient presents with    Follow-up       HPI:  Oncology History    77-year-old male status post esophagectomy and liver resection 10/31/16 for esophageal cancer and hepatocellular carcinoma in 2016  Treatment included a course of radiation to the esophagus to a total dose of 4680 cGy with treatments given from 6/27/16 to 8/3/2016  Radiation therapy was administered at [facility] by Dr Maxwell Cohen  Concurrent Taxol and Carboplatin was under the care of Dr Neville Cardenas at [facility]  CT scan of the abdomen and MRI of the abdomen in October 2017 revealed a new lesion in the left lobe of the liver measuring 4 1 x 2 7 cm with 1-2 additional lesions  CT-guided biopsy liver lesion was positive for hepatocellular carcinoma on October 11, 2017  Recommendations were made for Sir sphere radiation therapy to the whole liver which was given on December 13, 2017  The patient returns today for follow-up examination  reports he is feeling well and is having no abdominal complaints  He denies any pain  He is still having occasional nausea with some dysphagia and is considering having a repeat esophageal dilation performed  He had repeat blood work including liver function studies performed January 19, 2017 at [facility] but results are not available today  We will call him with these results  He has MRI of the abdomen and follow-up appointment with Dr Johann Izquierdo on the same day February 6, 2018  Hopefully, this will show good results and response to treatment  Assuming MRI shows favorable response, then we would recommend repeat MRI again in 3 months  The patient states he will be following with Dr Gibbs Lung every 3 months  It takes about 1 5 hours to come here from [place], [place] and we will therefore see him here on a p r n  basis  Should he have evidence of progressive disease the future then, we would be happy to re-evaluate him for consideration of additional Sir spheres to the liver  The patient is wife were in agreement with the above plan and they know they can call any time if they have any questions  2/14/18 Had EGD, dialtion of the esophagus  There was a mild stricture at the site of anastomosis  4/5/18 Had MRI of the abd: The previously noted dominant lesion in the left hepatic lobe demonstrate increasing necrosis suggest treatment response The segment 4 lesion is decreasing in size The lesion in the segment 5 cm remains stable with thin rim enhancement suggesting no viable tissue Segment 2 lesion is stable Enlarging regional lymph node in the upper abdomen just below the silvestre hepatis which measures 2 5 x 2 4 cm    Pt saw both surg Onc, and Med onc  On 4/6/18 Pt met with Dr Raleigh Martinez, who discussed, If radiation therapy could be applied to this lesion deferral of systemic treatment initiation could be accomplished  Systemic treatment options: potential treatment options for patients with [address]  including Nayeli Guzmán  Pt had previous esophagus EBRT,8/2016  at [facility] in [place]  Treatment recoreds available in [address]           Esophageal cancer ([facility] [address] [facility] )    4/27/2016 Initial Diagnosis     Esophageal cancer ([facility] [address] [facility] )         6/27/2016 - 7/26/2016 Chemotherapy     Taxol 50 + Carbo AUC 2         6/27/2016 - 8/3/2016 Radiation     Concurrent RT Esophagus 4,680 cGy   [facility]         10/31/2016 Surgery     Esophagectomy           Hepatocellular carcinoma ([address] )    10/31/2016 Initial Diagnosis     Cancer, hepatocellular ([address] ) 10/31/2016 Surgery     Resection/ablation of liver - segments 5/6         10/11/2017 Biopsy     Local recurrence         12/13/2017 -  Radiation     SIRSphere treatment  Treatment site:  whole liver  Whole liver Dose:  Prescribed: 1 66 GBq, Delivered: 1 61GBq  (Right lobe Dose:   Prescribed: 1 06 GBq,  Delivered: 1 03 GBq)  (Left lobe Dose:   Prescribed: 0 60 GBq,  Delivered: 0 58 GBq)            Interval History:  Clinically stable  1 - Symptomatic but completely ambulatory    Review of Systems   Constitutional: Negative for chills and fever  HENT: Negative for nosebleeds  Eyes: Negative for discharge  Respiratory: Negative for cough and shortness of breath  Cardiovascular: Negative for chest pain  Gastrointestinal: Negative for abdominal pain, constipation and diarrhea  Endocrine: Negative for polydipsia  Genitourinary: Negative for hematuria  Musculoskeletal: Negative for arthralgias  Skin: Negative for color change  Allergic/Immunologic: Negative for immunocompromised state  Neurological: Negative for dizziness and headaches  Hematological: Negative for adenopathy  Psychiatric/Behavioral: Negative for agitation         Past Medical History:   Diagnosis Date    Anemia     Coronary artery disease     Dysphagia     Esophageal cancer (HCC)     Gout     Hyperlipidemia     Hypertension     Restless leg syndrome     Rheumatoid arthritis (HCC)     Sleep apnea     Vision abnormalities      Patient Active Problem List   Diagnosis    [facility] )    Hepatocellular carcinoma ([facility] )    Atrial fibrillation (HCC)    Hypotension    Type 2 diabetes mellitus (HCC)    Vision abnormalities    Dysphagia    Radiation esophagitis    Restless legs syndrome    Rheumatoid arthritis (HCC)    Sleep apnea    Hyperlipidemia    Hypertension    Peripheral neuropathy    Epidermoid cyst of neck    Glaucoma    Gout    Incisional hernia    Cough    Stage 3 severe COPD by GOLD classification (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)    Anxiety and depression    Acute pain of left shoulder    History of stroke    Mild cognitive impairment    Nutritional deficiency    Coronary artery disease       Current Outpatient Prescriptions:     albuterol (2 5 mg/3 mL) 0 083 % nebulizer solution, Take 1 ampule by nebulization every 4 (four) hours as needed for wheezing  , Disp: , Rfl:     albuterol (PROVENTIL HFA,VENTOLIN HFA) 90 mcg/act inhaler, Inhale 2 puffs every 6 (six) hours as needed for wheezing or shortness of breath (or cough), Disp: 1 Inhaler, Rfl: 11    allopurinol (ZYLOPRIM) 100 mg tablet, Take 100 mg by mouth daily, Disp: , Rfl:     amiodarone 200 mg tablet, Take 200 mg by mouth daily, Disp: , Rfl:     apixaban (ELIQUIS) 5 mg, Take 5 mg by mouth 2 (two) times a day, Disp: , Rfl:     cephalexin (KEFLEX) 500 mg capsule, Take 500 mg by mouth 4 (four) times a day, Disp: , Rfl:     dexlansoprazole (DEXILANT) 60 MG capsule, Take 60 mg by mouth daily, Disp: , Rfl:     diazepam (VALIUM) 5 mg tablet, Take 5 mg by mouth every 6 (six) hours as needed for anxiety, Disp: , Rfl:     diphenhydrAMINE (BENADRYL) 25 mg capsule, Take 25 mg by mouth daily, Disp: , Rfl:     gabapentin (NEURONTIN) 100 mg capsule, Take 100 mg by mouth daily  , Disp: , Rfl:     guaifenesin-codeine (GUAIFENESIN AC) 100-10 MG/5ML liquid, Take 5 mL by mouth 3 (three) times a day as needed for cough, Disp: 120 mL, Rfl: 0    hydrOXYzine HCL (ATARAX) 25 mg tablet, Take 25 mg by mouth 2 (two) times a day as needed for itching, Disp: , Rfl:     insulin aspart (NovoLOG) 100 units/mL injection, Inject under the skin as needed for high blood sugar, Disp: , Rfl:     latanoprost (XALATAN) 0 005 % ophthalmic solution, Administer 1 drop to both eyes daily at bedtime  , Disp: , Rfl:     LORazepam (ATIVAN) 1 mg tablet, Take 2 mg by mouth daily at bedtime  , Disp: , Rfl:     rOPINIRole (REQUIP) 1 mg tablet, Take 3 mg by mouth daily at bedtime Take 1mg in am and 3mg at bedtime , Disp: , Rfl:     sertraline (ZOLOFT) 25 mg tablet, Take 25 mg by mouth daily at bedtime  , Disp: , Rfl:     traMADol (ULTRAM) 50 mg tablet, Take 50 mg by mouth every 6 (six) hours as needed for moderate pain, Disp: , Rfl:     Umeclidinium-Vilanterol (ANORO ELLIPTA) 62 5-25 MCG/INH AEPB, Inhale 1 puff daily, Disp: 1 each, Rfl: 11  Allergies   Allergen Reactions    Humira [Adalimumab] Rash     itchy    Levaquin [Levofloxacin In D5w] Rash    Levofloxacin Rash    Lovenox [Enoxaparin] Rash and Hives     Past Surgical History:   Procedure Laterality Date    APPENDECTOMY      CHOLECYSTECTOMY      GASTROJEJUNOSTOMY W/ JEJUNOSTOMY TUBE N/A 8/4/2016    Procedure: INSERTION JEJUNOSTOMY TUBE OPEN;  Surgeon: Ronnell Johnson MD;  [facility];  Service:     KNEE ARTHROSCOPY      LIVER RESECTION N/A 10/31/2016    Procedure: INTRAOPERATIVE ULTRASOUND OF LIVER; LIVER LESION RESECTION/ABLATION ;  Surgeon: Ronnell Johnson MD;  [facility];  Service:    Gemma Stagers PLACEMENT      IA EGD INSERT GUIDE WIRE DILATOR PASSAGE ESOPHAGUS N/A 2/14/2018    Procedure: ESOPHAGOGASTRODUODENOSCOPY (EGD) with dilation;  Surgeon: Adalid Corrales MD;  [facility];  Service: Thoracic    IA ESOPHAGOGASTRODUODENOSCOPY TRANSORAL DIAGNOSTIC N/A 10/31/2016    Procedure: ESOPHAGOGASTRODUODENOSCOPY (EGD);   Surgeon: Ronnell Johnson MD;  [facility];  Service: Surgical Oncology    IA ESOPHAGOSCOPY FLEX BALLOON DILAT <30 MM DIAM N/A 1/6/2017    Procedure: DILATATION ESOPHAGEAL;  Surgeon: Adalid Corrales MD;  [facility];  Service: Thoracic    IA ESOPHAGOSCOPY FLEX BALLOON DILAT <30 MM DIAM N/A 12/12/2016    Procedure: DILATATION ESOPHAGEAL, EGD;  Surgeon: Adaild Corrales MD;  [facility];  Service: Thoracic    IA ESOPHAGOSCOPY FLEX BALLOON DILAT <30 MM DIAM N/A 5/24/2017    Procedure: EGD WITH DILATION ;  Surgeon: Hiren Hager MD;  [facility];  Service: Thoracic    IA ESOPHAGOSCOPY FLEX BALLOON DILAT <30 MM DIAM N/A 8/3/2017    Procedure: EGD WITH DILATION;  Surgeon: Martina Ambrocio MD;  [facility];  Service: Thoracic    IA REMOVAL [address] [facility] N/A 10/31/2016    Procedure: ESOPHAGECTOMY;  Surgeon: Destiny Boggs MD;  [facility];  Service: Surgical Oncology    TONSILLECTOMY      WOUND DEBRIDEMENT Left 5/3/2018    Procedure: DEBRIDEMENT UPPER EXTREMITY ([address]) left shoulder;  Surgeon: Ashleigh Fuentes MD;  [facility];  Service: Orthopedics     Social History     Objective:  Vitals:    07/13/18 1016   BP: 118/66   BP Location: Left arm   Patient Position: Sitting   Cuff Size: Standard   Pulse: (!) 53   Resp: 17   Temp: (!) 95 1 °F (35 1 °C)   TempSrc: Tympanic   SpO2: 98%   Weight: 97 2 kg (214 lb 3 2 oz)   Height: 5' 10 5" (1 791 m)     Physical Exam   Constitutional: He is oriented to person, place, and time  He appears well-developed  HENT:   Head: Normocephalic  Eyes: Pupils are equal, round, and reactive to light  Neck: Neck supple  Cardiovascular: Normal rate and regular rhythm  No murmur heard  Pulmonary/Chest: Breath sounds normal  He has no wheezes  He has no rales  Abdominal: Soft  There is no tenderness  Musculoskeletal: Normal range of motion  He exhibits no edema or tenderness  Lymphadenopathy:     He has no cervical adenopathy  Neurological: He is alert and oriented to person, place, and time  He has normal reflexes  No cranial nerve deficit  Skin: No rash noted  No erythema  Psychiatric: He has a normal mood and affect  His behavior is normal          Labs: I personally reviewed the labs and imaging pertinent to this patient care

## 2018-07-13 NOTE — LETTER
July 13, 2018     Judy Rodriguez MD  [address] [place] [address]    Patient: Lucía Lira   YOB: 1945   Date of Visit: 7/13/2018       Dear Dr Cuellar Grate: Thank you for referring Jennifer Li to me for evaluation  Below are my notes for this consultation  If you have questions, please do not hesitate to call me  I look forward to following your patient along with you  Sincerely,        Blessing Barrientos DO        CC: MD Aman Smith MD Nan Pomfret, DO  7/13/2018 11:04 AM  Sign at close encounter  [address]  [address]  [facility] [address]    Miya Matter AFAOB,[birth year], [id number]  07/13/18    Discussion:   In summary, this is a 74year old male history of early stage esophageal cancer as well as more recent hepatocellular carcinoma  He underwent radiation therapy to a retroperitoneal node  Recent imaging shows progression of pulmonary nodules, adrenal nodule and silvestre hepatis lymphadenopathy, as well as MRI showing increase in the size of previous [address]  lesion and a new lesion in the liver  All of this, concomitantly, suggests progression of HCC  He had had AFP in May 2018 which was normal   Repeat is requested  We reviewed potential for treatment for his systemic progression of HC C  Nexavar is considered standard first-line therapy for patients with [address]  We reviewed potential toxicities including but not limited to fatigue, diarrhea, rash, hand-foot syndrome, thrombocytopenia  Standard doses 400 mg q 12  I would start at 200 mg p o  Q 12 and increase by 200 mg each month, as tolerated  CBC and chemistry to be accomplished every 2 weeks  Baseline AFP is requested  We reviewed that the goal of therapy is disease control, palliation, survival benefit but that cure is quite unlikely    Diann Pleasant is approved in the second-line setting with mild/modest efficacy but long durations of response in patients who have responding disease  We reviewed the above considerations at length today  Our chemotherapy nurse reviewed information regarding Nexavar as well as providing written information in this regard  I discussed the above with the patient  The patient and his wife voiced understanding and agreement   ______________________________________________________________________    Chief Complaint   Patient presents with    Follow-up       HPI:  Oncology History    40-year-old male status post esophagectomy and liver resection 10/31/16 for esophageal cancer and hepatocellular carcinoma in 2016  Treatment included a course of radiation to the esophagus to a total dose of 4680 cGy with treatments given from 6/27/16 to 8/3/2016  Radiation therapy was administered at [facility] by Dr Glen Funez  Concurrent Taxol and Carboplatin was under the care of Dr Ashley Krueger at [facility]  CT scan of the abdomen and MRI of the abdomen in October 2017 revealed a new lesion in the left lobe of the liver measuring 4 1 x 2 7 cm with 1-2 additional lesions  CT-guided biopsy liver lesion was positive for hepatocellular carcinoma on October 11, 2017  Recommendations were made for Sir sphere radiation therapy to the whole liver which was given on December 13, 2017  The patient returns today for follow-up examination  reports he is feeling well and is having no abdominal complaints  He denies any pain  He is still having occasional nausea with some dysphagia and is considering having a repeat esophageal dilation performed  He had repeat blood work including liver function studies performed January 19, 2017 at [facility] but results are not available today  We will call him with these results  He has MRI of the abdomen and follow-up appointment with Dr Tomi Maravilla on the same day February 6, 2018  Hopefully, this will show good results and response to treatment  Assuming MRI shows favorable response, then we would recommend repeat MRI again in 3 months  The patient states he will be following with Dr Gee Arauz every 3 months  It takes about 1 5 hours to come here from [place], [place] and we will therefore see him here on a p r n  basis  Should he have evidence of progressive disease the future then, we would be happy to re-evaluate him for consideration of additional Sir spheres to the liver  The patient is wife were in agreement with the above plan and they know they can call any time if they have any questions  2/14/18 Had EGD, dialtion of the esophagus  There was a mild stricture at the site of anastomosis  4/5/18 Had MRI of the abd: The previously noted dominant lesion in the left hepatic lobe demonstrate increasing necrosis suggest treatment response The segment 4 lesion is decreasing in size The lesion in the segment 5 cm remains stable with thin rim enhancement suggesting no viable tissue Segment 2 lesion is stable Enlarging regional lymph node in the upper abdomen just below the silvestre hepatis which measures 2 5 x 2 4 cm    Pt saw both surg Onc, and Med onc  On 4/6/18 Pt met with Dr Tin Huynh, who discussed, If radiation therapy could be applied to this lesion deferral of systemic treatment initiation could be accomplished  Systemic treatment options: potential treatment options for patients with [address]  including Lorenzo Guzmán  Pt had previous esophagus EBRT,8/2016  at [facility] in [place]  Treatment recoreds available in [address]           Esophageal cancer ([address] )    4/27/2016 Initial Diagnosis     Esophageal cancer ([address] )         6/27/2016 - 7/26/2016 Chemotherapy     Taxol 50 + Carbo AUC 2         6/27/2016 - 8/3/2016 Radiation     Con[facility] 4,680 cGy   [facility]         10/31/2016 Surgery     Esophagectomy           Hepatocellular carcinoma ([address] )    10/31/2016 Initial Diagnosis     Cancer, hepatocellular ([address] ) 10/31/2016 Surgery     Resection/ablation of liver - segments 5/6         10/11/2017 Biopsy     Local recurrence         12/13/2017 -  Radiation     SIRSphere treatment  Treatment site:  whole liver  Whole liver Dose:  Prescribed: 1 66 GBq, Delivered: 1 61GBq  (Right lobe Dose:   Prescribed: 1 06 GBq,  Delivered: 1 03 GBq)  (Left lobe Dose:   Prescribed: 0 60 GBq,  Delivered: 0 58 GBq)            Interval History:  ***  1 - Symptomatic but completely ambulatory    Review of Systems   Constitutional: Negative for chills and fever  HENT: Negative for nosebleeds  Eyes: Negative for discharge  Respiratory: Negative for cough and shortness of breath  Cardiovascular: Negative for chest pain  Gastrointestinal: Negative for abdominal pain, constipation and diarrhea  Endocrine: Negative for polydipsia  Genitourinary: Negative for hematuria  Musculoskeletal: Negative for arthralgias  Skin: Negative for color change  Allergic/Immunologic: Negative for immunocompromised state  Neurological: Negative for dizziness and headaches  Hematological: Negative for adenopathy  Psychiatric/Behavioral: Negative for agitation         Past Medical History:   Diagnosis Date    Anemia     Coronary artery disease     Dysphagia     Esophageal cancer (HCC)     Gout     Hyperlipidemia     Hypertension     Restless leg syndrome     Rheumatoid arthritis (HCC)     Sleep apnea     Vision abnormalities      Patient Active Problem List   Diagnosis    [facility]    Hepatocellular carcinoma [facility] )    Atrial fibrillation (HCC)    Hypotension    Type 2 diabetes mellitus (HCC)    Vision abnormalities    Dysphagia    Radiation esophagitis    Restless legs syndrome    Rheumatoid arthritis (HCC)    Sleep apnea    Hyperlipidemia    Hypertension    Peripheral neuropathy    Epidermoid cyst of neck    Glaucoma    Gout    Incisional hernia    Cough    Stage 3 severe COPD by GOLD classification [facility])    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)    Anxiety and depression    Acute pain of left shoulder    History of stroke    Mild cognitive impairment    Nutritional deficiency    Coronary artery disease       Current Outpatient Prescriptions:     albuterol (2 5 mg/3 mL) 0 083 % nebulizer solution, Take 1 ampule by nebulization every 4 (four) hours as needed for wheezing  , Disp: , Rfl:     albuterol (PROVENTIL HFA,VENTOLIN HFA) 90 mcg/act inhaler, Inhale 2 puffs every 6 (six) hours as needed for wheezing or shortness of breath (or cough), Disp: 1 Inhaler, Rfl: 11    allopurinol (ZYLOPRIM) 100 mg tablet, Take 100 mg by mouth daily, Disp: , Rfl:     amiodarone 200 mg tablet, Take 200 mg by mouth daily, Disp: , Rfl:     apixaban (ELIQUIS) 5 mg, Take 5 mg by mouth 2 (two) times a day, Disp: , Rfl:     cephalexin (KEFLEX) 500 mg capsule, Take 500 mg by mouth 4 (four) times a day, Disp: , Rfl:     dexlansoprazole (DEXILANT) 60 MG capsule, Take 60 mg by mouth daily, Disp: , Rfl:     diazepam (VALIUM) 5 mg tablet, Take 5 mg by mouth every 6 (six) hours as needed for anxiety, Disp: , Rfl:     diphenhydrAMINE (BENADRYL) 25 mg capsule, Take 25 mg by mouth daily, Disp: , Rfl:     gabapentin (NEURONTIN) 100 mg capsule, Take 100 mg by mouth daily  , Disp: , Rfl:     guaifenesin-codeine (GUAIFENESIN AC) 100-10 MG/5ML liquid, Take 5 mL by mouth 3 (three) times a day as needed for cough, Disp: 120 mL, Rfl: 0    hydrOXYzine HCL (ATARAX) 25 mg tablet, Take 25 mg by mouth 2 (two) times a day as needed for itching, Disp: , Rfl:     insulin aspart (NovoLOG) 100 units/mL injection, Inject under the skin as needed for high blood sugar, Disp: , Rfl:     latanoprost (XALATAN) 0 005 % ophthalmic solution, Administer 1 drop to both eyes daily at bedtime  , Disp: , Rfl:     LORazepam (ATIVAN) 1 mg tablet, Take 2 mg by mouth daily at bedtime  , Disp: , Rfl:     rOPINIRole (REQUIP) 1 mg tablet, Take 3 mg by mouth daily at bedtime Take 1mg in am and 3mg at bedtime , Disp: , Rfl:     sertraline (ZOLOFT) 25 mg tablet, Take 25 mg by mouth daily at bedtime  , Disp: , Rfl:     traMADol (ULTRAM) 50 mg tablet, Take 50 mg by mouth every 6 (six) hours as needed for moderate pain, Disp: , Rfl:     Umeclidinium-Vilanterol (ANORO ELLIPTA) 62 5-25 MCG/INH AEPB, Inhale 1 puff daily, Disp: 1 each, Rfl: 11  Allergies   Allergen Reactions    Humira [Adalimumab] Rash     itchy    Levaquin [Levofloxacin In D5w] Rash    Levofloxacin Rash    Lovenox [Enoxaparin] Rash and Hives     Past Surgical History:   Procedure Laterality Date    APPENDECTOMY      CHOLECYSTECTOMY      GASTROJEJUNOSTOMY W/ JEJUNOSTOMY TUBE N/A 8/4/2016    Procedure: INSERTION JEJUNOSTOMY TUBE OPEN;  Surgeon: Bismark Rios MD;  [facility];  Service:     KNEE ARTHROSCOPY      LIVER RESECTION N/A 10/31/2016    Procedure: INTRAOPERATIVE ULTRASOUND OF LIVER; LIVER LESION RESECTION/ABLATION ;  Surgeon: Bismark Rios MD;  [facility];  Service:    Pleas Bores PLACEMENT      PA EGD INSERT GUIDE WIRE DILATOR PASSAGE ESOPHAGUS N/A 2/14/2018    Procedure: ESOPHAGOGASTRODUODENOSCOPY (EGD) with dilation;  Surgeon: Omid Wiggins MD;  [facility];  Service: Thoracic    PA ESOPHAGOGASTRODUODENOSCOPY TRANSORAL DIAGNOSTIC N/A 10/31/2016    Procedure: ESOPHAGOGASTRODUODENOSCOPY (EGD);   Surgeon: Bismark Rios MD;  [facility];  Service: Surgical Oncology    PA ESOPHAGOSCOPY FLEX BALLOON DILAT <30 MM DIAM N/A 1/6/2017    Procedure: DILATATION ESOPHAGEAL;  Surgeon: Omid Wiggins MD;  [facility];  Service: Thoracic    PA ESOPHAGOSCOPY FLEX BALLOON DILAT <30 MM DIAM N/A 12/12/2016    Procedure: DILATATION ESOPHAGEAL, EGD;  Surgeon: Omid Wiggins MD;  [facility];  Service: Thoracic    PA ESOPHAGOSCOPY FLEX L[place] Chacko <30 MM DIAM N/A 5/24/2017    Procedure: EGD WITH DILATION ;  Surgeon: Mikel Baxter MD;  [facility];  Service: Thoracic    TX ESOPHAGOSCOPY FLEX BALLOON DILAT <30 MM DIAM N/A 8/3/2017    Procedure: EGD WITH DILATION;  Surgeon: Rohith Hu MD;  [facility];  Service: Thoracic    TX REMOVAL [facility] N/A 10/31/2016    Procedure: ESOPHAGECTOMY;  Surgeon: Flory Herrera MD;  [facility];  Service: Surgical Oncology    TONSILLECTOMY      WOUND DEBRIDEMENT Left 5/3/2018    Procedure: DEBRIDEMENT UPPER EXTREMITY ([address]) left shoulder;  Surgeon: Umair Lucas MD;  [facility];  Service: Orthopedics     Social History     Objective:  Vitals:    07/13/18 1016   BP: 118/66   BP Location: Left arm   Patient Position: Sitting   Cuff Size: Standard   Pulse: (!) 53   Resp: 17   Temp: (!) 95 1 °F (35 1 °C)   TempSrc: Tympanic   SpO2: 98%   Weight: 97 2 kg (214 lb 3 2 oz)   Height: 5' 10 5" (1 791 m)     Physical Exam   Constitutional: He is oriented to person, place, and time  He appears well-developed  HENT:   Head: Normocephalic  Eyes: Pupils are equal, round, and reactive to light  Neck: Neck supple  Cardiovascular: Normal rate and regular rhythm  No murmur heard  Pulmonary/Chest: Breath sounds normal  He has no wheezes  He has no rales  Abdominal: Soft  There is no tenderness  Musculoskeletal: Normal range of motion  He exhibits no edema or tenderness  Lymphadenopathy:     He has no cervical adenopathy  Neurological: He is alert and oriented to person, place, and time  He has normal reflexes  No cranial nerve deficit  Skin: No rash noted  No erythema  Psychiatric: He has a normal mood and affect  His behavior is normal          Labs: I personally reviewed the labs and imaging pertinent to this patient care

## 2018-07-13 NOTE — LETTER
July 13, 2018     Edward Rincon MD  [address] [place] [id number]    Patient: Marquita Farr   YOB: 1945   Date of Visit: 7/13/2018       Dear Dr Dolan Opitz: Thank you for referring Dedra Bella to me for evaluation  Below are my notes for this consultation  If you have questions, please do not hesitate to call me  I look forward to following your patient along with you  Sincerely,        Omar Estrada DO        CC: MD Belgica Carroll MD Aron Adam, DO  7/13/2018 11:04 AM  Sign at close encounter  [address]  [address]  [facility] [address]    Aaron MILLER,[birth year], [id number]  07/13/18    Discussion:   In summary, this is a 74year old male history of early stage esophageal cancer as well as more recent hepatocellular carcinoma  He underwent radiation therapy to a retroperitoneal node  Recent imaging shows progression of pulmonary nodules, adrenal nodule and silvestre hepatis lymphadenopathy, as well as MRI showing increase in the size of previous [address]  lesion and a new lesion in the liver  All of this, concomitantly, suggests progression of HCC  He had had AFP in May 2018 which was normal   Repeat is requested  We reviewed potential for treatment for his systemic progression of HC C  Nexavar is considered standard first-line therapy for patients with [address]  We reviewed potential toxicities including but not limited to fatigue, diarrhea, rash, hand-foot syndrome, thrombocytopenia  Standard doses 400 mg q 12  I would start at 200 mg p o  Q 12 and increase by 200 mg each month, as tolerated  CBC and chemistry to be accomplished every 2 weeks  Baseline AFP is requested  We reviewed that the goal of therapy is disease control, palliation, survival benefit but that cure is quite unlikely    Jez Hu is approved in the second-line setting with mild/modest efficacy but long durations of response in patients who have responding disease  We reviewed the above considerations at length today  Our chemotherapy nurse reviewed information regarding Nexavar as well as providing written information in this regard  I discussed the above with the patient  The patient and his wife voiced understanding and agreement   ______________________________________________________________________    Chief Complaint   Patient presents with    Follow-up       HPI:  Oncology History    64-year-old male status post esophagectomy and liver resection 10/31/16 for esophageal cancer and hepatocellular carcinoma in 2016  Treatment included a course of radiation to the esophagus to a total dose of 4680 cGy with treatments given from 6/27/16 to 8/3/2016  Radiation therapy was administered at [facility] by Dr Jennifer Payne  Concurrent Taxol and Carboplatin was under the care of Dr Dotty Vasques at [facility]  CT scan of the abdomen and MRI of the abdomen in October 2017 revealed a new lesion in the left lobe of the liver measuring 4 1 x 2 7 cm with 1-2 additional lesions  CT-guided biopsy liver lesion was positive for hepatocellular carcinoma on October 11, 2017  Recommendations were made for Sir sphere radiation therapy to the whole liver which was given on December 13, 2017  The patient returns today for follow-up examination  reports he is feeling well and is having no abdominal complaints  He denies any pain  He is still having occasional nausea with some dysphagia and is considering having a repeat esophageal dilation performed  He had repeat blood work including liver function studies performed January 19, 2017 at [facility] but results are not available today  We will call him with these results  He has MRI of the abdomen and follow-up appointment with Dr Ciarra Linder on the same day February 6, 2018  Hopefully, this will show good results and response to treatment  Assuming MRI shows favorable response, then we would recommend repeat MRI again in 3 months  The patient states he will be following with Dr Clement Ballard every 3 months  It takes about 1 5 hours to come here from [place], [place] and we will therefore see him here on a p r n  basis  Should he have evidence of progressive disease the future then, we would be happy to re-evaluate him for consideration of additional Sir spheres to the liver  The patient is wife were in agreement with the above plan and they know they can call any time if they have any questions  2/14/18 Had EGD, dialtion of the esophagus  There was a mild stricture at the site of anastomosis  4/5/18 Had MRI of the abd: The previously noted dominant lesion in the left hepatic lobe demonstrate increasing necrosis suggest treatment response The segment 4 lesion is decreasing in size The lesion in the segment 5 cm remains stable with thin rim enhancement suggesting no viable tissue Segment 2 lesion is stable Enlarging regional lymph node in the upper abdomen just below the silvestre hepatis which measures 2 5 x 2 4 cm    Pt saw both surg Onc, and Med onc  On 4/6/18 Pt met with Dr Rubi Hein, who discussed, If radiation therapy could be applied to this lesion deferral of systemic treatment initiation could be accomplished  Systemic treatment options: potential treatment options for patients with [address]  including Rebecca Guzmán  Pt had previous esophagus EBRT,8/2016  at [facility] in [place]  Treatment recoreds available in [address]           Esophageal cancer ([address] )    4/27/2016 Initial Diagnosis     Esophageal cancer ([address] )         6/27/2016 - 7/26/2016 Chemotherapy     Taxol 50 + Carbo AUC 2         6/27/2016 - 8/3/2016 Radiation     Concurrent RT Esophagus 4,680 cGy   [facility]         10/31/2016 Surgery     Esophagectomy           Hepatocellular carcinoma ([address] )    10/31/2016 Initial Diagnosis     Cancer, hepatocellular ([address] ) 10/31/2016 Surgery     Resection/ablation of liver - segments 5/6         10/11/2017 Biopsy     Local recurrence         12/13/2017 -  Radiation     SIRSphere treatment  Treatment site:  whole liver  Whole liver Dose:  Prescribed: 1 66 GBq, Delivered: 1 61GBq  (Right lobe Dose:   Prescribed: 1 06 GBq,  Delivered: 1 03 GBq)  (Left lobe Dose:   Prescribed: 0 60 GBq,  Delivered: 0 58 GBq)            Interval History:  ***  1 - Symptomatic but completely ambulatory    Review of Systems   Constitutional: Negative for chills and fever  HENT: Negative for nosebleeds  Eyes: Negative for discharge  Respiratory: Negative for cough and shortness of breath  Cardiovascular: Negative for chest pain  Gastrointestinal: Negative for abdominal pain, constipation and diarrhea  Endocrine: Negative for polydipsia  Genitourinary: Negative for hematuria  Musculoskeletal: Negative for arthralgias  Skin: Negative for color change  Allergic/Immunologic: Negative for immunocompromised state  Neurological: Negative for dizziness and headaches  Hematological: Negative for adenopathy  Psychiatric/Behavioral: Negative for agitation         Past Medical History:   Diagnosis Date    Anemia     Coronary artery disease     Dysphagia     Esophageal cancer (HCC)     Gout     Hyperlipidemia     Hypertension     Restless leg syndrome     Rheumatoid arthritis (HCC)     Sleep apnea     Vision abnormalities      Patient Active Problem List   Diagnosis    [facility] )    Hepatocellular carcinoma ([facility] )    Atrial fibrillation (HCC)    Hypotension    Type 2 diabetes mellitus (HCC)    Vision abnormalities    Dysphagia    Radiation esophagitis    Restless legs syndrome    Rheumatoid arthritis (HCC)    Sleep apnea    Hyperlipidemia    Hypertension    Peripheral neuropathy    Epidermoid cyst of neck    Glaucoma    Gout    Incisional hernia    Cough    Stage 3 severe COPD by GOLD classification [facility])    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)    Anxiety and depression    Acute pain of left shoulder    History of stroke    Mild cognitive impairment    Nutritional deficiency    Coronary artery disease       Current Outpatient Prescriptions:     albuterol (2 5 mg/3 mL) 0 083 % nebulizer solution, Take 1 ampule by nebulization every 4 (four) hours as needed for wheezing  , Disp: , Rfl:     albuterol (PROVENTIL HFA,VENTOLIN HFA) 90 mcg/act inhaler, Inhale 2 puffs every 6 (six) hours as needed for wheezing or shortness of breath (or cough), Disp: 1 Inhaler, Rfl: 11    allopurinol (ZYLOPRIM) 100 mg tablet, Take 100 mg by mouth daily, Disp: , Rfl:     amiodarone 200 mg tablet, Take 200 mg by mouth daily, Disp: , Rfl:     apixaban (ELIQUIS) 5 mg, Take 5 mg by mouth 2 (two) times a day, Disp: , Rfl:     cephalexin (KEFLEX) 500 mg capsule, Take 500 mg by mouth 4 (four) times a day, Disp: , Rfl:     dexlansoprazole (DEXILANT) 60 MG capsule, Take 60 mg by mouth daily, Disp: , Rfl:     diazepam (VALIUM) 5 mg tablet, Take 5 mg by mouth every 6 (six) hours as needed for anxiety, Disp: , Rfl:     diphenhydrAMINE (BENADRYL) 25 mg capsule, Take 25 mg by mouth daily, Disp: , Rfl:     gabapentin (NEURONTIN) 100 mg capsule, Take 100 mg by mouth daily  , Disp: , Rfl:     guaifenesin-codeine (GUAIFENESIN AC) 100-10 MG/5ML liquid, Take 5 mL by mouth 3 (three) times a day as needed for cough, Disp: 120 mL, Rfl: 0    hydrOXYzine HCL (ATARAX) 25 mg tablet, Take 25 mg by mouth 2 (two) times a day as needed for itching, Disp: , Rfl:     insulin aspart (NovoLOG) 100 units/mL injection, Inject under the skin as needed for high blood sugar, Disp: , Rfl:     latanoprost (XALATAN) 0 005 % ophthalmic solution, Administer 1 drop to both eyes daily at bedtime  , Disp: , Rfl:     LORazepam (ATIVAN) 1 mg tablet, Take 2 mg by mouth daily at bedtime  , Disp: , Rfl:     rOPINIRole (REQUIP) 1 mg tablet, Take 3 mg by mouth daily at bedtime Take 1mg in am and 3mg at bedtime , Disp: , Rfl:     sertraline (ZOLOFT) 25 mg tablet, Take 25 mg by mouth daily at bedtime  , Disp: , Rfl:     traMADol (ULTRAM) 50 mg tablet, Take 50 mg by mouth every 6 (six) hours as needed for moderate pain, Disp: , Rfl:     Umeclidinium-Vilanterol (ANORO ELLIPTA) 62 5-25 MCG/INH AEPB, Inhale 1 puff daily, Disp: 1 each, Rfl: 11  Allergies   Allergen Reactions    Humira [Adalimumab] Rash     itchy    Levaquin [Levofloxacin In D5w] Rash    Levofloxacin Rash    Lovenox [Enoxaparin] Rash and Hives     Past Surgical History:   Procedure Laterality Date    APPENDECTOMY      CHOLECYSTECTOMY      GASTROJEJUNOSTOMY W/ JEJUNOSTOMY TUBE N/A 8/4/2016    Procedure: INSERTION JEJUNOSTOMY TUBE OPEN;  Surgeon: Can Mathur MD;  [facility];  Service:     KNEE ARTHROSCOPY      LIVER RESECTION N/A 10/31/2016    Procedure: INTRAOPERATIVE ULTRASOUND OF LIVER; LIVER LESION RESECTION/ABLATION ;  Surgeon: Can Mathur MD;  [facility];  Service:    Abril Brill PLACEMENT      [place] EGD INSERT GUIDE WIRE DILATOR PASSAGE ESOPHAGUS N/A 2/14/2018    Procedure: ESOPHAGOGASTRODUODENOSCOPY (EGD) with dilation;  Surgeon: Leann Lind MD;  [facility];  Service: Thoracic    [place] ESOPHAGOGASTRODUODENOSCOPY TRANSORAL DIAGNOSTIC N/A 10/31/2016    Procedure: ESOPHAGOGASTRODUODENOSCOPY (EGD);   Surgeon: Can Mathur MD;  [facility];  Service: Surgical Oncology    [place] ESOPHAGOSCOPY FLEX BALLOON DILAT <30 MM DIAM N/A 1/6/2017    Procedure: DILATATION ESOPHAGEAL;  Surgeon: Leann Lind MD;  [facility];  Service: Thoracic    [place] ESOPHAGOSCOPY FLEX BALLOON DILAT <30 MM DIAM N/A 12/12/2016    Procedure: DILATATION ESOPHAGEAL, EGD;  Surgeon: Leann Lind MD;  [facility];  Service: Thoracic    [place] ESOPHAGOSCOPY FLEX Deidra Panning <30 MM DIAM N/A 5/24/2017    Procedure: EGD WITH DILATION ;  Surgeon: Nasrin Sierra MD;  [facility];  Service: Thoracic    ND ESOPHAGOSCOPY FLEX BALLOON DILAT <30 MM DIAM N/A 8/3/2017    Procedure: EGD WITH DILATION;  Surgeon: Walter Chavez MD;  [facility];  Service: Thoracic    ND REMOVAL [facility] N/A 10/31/2016    Procedure: ESOPHAGECTOMY;  Surgeon: Jamey Yi MD;  [facility];  Service: Surgical Oncology    TONSILLECTOMY      WOUND DEBRIDEMENT Left 5/3/2018    Procedure: DEBRIDEMENT UPPER EXTREMITY ([address] OUT) left shoulder;  Surgeon: Marnie Mujica MD;  [facility];  Service: Orthopedics     Social History     Objective:  Vitals:    07/13/18 1016   BP: 118/66   BP Location: Left arm   Patient Position: Sitting   Cuff Size: Standard   Pulse: (!) 53   Resp: 17   Temp: (!) 95 1 °F (35 1 °C)   TempSrc: Tympanic   SpO2: 98%   Weight: 97 2 kg (214 lb 3 2 oz)   Height: 5' 10 5" (1 791 m)     Physical Exam   Constitutional: He is oriented to person, place, and time  He appears well-developed  HENT:   Head: Normocephalic  Eyes: Pupils are equal, round, and reactive to light  Neck: Neck supple  Cardiovascular: Normal rate and regular rhythm  No murmur heard  Pulmonary/Chest: Breath sounds normal  He has no wheezes  He has no rales  Abdominal: Soft  There is no tenderness  Musculoskeletal: Normal range of motion  He exhibits no edema or tenderness  Lymphadenopathy:     He has no cervical adenopathy  Neurological: He is alert and oriented to person, place, and time  He has normal reflexes  No cranial nerve deficit  Skin: No rash noted  No erythema  Psychiatric: He has a normal mood and affect  His behavior is normal          Labs: I personally reviewed the labs and imaging pertinent to this patient care

## 2018-07-13 NOTE — PROGRESS NOTES
Received notification on 7/13/2018 from clinical pt will be starting Nexavar  Called pt for his rx ins information  He stated that he has [employer] but did not have a card  Called [employer] @2:00 ([phone]) spoke with Kathryn Patel who stated the pt's  ID #[id number]  He then transferred me to [address] [place] [address]  Per Ruddy Pittman, she stated that a form will be faxed over  Received the form and completed it  Called [employer] @2:58 spoke with Ruddy Pittman to confirm what fax number I was to send the form back, per Ruddy Pittman, she can take the answers to the questions over the phone  Gave her the answers  She stated the medication has been approved  Auth/cs #[id number] is valid from 4/14/2018 through 7/12/2021    She also stated the pt is not capped to a specific pharmacy    Received approval letter    Notified clinical, [employer], and financial

## 2018-07-18 ENCOUNTER — TELEPHONE (OUTPATIENT)
Dept: HEMATOLOGY ONCOLOGY | Facility: CLINIC | Age: 73
End: 2018-07-18

## 2018-07-18 NOTE — TELEPHONE ENCOUNTER
Kenyetta López calls reporting that he lost the fax number that he was to use to send us his tax forms  Gave him Opal Lomas's fax: [phone]

## 2018-07-20 ENCOUNTER — TELEPHONE (OUTPATIENT)
Dept: HEMATOLOGY ONCOLOGY | Facility: CLINIC | Age: 73
End: 2018-07-20

## 2018-08-10 ENCOUNTER — TELEPHONE (OUTPATIENT)
Dept: HEMATOLOGY ONCOLOGY | Facility: CLINIC | Age: 73
End: 2018-08-10

## 2018-08-10 NOTE — TELEPHONE ENCOUNTER
Pt called about Nexavar  States drug has been approved and is to receive funding but hasn't heard anything about getting   He would appreciate an update

## 2018-08-16 ENCOUNTER — TELEPHONE (OUTPATIENT)
Dept: HEMATOLOGY ONCOLOGY | Facility: CLINIC | Age: 73
End: 2018-08-16

## 2018-08-16 NOTE — TELEPHONE ENCOUNTER
Per Amy Agee called Selene Harrison to reschedule his appointment with Aguila Anderson on 8/31  He could not do another time on that day so, after double checking with Lisa Peoples, we rescheduled him to 8/28

## 2018-08-21 ENCOUNTER — OFFICE VISIT (OUTPATIENT)
Dept: PALLIATIVE MEDICINE | Facility: CLINIC | Age: 73
End: 2018-08-21
Payer: MEDICARE

## 2018-08-21 VITALS
SYSTOLIC BLOOD PRESSURE: 130 MMHG | BODY MASS INDEX: 29.26 KG/M2 | HEART RATE: 80 BPM | WEIGHT: 209 LBS | TEMPERATURE: 98.4 F | RESPIRATION RATE: 16 BRPM | DIASTOLIC BLOOD PRESSURE: 62 MMHG | HEIGHT: 71 IN

## 2018-08-21 DIAGNOSIS — Z71.89 GOALS OF CARE, COUNSELING/DISCUSSION: ICD-10-CM

## 2018-08-21 DIAGNOSIS — C15.5 MALIGNANT NEOPLASM OF LOWER THIRD OF ESOPHAGUS (HCC): Primary | ICD-10-CM

## 2018-08-21 DIAGNOSIS — C22.0 HEPATOCELLULAR CARCINOMA (HCC): ICD-10-CM

## 2018-08-21 PROBLEM — Z78.9 ACTIVE ADVANCE DIRECTIVE: Status: ACTIVE | Noted: 2018-08-21

## 2018-08-21 PROCEDURE — 99214 OFFICE O/P EST MOD 30 MIN: CPT | Performed by: FAMILY MEDICINE

## 2018-08-21 NOTE — PROGRESS NOTES
Palliative and Supportive Care   Christal Melendez 74 y o  male [id number]    Assessment/Plan:  1  Malignant neoplasm of lower third of esophagus (HCC)    2  Hepatocellular carcinoma ([address] )    3  Goals of care, counseling/discussion      The role of palliative care in patient's cancer care continuum was discussed in terms of symptom management and goals of care  At the present time he rates his quality of life as good and has little to no symptoms of having cancer  He has completed advanced care planning and has discussed things with his wife, who is also his POA  His goal is to live until October 13, 2019  He has some financial things that will fall into place around that time  We discussed that we will monitor him for symptoms of cancer or side effects of treatments that he is pursuing  As is true for all patient's the best prognostic indicator of his longevity will be his functional status  For his part he should focus on nutrition and maintaining his muscle mass so that if he treatments fail, he is healthy enough to pursue next line therapies  At some point, hospice may be the most appropriate plan of care and we will have facilitate that transition  Representatives have queried the patient's controlled substance dispensing history in the Prescription Drug Monitoring Program in compliance with regulations before I have prescribed any controlled substances  The prescription history is consistent with prescribed therapy and our practice policies  45 minutes were spent face to face with Christal Melendez and his spouse with greater than 50% of the time spent in counseling or coordination of care including discussions of prognosis of diagnosis, follow up requirements and patient and family counseling/involvement in care   All of the patient's questions were answered during this discussion  Return in about 3 months (around 11/21/2018)      Subjective:   Chief Complaint  New consultation for: symptom management  HPI     Leah Bhat is a 74 y o  male with esophageal cancer as well as more recent hepatocellular carcinoma  He sees Dr Radha Maciel and is on Nexavar  He is status post esophagectomy and liver resection 10/31/16  Treatment included a course of radiation to the esophagus completed 8/3/2016  He also had Sir sphere radiation therapy to the whole liver which was given on December 13, 2017  While nexavar he noted that he has to take it on an empty stomach or else he regrets his food choices  He recently returned from a cruise and actually lost 3 lbs  He notes that he is not enjoying eating has much as he used to but is trying to be mindful maintaining his weight  He wonders around his overall prognosis and when he will feel "sick "  Right now he enjoys a good quality of life  Though he dreams of a trip out [place] and to [place] he doesn't think he will be able to accomplish those things in his lifetime  The following portions of the medical history were reviewed: past medical history, problem list, medication list, and social history      Current Outpatient Prescriptions:     albuterol (2 5 mg/3 mL) 0 083 % nebulizer solution, Take 1 ampule by nebulization every 4 (four) hours as needed for wheezing  , Disp: , Rfl:     allopurinol (ZYLOPRIM) 100 mg tablet, Take 100 mg by mouth daily, Disp: , Rfl:     amiodarone 200 mg tablet, Take 200 mg by mouth daily, Disp: , Rfl:     apixaban (ELIQUIS) 5 mg, Take 5 mg by mouth 2 (two) times a day, Disp: , Rfl:     diazepam (VALIUM) 5 mg tablet, Take 5 mg by mouth every 6 (six) hours as needed for anxiety, Disp: , Rfl:     diphenhydrAMINE (BENADRYL) 25 mg capsule, Take 25 mg by mouth daily, Disp: , Rfl:     gabapentin (NEURONTIN) 100 mg capsule, Take 100 mg by mouth daily  , Disp: , Rfl:     guaifenesin-codeine (GUAIFENESIN AC) 100-10 MG/5ML liquid, Take 5 mL by mouth 3 (three) times a day as needed for cough, Disp: 120 mL, Rfl: 0   hydrOXYzine HCL (ATARAX) 25 mg tablet, Take 25 mg by mouth 2 (two) times a day as needed for itching, Disp: , Rfl:     insulin aspart (NovoLOG) 100 units/mL injection, Inject under the skin as needed for high blood sugar  , Disp: , Rfl:     latanoprost (XALATAN) 0 005 % ophthalmic solution, Administer 1 drop to both eyes daily at bedtime  , Disp: , Rfl:     LORazepam (ATIVAN) 1 mg tablet, Take 2 mg by mouth daily at bedtime  , Disp: , Rfl:     rOPINIRole (REQUIP) 1 mg tablet, Take 3 mg by mouth daily at bedtime Take 1mg in am and 3mg at bedtime , Disp: , Rfl:     sertraline (ZOLOFT) 25 mg tablet, Take 25 mg by mouth daily at bedtime  , Disp: , Rfl:     SORAfenib (NexAVAR) 200 MG tablet, Take one tablet by mouth every 12 hours, Disp: 60 tablet, Rfl: 3    traMADol (ULTRAM) 50 mg tablet, Take 50 mg by mouth every 6 (six) hours as needed for moderate pain, Disp: , Rfl:     Umeclidinium-Vilanterol (ANORO ELLIPTA) 62 5-25 MCG/INH AEPB, Inhale 1 puff daily, Disp: 1 each, Rfl: 11    albuterol (PROVENTIL HFA,VENTOLIN HFA) 90 mcg/act inhaler, Inhale 2 puffs every 6 (six) hours as needed for wheezing or shortness of breath (or cough), Disp: 1 Inhaler, Rfl: 11    cephalexin (KEFLEX) 500 mg capsule, Take 500 mg by mouth 4 (four) times a day, Disp: , Rfl:     dexlansoprazole (DEXILANT) 60 MG capsule, Take 60 mg by mouth daily, Disp: , Rfl:   Review of Systems   Constitutional: Positive for activity change and unexpected weight change  Psychiatric/Behavioral: The patient is nervous/anxious (Says there has never been a time in his life when he wasn't worried about something)  All other systems negative    Objective:  Vital Signs  /62 (BP Location: Left arm)   Pulse 80   Temp 98 4 °F (36 9 °C) (Tympanic)   Resp 16   Ht 5' 10 51" (1 791 m)   Wt 94 8 kg (209 lb)   BMI 29 55 kg/m²    Physical Exam    Constitutional: Appears well-developed and well-nourished  In no acute distress     Head: Normocephalic and atraumatic  Eyes: EOM are normal  Right eye exhibits no discharge  Left eye exhibits no discharge  No scleral icterus  Pulmonary/Chest: Effort normal  No stridor  No respiratory distress  Abdominal: No distension  Musculoskeletal: No edema  Neurological: Alert, oriented and appropriately conversant  Skin: Skin is dry, not diaphoretic  Psychiatric: Displays a normal mood and affect  Behavior, judgement and thought content appear normal    Vitals reviewed

## 2018-08-23 ENCOUNTER — TELEPHONE (OUTPATIENT)
Dept: HEMATOLOGY ONCOLOGY | Facility: CLINIC | Age: 73
End: 2018-08-23

## 2018-08-23 DIAGNOSIS — T45.1X5A CHEMOTHERAPY-INDUCED NAUSEA: Primary | ICD-10-CM

## 2018-08-23 DIAGNOSIS — R11.0 CHEMOTHERAPY-INDUCED NAUSEA: Primary | ICD-10-CM

## 2018-08-23 RX ORDER — PROCHLORPERAZINE MALEATE 10 MG
10 TABLET ORAL EVERY 6 HOURS PRN
Qty: 30 TABLET | Refills: 2 | Status: SHIPPED | OUTPATIENT
Start: 2018-08-23 | End: 2018-10-28 | Stop reason: SDUPTHER

## 2018-08-23 NOTE — TELEPHONE ENCOUNTER
Returned call to patient - will send anti nausea medication to the pharmacy for patient to use PRN  He does have intermittent nausea  Reviewed instructions with patient on taking Nexavar on an empty stomach  Suggested he use Imodium for diarrhea if needed    He will be seeing Dr Lazaro Smith next week for follow up

## 2018-08-23 NOTE — TELEPHONE ENCOUNTER
Called Jaylan to speak with him about moving his appointment again due to a scheduling error with Dr Willingham's schedule  I wanted to get him in on 8/24, but he has another appointment that day  The earliest he can be seen in [place] is now 9/14  I gave him that appointment time  He said he is willing to go to any location to see Radha Maciel  He does not want to see Ivonne Jackson  I told him I'd call back if I can figure out anything sooner than 9/14  FOR CRYSTAL, during this phone call Alina Bustamante told me he would like to speak with Crystal because he started to get some side effects from his chemo  A lot of stomach discomfort and fatigue  He said he is unsure what time frame to take the medicine (how long before or how long after he eats)  He can be reached at [phone]

## 2018-08-30 ENCOUNTER — OFFICE VISIT (OUTPATIENT)
Dept: HEMATOLOGY ONCOLOGY | Facility: CLINIC | Age: 73
End: 2018-08-30
Payer: MEDICARE

## 2018-08-30 VITALS
HEIGHT: 71 IN | RESPIRATION RATE: 16 BRPM | WEIGHT: 207.2 LBS | DIASTOLIC BLOOD PRESSURE: 80 MMHG | OXYGEN SATURATION: 96 % | TEMPERATURE: 96.8 F | SYSTOLIC BLOOD PRESSURE: 120 MMHG | BODY MASS INDEX: 29.01 KG/M2 | HEART RATE: 56 BPM

## 2018-08-30 DIAGNOSIS — C15.5 MALIGNANT NEOPLASM OF LOWER THIRD OF ESOPHAGUS (HCC): ICD-10-CM

## 2018-08-30 DIAGNOSIS — C22.0 HEPATOCELLULAR CARCINOMA (HCC): Primary | ICD-10-CM

## 2018-08-30 PROCEDURE — 99215 OFFICE O/P EST HI 40 MIN: CPT | Performed by: INTERNAL MEDICINE

## 2018-08-30 NOTE — PROGRESS NOTES
[facility] HEMATOLOGY ONCOLOGY Claudio Correa  [address] [address] [phone]    Gabriela Pepe QDSDL,[birth year], [id number]  08/30/18    Discussion:   In summary, this is a 33-year-old male history of advanced hepatocellular carcinoma  He has been on Nexavar for 2-3 weeks  He seems to be tolerating this relatively well  He reports some abdominal bloating and flatus  Additionally, he has mild diarrhea  I suggested that he use a half of an Imodium tablet on a p r n  Basis as he has had severe constipation in the past   He had a PET-CT on August 27, 2018  This was compared to a prior PET-CT in May  We also had a CT of the chest in July which showed pulmonary nodules ranging from 3-7 mm  The PET-CT shows bilateral pulmonary nodules up to 9 mm, SUV max 2 2  There is increased hypermetabolism in the right hepatic lobe, SUV 8 9, prior 5 3  There is no increase in size, however  Additionally there is new irregular FDG uptake adjacent to the previous resection in the liver  Resolution of previous hypermetabolic lymphadenopathy in the silvestre hepatis with decreased size, prior 2 2 cm current less than 1 cm  No mention is made of the adrenal gland  I reviewed the above findings with the patient and his wife  I believe that his disease in the silvestre hepatis has responded well to local radiation therapy  Unfortunately, he has progressive disease outside the radiation field  This is under treatment with Nexavar  Repeat scan in 2 months is requested to evaluate Nexavar efficacy  We also reviewed that Graeme Bel has been approved more than 6 months ago and that Shepard Diver was approved only a week ago or so  This is to point out that there are other treatment options that his current therapy alone  I discussed the above with the patient    The patient and his wife voiced understanding and agreement   ______________________________________________________________________    Chief Complaint   Patient presents with    Follow-up     6 weeks follow up       HPI:  Oncology History    77-year-old male status post esophagectomy and liver resection 10/31/16 for esophageal cancer and hepatocellular carcinoma in 2016  Treatment included a course of radiation to the esophagus to a total dose of 4680 cGy with treatments given from 6/27/16 to 8/3/2016  Radiation therapy was administered at [facility] by Dr Stephen Munoz  Concurrent Taxol and Carboplatin was under the care of Dr Macie Yoo at [address]  CT scan of the abdomen and MRI of the abdomen in October 2017 revealed a new lesion in the left lobe of the liver measuring 4 1 x 2 7 cm with 1-2 additional lesions  CT-guided biopsy liver lesion was positive for hepatocellular carcinoma on October 11, 2017  Recommendations were made for Sir sphere radiation therapy to the whole liver which was given on December 13, 2017  The patient returns today for follow-up examination  reports he is feeling well and is having no abdominal complaints  He denies any pain  He is still having occasional nausea with some dysphagia and is considering having a repeat esophageal dilation performed  He had repeat blood work including liver function studies performed January 19, 2017 at [facility] but results are not available today  We will call him with these results  He has MRI of the abdomen and follow-up appointment with Dr Su Marina on the same day February 6, 2018  Hopefully, this will show good results and response to treatment  Assuming MRI shows favorable response, then we would recommend repeat MRI again in 3 months  The patient states he will be following with Dr Su Marina every 3 months  It takes about 1 5 hours to come here from [place], [place] and we will therefore see him here on a p r n  basis   Should he have evidence of progressive disease the future then, we would be happy to re-evaluate him for consideration of additional Sir spheres to the liver  The patient is wife were in agreement with the above plan and they know they can call any time if they have any questions  2/14/18 Had EGD, dialtion of the esophagus  There was a mild stricture at the site of anastomosis  4/5/18 Had MRI of the abd: The previously noted dominant lesion in the left hepatic lobe demonstrate increasing necrosis suggest treatment response The segment 4 lesion is decreasing in size The lesion in the segment 5 cm remains stable with thin rim enhancement suggesting no viable tissue Segment 2 lesion is stable Enlarging regional lymph node in the upper abdomen just below the silvestre hepatis which measures 2 5 x 2 4 cm    Pt saw both surg Onc, and Med onc  On 4/6/18 Pt met with Dr Becki Abraham, who discussed, If radiation therapy could be applied to this lesion deferral of systemic treatment initiation could be accomplished  Systemic treatment options: potential treatment options for patients with [address]  including Laith Guzmán  Pt had previous esophagus EBRT,8/2016  at [facility] in [place]  Treatment recoreds available in [address]           Esophageal cancer ([facility] [address] )    4/27/2016 Initial Diagnosis     Esophageal cancer ([address] )         6/27/2016 - 7/26/2016 Chemotherapy     Taxol 50 + Carbo AUC 2         6/27/2016 - 8/3/2016 Radiation     Concurrent RT Esophagus 4,680 cGy   @ [place]         10/31/2016 Surgery     Esophagectomy           Hepatocellular carcinoma ([address] )    10/31/2016 Initial Diagnosis     Cancer, hepatocellular ([address] )       10/31/2016 Surgery     Resection/ablation of liver - segments 5/6         10/11/2017 Biopsy     Local recurrence         12/13/2017 -  Radiation     SIRSphere treatment  Treatment site:  whole liver  Whole liver Dose:  Prescribed: 1 66 GBq, Delivered: 1 61GBq  (Right lobe Dose:   Prescribed: 1 06 GBq, Delivered: 1 03 GBq)  (Left lobe Dose:   Prescribed: 0 60 GBq,  Delivered: 0 58 GBq)         8/3/2018 -  Chemotherapy     Nexavar 400 mg p o  daily  Initially planned to start at a visit on July 13th  Medication not obtained until August 3rd  Interval History:  Clinically stable  2 - Symptomatic, <50% confined to bed    Review of Systems   Constitutional: Negative for chills and fever  HENT: Positive for rhinorrhea  Negative for nosebleeds  Eyes: Negative for discharge  Respiratory: Negative for cough and shortness of breath  Cardiovascular: Negative for chest pain  Gastrointestinal: Negative for abdominal pain, constipation and diarrhea  Endocrine: Negative for polydipsia  Genitourinary: Negative for hematuria  Musculoskeletal: Negative for arthralgias  Skin: Negative for color change  Allergic/Immunologic: Negative for immunocompromised state  Neurological: Negative for dizziness and headaches  Hematological: Negative for adenopathy  Psychiatric/Behavioral: Negative for agitation         Past Medical History:   Diagnosis Date    Anemia     Coronary artery disease     Dysphagia     Esophageal cancer (HCC)     Gout     Hyperlipidemia     Hypertension     Restless leg syndrome     Rheumatoid arthritis (HCC)     Sleep apnea     Vision abnormalities      Patient Active Problem List   Diagnosis    [facility] )    Hepatocellular carcinoma ([facility] )    Atrial fibrillation (HCC)    Hypotension    Type 2 diabetes mellitus (HCC)    Vision abnormalities    Dysphagia    Restless legs syndrome    Rheumatoid arthritis (HCC)    Sleep apnea    Hyperlipidemia    Hypertension    Peripheral neuropathy    Epidermoid cyst of neck    Glaucoma    Gout    Incisional hernia    Cough    Stage 3 severe COPD by GOLD classification (HCC)    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)    Anxiety and depression    Acute pain of left shoulder  History of stroke    Mild cognitive impairment    Nutritional deficiency    Coronary artery disease    Goals of care, counseling/discussion    Active advance directive       Current Outpatient Prescriptions:     albuterol (2 5 mg/3 mL) 0 083 % nebulizer solution, Take 1 ampule by nebulization every 4 (four) hours as needed for wheezing  , Disp: , Rfl:     albuterol (PROVENTIL HFA,VENTOLIN HFA) 90 mcg/act inhaler, Inhale 2 puffs every 6 (six) hours as needed for wheezing or shortness of breath (or cough), Disp: 1 Inhaler, Rfl: 11    allopurinol (ZYLOPRIM) 100 mg tablet, Take 100 mg by mouth daily, Disp: , Rfl:     amiodarone 200 mg tablet, Take 200 mg by mouth daily, Disp: , Rfl:     apixaban (ELIQUIS) 5 mg, Take 5 mg by mouth 2 (two) times a day, Disp: , Rfl:     cephalexin (KEFLEX) 500 mg capsule, Take 500 mg by mouth 4 (four) times a day, Disp: , Rfl:     dexlansoprazole (DEXILANT) 60 MG capsule, Take 60 mg by mouth daily, Disp: , Rfl:     diazepam (VALIUM) 5 mg tablet, Take 5 mg by mouth every 6 (six) hours as needed for anxiety, Disp: , Rfl:     diphenhydrAMINE (BENADRYL) 25 mg capsule, Take 25 mg by mouth daily, Disp: , Rfl:     gabapentin (NEURONTIN) 100 mg capsule, Take 100 mg by mouth daily  , Disp: , Rfl:     guaifenesin-codeine (GUAIFENESIN AC) 100-10 MG/5ML liquid, Take 5 mL by mouth 3 (three) times a day as needed for cough, Disp: 120 mL, Rfl: 0    hydrOXYzine HCL (ATARAX) 25 mg tablet, Take 25 mg by mouth 2 (two) times a day as needed for itching, Disp: , Rfl:     insulin aspart (NovoLOG) 100 units/mL injection, Inject under the skin as needed for high blood sugar  , Disp: , Rfl:     latanoprost (XALATAN) 0 005 % ophthalmic solution, Administer 1 drop to both eyes daily at bedtime  , Disp: , Rfl:     LORazepam (ATIVAN) 1 mg tablet, Take 2 mg by mouth daily at bedtime  , Disp: , Rfl:     prochlorperazine (COMPAZINE) 10 mg tablet, Take 1 tablet (10 mg total) by mouth every 6 (six) hours as needed for nausea or vomiting, Disp: 30 tablet, Rfl: 2    rOPINIRole (REQUIP) 1 mg tablet, Take 3 mg by mouth daily at bedtime Take 1mg in am and 3mg at bedtime , Disp: , Rfl:     sertraline (ZOLOFT) 25 mg tablet, Take 25 mg by mouth daily at bedtime  , Disp: , Rfl:     SORAfenib (NexAVAR) 200 MG tablet, Take one tablet by mouth every 12 hours, Disp: 60 tablet, Rfl: 3    traMADol (ULTRAM) 50 mg tablet, Take 50 mg by mouth every 6 (six) hours as needed for moderate pain, Disp: , Rfl:     Umeclidinium-Vilanterol (ANORO ELLIPTA) 62 5-25 MCG/INH AEPB, Inhale 1 puff daily, Disp: 1 each, Rfl: 11  Allergies   Allergen Reactions    Humira [Adalimumab] Rash     itchy    Levaquin [Levofloxacin In D5w] Rash    Levofloxacin Rash    Lovenox [Enoxaparin] Rash and Hives     Past Surgical History:   Procedure Laterality Date    APPENDECTOMY      CHOLECYSTECTOMY      GASTROJEJUNOSTOMY W/ JEJUNOSTOMY TUBE N/A 8/4/2016    Procedure: INSERTION JEJUNOSTOMY TUBE OPEN;  Surgeon: Rosales Yuen MD;  [facility];  Service:     KNEE ARTHROSCOPY      LIVER RESECTION N/A 10/31/2016    Procedure: INTRAOPERATIVE ULTRASOUND OF LIVER; LIVER LESION RESECTION/ABLATION ;  Surgeon: Rosales Yuen MD;  [facility];  Service:    Sahil Diego PLACEMENT      OK EGD INSERT GUIDE WIRE DILATOR PASSAGE ESOPHAGUS N/A 2/14/2018    Procedure: ESOPHAGOGASTRODUODENOSCOPY (EGD) with dilation;  Surgeon: Sheree Schwartz MD;  [facility];  Service: Thoracic    OK ESOPHAGOGASTRODUODENOSCOPY TRANSORAL DIAGNOSTIC N/A 10/31/2016    Procedure: ESOPHAGOGASTRODUODENOSCOPY (EGD);   Surgeon: Rosales Yuen MD;  [facility];  Service: Surgical Oncology    OK ESOPHAGOSCOPY FLEX James Hash <30 MM DIAM N/A 1/6/2017    Procedure: DILATATION ESOPHAGEAL;  Surgeon: Sheree Schwartz MD;  [facility];  Service: Thoracic    OK ESOPHAGOSCOPY FLEX BALLOON DILAT <30 MM DIAM N/A 12/12/2016    Procedure: Claudio Hernandez ESOPHAGEAL, EGD;  Surgeon: Adalid Corrales MD;  [facility];  Service: Thoracic    [place] ESOPHAGOSCOPY FLEX BALLOON DILAT <30 MM DIAM N/A 5/24/2017    Procedure: EGD WITH DILATION ;  Surgeon: Rashida Locke MD;  [facility];  Service: Thoracic    [place] ESOPHAGOSCOPY FLEX BALLOON DILAT <30 MM DIAM N/A 8/3/2017    Procedure: EGD WITH DILATION;  Surgeon: Adalid Corrales MD;  [facility];  Service: Thoracic    [place] REMOVAL [address] N/A 10/31/2016    Procedure: ESOPHAGECTOMY;  Surgeon: Ronnell Johnson MD;  [facility];  Service: Surgical Oncology    TONSILLECTOMY      WOUND DEBRIDEMENT Left 5/3/2018    Procedure: DEBRIDEMENT UPPER EXTREMITY ([address]) left shoulder;  Surgeon: Padma Trujillo MD;  [facility];  Service: Orthopedics     Social History     Objective:  Vitals:    08/30/18 1400   BP: 120/80   BP Location: Left arm   Cuff Size: Large   Pulse: 56   Resp: 16   Temp: (!) 96 8 °F (36 °C)   TempSrc: Tympanic   SpO2: 96%   Weight: 94 kg (207 lb 3 2 oz)   Height: 5' 10 51" (1 791 m)     Physical Exam      Labs: I personally reviewed the labs and imaging pertinent to this patient care

## 2018-08-30 NOTE — LETTER
August 30, 2018     Stephanie Pace MD  [address] [place] [address]    Patient: Pema Johnson   YOB: 1945   Date of Visit: 8/30/2018       Dear Dr Maggie Nino: Thank you for referring Ch[place] Zavala to me for evaluation  Below are my notes for this consultation  If you have questions, please do not hesitate to call me  I look forward to following your patient along with you  Sincerely,        Yenifer Boyce,         CC: MD Jocelynn Smith MD Karlene Resides, DO  8/30/2018  3:28 PM  Sign at close encounter  [address]  [address]  [address] [address]  [phone]  [phone]    Zari Vigil BRE,[id number], [id number]  08/30/18    Discussion:   In summary, this is a 77-year-old male history of advanced hepatocellular carcinoma  He has been on Nexavar for 2-3 weeks  He seems to be tolerating this relatively well  He reports some abdominal bloating and flatus  Additionally, he has mild diarrhea  I suggested that he use a half of an Imodium tablet on a p r n  Basis as he has had severe constipation in the past   He had a PET-CT on August 27, 2018  This was compared to a prior PET-CT in May  We also had a CT of the chest in July which showed pulmonary nodules ranging from 3-7 mm  The PET-CT shows bilateral pulmonary nodules up to 9 mm, SUV max 2 2  There is increased hypermetabolism in the right hepatic lobe, SUV 8 9, prior 5 3  There is no increase in size, however  Additionally there is new irregular FDG uptake adjacent to the previous resection in the liver  Resolution of previous hypermetabolic lymphadenopathy in the silvestre hepatis with decreased size, prior 2 2 cm current less than 1 cm  No mention is made of the adrenal gland  I reviewed the above findings with the patient and his wife  I believe that his disease in the silvestre hepatis has responded well to local radiation therapy  Unfortunately, he has progressive disease outside the radiation field  This is under treatment with Nexavar  Repeat scan in 2 months is requested to evaluate Nexavar efficacy  We also reviewed that Imelda Garcia has been approved more than 6 months ago and that Taco Franco was approved only a week ago or so  This is to point out that there are other treatment options that his current therapy alone  I discussed the above with the patient  The patient and his wife voiced understanding and agreement   ______________________________________________________________________    Chief Complaint   Patient presents with    Follow-up     6 weeks follow up       HPI:  Oncology History    68-year-old male status post esophagectomy and liver resection 10/31/16 for esophageal cancer and hepatocellular carcinoma in 2016  Treatment included a course of radiation to the esophagus to a total dose of 4680 cGy with treatments given from 6/27/16 to 8/3/2016  Radiation therapy was administered at [facility] by Dr Cindy Cage  Concurrent Taxol and Carboplatin was under the care of Dr Joshua Rogers at [facility]  CT scan of the abdomen and MRI of the abdomen in October 2017 revealed a new lesion in the left lobe of the liver measuring 4 1 x 2 7 cm with 1-2 additional lesions  CT-guided biopsy liver lesion was positive for hepatocellular carcinoma on October 11, 2017  Recommendations were made for Sir sphere radiation therapy to the whole liver which was given on December 13, 2017  The patient returns today for follow-up examination  reports he is feeling well and is having no abdominal complaints  He denies any pain  He is still having occasional nausea with some dysphagia and is considering having a repeat esophageal dilation performed  He had repeat blood work including liver function studies performed January 19, 2017 at [facility] but results are not available today   We will call him with these results  He has MRI of the abdomen and follow-up appointment with Dr Madhu Hills on the same day February 6, 2018  Hopefully, this will show good results and response to treatment  Assuming MRI shows favorable response, then we would recommend repeat MRI again in 3 months  The patient states he will be following with Dr Madhu Hills every 3 months  It takes about 1 5 hours to come here from [place], [place] and we will therefore see him here on a p r n  basis  Should he have evidence of progressive disease the future then, we would be happy to re-evaluate him for consideration of additional Sir spheres to the liver  The patient is wife were in agreement with the above plan and they know they can call any time if they have any questions  2/14/18 Had EGD, dialtion of the esophagus  There was a mild stricture at the site of anastomosis  4/5/18 Had MRI of the abd: The previously noted dominant lesion in the left hepatic lobe demonstrate increasing necrosis suggest treatment response The segment 4 lesion is decreasing in size The lesion in the segment 5 cm remains stable with thin rim enhancement suggesting no viable tissue Segment 2 lesion is stable Enlarging regional lymph node in the upper abdomen just below the silvestre hepatis which measures 2 5 x 2 4 cm    Pt saw both surg Onc, and Med onc  On 4/6/18 Pt met with Dr Angelnie Horn, who discussed, If radiation therapy could be applied to this lesion deferral of systemic treatment initiation could be accomplished  Systemic treatment options: potential treatment options for patients with [address]  including Nexavar, Acquanetta Dense  Pt had previous esophagus EBRT,8/2016  at [facility] in [place]  Treatment recoreds available in [address]           Esophageal cancer ([facility] [address] )    4/27/2016 Initial Diagnosis     Esophageal cancer ([facility] [address] )         6/27/2016 - 7/26/2016 Chemotherapy     Taxol 50 + Carbo AUC 2         6/27/2016 - 8/3/2016 Radiation     Concurrent RT Esophagus 4,680 cGy   @ [facility] [address]         10/31/2016 Surgery     Esophagectomy           Hepatocellular carcinoma ([facility] [address] )    10/31/2016 Initial Diagnosis     Cancer, hepatocellular ([facility] [address] )       10/31/2016 Surgery     Resection/ablation of liver - segments 5/6         10/11/2017 Biopsy     Local recurrence         12/13/2017 -  Radiation     SIRSphere treatment  Treatment site:  whole liver  Whole liver Dose:  Prescribed: 1 66 GBq, Delivered: 1 61GBq  (Right lobe Dose:   Prescribed: 1 06 GBq,  Delivered: 1 03 GBq)  (Left lobe Dose:   Prescribed: 0 60 GBq,  Delivered: 0 58 GBq)         8/3/2018 -  Chemotherapy     Nexavar 400 mg p o  daily  Initially planned to start at a visit on July 13th  Medication not obtained until August 3rd  Interval History:  Clinically stable  2 - Symptomatic, <50% confined to bed    Review of Systems   Constitutional: Negative for chills and fever  HENT: Positive for rhinorrhea  Negative for nosebleeds  Eyes: Negative for discharge  Respiratory: Negative for cough and shortness of breath  Cardiovascular: Negative for chest pain  Gastrointestinal: Negative for abdominal pain, constipation and diarrhea  Endocrine: Negative for polydipsia  Genitourinary: Negative for hematuria  Musculoskeletal: Negative for arthralgias  Skin: Negative for color change  Allergic/Immunologic: Negative for immunocompromised state  Neurological: Negative for dizziness and headaches  Hematological: Negative for adenopathy  Psychiatric/Behavioral: Negative for agitation         Past Medical History:   Diagnosis Date    Anemia     Coronary artery disease     Dysphagia     Esophageal cancer (HCC)     Gout     Hyperlipidemia     Hypertension     Restless leg syndrome     Rheumatoid arthritis (HCC)     Sleep apnea     Vision abnormalities      Patient Active Problem List   Diagnosis    Esophageal cancer ([facility] )    Hepatocellular carcinoma ([facility] )    Atrial fibrillation ([facility] )    Hypotension    Type 2 diabetes mellitus (HCC)    Vision abnormalities    Dysphagia    Restless legs syndrome    Rheumatoid arthritis (HCC)    Sleep apnea    Hyperlipidemia    Hypertension    Peripheral neuropathy    Epidermoid cyst of neck    Glaucoma    Gout    Incisional hernia    Cough    Stage 3 severe COPD by GOLD classification ([facility])    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[facility])    Anxiety and depression    Acute pain of left shoulder    History of stroke    Mild cognitive impairment    Nutritional deficiency    Coronary artery disease    Goals of care, counseling/discussion    Active advance directive       Current Outpatient Prescriptions:     albuterol (2 5 mg/3 mL) 0 083 % nebulizer solution, Take 1 ampule by nebulization every 4 (four) hours as needed for wheezing  , Disp: , Rfl:     albuterol (PROVENTIL HFA,VENTOLIN HFA) 90 mcg/act inhaler, Inhale 2 puffs every 6 (six) hours as needed for wheezing or shortness of breath (or cough), Disp: 1 Inhaler, Rfl: 11    allopurinol (ZYLOPRIM) 100 mg tablet, Take 100 mg by mouth daily, Disp: , Rfl:     amiodarone 200 mg tablet, Take 200 mg by mouth daily, Disp: , Rfl:     apixaban (ELIQUIS) 5 mg, Take 5 mg by mouth 2 (two) times a day, Disp: , Rfl:     cephalexin (KEFLEX) 500 mg capsule, Take 500 mg by mouth 4 (four) times a day, Disp: , Rfl:     dexlansoprazole (DEXILANT) 60 MG capsule, Take 60 mg by mouth daily, Disp: , Rfl:     diazepam (VALIUM) 5 mg tablet, Take 5 mg by mouth every 6 (six) hours as needed for anxiety, Disp: , Rfl:     diphenhydrAMINE (BENADRYL) 25 mg capsule, Take 25 mg by mouth daily, Disp: , Rfl:     gabapentin (NEURONTIN) 100 mg capsule, Take 100 mg by mouth daily  , Disp: , Rfl:     guaifenesin-codeine (GUAIFENESIN AC) 100-10 MG/5ML liquid, Take 5 mL by mouth 3 (three) times a day as needed for cough, Disp: 120 mL, Rfl: 0    hydrOXYzine HCL (ATARAX) 25 mg tablet, Take 25 mg by mouth 2 (two) times a day as needed for itching, Disp: , Rfl:     insulin aspart (NovoLOG) 100 units/mL injection, Inject under the skin as needed for high blood sugar  , Disp: , Rfl:     latanoprost (XALATAN) 0 005 % ophthalmic solution, Administer 1 drop to both eyes daily at bedtime  , Disp: , Rfl:     LORazepam (ATIVAN) 1 mg tablet, Take 2 mg by mouth daily at bedtime  , Disp: , Rfl:     prochlorperazine (COMPAZINE) 10 mg tablet, Take 1 tablet (10 mg total) by mouth every 6 (six) hours as needed for nausea or vomiting, Disp: 30 tablet, Rfl: 2    rOPINIRole (REQUIP) 1 mg tablet, Take 3 mg by mouth daily at bedtime Take 1mg in am and 3mg at bedtime , Disp: , Rfl:     sertraline (ZOLOFT) 25 mg tablet, Take 25 mg by mouth daily at bedtime  , Disp: , Rfl:     SORAfenib (NexAVAR) 200 MG tablet, Take one tablet by mouth every 12 hours, Disp: 60 tablet, Rfl: 3    traMADol (ULTRAM) 50 mg tablet, Take 50 mg by mouth every 6 (six) hours as needed for moderate pain, Disp: , Rfl:     Umeclidinium-Vilanterol (ANORO ELLIPTA) 62 5-25 MCG/INH AEPB, Inhale 1 puff daily, Disp: 1 each, Rfl: 11  Allergies   Allergen Reactions    Humira [Adalimumab] Rash     itchy    Levaquin [Levofloxacin In D5w] Rash    Levofloxacin Rash    Lovenox [Enoxaparin] Rash and Hives     Past Surgical History:   Procedure Laterality Date    APPENDECTOMY      CHOLECYSTECTOMY      GASTROJEJUNOSTOMY W/ JEJUNOSTOMY TUBE N/A 8/4/2016    Procedure: INSERTION JEJUNOSTOMY TUBE OPEN;  Surgeon: Whit Taylor MD;  [facility];  Service:     KNEE ARTHROSCOPY      LIVER RESECTION N/A 10/31/2016    Procedure: INTRAOPERATIVE ULTRASOUND OF LIVER; LIVER LESION RESECTION/ABLATION ;  Surgeon: Whit Taylor MD;  [facility];  Service:    Judy SAUNDERS      [place] EGD INSERT GUIDE WIRE DILATOR PASSAGE ESOPHAGUS N/A 2/14/2018    Procedure: ESOPHAGOGASTRODUODENOSCOPY (EGD) with dilation;  Surgeon: Gin Marie MD;  [facility]; Service: Thoracic    GA ESOPHAGOGASTRODUODENOSCOPY TRANSORAL DIAGNOSTIC N/A 10/31/2016    Procedure: ESOPHAGOGASTRODUODENOSCOPY (EGD); Surgeon: Raymond Frey MD;  [facility];  Service: Surgical Oncology    GA ESOPHAGOSCOPY FLEX BALLOON DILAT <30 MM DIAM N/A 1/6/2017    Procedure: DILATATION ESOPHAGEAL;  Surgeon: Blaire Rodrigez MD;  [facility];  Service: Thoracic    GA ESOPHAGOSCOPY FLEX BALLOON DILAT <30 MM DIAM N/A 12/12/2016    Procedure: DILATATION ESOPHAGEAL, EGD;  Surgeon: Blaire Rodrigez MD;  [facility];  Service: Thoracic    GA ESOPHAGOSCOPY FLEX BALLOON DILAT <30 MM DIAM N/A 5/24/2017    Procedure: EGD WITH DILATION ;  Surgeon: Rodrigo Patel MD;  [facility];  Service: Thoracic    GA ESOPHAGOSCOPY FLEX BALLOON DILAT <30 MM DIAM N/A 8/3/2017    Procedure: EGD WITH DILATION;  Surgeon: Blaire Rodrigez MD;  [facility];  Service: Thoracic    GA REMOVAL [address] N/A 10/31/2016    Procedure: ESOPHAGECTOMY;  Surgeon: Raymond Frey MD;  [facility];  Service: Surgical Oncology    TONSILLECTOMY      WOUND DEBRIDEMENT Left 5/3/2018    Procedure: DEBRIDEMENT UPPER EXTREMITY ([address]) left shoulder;  Surgeon: Sharon Francis MD;  [facility];  Service: Orthopedics     Social History     Objective:  Vitals:    08/30/18 1400   BP: 120/80   BP Location: Left arm   Cuff Size: Large   Pulse: 56   Resp: 16   Temp: (!) 96 8 °F (36 °C)   TempSrc: Tympanic   SpO2: 96%   Weight: 94 kg (207 lb 3 2 oz)   Height: 5' 10 51" (1 791 m)     Physical Exam      Labs: I personally reviewed the labs and imaging pertinent to this patient care

## 2018-09-19 ENCOUNTER — TELEPHONE (OUTPATIENT)
Dept: PULMONOLOGY | Facility: CLINIC | Age: 73
End: 2018-09-19

## 2018-09-19 NOTE — TELEPHONE ENCOUNTER
Patient calling saying he is coughing non stop  He cannot sleep at night  He has congestion  He is wheezing  He will get SOB if working in the yard but not so much walking around the house  Denies chest tightness, fevers, chills and night sweats  He is using his inhalers and nebulizer with no relief  I advised him to continue to use the nebulizer every 4 hours as he just started using it yesterday  He is not taking Mucinex and did not take any OTC medication  He was coughing a lot on the phone  Please advise

## 2018-09-20 ENCOUNTER — OFFICE VISIT (OUTPATIENT)
Dept: PULMONOLOGY | Facility: CLINIC | Age: 73
End: 2018-09-20
Payer: MEDICARE

## 2018-09-20 ENCOUNTER — HOSPITAL ENCOUNTER (OUTPATIENT)
Dept: RADIOLOGY | Facility: HOSPITAL | Age: 73
Discharge: HOME | End: 2018-09-20
Payer: MEDICARE

## 2018-09-20 VITALS
HEIGHT: 71 IN | RESPIRATION RATE: 16 BRPM | TEMPERATURE: 96.5 F | WEIGHT: 202.8 LBS | OXYGEN SATURATION: 96 % | BODY MASS INDEX: 28.39 KG/M2 | HEART RATE: 65 BPM | SYSTOLIC BLOOD PRESSURE: 104 MMHG | DIASTOLIC BLOOD PRESSURE: 52 MMHG

## 2018-09-20 DIAGNOSIS — J20.9 ACUTE TRACHEOBRONCHITIS: ICD-10-CM

## 2018-09-20 DIAGNOSIS — J44.9 STAGE 3 SEVERE COPD BY GOLD CLASSIFICATION (HCC): Primary | ICD-10-CM

## 2018-09-20 DIAGNOSIS — G47.33 OBSTRUCTIVE SLEEP APNEA SYNDROME: ICD-10-CM

## 2018-09-20 DIAGNOSIS — K21.9 GASTROESOPHAGEAL REFLUX DISEASE WITHOUT ESOPHAGITIS: ICD-10-CM

## 2018-09-20 PROCEDURE — 99214 OFFICE O/P EST MOD 30 MIN: CPT | Performed by: NURSE PRACTITIONER

## 2018-09-20 PROCEDURE — 71046 X-RAY EXAM CHEST 2 VIEWS: CPT

## 2018-09-20 RX ORDER — DOXYCYCLINE HYCLATE 100 MG/1
100 CAPSULE ORAL EVERY 12 HOURS SCHEDULED
Qty: 14 CAPSULE | Refills: 0 | Status: SHIPPED | OUTPATIENT
Start: 2018-09-20 | End: 2018-09-27

## 2018-09-20 RX ORDER — RANITIDINE 150 MG/1
150 TABLET ORAL
Qty: 30 TABLET | Refills: 0 | Status: SHIPPED | OUTPATIENT
Start: 2018-09-20 | End: 2018-10-29 | Stop reason: SDUPTHER

## 2018-09-20 RX ORDER — CALCIUM CARBONATE 750 MG/1
1 TABLET, CHEWABLE ORAL DAILY
Qty: 30 TABLET | Refills: 0
Start: 2018-09-20

## 2018-09-20 NOTE — ASSESSMENT & PLAN NOTE
Continue Anoro Ellipta with albuterol nebulized  He will continue nebulizer 4 times daily until further improved, and then can return to as needed  Samples of Anoro were given today

## 2018-09-20 NOTE — ASSESSMENT & PLAN NOTE
He should be using his CPAP nightly, but until acid reflux is under control, he can continue to hold  He is aware to restart as able

## 2018-09-20 NOTE — TELEPHONE ENCOUNTER
Patient cannot come today as he lives an hour [place] a half away  His wife already left for the day  He is asking if he can come tomorrow around 3:00?

## 2018-09-20 NOTE — PROGRESS NOTES
Pulmonary Follow-Up Note   Loraine Kyle 74 y o  male MRN: [id number]  9/20/2018      Assessment/Plan:    Problem List Items Addressed This Visit        Digestive    Gastroesophageal reflux disease without esophagitis     Continue the use of Tums as needed and add Zantac at HS  Follow-up with PCP  Relevant Medications    ranitidine (ZANTAC) 150 mg tablet    calcium carbonate (TUMS EX) 750 mg chewable tablet       Respiratory    Sleep apnea     He should be using his CPAP nightly, but until acid reflux is under control, he can continue to hold  He is aware to restart as able  Stage 3 severe COPD by GOLD classification ([facility] [address] ) - Primary     Continue Anoro Ellipta with albuterol nebulized  He will continue nebulizer 4 times daily until further improved, and then can return to as needed  Samples of Anoro were given today  Relevant Medications    doxycycline hyclate (VIBRAMYCIN) 100 mg capsule    umeclidinium-vilanterol (ANORO ELLIPTA) 62 5-25 MCG/INH inhaler    Acute tracheobronchitis     Start course of doxycycline  Obtain chest x-ray to rule out pneumonia  Relevant Medications    doxycycline hyclate (VIBRAMYCIN) 100 mg capsule    umeclidinium-vilanterol (ANORO ELLIPTA) 62 5-25 MCG/INH inhaler    Other Relevant Orders    XR chest pa & lateral        Return in about 2 months (around 11/20/2018), or if symptoms worsen or fail to improve  All of Jaylan's questions were answered prior to leaving the office today  He will follow-up with Dr Malcolm Nieves in three months or sooner should the need arise  He is aware to call our office with any further questions or concerns  He is aware to present to ER if symptoms worsen or if there is emergency  D/W Dr Eddi Serrano who agrees with plan as listed      History of Present Illness   Reason for Visit: Sick Visit  Chief Complaint: "I have been coughing my head off "  HPI: Loraine Kyle is a 74 y o  male who presents to the office today due to cough   Ruth Clemons reports that he has had a cough productive of yellow mucus over the last few days  He denies hemoptysis  He reports that he has had some difficulty clearing secretions, which has helped with the use of his nebulizer  He denies any fevers, chills, or night sweats  He denies any chest pain or tightness  He is somewhat short of breath with activity, but this is his baseline  He denies any changes in weight or appetite from baseline, but does report significant acid reflux and has been taking Tums  He reports his reflux is worse at night  He has not been wearing his CPAP because of this  He continues to take his Anoro and use his albuterol nebulizer  He has not had any wheezing  He does not have oxygen at home  Review of Systems   All other systems reviewed and are negative  A full 12-point review of systems was completed and is negative except for those outlined in the HPI      Historical Information   Past Medical History:   Diagnosis Date    Anemia     Coronary artery disease     Dysphagia     Esophageal cancer (HCC)     Gout     Hyperlipidemia     Hypertension     Restless leg syndrome     Rheumatoid arthritis ([facility] [address] )     Sleep apnea     Vision abnormalities      Past Surgical History:   Procedure Laterality Date    APPENDECTOMY      CHOLECYSTECTOMY      GASTROJEJUNOSTOMY W/ JEJUNOSTOMY TUBE N/A 8/4/2016    Procedure: INSERTION JEJUNOSTOMY TUBE OPEN;  Surgeon: Cirilo Nice MD;  [facility];  Service:     KNEE ARTHROSCOPY      LIVER RESECTION N/A 10/31/2016    Procedure: INTRAOPERATIVE ULTRASOUND OF LIVER; LIVER LESION RESECTION/ABLATION ;  Surgeon: Cirilo Nice MD;  [facility];  Service:    Mauri Carp PLACEMENT      UT EGD INSERT GUIDE WIRE DILATOR PASSAGE ESOPHAGUS N/A 2/14/2018    Procedure: ESOPHAGOGASTRODUODENOSCOPY (EGD) with dilation;  Surgeon: Brigida Nix MD;  [facility];  Service: Thoracic    UT ESOPHAGOGASTRODUODENOSCOPY TRANSORAL DIAGNOSTIC N/A 10/31/2016    Procedure: ESOPHAGOGASTRODUODENOSCOPY (EGD);   Surgeon: Tod Dickinson MD;  [facility];  Service: Surgical Oncology    GA ESOPHAGOSCOPY FLEX Chiqui Shaper <30 MM DIAM N/A 1/6/2017    Procedure: DILATATION ESOPHAGEAL;  Surgeon: Min Dwyer MD;  [facility];  Service: Thoracic    GA ESOPHAGOSCOPY FLEX BALLOON DILAT <30 MM DIAM N/A 12/12/2016    Procedure: DILATATION ESOPHAGEAL, EGD;  Surgeon: Min Dwyer MD;  [facility];  Service: Thoracic    GA ESOPHAGOSCOPY FLEX BALLOON DILAT <30 MM DIAM N/A 5/24/2017    Procedure: EGD WITH DILATION ;  Surgeon: Deven Sue MD;  [facility];  Service: Thoracic    GA ESOPHAGOSCOPY FLEX Chiqui Shaper <30 MM DIAM N/A 8/3/2017    Procedure: EGD WITH DILATION;  Surgeon: Min Dwyer MD;  [facility];  Service: Thoracic    GA REMOVAL [address] THORACOTOMY N/A 10/31/2016    Procedure: ESOPHAGECTOMY;  Surgeon: Tod Dickinson MD;  [facility];  Service: Surgical Oncology    TONSILLECTOMY      WOUND DEBRIDEMENT Left 5/3/2018    Procedure: DEBRIDEMENT UPPER EXTREMITY ([address]) left shoulder;  Surgeon: Hola Marina MD;  [facility];  Service: Orthopedics     Family History   Problem Relation Age of Onset    Stroke Mother     Colon cancer Father     Other Father         Pneumoconiosis    Cancer Sister         oral cancer     Social History   History   Alcohol Use No     History   Drug Use No     History   Smoking Status    Former Smoker    Packs/day: 3 00    Years: 40 00    Types: Cigarettes    Start date: 1951    Quit date: 4/13/1991   Smokeless Tobacco    Never Used       Meds/Allergies     Current Outpatient Prescriptions:     albuterol (2 5 mg/3 mL) 0 083 % nebulizer solution, Take 1 ampule by nebulization every 4 (four) hours as needed for wheezing  , Disp: , Rfl:     allopurinol (ZYLOPRIM) 100 mg tablet, Take 100 mg by mouth daily, Disp: , Rfl:   amiodarone 200 mg tablet, Take 200 mg by mouth daily, Disp: , Rfl:     apixaban (ELIQUIS) 5 mg, Take 5 mg by mouth 2 (two) times a day, Disp: , Rfl:     diazepam (VALIUM) 5 mg tablet, Take 5 mg by mouth every 6 (six) hours as needed for anxiety, Disp: , Rfl:     diphenhydrAMINE (BENADRYL) 25 mg capsule, Take 25 mg by mouth daily, Disp: , Rfl:     hydrOXYzine HCL (ATARAX) 25 mg tablet, Take 25 mg by mouth 2 (two) times a day as needed for itching, Disp: , Rfl:     insulin aspart (NovoLOG) 100 units/mL injection, Inject under the skin as needed for high blood sugar  , Disp: , Rfl:     latanoprost (XALATAN) 0 005 % ophthalmic solution, Administer 1 drop to both eyes daily at bedtime  , Disp: , Rfl:     LORazepam (ATIVAN) 1 mg tablet, Take 2 mg by mouth daily at bedtime  , Disp: , Rfl:     prochlorperazine (COMPAZINE) 10 mg tablet, Take 1 tablet (10 mg total) by mouth every 6 (six) hours as needed for nausea or vomiting, Disp: 30 tablet, Rfl: 2    rOPINIRole (REQUIP) 1 mg tablet, Take 3 mg by mouth daily at bedtime Take 1mg in am and 3mg at bedtime , Disp: , Rfl:     sertraline (ZOLOFT) 25 mg tablet, Take 25 mg by mouth daily at bedtime  , Disp: , Rfl:     SORAfenib (NexAVAR) 200 MG tablet, Take one tablet by mouth every 12 hours, Disp: 60 tablet, Rfl: 3    traMADol (ULTRAM) 50 mg tablet, Take 50 mg by mouth every 6 (six) hours as needed for moderate pain, Disp: , Rfl:     Umeclidinium-Vilanterol (ANORO ELLIPTA) 62 5-25 MCG/INH AEPB, Inhale 1 puff daily, Disp: 1 each, Rfl: 11    calcium carbonate (TUMS EX) 750 mg chewable tablet, Chew 1 tablet (750 mg total) daily, Disp: 30 tablet, Rfl: 0    doxycycline hyclate (VIBRAMYCIN) 100 mg capsule, Take 1 capsule (100 mg total) by mouth every 12 (twelve) hours for 7 days, Disp: 14 capsule, Rfl: 0    ranitidine (ZANTAC) 150 mg tablet, Take 1 tablet (150 mg total) by mouth daily at bedtime, Disp: 30 tablet, Rfl: 0    umeclidinium-vilanterol (ANORO ELLIPTA) 62 5-25 MCG/INH inhaler, Inhale 1 puff daily, Disp: 2 Inhaler, Rfl: 0  Allergies   Allergen Reactions    Humira [Adalimumab] Rash     itchy    Levaquin [Levofloxacin In D5w] Rash    Levofloxacin Rash    Lovenox [Enoxaparin] Rash and Hives       Vitals: Blood pressure 104/52, pulse 65, temperature (!) 96 5 °F (35 8 °C), temperature source Tympanic, resp  rate 16, height 5' 11" (1 803 m), weight 92 kg (202 lb 12 8 oz), SpO2 96 %  Body mass index is 28 28 kg/m²  Oxygen Therapy  SpO2: 96 %    Physical Exam:  Physical Exam   Constitutional: He is oriented to person, place, and time  He appears well-developed  He is cooperative  Non-toxic appearance  No distress  HENT:   Head: Normocephalic and atraumatic  Mouth/Throat: No oropharyngeal exudate  Eyes: EOM are normal  No scleral icterus  Neck: Neck supple  No JVD present  No tracheal deviation present  Cardiovascular: Normal rate, regular rhythm, S1 normal and S2 normal   Exam reveals no gallop and no friction rub  No murmur heard  Pulmonary/Chest: Effort normal  No accessory muscle usage or stridor  No tachypnea  No respiratory distress  He has no decreased breath sounds  He has no wheezes  He has rhonchi  He has no rales  He exhibits no tenderness  Abdominal: Soft  Bowel sounds are normal  He exhibits no distension  There is no tenderness  There is no rebound and no guarding  Musculoskeletal: He exhibits no edema or tenderness  Neurological: He is alert and oriented to person, place, and time  He has normal strength  GCS eye subscore is 4  GCS verbal subscore is 5  GCS motor subscore is 6  Skin: Skin is warm and dry  No abrasion, no ecchymosis, no lesion and no rash noted  He is not diaphoretic  No cyanosis or erythema  Nails show no clubbing  Psychiatric: He has a normal mood and affect  His speech is normal and behavior is normal    Vitals reviewed      Labs: None recent; Labs pending for Dr Maurice Brennan which he will obtain after calling to confirm timing with oncology office    Imaging and other studies: None    BEATRIS Medina  [facility]        Portions of the record may have been created with voice recognition software  Occasional wrong word or "sound a like" substitutions may have occurred due to the inherent limitations of voice recognition software  Read the chart carefully and recognize, using context, where substitutions have occurred or contact the dictating provider

## 2018-09-20 NOTE — TELEPHONE ENCOUNTER
Patient calling back he can come today now  He is scheduled for 3:00 at [facility] with Hans Morgan  He was advised to arrive early to check in

## 2018-09-27 ENCOUNTER — TELEPHONE (OUTPATIENT)
Dept: OBGYN CLINIC | Facility: HOSPITAL | Age: 73
End: 2018-09-27

## 2018-09-27 DIAGNOSIS — R29.898 SHOULDER WEAKNESS: Primary | ICD-10-CM

## 2018-09-27 NOTE — TELEPHONE ENCOUNTER
Dr Erick Miles    Patient called stating that [facility] called to get an order to continue PT  As of now they had not received it  I did not see the request in patients chart  Can a continuation order be written for patient   He is going to have someone call back with the fax number so it can be faxed to [facility]

## 2018-10-04 ENCOUNTER — TELEPHONE (OUTPATIENT)
Dept: PULMONOLOGY | Facility: CLINIC | Age: 73
End: 2018-10-04

## 2018-10-04 NOTE — TELEPHONE ENCOUNTER
Patient called stating he started with the shakes last night and spitting out blood coughing out dark brown mucus patient also vomited  Patient had no fever last nigh when he took his temp   Please advise

## 2018-10-04 NOTE — PROGRESS NOTES
The patient called to say that last night he had a bad night  He was coughing up so dark mucous that had some blood  His wife thinks it is because he was outside all day yesterday working in the [employer]  He had shaking chills - he felt very cold  His blood sugar was good  No fevers    He feels better now  He just completed a week of doxycycline and he felt better last week    I have asked him to start Mucinex to help clear the secretions  Will hold off on further antibiotics at this time as the patient feels well without anything  I told him that if any of his symptoms return including fever, shaking chills, coughing up blood he should go to the emergency room

## 2018-10-18 ENCOUNTER — TELEPHONE (OUTPATIENT)
Dept: HEMATOLOGY ONCOLOGY | Facility: CLINIC | Age: 73
End: 2018-10-18

## 2018-10-18 ENCOUNTER — HOSPITAL ENCOUNTER (OUTPATIENT)
Dept: CT IMAGING | Facility: HOSPITAL | Age: 73
Discharge: HOME | End: 2018-10-18
Attending: SURGERY
Payer: MEDICARE

## 2018-10-18 ENCOUNTER — HOSPITAL ENCOUNTER (OUTPATIENT)
Dept: MRI IMAGING | Facility: HOSPITAL | Age: 73
Discharge: HOME | End: 2018-10-18
Attending: SURGERY
Payer: MEDICARE

## 2018-10-18 ENCOUNTER — OFFICE VISIT (OUTPATIENT)
Dept: SURGICAL ONCOLOGY | Facility: CLINIC | Age: 73
End: 2018-10-18
Payer: MEDICARE

## 2018-10-18 VITALS
TEMPERATURE: 96.8 F | SYSTOLIC BLOOD PRESSURE: 122 MMHG | RESPIRATION RATE: 16 BRPM | WEIGHT: 197 LBS | HEIGHT: 71 IN | BODY MASS INDEX: 27.58 KG/M2 | HEART RATE: 70 BPM | DIASTOLIC BLOOD PRESSURE: 78 MMHG

## 2018-10-18 DIAGNOSIS — C15.5 MALIGNANT NEOPLASM OF LOWER THIRD OF ESOPHAGUS (HCC): ICD-10-CM

## 2018-10-18 DIAGNOSIS — C22.0 HEPATOCELLULAR CARCINOMA (HCC): ICD-10-CM

## 2018-10-18 DIAGNOSIS — C22.0 HEPATOCELLULAR CARCINOMA (HCC): Primary | ICD-10-CM

## 2018-10-18 PROCEDURE — A9585 GADOBUTROL INJECTION: HCPCS | Performed by: SURGERY

## 2018-10-18 PROCEDURE — 74183 MRI ABD W/O CNTR FLWD CNTR: CPT

## 2018-10-18 PROCEDURE — 99215 OFFICE O/P EST HI 40 MIN: CPT | Performed by: SURGERY

## 2018-10-18 PROCEDURE — 71250 CT THORAX DX C-: CPT

## 2018-10-18 RX ADMIN — GADOBUTROL 8 ML: 604.72 INJECTION INTRAVENOUS at 10:28

## 2018-10-18 NOTE — TELEPHONE ENCOUNTER
Jordan from surg onc Dr Jody Pradhan called and said Dr Jody Pradhan wants pt's appt with Dr Michelle Castillo moved up  Currently scheduled 11/1/18  Did not give reason  Seen by Dr Jody Pradhan today and note not done

## 2018-10-18 NOTE — LETTER
October 18, 2018     Nancy Hernandez MD  [address] [place] [id number]    Patient: Ivania Lyon   YOB: 1945   Date of Visit: 10/18/2018       Dear Dr Luiz Scanlon: Thank you for referring Bkjose Shanika to me for evaluation  Below are my notes for this consultation  If you have questions, please do not hesitate to call me  I look forward to following your patient along with you  Sincerely,        Dempsey Fabry, MD        CC: Briscoe Scott, DO Dempsey Fabry, MD  10/18/2018 12:56 PM  Sign at close encounter               Surgical Oncology Follow Up       [address]  1945  [id number]  Yarely Tran [address] [facility] SURGICAL ONCOLOGY [address] [address]    There are no diagnoses linked to this encounter  Chief Complaint   Patient presents with    Follow-up       No Follow-up on file  Oncology History    77-year-old male status post esophagectomy and liver resection 10/31/16 for esophageal cancer and hepatocellular carcinoma in 2016  Treatment included a course of radiation to the esophagus to a total dose of 4680 cGy with treatments given from 6/27/16 to 8/3/2016  Radiation therapy was administered at [facility] by Dr Cm Lorenzana  Concurrent Taxol and Carboplatin was under the care of Dr Bridgett Lares at [facility]  CT scan of the abdomen and MRI of the abdomen in October 2017 revealed a new lesion in the left lobe of the liver measuring 4 1 x 2 7 cm with 1-2 additional lesions  CT-guided biopsy liver lesion was positive for hepatocellular carcinoma on October 11, 2017  Recommendations were made for Sir sphere radiation therapy to the whole liver which was given on December 13, 2017  The patient returns today for follow-up examination      reports he is feeling well and is having no abdominal complaints  He denies any pain  He is still having occasional nausea with some dysphagia and is considering having a repeat esophageal dilation performed  He had repeat blood work including liver function studies performed January 19, 2017 at [facility] but results are not available today  We will call him with these results  He has MRI of the abdomen and follow-up appointment with Dr Yvonne Turner on the same day February 6, 2018  Hopefully, this will show good results and response to treatment  Assuming MRI shows favorable response, then we would recommend repeat MRI again in 3 months  The patient states he will be following with Dr Yvonne Turner every 3 months  It takes about 1 5 hours to come here from [place], [place] and we will therefore see him here on a p r n  basis  Should he have evidence of progressive disease the future then, we would be happy to re-evaluate him for consideration of additional Sir spheres to the liver  The patient is wife were in agreement with the above plan and they know they can call any time if they have any questions  2/14/18 Had EGD, dialtion of the esophagus  There was a mild stricture at the site of anastomosis  4/5/18 Had MRI of the abd: The previously noted dominant lesion in the left hepatic lobe demonstrate increasing necrosis suggest treatment response The segment 4 lesion is decreasing in size The lesion in the segment 5 cm remains stable with thin rim enhancement suggesting no viable tissue Segment 2 lesion is stable Enlarging regional lymph node in the upper abdomen just below the silvestre hepatis which measures 2 5 x 2 4 cm    Pt saw both surg Onc, and Med onc  On 4/6/18 Pt met with Dr Bridgett Lares, who discussed, If radiation therapy could be applied to this lesion deferral of systemic treatment initiation could be accomplished  Systemic treatment options: potential treatment options for patients with [address]  including Nexavar, Moreno Aguilar        Pt had previous esophagus EBRT,8/2016  at [facility] in [place]  Treatment recoreds available in [address]  [facility] ([facility] )    4/27/2016 Initial Diagnosis     Esophageal cancer ([facility] )         6/27/2016 - 7/26/2016 Chemotherapy     Taxol 50 + Carbo AUC 2         6/27/2016 - 8/3/2016 Radiation     Concurrent RT Esophagus 4,680 cGy   [facility]         10/31/2016 Surgery     Esophagectomy           Hepatocellular carcinoma ([facility] )    10/31/2016 Initial Diagnosis     Cancer, hepatocellular ([facility] )       10/31/2016 Surgery     Resection/ablation of liver - segments 5/6         10/11/2017 Biopsy     Local recurrence         12/13/2017 -  Radiation     SIRSphere treatment  Treatment site:  whole liver  Whole liver Dose:  Prescribed: 1 66 GBq, Delivered: 1 61GBq  (Right lobe Dose:   Prescribed: 1 06 GBq,  Delivered: 1 03 GBq)  (Left lobe Dose:   Prescribed: 0 60 GBq,  Delivered: 0 58 GBq)         8/3/2018 -  Chemotherapy     Nexavar 400 mg p o  daily  Initially planned to start at a visit on July 13th  Medication not obtained until August 3rd  Diagnosis and Staging: [id number] esophageal adenocarcinoma May 2016  [id number] esophageal adenocarcinoma October 2016    [id number] [place] [id number]  October 2016   Treatment History: Taxol Carbo with radiation   Sir spheres December 13, 2017  Current Therapy: Nexovar  Disease Status: [facility]       History of Present Illness:   Patient returns in follow-up of his [place] [address]  CT his chest shows slight progression disease  A left adrenal nodule has increased in size as well  MRI of the liver shows a mixed response to therapy, but predominantly there has been progression of metastatic disease  Personally reviewed his films  Patient is actually feeling more fatigued with increased shortness of breath  He has also lost weight  His appetite has also diminished      Review of Systems  Complete ROS Surg Onc:   Complete ROS Surg Onc:   Constitutional: The patient has new fatigue, weight loss and change in appetite  Eyes: No complaints of visual problems, no scleral icterus  ENT: no complaints of ear pain, no hoarseness, no difficulty swallowing,  no tinnitus and no new masses in head, oral cavity, or neck  Cardiovascular: No complaints of chest pain, no palpitations, no ankle edema  Respiratory: No complaints of shortness of breath, no cough  Gastrointestinal: No complaints of jaundice, no bloody stools, no pale stools  Genitourinary: No complaints of dysuria, no hematuria, no nocturia, no frequent urination, no urethral discharge  Musculoskeletal: No complaints of weakness, paralysis, joint stiffness or arthralgias  Integumentary: No complaints of rash, no new lesions  Neurological: No complaints of convulsions, no seizures, no dizziness  Hematologic/Lymphatic: No complaints of easy bruising  Endocrine:  No hot or cold intolerance  No polydipsia, polyphagia, or polyuria  Allergy/immunology:  No environmental allergies  No food allergies  Not immunocompromised  Skin:  No pallor or rash  No wound          Patient Active Problem List   Diagnosis    [facility]    [facility]at[facility]    Atrial fibrillation (HCC)    Hypotension    Type 2 diabetes mellitus (HCC)    Vision abnormalities    Dysphagia    Restless legs syndrome    Rheumatoid arthritis ([address] )    Sleep apnea    Hyperlipidemia    Hypertension    Peripheral neuropathy    Epidermoid cyst of neck    Glaucoma    Gout    Incisional hernia    Cough    Stage 3 severe COPD by GOLD classification ([facility] )    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)    Anxiety and depression    Acute pain of left shoulder    History of stroke    Mild cognitive impairment    Nutritional deficiency    Coronary artery disease    Goals of care, counseling/discussion    Active advance directive    Acute tracheobronchitis    Gastroesophageal reflux disease without esophagitis     Past Medical History:   Diagnosis Date    Anemia     Coronary artery disease     Dysphagia     Esophageal cancer (HCC)     Gout     Hyperlipidemia     Hypertension     Restless leg syndrome     Rheumatoid arthritis ([facility]     Sleep apnea     Vision abnormalities      Past Surgical History:   Procedure Laterality Date    APPENDECTOMY      CHOLECYSTECTOMY      GASTROJEJUNOSTOMY W/ JEJUNOSTOMY TUBE N/A 8/4/2016    Procedure: INSERTION JEJUNOSTOMY TUBE OPEN;  Surgeon: Samantha Giles MD;  [facility];  Service:     KNEE ARTHROSCOPY      LIVER RESECTION N/A 10/31/2016    Procedure: INTRAOPERATIVE ULTRASOUND OF LIVER; LIVER LESION RESECTION/ABLATION ;  Surgeon: Samantha Giles MD;  [facility];  Service:    Orvil Janki PLACEMENT      WI EGD INSERT GUIDE WIRE DILATOR PASSAGE ESOPHAGUS N/A 2/14/2018    Procedure: ESOPHAGOGASTRODUODENOSCOPY (EGD) with dilation;  Surgeon: Judy Aranda MD;  [facility];  Service: Thoracic    WI ESOPHAGOGASTRODUODENOSCOPY TRANSORAL DIAGNOSTIC N/A 10/31/2016    Procedure: ESOPHAGOGASTRODUODENOSCOPY (EGD);   Surgeon: Samantha Giles MD;  [facility];  Service: Surgical Oncology    WI ESOPHAGOSCOPY FLEX BALLOON DILAT <30 MM DIAM N/A 1/6/2017    Procedure: DILATATION ESOPHAGEAL;  Surgeon: Judy Aranda MD;  [facility];  Service: Thoracic    WI ESOPHAGOSCOPY FLEX BALLOON DILAT <30 MM DIAM N/A 12/12/2016    Procedure: DILATATION ESOPHAGEAL, EGD;  Surgeon: Judy Aranda MD;  [facility];  Service: Thoracic    WI ESOPHAGOSCOPY FLEX BALLOON DILAT <30 MM DIAM N/A 5/24/2017    Procedure: EGD WITH DILATION ;  Surgeon: Caty Barrow MD;  [facility];  Service: Thoracic    WI ESOPHAGOSCOPY FLEX BALLOON DILAT <30 MM DIAM N/A 8/3/2017    Procedure: EGD WITH DILATION;  Surgeon: Judy Aranda MD;  [facility];  Service: Thoracic    WI REMOVAL [facility] N/A 10/31/2016 Procedure: ESOPHAGECTOMY;  Surgeon: Dempsey Fabry, MD;  [facility];  Service: Surgical Oncology    TONSILLECTOMY      WOUND DEBRIDEMENT Left 5/3/2018    Procedure: DEBRIDEMENT UPPER EXTREMITY ([address]) left shoulder;  Surgeon: Jacquie Bey MD;  [facility];  Service: Orthopedics     Family History   Problem Relation Age of Onset    Stroke Mother     Colon cancer Father     Other Father         Pneumoconiosis    Cancer Sister         oral cancer     Social History     Social History    Marital status: /Civil Union     Spouse name: N/A    Number of children: N/A    Years of education: N/A     Occupational History          Social History Main Topics    Smoking status: Former Smoker     Packs/day: 3 00     Years: 40 00     Types: Cigarettes     Start date: 1951     Quit date: 4/13/1991    Smokeless tobacco: Never Used    Alcohol use No    Drug use: No    Sexual activity: Not Currently     Other Topics Concern    Not on file     Social History Narrative    No narrative on file       Current Outpatient Prescriptions:     albuterol (2 5 mg/3 mL) 0 083 % nebulizer solution, Take 1 ampule by nebulization every 4 (four) hours as needed for wheezing  , Disp: , Rfl:     allopurinol (ZYLOPRIM) 100 mg tablet, Take 100 mg by mouth daily, Disp: , Rfl:     amiodarone 200 mg tablet, Take 200 mg by mouth daily, Disp: , Rfl:     apixaban (ELIQUIS) 5 mg, Take 5 mg by mouth 2 (two) times a day, Disp: , Rfl:     calcium carbonate (TUMS EX) 750 mg chewable tablet, Chew 1 tablet (750 mg total) daily, Disp: 30 tablet, Rfl: 0    diazepam (VALIUM) 5 mg tablet, Take 5 mg by mouth every 6 (six) hours as needed for anxiety, Disp: , Rfl:     diphenhydrAMINE (BENADRYL) 25 mg capsule, Take 25 mg by mouth daily, Disp: , Rfl:     hydrOXYzine HCL (ATARAX) 25 mg tablet, Take 25 mg by mouth 2 (two) times a day as needed for itching, Disp: , Rfl:     insulin aspart (NovoLOG) 100 units/mL injection, Inject under the skin as needed for high blood sugar  , Disp: , Rfl:     latanoprost (XALATAN) 0 005 % ophthalmic solution, Administer 1 drop to both eyes daily at bedtime  , Disp: , Rfl:     LORazepam (ATIVAN) 1 mg tablet, Take 2 mg by mouth daily at bedtime  , Disp: , Rfl:     prochlorperazine (COMPAZINE) 10 mg tablet, Take 1 tablet (10 mg total) by mouth every 6 (six) hours as needed for nausea or vomiting, Disp: 30 tablet, Rfl: 2    ranitidine (ZANTAC) 150 mg tablet, Take 1 tablet (150 mg total) by mouth daily at bedtime, Disp: 30 tablet, Rfl: 0    rOPINIRole (REQUIP) 1 mg tablet, Take 3 mg by mouth daily at bedtime Take 1mg in am and 3mg at bedtime , Disp: , Rfl:     sertraline (ZOLOFT) 25 mg tablet, Take 25 mg by mouth daily at bedtime  , Disp: , Rfl:     SORAfenib (NexAVAR) 200 MG tablet, Take one tablet by mouth every 12 hours, Disp: 60 tablet, Rfl: 3    traMADol (ULTRAM) 50 mg tablet, Take 50 mg by mouth every 6 (six) hours as needed for moderate pain, Disp: , Rfl:     Umeclidinium-Vilanterol (ANORO ELLIPTA) 62 5-25 MCG/INH AEPB, Inhale 1 puff daily, Disp: 1 each, Rfl: 11    umeclidinium-vilanterol (ANORO ELLIPTA) 62 5-25 MCG/INH inhaler, Inhale 1 puff daily, Disp: 2 Inhaler, Rfl: 0  No current facility-administered medications for this visit  Allergies   Allergen Reactions    Humira [Adalimumab] Rash     itchy    Levaquin [Levofloxacin In D5w] Rash    Levofloxacin Rash    Lovenox [Enoxaparin] Rash and Hives     Vitals:    10/18/18 1219   BP: 122/78   Pulse: 70   Resp: 16   Temp: (!) 96 8 °F (36 °C)       Physical Exam  Constitutional: General appearance: The Patient is well-developed and well-nourished who appears the stated age in no acute distress  Patient is pleasant and talkative  HEENT:  Normocephalic  Sclerae are anicteric  Mucous membranes are moist  Neck is supple without adenopathy  No JVD  Chest: The lungs are clear to auscultation       Cardiac: Heart is regular rate  Abdomen: Abdomen is soft, non-tender, non-distended and without masses  Extremities: There is no clubbing or cyanosis  There is no edema  Symmetric  Neuro: Grossly nonfocal  Gait is normal      Lymphatic: No evidence of cervical adenopathy bilaterally  No evidence of axillary adenopathy bilaterally  No evidence of inguinal adenopathy bilaterally  Skin: Warm, anicteric  Psych:  Patient is pleasant and talkative  Breasts:        Pathology:  [unfilled]    Labs:      Imaging  Xr Chest Pa & Lateral    Result Date: 9/21/2018  Narrative: CHEST INDICATION:   J20 9: Acute bronchitis, unspecified  Productive cough and chest congestion COMPARISON:  4/5/2018 EXAM PERFORMED/VIEWS:  XR CHEST PA & LATERAL  The frontal view was performed utilizing dual energy radiographic technique  FINDINGS:  Sehghl-e-Ecmn catheter unchanged in position Cardiomediastinal silhouette appears unremarkable  There is no evidence of acute infiltrate  No significant effusion or pneumothorax  Patient is status post gastric pull-through procedure  Osseous structures appear within normal limits for patient age  Impression: No acute pulmonary disease detected Workstation performed: [id number]     Ct Chest Wo Contrast    Result Date: 10/18/2018  Narrative: CT CHEST WITHOUT IV CONTRAST INDICATION: History of hepatocellular and esophageal carcinoma  COMPARISON:  7/5/2018  TECHNIQUE: CT examination of the chest was performed without intravenous contrast   Axial, sagittal, and coronal 2D reformatted images were created from the source data and submitted for interpretation  Radiation dose length product (DLP) for this visit:  336 mGy-cm   This examination, like all CT scans performed in the [facility], was performed utilizing techniques to minimize radiation dose exposure, including the use of iterative reconstruction and automated exposure control   FINDINGS: LUNGS:  Multiple pulmonary nodules are again identified  There is been slight progression of disease from the previous exam for example there is interval enlargement of an 8 mm right upper lobe pulmonary nodule series 2 image 34 previously this measured  approximately 3 mm  There are several new nodules present as well  Scattered faint opacities within the left upper and lower lobes may be infectious radiology  PLEURA:  Unremarkable  HEART/GREAT VESSELS:  Unremarkable for patient's age  MEDIASTINUM AND JORGE:  Postoperative changes related to esophagectomy again identified  CHEST WALL AND LOWER NECK:   Right chest wall port catheter is present  VISUALIZED STRUCTURES IN THE UPPER ABDOMEN:  Interval enlargement of left adrenal nodule is noted measuring 2 8 x 2 0 cm compared with 2 1 x 1 7 cm previously  Multiple incompletely visualized hepatic masses are again seen  OSSEOUS STRUCTURES:  No acute fracture or destructive osseous lesion  Impression: Interval progression of pulmonary and left adrenal metastatic disease  Multiple hepatic masses incompletely visualized  Scattered left upper and lower lobe faint opacities which may be infectious in etiology  This can be assessed on follow-up  Workstation performed: [id number]     Mri Abdomen W Wo Contrast    Result Date: 10/18/2018  Narrative: MRI OF THE ABDOMEN (LIVER) WITH AND WITHOUT CONTRAST INDICATION:  Hepatocellular carcinoma  COMPARISON:  Multiple prior examinations including radha most recent MRI performed July 5, 2018  TECHNIQUE:  The following pulse sequences were obtained on a 1 5 T scanner:  Coronal and axial T2 with TE of 90 and 180 respectively, axial T2 with fat saturation, axial FIESTA fat-sat, axial T1-weighted in-and-out-of phase, axial DWI/ADC, pre-contrast axial T1 with fat saturation, post-contrast dynamic axial T1 with fat saturation at 20, 70, and 180 seconds, followed by coronal and 7 minute delayed axial T1 with fat saturation    IV Contrast:  8 mL of gadobutrol injection (MULTI-DOSE) FINDINGS: LIVER: Tumor mass with its epicenter in the lower lateral left hepatic lobe (segment 3) with a thick rind of postcontrast enhancement is difficult to measure due to its heterogeneous configuration but measures approximately 8 4 x 6 4 cm which compares with 8 3 x 5 7 cm when measured using similar technique on July 5, 2018  However, there tumor mass in the more medial dome of the lateral left hepatic lobe measures 4 5 x 3 8 cm on image 142 of series 11 which represents an increase in size from only 2 9 x 1 6 cm when measured using similar technique on the previous examination  An adjacent mass measuring 5 8 x 3 9 cm on image 140 of series 11 is increased from 3 2 x 2 5 cm when measured using similar technique on the previous exam  Heterogeneous and infiltrating type tumor in the periphery of the anterior upper right hepatic lobe measuring approximately 7 0 x 6 8 cm on image 155 of series 11 is increased from 6 4 x 6 0 cm on July 5, 2018  Heterogeneously enhancing mass in the lower posterior right lobe of the liver measuring 2 8 x 2 1 cm along the margin of gallbladder resection on image 184 of series 11 measured 2 6 x 2 2 cm on the previous exam   An enhancing signal along the margin of gallbladder resection in the lower right lobe of the liver which could represent tumor, necrosis, or postsurgical change measuring 3 4 cm is not significantly changed from previous examinations  No new discrete masses are identified  Main and right portal veins are patent  There is 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 in a pattern that is similar when compared with July 5, 2018  BILIARY TREE:  Normal   GALLBLADDER:  Surgically absent  PANCREAS:  Intrinsically unremarkable   ADRENAL GLANDS:  The metastatic lesion in the left adrenal gland measuring 2 8 x 2 0 cm is increased from 2 1 x 1 6 cm on the previous exam  SPLEEN:  Normal  KIDNEYS:  Normal  ABDOMINAL CAVITY:  Essentially necrotic portacaval lymph node measuring 2 1 x 1 9 cm is significantly decreased in size from 2 5 x 2 3 cm on the previous exam   A metastatic lesion associated with the serosal wall of the duodenum on image 308 of series 11 measures 1 6 x 1 6 cm and similar when compared to previous examination when this measured 1 5 x 1 4 cm  BOWEL:  Surgical changes of prior gastric pull-up surgery noted  No bowel obstruction  A simple fluid collection in the gastrohepatic ligament without thick enhancing walls is similar when compared to previous the examination probably representing trapped ascites  Colonic diverticulosis without evidence of acute diverticulitis  OSSEOUS STRUCTURES:  No osseous destruction  EXTRAHEPATIC VASCULAR STRUCTURES:  Visualized vasculature is normal  ABDOMINAL WALL:  Ventral hernia, unchanged  Surgical changes again noted  LOWER CHEST:  Surgical changes of prior gastric pull-up procedure  Impression: Mixed response to therapy is noted however, there has predominantly been progression of metastatic disease in the liver and in the left adrenal gland as described  Again noted is 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 in the lateral left hepatic lobe  A necrotic portacaval node has decreased in size since the prior exam  Workstation performed: [id number]     I reviewed the above laboratory and imaging data  Discussion/Summary:   20-year-old male status post esophagectomy and liver resection  He has undergone Sir spheres embolization  There is progression of disease in his liver and lung  He has been started on systemic therapy, but this appears to have progressed  I recommended that he follow up with Dr Deng Kim to see if some other type therapy can be initiated, possibly immunotherapy  He is already seeing palliative care    At this time since there is progression of disease outside is liver and systemic therapy will be mainstay of his treatment, he will follow up with   [facility] and I will see him again in the future should the need arise  He is agreeable to this plan  All his questions were answered

## 2018-10-18 NOTE — PROGRESS NOTES
Surgical Oncology Follow Up       [facility]  [facility] [place]  [address] [facility] [address]  1945  [phone]  [facility]  [facility] [id number]    There are no diagnoses linked to this encounter  Chief Complaint   Patient presents with    Follow-up       No Follow-up on file  Oncology History    64-year-old male status post esophagectomy and liver resection 10/31/16 for esophageal cancer and hepatocellular carcinoma in 2016  Treatment included a course of radiation to the esophagus to a total dose of 4680 cGy with treatments given from 6/27/16 to 8/3/2016  Radiation therapy was administered at [facility] by Dr Howard Bains  Concurrent Taxol and Carboplatin was under the care of Dr Navin Billy at [facility]  CT scan of the abdomen and MRI of the abdomen in October 2017 revealed a new lesion in the left lobe of the liver measuring 4 1 x 2 7 cm with 1-2 additional lesions  CT-guided biopsy liver lesion was positive for hepatocellular carcinoma on October 11, 2017  Recommendations were made for Sir sphere radiation therapy to the whole liver which was given on December 13, 2017  The patient returns today for follow-up examination  reports he is feeling well and is having no abdominal complaints  He denies any pain  He is still having occasional nausea with some dysphagia and is considering having a repeat esophageal dilation performed  He had repeat blood work including liver function studies performed January 19, 2017 at [facility] but results are not available today  We will call him with these results  He has MRI of the abdomen and follow-up appointment with Dr Melva Juarez on the same day February 6, 2018  Hopefully, this will show good results and response to treatment   Assuming MRI shows favorable response, then we would recommend repeat MRI again in 3 months  The patient states he will be following with Dr Yomi Lofton every 3 months  It takes about 1 5 hours to come here from [place], [place] and we will therefore see him here on a p r n  basis  Should he have evidence of progressive disease the future then, we would be happy to re-evaluate him for consideration of additional Sir spheres to the liver  The patient is wife were in agreement with the above plan and they know they can call any time if they have any questions  2/14/18 Had EGD, dialtion of the esophagus  There was a mild stricture at the site of anastomosis  4/5/18 Had MRI of the abd: The previously noted dominant lesion in the left hepatic lobe demonstrate increasing necrosis suggest treatment response The segment 4 lesion is decreasing in size The lesion in the segment 5 cm remains stable with thin rim enhancement suggesting no viable tissue Segment 2 lesion is stable Enlarging regional lymph node in the upper abdomen just below the silvestre hepatis which measures 2 5 x 2 4 cm    Pt saw both surg Onc, and Med onc  On 4/6/18 Pt met with Dr Paula Britt, who discussed, If radiation therapy could be applied to this lesion deferral of systemic treatment initiation could be accomplished  Systemic treatment options: potential treatment options for patients with [address]  including Nexavar, Curvin Matsu  Pt had previous esophagus EBRT,8/2016  at [facility] in [place]  Treatment recoreds available in [address]           Esophageal cancer ([address] )    4/27/2016 Initial Diagnosis     Esophageal cancer ([address] )         6/27/2016 - 7/26/2016 Chemotherapy     Taxol 50 + Carbo AUC 2         6/27/2016 - 8/3/2016 Radiation     Concurrent [facility] 4,680 cGy   [facility]         10/31/2016 Surgery     Esophagectomy           Hepatocellular carcinoma ([address] )    10/31/2016 Initial Diagnosis     Cancer, hepatocellular ([address] )       10/31/2016 Surgery Resection/ablation of liver - segments 5/6         10/11/2017 Biopsy     Local recurrence         12/13/2017 -  Radiation     SIRSphere treatment  Treatment site:  whole liver  Whole liver Dose:  Prescribed: 1 66 GBq, Delivered: 1 61GBq  (Right lobe Dose:   Prescribed: 1 06 GBq,  Delivered: 1 03 GBq)  (Left lobe Dose:   Prescribed: 0 60 GBq,  Delivered: 0 58 GBq)         8/3/2018 -  Chemotherapy     Nexavar 400 mg p o  daily  Initially planned to start at a visit on July 13th  Medication not obtained until August 3rd  Diagnosis and Staging: [id number] esophageal adenocarcinoma May 2016  [id number] esophageal adenocarcinoma October 2016    [id number] [facility] [address]  October 2016   Treatment History: Taxol Carbo with radiation   Sir spheres December 13, 2017  Current Therapy: Nexovar  Disease Status: Recurrent [facility] [address]      History of Present Illness:   Patient returns in follow-up of his [facility] [address]  CT his chest shows slight progression disease  A left adrenal nodule has increased in size as well  MRI of the liver shows a mixed response to therapy, but predominantly there has been progression of metastatic disease  Personally reviewed his films  Patient is actually feeling more fatigued with increased shortness of breath  He has also lost weight  His appetite has also diminished  Review of Systems  Complete ROS Surg Onc:   Complete ROS Surg Onc:   Constitutional: The patient has new fatigue, weight loss and change in appetite  Eyes: No complaints of visual problems, no scleral icterus  ENT: no complaints of ear pain, no hoarseness, no difficulty swallowing,  no tinnitus and no new masses in head, oral cavity, or neck  Cardiovascular: No complaints of chest pain, no palpitations, no ankle edema  Respiratory: No complaints of shortness of breath, no cough  Gastrointestinal: No complaints of jaundice, no bloody stools, no pale stools     Genitourinary: No complaints of dysuria, no hematuria, no nocturia, no frequent urination, no urethral discharge  Musculoskeletal: No complaints of weakness, paralysis, joint stiffness or arthralgias  Integumentary: No complaints of rash, no new lesions  Neurological: No complaints of convulsions, no seizures, no dizziness  Hematologic/Lymphatic: No complaints of easy bruising  Endocrine:  No hot or cold intolerance  No polydipsia, polyphagia, or polyuria  Allergy/immunology:  No environmental allergies  No food allergies  Not immunocompromised  Skin:  No pallor or rash  No wound          Patient Active Problem List   Diagnosis    Esophageal cancer ([place] [address] )    Hepatocellular carcinoma (HCC)    Atrial fibrillation (HCC)    Hypotension    Type 2 diabetes mellitus (HCC)    Vision abnormalities    Dysphagia    Restless legs syndrome    Rheumatoid arthritis (HCC)    Sleep apnea    Hyperlipidemia    Hypertension    Peripheral neuropathy    Epidermoid cyst of neck    Glaucoma    Gout    Incisional hernia    Cough    Stage 3 severe COPD by GOLD classification ([place] [address] )    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)    Anxiety and depression    Acute pain of left shoulder    History of stroke    Mild cognitive impairment    Nutritional deficiency    Coronary artery disease    Goals of care, counseling/discussion    Active advance directive    Acute tracheobronchitis    Gastroesophageal reflux disease without esophagitis     Past Medical History:   Diagnosis Date    Anemia     Coronary artery disease     Dysphagia     Esophageal cancer (HCC)     Gout     Hyperlipidemia     Hypertension     Restless leg syndrome     Rheumatoid arthritis ([place] [address] )     Sleep apnea     Vision abnormalities      Past Surgical History:   Procedure Laterality Date    APPENDECTOMY      CHOLECYSTECTOMY      GASTROJEJUNOSTOMY W/ JEJUNOSTOMY TUBE N/A 8/4/2016    Procedure: INSERTION JEJUNOSTOMY TUBE OPEN;  Surgeon: Adolph Hernandez MD;  [facility]; Service:     KNEE ARTHROSCOPY      LIVER RESECTION N/A 10/31/2016    Procedure: INTRAOPERATIVE ULTRASOUND OF LIVER; LIVER LESION RESECTION/ABLATION ;  Surgeon: Isaias Omalley MD;  [facility];  Service:    Georgeana Rouleau PORTACATH PLACEMENT      MD EGD INSERT GUIDE WIRE DILATOR PASSAGE ESOPHAGUS N/A 2/14/2018    Procedure: ESOPHAGOGASTRODUODENOSCOPY (EGD) with dilation;  Surgeon: Deanna Rainey MD;  [facility];  Service: Thoracic    MD ESOPHAGOGASTRODUODENOSCOPY TRANSORAL DIAGNOSTIC N/A 10/31/2016    Procedure: ESOPHAGOGASTRODUODENOSCOPY (EGD);   Surgeon: Isaias Omalley MD;  [facility];  Service: Surgical Oncology    MD ESOPHAGOSCOPY FLEX Ressie Regla <30 MM DIAM N/A 1/6/2017    Procedure: DILATATION ESOPHAGEAL;  Surgeon: Deanna Rainey MD;  [facility];  Service: Thoracic    MD ESOPHAGOSCOPY FLEX BALLOON DILAT <30 MM DIAM N/A 12/12/2016    Procedure: DILATATION ESOPHAGEAL, EGD;  Surgeon: Deanna Rainey MD;  [facility];  Service: Thoracic    MD ESOPHAGOSCOPY FLEX BALLOON DILAT <30 MM DIAM N/A 5/24/2017    Procedure: EGD WITH DILATION ;  Surgeon: Adiel Guy MD;  [facility];  Service: Thoracic    MD ESOPHAGOSCOPY FLEX BALLOON DILAT <30 MM DIAM N/A 8/3/2017    Procedure: EGD WITH DILATION;  Surgeon: Deanna Rainey MD;  [facility];  Service: Thoracic    MD REMOVAL [address] N/A 10/31/2016    Procedure: ESOPHAGECTOMY;  Surgeon: Isaias Omalley MD;  [facility];  Service: Surgical Oncology    TONSILLECTOMY      WOUND DEBRIDEMENT Left 5/3/2018    Procedure: DEBRIDEMENT UPPER EXTREMITY ([address]) left shoulder;  Surgeon: Kelsey Alba MD;  [facility];  Service: Orthopedics     Family History   Problem Relation Age of Onset    Stroke Mother     Colon cancer Father     Other Father         Pneumoconiosis    Cancer Sister         oral cancer     Social History     Social History    Marital status: /Civil Union     Spouse name: N/A    Number of children: N/A    Years of education: N/A     Occupational History          Social History Main Topics    Smoking status: Former Smoker     Packs/day: 3 00     Years: 40 00     Types: Cigarettes     Start date: 1951     Quit date: 4/13/1991    Smokeless tobacco: Never Used    Alcohol use No    Drug use: No    Sexual activity: Not Currently     Other Topics Concern    Not on file     Social History Narrative    No narrative on file       Current Outpatient Prescriptions:     albuterol (2 5 mg/3 mL) 0 083 % nebulizer solution, Take 1 ampule by nebulization every 4 (four) hours as needed for wheezing  , Disp: , Rfl:     allopurinol (ZYLOPRIM) 100 mg tablet, Take 100 mg by mouth daily, Disp: , Rfl:     amiodarone 200 mg tablet, Take 200 mg by mouth daily, Disp: , Rfl:     apixaban (ELIQUIS) 5 mg, Take 5 mg by mouth 2 (two) times a day, Disp: , Rfl:     calcium carbonate (TUMS EX) 750 mg chewable tablet, Chew 1 tablet (750 mg total) daily, Disp: 30 tablet, Rfl: 0    diazepam (VALIUM) 5 mg tablet, Take 5 mg by mouth every 6 (six) hours as needed for anxiety, Disp: , Rfl:     diphenhydrAMINE (BENADRYL) 25 mg capsule, Take 25 mg by mouth daily, Disp: , Rfl:     hydrOXYzine HCL (ATARAX) 25 mg tablet, Take 25 mg by mouth 2 (two) times a day as needed for itching, Disp: , Rfl:     insulin aspart (NovoLOG) 100 units/mL injection, Inject under the skin as needed for high blood sugar  , Disp: , Rfl:     latanoprost (XALATAN) 0 005 % ophthalmic solution, Administer 1 drop to both eyes daily at bedtime  , Disp: , Rfl:     LORazepam (ATIVAN) 1 mg tablet, Take 2 mg by mouth daily at bedtime  , Disp: , Rfl:     prochlorperazine (COMPAZINE) 10 mg tablet, Take 1 tablet (10 mg total) by mouth every 6 (six) hours as needed for nausea or vomiting, Disp: 30 tablet, Rfl: 2    ranitidine (ZANTAC) 150 mg tablet, Take 1 tablet (150 mg total) by mouth daily at bedtime, Disp: 30 tablet, Rfl: 0    rOPINIRole (REQUIP) 1 mg tablet, Take 3 mg by mouth daily at bedtime Take 1mg in am and 3mg at bedtime , Disp: , Rfl:     sertraline (ZOLOFT) 25 mg tablet, Take 25 mg by mouth daily at bedtime  , Disp: , Rfl:     SORAfenib (NexAVAR) 200 MG tablet, Take one tablet by mouth every 12 hours, Disp: 60 tablet, Rfl: 3    traMADol (ULTRAM) 50 mg tablet, Take 50 mg by mouth every 6 (six) hours as needed for moderate pain, Disp: , Rfl:     Umeclidinium-Vilanterol (ANORO ELLIPTA) 62 5-25 MCG/INH AEPB, Inhale 1 puff daily, Disp: 1 each, Rfl: 11    umeclidinium-vilanterol (ANORO ELLIPTA) 62 5-25 MCG/INH inhaler, Inhale 1 puff daily, Disp: 2 Inhaler, Rfl: 0  No current facility-administered medications for this visit  Allergies   Allergen Reactions    Humira [Adalimumab] Rash     itchy    Levaquin [Levofloxacin In D5w] Rash    Levofloxacin Rash    Lovenox [Enoxaparin] Rash and Hives     Vitals:    10/18/18 1219   BP: 122/78   Pulse: 70   Resp: 16   Temp: (!) 96 8 °F (36 °C)       Physical Exam  Constitutional: General appearance: The Patient is well-developed and well-nourished who appears the stated age in no acute distress  Patient is pleasant and talkative  HEENT:  Normocephalic  Sclerae are anicteric  Mucous membranes are moist  Neck is supple without adenopathy  No JVD  Chest: The lungs are clear to auscultation  Cardiac: Heart is regular rate  Abdomen: Abdomen is soft, non-tender, non-distended and without masses  Extremities: There is no clubbing or cyanosis  There is no edema  Symmetric  Neuro: Grossly nonfocal  Gait is normal      Lymphatic: No evidence of cervical adenopathy bilaterally  No evidence of axillary adenopathy bilaterally  No evidence of inguinal adenopathy bilaterally  Skin: Warm, anicteric  Psych:  Patient is pleasant and talkative    Breasts:        Pathology:  [unfilled]    Labs:      Imaging  Xr Chest Pa & Lateral    Result Date: 9/21/2018  Narrative: CHEST INDICATION:   J20 9: Acute bronchitis, unspecified  Productive cough and chest congestion COMPARISON:  4/5/2018 EXAM PERFORMED/VIEWS:  XR CHEST PA & LATERAL  The frontal view was performed utilizing dual energy radiographic technique  FINDINGS:  Baecqn-f-Ekyc catheter unchanged in position Cardiomediastinal silhouette appears unremarkable  There is no evidence of acute infiltrate  No significant effusion or pneumothorax  Patient is status post gastric pull-through procedure  Osseous structures appear within normal limits for patient age  Impression: No acute pulmonary disease detected Workstation performed: [id number]     Ct Chest Wo Contrast    Result Date: 10/18/2018  Narrative: CT CHEST WITHOUT IV CONTRAST INDICATION: History of hepatocellular and esophageal carcinoma  COMPARISON:  7/5/2018  TECHNIQUE: CT examination of the chest was performed without intravenous contrast   Axial, sagittal, and coronal 2D reformatted images were created from the source data and submitted for interpretation  Radiation dose length product (DLP) for this visit:  336 mGy-cm   This examination, like all CT scans performed in the [facility], was performed utilizing techniques to minimize radiation dose exposure, including the use of iterative reconstruction and automated exposure control  FINDINGS: LUNGS:  Multiple pulmonary nodules are again identified  There is been slight progression of disease from the previous exam for example there is interval enlargement of an 8 mm right upper lobe pulmonary nodule series 2 image 34 previously this measured  approximately 3 mm  There are several new nodules present as well  Scattered faint opacities within the left upper and lower lobes may be infectious radiology  PLEURA:  Unremarkable  HEART/GREAT VESSELS:  Unremarkable for patient's age   MEDIASTINUM AND JORGE:  Postoperative changes related to esophagectomy again identified  CHEST WALL AND LOWER NECK:   Right chest wall port catheter is present  VISUALIZED STRUCTURES IN THE UPPER ABDOMEN:  Interval enlargement of left adrenal nodule is noted measuring 2 8 x 2 0 cm compared with 2 1 x 1 7 cm previously  Multiple incompletely visualized hepatic masses are again seen  OSSEOUS STRUCTURES:  No acute fracture or destructive osseous lesion  Impression: Interval progression of pulmonary and left adrenal metastatic disease  Multiple hepatic masses incompletely visualized  Scattered left upper and lower lobe faint opacities which may be infectious in etiology  This can be assessed on follow-up  Workstation performed: [id number]     Mri Abdomen W Wo Contrast    Result Date: 10/18/2018  Narrative: MRI OF THE ABDOMEN (LIVER) WITH AND WITHOUT CONTRAST INDICATION:  Hepatocellular carcinoma  COMPARISON:  Multiple prior examinations including radha most recent MRI performed July 5, 2018  TECHNIQUE:  The following pulse sequences were obtained on a 1 5 T scanner:  Coronal and axial T2 with TE of 90 and 180 respectively, axial T2 with fat saturation, axial FIESTA fat-sat, axial T1-weighted in-and-out-of phase, axial DWI/ADC, pre-contrast axial T1 with fat saturation, post-contrast dynamic axial T1 with fat saturation at 20, 70, and 180 seconds, followed by coronal and 7 minute delayed axial T1 with fat saturation  IV Contrast:  8 mL of gadobutrol injection (MULTI-DOSE) FINDINGS: LIVER: Tumor mass with its epicenter in the lower lateral left hepatic lobe (segment 3) with a thick rind of postcontrast enhancement is difficult to measure due to its heterogeneous configuration but measures approximately 8 4 x 6 4 cm which compares with 8 3 x 5 7 cm when measured using similar technique on July 5, 2018    However, there tumor mass in the more medial dome of the lateral left hepatic lobe measures 4 5 x 3 8 cm on image 142 of series 11 which represents an increase in size from only 2 9 x 1 6 cm when measured using similar technique on the previous examination  An adjacent mass measuring 5 8 x 3 9 cm on image 140 of series 11 is increased from 3 2 x 2 5 cm when measured using similar technique on the previous exam  Heterogeneous and infiltrating type tumor in the periphery of the anterior upper right hepatic lobe measuring approximately 7 0 x 6 8 cm on image 155 of series 11 is increased from 6 4 x 6 0 cm on July 5, 2018  Heterogeneously enhancing mass in the lower posterior right lobe of the liver measuring 2 8 x 2 1 cm along the margin of gallbladder resection on image 184 of series 11 measured 2 6 x 2 2 cm on the previous exam   An enhancing signal along the margin of gallbladder resection in the lower right lobe of the liver which could represent tumor, necrosis, or postsurgical change measuring 3 4 cm is not significantly changed from previous examinations  No new discrete masses are identified  Main and right portal veins are patent  There is 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 in a pattern that is similar when compared with July 5, 2018  BILIARY TREE:  Normal   GALLBLADDER:  Surgically absent  PANCREAS:  Intrinsically unremarkable  ADRENAL GLANDS:  The metastatic lesion in the left adrenal gland measuring 2 8 x 2 0 cm is increased from 2 1 x 1 6 cm on the previous exam  SPLEEN:  Normal  KIDNEYS:  Normal  ABDOMINAL CAVITY:  Essentially necrotic portacaval lymph node measuring 2 1 x 1 9 cm is significantly decreased in size from 2 5 x 2 3 cm on the previous exam   A metastatic lesion associated with the serosal wall of the duodenum on image 308 of series 11 measures 1 6 x 1 6 cm and similar when compared to previous examination when this measured 1 5 x 1 4 cm  BOWEL:  Surgical changes of prior gastric pull-up surgery noted  No bowel obstruction    A simple fluid collection in the gastrohepatic ligament without thick enhancing walls is similar when compared to previous the examination probably representing trapped ascites  Colonic diverticulosis without evidence of acute diverticulitis  OSSEOUS STRUCTURES:  No osseous destruction  EXTRAHEPATIC VASCULAR STRUCTURES:  Visualized vasculature is normal  ABDOMINAL WALL:  Ventral hernia, unchanged  Surgical changes again noted  LOWER CHEST:  Surgical changes of prior gastric pull-up procedure  Impression: Mixed response to therapy is noted however, there has predominantly been progression of metastatic disease in the liver and in the left adrenal gland as described  Again noted is 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 in the lateral left hepatic lobe  A necrotic portacaval node has decreased in size since the prior exam  Workstation performed: [id number]     I reviewed the above laboratory and imaging data  Discussion/Summary:   40-year-old male status post esophagectomy and liver resection  He has undergone Sir spheres embolization  There is progression of disease in his liver and lung  He has been started on systemic therapy, but this appears to have progressed  I recommended that he follow up with Dr Candy Cabral to see if some other type therapy can be initiated, possibly immunotherapy  He is already seeing palliative care  At this time since there is progression of disease outside is liver and systemic therapy will be mainstay of his treatment, he will follow up with Dr Lee and I will see him again in the future should the need arise  He is agreeable to this plan  All his questions were answered

## 2018-10-23 ENCOUNTER — OFFICE VISIT (OUTPATIENT)
Dept: HEMATOLOGY ONCOLOGY | Facility: HOSPITAL | Age: 73
End: 2018-10-23
Payer: MEDICARE

## 2018-10-23 VITALS
HEART RATE: 58 BPM | DIASTOLIC BLOOD PRESSURE: 72 MMHG | TEMPERATURE: 98.2 F | SYSTOLIC BLOOD PRESSURE: 124 MMHG | BODY MASS INDEX: 27.94 KG/M2 | OXYGEN SATURATION: 97 % | WEIGHT: 199.6 LBS | HEIGHT: 71 IN | RESPIRATION RATE: 17 BRPM

## 2018-10-23 DIAGNOSIS — C22.0 HEPATOCELLULAR CARCINOMA (HCC): Primary | ICD-10-CM

## 2018-10-23 DIAGNOSIS — R53.81 OTHER MALAISE: ICD-10-CM

## 2018-10-23 PROCEDURE — 99215 OFFICE O/P EST HI 40 MIN: CPT | Performed by: INTERNAL MEDICINE

## 2018-10-23 RX ORDER — GABAPENTIN 100 MG/1
100 CAPSULE ORAL DAILY
COMMUNITY
End: 2019-02-04 | Stop reason: ALTCHOICE

## 2018-10-23 NOTE — PROGRESS NOTES
[facility] [address]  [place] [id number] [phone]    Farhan MCCOLLUM,[birth year], [id number]  10/23/18    Discussion:   In summary, this is a 15-year-old male history of hepatocellular carcinoma as well as esophageal carcinoma  Hepatocellular carcinoma is progressive at this time with some new small pulmonary lesions, mixed picture in liver, and increase in adrenal metastases  Portal caval node has responded to radiation therapy previously delivered  We reviewed that treatment change could be considered  The patient is in favor of this  Electa Promise is reasonable to consider  480 mg IV q 4 weeks is recommended  He does have a history of rheumatoid arthritis  We reviewed that this could worsen  It is not considered an absolute contraindication, however  We reviewed potential toxicities including but not limited to diarrhea, eczematoid rash, hypothyroidism  The likelihood of response is approximately 20%, another 10% achieve stable disease  In patients who respond mean responses are longer than 1 year with some responses lasting 2-3 years  I would re-stage him after 2 months and make further decisions thereafter  I discussed the above with the patient  The patient and his wife voiced understanding and agreement   ______________________________________________________________________    Chief Complaint   Patient presents with    Follow-up       HPI:  Oncology History    15-year-old male status post esophagectomy and liver resection 10/31/16 for esophageal cancer and hepatocellular carcinoma in 2016  Treatment included a course of radiation to the esophagus to a total dose of 4680 cGy with treatments given from 6/27/16 to 8/3/2016  Radiation therapy was administered at [facility] by Dr Kenya Uriarte  Concurrent Taxol and Carboplatin was under the care of Dr Candy Cabral at [place] [address]   CT scan of the abdomen and MRI of the abdomen in October 2017 revealed a new lesion in the left lobe of the liver measuring 4 1 x 2 7 cm with 1-2 additional lesions  CT-guided biopsy liver lesion was positive for hepatocellular carcinoma on October 11, 2017  Recommendations were made for Sir sphere radiation therapy to the whole liver which was given on December 13, 2017  The patient returns today for follow-up examination  reports he is feeling well and is having no abdominal complaints  He denies any pain  He is still having occasional nausea with some dysphagia and is considering having a repeat esophageal dilation performed  He had repeat blood work including liver function studies performed January 19, 2017 at [facility] but results are not available today  We will call him with these results  He has MRI of the abdomen and follow-up appointment with Dr Janay Gonzalez on the same day February 6, 2018  Hopefully, this will show good results and response to treatment  Assuming MRI shows favorable response, then we would recommend repeat MRI again in 3 months  The patient states he will be following with Dr Janay Gonzalez every 3 months  It takes about 1 5 hours to come here from [place], [place] and we will therefore see him here on a p r n  basis  Should he have evidence of progressive disease the future then, we would be happy to re-evaluate him for consideration of additional Sir spheres to the liver  The patient is wife were in agreement with the above plan and they know they can call any time if they have any questions  2/14/18 Had EGD, dialtion of the esophagus  There was a mild stricture at the site of anastomosis  4/5/18 Had MRI of the abd:  The previously noted dominant lesion in the left hepatic lobe demonstrate increasing necrosis suggest treatment response The segment 4 lesion is decreasing in size The lesion in the segment 5 cm remains stable with thin rim enhancement suggesting no viable tissue Segment 2 lesion is stable Enlarging regional lymph node in the upper abdomen just below the silvestre hepatis which measures 2 5 x 2 4 cm    Pt saw both surg Onc, and Med onc  On 4/6/18 Pt met with Dr Asenath Claude, who discussed, If radiation therapy could be applied to this lesion deferral of systemic treatment initiation could be accomplished  Systemic treatment options: potential treatment options for patients with [address]  including Nexavar, Nathalie Razo  Pt had previous esophagus EBRT,8/2016  at [facility] in [place]  Treatment recoreds available in [address]  Esophageal cancer ([facility] )    4/27/2016 Initial Diagnosis     Esophageal cancer ([facility] )         6/27/2016 - 7/26/2016 Chemotherapy     Taxol 50 + Carbo AUC 2         6/27/2016 - 8/3/2016 Radiation     Concurrent RT Esophagus 4,680 cGy   @ [facility] [place]         10/31/2016 Surgery     Esophagectomy           [facility] )    10/31/2016 Initial Diagnosis     Cancer, hepatocellular ([facility] )       10/31/2016 Surgery     Resection/ablation of liver - segments 5/6         10/11/2017 Biopsy     Local recurrence         12/13/2017 -  Radiation     SIRSphere treatment  Treatment site:  whole liver  Whole liver Dose:  Prescribed: 1 66 GBq, Delivered: 1 61GBq  (Right lobe Dose:   Prescribed: 1 06 GBq,  Delivered: 1 03 GBq)  (Left lobe Dose:   Prescribed: 0 60 GBq,  Delivered: 0 58 GBq)         8/3/2018 -  Chemotherapy     Nexavar 400 mg p o  daily  Initially planned to start at a visit on July 13th  Medication not obtained until August 3rd  Interval History:  Clinically stable  1 - Symptomatic but completely ambulatory    Review of Systems   Constitutional: Negative for chills and fever  HENT: Negative for nosebleeds  Eyes: Negative for discharge  Respiratory: Negative for cough and shortness of breath  Cardiovascular: Negative for chest pain  Gastrointestinal: Negative for abdominal pain, constipation and diarrhea     Endocrine: Negative for polydipsia  Genitourinary: Negative for hematuria  Musculoskeletal: Negative for arthralgias  Skin: Negative for color change  Allergic/Immunologic: Negative for immunocompromised state  Neurological: Negative for dizziness and headaches  Hematological: Negative for adenopathy  Psychiatric/Behavioral: Negative for agitation         Past Medical History:   Diagnosis Date    Anemia     Coronary artery disease     Dysphagia     Esophageal cancer (HCC)     Gout     Hyperlipidemia     Hypertension     Restless leg syndrome     Rheumatoid arthritis (HCC)     Sleep apnea     Vision abnormalities      Patient Active Problem List   Diagnosis    [email or web address]    Hepatocellular carcinoma ([email or web address]    Atrial fibrillation (HCC)    Hypotension    Type 2 diabetes mellitus (HCC)    Vision abnormalities    Dysphagia    Restless legs syndrome    Rheumatoid arthritis (HCC)    Sleep apnea    Hyperlipidemia    Hypertension    Peripheral neuropathy    Epidermoid cyst of neck    Glaucoma    Gout    Incisional hernia    Cough    Stage 3 severe COPD by GOLD classification (HCC)    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)    Anxiety and depression    Acute pain of left shoulder    History of stroke    Mild cognitive impairment    Nutritional deficiency    Coronary artery disease    Goals of care, counseling/discussion    Active advance directive    Acute tracheobronchitis    Gastroesophageal reflux disease without esophagitis       Current Outpatient Prescriptions:     albuterol (2 5 mg/3 mL) 0 083 % nebulizer solution, Take 1 ampule by nebulization every 4 (four) hours as needed for wheezing  , Disp: , Rfl:     allopurinol (ZYLOPRIM) 100 mg tablet, Take 100 mg by mouth daily, Disp: , Rfl:     amiodarone 200 mg tablet, Take 200 mg by mouth daily, Disp: , Rfl:     apixaban (ELIQUIS) 5 mg, Take 5 mg by mouth 2 (two) times a day, Disp: , Rfl:     calcium carbonate (TUMS EX) 750 mg chewable tablet, Chew 1 tablet (750 mg total) daily, Disp: 30 tablet, Rfl: 0    diazepam (VALIUM) 5 mg tablet, Take 5 mg by mouth every 6 (six) hours as needed for anxiety, Disp: , Rfl:     diphenhydrAMINE (BENADRYL) 25 mg capsule, Take 25 mg by mouth daily, Disp: , Rfl:     hydrOXYzine HCL (ATARAX) 25 mg tablet, Take 25 mg by mouth 2 (two) times a day as needed for itching, Disp: , Rfl:     insulin aspart (NovoLOG) 100 units/mL injection, Inject under the skin as needed for high blood sugar  , Disp: , Rfl:     latanoprost (XALATAN) 0 005 % ophthalmic solution, Administer 1 drop to both eyes daily at bedtime  , Disp: , Rfl:     LORazepam (ATIVAN) 1 mg tablet, Take 2 mg by mouth daily at bedtime  , Disp: , Rfl:     prochlorperazine (COMPAZINE) 10 mg tablet, Take 1 tablet (10 mg total) by mouth every 6 (six) hours as needed for nausea or vomiting, Disp: 30 tablet, Rfl: 2    ranitidine (ZANTAC) 150 mg tablet, Take 1 tablet (150 mg total) by mouth daily at bedtime, Disp: 30 tablet, Rfl: 0    rOPINIRole (REQUIP) 1 mg tablet, Take 3 mg by mouth daily at bedtime Take 1mg in am and 3mg at bedtime , Disp: , Rfl:     sertraline (ZOLOFT) 25 mg tablet, Take 25 mg by mouth daily at bedtime  , Disp: , Rfl:     SORAfenib (NexAVAR) 200 MG tablet, Take one tablet by mouth every 12 hours, Disp: 60 tablet, Rfl: 3    traMADol (ULTRAM) 50 mg tablet, Take 50 mg by mouth every 6 (six) hours as needed for moderate pain, Disp: , Rfl:     Umeclidinium-Vilanterol (ANORO ELLIPTA) 62 5-25 MCG/INH AEPB, Inhale 1 puff daily, Disp: 1 each, Rfl: 11    umeclidinium-vilanterol (ANORO ELLIPTA) 62 5-25 MCG/INH inhaler, Inhale 1 puff daily, Disp: 2 Inhaler, Rfl: 0  Allergies   Allergen Reactions    Humira [Adalimumab] Rash     itchy    Levaquin [Levofloxacin In D5w] Rash    Levofloxacin Rash    Lovenox [Enoxaparin] Rash and Hives     Past Surgical History:   Procedure Laterality Date    APPENDECTOMY      CHOLECYSTECTOMY      GASTROJEJUNOSTOMY W/ JEJUNOSTOMY TUBE N/A 8/4/2016    Procedure: INSERTION JEJUNOSTOMY TUBE OPEN;  Surgeon: Leroy Gaming MD;  [facility];  Service:     KNEE ARTHROSCOPY      LIVER RESECTION N/A 10/31/2016    Procedure: INTRAOPERATIVE ULTRASOUND OF LIVER; LIVER LESION RESECTION/ABLATION ;  Surgeon: Leroy Gaming MD;  [facility];  Service:    Dareen Jessica PLACEMENT      MD EGD INSERT GUIDE WIRE DILATOR PASSAGE ESOPHAGUS N/A 2/14/2018    Procedure: ESOPHAGOGASTRODUODENOSCOPY (EGD) with dilation;  Surgeon: Frankey Goldberg, MD;  [facility];  Service: Thoracic    MD ESOPHAGOGASTRODUODENOSCOPY TRANSORAL DIAGNOSTIC N/A 10/31/2016    Procedure: ESOPHAGOGASTRODUODENOSCOPY (EGD);   Surgeon: Leroy Gaming MD;  [facility];  Service: Surgical Oncology    MD ESOPHAGOSCOPY FLEX BALLOON DILAT <30 MM DIAM N/A 1/6/2017    Procedure: DILATATION ESOPHAGEAL;  Surgeon: Frankey Goldberg, MD;  [facility];  Service: Thoracic    MD ESOPHAGOSCOPY FLEX BALLOON DILAT <30 MM DIAM N/A 12/12/2016    Procedure: DILATATION ESOPHAGEAL, EGD;  Surgeon: Frankey Goldberg, MD;  [facility];  Service: Thoracic    MD ESOPHAGOSCOPY FLEX BALLOON DILAT <30 MM DIAM N/A 5/24/2017    Procedure: EGD WITH DILATION ;  Surgeon: Federico Ahumada MD;  [facility];  Service: Thoracic    MD ESOPHAGOSCOPY FLEX BALLOON DILAT <30 MM DIAM N/A 8/3/2017    Procedure: EGD WITH DILATION;  Surgeon: Frankey Goldberg, MD;  [facility];  Service: Thoracic    MD REMOVAL [address] THORACOTOMY N/A 10/31/2016    Procedure: ESOPHAGECTOMY;  Surgeon: Leroy Gaming MD;  [facility];  Service: Surgical Oncology    TONSILLECTOMY      WOUND DEBRIDEMENT Left 5/3/2018    Procedure: DEBRIDEMENT UPPER EXTREMITY ([address]) left shoulder;  Surgeon: Mily Giles MD;  [facility];  Service: Orthopedics     Social History     Objective:  Vitals:    10/23/18 1236   BP: 124/72 BP Location: Left arm   Patient Position: Sitting   Pulse: 58   Resp: 17   Temp: 98 2 °F (36 8 °C)   TempSrc: Tympanic   SpO2: 97%   Weight: 90 5 kg (199 lb 9 6 oz)   Height: 5' 11" (1 803 m)     Physical Exam   Constitutional: He is oriented to person, place, and time  He appears well-developed  HENT:   Head: Normocephalic  Eyes: Pupils are equal, round, and reactive to light  Neck: Neck supple  Cardiovascular: Normal rate and regular rhythm  No murmur heard  Pulmonary/Chest: Breath sounds normal  He has no wheezes  He has no rales  Abdominal: Soft  There is no tenderness  Musculoskeletal: Normal range of motion  He exhibits no edema or tenderness  Lymphadenopathy:     He has no cervical adenopathy  Neurological: He is alert and oriented to person, place, and time  He has normal reflexes  No cranial nerve deficit  Skin: No rash noted  No erythema  Psychiatric: He has a normal mood and affect  His behavior is normal          Labs: I personally reviewed the labs and imaging pertinent to this patient care

## 2018-10-23 NOTE — LETTER
October 23, 2018     Radha Oliva MD  [address] [place] [address]    Patient: Andi Taylor   YOB: 1945   Date of Visit: 10/23/2018       Dear Dr Chin Amaya: Thank you for referring Thien Alba to me for evaluation  Below are my notes for this consultation  If you have questions, please do not hesitate to call me  I look forward to following your patient along with you  Sincerely,        Ese Chapman DO        CC: MD Khushi Reyes MD Reva Mart, DO  10/23/2018  1:25 PM  Sign at close encounter  [address]  [facility] [place] [address]  [phone]    [facility],5/44/2517, [id number]  10/23/18    Discussion:   In summary, this is a 59-year-old male history of hepatocellular carcinoma as well as esophageal carcinoma  Hepatocellular carcinoma is progressive at this time with some new small pulmonary lesions, mixed picture in liver, and increase in adrenal metastases  Portal caval node has responded to radiation therapy previously delivered  We reviewed that treatment change could be considered  The patient is in favor of this  Ana Paula Poplar is reasonable to consider  480 mg IV q 4 weeks is recommended  We reviewed potential toxicities including but not limited to diarrhea, eczematoid rash, hypothyroidism  The likelihood of response is approximately 20%, another 10% achieve stable disease  In patients who respond mean responses are longer than 1 year with some responses lasting 2-3 years  I would re-stage him after 2 months and make further decisions thereafter  I discussed the above with the patient    The patient and his wife voiced understanding and agreement   ______________________________________________________________________    Chief Complaint   Patient presents with    Follow-up       HPI:  Oncology History    59-year-old male status post esophagectomy and liver resection 10/31/16 for esophageal cancer and hepatocellular carcinoma in 2016  Treatment included a course of radiation to the esophagus to a total dose of 4680 cGy with treatments given from 6/27/16 to 8/3/2016  Radiation therapy was administered at [facility] by Dr Moni Carroll  Concurrent Taxol and Carboplatin was under the care of Dr Sherine Poe at [address]  CT scan of the abdomen and MRI of the abdomen in October 2017 revealed a new lesion in the left lobe of the liver measuring 4 1 x 2 7 cm with 1-2 additional lesions  CT-guided biopsy liver lesion was positive for hepatocellular carcinoma on October 11, 2017  Recommendations were made for Sir sphere radiation therapy to the whole liver which was given on December 13, 2017  The patient returns today for follow-up examination  reports he is feeling well and is having no abdominal complaints  He denies any pain  He is still having occasional nausea with some dysphagia and is considering having a repeat esophageal dilation performed  He had repeat blood work including liver function studies performed January 19, 2017 at [facility] [place] but results are not available today  We will call him with these results  He has MRI of the abdomen and follow-up appointment with Dr Denilson Monte on the same day February 6, 2018  Hopefully, this will show good results and response to treatment  Assuming MRI shows favorable response, then we would recommend repeat MRI again in 3 months  The patient states he will be following with Dr Denilson Monte every 3 months  It takes about 1 5 hours to come here from [place], [place] and we will therefore see him here on a p r n  basis  Should he have evidence of progressive disease the future then, we would be happy to re-evaluate him for consideration of additional Sir spheres to the liver  The patient is wife were in agreement with the above plan and they know they can call any time if they have any questions      2/14/18 Had EGD, dialtion of the esophagus  There was a mild stricture at the site of anastomosis  4/5/18 Had MRI of the abd: The previously noted dominant lesion in the left hepatic lobe demonstrate increasing necrosis suggest treatment response The segment 4 lesion is decreasing in size The lesion in the segment 5 cm remains stable with thin rim enhancement suggesting no viable tissue Segment 2 lesion is stable Enlarging regional lymph node in the upper abdomen just below the silvestre hepatis which measures 2 5 x 2 4 cm    Pt saw both surg Onc, and Med onc  On 4/6/18 Pt met with Dr Pradeep Hi, who discussed, If radiation therapy could be applied to this lesion deferral of systemic treatment initiation could be accomplished  Systemic treatment options: potential treatment options for patients with [address]  including NexDianna lema  Pt had previous esophagus EBRT,8/2016  at [facility] [place]  Treatment recoreds available in [employer]  Esophageal cancer ([facility] )    4/27/2016 Initial Diagnosis     Esophageal cancer ([facility] )         6/27/2016 - 7/26/2016 Chemotherapy     Taxol 50 + Carbo AUC 2         6/27/2016 - 8/3/2016 Radiation     Concurrent [place]ophagus 4,680 cGy   [place] [facility]         10/31/2016 Surgery     Esophagectomy           Hepatocellular carcinoma ([facility] )    10/31/2016 Initial Diagnosis     Cancer, hepatocellular ([facility] )       10/31/2016 Surgery     Resection/ablation of liver - segments 5/6         10/11/2017 Biopsy     Local recurrence         12/13/2017 -  Radiation     SIRSphere treatment  Treatment site:  whole liver  Whole liver Dose:  Prescribed: 1 66 GBq, Delivered: 1 61GBq  (Right lobe Dose:   Prescribed: 1 06 GBq,  Delivered: 1 03 GBq)  (Left lobe Dose:   Prescribed: 0 60 GBq,  Delivered: 0 58 GBq)         8/3/2018 -  Chemotherapy     Nexavar 400 mg p o  daily  Initially planned to start at a visit on July 13th  Medication not obtained until August 3rd  Interval History:  Clinically stable  1 - Symptomatic but completely ambulatory    Review of Systems   Constitutional: Negative for chills and fever  HENT: Negative for nosebleeds  Eyes: Negative for discharge  Respiratory: Negative for cough and shortness of breath  Cardiovascular: Negative for chest pain  Gastrointestinal: Negative for abdominal pain, constipation and diarrhea  Endocrine: Negative for polydipsia  Genitourinary: Negative for hematuria  Musculoskeletal: Negative for arthralgias  Skin: Negative for color change  Allergic/Immunologic: Negative for immunocompromised state  Neurological: Negative for dizziness and headaches  Hematological: Negative for adenopathy  Psychiatric/Behavioral: Negative for agitation         Past Medical History:   Diagnosis Date    Anemia     Coronary artery disease     Dysphagia     Esophageal cancer (HCC)     Gout     Hyperlipidemia     Hypertension     Restless leg syndrome     Rheumatoid arthritis (HCC)     Sleep apnea     Vision abnormalities      Patient Active Problem List   Diagnosis    [facility]    [facility]    Atrial fibrillation (HCC)    Hypotension    Type 2 diabetes mellitus (HCC)    Vision abnormalities    Dysphagia    Restless legs syndrome    Rheumatoid arthritis (HCC)    Sleep apnea    Hyperlipidemia    Hypertension    Peripheral neuropathy    Epidermoid cyst of neck    Glaucoma    Gout    Incisional hernia    Cough    Stage 3 severe COPD by GOLD classification (HCC)    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)    Anxiety and depression    Acute pain of left shoulder    History of stroke    Mild cognitive impairment    Nutritional deficiency    Coronary artery disease    Goals of care, counseling/discussion    Active advance directive    Acute tracheobronchitis    Gastroesophageal reflux disease without esophagitis Current Outpatient Prescriptions:     albuterol (2 5 mg/3 mL) 0 083 % nebulizer solution, Take 1 ampule by nebulization every 4 (four) hours as needed for wheezing  , Disp: , Rfl:     allopurinol (ZYLOPRIM) 100 mg tablet, Take 100 mg by mouth daily, Disp: , Rfl:     amiodarone 200 mg tablet, Take 200 mg by mouth daily, Disp: , Rfl:     apixaban (ELIQUIS) 5 mg, Take 5 mg by mouth 2 (two) times a day, Disp: , Rfl:     calcium carbonate (TUMS EX) 750 mg chewable tablet, Chew 1 tablet (750 mg total) daily, Disp: 30 tablet, Rfl: 0    diazepam (VALIUM) 5 mg tablet, Take 5 mg by mouth every 6 (six) hours as needed for anxiety, Disp: , Rfl:     diphenhydrAMINE (BENADRYL) 25 mg capsule, Take 25 mg by mouth daily, Disp: , Rfl:     hydrOXYzine HCL (ATARAX) 25 mg tablet, Take 25 mg by mouth 2 (two) times a day as needed for itching, Disp: , Rfl:     insulin aspart (NovoLOG) 100 units/mL injection, Inject under the skin as needed for high blood sugar  , Disp: , Rfl:     latanoprost (XALATAN) 0 005 % ophthalmic solution, Administer 1 drop to both eyes daily at bedtime  , Disp: , Rfl:     LORazepam (ATIVAN) 1 mg tablet, Take 2 mg by mouth daily at bedtime  , Disp: , Rfl:     prochlorperazine (COMPAZINE) 10 mg tablet, Take 1 tablet (10 mg total) by mouth every 6 (six) hours as needed for nausea or vomiting, Disp: 30 tablet, Rfl: 2    ranitidine (ZANTAC) 150 mg tablet, Take 1 tablet (150 mg total) by mouth daily at bedtime, Disp: 30 tablet, Rfl: 0    rOPINIRole (REQUIP) 1 mg tablet, Take 3 mg by mouth daily at bedtime Take 1mg in am and 3mg at bedtime , Disp: , Rfl:     sertraline (ZOLOFT) 25 mg tablet, Take 25 mg by mouth daily at bedtime  , Disp: , Rfl:     SORAfenib (NexAVAR) 200 MG tablet, Take one tablet by mouth every 12 hours, Disp: 60 tablet, Rfl: 3    traMADol (ULTRAM) 50 mg tablet, Take 50 mg by mouth every 6 (six) hours as needed for moderate pain, Disp: , Rfl:     Umeclidinium-Vilanterol (ANORO ELLIPTA) 62 5-25 MCG/INH AEPB, Inhale 1 puff daily, Disp: 1 each, Rfl: 11    umeclidinium-vilanterol (ANORO ELLIPTA) 62 5-25 MCG/INH inhaler, Inhale 1 puff daily, Disp: 2 Inhaler, Rfl: 0  Allergies   Allergen Reactions    Humira [Adalimumab] Rash     itchy    Levaquin [Levofloxacin In D5w] Rash    Levofloxacin Rash    Lovenox [Enoxaparin] Rash and Hives     Past Surgical History:   Procedure Laterality Date    APPENDECTOMY      CHOLECYSTECTOMY      GASTROJEJUNOSTOMY W/ JEJUNOSTOMY TUBE N/A 8/4/2016    Procedure: INSERTION JEJUNOSTOMY TUBE OPEN;  Surgeon: Nathanael Salinas MD;  [facility];  Service:     KNEE ARTHROSCOPY      LIVER RESECTION N/A 10/31/2016    Procedure: INTRAOPERATIVE ULTRASOUND OF LIVER; LIVER LESION RESECTION/ABLATION ;  Surgeon: Nathanael Salinas MD;  [facility];  Service:    Rexie Dad PLACEMENT      OH EGD INSERT GUIDE WIRE DILATOR PASSAGE ESOPHAGUS N/A 2/14/2018    Procedure: ESOPHAGOGASTRODUODENOSCOPY (EGD) with dilation;  Surgeon: Misael Heredia MD;  [facility];  Service: Thoracic    OH ESOPHAGOGASTRODUODENOSCOPY TRANSORAL DIAGNOSTIC N/A 10/31/2016    Procedure: ESOPHAGOGASTRODUODENOSCOPY (EGD);   Surgeon: Nathanael Salinas MD;  [facility];  Service: Surgical Oncology    OH ESOPHAGOSCOPY FLEX BALLOON DILAT <30 MM DIAM N/A 1/6/2017    Procedure: DILATATION ESOPHAGEAL;  Surgeon: Misael Heredia MD;  [facility];  Service: Thoracic    OH ESOPHAGOSCOPY FLEX BALLOON DILAT <30 MM DIAM N/A 12/12/2016    Procedure: DILATATION ESOPHAGEAL, EGD;  Surgeon: Misael Heredia MD;  [facility];  Service: Thoracic    OH ESOPHAGOSCOPY FLEX BALLOON DILAT <30 MM DIAM N/A 5/24/2017    Procedure: EGD WITH DILATION ;  Surgeon: Tobin Milligan MD;  [facility];  Service: Thoracic    OH ESOPHAGOSCOPY FLEX Purvi Bony <30 MM DIAM N/A 8/3/2017    Procedure: EGD WITH DILATION;  Surgeon: Misael Heredia MD;  [facility];  Service: Thoracic    RI REMOVAL ESOPHAGUS,NO THORACOTOMY N/A 10/31/2016    Procedure: ESOPHAGECTOMY;  Surgeon: Ye Escalante MD;  [facility];  Service: Surgical Oncology    TONSILLECTOMY      WOUND DEBRIDEMENT Left 5/3/2018    Procedure: DEBRIDEMENT UPPER EXTREMITY ([address]) left shoulder;  Surgeon: Quan Roberts MD;  [facility];  Service: Orthopedics     Social History     Objective:  Vitals:    10/23/18 1236   BP: 124/72   BP Location: Left arm   Patient Position: Sitting   Pulse: 58   Resp: 17   Temp: 98 2 °F (36 8 °C)   TempSrc: Tympanic   SpO2: 97%   Weight: 90 5 kg (199 lb 9 6 oz)   Height: 5' 11" (1 803 m)     Physical Exam   Constitutional: He is oriented to person, place, and time  He appears well-developed  HENT:   Head: Normocephalic  Eyes: Pupils are equal, round, and reactive to light  Neck: Neck supple  Cardiovascular: Normal rate and regular rhythm  No murmur heard  Pulmonary/Chest: Breath sounds normal  He has no wheezes  He has no rales  Abdominal: Soft  There is no tenderness  Musculoskeletal: Normal range of motion  He exhibits no edema or tenderness  Lymphadenopathy:     He has no cervical adenopathy  Neurological: He is alert and oriented to person, place, and time  He has normal reflexes  No cranial nerve deficit  Skin: No rash noted  No erythema  Psychiatric: He has a normal mood and affect  His behavior is normal          Labs: I personally reviewed the labs and imaging pertinent to this patient care

## 2018-10-25 ENCOUNTER — TELEPHONE (OUTPATIENT)
Dept: HEMATOLOGY ONCOLOGY | Facility: CLINIC | Age: 73
End: 2018-10-25

## 2018-10-25 NOTE — TELEPHONE ENCOUNTER
Returned call to patient - explained that Lum Mack is not an FDA approved medication  We are not sure how this type of supplement interacts with his prescribed medications or how effective it is in cancer treatment  He will be switching to immunotherapy starting next week  He will discuss this further with Dr Aneesh Mariscal at his next visit

## 2018-10-25 NOTE — TELEPHONE ENCOUNTER
Patient wants to speak with Crystal regarding Methyl Jasmonate  Wants to see what your thoughts are on it  Please call patient

## 2018-10-26 RX ORDER — SODIUM CHLORIDE 9 MG/ML
20 INJECTION, SOLUTION INTRAVENOUS CONTINUOUS
Status: DISCONTINUED | OUTPATIENT
Start: 2018-10-29 | End: 2018-11-01 | Stop reason: HOSPADM

## 2018-10-28 DIAGNOSIS — T45.1X5A CHEMOTHERAPY-INDUCED NAUSEA: ICD-10-CM

## 2018-10-28 DIAGNOSIS — R11.0 CHEMOTHERAPY-INDUCED NAUSEA: ICD-10-CM

## 2018-10-29 ENCOUNTER — HOSPITAL ENCOUNTER (OUTPATIENT)
Dept: INFUSION CENTER | Facility: CLINIC | Age: 73
Discharge: HOME | End: 2018-10-29
Payer: MEDICARE

## 2018-10-29 ENCOUNTER — APPOINTMENT (OUTPATIENT)
Dept: LAB | Facility: HOSPITAL | Age: 73
End: 2018-10-29
Attending: INTERNAL MEDICINE
Payer: MEDICARE

## 2018-10-29 VITALS
RESPIRATION RATE: 18 BRPM | BODY MASS INDEX: 29.32 KG/M2 | HEIGHT: 69 IN | SYSTOLIC BLOOD PRESSURE: 144 MMHG | TEMPERATURE: 97.5 F | DIASTOLIC BLOOD PRESSURE: 71 MMHG | HEART RATE: 59 BPM | WEIGHT: 197.97 LBS

## 2018-10-29 DIAGNOSIS — K21.9 GASTROESOPHAGEAL REFLUX DISEASE WITHOUT ESOPHAGITIS: ICD-10-CM

## 2018-10-29 LAB
ALBUMIN SERPL BCP-MCNC: 2.3 G/DL (ref 3.5–5)
ALP SERPL-CCNC: 522 U/L (ref 46–116)
ALT SERPL W P-5'-P-CCNC: 41 U/L (ref 12–78)
ANION GAP SERPL CALCULATED.3IONS-SCNC: 4 MMOL/L (ref 4–13)
AST SERPL W P-5'-P-CCNC: 39 U/L (ref 5–45)
BASOPHILS # BLD AUTO: 0.02 THOUSANDS/ΜL (ref 0–0.1)
BASOPHILS NFR BLD AUTO: 0 % (ref 0–1)
BILIRUB SERPL-MCNC: 1 MG/DL (ref 0.2–1)
BUN SERPL-MCNC: 14 MG/DL (ref 5–25)
CALCIUM SERPL-MCNC: 8.8 MG/DL (ref 8.3–10.1)
CHLORIDE SERPL-SCNC: 103 MMOL/L (ref 100–108)
CO2 SERPL-SCNC: 32 MMOL/L (ref 21–32)
CREAT SERPL-MCNC: 0.97 MG/DL (ref 0.6–1.3)
EOSINOPHIL # BLD AUTO: 0.05 THOUSAND/ΜL (ref 0–0.61)
EOSINOPHIL NFR BLD AUTO: 1 % (ref 0–6)
ERYTHROCYTE [DISTWIDTH] IN BLOOD BY AUTOMATED COUNT: 15.3 % (ref 11.6–15.1)
GFR SERPL CREATININE-BSD FRML MDRD: 76 ML/MIN/1.73SQ M
GLUCOSE SERPL-MCNC: 271 MG/DL (ref 65–140)
HCT VFR BLD AUTO: 52.8 % (ref 36.5–49.3)
HGB BLD-MCNC: 16.9 G/DL (ref 12–17)
IMM GRANULOCYTES # BLD AUTO: 0.03 THOUSAND/UL (ref 0–0.2)
IMM GRANULOCYTES NFR BLD AUTO: 1 % (ref 0–2)
LYMPHOCYTES # BLD AUTO: 0.44 THOUSANDS/ΜL (ref 0.6–4.47)
LYMPHOCYTES NFR BLD AUTO: 8 % (ref 14–44)
MCH RBC QN AUTO: 30 PG (ref 26.8–34.3)
MCHC RBC AUTO-ENTMCNC: 32 G/DL (ref 31.4–37.4)
MCV RBC AUTO: 94 FL (ref 82–98)
MONOCYTES # BLD AUTO: 0.37 THOUSAND/ΜL (ref 0.17–1.22)
MONOCYTES NFR BLD AUTO: 7 % (ref 4–12)
NEUTROPHILS # BLD AUTO: 4.31 THOUSANDS/ΜL (ref 1.85–7.62)
NEUTS SEG NFR BLD AUTO: 83 % (ref 43–75)
NRBC BLD AUTO-RTO: 0 /100 WBCS
PLATELET # BLD AUTO: 122 THOUSANDS/UL (ref 149–390)
PMV BLD AUTO: 11.6 FL (ref 8.9–12.7)
POTASSIUM SERPL-SCNC: 4.4 MMOL/L (ref 3.5–5.3)
PROT SERPL-MCNC: 7.8 G/DL (ref 6.4–8.2)
RBC # BLD AUTO: 5.64 MILLION/UL (ref 3.88–5.62)
SODIUM SERPL-SCNC: 139 MMOL/L (ref 136–145)
T4 FREE SERPL-MCNC: 1.11 NG/DL (ref 0.76–1.46)
TSH SERPL DL<=0.05 MIU/L-ACNC: 7.22 UIU/ML (ref 0.36–3.74)
WBC # BLD AUTO: 5.22 THOUSAND/UL (ref 4.31–10.16)

## 2018-10-29 PROCEDURE — 80053 COMPREHEN METABOLIC PANEL: CPT | Performed by: INTERNAL MEDICINE

## 2018-10-29 PROCEDURE — 36415 COLL VENOUS BLD VENIPUNCTURE: CPT | Performed by: INTERNAL MEDICINE

## 2018-10-29 PROCEDURE — 84439 ASSAY OF FREE THYROXINE: CPT | Performed by: INTERNAL MEDICINE

## 2018-10-29 PROCEDURE — 84443 ASSAY THYROID STIM HORMONE: CPT | Performed by: INTERNAL MEDICINE

## 2018-10-29 PROCEDURE — 85025 COMPLETE CBC W/AUTO DIFF WBC: CPT | Performed by: INTERNAL MEDICINE

## 2018-10-29 PROCEDURE — 96413 CHEMO IV INFUSION 1 HR: CPT

## 2018-10-29 RX ORDER — PROCHLORPERAZINE MALEATE 10 MG
TABLET ORAL
Qty: 30 TABLET | Refills: 1 | Status: SHIPPED | OUTPATIENT
Start: 2018-10-29 | End: 2018-12-29 | Stop reason: SDUPTHER

## 2018-10-29 RX ORDER — RANITIDINE 150 MG/1
150 TABLET ORAL
Qty: 30 TABLET | Refills: 5 | Status: SHIPPED | OUTPATIENT
Start: 2018-10-29

## 2018-10-29 RX ADMIN — HEPARIN 300 UNITS: 100 SYRINGE at 16:39

## 2018-10-29 RX ADMIN — SODIUM CHLORIDE 20 ML/HR: 0.9 INJECTION, SOLUTION INTRAVENOUS at 15:22

## 2018-10-29 RX ADMIN — SODIUM CHLORIDE 480 MG: 9 INJECTION, SOLUTION INTRAVENOUS at 15:39

## 2018-10-29 NOTE — PROGRESS NOTES
Pt here for first opdivo infusion  Oriented to infusion center, offers no complaints, resting comfortably  Vitals stable  Labs in process-ok to proceed without results  Call bell in reach, will continue to monitor

## 2018-11-09 ENCOUNTER — TELEPHONE (OUTPATIENT)
Dept: HEMATOLOGY ONCOLOGY | Facility: CLINIC | Age: 73
End: 2018-11-09

## 2018-11-13 PROBLEM — C79.70 METASTASIS TO ADRENAL GLAND (HCC): Status: ACTIVE | Noted: 2018-11-13

## 2018-11-13 PROBLEM — C78.00 PULMONARY METASTASES (HCC): Status: ACTIVE | Noted: 2018-11-13

## 2018-11-13 NOTE — PROGRESS NOTES
Palliative and Supportive Care   Sadi Pierre 74 y o  male [id number]    Assessment/Plan:  1  Malignant neoplasm of lower third of esophagus (HCC)    2  Hepatocellular carcinoma (HCC)    3  Other fatigue    4  Weakness of both lower extremities      Prescription written for physical therapy so patient can pursue this closer to his house  They will work on strength and stamina especially through the winter months  The importance of his functional status in relation to his overall prognosis was discussed  20 minutes were spent face to face with Sadi Pierre and his spouse with greater than 50% of the time spent in counseling or coordination of care including discussions of prognosis of diagnosis, instructions for disease self management and follow up requirements   All of the patient's questions were answered during this discussion  Return in about 3 months (around 2/14/2019)  Patient will attempt to coordinate a visit with me with one of his other specialists due the distance that he travels to get here  Subjective:   Chief Complaint  Follow up visit for:  symptom management  HPI     Sadi Pierre is a 74 y o  male with esophageal cancer as well as more recent hepatocellular carcinoma  He sees Dr Sherine Poe and is on Nexavar  He is status post esophagectomy and liver resection 10/31/16  Treatment included a course of radiation to the esophagus completed 8/3/2016  [place] also had Sir sphere radiation therapy to the whole liver which was given on December 13, 2017  He was on Sorafenib (nexavar) and since our last visit he has been switched to nivolumab (Opdivo) due to hepatocellular disease progression in his lungs and adrenal glands  At his last visit to palliative care he continued to report good quality of life  He continues to  try to be mindful maintaining his weight  He has held off on making any travel plans but continues to feel good    He does not decreased stamina and weakness in his legs       The following portions of the medical history were reviewed: past medical history, problem list, medication list, and social history  Current Outpatient Prescriptions:     albuterol (2 5 mg/3 mL) 0 083 % nebulizer solution, Take 1 ampule by nebulization every 4 (four) hours as needed for wheezing  , Disp: , Rfl:     allopurinol (ZYLOPRIM) 100 mg tablet, Take 100 mg by mouth daily, Disp: , Rfl:     amiodarone 200 mg tablet, Take 200 mg by mouth daily, Disp: , Rfl:     apixaban (ELIQUIS) 5 mg, Take 5 mg by mouth 2 (two) times a day, Disp: , Rfl:     calcium carbonate (TUMS EX) 750 mg chewable tablet, Chew 1 tablet (750 mg total) daily, Disp: 30 tablet, Rfl: 0    diazepam (VALIUM) 5 mg tablet, Take 5 mg by mouth every 6 (six) hours as needed for anxiety, Disp: , Rfl:     diphenhydrAMINE (BENADRYL) 25 mg capsule, Take 25 mg by mouth daily, Disp: , Rfl:     DiphenhydrAMINE HCl 50 MG/30ML LIQD, Take by mouth daily, Disp: , Rfl:     gabapentin (NEURONTIN) 100 mg capsule, Take 100 mg by mouth daily, Disp: , Rfl:     hydrOXYzine HCL (ATARAX) 25 mg tablet, Take 25 mg by mouth 2 (two) times a day as needed for itching, Disp: , Rfl:     insulin aspart (NovoLOG) 100 units/mL injection, Inject under the skin as needed for high blood sugar  , Disp: , Rfl:     latanoprost (XALATAN) 0 005 % ophthalmic solution, Administer 1 drop to both eyes daily at bedtime  , Disp: , Rfl:     LORazepam (ATIVAN) 1 mg tablet, Take 2 mg by mouth daily at bedtime  , Disp: , Rfl:     prochlorperazine (COMPAZINE) 10 mg tablet, TAKE ONE TABLET BY MOUTH EVERY SIX HOURS AS NEEDED NAUSEA AND VOMITING, Disp: 30 tablet, Rfl: 1    ranitidine (ZANTAC) 150 mg tablet, Take 1 tablet (150 mg total) by mouth daily at bedtime, Disp: 30 tablet, Rfl: 5    rOPINIRole (REQUIP) 1 mg tablet, Take 3 mg by mouth daily at bedtime Take 1mg in am and 3mg at bedtime , Disp: , Rfl:     sertraline (ZOLOFT) 25 mg tablet, Take 25 mg by mouth daily at bedtime  , Disp: , Rfl:     traMADol (ULTRAM) 50 mg tablet, Take 50 mg by mouth every 6 (six) hours as needed for moderate pain, Disp: , Rfl:     Umeclidinium-Vilanterol (ANORO ELLIPTA) 62 5-25 MCG/INH AEPB, Inhale 1 puff daily, Disp: 1 each, Rfl: 11    guaifenesin-codeine (GUAIFENESIN AC) 100-10 MG/5ML liquid, Take 5 mL by mouth 3 (three) times a day as needed for cough, Disp: , Rfl:   Review of Systems   Constitutional: Positive for activity change, appetite change and fatigue  Musculoskeletal: Positive for gait problem  Neurological: Positive for weakness  All other systems negative    Objective:  Vital Signs  /68 (BP Location: Left arm, Patient Position: Sitting, Cuff Size: Standard)   Pulse (!) 54   Temp (!) 96 6 °F (35 9 °C) (Tympanic)   Resp 16   Ht 5' 9" (1 753 m)   Wt 91 6 kg (202 lb)   SpO2 97%   BMI 29 83 kg/m²    Physical Exam    Constitutional: Appears well-developed and well-nourished  In no acute distress  Head: Normocephalic and atraumatic  Eyes: EOM are normal  Right eye exhibits no discharge  Left eye exhibits no discharge  No scleral icterus  Pulmonary/Chest: Effort normal  No stridor  No respiratory distress  Abdominal: No distension  Musculoskeletal: No edema  Neurological: Alert, oriented and appropriately conversant  Skin: Skin is dry, not diaphoretic  Psychiatric: Displays a normal mood and affect  Behavior, judgement and thought content appear normal    Vitals reviewed

## 2018-11-14 ENCOUNTER — OFFICE VISIT (OUTPATIENT)
Dept: PALLIATIVE MEDICINE | Facility: CLINIC | Age: 73
End: 2018-11-14
Payer: MEDICARE

## 2018-11-14 VITALS
DIASTOLIC BLOOD PRESSURE: 68 MMHG | HEIGHT: 69 IN | OXYGEN SATURATION: 97 % | SYSTOLIC BLOOD PRESSURE: 102 MMHG | BODY MASS INDEX: 29.92 KG/M2 | TEMPERATURE: 96.6 F | WEIGHT: 202 LBS | HEART RATE: 54 BPM | RESPIRATION RATE: 16 BRPM

## 2018-11-14 DIAGNOSIS — R29.898 WEAKNESS OF BOTH LOWER EXTREMITIES: ICD-10-CM

## 2018-11-14 DIAGNOSIS — C22.0 HEPATOCELLULAR CARCINOMA (HCC): ICD-10-CM

## 2018-11-14 DIAGNOSIS — R53.83 OTHER FATIGUE: ICD-10-CM

## 2018-11-14 DIAGNOSIS — C15.5 MALIGNANT NEOPLASM OF LOWER THIRD OF ESOPHAGUS (HCC): Primary | ICD-10-CM

## 2018-11-14 PROCEDURE — 99213 OFFICE O/P EST LOW 20 MIN: CPT | Performed by: FAMILY MEDICINE

## 2018-11-14 RX ORDER — GUAIFENESIN AND CODEINE PHOSPHATE 100; 10 MG/5ML; MG/5ML
5 SOLUTION ORAL 3 TIMES DAILY PRN
COMMUNITY
End: 2019-02-04 | Stop reason: ALTCHOICE

## 2018-11-20 ENCOUNTER — TELEPHONE (OUTPATIENT)
Dept: HEMATOLOGY ONCOLOGY | Facility: CLINIC | Age: 73
End: 2018-11-20

## 2018-11-23 ENCOUNTER — OFFICE VISIT (OUTPATIENT)
Dept: HEMATOLOGY ONCOLOGY | Facility: HOSPITAL | Age: 73
End: 2018-11-23
Payer: MEDICARE

## 2018-11-23 VITALS
OXYGEN SATURATION: 97 % | HEIGHT: 69 IN | RESPIRATION RATE: 17 BRPM | BODY MASS INDEX: 29.74 KG/M2 | DIASTOLIC BLOOD PRESSURE: 76 MMHG | SYSTOLIC BLOOD PRESSURE: 124 MMHG | WEIGHT: 200.8 LBS | TEMPERATURE: 98.1 F | HEART RATE: 59 BPM

## 2018-11-23 DIAGNOSIS — C22.0 HEPATOCELLULAR CARCINOMA (HCC): Primary | ICD-10-CM

## 2018-11-23 PROCEDURE — 99214 OFFICE O/P EST MOD 30 MIN: CPT | Performed by: INTERNAL MEDICINE

## 2018-11-23 RX ORDER — SODIUM CHLORIDE 9 MG/ML
20 INJECTION, SOLUTION INTRAVENOUS CONTINUOUS
Status: DISCONTINUED | OUTPATIENT
Start: 2018-11-26 | End: 2018-11-29 | Stop reason: HOSPADM

## 2018-11-23 NOTE — PROGRESS NOTES
[facility]ATOLOGY ONCOLOGY SPECIALISTS [address]  [place] [id number] [phone]    Sydney CARVAJAL,[birth year], [id number]  11/23/18    Discussion:   In summary, this is a 47-year-old male history of hepatocellular carcinoma  He started Opdivo about a month ago  He seems to be tolerating this fairly well  He does note that he has had pruritus predominantly on the trunk and forearms  There is no visible rash or dry skin  He has been on Atarax without benefit  I suggested that he discontinue this and try over-the-counter steroid creams  If this improves it supports that it is related to [address]  Additionally he has had trouble sleeping  He does use Benadryl with modest benefit  Melatonin was recommended  Labs are acceptable to proceed with Opdivo  Reimaging just prior to his next  I discussed the above with the patient  The patient visit  And his wife voiced understanding and agreement   ______________________________________________________________________    Chief Complaint   Patient presents with    Follow-up       HPI:  Oncology History    58-year-old male status post esophagectomy and liver resection 10/31/16 for esophageal cancer and hepatocellular carcinoma in 2016  Treatment included a course of radiation to the esophagus to a total dose of 4680 cGy with treatments given from 6/27/16 to 8/3/2016  Radiation therapy was administered at [facility] by Dr Kya Licona  Concurrent Taxol and Carboplatin was under the care of Dr Ranjana Sinclair at [facility]  CT scan of the abdomen and MRI of the abdomen in October 2017 revealed a new lesion in the left lobe of the liver measuring 4 1 x 2 7 cm with 1-2 additional lesions  CT-guided biopsy liver lesion was positive for hepatocellular carcinoma on October 11, 2017  Recommendations were made for Sir sphere radiation therapy to the whole liver which was given on December 13, 2017   The patient returns today for follow-up examination  reports he is feeling well and is having no abdominal complaints  He denies any pain  He is still having occasional nausea with some dysphagia and is considering having a repeat esophageal dilation performed  He had repeat blood work including liver function studies performed January 19, 2017 at [facility] but results are not available today  We will call him with these results  He has MRI of the abdomen and follow-up appointment with Dr Escobar Garcia on the same day February 6, 2018  Hopefully, this will show good results and response to treatment  Assuming MRI shows favorable response, then we would recommend repeat MRI again in 3 months  The patient states he will be following with Dr Escobar Garcia every 3 months  It takes about 1 5 hours to come here from [place], [place] and we will therefore see him here on a p r n  basis  Should he have evidence of progressive disease the future then, we would be happy to re-evaluate him for consideration of additional Sir spheres to the liver  The patient is wife were in agreement with the above plan and they know they can call any time if they have any questions  2/14/18 Had EGD, dialtion of the esophagus  There was a mild stricture at the site of anastomosis  4/5/18 Had MRI of the abd: The previously noted dominant lesion in the left hepatic lobe demonstrate increasing necrosis suggest treatment response The segment 4 lesion is decreasing in size The lesion in the segment 5 cm remains stable with thin rim enhancement suggesting no viable tissue Segment 2 lesion is stable Enlarging regional lymph node in the upper abdomen just below the silvestre hepatis which measures 2 5 x 2 4 cm    Pt saw both surg Onc, and Med onc  On 4/6/18 Pt met with Dr Mita Hernández, who discussed, If radiation therapy could be applied to this lesion deferral of systemic treatment initiation could be accomplished    Systemic treatment options: potential treatment options for patients with [address]  including Nexavar, [address] [facility], [address]  Pt had previous esophagus EBRT,8/2016  at [facility] in [place]  Treatment recoreds available in [address]  [facility] )    4/27/2016 Initial Diagnosis     Esophageal cancer ([facility] )         6/27/2016 - 7/26/2016 Chemotherapy     Taxol 50 + Carbo AUC 2         6/27/2016 - 8/3/2016 Radiation     Concurrent RT Esophagus 4,680 cGy   @ [facility]         10/31/2016 Surgery     Esophagectomy           Hepatocellular carcinoma ([facility] )    10/31/2016 Initial Diagnosis     Cancer, hepatocellular ([facility] )       10/31/2016 Surgery     Resection/ablation of liver - segments 5/6         10/11/2017 Biopsy     Local recurrence         12/13/2017 -  Radiation     SIRSphere treatment  Treatment site:  whole liver  Whole liver Dose:  Prescribed: 1 66 GBq, Delivered: 1 61GBq  (Right lobe Dose:   Prescribed: 1 06 GBq,  Delivered: 1 03 GBq)  (Left lobe Dose:   Prescribed: 0 60 GBq,  Delivered: 0 58 GBq)         8/3/2018 - 10/23/2018 Chemotherapy     Nexavar 400 mg p o  daily  Initially planned to start at a visit on July 13th  Medication not obtained until August 3rd  10/18/2018 Progression     October 18, 2018 CT chest showed slight progression of multiple pulmonary nodules, all self sent all subcentimeter  Several new nodules are noted  Left adrenal nodule previously 2 1 cm, currently 2 8 cm  MRI liver showed progression of disease in the left adrenal and liver  A portacaval node has decreased in size, previously 2 5 cm, currently 2 1 cm          10/29/2018 -  Chemotherapy     Opdivo 480 mg IV q 4 weeks  Interval History:  Clinically stable  1 - Symptomatic but completely ambulatory    Review of Systems   Constitutional: Negative for chills and fever  HENT: Negative for nosebleeds  Eyes: Negative for discharge  Respiratory: Negative for cough and shortness of breath      Cardiovascular: Negative for chest pain  Gastrointestinal: Negative for abdominal pain, constipation and diarrhea  Endocrine: Negative for polydipsia  Genitourinary: Negative for hematuria  Musculoskeletal: Negative for arthralgias  Skin: Negative for color change  Allergic/Immunologic: Negative for immunocompromised state  Neurological: Negative for dizziness and headaches  Hematological: Negative for adenopathy  Psychiatric/Behavioral: Negative for agitation         Past Medical History:   Diagnosis Date    Anemia     Coronary artery disease     Dysphagia     Esophageal cancer (HCC)     Gout     Hyperlipidemia     Hypertension     Restless leg syndrome     Rheumatoid arthritis (HCC)     Sleep apnea     Vision abnormalities      Patient Active Problem List   Diagnosis    Esophageal cancer ([facility] )    Hepatocellular carcinoma ([facility] )    Atrial fibrillation (HCC)    Hypotension    Type 2 diabetes mellitus (HCC)    Vision abnormalities    Dysphagia    Restless legs syndrome    Rheumatoid arthritis (HCC)    Sleep apnea    Hyperlipidemia    Hypertension    Peripheral neuropathy    Epidermoid cyst of neck    Glaucoma    Gout    Incisional hernia    Cough    Stage 3 severe COPD by GOLD classification (HCC)    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)    Anxiety and depression    Acute pain of left shoulder    History of stroke    Mild cognitive impairment    Nutritional deficiency    Coronary artery disease    Goals of care, counseling/discussion    Active advance directive    Acute tracheobronchitis    Gastroesophageal reflux disease without esophagitis    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)    Pulmonary metastases (HCC)    Other fatigue    Weakness of both lower extremities       Current Outpatient Prescriptions:     albuterol (2 5 mg/3 mL) 0 083 % nebulizer solution, Take 1 ampule by nebulization every 4 (four) hours as needed for wheezing  , Disp: , Rfl:    allopurinol (ZYLOPRIM) 100 mg tablet, Take 100 mg by mouth daily, Disp: , Rfl:     amiodarone 200 mg tablet, Take 200 mg by mouth daily, Disp: , Rfl:     apixaban (ELIQUIS) 5 mg, Take 5 mg by mouth 2 (two) times a day, Disp: , Rfl:     calcium carbonate (TUMS EX) 750 mg chewable tablet, Chew 1 tablet (750 mg total) daily, Disp: 30 tablet, Rfl: 0    diazepam (VALIUM) 5 mg tablet, Take 5 mg by mouth every 6 (six) hours as needed for anxiety, Disp: , Rfl:     diphenhydrAMINE (BENADRYL) 25 mg capsule, Take 25 mg by mouth daily, Disp: , Rfl:     DiphenhydrAMINE HCl 50 MG/30ML LIQD, Take by mouth daily, Disp: , Rfl:     gabapentin (NEURONTIN) 100 mg capsule, Take 100 mg by mouth daily, Disp: , Rfl:     guaifenesin-codeine (GUAIFENESIN AC) 100-10 MG/5ML liquid, Take 5 mL by mouth 3 (three) times a day as needed for cough, Disp: , Rfl:     hydrOXYzine HCL (ATARAX) 25 mg tablet, Take 25 mg by mouth 2 (two) times a day as needed for itching, Disp: , Rfl:     insulin aspart (NovoLOG) 100 units/mL injection, Inject under the skin as needed for high blood sugar  , Disp: , Rfl:     latanoprost (XALATAN) 0 005 % ophthalmic solution, Administer 1 drop to both eyes daily at bedtime  , Disp: , Rfl:     LORazepam (ATIVAN) 1 mg tablet, Take 2 mg by mouth daily at bedtime  , Disp: , Rfl:     prochlorperazine (COMPAZINE) 10 mg tablet, TAKE ONE TABLET BY MOUTH EVERY SIX HOURS AS NEEDED NAUSEA AND VOMITING, Disp: 30 tablet, Rfl: 1    ranitidine (ZANTAC) 150 mg tablet, Take 1 tablet (150 mg total) by mouth daily at bedtime, Disp: 30 tablet, Rfl: 5    rOPINIRole (REQUIP) 1 mg tablet, Take 3 mg by mouth daily at bedtime Take 1mg in am and 3mg at bedtime , Disp: , Rfl:     sertraline (ZOLOFT) 25 mg tablet, Take 25 mg by mouth daily at bedtime  , Disp: , Rfl:     traMADol (ULTRAM) 50 mg tablet, Take 50 mg by mouth every 6 (six) hours as needed for moderate pain, Disp: , Rfl:     Umeclidinium-Vilanterol (ANORO ELLIPTA) 62 5-25 MCG/INH AEPB, Inhale 1 puff daily, Disp: 1 each, Rfl: 11  No current facility-administered medications for this visit  Facility-Administered Medications Ordered in Other Visits:     [START ON 11/26/2018] heparin lock flush 100 units/mL injection 300 Units, 300 Units, Intracatheter, PRN, Gómez Noun, DO    [START ON 11/26/2018] nivolumab (OPDIVO) 480 mg in sodium chloride 0 9 % 100 mL IVPB, 480 mg, Intravenous, Once, [place] Noun, DO    [START ON 11/26/2018] sodium chloride 0 9 % infusion, 20 mL/hr, Intravenous, Continuous, Gómez Noun, DO  Allergies   Allergen Reactions    Humira [Adalimumab] Rash     itchy    Levaquin [Levofloxacin In D5w] Rash    Levofloxacin Rash    Lovenox [Enoxaparin] Rash and Hives     Past Surgical History:   Procedure Laterality Date    APPENDECTOMY      CHOLECYSTECTOMY      GASTROJEJUNOSTOMY W/ JEJUNOSTOMY TUBE N/A 8/4/2016    Procedure: INSERTION JEJUNOSTOMY TUBE OPEN;  Surgeon: Branden Galindo MD;  [facility];  Service:     KNEE ARTHROSCOPY      LIVER RESECTION N/A 10/31/2016    Procedure: INTRAOPERATIVE ULTRASOUND OF LIVER; LIVER LESION RESECTION/ABLATION ;  Surgeon: Branden Galindo MD;  [facility];  Service:    Thera Schlichter PLACEMENT      CA EGD INSERT GUIDE WIRE DILATOR PASSAGE ESOPHAGUS N/A 2/14/2018    Procedure: ESOPHAGOGASTRODUODENOSCOPY (EGD) with dilation;  Surgeon: Angela Thomson MD;  [facility];  Service: Thoracic    CA ESOPHAGOGASTRODUODENOSCOPY TRANSORAL DIAGNOSTIC N/A 10/31/2016    Procedure: ESOPHAGOGASTRODUODENOSCOPY (EGD);   Surgeon: Branden Galindo MD;  [facility];  Service: Surgical Oncology    CA ESOPHAGOSCOPY FLEX BALLOON DILAT <30 MM DIAM N/A 1/6/2017    Procedure: DILATATION ESOPHAGEAL;  Surgeon: Angela Thomson MD;  [facility];  Service: Thoracic    CA ESOPHAGOSCOPY FLEX BALLOON DILAT <30 MM DIAM N/A 12/12/2016    Procedure: DILATATION ESOPHAGEAL, EGD;  Surgeon: Angela Thomson MD;  [facility];  Service: Thoracic    AK ESOPHAGOSCOPY FLEX BALLOON DILAT <30 MM DIAM N/A 5/24/2017    Procedure: EGD WITH DILATION ;  Surgeon: Pranav Arredondo MD;  [facility];  Service: Thoracic    AK ESOPHAGOSCOPY FLEX BALLOON DILAT <30 MM DIAM N/A 8/3/2017    Procedure: EGD WITH DILATION;  Surgeon: Maki Mohamud MD;  [facility];  Service: Thoracic    AK REMOVAL [address] N/A 10/31/2016    Procedure: ESOPHAGECTOMY;  Surgeon: Ines Peterson MD;  [facility];  Service: Surgical Oncology    TONSILLECTOMY      WOUND DEBRIDEMENT Left 5/3/2018    Procedure: DEBRIDEMENT UPPER EXTREMITY ([address]) left shoulder;  Surgeon: Cynthia Ballesteros MD;  [facility];  Service: Orthopedics     Social History     Objective:  Vitals:    11/23/18 1139   BP: 124/76   BP Location: Right arm   Patient Position: Sitting   Pulse: 59   Resp: 17   Temp: 98 1 °F (36 7 °C)   TempSrc: Tympanic   SpO2: 97%   Weight: 91 1 kg (200 lb 12 8 oz)   Height: 5' 9" (1 753 m)     Physical Exam   Constitutional: He is oriented to person, place, and time  He appears well-developed  HENT:   Head: Normocephalic  Eyes: Pupils are equal, round, and reactive to light  Neck: Neck supple  Cardiovascular: Normal rate and regular rhythm  No murmur heard  Pulmonary/Chest: Breath sounds normal  He has no wheezes  He has no rales  Abdominal: Soft  There is no tenderness  Musculoskeletal: Normal range of motion  He exhibits no edema or tenderness  Lymphadenopathy:     He has no cervical adenopathy  Neurological: He is alert and oriented to person, place, and time  He has normal reflexes  No cranial nerve deficit  Skin: No rash noted  No erythema  Psychiatric: He has a normal mood and affect  His behavior is normal          Labs: I personally reviewed the labs and imaging pertinent to this patient care

## 2018-11-26 ENCOUNTER — TELEPHONE (OUTPATIENT)
Dept: HEMATOLOGY ONCOLOGY | Facility: HOSPITAL | Age: 73
End: 2018-11-26

## 2018-11-26 ENCOUNTER — HOSPITAL ENCOUNTER (OUTPATIENT)
Dept: INFUSION CENTER | Facility: CLINIC | Age: 73
Discharge: HOME | End: 2018-11-26
Payer: MEDICARE

## 2018-11-26 VITALS
WEIGHT: 198.41 LBS | DIASTOLIC BLOOD PRESSURE: 70 MMHG | SYSTOLIC BLOOD PRESSURE: 138 MMHG | TEMPERATURE: 97.7 F | BODY MASS INDEX: 29.39 KG/M2 | HEART RATE: 61 BPM | RESPIRATION RATE: 18 BRPM | HEIGHT: 69 IN

## 2018-11-26 DIAGNOSIS — T45.1X5A CHEMOTHERAPY-INDUCED NAUSEA: Primary | ICD-10-CM

## 2018-11-26 DIAGNOSIS — R11.0 CHEMOTHERAPY-INDUCED NAUSEA: Primary | ICD-10-CM

## 2018-11-26 LAB
ALBUMIN SERPL BCP-MCNC: 2.5 G/DL (ref 3.5–5)
ALP SERPL-CCNC: 537 U/L (ref 46–116)
ALT SERPL W P-5'-P-CCNC: 68 U/L (ref 12–78)
ANION GAP SERPL CALCULATED.3IONS-SCNC: 9 MMOL/L (ref 4–13)
AST SERPL W P-5'-P-CCNC: 69 U/L (ref 5–45)
BASOPHILS # BLD AUTO: 0.05 THOUSANDS/ΜL (ref 0–0.1)
BASOPHILS NFR BLD AUTO: 1 % (ref 0–1)
BILIRUB SERPL-MCNC: 1.8 MG/DL (ref 0.2–1)
BUN SERPL-MCNC: 20 MG/DL (ref 5–25)
CALCIUM SERPL-MCNC: 9.1 MG/DL (ref 8.3–10.1)
CHLORIDE SERPL-SCNC: 101 MMOL/L (ref 100–108)
CO2 SERPL-SCNC: 26 MMOL/L (ref 21–32)
CREAT SERPL-MCNC: 0.97 MG/DL (ref 0.6–1.3)
EOSINOPHIL # BLD AUTO: 0.08 THOUSAND/ΜL (ref 0–0.61)
EOSINOPHIL NFR BLD AUTO: 1 % (ref 0–6)
ERYTHROCYTE [DISTWIDTH] IN BLOOD BY AUTOMATED COUNT: 17.7 % (ref 11.6–15.1)
GFR SERPL CREATININE-BSD FRML MDRD: 76 ML/MIN/1.73SQ M
GLUCOSE SERPL-MCNC: 256 MG/DL (ref 65–140)
HCT VFR BLD AUTO: 49.4 % (ref 36.5–49.3)
HGB BLD-MCNC: 15.8 G/DL (ref 12–17)
IMM GRANULOCYTES # BLD AUTO: 0.02 THOUSAND/UL (ref 0–0.2)
IMM GRANULOCYTES NFR BLD AUTO: 0 % (ref 0–2)
LYMPHOCYTES # BLD AUTO: 0.52 THOUSANDS/ΜL (ref 0.6–4.47)
LYMPHOCYTES NFR BLD AUTO: 6 % (ref 14–44)
MCH RBC QN AUTO: 30 PG (ref 26.8–34.3)
MCHC RBC AUTO-ENTMCNC: 32 G/DL (ref 31.4–37.4)
MCV RBC AUTO: 94 FL (ref 82–98)
MONOCYTES # BLD AUTO: 0.7 THOUSAND/ΜL (ref 0.17–1.22)
MONOCYTES NFR BLD AUTO: 9 % (ref 4–12)
NEUTROPHILS # BLD AUTO: 6.74 THOUSANDS/ΜL (ref 1.85–7.62)
NEUTS SEG NFR BLD AUTO: 83 % (ref 43–75)
NRBC BLD AUTO-RTO: 0 /100 WBCS
PLATELET # BLD AUTO: 116 THOUSANDS/UL (ref 149–390)
PMV BLD AUTO: 12.4 FL (ref 8.9–12.7)
POTASSIUM SERPL-SCNC: 4 MMOL/L (ref 3.5–5.3)
PROT SERPL-MCNC: 7.4 G/DL (ref 6.4–8.2)
RBC # BLD AUTO: 5.27 MILLION/UL (ref 3.88–5.62)
SODIUM SERPL-SCNC: 136 MMOL/L (ref 136–145)
TSH SERPL DL<=0.05 MIU/L-ACNC: 5.95 UIU/ML (ref 0.36–3.74)
WBC # BLD AUTO: 8.11 THOUSAND/UL (ref 4.31–10.16)

## 2018-11-26 PROCEDURE — 84443 ASSAY THYROID STIM HORMONE: CPT | Performed by: INTERNAL MEDICINE

## 2018-11-26 PROCEDURE — 85025 COMPLETE CBC W/AUTO DIFF WBC: CPT | Performed by: INTERNAL MEDICINE

## 2018-11-26 PROCEDURE — 80053 COMPREHEN METABOLIC PANEL: CPT | Performed by: INTERNAL MEDICINE

## 2018-11-26 PROCEDURE — 96413 CHEMO IV INFUSION 1 HR: CPT

## 2018-11-26 RX ORDER — ONDANSETRON 4 MG/1
4 TABLET, FILM COATED ORAL EVERY 8 HOURS PRN
Qty: 30 TABLET | Refills: 0 | Status: SHIPPED | OUTPATIENT
Start: 2018-11-26 | End: 2019-02-04 | Stop reason: ALTCHOICE

## 2018-11-26 RX ADMIN — SODIUM CHLORIDE 20 ML/HR: 0.9 INJECTION, SOLUTION INTRAVENOUS at 15:16

## 2018-11-26 RX ADMIN — HEPARIN 300 UNITS: 100 SYRINGE at 16:20

## 2018-11-26 RX ADMIN — SODIUM CHLORIDE 480 MG: 9 INJECTION, SOLUTION INTRAVENOUS at 15:16

## 2018-11-26 NOTE — TELEPHONE ENCOUNTER
Dr Bridgett Lares was to order Zofran for the patient on Friday but there is no order  Pharmacy verified  Compazine is not working

## 2018-11-26 NOTE — TELEPHONE ENCOUNTER
Pt indicated that Dr Sury Goel was giving him a script for Zofran but they left without receiving that  If you can send that over to the pharmacy on file which is [facility]

## 2018-11-26 NOTE — TELEPHONE ENCOUNTER
Please let patient know script for Zofran is pended to Dr Marta Kelly for signature - Rx will be sent today    Thank you

## 2018-12-05 ENCOUNTER — HOSPITAL ENCOUNTER (OUTPATIENT)
Dept: MRI IMAGING | Facility: HOSPITAL | Age: 73
Discharge: HOME | End: 2018-12-05
Attending: INTERNAL MEDICINE
Payer: MEDICARE

## 2018-12-05 DIAGNOSIS — C22.0 HEPATOCELLULAR CARCINOMA (HCC): ICD-10-CM

## 2018-12-05 PROCEDURE — 74183 MRI ABD W/O CNTR FLWD CNTR: CPT

## 2018-12-05 PROCEDURE — A9585 GADOBUTROL INJECTION: HCPCS | Performed by: INTERNAL MEDICINE

## 2018-12-05 RX ADMIN — GADOBUTROL 9 ML: 604.72 INJECTION INTRAVENOUS at 19:30

## 2018-12-06 ENCOUNTER — TELEPHONE (OUTPATIENT)
Dept: HEMATOLOGY ONCOLOGY | Facility: CLINIC | Age: 73
End: 2018-12-06

## 2018-12-06 NOTE — TELEPHONE ENCOUNTER
CALL FROM  RADIOLOGY DEPT - MRI OF ABDOMEN DONE YESTERDAY HAS SIGNIFICANT FINDINGS  REPORT IS IN EPIC - [facility] [place] [facility]      THANKS

## 2018-12-10 ENCOUNTER — TELEPHONE (OUTPATIENT)
Dept: HEMATOLOGY ONCOLOGY | Facility: HOSPITAL | Age: 73
End: 2018-12-10

## 2018-12-10 NOTE — TELEPHONE ENCOUNTER
Pt called and indicated that he needs [facility] indicating his dx and also the treatment he is receiving  Would like to have it mailed to him  Any questions can you reach out tot he pt

## 2018-12-18 ENCOUNTER — OFFICE VISIT (OUTPATIENT)
Dept: HEMATOLOGY ONCOLOGY | Facility: CLINIC | Age: 73
End: 2018-12-18
Payer: MEDICARE

## 2018-12-18 ENCOUNTER — OFFICE VISIT (OUTPATIENT)
Dept: PULMONOLOGY | Facility: CLINIC | Age: 73
End: 2018-12-18
Payer: MEDICARE

## 2018-12-18 VITALS
TEMPERATURE: 97.5 F | BODY MASS INDEX: 28.77 KG/M2 | SYSTOLIC BLOOD PRESSURE: 100 MMHG | DIASTOLIC BLOOD PRESSURE: 50 MMHG | HEART RATE: 56 BPM | OXYGEN SATURATION: 95 % | WEIGHT: 201 LBS | HEIGHT: 70 IN

## 2018-12-18 VITALS
BODY MASS INDEX: 28.77 KG/M2 | RESPIRATION RATE: 18 BRPM | HEIGHT: 70 IN | DIASTOLIC BLOOD PRESSURE: 72 MMHG | WEIGHT: 201 LBS | SYSTOLIC BLOOD PRESSURE: 118 MMHG | OXYGEN SATURATION: 93 % | HEART RATE: 62 BPM

## 2018-12-18 DIAGNOSIS — J44.9 STAGE 3 SEVERE COPD BY GOLD CLASSIFICATION (HCC): Primary | ICD-10-CM

## 2018-12-18 DIAGNOSIS — Z23 FLU VACCINE NEED: ICD-10-CM

## 2018-12-18 DIAGNOSIS — G47.33 OBSTRUCTIVE SLEEP APNEA SYNDROME: ICD-10-CM

## 2018-12-18 DIAGNOSIS — C22.0 HEPATOCELLULAR CARCINOMA (HCC): Primary | ICD-10-CM

## 2018-12-18 DIAGNOSIS — C78.00 MALIGNANT NEOPLASM METASTATIC TO LUNG, UNSPECIFIED LATERALITY (HCC): ICD-10-CM

## 2018-12-18 PROBLEM — J20.9 ACUTE TRACHEOBRONCHITIS: Status: RESOLVED | Noted: 2018-09-20 | Resolved: 2018-12-18

## 2018-12-18 PROCEDURE — 90662 IIV NO PRSV INCREASED AG IM: CPT

## 2018-12-18 PROCEDURE — G0008 ADMIN INFLUENZA VIRUS VAC: HCPCS

## 2018-12-18 PROCEDURE — 99213 OFFICE O/P EST LOW 20 MIN: CPT | Performed by: INTERNAL MEDICINE

## 2018-12-18 PROCEDURE — 99215 OFFICE O/P EST HI 40 MIN: CPT | Performed by: INTERNAL MEDICINE

## 2018-12-18 RX ORDER — OXYCODONE HYDROCHLORIDE 5 MG/1
5 TABLET ORAL EVERY 4 HOURS PRN
Qty: 120 TABLET | Refills: 0 | Status: ON HOLD | OUTPATIENT
Start: 2018-12-18 | End: 2019-02-19 | Stop reason: SDUPTHER

## 2018-12-18 NOTE — PATIENT INSTRUCTIONS
Albuterol/ipratropium nebulizer 3-4 times a day    Use incentive spirometer every commercial while watching television    Home sleep study

## 2018-12-18 NOTE — PROGRESS NOTES
[facility] HEMATOLOGY ONCOLOGY Angela Gresham  [address] [id number] [phone]    Kiki SKELTON,[birth year], [id number]  12/18/18    Discussion:   In summary, this is a 78-year-old male history of advanced hepatocellular carcinoma  He fell about 5 days ago striking his right ribs  He was seen in the emergency room  Imaging showed no evidence of fracture  CT scan did confirm the presence of progressive hepatocellular carcinoma both in the liver and the adrenal   I reviewed the above findings with the patient and his wife  Carla Freshwater is discontinued at this time  Other treatment options could include Stivarga or Lenvima  Other drugs that are under FDA review at this time include Cyramza and Cabozatinib  I would favor the use of Lenvima  This showed responding or stable disease in a majority of patients treated in the first-line setting  Standard dose is 12 mg p o  Daily  I would start with 8 mg p o  Daily  We reviewed potential toxicities including but not limited to cytopenias, fatigue, nausea, diarrhea, hand-foot syndrome, hypertension  We reviewed monitoring to allow for early intervention as appropriate  Our chemotherapy nurse review the above information as well as providing written information in this regard  Restaging after 2 months of therapy  He had oxycodone left over from previous surgery  He was using this for his rib pain  I suspect he may have a nondisplaced rib fracture which was not evident on CT scan  I renewed his prescription for oxycodone with instructions on its use  I discussed the above with the patient    The patient and his wife voiced understanding and agreement   ______________________________________________________________________    Chief Complaint   Patient presents with    Follow-up       HPI:  Oncology History    68-year-old male status post esophagectomy and liver resection 10/31/16 for esophageal cancer and hepatocellular carcinoma in 2016  Treatment included a course of radiation to the esophagus to a total dose of 4680 cGy with treatments given from 6/27/16 to 8/3/2016  Radiation therapy was administered at [facility] by Dr Bree Leon  Concurrent Taxol and Carboplatin was under the care of Dr Amy Acevedo at [facility]  CT scan of the abdomen and MRI of the abdomen in October 2017 revealed a new lesion in the left lobe of the liver measuring 4 1 x 2 7 cm with 1-2 additional lesions  CT-guided biopsy liver lesion was positive for hepatocellular carcinoma on October 11, 2017  Recommendations were made for Sir sphere radiation therapy to the whole liver which was given on December 13, 2017  The patient returns today for follow-up examination  reports he is feeling well and is having no abdominal complaints  He denies any pain  He is still having occasional nausea with some dysphagia and is considering having a repeat esophageal dilation performed  He had repeat blood work including liver function studies performed January 19, 2017 at [facility] but results are not available today  We will call him with these results  He has MRI of the abdomen and follow-up appointment with Dr Yesika Navarrete on the same day February 6, 2018  Hopefully, this will show good results and response to treatment  Assuming MRI shows favorable response, then we would recommend repeat MRI again in 3 months  The patient states he will be following with Dr Yesika Navarrete every 3 months  It takes about 1 5 hours to come here from [place], [place] and we will therefore see him here on a p r n  basis  Should he have evidence of progressive disease the future then, we would be happy to re-evaluate him for consideration of additional Sir spheres to the liver  The patient is wife were in agreement with the above plan and they know they can call any time if they have any questions  2/14/18 Had EGD, dialtion of the esophagus   There was a mild stricture at the site of anastomosis  4/5/18 Had MRI of the abd: The previously noted dominant lesion in the left hepatic lobe demonstrate increasing necrosis suggest treatment response The segment 4 lesion is decreasing in size The lesion in the segment 5 cm remains stable with thin rim enhancement suggesting no viable tissue Segment 2 lesion is stable Enlarging regional lymph node in the upper abdomen just below the silvestre hepatis which measures 2 5 x 2 4 cm    Pt saw both surg Onc, and Med onc  On 4/6/18 Pt met with Dr Paula Britt, who discussed, If radiation therapy could be applied to this lesion deferral of systemic treatment initiation could be accomplished  Systemic treatment options: potential treatment options for patients with [address]  including NexavarKelsey  Pt had previous esophagus EBRT,8/2016  at [facility] in [place]  Treatment recoreds available in [employer]  Esophageal cancer ([facility] [address] )    4/27/2016 Initial Diagnosis     Esophageal cancer ([facility] [address] )         6/27/2016 - 7/26/2016 Chemotherapy     Taxol 50 + Carbo AUC 2         6/27/2016 - 8/3/2016 Radiation     Concurrent RT Esophagus 4,680 cGy   @ [facility]         10/31/2016 Surgery     Esophagectomy           Hepatocellular carcinoma ([facility] [address] )    10/31/2016 Initial Diagnosis     Cancer, hepatocellular ([facility] [address] )       10/31/2016 Surgery     Resection/ablation of liver - segments 5/6         10/11/2017 Biopsy     Local recurrence         12/13/2017 -  Radiation     SIRSphere treatment  Treatment site:  whole liver  Whole liver Dose:  Prescribed: 1 66 GBq, Delivered: 1 61GBq  (Right lobe Dose:   Prescribed: 1 06 GBq,  Delivered: 1 03 GBq)  (Left lobe Dose:   Prescribed: 0 60 GBq,  Delivered: 0 58 GBq)         8/3/2018 - 10/23/2018 Chemotherapy     Nexavar 400 mg p o  daily  Initially planned to start at a visit on July 13th  Medication not obtained until August 3rd           10/18/2018 Progression     October 18, 2018 CT chest showed slight progression of multiple pulmonary nodules, all self sent all subcentimeter  Several new nodules are noted  Left adrenal nodule previously 2 1 cm, currently 2 8 cm  MRI liver showed progression of disease in the left adrenal and liver  A portacaval node has decreased in size, previously 2 5 cm, currently 2 1 cm          10/29/2018 -  Chemotherapy     Opdivo 480 mg IV q 4 weeks  Interval History:  Felt  Right rib pain  2 - Symptomatic, <50% confined to bed    Review of Systems   Constitutional: Negative for chills and fever  HENT: Negative for nosebleeds  Eyes: Negative for discharge  Respiratory: Negative for cough and shortness of breath  Cardiovascular: Negative for chest pain  Gastrointestinal: Negative for abdominal pain, constipation and diarrhea  Endocrine: Negative for polydipsia  Genitourinary: Negative for hematuria  Musculoskeletal: Negative for arthralgias  Skin: Negative for color change  Allergic/Immunologic: Negative for immunocompromised state  Neurological: Negative for dizziness and headaches  Hematological: Negative for adenopathy  Psychiatric/Behavioral: Negative for agitation         Past Medical History:   Diagnosis Date    Anemia     Coronary artery disease     Dysphagia     Esophageal cancer (HCC)     Gout     Hyperlipidemia     Hypertension     Restless leg syndrome     Rheumatoid arthritis (HCC)     Sleep apnea     Vision abnormalities      Patient Active Problem List   Diagnosis    [facility] [address] [facility]    Hepatocellular carcinoma ([facility] [address] )    Atrial fibrillation (HCC)    Hypotension    Type 2 diabetes mellitus (HCC)    Vision abnormalities    Dysphagia    Restless legs syndrome    Rheumatoid arthritis (HCC)    Sleep apnea    Hyperlipidemia    Hypertension    Peripheral neuropathy    Epidermoid cyst of neck    Glaucoma    Gout    Incisional hernia    Cough    Stage 3 severe COPD by GOLD classification ([facility] [address] )    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)    Anxiety and depression    Acute pain of left shoulder    History of stroke    Mild cognitive impairment    Nutritional deficiency    Coronary artery disease    Goals of care, counseling/discussion    Active advance directive    Acute tracheobronchitis    Gastroesophageal reflux disease without esophagitis    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)    Pulmonary metastases (HCC)    Other fatigue    Weakness of both lower extremities       Current Outpatient Prescriptions:     albuterol (2 5 mg/3 mL) 0 083 % nebulizer solution, Take 1 ampule by nebulization every 4 (four) hours as needed for wheezing  , Disp: , Rfl:     allopurinol (ZYLOPRIM) 100 mg tablet, Take 100 mg by mouth daily, Disp: , Rfl:     amiodarone 200 mg tablet, Take 200 mg by mouth daily, Disp: , Rfl:     apixaban (ELIQUIS) 5 mg, Take 5 mg by mouth 2 (two) times a day, Disp: , Rfl:     calcium carbonate (TUMS EX) 750 mg chewable tablet, Chew 1 tablet (750 mg total) daily, Disp: 30 tablet, Rfl: 0    diazepam (VALIUM) 5 mg tablet, Take 5 mg by mouth every 6 (six) hours as needed for anxiety, Disp: , Rfl:     diphenhydrAMINE (BENADRYL) 25 mg capsule, Take 25 mg by mouth daily, Disp: , Rfl:     DiphenhydrAMINE HCl 50 MG/30ML LIQD, Take by mouth daily, Disp: , Rfl:     gabapentin (NEURONTIN) 100 mg capsule, Take 100 mg by mouth daily, Disp: , Rfl:     guaifenesin-codeine (GUAIFENESIN AC) 100-10 MG/5ML liquid, Take 5 mL by mouth 3 (three) times a day as needed for cough, Disp: , Rfl:     hydrOXYzine HCL (ATARAX) 25 mg tablet, Take 25 mg by mouth 2 (two) times a day as needed for itching, Disp: , Rfl:     insulin aspart (NovoLOG) 100 units/mL injection, Inject under the skin as needed for high blood sugar  , Disp: , Rfl:     latanoprost (XALATAN) 0 005 % ophthalmic solution, Administer 1 drop to both eyes daily at bedtime  , Disp: , Rfl:    LORazepam (ATIVAN) 1 mg tablet, Take 2 mg by mouth daily at bedtime  , Disp: , Rfl:     ondansetron (ZOFRAN) 4 mg tablet, Take 1 tablet (4 mg total) by mouth every 8 (eight) hours as needed for nausea or vomiting, Disp: 30 tablet, Rfl: 0    prochlorperazine (COMPAZINE) 10 mg tablet, TAKE ONE TABLET BY MOUTH EVERY SIX HOURS AS NEEDED NAUSEA AND VOMITING, Disp: 30 tablet, Rfl: 1    ranitidine (ZANTAC) 150 mg tablet, Take 1 tablet (150 mg total) by mouth daily at bedtime, Disp: 30 tablet, Rfl: 5    rOPINIRole (REQUIP) 1 mg tablet, Take 3 mg by mouth daily at bedtime Take 1mg in am and 3mg at bedtime , Disp: , Rfl:     sertraline (ZOLOFT) 25 mg tablet, Take 25 mg by mouth daily at bedtime  , Disp: , Rfl:     traMADol (ULTRAM) 50 mg tablet, Take 50 mg by mouth every 6 (six) hours as needed for moderate pain, Disp: , Rfl:     Umeclidinium-Vilanterol (ANORO ELLIPTA) 62 5-25 MCG/INH AEPB, Inhale 1 puff daily, Disp: 1 each, Rfl: 11  Allergies   Allergen Reactions    Humira [Adalimumab] Rash     itchy    Levaquin [Levofloxacin In D5w] Rash    Levofloxacin Rash    Lovenox [Enoxaparin] Rash and Hives     Past Surgical History:   Procedure Laterality Date    APPENDECTOMY      CHOLECYSTECTOMY      GASTROJEJUNOSTOMY W/ JEJUNOSTOMY TUBE N/A 8/4/2016    Procedure: INSERTION JEJUNOSTOMY TUBE OPEN;  Surgeon: Fang Estes MD;  [facility];  Service:     KNEE ARTHROSCOPY      LIVER RESECTION N/A 10/31/2016    Procedure: INTRAOPERATIVE ULTRASOUND OF LIVER; LIVER LESION RESECTION/ABLATION ;  Surgeon: Fang Estes MD;  [facility];  Service:    Silvia Courser PORTACATH PLACEMENT      MS EGD INSERT GUIDE WIRE DILATOR PASSAGE ESOPHAGUS N/A 2/14/2018    Procedure: ESOPHAGOGASTRODUODENOSCOPY (EGD) with dilation;  Surgeon: Rl Cruz MD;  [facility];  Service: Thoracic    MS ESOPHAGOGASTRODUODENOSCOPY TRANSORAL DIAGNOSTIC N/A 10/31/2016    Procedure: ESOPHAGOGASTRODUODENOSCOPY (EGD);   Surgeon: Christin Aguirre Yvonne Turner MD;  [facility];  Service: Surgical Oncology    DE ESOPHAGOSCOPY FLEX BALLOON DILAT <30 MM DIAM N/A 1/6/2017    Procedure: DILATATION ESOPHAGEAL;  Surgeon: Chuy Wang MD;  [facility];  Service: Thoracic    DE ESOPHAGOSCOPY FLEX BALLOON DILAT <30 MM DIAM N/A 12/12/2016    Procedure: DILATATION ESOPHAGEAL, EGD;  Surgeon: Chuy Wang MD;  [facility];  Service: Thoracic    DE ESOPHAGOSCOPY FLEX BALLOON DILAT <30 MM DIAM N/A 5/24/2017    Procedure: EGD WITH DILATION ;  Surgeon: Hunter Perdomo MD;  [facility];  Service: Thoracic    DE ESOPHAGOSCOPY FLEX BALLOON DILAT <30 MM DIAM N/A 8/3/2017    Procedure: EGD WITH DILATION;  Surgeon: Chuy Wang MD;  [facility];  Service: Thoracic    DE REMOVAL [address] THORACOTOMY N/A 10/31/2016    Procedure: ESOPHAGECTOMY;  Surgeon: Dempsey Fabry, MD;  [facility];  Service: Surgical Oncology    TONSILLECTOMY      WOUND DEBRIDEMENT Left 5/3/2018    Procedure: DEBRIDEMENT UPPER EXTREMITY ([address]) left shoulder;  Surgeon: Jacquie Bey MD;  [facility];  Service: Orthopedics     Social History     Objective: There were no vitals filed for this visit  Physical Exam   Constitutional: He is oriented to person, place, and time  He appears well-developed  HENT:   Head: Normocephalic  Eyes: Pupils are equal, round, and reactive to light  Neck: Neck supple  Cardiovascular: Normal rate and regular rhythm  No murmur heard  Pulmonary/Chest: Breath sounds normal  He has no wheezes  He has no rales  Abdominal: Soft  There is no tenderness  Musculoskeletal: Normal range of motion  He exhibits no edema or tenderness  Lymphadenopathy:     He has no cervical adenopathy  Neurological: He is alert and oriented to person, place, and time  He has normal reflexes  No cranial nerve deficit  Skin: No rash noted  No erythema  Psychiatric: He has a normal mood and affect   His behavior is normal          Labs: I personally reviewed the labs and imaging pertinent to this patient care

## 2018-12-18 NOTE — PROGRESS NOTES
Assessment:    Sleep apnea  He has not been wearing CPAP recently because since losing a lot of weight, he feels that his previous symptoms of fatigue/nocturnal choking have resolved  He also is in need of new tubing/filters  I have asked him to do a home sleep study to see if CPAP is still needed  If so we will encourage him to use on a nightly basis    Pulmonary metastases ([facility] [address] )  He has increased size of metastatic lesions based off CT scan last week  He has seen Dr Sherine Poe and is currently undergoing a change in his chemotherapy regimen    Stage 3 severe COPD by GOLD classification ([address] )  He was doing well with symptoms from a pulmonary standpoint until he stopped Anoro because of financial reasons and his fall last week resulting in rib contusions  I ordered ipratropium to use in the nebulizer TID with albuterol to help with cost  He needs a flu shot today  Will hold on pulmonary rehab for now with the multiple issues he has going on in regards to metastatic liver cancer     I have asked him to use an incentive spirometer while watching tv to avoid atelectasis/pneumonia from splinting with rib fracture  Plan:    Diagnoses and all orders for this visit:    Stage 3 severe COPD by GOLD classification ([facility] [address] )  -     ipratropium (ATROVENT) 0 02 % nebulizer solution; Take 1 vial (0 5 mg total) by nebulization 4 (four) times a day  -     Nebulizer Supplies  -     influenza vaccine, [id number], high-dose, PF 0 5 mL, for patients 65 yr+ (FLUZONE HIGH-DOSE)    Obstructive sleep apnea syndrome  -     PAP DME Resupply/Reorder  -     Home Study; Future    Malignant neoplasm metastatic to lung, unspecified laterality (HCC)    Flu vaccine need  -     influenza vaccine, [id number], high-dose, PF 0 5 mL, for patients 65 yr+ (FLUZONE HIGH-DOSE)        Follow-up: 3-6 months      HPI:  He fell on his right side last week and landed on ribs  No fracture but CT showed an increase in size of pulmonary metastatic lesions      Oncology is changing his chemotherapy from [id number] [facility] to [place]    I spoke with him in October when he had a cough that was concerning for infection  We treated him with doxycyclin    The cough has resolved again since getting a course of antibiotics  No significant shortness of breath unless he is exerting himself - stairs or carrying something  No substernal chest pain or wheezing  He has ongoing rib pain    Not taking Anoro because of cost  He uses the albuterol nebulizer 1-2 times per day    Not using CPAP at night because equipment is not working - he is not tired during the day and no longer feels like he is drowning - symptoms seemed to change since losing weight      Review of Systems    The following portions of the patient's history were reviewed and updated as appropriate: allergies, current medications, past family history, past medical history, past social history, past surgical history and problem list     VITALS:  Vitals:    12/18/18 1538   BP: 100/50   BP Location: Left arm   Patient Position: Sitting   Pulse: 56   Temp: 97 5 °F (36 4 °C)   TempSrc: Tympanic   SpO2: 95%   Weight: 91 2 kg (201 lb)   Height: 5' 10" (1 778 m)       Physical Exam  General:  Patient is awake, alert, non-toxic and in no acute respiratory distress  Neck: No JVD  CV:  Regular, +S1 and S2, No murmurs, gallops or rubs appreciated  Lungs: Decreased BS bilateral without wheeze  Abdomen: Soft, +BS, Non-tender, non-distended  Extremities: No clubbing, cyanosis or edema  Neuro: No focal deficits        Diagnostic Testing:      CBC:  Lab Results   Component Value Date    WBC 8 11 11/26/2018    HGB 15 8 11/26/2018    HCT 49 4 (H) 11/26/2018    MCV 94 11/26/2018     (L) 11/26/2018         BMP:   Lab Results   Component Value Date    K 4 0 11/26/2018     11/26/2018    CO2 26 11/26/2018    BUN 20 11/26/2018    CREATININE 0 97 11/26/2018    GLUCOSE 210 (H) 11/02/2016    GLUF 165 (H) 12/13/2017    CALCIUM 9 1 11/26/2018    AST 69 (H) 11/26/2018    ALT 68 11/26/2018    ALKPHOS 537 (H) 11/26/2018    EGFR 76 11/26/2018         Imaging studies:  I have personally reviewed pertinent reports      CT Chest 12/14: increased pulmonary metastatic lesion in number and size  No rib fractures      Cheng Cyr, DO  Answers for HPI/ROS submitted by the patient on 12/16/2018   Primary symptoms  Do you experience frequent throat clearing?: Yes  Chronicity: recurrent  When did you first notice your symptoms?: more than 1 year ago  How often do your symptoms occur?: daily  Since you first noticed this problem, how has it changed?: unchanged  Do you have shortness of breath that occurs with effort or exertion?: Yes  Do you have ear congestion?: No  Do you have heartburn?: Yes  Do you have fatigue?: No  Do you have nasal congestion?: No  Do you have shortness of breath when lying flat?: No  Do you have shortness of breath when you wake up?: No  Do you have sweats?: No  Have you experienced weight loss?: Yes  Which of the following makes your symptoms worse?: eating, exercise, exposure to smoke, strenuous activity  Which of the following makes your symptoms better?: prescription cough suppressant, steroid inhaler

## 2018-12-19 ENCOUNTER — DOCUMENTATION (OUTPATIENT)
Dept: PULMONOLOGY | Facility: CLINIC | Age: 73
End: 2018-12-19

## 2018-12-19 DIAGNOSIS — C22.9 MALIGNANT NEOPLASM OF LIVER, UNSPECIFIED LIVER MALIGNANCY TYPE (HCC): Primary | ICD-10-CM

## 2018-12-19 NOTE — ASSESSMENT & PLAN NOTE
He has not been wearing CPAP recently because since losing a lot of weight, he feels that his previous symptoms of fatigue/nocturnal choking have resolved  He also is in need of new tubing/filters  I have asked him to do a home sleep study to see if CPAP is still needed   If so we will encourage him to use on a nightly basis

## 2018-12-19 NOTE — ASSESSMENT & PLAN NOTE
He has increased size of metastatic lesions based off CT scan last week  He has seen Dr Jamilah Jones and is currently undergoing a change in his chemotherapy regimen

## 2018-12-19 NOTE — ASSESSMENT & PLAN NOTE
He has increased size of metastatic lesions based off CT scan last week  He has seen Dr Pradeep Hi and is currently undergoing a change in his chemotherapy regimen

## 2018-12-19 NOTE — ASSESSMENT & PLAN NOTE
He was doing well with symptoms from a pulmonary standpoint until he stopped Anoro because of financial reasons and his fall last week resulting in rib contusions  I ordered ipratropium to use in the nebulizer TID with albuterol to help with cost  He needs a flu shot today  Will hold on pulmonary rehab for now with the multiple issues he has going on in regards to metastatic liver cancer     I have asked him to use an incentive spirometer while watching tv to avoid atelectasis/pneumonia from splinting with rib fracture

## 2018-12-20 ENCOUNTER — DOCUMENTATION (OUTPATIENT)
Dept: HEMATOLOGY ONCOLOGY | Facility: CLINIC | Age: 73
End: 2018-12-20

## 2018-12-20 ENCOUNTER — TELEPHONE (OUTPATIENT)
Dept: HEMATOLOGY ONCOLOGY | Facility: CLINIC | Age: 73
End: 2018-12-20

## 2018-12-20 NOTE — TELEPHONE ENCOUNTER
Patient wanted to see if there is help to help pay for the chemo medication  Please call back and advise  Thank you

## 2018-12-21 ENCOUNTER — TELEPHONE (OUTPATIENT)
Dept: SLEEP CENTER | Facility: CLINIC | Age: 73
End: 2018-12-21

## 2018-12-21 NOTE — TELEPHONE ENCOUNTER
----- Message from Bradly Forde MD sent at 12/19/2018  1:05 PM EST -----  Approved  ----- Message -----  From: Marcia Held: 12/19/2018  10:53 AM  To: Sleep Medicine [facility] [place], #    PLEASE REVIEW FOR APPROVAL OR DENIAL AND WHY

## 2018-12-24 ENCOUNTER — TELEPHONE (OUTPATIENT)
Dept: HEMATOLOGY ONCOLOGY | Facility: CLINIC | Age: 73
End: 2018-12-24

## 2018-12-24 NOTE — TELEPHONE ENCOUNTER
Spoke with Dr Candy Cabral and he said everything that the pt needs is in his (oncology history) part of his note  LM for pt with above information

## 2018-12-24 NOTE — TELEPHONE ENCOUNTER
Patient called requesting a copy of his visit reports with Dr Julio Walker  He said he needs a report that lists all 5 of his cancers, especially the lung cancer  He needs these reports for the [facility]  He said the last report that he received only addressed the liver and esophogeal cancer  He's requesting this be mailed to his home

## 2018-12-29 DIAGNOSIS — T45.1X5A CHEMOTHERAPY-INDUCED NAUSEA: ICD-10-CM

## 2018-12-29 DIAGNOSIS — R11.0 CHEMOTHERAPY-INDUCED NAUSEA: ICD-10-CM

## 2019-01-02 RX ORDER — PROCHLORPERAZINE MALEATE 10 MG
TABLET ORAL
Qty: 30 TABLET | Refills: 0 | Status: SHIPPED | OUTPATIENT
Start: 2019-01-02 | End: 2019-02-04 | Stop reason: ALTCHOICE

## 2019-01-09 DIAGNOSIS — R11.2 CHEMOTHERAPY INDUCED NAUSEA AND VOMITING: Primary | ICD-10-CM

## 2019-01-09 DIAGNOSIS — T45.1X5A CHEMOTHERAPY INDUCED NAUSEA AND VOMITING: Primary | ICD-10-CM

## 2019-01-09 NOTE — TELEPHONE ENCOUNTER
Patient reports nausea with vomiting over the past week  Over the past 24 hours he developed chills and diarrhea  Suggested he reach out to his PCP so he can be evaluated  Rx for Reglan will be sent to pharmacy to help with nausea  Patient currently trying compazine and zofran without benefit    Patient was agreeable to call PCP to arrange for appointment today

## 2019-01-10 RX ORDER — METOCLOPRAMIDE 10 MG/1
10 TABLET ORAL 4 TIMES DAILY
Qty: 60 TABLET | Refills: 1 | Status: SHIPPED | OUTPATIENT
Start: 2019-01-10 | End: 2019-02-04 | Stop reason: SDUPTHER

## 2019-01-11 ENCOUNTER — TELEPHONE (OUTPATIENT)
Dept: HEMATOLOGY ONCOLOGY | Facility: HOSPITAL | Age: 74
End: 2019-01-11

## 2019-01-11 ENCOUNTER — TELEPHONE (OUTPATIENT)
Dept: HEMATOLOGY ONCOLOGY | Facility: MEDICAL CENTER | Age: 74
End: 2019-01-11

## 2019-01-11 DIAGNOSIS — C22.9 MALIGNANT NEOPLASM OF LIVER, UNSPECIFIED LIVER MALIGNANCY TYPE (HCC): ICD-10-CM

## 2019-01-11 NOTE — TELEPHONE ENCOUNTER
Awa CODY  spoke with [facility](Specialty [facility]) about pts chemo med and they said they will be reaching out to us on Monday to let us know when the med will be delivered to patients home  I told patient we will be reaching out to him on Monday or Tuesday (1/14/19-1/15/19) to let him know the delivery date  Pt voiced understanding

## 2019-01-14 ENCOUNTER — TELEPHONE (OUTPATIENT)
Dept: HEMATOLOGY ONCOLOGY | Facility: CLINIC | Age: 74
End: 2019-01-14

## 2019-02-04 ENCOUNTER — DOCUMENTATION (OUTPATIENT)
Dept: HEMATOLOGY ONCOLOGY | Facility: CLINIC | Age: 74
End: 2019-02-04

## 2019-02-04 ENCOUNTER — OFFICE VISIT (OUTPATIENT)
Dept: HEMATOLOGY ONCOLOGY | Facility: CLINIC | Age: 74
End: 2019-02-04
Payer: MEDICARE

## 2019-02-04 VITALS
BODY MASS INDEX: 27.97 KG/M2 | HEART RATE: 65 BPM | HEIGHT: 70 IN | WEIGHT: 195.4 LBS | TEMPERATURE: 98.1 F | RESPIRATION RATE: 17 BRPM | OXYGEN SATURATION: 94 %

## 2019-02-04 DIAGNOSIS — G47.01 INSOMNIA DUE TO MEDICAL CONDITION: Primary | ICD-10-CM

## 2019-02-04 DIAGNOSIS — R11.2 CHEMOTHERAPY INDUCED NAUSEA AND VOMITING: ICD-10-CM

## 2019-02-04 DIAGNOSIS — T45.1X5A CHEMOTHERAPY INDUCED NAUSEA AND VOMITING: ICD-10-CM

## 2019-02-04 PROCEDURE — 99215 OFFICE O/P EST HI 40 MIN: CPT | Performed by: INTERNAL MEDICINE

## 2019-02-04 RX ORDER — MULTIVIT WITH MINERALS/LUTEIN
1000 TABLET ORAL DAILY
COMMUNITY

## 2019-02-04 RX ORDER — LANOLIN ALCOHOL/MO/W.PET/CERES
CREAM (GRAM) TOPICAL DAILY
COMMUNITY

## 2019-02-04 RX ORDER — PNV NO.95/FERROUS FUM/FOLIC AC 28MG-0.8MG
TABLET ORAL DAILY
COMMUNITY

## 2019-02-04 RX ORDER — TRAMADOL HYDROCHLORIDE 50 MG/1
50 TABLET ORAL EVERY 6 HOURS PRN
Status: ON HOLD | COMMUNITY
End: 2019-02-09

## 2019-02-04 RX ORDER — TRAZODONE HYDROCHLORIDE 50 MG/1
50 TABLET ORAL
Qty: 30 TABLET | Refills: 1 | Status: SHIPPED | OUTPATIENT
Start: 2019-02-04

## 2019-02-04 RX ORDER — METOCLOPRAMIDE 10 MG/1
10 TABLET ORAL 4 TIMES DAILY
Qty: 60 TABLET | Refills: 1 | Status: SHIPPED | OUTPATIENT
Start: 2019-02-04

## 2019-02-04 NOTE — PROGRESS NOTES
[facility]EMATOLOGY ONCOLOGY [facility]  [address] [address] [phone]    Alfredokristina Islasamina GUZMÁN,[birth year], [id number]  02/04/19    Discussion:   In summary, this is a 68-year-old male history of hepatocellular carcinoma as outlined  He started Valeda Apo only 3 days ago  Unfortunately, there were significant delays due to insurance coverage, etc   He is feeling quite weak  He has insomnia  He has tried Ambien and Valium in the past   These helped but are helping no longer  Trazodone is prescribed for this purpose  Additionally, he has what sounds like early satiety and regurgitation  I suspect this is representative of gastroparesis  He had been taking narcotics in the past but has not had any pain recently and is not taking them  Gastroparesis may result from diabetes  Lastly, he reports occasional diplopia  We reviewed that this could represent brain metastases  He does not wish to pursue an MRI at this time  He is hoping to go on a cruise in March  We reviewed that if his condition continues to decline, this will likely not be feasible, unfortunately  The patient understands that he is entering the later phases of his illness and that improvement is becoming less likely  None the less, he remains hopeful for improvement in his symptoms, quality of life, etc   We will continue to work on these  I asked the patient and his wife to call us in 3 days to give us an update on how he is doing and make any medication adjustments applicable  I discussed the above with the patient  The patient and his wife voiced understanding and agreement   ______________________________________________________________________    Chief Complaint   Patient presents with    Follow-up       HPI:  Oncology History    68-year-old male status post esophagectomy and liver resection 10/31/16 for esophageal cancer and hepatocellular carcinoma in 2016   Treatment included a course of radiation to the esophagus to a total dose of 4680 cGy with treatments given from 6/27/16 to 8/3/2016  Radiation therapy was administered at [facility] by Dr Carlos Alberto Morales  Concurrent Taxol and Carboplatin was under the care of Dr David Silva at [address]  CT scan of the abdomen and MRI of the abdomen in October 2017 revealed a new lesion in the left lobe of the liver measuring 4 1 x 2 7 cm with 1-2 additional lesions  CT-guided biopsy liver lesion was positive for hepatocellular carcinoma on October 11, 2017  Recommendations were made for Sir sphere radiation therapy to the whole liver which was given on December 13, 2017  The patient returns today for follow-up examination  reports he is feeling well and is having no abdominal complaints  He denies any pain  He is still having occasional nausea with some dysphagia and is considering having a repeat esophageal dilation performed  He had repeat blood work including liver function studies performed January 19, 2017 at [facility] but results are not available today  We will call him with these results  He has MRI of the abdomen and follow-up appointment with Dr Elgin Aldana on the same day February 6, 2018  Hopefully, this will show good results and response to treatment  Assuming MRI shows favorable response, then we would recommend repeat MRI again in 3 months  The patient states he will be following with Dr Elgin Aldana every 3 months  It takes about 1 5 hours to come here from [place], [place] and we will therefore see him here on a p r n  basis  Should he have evidence of progressive disease the future then, we would be happy to re-evaluate him for consideration of additional Sir spheres to the liver  The patient is wife were in agreement with the above plan and they know they can call any time if they have any questions  2/14/18 Had EGD, dialtion of the esophagus   There was a mild stricture at the site of anastomosis  4/5/18 Had MRI of the abd: The previously noted dominant lesion in the left hepatic lobe demonstrate increasing necrosis suggest treatment response The segment 4 lesion is decreasing in size The lesion in the segment 5 cm remains stable with thin rim enhancement suggesting no viable tissue Segment 2 lesion is stable Enlarging regional lymph node in the upper abdomen just below the silvestre hepatis which measures 2 5 x 2 4 cm    Pt saw both surg Onc, and Med onc  On 4/6/18 Pt met with Dr Shaquille Spears, who discussed, If radiation therapy could be applied to this lesion deferral of systemic treatment initiation could be accomplished  Systemic treatment options: potential treatment options for patients with [address]  including Nexavar, Katina Livingston  Pt had previous esophagus EBRT,8/2016  at [facility] in [place]  Treatment recoreds available in [address]  Esophageal cancer ([facility] )    4/27/2016 Initial Diagnosis     Esophageal cancer ([address] )         6/27/2016 - 7/26/2016 Chemotherapy     Taxol 50 + Carbo AUC 2         6/27/2016 - 8/3/2016 Radiation     Concurrent RT Esophagus 4,680 cGy   [place]         10/31/2016 Surgery     Esophagectomy           Hepatocellular carcinoma ([facility] )    10/31/2016 Initial Diagnosis     Cancer, hepatocellular ([facility] )       10/31/2016 Surgery     Resection/ablation of liver - segments 5/6         10/11/2017 Biopsy     Local recurrence         12/13/2017 -  Radiation     SIRSphere treatment  Treatment site:  whole liver  Whole liver Dose:  Prescribed: 1 66 GBq, Delivered: 1 61GBq  (Right lobe Dose:   Prescribed: 1 06 GBq,  Delivered: 1 03 GBq)  (Left lobe Dose:   Prescribed: 0 60 GBq,  Delivered: 0 58 GBq)         8/3/2018 - 10/23/2018 Chemotherapy     Nexavar 400 mg p o  daily  Initially planned to start at a visit on July 13th  Medication not obtained until August 3rd           10/18/2018 Progression     October 18, 2018 CT chest showed slight progression of multiple pulmonary nodules, all self sent all subcentimeter  Several new nodules are noted  Left adrenal nodule previously 2 1 cm, currently 2 8 cm  MRI liver showed progression of disease in the left adrenal and liver  A portacaval node has decreased in size, previously 2 5 cm, currently 2 1 cm          10/29/2018 - 12/12/2018 Chemotherapy     Opdivo 480 mg IV q 4 weeks  Interval History:  Weak, tired, insomnia  3 - Symptomatic, >50% confined to bed    Review of Systems   Constitutional: Positive for fatigue  Negative for chills and fever  HENT: Negative for nosebleeds  Eyes: Negative for discharge  Respiratory: Negative for cough and shortness of breath  Cardiovascular: Negative for chest pain  Gastrointestinal: Positive for diarrhea and nausea  Negative for abdominal pain and constipation  Endocrine: Negative for polydipsia  Genitourinary: Negative for hematuria  Musculoskeletal: Negative for arthralgias  Skin: Negative for color change  Allergic/Immunologic: Negative for immunocompromised state  Neurological: Negative for dizziness and headaches  Hematological: Negative for adenopathy  Psychiatric/Behavioral: Negative for agitation         Past Medical History:   Diagnosis Date    Anemia     Coronary artery disease     Dysphagia     Esophageal cancer (HCC)     Gout     Hyperlipidemia     Hypertension     Restless leg syndrome     Rheumatoid arthritis (HCC)     Sleep apnea     Vision abnormalities      Patient Active Problem List   Diagnosis    [facility] )    Hepatocellular carcinoma ([facility] )    Atrial fibrillation (HCC)    Hypotension    Type 2 diabetes mellitus (HCC)    Vision abnormalities    Dysphagia    Restless legs syndrome    Rheumatoid arthritis (HCC)    Sleep apnea    Hyperlipidemia    Hypertension    Peripheral neuropathy    Epidermoid cyst of neck    Glaucoma    Gout    Incisional hernia    Cough    Stage 3 severe COPD by GOLD classification ([facility] )    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)    Anxiety and depression    Acute pain of left shoulder    History of stroke    Mild cognitive impairment    Nutritional deficiency    Coronary artery disease    Goals of care, counseling/discussion    Active advance directive    Gastroesophageal reflux disease without esophagitis    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)    Pulmonary metastases (HCC)    Other fatigue    Weakness of both lower extremities       Current Outpatient Prescriptions:     albuterol (2 5 mg/3 mL) 0 083 % nebulizer solution, Take 1 ampule by nebulization every 4 (four) hours as needed for wheezing  , Disp: , Rfl:     allopurinol (ZYLOPRIM) 100 mg tablet, Take 100 mg by mouth daily, Disp: , Rfl:     amiodarone 200 mg tablet, Take 200 mg by mouth daily, Disp: , Rfl:     apixaban (ELIQUIS) 5 mg, Take 5 mg by mouth 2 (two) times a day, Disp: , Rfl:     Ascorbic Acid (VITAMIN C) 1000 MG tablet, Take 1,000 mg by mouth daily Does not know dosage of vitamin, Disp: , Rfl:     Bisacodyl (LAXATIVE PO), Take by mouth as needed, Disp: , Rfl:     calcium carbonate (TUMS EX) 750 mg chewable tablet, Chew 1 tablet (750 mg total) daily (Patient taking differently: Chew 1 tablet as needed  ), Disp: 30 tablet, Rfl: 0    cyanocobalamin (VITAMIN B-12) 1,000 mcg tablet, Take by mouth daily, Disp: , Rfl:     diazepam (VALIUM) 5 mg tablet, Take 5 mg by mouth every 6 (six) hours as needed for anxiety, Disp: , Rfl:     diphenhydrAMINE (BENADRYL) 25 mg capsule, Take 25 mg by mouth daily, Disp: , Rfl:     Ferrous Sulfate (IRON) 325 (65 Fe) MG TABS, Take by mouth daily, Disp: , Rfl:     hydrOXYzine HCL (ATARAX) 25 mg tablet, Take 25 mg by mouth 2 (two) times a day as needed for itching, Disp: , Rfl:     insulin aspart (NovoLOG) 100 units/mL injection, Inject under the skin as needed for high blood sugar  , Disp: , Rfl:     latanoprost (XALATAN) 0 005 % ophthalmic solution, Administer 1 drop to both eyes daily at bedtime  , Disp: , Rfl:     Lenvatinib 4 MG Daily Dose 4 MG CPPK, Take 2 capsules by mouth daily, Disp: 60 each, Rfl: 0    metoclopramide (REGLAN) 10 mg tablet, Take 1 tablet (10 mg total) by mouth 4 (four) times a day, Disp: 60 tablet, Rfl: 1    oxyCODONE (ROXICODONE) 5 mg immediate release tablet, Take 1 tablet (5 mg total) by mouth every 4 (four) hours as needed for moderate pain Max Daily Amount: 30 mg, Disp: 120 tablet, Rfl: 0    PAPAYA PO, Take by mouth daily, Disp: , Rfl:     Pseudoephedrine HCl (NASAL DECONGESTANT PO), Take by mouth as needed, Disp: , Rfl:     ranitidine (ZANTAC) 150 mg tablet, Take 1 tablet (150 mg total) by mouth daily at bedtime, Disp: 30 tablet, Rfl: 5    rOPINIRole (REQUIP) 1 mg tablet, Take 3 mg by mouth daily at bedtime Take 1mg in am and 3mg at bedtime , Disp: , Rfl:     sertraline (ZOLOFT) 25 mg tablet, Take 25 mg by mouth daily at bedtime  , Disp: , Rfl:     traMADol (ULTRAM) 50 mg tablet, Take 50 mg by mouth every 6 (six) hours as needed for moderate pain, Disp: , Rfl:     umeclidinium-vilanterol (ANORO ELLIPTA) 62 5-25 MCG/INH inhaler, Inhale 1 puff daily, Disp: , Rfl:     ipratropium (ATROVENT) 0 02 % nebulizer solution, Take 1 vial (0 5 mg total) by nebulization 4 (four) times a day, Disp: 120 vial, Rfl: 6  Allergies   Allergen Reactions    Humira [Adalimumab] Rash     itchy    Levaquin [Levofloxacin In D5w] Rash    Levofloxacin Rash    Lovenox [Enoxaparin] Rash and Hives     Past Surgical History:   Procedure Laterality Date    APPENDECTOMY      CHOLECYSTECTOMY      GASTROJEJUNOSTOMY W/ JEJUNOSTOMY TUBE N/A 8/4/2016    Procedure: INSERTION JEJUNOSTOMY TUBE OPEN;  Surgeon: Jamal Herrera MD;  [facility];  Service:     KNEE ARTHROSCOPY      LIVER RESECTION N/A 10/31/2016    Procedure: INTRAOPERATIVE ULTRASOUND OF LIVER; LIVER LESION RESECTION/ABLATION ;  Surgeon: Jamal Herrera MD;  [facility];  Service:     PORTACATH PLACEMENT      [place] EGD INSERT GUIDE WIRE DILATOR PASSAGE ESOPHAGUS N/A 2/14/2018    Procedure: ESOPHAGOGASTRODUODENOSCOPY (EGD) with dilation;  Surgeon: Helen William MD;  [facility];  Service: Thoracic    [place] ESOPHAGOGASTRODUODENOSCOPY TRANSORAL DIAGNOSTIC N/A 10/31/2016    Procedure: ESOPHAGOGASTRODUODENOSCOPY (EGD); Surgeon: Georgie Hashimoto, MD;  [facility];  Service: Surgical Oncology    [place] ESOPHAGOSCOPY FLEX BALLOON DILAT <30 MM DIAM N/A 1/6/2017    Procedure: DILATATION ESOPHAGEAL;  Surgeon: Helen William MD;  [facility];  Service: Thoracic    [place] ESOPHAGOSCOPY FLEX BALLOON DILAT <30 MM DIAM N/A 12/12/2016    Procedure: DILATATION ESOPHAGEAL, EGD;  Surgeon: Helen William MD;  [facility];  Service: Thoracic    [place] ESOPHAGOSCOPY FLEX BALLOON DILAT <30 MM DIAM N/A 5/24/2017    Procedure: EGD WITH DILATION ;  Surgeon: Rhonda Nuñez MD;  [facility];  Service: Thoracic    [place] ESOPHAGOSCOPY FLEX BALLOON DILAT <30 MM DIAM N/A 8/3/2017    Procedure: EGD WITH DILATION;  Surgeon: Helen William MD;  [facility];  Service: Thoracic    [place] REMOVAL [address] THORACOTOMY N/A 10/31/2016    Procedure: ESOPHAGECTOMY;  Surgeon: Georgie Hashimoto, MD;  [facility];  Service: Surgical Oncology    TONSILLECTOMY      WOUND DEBRIDEMENT Left 5/3/2018    Procedure: DEBRIDEMENT UPPER EXTREMITY ([address]) left shoulder;  Surgeon: Baljit Foster MD;  [facility];  Service: Orthopedics     Social History     Objective:  Vitals:    02/04/19 1446   Pulse: 65   Resp: 17   Temp: 98 1 °F (36 7 °C)   TempSrc: Tympanic   SpO2: 94%   Weight: 88 6 kg (195 lb 6 4 oz)   Height: 5' 10" (1 778 m)     Physical Exam   Constitutional: He is oriented to person, place, and time  He appears well-developed  Ill-appearing  HENT:   Head: Normocephalic  Eyes: Pupils are equal, round, and reactive to light  Neck: Neck supple  Cardiovascular: Normal rate and regular rhythm  No murmur heard  Pulmonary/Chest: Breath sounds normal  He has no wheezes  He has no rales  Abdominal: Soft  There is no tenderness  Musculoskeletal: Normal range of motion  He exhibits no edema or tenderness  Lymphadenopathy:     He has no cervical adenopathy  Neurological: He is alert and oriented to person, place, and time  He has normal reflexes  No cranial nerve deficit  Skin: No rash noted  No erythema  Psychiatric: He has a normal mood and affect  His behavior is normal          Labs: I personally reviewed the labs and imaging pertinent to this patient care

## 2019-02-04 NOTE — PROGRESS NOTES
[facility] Approved Kevin for [address] for free medication  Valid 1-29-19  Thru 46-13-13    Patient received 1st shippment 1-31-19  EMAIL to provider to advise

## 2019-02-08 ENCOUNTER — HOSPITAL ENCOUNTER (INPATIENT)
Facility: HOSPITAL | Age: 74
LOS: 10 days | Discharge: HOSPICE | DRG: 435 | End: 2019-02-19
Attending: EMERGENCY MEDICINE | Admitting: INTERNAL MEDICINE
Payer: MEDICARE

## 2019-02-08 ENCOUNTER — APPOINTMENT (EMERGENCY)
Dept: RADIOLOGY | Facility: HOSPITAL | Age: 74
DRG: 435 | End: 2019-02-08
Payer: MEDICARE

## 2019-02-08 DIAGNOSIS — C15.9 MALIGNANT NEOPLASM OF ESOPHAGUS, UNSPECIFIED LOCATION (HCC): ICD-10-CM

## 2019-02-08 DIAGNOSIS — R53.83 FATIGUE: Primary | ICD-10-CM

## 2019-02-08 DIAGNOSIS — E86.0 DEHYDRATION: ICD-10-CM

## 2019-02-08 DIAGNOSIS — H04.123 DRY EYES: ICD-10-CM

## 2019-02-08 DIAGNOSIS — C22.0 HEPATOCELLULAR CARCINOMA (HCC): ICD-10-CM

## 2019-02-08 DIAGNOSIS — G47.00 INSOMNIA: ICD-10-CM

## 2019-02-08 DIAGNOSIS — K21.9 GERD (GASTROESOPHAGEAL REFLUX DISEASE): ICD-10-CM

## 2019-02-08 DIAGNOSIS — J44.9 STAGE 3 SEVERE COPD BY GOLD CLASSIFICATION (HCC): ICD-10-CM

## 2019-02-08 DIAGNOSIS — Z51.5 HOSPICE CARE: ICD-10-CM

## 2019-02-08 LAB
ALBUMIN SERPL BCP-MCNC: 2.6 G/DL (ref 3.5–5)
ALP SERPL-CCNC: 846 U/L (ref 46–116)
ALT SERPL W P-5'-P-CCNC: 113 U/L (ref 12–78)
ANION GAP SERPL CALCULATED.3IONS-SCNC: 9 MMOL/L (ref 4–13)
AST SERPL W P-5'-P-CCNC: 237 U/L (ref 5–45)
BASOPHILS # BLD AUTO: 0.07 THOUSANDS/ΜL (ref 0–0.1)
BASOPHILS NFR BLD AUTO: 1 % (ref 0–1)
BILIRUB SERPL-MCNC: 3.82 MG/DL (ref 0.2–1)
BUN SERPL-MCNC: 15 MG/DL (ref 5–25)
CALCIUM SERPL-MCNC: 9.1 MG/DL (ref 8.3–10.1)
CHLORIDE SERPL-SCNC: 103 MMOL/L (ref 100–108)
CO2 SERPL-SCNC: 25 MMOL/L (ref 21–32)
CREAT SERPL-MCNC: 0.86 MG/DL (ref 0.6–1.3)
EOSINOPHIL # BLD AUTO: 0.07 THOUSAND/ΜL (ref 0–0.61)
EOSINOPHIL NFR BLD AUTO: 1 % (ref 0–6)
ERYTHROCYTE [DISTWIDTH] IN BLOOD BY AUTOMATED COUNT: 20.2 % (ref 11.6–15.1)
GFR SERPL CREATININE-BSD FRML MDRD: 85 ML/MIN/1.73SQ M
GLUCOSE SERPL-MCNC: 72 MG/DL (ref 65–140)
GLUCOSE SERPL-MCNC: 75 MG/DL (ref 65–140)
HCT VFR BLD AUTO: 60.8 % (ref 36.5–49.3)
HGB BLD-MCNC: 19.5 G/DL (ref 12–17)
IMM GRANULOCYTES # BLD AUTO: 0.02 THOUSAND/UL (ref 0–0.2)
IMM GRANULOCYTES NFR BLD AUTO: 0 % (ref 0–2)
LYMPHOCYTES # BLD AUTO: 0.42 THOUSANDS/ΜL (ref 0.6–4.47)
LYMPHOCYTES NFR BLD AUTO: 8 % (ref 14–44)
MCH RBC QN AUTO: 30.8 PG (ref 26.8–34.3)
MCHC RBC AUTO-ENTMCNC: 32.1 G/DL (ref 31.4–37.4)
MCV RBC AUTO: 96 FL (ref 82–98)
MONOCYTES # BLD AUTO: 0.45 THOUSAND/ΜL (ref 0.17–1.22)
MONOCYTES NFR BLD AUTO: 8 % (ref 4–12)
NEUTROPHILS # BLD AUTO: 4.35 THOUSANDS/ΜL (ref 1.85–7.62)
NEUTS SEG NFR BLD AUTO: 82 % (ref 43–75)
NRBC BLD AUTO-RTO: 0 /100 WBCS
PLATELET # BLD AUTO: 109 THOUSANDS/UL (ref 149–390)
PMV BLD AUTO: 11.8 FL (ref 8.9–12.7)
POTASSIUM SERPL-SCNC: 4.2 MMOL/L (ref 3.5–5.3)
PROT SERPL-MCNC: 8.9 G/DL (ref 6.4–8.2)
RBC # BLD AUTO: 6.33 MILLION/UL (ref 3.88–5.62)
SODIUM SERPL-SCNC: 137 MMOL/L (ref 136–145)
TROPONIN I SERPL-MCNC: <0.02 NG/ML
TSH SERPL DL<=0.05 MIU/L-ACNC: 10.2 UIU/ML (ref 0.36–3.74)
WBC # BLD AUTO: 5.38 THOUSAND/UL (ref 4.31–10.16)

## 2019-02-08 PROCEDURE — 96374 THER/PROPH/DIAG INJ IV PUSH: CPT

## 2019-02-08 PROCEDURE — 99285 EMERGENCY DEPT VISIT HI MDM: CPT

## 2019-02-08 PROCEDURE — 71046 X-RAY EXAM CHEST 2 VIEWS: CPT

## 2019-02-08 PROCEDURE — 93005 ELECTROCARDIOGRAM TRACING: CPT

## 2019-02-08 PROCEDURE — 70450 CT HEAD/BRAIN W/O DYE: CPT

## 2019-02-08 PROCEDURE — 85025 COMPLETE CBC W/AUTO DIFF WBC: CPT | Performed by: EMERGENCY MEDICINE

## 2019-02-08 PROCEDURE — 96361 HYDRATE IV INFUSION ADD-ON: CPT

## 2019-02-08 PROCEDURE — 82948 REAGENT STRIP/BLOOD GLUCOSE: CPT

## 2019-02-08 PROCEDURE — 80053 COMPREHEN METABOLIC PANEL: CPT | Performed by: EMERGENCY MEDICINE

## 2019-02-08 PROCEDURE — 84443 ASSAY THYROID STIM HORMONE: CPT | Performed by: EMERGENCY MEDICINE

## 2019-02-08 PROCEDURE — 36415 COLL VENOUS BLD VENIPUNCTURE: CPT | Performed by: EMERGENCY MEDICINE

## 2019-02-08 PROCEDURE — 84439 ASSAY OF FREE THYROXINE: CPT | Performed by: EMERGENCY MEDICINE

## 2019-02-08 PROCEDURE — 84484 ASSAY OF TROPONIN QUANT: CPT | Performed by: EMERGENCY MEDICINE

## 2019-02-08 RX ORDER — LORAZEPAM 1 MG/1
0.5 TABLET ORAL 2 TIMES DAILY
Status: ON HOLD | COMMUNITY
End: 2019-02-19 | Stop reason: SDUPTHER

## 2019-02-08 RX ORDER — ONDANSETRON 2 MG/ML
4 INJECTION INTRAMUSCULAR; INTRAVENOUS ONCE
Status: COMPLETED | OUTPATIENT
Start: 2019-02-08 | End: 2019-02-08

## 2019-02-08 RX ADMIN — SODIUM CHLORIDE 1000 ML: 0.9 INJECTION, SOLUTION INTRAVENOUS at 22:38

## 2019-02-08 RX ADMIN — SODIUM CHLORIDE 1000 ML: 0.9 INJECTION, SOLUTION INTRAVENOUS at 21:24

## 2019-02-08 RX ADMIN — ONDANSETRON 4 MG: 2 INJECTION INTRAMUSCULAR; INTRAVENOUS at 21:24

## 2019-02-09 PROBLEM — R53.1 RIGHT SIDED WEAKNESS: Status: ACTIVE | Noted: 2019-02-09

## 2019-02-09 PROBLEM — R74.01 TRANSAMINITIS: Status: ACTIVE | Noted: 2019-02-09

## 2019-02-09 PROBLEM — R09.89 PULMONARY VASCULAR CONGESTION: Status: ACTIVE | Noted: 2019-02-09

## 2019-02-09 LAB
ALBUMIN SERPL BCP-MCNC: 1.9 G/DL (ref 3.5–5)
ALP SERPL-CCNC: 621 U/L (ref 46–116)
ALT SERPL W P-5'-P-CCNC: 82 U/L (ref 12–78)
ANION GAP SERPL CALCULATED.3IONS-SCNC: 5 MMOL/L (ref 4–13)
AST SERPL W P-5'-P-CCNC: 168 U/L (ref 5–45)
ATRIAL RATE: 66 BPM
BASOPHILS # BLD AUTO: 0.07 THOUSANDS/ΜL (ref 0–0.1)
BASOPHILS NFR BLD AUTO: 1 % (ref 0–1)
BILIRUB SERPL-MCNC: 2.53 MG/DL (ref 0.2–1)
BUN SERPL-MCNC: 14 MG/DL (ref 5–25)
CALCIUM SERPL-MCNC: 8 MG/DL (ref 8.3–10.1)
CHLORIDE SERPL-SCNC: 108 MMOL/L (ref 100–108)
CO2 SERPL-SCNC: 26 MMOL/L (ref 21–32)
CREAT SERPL-MCNC: 0.57 MG/DL (ref 0.6–1.3)
EOSINOPHIL # BLD AUTO: 0.09 THOUSAND/ΜL (ref 0–0.61)
EOSINOPHIL NFR BLD AUTO: 2 % (ref 0–6)
ERYTHROCYTE [DISTWIDTH] IN BLOOD BY AUTOMATED COUNT: 19.6 % (ref 11.6–15.1)
FERRITIN SERPL-MCNC: 198 NG/ML (ref 8–388)
GFR SERPL CREATININE-BSD FRML MDRD: 100 ML/MIN/1.73SQ M
GLUCOSE SERPL-MCNC: 100 MG/DL (ref 65–140)
GLUCOSE SERPL-MCNC: 102 MG/DL (ref 65–140)
GLUCOSE SERPL-MCNC: 107 MG/DL (ref 65–140)
GLUCOSE SERPL-MCNC: 109 MG/DL (ref 65–140)
GLUCOSE SERPL-MCNC: 69 MG/DL (ref 65–140)
HBV CORE AB SER QL: NORMAL
HBV CORE IGM SER QL: NORMAL
HBV SURFACE AG SER QL: NORMAL
HCT VFR BLD AUTO: 53.4 % (ref 36.5–49.3)
HCV AB SER QL: NORMAL
HGB BLD-MCNC: 17.3 G/DL (ref 12–17)
IMM GRANULOCYTES # BLD AUTO: 0.02 THOUSAND/UL (ref 0–0.2)
IMM GRANULOCYTES NFR BLD AUTO: 0 % (ref 0–2)
IRON SATN MFR SERPL: 16 %
IRON SERPL-MCNC: 45 UG/DL (ref 65–175)
LYMPHOCYTES # BLD AUTO: 0.4 THOUSANDS/ΜL (ref 0.6–4.47)
LYMPHOCYTES NFR BLD AUTO: 8 % (ref 14–44)
MCH RBC QN AUTO: 31.5 PG (ref 26.8–34.3)
MCHC RBC AUTO-ENTMCNC: 32.4 G/DL (ref 31.4–37.4)
MCV RBC AUTO: 97 FL (ref 82–98)
MONOCYTES # BLD AUTO: 0.42 THOUSAND/ΜL (ref 0.17–1.22)
MONOCYTES NFR BLD AUTO: 8 % (ref 4–12)
NEUTROPHILS # BLD AUTO: 4.31 THOUSANDS/ΜL (ref 1.85–7.62)
NEUTS SEG NFR BLD AUTO: 81 % (ref 43–75)
NRBC BLD AUTO-RTO: 0 /100 WBCS
NT-PROBNP SERPL-MCNC: 151 PG/ML
P AXIS: 104 DEGREES
PLATELET # BLD AUTO: 101 THOUSANDS/UL (ref 149–390)
PMV BLD AUTO: 11.5 FL (ref 8.9–12.7)
POTASSIUM SERPL-SCNC: 4.1 MMOL/L (ref 3.5–5.3)
PR INTERVAL: 126 MS
PROT SERPL-MCNC: 6.6 G/DL (ref 6.4–8.2)
QRS AXIS: -72 DEGREES
QRSD INTERVAL: 94 MS
QT INTERVAL: 388 MS
QTC INTERVAL: 406 MS
RBC # BLD AUTO: 5.5 MILLION/UL (ref 3.88–5.62)
SODIUM SERPL-SCNC: 139 MMOL/L (ref 136–145)
T WAVE AXIS: 30 DEGREES
T4 FREE SERPL-MCNC: 1.33 NG/DL (ref 0.76–1.46)
TIBC SERPL-MCNC: 287 UG/DL (ref 250–450)
VENTRICULAR RATE: 66 BPM
VIT B12 SERPL-MCNC: 5367 PG/ML (ref 100–900)
WBC # BLD AUTO: 5.31 THOUSAND/UL (ref 4.31–10.16)

## 2019-02-09 PROCEDURE — 82948 REAGENT STRIP/BLOOD GLUCOSE: CPT

## 2019-02-09 PROCEDURE — G8978 MOBILITY CURRENT STATUS: HCPCS

## 2019-02-09 PROCEDURE — 97163 PT EVAL HIGH COMPLEX 45 MIN: CPT

## 2019-02-09 PROCEDURE — 86704 HEP B CORE ANTIBODY TOTAL: CPT | Performed by: INTERNAL MEDICINE

## 2019-02-09 PROCEDURE — 86705 HEP B CORE ANTIBODY IGM: CPT | Performed by: INTERNAL MEDICINE

## 2019-02-09 PROCEDURE — 99225 PR SBSQ OBSERVATION CARE/DAY 25 MINUTES: CPT | Performed by: INTERNAL MEDICINE

## 2019-02-09 PROCEDURE — 86803 HEPATITIS C AB TEST: CPT | Performed by: INTERNAL MEDICINE

## 2019-02-09 PROCEDURE — 83540 ASSAY OF IRON: CPT | Performed by: INTERNAL MEDICINE

## 2019-02-09 PROCEDURE — 99220 PR INITIAL OBSERVATION CARE/DAY 70 MINUTES: CPT | Performed by: INTERNAL MEDICINE

## 2019-02-09 PROCEDURE — G8987 SELF CARE CURRENT STATUS: HCPCS

## 2019-02-09 PROCEDURE — 93010 ELECTROCARDIOGRAM REPORT: CPT | Performed by: INTERNAL MEDICINE

## 2019-02-09 PROCEDURE — 83550 IRON BINDING TEST: CPT | Performed by: INTERNAL MEDICINE

## 2019-02-09 PROCEDURE — 87340 HEPATITIS B SURFACE AG IA: CPT | Performed by: INTERNAL MEDICINE

## 2019-02-09 PROCEDURE — 94760 N-INVAS EAR/PLS OXIMETRY 1: CPT

## 2019-02-09 PROCEDURE — 85025 COMPLETE CBC W/AUTO DIFF WBC: CPT | Performed by: INTERNAL MEDICINE

## 2019-02-09 PROCEDURE — 97167 OT EVAL HIGH COMPLEX 60 MIN: CPT

## 2019-02-09 PROCEDURE — 83880 ASSAY OF NATRIURETIC PEPTIDE: CPT | Performed by: INTERNAL MEDICINE

## 2019-02-09 PROCEDURE — 94640 AIRWAY INHALATION TREATMENT: CPT

## 2019-02-09 PROCEDURE — 80053 COMPREHEN METABOLIC PANEL: CPT | Performed by: INTERNAL MEDICINE

## 2019-02-09 PROCEDURE — 82607 VITAMIN B-12: CPT | Performed by: INTERNAL MEDICINE

## 2019-02-09 PROCEDURE — 99222 1ST HOSP IP/OBS MODERATE 55: CPT | Performed by: INTERNAL MEDICINE

## 2019-02-09 PROCEDURE — 82728 ASSAY OF FERRITIN: CPT | Performed by: INTERNAL MEDICINE

## 2019-02-09 PROCEDURE — G8979 MOBILITY GOAL STATUS: HCPCS

## 2019-02-09 PROCEDURE — G8988 SELF CARE GOAL STATUS: HCPCS

## 2019-02-09 RX ORDER — METOCLOPRAMIDE 10 MG/1
10 TABLET ORAL 4 TIMES DAILY
Status: DISCONTINUED | OUTPATIENT
Start: 2019-02-09 | End: 2019-02-19 | Stop reason: HOSPADM

## 2019-02-09 RX ORDER — SERTRALINE HYDROCHLORIDE 25 MG/1
25 TABLET, FILM COATED ORAL
Status: DISCONTINUED | OUTPATIENT
Start: 2019-02-09 | End: 2019-02-19 | Stop reason: HOSPADM

## 2019-02-09 RX ORDER — LORAZEPAM 0.5 MG/1
0.5 TABLET ORAL 2 TIMES DAILY
Status: DISCONTINUED | OUTPATIENT
Start: 2019-02-09 | End: 2019-02-11

## 2019-02-09 RX ORDER — CALCIUM CARBONATE 200(500)MG
750 TABLET,CHEWABLE ORAL DAILY PRN
Status: DISCONTINUED | OUTPATIENT
Start: 2019-02-09 | End: 2019-02-19 | Stop reason: HOSPADM

## 2019-02-09 RX ORDER — DIPHENHYDRAMINE HCL 25 MG
25 TABLET ORAL DAILY
Status: DISCONTINUED | OUTPATIENT
Start: 2019-02-09 | End: 2019-02-11

## 2019-02-09 RX ORDER — DIAZEPAM 5 MG/1
5 TABLET ORAL EVERY 6 HOURS PRN
Status: DISCONTINUED | OUTPATIENT
Start: 2019-02-09 | End: 2019-02-19 | Stop reason: HOSPADM

## 2019-02-09 RX ORDER — CHOLECALCIFEROL (VITAMIN D3) 125 MCG
1000 CAPSULE ORAL DAILY
Status: DISCONTINUED | OUTPATIENT
Start: 2019-02-09 | End: 2019-02-19 | Stop reason: HOSPADM

## 2019-02-09 RX ORDER — UREA 10 %
LOTION (ML) TOPICAL DAILY
Status: DISCONTINUED | OUTPATIENT
Start: 2019-02-09 | End: 2019-02-09 | Stop reason: CLARIF

## 2019-02-09 RX ORDER — LATANOPROST 50 UG/ML
1 SOLUTION/ DROPS OPHTHALMIC
Status: DISCONTINUED | OUTPATIENT
Start: 2019-02-09 | End: 2019-02-19 | Stop reason: HOSPADM

## 2019-02-09 RX ORDER — ALBUTEROL SULFATE 2.5 MG/3ML
2.5 SOLUTION RESPIRATORY (INHALATION) EVERY 4 HOURS PRN
Status: DISCONTINUED | OUTPATIENT
Start: 2019-02-09 | End: 2019-02-19 | Stop reason: HOSPADM

## 2019-02-09 RX ORDER — FERROUS SULFATE 325(65) MG
325 TABLET ORAL DAILY
Status: DISCONTINUED | OUTPATIENT
Start: 2019-02-09 | End: 2019-02-19 | Stop reason: HOSPADM

## 2019-02-09 RX ORDER — ASCORBIC ACID 500 MG
1000 TABLET ORAL DAILY
Status: DISCONTINUED | OUTPATIENT
Start: 2019-02-09 | End: 2019-02-19 | Stop reason: HOSPADM

## 2019-02-09 RX ORDER — FAMOTIDINE 20 MG/1
20 TABLET, FILM COATED ORAL
Status: DISCONTINUED | OUTPATIENT
Start: 2019-02-09 | End: 2019-02-19 | Stop reason: HOSPADM

## 2019-02-09 RX ORDER — HYDROXYZINE HYDROCHLORIDE 25 MG/1
25 TABLET, FILM COATED ORAL 2 TIMES DAILY PRN
Status: DISCONTINUED | OUTPATIENT
Start: 2019-02-09 | End: 2019-02-19 | Stop reason: HOSPADM

## 2019-02-09 RX ORDER — AMIODARONE HYDROCHLORIDE 200 MG/1
200 TABLET ORAL DAILY
Status: DISCONTINUED | OUTPATIENT
Start: 2019-02-09 | End: 2019-02-19 | Stop reason: HOSPADM

## 2019-02-09 RX ORDER — ALLOPURINOL 100 MG/1
100 TABLET ORAL DAILY
Status: DISCONTINUED | OUTPATIENT
Start: 2019-02-09 | End: 2019-02-19 | Stop reason: HOSPADM

## 2019-02-09 RX ORDER — TRAZODONE HYDROCHLORIDE 50 MG/1
50 TABLET ORAL
Status: DISCONTINUED | OUTPATIENT
Start: 2019-02-09 | End: 2019-02-19 | Stop reason: HOSPADM

## 2019-02-09 RX ADMIN — TRAZODONE HYDROCHLORIDE 50 MG: 50 TABLET ORAL at 21:48

## 2019-02-09 RX ADMIN — FERROUS SULFATE TAB 325 MG (65 MG ELEMENTAL FE) 325 MG: 325 (65 FE) TAB at 09:09

## 2019-02-09 RX ADMIN — METOCLOPRAMIDE HYDROCHLORIDE 10 MG: 10 TABLET ORAL at 17:02

## 2019-02-09 RX ADMIN — METOCLOPRAMIDE HYDROCHLORIDE 10 MG: 10 TABLET ORAL at 21:47

## 2019-02-09 RX ADMIN — METOCLOPRAMIDE HYDROCHLORIDE 10 MG: 10 TABLET ORAL at 09:09

## 2019-02-09 RX ADMIN — SERTRALINE HYDROCHLORIDE 25 MG: 25 TABLET ORAL at 21:47

## 2019-02-09 RX ADMIN — ALLOPURINOL 100 MG: 100 TABLET ORAL at 09:09

## 2019-02-09 RX ADMIN — APIXABAN 5 MG: 5 TABLET, FILM COATED ORAL at 17:02

## 2019-02-09 RX ADMIN — FAMOTIDINE 20 MG: 20 TABLET, FILM COATED ORAL at 21:48

## 2019-02-09 RX ADMIN — ROPINIROLE 3 MG: 2 TABLET, FILM COATED ORAL at 03:09

## 2019-02-09 RX ADMIN — ROPINIROLE 3 MG: 2 TABLET, FILM COATED ORAL at 21:49

## 2019-02-09 RX ADMIN — APIXABAN 5 MG: 5 TABLET, FILM COATED ORAL at 09:09

## 2019-02-09 RX ADMIN — IPRATROPIUM BROMIDE 0.5 MG: 0.5 SOLUTION RESPIRATORY (INHALATION) at 17:40

## 2019-02-09 RX ADMIN — IPRATROPIUM BROMIDE 0.5 MG: 0.5 SOLUTION RESPIRATORY (INHALATION) at 06:53

## 2019-02-09 RX ADMIN — LORAZEPAM 0.5 MG: 0.5 TABLET ORAL at 17:02

## 2019-02-09 RX ADMIN — METOCLOPRAMIDE HYDROCHLORIDE 10 MG: 10 TABLET ORAL at 11:54

## 2019-02-09 RX ADMIN — IPRATROPIUM BROMIDE 0.5 MG: 0.5 SOLUTION RESPIRATORY (INHALATION) at 11:46

## 2019-02-09 RX ADMIN — LATANOPROST 1 DROP: 50 SOLUTION OPHTHALMIC at 22:33

## 2019-02-09 RX ADMIN — CYANOCOBALAMIN TAB 500 MCG 1000 MCG: 500 TAB at 09:09

## 2019-02-09 RX ADMIN — DIPHENHYDRAMINE HCL 25 MG: 25 TABLET ORAL at 09:09

## 2019-02-09 RX ADMIN — LORAZEPAM 0.5 MG: 0.5 TABLET ORAL at 09:10

## 2019-02-09 RX ADMIN — OXYCODONE HYDROCHLORIDE AND ACETAMINOPHEN 1000 MG: 500 TABLET ORAL at 09:09

## 2019-02-09 RX ADMIN — AMIODARONE HYDROCHLORIDE 200 MG: 200 TABLET ORAL at 09:09

## 2019-02-09 RX ADMIN — IPRATROPIUM BROMIDE 0.5 MG: 0.5 SOLUTION RESPIRATORY (INHALATION) at 21:46

## 2019-02-09 NOTE — RESPIRATORY THERAPY NOTE
RT Protocol Note  Flor Doss 74 y o  male MRN: [id number]  Unit/Bed#: [facility] 522-01 Encounter: [id number]    Assessment    Principal Problem:    Other fatigue  Active Problems:    Hepatocellular carcinoma ([facility] )    Type 2 diabetes mellitus ([facility] )    Stage 3 severe COPD by GOLD classification ([place] [address] )      Home Pulmonary Medications:  Atrovent  Albuterol  Past Medical History:   Diagnosis Date    Anemia     Coronary artery disease     Dysphagia     Esophageal cancer (HCC)     Gout     Hyperlipidemia     Hypertension     Restless leg syndrome     Rheumatoid arthritis ([place] [address] )     Sleep apnea     Vision abnormalities      Social History     Social History    Marital status: /Civil Union     Spouse name: N/A    Number of children: N/A    Years of education: N/A     Occupational History          Social History Main Topics    Smoking status: Former Smoker     Packs/day: 3 00     Years: 40 00     Types: Cigarettes     Start date: 1951     Quit date: 4/13/1991    Smokeless tobacco: Never Used    Alcohol use Yes      Comment: 1 drink/week    Drug use: No    Sexual activity: Not Currently     Other Topics Concern    None     Social History Narrative    None       Subjective         Objective    Physical Exam:   Assessment Type: Assess only  General Appearance: Awake  Respiratory Pattern: Normal  Chest Assessment: Chest expansion symmetrical  Bilateral Breath Sounds: Clear, Diminished  Cough: None    Vitals:  Blood pressure 162/80, pulse 62, temperature (!) 96 5 °F (35 8 °C), temperature source Axillary, resp  rate 12, weight 88 6 kg (195 lb 5 2 oz), SpO2 95 %  Imaging and other studies: I have personally reviewed pertinent reports  Plan    Respiratory Plan: Home Bronchodilator Patient pathway        Resp Comments: Pt admit for Dx of fatique/weakness  Hx of lung/esophageal/liver CA, COPD  (Pt sees Dr Aguila Miller, pulmonologist as out-pt)  BS clear  HR/RR/SpO2=62/12/96%  CXR='pulm vasc congestion'  Pt in no acute distress  Pt w/o acute respiratory illness  Plan to continue home bronchodilator Rx

## 2019-02-09 NOTE — OCCUPATIONAL THERAPY NOTE
[address]  Evaluation     Patient Name: Ivania Lyon  YZMBYALONDRA Date: 2/9/2019  Problem List  Patient Active Problem List   Diagnosis    Es[facility]    Hepatocellular carcinoma ([facility]    Atrial fibrillation ([facility]    Type 2 diabetes mellitus ([facility] )    Vision abnormalities    Restless legs syndrome    Rheumatoid arthritis ([facility] )    Sleep apnea    Hyperlipidemia    Hypertension    Peripheral neuropathy    Glaucoma    Gout    Incisional hernia    Stage 3 severe COPD by GOLD classification ([facility] )    Anxiety and depression    History of stroke    Mild cognitive impairment    Nutritional deficiency    Coronary artery disease    Active advance directive    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)    Pulmonary metastases (HCC)    Other fatigue    Transaminitis    Right sided weakness    Pulmonary vascular congestion     Past Medical History  Past Medical History:   Diagnosis Date    Anemia     Coronary artery disease     Dysphagia     Esophageal cancer (HCC)     Gout     Hyperlipidemia     Hypertension     Restless leg syndrome     Rheumatoid arthritis ([facility]     Sleep apnea     Vision abnormalities      Past Surgical History  Past Surgical History:   Procedure Laterality Date    APPENDECTOMY      CHOLECYSTECTOMY      GASTROJEJUNOSTOMY W/ JEJUNOSTOMY TUBE N/A 8/4/2016    Procedure: INSERTION JEJUNOSTOMY TUBE OPEN;  Surgeon: Dempsey Fabry, MD;  [facility];  Service:     KNEE ARTHROSCOPY      LIVER RESECTION N/A 10/31/2016    Procedure: INTRAOPERATIVE ULTRASOUND OF LIVER; LIVER LESION RESECTION/ABLATION ;  Surgeon: Dempsey Fabry, MD;  [facility];  Service:    Sharifa Boga PLACEMENT      TX EGD INSERT GUIDE WIRE DILATOR PASSAGE ESOPHAGUS N/A 2/14/2018    Procedure: ESOPHAGOGASTRODUODENOSCOPY (EGD) with dilation;  Surgeon: Chuy Wang MD;  [facility];  Service: Thoracic    TX ESOPHAGOGASTRODUODENOSCOPY TRANSORAL DIAGNOSTIC N/A 10/31/2016 Procedure: ESOPHAGOGASTRODUODENOSCOPY (EGD); Surgeon: Le Jo MD;  [facility];  Service: Surgical Oncology    LA ESOPHAGOSCOPY FLEX BALLOON DILAT <30 MM DIAM N/A 1/6/2017    Procedure: DILATATION ESOPHAGEAL;  Surgeon: Whitney Diaz MD;  [facility];  Service: Thoracic    LA ESOPHAGOSCOPY FLEX BALLOON DILAT <30 MM DIAM N/A 12/12/2016    Procedure: DILATATION ESOPHAGEAL, EGD;  Surgeon: Whitney Diaz MD;  [facility];  Service: Thoracic    LA ESOPHAGOSCOPY FLEX BALLOON DILAT <30 MM DIAM N/A 5/24/2017    Procedure: EGD WITH DILATION ;  Surgeon: Jennifer De Oliveira MD;  [facility];  Service: Thoracic    LA ESOPHAGOSCOPY FLEX BALLOON DILAT <30 MM DIAM N/A 8/3/2017    Procedure: EGD WITH DILATION;  Surgeon: Whitney Diaz MD;  [facility];  Service: Thoracic    LA REMOVAL [address] THORACOTOMY N/A 10/31/2016    Procedure: ESOPHAGECTOMY;  Surgeon: Le Jo MD;  [facility];  Service: Surgical Oncology    TONSILLECTOMY      WOUND DEBRIDEMENT Left 5/3/2018    Procedure: DEBRIDEMENT UPPER EXTREMITY ([address]) left shoulder;  Surgeon: Kristy Anaya MD;  [facility];  Service: Orthopedics         02/09/19 1305   Note Type   Note type Eval only   Restrictions/Precautions   Weight Bearing Precautions Per Order No   Other Precautions Cognitive; Fall Risk   Pain Assessment   Pain Assessment FLACC   Pain Rating: FLACC (Rest) - Face 0   Pain Rating: FLACC (Rest) - Legs 0   Pain Rating: FLACC (Rest) - Activity 0   Pain Rating: FLACC (Rest) - Cry 0   Pain Rating: FLACC (Rest) - Consolability 0   Score: FLACC (Rest) 0   Pain Rating: FLACC (Activity) - Face 0   Pain Rating: FLACC (Activity) - Legs 0   Pain Rating: FLACC (Activity) - Activity 0   Pain Rating: FLACC (Activity) - Cry 0   Pain Rating: FLACC (Activity) - Consolability 0   Score: FLACC (Activity) 0   Home Living   Type of Home House  ([address]   Home Layout One level   Bathroom Shower/Tub Walk-in shower   Bathroom Toilet Raised   Home Equipment Cane   Additional Comments Per chart, pt lives with his wife in a [facility] with 3STE  Prior Function   Level of Bartholomew Independent with ADLs and functional mobility   Lives With Spouse   ADL Assistance Independent   Comments Information obtained from chart   Lifestyle   Autonomy Per chart, pt was I with ADLs, IADLs and functional mobility with use of spc PTA  Reciprocal Relationships wife   Intrinsic Gratification unable to state   Psychosocial   Psychosocial (WDL) X   Patient Behaviors/Mood Flat affect   Subjective   Subjective Pt very lethargic upon OT evaluation  Difficult to arouse, and required therapist to wake him up multiple times  Pt quietly mumbling answers inconsistently to questions asked by therapist   Pt inconsistently following commands  ADL   Eating Assistance 1  Total Assistance   Grooming Assistance 1  Total Assistance   UB Bathing Assistance 1  Total Assistance   LB Bathing Assistance 1  Total Assistance   UB Dressing Assistance 1  Total [address] 1  Total [address]  1  Total Assistance   Bed Mobility   Additional Comments pt seated in chair upon therapist entry  pt required totalA to sit upright in chair (reclined back)   Transfers   Sit to Stand Unable to assess   Stand to Sit Unable to assess   Additional Comments Pt not following commands during attempted functional mobility, and not participating during positioning adjustment in chair  Balance   Static Sitting Poor -   Activity Tolerance   Activity Tolerance Patient limited by fatigue;Treatment limited secondary to medical complications (Comment)   Nurse Made Aware Per RN, pt okay to see for OT and OOB mobility    Spoke with RN after evaluation to report severe lethargy and change in status   RUE Assessment   RUE Assessment (R  < L  but b/l WFL)   LUE Assessment   LUE Assessment (inconsistently following commands; shoulders WFL, elbows NAE)   Vision-Basic Assessment   Current Vision Wears glasses all the time   Cognition   Overall Cognitive Status Impaired   Arousal/Participation Poorly responsive;Lethargic;Persistent stimuli required   Attention Difficulty attending to directions   Orientation Level Oriented to person;Oriented to place;Oriented to time   Memory Unable to assess   Following Commands Follows one step commands inconsistently   Comments pt extremely lethargic  Difficult to arouse and to maintain staying awake  Required persistent stimuli to participate, however participation still minimal      Assessment   Limitation Decreased ADL status; Decreased cognition;Decreased endurance;Decreased self-care trans;Decreased high-level ADLs   Prognosis Fair   Assessment Pt is a 74year old male seen for initial OT evaluation s/p admission to \[facility]\"" with progressive weakness, described as heaviness in his legs interfering with ambulation and transferring  Etiology likely multifactorial, possibly secondary to poor p o  intake, medication side effect, progression of disease or deconditioning  CT head negative  Comorbidities include a PMHx: hepatocellular carcinoma, esophageal cancer with pulmonary metastasis, transaminitis, anxiety, depression, COPD, Afib, DM, and HTN  Pt with active OT orders and up with assistance orders  Pt lethargic and poorly responsive upon OT evaluation, and is unable to provide reliable home set-up or PLOF  Per chart, pt lives with his wife in a [facility] with ramped entrance  Per documentation from a previous admission in May 2018, pt was I with ADLs, IADLs and functional mobility with use of a cane  [place] since May 2018 unclear at this time secondary to pt's cognitive status  Upon OT evaluation, pt req totalA for ADLs and functional mobility    However, this may not be an accurate reflection of pt's overall status, as he was able to ambulate with restorative this morning, and his nurse reports that he was less lethargic earlier in the day  Impairments currently limiting pt's independence in these areas include: endurance, fatigue, lethargy, and cognitive status  Overall, pt scored 20/100 on the Barthel Index  From an OT standpoint, recommend STR upon d/c, however this recommendation is based on patient's presentation upon evaluation, which is inconsistent with pt's presentation observed by staff earlier today  Spoke with JASMIN Trujillo following OT evaluation to discuss pt's performance and change in status  Pt is to continue to benefit from skilled occupational therapy services while in the hospital to maximize functioning and independence with daily activities  See below for OT goals to be addressed 3-5x/wk  Goals   Patient Goals unable to state secondary to cognitive status   Plan   Treatment Interventions ADL retraining;Functional transfer training; Endurance training;Cognitive reorientation;Patient/family training;Equipment evaluation/education; Compensatory technique education;Continued evaluation; Activityengagement; Energy conservation   Goal Expiration Date 02/19/19   OT Frequency 3-5x/wk   Recommendation   OT Discharge Recommendation Short Term Rehab  (pending progress)   OT - OK to Discharge Yes  (when medically stable)   Barthel Index   Feeding 0   Bathing 0   Grooming Score 0   Dressing Score 0   Bladder Score 10   Bowels Score 10   Toilet Use Score 0   Transfers (Bed/Chair) Score 0   Mobility (Level Surface) Score 0   Stairs Score 0   Barthel Index Score 20     Goals:  Pt will maintain attention to a functional task for at least 5 minutes with min cues for attention/redirection  Pt will be alert and oriented x4 for all OT treatment session without use of environmental cues  Pt will complete UB ADLs with Han and min cues for attention to task  Pt will complete LB ADLs with Han with the use of DME as appropriate and with min cues for attention and sequencing      Pt will follow simple one step commands with min cues in order to complete a functional task  Pt will be attentive for 100% of formal cognitive assessment for safe discharge/planning  **OT to assess functional transfers and functional mobility as appropriate      EVERTON Jolly, OTR/L

## 2019-02-09 NOTE — CONSULTS
Patient: Delia Deutsch  Patient MRN: [id number]  Service date: 2/9/2019  Attending Physician:       CHIEF COMPLAIN  Chief Complaint   Patient presents with    Extremity Weakness     c/o right sided weakness x 2 days after starting Lenvima ([place] drug)  Pt is lethargic, difficulty ambulating and transferring  No neuro deficits noted on exam, family noticed bilateral weakness only  Heme / Oncology history: Delia Deutsch is a 74 y o  male     Oncology History    64-year-old male status post esophagectomy and liver resection 10/31/16 for esophageal cancer and hepatocellular carcinoma in 2016  Treatment included a course of radiation to the esophagus to a total dose of 4680 cGy with treatments given from 6/27/16 to 8/3/2016  Radiation therapy was administered at [facility] by Dr Garland Arita  Concurrent Taxol and Carboplatin was under the care of Dr Remedios Oliva at [address]  CT scan of the abdomen and MRI of the abdomen in October 2017 revealed a new lesion in the left lobe of the liver measuring 4 1 x 2 7 cm with 1-2 additional lesions  CT-guided biopsy liver lesion was positive for hepatocellular carcinoma on October 11, 2017  Recommendations were made for Sir sphere radiation therapy to the whole liver which was given on December 13, 2017  The patient returns today for follow-up examination  reports he is feeling well and is having no abdominal complaints  He denies any pain  He is still having occasional nausea with some dysphagia and is considering having a repeat esophageal dilation performed  He had repeat blood work including liver function studies performed January 19, 2017 at [facility] but results are not available today  We will call him with these results  He has MRI of the abdomen and follow-up appointment with Dr Donato Salas on the same day February 6, 2018  Hopefully, this will show good results and response to treatment   Assuming MRI shows favorable response, then we would recommend repeat MRI again in 3 months  The patient states he will be following with Dr Monet Anderson every 3 months  It takes about 1 5 hours to come here from [place], [place] and we will therefore see him here on a p r n  basis  Should he have evidence of progressive disease the future then, we would be happy to re-evaluate him for consideration of additional Sir spheres to the liver  The patient is wife were in agreement with the above plan and they know they can call any time if they have any questions  2/14/18 Had EGD, dialtion of the esophagus  There was a mild stricture at the site of anastomosis  4/5/18 Had MRI of the abd: The previously noted dominant lesion in the left hepatic lobe demonstrate increasing necrosis suggest treatment response The segment 4 lesion is decreasing in size The lesion in the segment 5 cm remains stable with thin rim enhancement suggesting no viable tissue Segment 2 lesion is stable Enlarging regional lymph node in the upper abdomen just below the silvestre hepatis which measures 2 5 x 2 4 cm    Pt saw both surg Onc, and Med onc  On 4/6/18 Pt met with Dr Juanpablo Erazo, who discussed, If radiation therapy could be applied to this lesion deferral of systemic treatment initiation could be accomplished  Systemic treatment options: potential treatment options for patients with [address]  including Nexavar, Birtha [place]  Pt had previous esophagus EBRT,8/2016  at [facility] [place]  Treatment recoreds available in [address]           Esophageal cancer ([address] )    4/27/2016 Initial Diagnosis     Esophageal cancer ([address] )         6/27/2016 - 7/26/2016 Chemotherapy     Taxol 50 + Carbo AUC 2         6/27/2016 - 8/3/2016 Radiation     Concurrent RT Esophagus 4,680 cGy   @ [facility]         10/31/2016 Surgery     Esophagectomy           Hepatocellular carcinoma ([address] )    10/31/2016 Initial Diagnosis     Cancer, hepatocellular ([address] )       10/31/2016 Surgery Resection/ablation of liver - segments 5/6         10/11/2017 Biopsy     Local recurrence         12/13/2017 -  Radiation     SIRSphere treatment  Treatment site:  whole liver  Whole liver Dose:  Prescribed: 1 66 GBq, Delivered: 1 61GBq  (Right lobe Dose:   Prescribed: 1 06 GBq,  Delivered: 1 03 GBq)  (Left lobe Dose:   Prescribed: 0 60 GBq,  Delivered: 0 58 GBq)         8/3/2018 - 10/23/2018 Chemotherapy     Nexavar 400 mg p o  daily  Initially planned to start at a visit on July 13th  Medication not obtained until August 3rd  10/18/2018 Progression     October 18, 2018 CT chest showed slight progression of multiple pulmonary nodules, all self sent all subcentimeter  Several new nodules are noted  Left adrenal nodule previously 2 1 cm, currently 2 8 cm  MRI liver showed progression of disease in the left adrenal and liver  A portacaval node has decreased in size, previously 2 5 cm, currently 2 1 cm          10/29/2018 - 12/12/2018 Chemotherapy     Opdivo 480 mg IV q 4 weeks  HISTORY OF PRESENT ILLNESS:  Quirino Cortés is a 74 y o  male who has extensive oncology history as above, history of esophageal cancer and hepatocellular carcinoma, has received SIRsphere, currently on 3rd line treatment VEGF inhibitor / Evelena Lux , has been taking the medicine for about 5 days,  Presents with acute onset fatigue / stress weakness more pronounced on left side  Otherwise no bleeding, no leg swelling, no focal neuro deficit        PROBLEM LIST:  Patient Active Problem List   Diagnosis    [facility]    Hepatocellular carcinoma (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)    Anxiety and depression    Acute pain of left shoulder    History of stroke    Mild cognitive impairment    Nutritional deficiency    Coronary artery disease    Goals of care, counseling/discussion    Active advance directive    Gastroesophageal reflux disease without esophagitis    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)    Pulmonary metastases (HCC)    Other fatigue    Weakness of both lower extremities    Transaminitis       ASSESSMENT/PLAN:  Delia Deutsch is a 74 y o  male with:    1) hepatocellular carcinoma  - not curable, on the 3rd line of VEGF inhibitor / Lennox Giancarlo,  goal is palliative  Given patient developed new symptoms, will hold the medicine  And re-evaluate as outpatient  2) fatigue/weakness  - common side effect for VEGF inhibitors include fatigue, thyroid dysfunction, bleeding, thrombosis , hypertension, proteinuria etc   Although potentially could be related with the new treatment, time wise this appears to be too quick  We will hold medicine regardless  No strong evidence for thrombosis clinically  Continue supportive care  minutes were spent face to face with patient with greater than 50% of the time spent in counseling or coordination of care including discussions of treatment instructions  All of the patient's questions were answered to their satisfactory during this discussion   Florentin Villalba MD PhD  Hematology / Oncology                              PAST MEDICAL HISTORY:   has a past medical history of Anemia; Coronary artery disease; Dysphagia; Esophageal cancer ([address] ); Gout; Hyperlipidemia; Hypertension; Restless leg syndrome; Rheumatoid arthritis ([address] ); Sleep apnea; and Vision abnormalities  PAST SURGICAL HISTORY:   has a past surgical history that includes Appendectomy; Knee arthroscopy; Cholecystectomy;  Tonsillectomy; Portacath placement; pr esophagoscopy flex balloon dilat <30 mm diam (N/A, 1/6/2017); pr esophagoscopy flex balloon dilat <30 mm diam (N/A, 12/12/2016); pr esophagogastroduodenoscopy transoral diagnostic (N/A, 10/31/2016); pr removal esophagus,no thoracotomy (N/A, 10/31/2016); Liver resection (N/A, 10/31/2016); Gastrojejunostomy w/ jejunostomy tube (N/A, 8/4/2016); pr esophagoscopy flex balloon dilat <30 mm diam (N/A, 5/24/2017); pr esophagoscopy flex balloon dilat <30 mm diam (N/A, 8/3/2017); pr egd insert guide wire dilator passage esophagus (N/A, 2/14/2018); and Wound debridement (Left, 5/3/2018)      CURRENT MEDICATIONS  Scheduled Meds:  Current Facility-Administered Medications:  albuterol 2 5 mg Nebulization Q4H PRN Daylene Falling, MD   allopurinol 100 mg Oral Daily Daylene Falling, MD   amiodarone 200 mg Oral Daily Daylene Falling, MD   apixaban 5 mg Oral BID Daylene Falling, MD   vitamin C 1,000 mg Oral Daily Daylene Falling, MD   bisacodyl 5 mg Oral Daily PRN Daylene Falling, MD   calcium carbonate 750 mg Oral Daily PRN Daylene Falling, MD   cyanocobalamin 1,000 mcg Oral Daily Daylene Falling, MD   diazepam 5 mg Oral Q6H PRN Daylene Falling, MD   diphenhydrAMINE 25 mg Oral Daily Daylene Falling, MD   famotidine 20 mg Oral HS Daylene Falling, MD   ferrous sulfate 325 mg Oral Daily Daylene Falling, MD   hydrOXYzine HCL 25 mg Oral BID PRN Daylene Falling, MD   insulin lispro 1-5 Units Subcutaneous HS Daylene Falling, MD   insulin lispro 1-6 Units Subcutaneous TID AC Daylene Falling, MD   ipratropium 0 5 mg Nebulization 4x Daily Daylene Falling, MD   latanoprost 1 drop Both Eyes HS Daylene Falling, MD   LORazepam 0 5 mg Oral BID Daylene Falling, MD   metoclopramide 10 mg Oral 4x Daily Daylene Falling, MD   rOPINIRole 3 mg Oral HS Daylene Falling, MD   sertraline 25 mg Oral HS Mary Jane Craft MD   traZODone 50 mg Oral HS Mary Jane Craft MD     Continuous Infusions:   PRN Meds:   albuterol    bisacodyl    calcium carbonate    diazepam    hydrOXYzine HCL    SOCIAL HISTORY:   reports that he quit smoking about 27 years ago  His smoking use included Cigarettes  He started smoking about 68 years ago  He has a 120 00 pack-year smoking history  He has never used smokeless tobacco  He reports that he drinks alcohol  He reports that he does not use drugs  FAMILY HISTORY:  family history includes Cancer in his sister; Colon cancer in his father; Other in his father; Stroke in his mother  ALLERGIES:  is allergic to humira [adalimumab]; levaquin [levofloxacin in d5w]; levofloxacin; and lovenox [enoxaparin]  REVIEW OF SYSTEMS:  Please note that a 14-point review of systems was performed to include Constitutional, HEENT, Respiratory, CVS, GI, , Musculoskeletal, Integumentary, Neurologic, Rheumatologic, Endocrinologic, Psychiatric, Lymphatic, and Hematologic/Oncologic systems were reviewed and are negative unless otherwise stated in HPI  Positive and negative findings pertinent to this evaluation are incorporated into the history of present illness  PHYSICAL EXAMINATION:  Vital Signs: Temp:  [95 2 °F (35 1 °C)-97 9 °F (36 6 °C)] 97 9 °F (36 6 °C)  HR:  [62-71] 65  Resp:  [12-18] 17  BP: (146-184)/(76-84) 146/76  Body mass index is 28 67 kg/m²  Body surface area is 2 09 meters squared  Constitutional: Alert and oriented  HEENT: Anicteric, PERRLA  Chest: Decreased breathing sound bilaterally, No wheezes/rales/rhonchi  CVS: Regular rhythm  Normal rate  Abdomen: Soft, nontender, nondistended  No palpable organomegaly  Extremities: No cyanosis/clubbing/edema  Integumentary: No obvious rashes or bruises  Musculoskeletal: No obvious bony or joint deformities  Psychiatric: Appropriate affect and mood  Lymph Node Survey: No palpable preauricular, submandibular, cervical, supraclavicular, axillary, epitrochlear or inguinal lymphadenopathy      LABS:    Results from last 7 days  Lab Units 02/09/19  0449 02/08/19  2130   WBC Thousand/uL 5 31 5 38 HEMATOCRIT % 53 4* 60 8*   PLATELETS Thousands/uL 101* 109*   NEUTROS PCT % 81* 82*   MONOS PCT % 8 8       Results from last 7 days  Lab Units 02/09/19  0449 02/08/19  2129   POTASSIUM mmol/L 4 1 4 2   CHLORIDE mmol/L 108 103   CO2 mmol/L 26 25   BUN mg/dL 14 15       Results from last 7 days  Lab Units 02/09/19  0449 02/08/19  2129   ALBUMIN g/dL 1 9* 2 6*   ALK PHOS U/L 621* 846*   ALT U/L 82* 113*   AST U/L 168* 237*               Invalid input(s): TNI,  PCT              IMAGING:  XR chest 2 views   ED Interpretation   No active pulmonary disease      Final Result      Cardiomegaly and pulmonary vascular congestion raises concern for CHF  The study was marked in [facility] for immediate notification  I      Workstation performed: [id number]         CT head without contrast   Final Result      No acute intracranial abnormality  Stable small old medial right occipital lobe infarct              Workstation performed: [id number]

## 2019-02-09 NOTE — PROGRESS NOTES
Progress Note - Vi Paez 1945, 74 y o  male MRN: [id number]    Unit/Bed#: [facility] 522-01 Encounter: [id number]    Primary Care Provider: Franklin Roman MD   Date and time admitted to hospital: 2/8/2019  8:48 PM        * Other fatigue   Assessment & Plan    · Etiology likely multifactorial, possibly secondary to poor p o  Intake, Levatinib side effect, progression of disease, or deconditioning  Also given pulmnoary vscular congestion and cardiomegaly, consider CHF  · No neurologic deficits noted on examination  Elevated TSH, however normal free T4   CT head negative for acute intracranial pathology  · Oncology input noted; hold Levatinib for now  Re-evaluate need for this as outpatient with Dr Deng Kim  · Plan for additional workup as below  · PT/OT evaluation     Pulmonary vascular congestion   Assessment & Plan    · Noted on CXR  BNP normal  · Not c/o SOB or leg edema  · Check echocardiogram  · Hold on any diuretic therapy at this time     Vision abnormalities   Assessment & Plan    · Has reported ongoing double vision past few days  · Discussed with Dr Janeth Estrada, will check MRI brain to r/o mets, CVA  CVA is a risk factor of new medication, however has only been on for 3 days  · Continue with eliquis  Cannot have statin given elevated LFTs     Hepatocellular carcinoma ([address] )   Assessment & Plan    · With recent initiation of Lenvatinib, however patient complaining of marked fatigue  · Oncology recommendations noted  Hold new medication for now  · Known to Dr Deng Kim  Not curable, goal is palliative at this time     Right sided weakness   Assessment & Plan    · Not appreciated on exam but patient reports episode few days ago which has improved  · Check MRI brain      Transaminitis   Assessment & Plan    · Has had persistently elevated bilirubin and ALP, however these are now elevated from before as are ALT/AST  · May represent progression of [address]  Hepatitis panel negative  · Iron panel noted   Continue supplements      Anxiety and depression   Assessment & Plan    · Continue home medications     Stage 3 severe COPD by GOLD classification ([address] )   Assessment & Plan    · Does not appear in acute exacerbation  · Continue home therapy regimen     Type 2 diabetes mellitus [facility])   Assessment & Plan    Lab Results   Component Value Date    HGBA1C 8 9 (H) 2018     Recent Labs      19   2133  19   0624  19   1148   POCGLU  75  102  107       Blood Sugar Average: Last 72 hrs:  (P) 41 [id number]   · Continue SSI/accuchecks      Atrial fibrillation (HCC)   Assessment & Plan    · In normal sinus rhythm  Continue apixaban for anticoagulation       VTE Pharmacologic Prophylaxis:   Pharmacologic: Apixaban (Eliquis)  Mechanical VTE Prophylaxis in Place: Yes    Patient Centered Rounds: I have performed bedside rounds with nursing staff today  Discussions with Specialists or Other Care Team Provider: oncology  Education and Discussions with Family / Patient: patient  Wife at bedside  Time Spent for Care: 30 minutes  More than 50% of total time spent on counseling and coordination of care as described above  Current Length of Stay: 0 day(s)    Current Patient Status: Observation   Certification Statement: The patient, admitted on an observation basis, will now require > 2 midnight hospital stay due to workup as above     Discharge Plan: not yet stable    Code Status: Level 1 - Full Code      Subjective:   Patient reports feeling slightly better today  Denies any fevers, chills, CP, SOB, abdominal pain, nausea or vomiting  No leg edema  Admits to some ongoing blurry vision and earlier in week had some R sided weakness which he currently states is not that bad       Objective:     Vitals:   Temp (24hrs), Av 7 °F (35 9 °C), Min:95 2 °F (35 1 °C), Max:97 9 °F (36 6 °C)    Temp:  [95 2 °F (35 1 °C)-97 9 °F (36 6 °C)] 97 9 °F (36 6 °C)  HR:  [62-71] 65  Resp:  [12-18] 17  BP: (146-184)/(76-84) 146/76  SpO2:  [93 %-96 %] 95 %  Body mass index is 28 67 kg/m²  Input and Output Summary (last 24 hours): Intake/Output Summary (Last 24 hours) at 02/09/19 1233  Last data filed at 02/09/19 1146   Gross per 24 hour   Intake             2000 ml   Output              475 ml   Net             1525 ml       Physical Exam:     Physical Exam   Constitutional: He is oriented to person, place, and time  No distress  Cardiovascular: Normal rate and regular rhythm  Pulmonary/Chest: Effort normal and breath sounds normal  No respiratory distress  Abdominal: Soft  Bowel sounds are normal  He exhibits no distension  There is no tenderness  Musculoskeletal: He exhibits edema (trace)  Neurological: He is alert and oriented to person, place, and time  Tongue midline, strength equal b/l upper and lower extremities    Skin: Skin is warm and dry  He is not diaphoretic  Psychiatric: He has a normal mood and affect  Nursing note and vitals reviewed  Additional Data:     Labs:      Results from last 7 days  Lab Units 02/09/19  0449   WBC Thousand/uL 5 31   HEMOGLOBIN g/dL 17 3*   HEMATOCRIT % 53 4*   PLATELETS Thousands/uL 101*   NEUTROS PCT % 81*   LYMPHS PCT % 8*   MONOS PCT % 8   EOS PCT % 2       Results from last 7 days  Lab Units 02/09/19  0449   POTASSIUM mmol/L 4 1   CHLORIDE mmol/L 108   CO2 mmol/L 26   BUN mg/dL 14   CREATININE mg/dL 0 57*   CALCIUM mg/dL 8 0*   ALK PHOS U/L 621*   ALT U/L 82*   AST U/L 168*           * I Have Reviewed All Lab Data Listed Above  * Additional Pertinent Lab Tests Reviewed:  All Labs Within Last 24 Hours Reviewed    Imaging:    Imaging Reports Reviewed Today Include: all  Imaging Personally Reviewed by Myself Includes:  none    Recent Cultures (last 7 days):           Last 24 Hours Medication List:     Current Facility-Administered Medications:  albuterol 2 5 mg Nebulization Q4H PRN Carl Delgado MD   allopurinol 100 mg Oral Daily Carl Delgado MD   amiodarone 200 mg Oral Daily Rosamaria Feldman MD   apixaban 5 mg Oral BID Rosamaria Feldman MD   vitamin C 1,000 mg Oral Daily Rosamaria Fedlman MD   bisacodyl 5 mg Oral Daily PRN Rosamaria Feldman MD   calcium carbonate 750 mg Oral Daily PRN Rosamaria Feldman, MD   cyanocobalamin 1,000 mcg Oral Daily Rosamaria Feldman, MD   diazepam 5 mg Oral Q6H PRN Rosamaria Feldman, MD   diphenhydrAMINE 25 mg Oral Daily Rosamaria Feldman, MD   famotidine 20 mg Oral HS Rosamaria Feldman, MD   ferrous sulfate 325 mg Oral Daily Rosamaria Feldman, MD   hydrOXYzine HCL 25 mg Oral BID PRN Rosamaria Feldman MD   insulin lispro 1-5 Units Subcutaneous HS Rosamaria Feldman, MD   insulin lispro 1-6 Units Subcutaneous TID AC Rosamaria Feldman, MD   ipratropium 0 5 mg Nebulization 4x Daily Rosamaria Feldman MD   latanoprost 1 drop Both Eyes HS Rosamaria Feldman MD   LORazepam 0 5 mg Oral BID Rosamaria Feldman MD   metoclopramide 10 mg Oral 4x Daily Rosamaria Feldman MD   rOPINIRole 3 mg Oral HS Rosamaria Feldman MD   sertraline 25 mg Oral HS Rosamaria Feldman, MD   traZODone 50 mg Oral HS Rosamaria Feldman, MD        Today, Patient Was Seen By: Austin Irby PA-C    ** Please Note: Dictation voice to text software may have been used in the creation of this document   **

## 2019-02-09 NOTE — UTILIZATION REVIEW
Initial Clinical Review    Admission: Date/Time/Statement: 02/09/19 @ 0021 -- OBS -- UPGRADED TO INPATIENT 2/9 @ [address] >2 [place]    02/09/19 1303  Inpatient Admission Once     Transfer Service: Hospitalist       Question Answer Comment   Admitting Physician Lit Noonan    Level of Care Med Surg    Estimated length of stay More than 2 Midnights    Certification I certify that inpatient services are medically necessary for this patient for a duration of greater than two midnights  See H&P and MD Progress Notes for additional information about the patient's course of treatment  02/09/19 1303     02/08/19 2029 Triage Started     ED: Date/Time/Mode of Arrival:   ED Arrival Information     Expected Arrival Acuity Means of Arrival Escorted By Service Admission Type    - 2/8/2019 20:22 Urgent Wheelchair Spouse Hospitalist Urgent    Arrival Complaint    Weakness        Chief Complaint:   Chief Complaint   Patient presents with    Extremity Weakness     c/o right sided weakness x 2 days after starting Lenvima ([place] drug)  Pt is lethargic, difficulty ambulating and transferring  No neuro deficits noted on exam, family noticed bilateral weakness only  History of Illness: 74 y o  male who has complicated past medical history significant for [address]  and esophageal cancer recently started on Levatinib therapy with pulmonary metastasis, COPD, AFib on Eliquis, type 2 DM, hypertension presenting with progressive weakness worsening over the past 2 days  He describes sensation as a heaviness in his legs interfering with ambulation and transferring  Denies focal weakness, fevers/chills, lightheadedness, shortness of breath, or pain in multiple locations  Notes that symptoms began after initiation of new chemotherapeutic regimen  Admits to some decrease in p o  Intake with rare nausea and vomiting        ED Vital Signs:   ED Triage Vitals [02/08/19 2028]   Temperature Pulse Respirations Blood Pressure [facility] 95 2 °F (35 1 °C) 71 16 (!) 184/81 96 %      Temp Source Heart Rate Source Patient Position - Orthostatic VS BP Location FiO2 (%)   Tympanic Monitor Sitting Left arm --      Pain Score       No Pain        Wt Readings from Last 1 Encounters:   02/09/19 90 6 kg (199 lb 12 8 oz)     Vital Signs (abnormal): T 95 2-97 9  Pertinent Labs/Diagnostic Test Results:   Lab Units 02/08/19  2130   WBC Thousand/uL 5 38   HEMOGLOBIN g/dL 19 5*   HEMATOCRIT % 60 8*   PLATELETS Thousands/uL 109*   NEUTROS PCT % 82*   LYMPHS PCT % 8*     ALK PHOS U/L 846*   ALT U/L 113*   AST U/L 237*     CT head --  No acute intracranial abnormality  Stable small old medial right occipital lobe infarct  CXR -- Cardiomegaly and pulmonary vascular congestion raises concern for CHF  ED Treatment:   Medication Administration from 02/08/2019 2022 to 02/09/2019 0057       Date/Time Order Dose Route Action     02/08/2019 2124 sodium chloride 0 9 % bolus 1,000 mL 1,000 mL Intravenous New Bag     02/08/2019 2124 ondansetron (ZOFRAN) injection 4 mg 4 mg Intravenous Given     02/08/2019 2238 sodium chloride 0 9 % bolus 1,000 mL 1,000 mL Intravenous New Bag     Past Medical/Surgical History:    Active Ambulatory Problems     Diagnosis Date Noted    Esophageal cancer ([place] [address] ) 08/04/2016    Hepatocellular carcinoma ([place] [address] ) 10/31/2016    Atrial fibrillation ([place] [address] ) 11/01/2016    Hypotension 11/03/2016    Type 2 diabetes mellitus ([facility] ) 11/03/2016    Vision abnormalities     Dysphagia 12/12/2016    Restless legs syndrome 06/15/2016    Rheumatoid arthritis ([facility] ) 06/13/2016    Sleep apnea 02/06/2018    Hyperlipidemia 06/15/2016    Hypertension 06/13/2016    Peripheral neuropathy 07/28/2017    Epidermoid cyst of neck 01/29/2014    Glaucoma 04/04/2018    Gout 06/15/2016    Incisional hernia 12/10/2013    Cough 04/11/2018    Stage 3 severe COPD by GOLD classification ([place] [address] ) 04/11/2018    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[place] [address] ) 04/13/2018    Anxiety and depression 05/02/2018    Acute pain of left shoulder 05/01/2018    History of stroke 05/03/2018    Mild cognitive impairment 06/19/2018    Nutritional deficiency 06/19/2018    Coronary artery disease 07/05/2018    Goals of care, counseling/discussion 08/21/2018    Active advance directive 08/21/2018    Gastroesophageal reflux disease without esophagitis 09/20/2018    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 ([place] [address] ) 11/13/2018    Pulmonary metastases ([place] [address] ) 11/13/2018    Other fatigue 11/14/2018    Weakness of both lower extremities 11/14/2018     Past Medical History:   Diagnosis Date    Anemia     Coronary artery disease     Dysphagia     Esophageal cancer (HCC)     Gout     Hyperlipidemia     Hypertension     Restless leg syndrome     Rheumatoid arthritis (HCC)     Sleep apnea     Vision abnormalities      Admitting Diagnosis: Dehydration [E86 0]  Hepatocellular carcinoma (HCC) [C22 0]  Fatigue [R53 83]  Weakness [R53 1]  Age/Sex: 74 y o  male  Assessment/Plan:   Hepatocellular carcinoma ([place] [address] )   Assessment & Plan     · With recent initiation of Lenvatinib, however patient complaining of marked fatigue  · Await Oncology recommendations regarding further treatment plan      * Other fatigue   Assessment & Plan     · Etiology likely multifactorial, possibly secondary to poor p o  Intake, Levatinib side effect, progression of disease, or deconditioning  · No neurologic deficits noted on examination  Elevated TSH, however normal free T4   CT head negative for acute intracranial pathology  · Await Oncology recommendations  Plan for additional workup as below       · PT/OT evaluation      Transaminitis   Assessment & Plan     · Has had persistently elevated bilirubin and ALP, however these are now elevated from before as are ALT/AST  · May represent progression of HCC, however will check iron panel, hepatitis panel to rule out common cause        Anticipated Length of Stay: Patient will be admitted on an Observation basis with an anticipated length of stay of  less than 2 midnights     Justification for Hospital Stay: Please see detailed plans noted above      Admission Orders:  Scheduled Meds:   Current Facility-Administered Medications:  albuterol 2 5 mg Nebulization Q4H PRN   allopurinol 100 mg Oral Daily   amiodarone 200 mg Oral Daily   apixaban 5 mg Oral BID   vitamin C 1,000 mg Oral Daily   bisacodyl 5 mg Oral Daily PRN   calcium carbonate 750 mg Oral Daily PRN   cyanocobalamin 1,000 mcg Oral Daily   diazepam 5 mg Oral Q6H PRN   diphenhydrAMINE 25 mg Oral Daily   famotidine 20 mg Oral HS   ferrous sulfate 325 mg Oral Daily   hydrOXYzine HCL 25 mg Oral BID PRN   insulin lispro 1-5 Units Subcutaneous HS   insulin lispro 1-6 Units Subcutaneous TID AC   ipratropium 0 5 mg Nebulization 4x Daily   latanoprost 1 drop Both Eyes HS   LORazepam 0 5 mg Oral BID   metoclopramide 10 mg Oral 4x Daily   rOPINIRole 3 mg Oral HS   sertraline 25 mg Oral HS   traZODone 50 mg Oral HS     Telem  O2 1 lpm nc  Cons carb diet  Fingerstick glucose check ac & hs  Up with assist  SCD's  PT/OT evals  Respiratory protocol  Consult oncology      2/9 --   Certification Statement: The patient, admitted on an observation basis, will now require > 2 midnight hospital stay due to workup as above

## 2019-02-09 NOTE — ASSESSMENT & PLAN NOTE
· Etiology likely multifactorial, possibly secondary to poor p o  Intake, Levatinib side effect, progression of disease, or deconditioning  Also given pulmnoary vscular congestion and cardiomegaly, consider CHF  · No neurologic deficits noted on examination  Elevated TSH, however normal free T4   CT head negative for acute intracranial pathology  · Oncology input noted; hold Levatinib for now  Re-evaluate need for this as outpatient with Dr Jori Carr  · Plan for additional workup as below       · PT/OT evaluation

## 2019-02-09 NOTE — ASSESSMENT & PLAN NOTE
· Has had persistently elevated bilirubin and ALP, however these are now elevated from before as are ALT/AST  · May represent progression of [facility]  Hepatitis panel negative  · Iron panel noted   Continue supplements

## 2019-02-09 NOTE — H&P
H&P- Quirino Cortés 1945, 74 y o  male MRN: [id number]    Unit/Bed#: [facility] 522-01 Encounter: [id number]    Primary Care Provider: Moises Roberts MD   Date and time admitted to hospital: 2/8/2019  8:48 PM        Hepatocellular carcinoma [facility])   Assessment & Plan    · With recent initiation of Lenvatinib, however patient complaining of marked fatigue  · Await Oncology recommendations regarding further treatment plan     * Other fatigue   Assessment & Plan    · Etiology likely multifactorial, possibly secondary to poor p o  Intake, Levatinib side effect, progression of disease, or deconditioning  · No neurologic deficits noted on examination  Elevated TSH, however normal free T4   CT head negative for acute intracranial pathology  · Await Oncology recommendations  Plan for additional workup as below  · PT/OT evaluation     Transaminitis   Assessment & Plan    · Has had persistently elevated bilirubin and ALP, however these are now elevated from before as are ALT/AST  · May represent progression of [facility] [address] , however will check iron panel, hepatitis panel to rule out common cause     Type 2 diabetes mellitus [facility])   Assessment & Plan    Lab Results   Component Value Date    HGBA1C 8 9 (H) 05/02/2018     Initiate SSI with Accu-Cheks, carb controlled diet    Recent Labs      02/08/19   2133   POCGLU  75       Blood Sugar Average: Last 72 hrs:  (P) 75     Anxiety and depression   Assessment & Plan    · Continue home medications     Stage 3 severe COPD by GOLD classification ([address] )   Assessment & Plan    · Does not appear in acute exacerbation  · Continue home therapy regimen     Atrial fibrillation (HCC)   Assessment & Plan    · In normal sinus rhythm    Continue apixaban for anticoagulation         VTE Prophylaxis: Apixaban (Eliquis)  / sequential compression device   Code Status: Level 1 - Full Code as discussed with patient  POLST: There is no POLST form on file for this patient (pre-hospital)    Anticipated Length of Stay:  Patient will be admitted on an Observation basis with an anticipated length of stay of  less than 2 midnights  Justification for Hospital Stay: Please see detailed plans noted above  Chief Complaint:     Fatigue and extremity weakness  History of Present Illness: Quirino Cortés is a 74 y o  male who has complicated past medical history significant for [address]  and esophageal cancer recently started on Levatinib therapy with pulmonary metastasis, COPD, AFib on Eliquis, type 2 DM, hypertension presenting with progressive weakness worsening over the past 2 days  He describes sensation as a heaviness in his legs interfering with ambulation and transferring  Denies focal weakness, fevers/chills, lightheadedness, shortness of breath, or pain in multiple locations  Notes that symptoms began after initiation of new chemotherapeutic regimen  Admits to some decrease in p o  Intake with rare nausea and vomiting  ED evaluation revealing elevated transaminases, elevated TSH, unremarkable CT head  Admitted for evaluation/treatment of weakness/fatigue      Review of Systems:    Constitutional:  Denies fever or chills but reports fatigue  Eyes:  Denies change in visual acuity   HENT:  Denies nasal congestion or sore throat   Respiratory:  Denies cough or shortness of breath   Cardiovascular:  Denies chest pain or edema   GI:  Denies abdominal pain, bloody stools or diarrhea but reports nausea and vomiting  :  Denies dysuria   Musculoskeletal:  Denies back pain or joint pain   Integument:  Denies rash   Neurologic:  Denies headache, focal weakness or sensory changes but reports generalized weakness  Endocrine:  Denies polyuria or polydipsia   Lymphatic:  Denies swollen glands   Psychiatric:  Denies depression or anxiety     Past Medical and Surgical History:   Past Medical History:   Diagnosis Date    Anemia     Coronary artery disease     Dysphagia     Esophageal cancer ([address] )     Gout     Hyperlipidemia     Hypertension     Restless leg syndrome     Rheumatoid arthritis ([facility] [address] )     Sleep apnea     Vision abnormalities      Past Surgical History:   Procedure Laterality Date    APPENDECTOMY      CHOLECYSTECTOMY      GASTROJEJUNOSTOMY W/ JEJUNOSTOMY TUBE N/A 8/4/2016    Procedure: INSERTION JEJUNOSTOMY TUBE OPEN;  Surgeon: Tuyet Watson MD;  [facility];  Service:     KNEE ARTHROSCOPY      LIVER RESECTION N/A 10/31/2016    Procedure: INTRAOPERATIVE ULTRASOUND OF LIVER; LIVER LESION RESECTION/ABLATION ;  Surgeon: Tuyet Watson MD;  [facility];  Service:    Millie Common PLACEMENT      ME EGD INSERT GUIDE WIRE DILATOR PASSAGE ESOPHAGUS N/A 2/14/2018    Procedure: ESOPHAGOGASTRODUODENOSCOPY (EGD) with dilation;  Surgeon: Iván Sims MD;  [facility];  Service: Thoracic    ME ESOPHAGOGASTRODUODENOSCOPY TRANSORAL DIAGNOSTIC N/A 10/31/2016    Procedure: ESOPHAGOGASTRODUODENOSCOPY (EGD);   Surgeon: Tuyet Watson MD;  [facility];  Service: Surgical Oncology    ME ESOPHAGOSCOPY FLEX Mylinda Aquas <30 MM DIAM N/A 1/6/2017    Procedure: DILATATION ESOPHAGEAL;  Surgeon: Iván Sims MD;  [facility];  Service: Thoracic    ME ESOPHAGOSCOPY FLEX BALLOON DILAT <30 MM DIAM N/A 12/12/2016    Procedure: DILATATION ESOPHAGEAL, EGD;  Surgeon: Iván Sims MD;  [facility];  Service: Thoracic    ME ESOPHAGOSCOPY FLEX BALLOON DILAT <30 MM DIAM N/A 5/24/2017    Procedure: EGD WITH DILATION ;  Surgeon: Hunter Walls MD;  [facility];  Service: Thoracic    ME ESOPHAGOSCOPY FLEX BALLOON DILAT <30 MM DIAM N/A 8/3/2017    Procedure: EGD WITH DILATION;  Surgeon: Iván Sims MD;  [facility];  Service: Thoracic    ME REMOVAL [address] THORACOTOMY N/A 10/31/2016    Procedure: ESOPHAGECTOMY;  Surgeon: Tuyet Watson MD;  [facility];  Service: Surgical Oncology    TONSILLECTOMY      WOUND DEBRIDEMENT Left 5/3/2018 Procedure: DEBRIDEMENT UPPER EXTREMITY ([address]) left shoulder;  Surgeon: Chiqui Giraldo MD;  [facility];  Service: Orthopedics       Meds/Allergies:  Prescriptions Prior to Admission   Medication    albuterol (2 5 mg/3 mL) 0 083 % nebulizer solution    allopurinol (ZYLOPRIM) 100 mg tablet    amiodarone 200 mg tablet    apixaban (ELIQUIS) 5 mg    Ascorbic Acid (VITAMIN C) 1000 MG tablet    Bisacodyl (LAXATIVE PO)    calcium carbonate (TUMS EX) 750 mg chewable tablet    cyanocobalamin (VITAMIN B-12) 1,000 mcg tablet    diazepam (VALIUM) 5 mg tablet    diphenhydrAMINE (BENADRYL) 25 mg capsule    Ferrous Sulfate (IRON) 325 (65 Fe) MG TABS    hydrOXYzine HCL (ATARAX) 25 mg tablet    insulin aspart (NovoLOG) 100 units/mL injection    latanoprost (XALATAN) 0 005 % ophthalmic solution    Lenvatinib 4 MG Daily Dose 4 MG CPPK    LORazepam (ATIVAN) 1 mg tablet    rOPINIRole (REQUIP) 1 mg tablet    sertraline (ZOLOFT) 25 mg tablet    umeclidinium-vilanterol (ANORO ELLIPTA) 62 5-25 MCG/INH inhaler    ipratropium (ATROVENT) 0 02 % nebulizer solution    metoclopramide (REGLAN) 10 mg tablet    oxyCODONE (ROXICODONE) 5 mg immediate release tablet    Pseudoephedrine HCl (NASAL DECONGESTANT PO)    ranitidine (ZANTAC) 150 mg tablet    traZODone (DESYREL) 50 mg tablet       Allergies:    Allergies   Allergen Reactions    Humira [Adalimumab] Rash     itchy    Levaquin [Levofloxacin In D5w] Rash    Levofloxacin Rash    Lovenox [Enoxaparin] Rash and Hives     History:  Marital Status: /Civil Union   Occupation: Retired  Patient Pre-hospital Living Situation: Home with wife  Substance Use History:   History   Alcohol Use    Yes     Comment: 1 drink/week     History   Smoking Status    Former Smoker    Packs/day: 3 00    Years: 40 00    Types: Cigarettes    Start date: 10    Quit date: 4/13/1991   Smokeless Tobacco    Never Used     History   Drug Use No       Family History:  Family History   Problem Relation Age of Onset    Stroke Mother     Colon cancer Father     Other Father         Pneumoconiosis    Cancer Sister         oral cancer       Physical Exam:     Vitals:   Blood Pressure: 162/80 (02/09/19 0114)  Pulse: 62 (02/09/19 0223)  Temperature: (!) 96 5 °F (35 8 °C) (02/09/19 0114)  Temp Source: Axillary (02/09/19 0114)  Respirations: 12 (02/09/19 0223)  Weight - Scale: 88 6 kg (195 lb 5 2 oz) (02/08/19 2028)  SpO2: 95 % (02/09/19 0223)    Constitutional:  Chronically ill appearing, no acute distress, non-toxic appearance   Eyes:  PERRL, conjunctiva jaundiced   HENT:  Atraumatic, external ears normal, nose normal, oropharynx moist, no pharyngeal exudates  Neck- normal range of motion, no tenderness, supple   Respiratory:  No respiratory distress, decreased breath sounds, no rales, no wheezing   Cardiovascular:  Normal rate, normal rhythm, no murmurs, no gallops, no rubs   GI:  Soft, nondistended, normal bowel sounds, nontender, no organomegaly, no mass, no rebound, no guarding   :  No costovertebral angle tenderness   Musculoskeletal:  No edema, no tenderness, no deformities  Back- no tenderness  Integument:  Well hydrated, no rash   Lymphatic:  No lymphadenopathy noted   Neurologic:  Alert &awake, communicative, CN 2-12 normal, normal motor function however easily fatiguable, normal sensory function, no focal deficits noted   Psychiatric:  Speech and behavior appropriate       Lab Results: I have personally reviewed pertinent reports          Results from last 7 days  Lab Units 02/08/19  2130   WBC Thousand/uL 5 38   HEMOGLOBIN g/dL 19 5*   HEMATOCRIT % 60 8*   PLATELETS Thousands/uL 109*   NEUTROS PCT % 82*   LYMPHS PCT % 8*   MONOS PCT % 8   EOS PCT % 1       Results from last 7 days  Lab Units 02/08/19  2129   POTASSIUM mmol/L 4 2   CHLORIDE mmol/L 103   CO2 mmol/L 25   BUN mg/dL 15   CREATININE mg/dL 0 86   CALCIUM mg/dL 9 1   ALK PHOS U/L 846*   ALT U/L 113*   AST U/L 237* EKG: Normal sinus rhythm, low voltage, right axis deviation    Imaging: I have personally reviewed pertinent reports  and I have personally reviewed pertinent films in PACS    Ct Head Without Contrast    Result Date: 2/8/2019  Narrative: CT BRAIN - WITHOUT CONTRAST INDICATION:   Right-sided weakness  Oxana Pearson History of esophageal cancer  COMPARISON:  CT brain dated May 2, 2018  TECHNIQUE:  CT examination of the brain was performed  In addition to axial images, coronal 2D reformatted images were created and submitted for interpretation  Radiation dose length product (DLP) for this visit:  966 93 mGy-cm   This examination, like all CT scans performed in the [facility], was performed utilizing techniques to minimize radiation dose exposure, including the use of iterative  reconstruction and automated exposure control  IMAGE QUALITY:  Diagnostic  FINDINGS: PARENCHYMA:  No intracranial mass, mass effect or midline shift  No CT signs of acute infarction  No acute parenchymal hemorrhage  There is a tiny focus of encephalomalacia at the medial right occipital lobe, likely the skull of an old infarct  There is mild periventricular white matter low attenuation which is nonspecific and most likely related to chronic small vessel ischemic changes  VENTRICLES AND EXTRA-AXIAL SPACES:  Normal for the patient's age  VISUALIZED ORBITS AND PARANASAL SINUSES:  Unremarkable  CALVARIUM AND EXTRACRANIAL SOFT TISSUES:  Normal      Impression: No acute intracranial abnormality  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

## 2019-02-09 NOTE — OCCUPATIONAL THERAPY NOTE
Pt is a 74year old male seen for initial OT evaluation s/p admission to [facility] with progressive weakness, described as heaviness in his legs interfering with ambulation and transferring  Etiology likely multifactorial, possibly secondary to poor p o  intake, medication side effect, progression of disease or deconditioning  CT head negative  Comorbidities include a PMHx: hepatocellular carcinoma, esophageal cancer with pulmonary metastasis, transaminitis, anxiety, depression, COPD, Afib, DM, and HTN  Pt with active OT orders and up with assistance orders  Pt lives with his wife in a [facility] with ramped entrance

## 2019-02-09 NOTE — RESTORATIVE TECHNICIAN NOTE
Restorative Specialist Mobility Note       Activity: Bedrest, Dangle, Stand at bedside, Turn, Chair     Assistive Device: Other (Comment) (HHA x 2)     Ambulation Response: Tolerated fairly well  Repositioned: Sitting, Up in chair              Anti-Embolism Device On:  Bilateral, Sequential compression devices, below knee

## 2019-02-09 NOTE — ASSESSMENT & PLAN NOTE
· Noted on CXR   BNP normal  · Not c/o SOB or leg edema  · Check echocardiogram  · Hold on any diuretic therapy at this time

## 2019-02-09 NOTE — ASSESSMENT & PLAN NOTE
· With recent initiation of Lenvatinib, however patient complaining of marked fatigue      · Await Oncology recommendations regarding further treatment plan

## 2019-02-09 NOTE — ASSESSMENT & PLAN NOTE
Lab Results   Component Value Date    HGBA1C 8 9 (H) 05/02/2018     Initiate SSI with Accu-Cheks, carb controlled diet    Recent Labs      02/08/19   2133   POCGLU  75       Blood Sugar Average: Last 72 hrs:  (P) 75

## 2019-02-09 NOTE — PLAN OF CARE
Problem: OCCUPATIONAL THERAPY ADULT  Goal: Performs self-care activities at highest level of function for planned discharge setting  See evaluation for individualized goals  Treatment Interventions: ADL retraining, Functional transfer training, Endurance training, Cognitive reorientation, Patient/family training, Equipment evaluation/education, Compensatory technique education, Continued evaluation, Activityengagement, Energy conservation          See flowsheet documentation for full assessment, interventions and recommendations  Limitation: Decreased ADL status, Decreased cognition, Decreased endurance, Decreased self-care trans, Decreased high-level ADLs  Prognosis: Fair  Assessment: Pt is a 74year old male seen for initial OT evaluation s/p admission to [facility] with progressive weakness, described as heaviness in his legs interfering with ambulation and transferring  Etiology likely multifactorial, possibly secondary to poor p o  intake, medication side effect, progression of disease or deconditioning  CT head negative  Comorbidities include a PMHx: hepatocellular carcinoma, esophageal cancer with pulmonary metastasis, transaminitis, anxiety, depression, COPD, Afib, DM, and HTN  Pt with active OT orders and up with assistance orders  Pt lethargic and poorly responsive upon OT evaluation, and is unable to provide reliable home set-up or PLOF  Per chart, pt lives with his wife in a [facility] with ramped entrance  Per documentation from a previous admission in May 2018, pt was I with ADLs, IADLs and functional mobility with use of a cane  [place] since May 2018 unclear at this time secondary to pt's cognitive status  Upon OT evaluation, pt req totalA for ADLs and functional mobility  However, this may not be an accurate reflection of pt's overall status, as he was able to ambulate with restorative this morning, and his nurse reports that he was less lethargic earlier in the day    Impairments currently limiting pt's independence in these areas include: endurance, fatigue, lethargy, and cognitive status  Overall, pt scored 20/100 on the Barthel Index  From an OT standpoint, recommend STR upon d/c, however this recommendation is based on patient's presentation upon evaluation, which is inconsistent with pt's presentation observed by staff earlier today  Spoke with JASMIN Reno following OT evaluation to discuss pt's performance and change in status  Pt is to continue to benefit from skilled occupational therapy services while in the hospital to maximize functioning and independence with daily activities  See below for OT goals to be addressed 3-5x/wk       OT Discharge Recommendation: Short Term Rehab (pending progress)  OT - OK to Discharge: Yes (when medically stable)      Comments: EVERTON Kirkland, OTR/L

## 2019-02-09 NOTE — ASSESSMENT & PLAN NOTE
· Etiology likely multifactorial, possibly secondary to poor p o  Intake, Levatinib side effect, progression of disease, or deconditioning  · No neurologic deficits noted on examination  Elevated TSH, however normal free T4   CT head negative for acute intracranial pathology  · Await Oncology recommendations  Plan for additional workup as below       · PT/OT evaluation

## 2019-02-09 NOTE — ASSESSMENT & PLAN NOTE
· Has reported ongoing double vision past few days  · Discussed with Dr Ale Wyatt, will check MRI brain to r/o mets, CVA  CVA is a risk factor of new medication, however has only been on for 3 days  · Continue with eliquis   Cannot have statin given elevated LFTs

## 2019-02-09 NOTE — ASSESSMENT & PLAN NOTE
· Has had persistently elevated bilirubin and ALP, however these are now elevated from before as are ALT/AST  · May represent progression of [address] , however will check iron panel, hepatitis panel to rule out common cause

## 2019-02-09 NOTE — ASSESSMENT & PLAN NOTE
· Not appreciated on exam but patient reports episode few days ago which has improved  · Check MRI brain

## 2019-02-09 NOTE — PHYSICAL THERAPY NOTE
Physical Therapy Evaluation    Patient's Name:Jaylan Smith    Today's Date:02/09/19    Patient Active Problem List   Diagnosis    Esophageal cancer ([facility] )    Hepatocellular carcinoma ([place] [address] )    Atrial fibrillation (HCC)    Type 2 diabetes mellitus (HCC)    Vision abnormalities    Restless legs syndrome    Rheumatoid arthritis ([facility] )    Sleep apnea    Hyperlipidemia    Hypertension    Peripheral neuropathy    Glaucoma    Gout    Incisional hernia    Stage 3 severe COPD by GOLD classification ([place] [address] )    Anxiety and depression    History of stroke    Mild cognitive impairment    Nutritional deficiency    Coronary artery disease    Active advance directive    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 ([facility] )    Pulmonary metastases (HCC)    Other fatigue    Transaminitis    Right sided weakness    Pulmonary vascular congestion       Past Medical History:   Diagnosis Date    Anemia     Coronary artery disease     Dysphagia     Esophageal cancer (HCC)     Gout     Hyperlipidemia     Hypertension     Restless leg syndrome     Rheumatoid arthritis ([place] [address] )     Sleep apnea     Vision abnormalities        Past Surgical History:   Procedure Laterality Date    APPENDECTOMY      CHOLECYSTECTOMY      GASTROJEJUNOSTOMY W/ JEJUNOSTOMY TUBE N/A 8/4/2016    Procedure: INSERTION JEJUNOSTOMY TUBE OPEN;  Surgeon: Georgie Hashimoto, MD;  [facility];  Service:     KNEE ARTHROSCOPY      LIVER RESECTION N/A 10/31/2016    Procedure: INTRAOPERATIVE ULTRASOUND OF LIVER; LIVER LESION RESECTION/ABLATION ;  Surgeon: Georgie Hashimoto, MD;  [facility];  Service:    [facility]      LA EGD INSERT GUIDE WIRE DILATOR PASSAGE ESOPHAGUS N/A 2/14/2018    Procedure: ESOPHAGOGASTRODUODENOSCOPY (EGD) with dilation;  Surgeon: Helen William MD;  [facility];  Service: Thoracic    LA ESOPHAGOGASTRODUODENOSCOPY TRANSORAL DIAGNOSTIC N/A 10/31/2016    Procedure: ESOPHAGOGASTRODUODENOSCOPY (EGD); Surgeon: Ye Escalante MD;  [facility];  Service: Surgical Oncology    AL ESOPHAGOSCOPY FLEX BALLOON DILAT <30 MM DIAM N/A 1/6/2017    Procedure: DILATATION ESOPHAGEAL;  Surgeon: Glori Fothergill, MD;  [facility];  Service: Thoracic    AL ESOPHAGOSCOPY FLEX BALLOON DILAT <30 MM DIAM N/A 12/12/2016    Procedure: DILATATION ESOPHAGEAL, EGD;  Surgeon: Glori Fothergill, MD;  [facility];  Service: Thoracic    AL ESOPHAGOSCOPY FLEX BALLOON DILAT <30 MM DIAM N/A 5/24/2017    Procedure: EGD WITH DILATION ;  Surgeon: Hayley Oro MD;  [facility];  Service: Thoracic    AL ESOPHAGOSCOPY FLEX BALLOON DILAT <30 MM DIAM N/A 8/3/2017    Procedure: EGD WITH DILATION;  Surgeon: Glori Fothergill, MD;  [facility];  Service: Thoracic    AL REMOVAL [address] THORACOTOMY N/A 10/31/2016    Procedure: ESOPHAGECTOMY;  Surgeon: Ye Escalante MD;  [facility];  Service: Surgical Oncology    TONSILLECTOMY      WOUND DEBRIDEMENT Left 5/3/2018    Procedure: DEBRIDEMENT UPPER EXTREMITY ([address]) left shoulder;  Surgeon: Quan Roberts MD;  [facility];  Service: Orthopedics          02/09/19 1215   Pain Assessment   Pain Assessment No/denies pain   Home Living   Type of [address] One level  (3 SHELL)   Prior Function   Level of [place] Independent with ADLs and functional mobility  (amb w RW)   Lives With Spouse   Receives Help From Family  (spouse reports assist available prn)   General   Family/Caregiver Present Yes   Cognition   Arousal/Participation Alert   Orientation Level Oriented to person;Oriented to place;Oriented to situation   Following Commands Follows all commands and directions without difficulty   RUE Assessment   RUE Assessment (funct propulsion of RW)   LUE Assessment   LUE Assessment (funct propulsion of RW)   RLE Assessment   RLE Assessment X   Strength RLE   RLE Overall Strength 3+/5   LLE Assessment   LLE Assessment X Strength LLE   LLE Overall Strength 3+/5   Coordination   Movements are Fluid and Coordinated 0   Coordination and Movement Description LE instability   Bed Mobility   Additional Comments bed mob NT- pt in chair on PT arrival   Transfers   Sit to Stand 4  Minimal assistance   Additional items Increased time required;Verbal cues   Stand to Sit 4  Minimal assistance   Additional items Increased time required;Verbal cues   Stand pivot 4  Minimal assistance   Additional items Increased time required;Verbal cues   Sliding Board transfer (RW)   Ambulation/Elevation   Gait pattern Improper Weight shift; Forward Flexion;Decreased foot clearance; Short stride; Excessively slow   Gait Assistance 4  Minimal assist   Additional items Verbal cues; Tactile cues   Assistive Device Rolling walker   Distance 60 ft   Balance   Static Standing Fair  (RW)   Dynamic Standing Fair -  (RW)   Endurance Deficit   Endurance Deficit Yes   Endurance Deficit Description fatigue LE instability   Activity Tolerance   Activity Tolerance Patient limited by fatigue  (LE instability)   Assessment   Prognosis Good   Problem List Decreased strength;Decreased range of motion;Decreased endurance; Impaired balance;Decreased mobility; Decreased coordination; Impaired judgement;Decreased safety awareness;Pain;Orthopedic restrictions;Decreased skin integrity   Assessment Pt is a 74 y o  male admitted to [facility] on 2/8/2019 w/ Other fatigue, dehydration Pt exhibits significant impairments with weakness, decreased ROM, impaired balance, decreased endurance, impaired coordination, gait deviations, pain, decreased activity tolerance, decreased functional mobility tolerance, decreased safety awareness, impaired judgement, fall risk, orthopedic restrictions and decreased skin integrity; these impact independence with mobility, ADLs, and IADLs; requires min contact guard assist w transf and amb 60 ft using RW- gait pattern unsteady walker reliant + falls risk; objective measures on the Barthel Index also reveal limitations;  therapy prognosis is impacted by relevant co morbidities as noted in evaluation; clinical presentation is currently unstable/unpredictable - pt presents w abnormal labs complex hx and phys impairments as noted- a regression from baseline ; PTA, pt was ambulatory w RW, lives in single level setup 3 SHELL; spouse present at eval and reports she works during day but can arrange to have someone w pt; skilled PT is indicated to to optimize functional independence and discharge planning; pending functional progress, PT recommendation at discharge is Home PT  and home with family support RW   Goals   Patient Goals go home   STG Expiration Date 02/19/19   Short Term Goal #1 1  Modified Independent with Bed Mobility Rolling Right and Left     2  Modified Independent with Bed Mobility Supine-Sit     3  Modified Independent with Transfer Bed-Chair     4  Increase Dynamic Sitting Balance at least 1 Grade for improved stability with functional reach activities     5  Increase Dynamic Standing Balance at least 1 Grade for improved ease with Activities of Daily Living     6  Increase Lower Extremity Strength at least 1 Grade for improved ease mobility tasks     7  Modified Independent with  Ambulation 150 feet using RW to facilitate home and community mobility 8  Modified Independent with Ascending/Descending 3 steps to facilitate home and community accessibility  Plan   Treatment/Interventions Functional transfer training;LE strengthening/ROM; Elevations; Therapeutic exercise; Endurance training;Cognitive reorientation;Patient/family training;Equipment eval/education; Bed mobility;Gait training; Compensatory technique education;Continued evaluation;Spoke to nursing   PT Frequency (3-5x/wk)   Recommendation   Recommendation Home PT; Home with family support   Equipment Recommended Walker   Barthel Index   Feeding 10   Bathing 0   Grooming Score 5   Dressing Score 5 Bladder Score 10   Bowels Score 10   Toilet Use Score 5   Transfers (Bed/Chair) Score 10   Mobility (Level Surface) Score 0   Stairs Score 0   Barthel Index Score 55

## 2019-02-09 NOTE — PLAN OF CARE
Problem: PHYSICAL THERAPY ADULT  Goal: Performs mobility at highest level of function for planned discharge setting  See evaluation for individualized goals  Treatment/Interventions: Functional transfer training, LE strengthening/ROM, Elevations, Therapeutic exercise, Endurance training, Cognitive reorientation, Patient/family training, Equipment eval/education, Bed mobility, Gait training, Compensatory technique education, Continued evaluation, Spoke to nursing  Equipment Recommended: Michelle Simmons       See flowsheet documentation for full assessment, interventions and recommendations    Prognosis: Good  Problem List: Decreased strength, Decreased range of motion, Decreased endurance, Impaired balance, Decreased mobility, Decreased coordination, Impaired judgement, Decreased safety awareness, Pain, Orthopedic restrictions, Decreased skin integrity  Assessment: Pt is a 74 y o  male admitted to [facility] on 2/8/2019 w/ Other fatigue, dehydration Pt exhibits significant impairments with weakness, decreased ROM, impaired balance, decreased endurance, impaired coordination, gait deviations, pain, decreased activity tolerance, decreased functional mobility tolerance, decreased safety awareness, impaired judgement, fall risk, orthopedic restrictions and decreased skin integrity; these impact independence with mobility, ADLs, and IADLs; requires min contact guard assist w transf and amb 60 ft using RW- gait pattern unsteady walker reliant + falls risk; objective measures on the Barthel Index also reveal limitations;  therapy prognosis is impacted by relevant co morbidities as noted in evaluation; clinical presentation is currently unstable/unpredictable - pt presents w abnormal labs complex hx and phys impairments as noted- a regression from baseline ; PTA, pt was ambulatory w RW, lives in single level setup 3 SHELL; spouse present at eval and reports she works during day but can arrange to have someone w pt; skilled PT is indicated to to optimize functional independence and discharge planning; pending functional progress, PT recommendation at discharge is Home PT  and home with family support RW        Recommendation: Home PT, Home with family support          See flowsheet documentation for full assessment

## 2019-02-09 NOTE — ASSESSMENT & PLAN NOTE
Lab Results   Component Value Date    HGBA1C 8 9 (H) 05/02/2018     Recent Labs      02/08/19   2133  02/09/19   0624  02/09/19   1148   POCGLU  75  102  107       Blood Sugar Average: Last 72 hrs:  (P) 08 [id number]   · Continue SSI/accuchecks

## 2019-02-09 NOTE — ED ATTENDING ATTESTATION
Dafne Joe DO, saw and evaluated the patient  I have discussed the patient with the resident/non-physician practitioner and agree with the resident's/non-physician practitioner's findings, Plan of Care, and MDM as documented in the resident's/non-physician practitioner's note, except where noted  All available labs and Radiology studies were reviewed  At this point I agree with the current assessment done in the Emergency Department  I have conducted an independent evaluation of this patient a history and physical is as follows:    75 y/o male with a complicated past medical history significant for hepatocellular carcinoma in esophageal cancer  Is also involved pulmonary metastasis  He also has a history of COPD, atrial fibrillation maintained on Eliquis therapy, diabetes mellitus, high blood pressure  Patient is presenting with progressive generalized weakness over the last several days  He describes a heaviness sensation in his legs which is interfering with his ambulation and transferring  There are no focal areas of weakness or numbness  He denies any fevers or chills or recent illness  Denies any episodes of feeling lightheaded or short of breath  Denies any chest pain  Does complain of some generalized myalgias and arthralgias  Does note decreased p o  Intake with an occasional episode of nausea and vomiting  Will continue with labs, CT head will administer IV fluids and reassess  Labs reviewed significant for a transaminitis, elevated TSH  Head CT was unremarkable  Other labs reviewed  Patient be admitted for further evaluation, monitoring, physical therapy, case management evaluation  Family updated and agrees to treatment plan  Patient remains hemodynamically stable          Critical Care Time  Procedures

## 2019-02-09 NOTE — ASSESSMENT & PLAN NOTE
· With recent initiation of Lenvatinib, however patient complaining of marked fatigue  · Oncology recommendations noted  Hold new medication for now  · Known to Dr Chilel Exon   Not curable, goal is palliative at this time

## 2019-02-09 NOTE — ED PROVIDER NOTES
History  Chief Complaint   Patient presents with    Extremity Weakness     c/o right sided weakness x 2 days after starting Lenvima ([place] drug)  Pt is lethargic, difficulty ambulating and transferring  No neuro deficits noted on exam, family noticed bilateral weakness only  78-year-old male presenting to the emergency department for evaluation of generalized weakness  He has had treated her generalized weakness after beginning a novel chemotherapeutic agent for what he believes his primary hepatocellular carcinoma  Describes generalized weakness which is also worse in the bilateral upper extremities  On my interview the patient denies any lateralizing weakness  He has had some nausea and vomiting as well  This is associated with decreased p  O  Intake and appetite  Denies any blood in the stool he denies any chest pain palpitations syncope or presyncope  Denies any urinary symptoms, fevers or chills  Prior to Admission Medications   Prescriptions Last Dose Informant Patient Reported? Taking?    Ascorbic Acid (VITAMIN C) 1000 MG tablet  Self Yes Yes   Sig: Take 1,000 mg by mouth daily Does not know dosage of vitamin   Bisacodyl (LAXATIVE PO)  Self Yes Yes   Sig: Take by mouth as needed   Ferrous Sulfate (IRON) 325 (65 Fe) MG TABS  Self Yes Yes   Sig: Take by mouth daily   LORazepam (ATIVAN) 1 mg tablet   Yes Yes   Sig: Take 0 5 mg by mouth 2 (two) times a day   Lenvatinib 4 MG Daily Dose 4 MG CPPK  Self No Yes   Sig: Take 2 capsules by mouth daily   Pseudoephedrine HCl (NASAL DECONGESTANT PO)  Self Yes No   Sig: Take by mouth as needed   albuterol (2 5 mg/3 mL) 0 083 % nebulizer solution  Self Yes Yes   Sig: Take 1 ampule by nebulization every 4 (four) hours as needed for wheezing     allopurinol (ZYLOPRIM) 100 mg tablet  Self Yes Yes   Sig: Take 100 mg by mouth daily   amiodarone 200 mg tablet  Self Yes Yes   Sig: Take 200 mg by mouth daily   apixaban (ELIQUIS) 5 mg  Self Yes Yes   Sig: Take 5 mg by mouth 2 (two) times a day   calcium carbonate (TUMS EX) 750 mg chewable tablet  Self No Yes   Sig: Chew 1 tablet (750 mg total) daily   Patient taking differently: Chew 1 tablet as needed     cyanocobalamin (VITAMIN B-12) 1,000 mcg tablet  Self Yes Yes   Sig: Take by mouth daily   diazepam (VALIUM) 5 mg tablet  Self Yes Yes   Sig: Take 5 mg by mouth every 6 (six) hours as needed for anxiety   diphenhydrAMINE (BENADRYL) 25 mg capsule  Self Yes Yes   Sig: Take 25 mg by mouth daily   hydrOXYzine HCL (ATARAX) 25 mg tablet  Self Yes Yes   Sig: Take 25 mg by mouth 2 (two) times a day as needed for itching   insulin aspart (NovoLOG) 100 units/mL injection  Self Yes Yes   Sig: Inject under the skin as needed for high blood sugar     ipratropium (ATROVENT) 0 02 % nebulizer solution  Self No No   Sig: Take 1 vial (0 5 mg total) by nebulization 4 (four) times a day   latanoprost (XALATAN) 0 005 % ophthalmic solution  Self Yes Yes   Sig: Administer 1 drop to both eyes daily at bedtime     metoclopramide (REGLAN) 10 mg tablet   No No   Sig: Take 1 tablet (10 mg total) by mouth 4 (four) times a day   oxyCODONE (ROXICODONE) 5 mg immediate release tablet  Self No No   Sig: Take 1 tablet (5 mg total) by mouth every 4 (four) hours as needed for moderate pain Max Daily Amount: 30 mg   rOPINIRole (REQUIP) 1 mg tablet  Self Yes Yes   Sig: Take 3 mg by mouth daily at bedtime Take 1mg in am and 3mg at bedtime    ranitidine (ZANTAC) 150 mg tablet  Self No No   Sig: Take 1 tablet (150 mg total) by mouth daily at bedtime   sertraline (ZOLOFT) 25 mg tablet  Self Yes Yes   Sig: Take 25 mg by mouth daily at bedtime     traZODone (DESYREL) 50 mg tablet   No No   Sig: Take 1 tablet (50 mg total) by mouth daily at bedtime   umeclidinium-vilanterol (ANORO ELLIPTA) 62 5-25 MCG/INH inhaler  Self Yes Yes   Sig: Inhale 1 puff daily      Facility-Administered Medications: None       Past Medical History:   Diagnosis Date    Anemia     Coronary artery disease     Dysphagia     Esophageal cancer (HCC)     Gout     Hyperlipidemia     Hypertension     Restless leg syndrome     Rheumatoid arthritis ([address] )     Sleep apnea     Vision abnormalities        Past Surgical History:   Procedure Laterality Date    APPENDECTOMY      CHOLECYSTECTOMY      GASTROJEJUNOSTOMY W/ JEJUNOSTOMY TUBE N/A 8/4/2016    Procedure: INSERTION JEJUNOSTOMY TUBE OPEN;  Surgeon: Isaias Omalley MD;  [facility];  Service:     KNEE ARTHROSCOPY      LIVER RESECTION N/A 10/31/2016    Procedure: INTRAOPERATIVE ULTRASOUND OF LIVER; LIVER LESION RESECTION/ABLATION ;  Surgeon: Isaias Omalley MD;  [facility];  Service:    Marshal Sa PLACEMENT      MA EGD INSERT GUIDE WIRE DILATOR PASSAGE ESOPHAGUS N/A 2/14/2018    Procedure: ESOPHAGOGASTRODUODENOSCOPY (EGD) with dilation;  Surgeon: Deanna Rainey MD;  [facility];  Service: Thoracic    MA ESOPHAGOGASTRODUODENOSCOPY TRANSORAL DIAGNOSTIC N/A 10/31/2016    Procedure: ESOPHAGOGASTRODUODENOSCOPY (EGD);   Surgeon: Isaias Omalley MD;  [facility];  Service: Surgical Oncology    MA ESOPHAGOSCOPY FLEX BALLOON DILAT <30 MM DIAM N/A 1/6/2017    Procedure: DILATATION ESOPHAGEAL;  Surgeon: Deanna Rainey MD;  [facility];  Service: Thoracic    MA ESOPHAGOSCOPY FLEX BALLOON DILAT <30 MM DIAM N/A 12/12/2016    Procedure: DILATATION ESOPHAGEAL, EGD;  Surgeon: Deanna Rainey MD;  [facility];  Service: Thoracic    MA ESOPHAGOSCOPY FLEX BALLOON DILAT <30 MM DIAM N/A 5/24/2017    Procedure: EGD WITH DILATION ;  Surgeon: Adiel Guy MD;  [facility];  Service: Thoracic    MA ESOPHAGOSCOPY FLEX BALLOON DILAT <30 MM DIAM N/A 8/3/2017    Procedure: EGD WITH DILATION;  Surgeon: Deanna Rainey MD;  [facility];  Service: Thoracic    MA REMOVAL [address] THORACOTOMY N/A 10/31/2016    Procedure: ESOPHAGECTOMY;  Surgeon: Isaias Omalley MD;  [facility];  Service: Surgical Oncology    TONSILLECTOMY      WOUND DEBRIDEMENT Left 5/3/2018    Procedure: DEBRIDEMENT UPPER EXTREMITY ([address]) left shoulder;  Surgeon: Rupinder Rand MD;  [facility];  Service: Orthopedics       Family History   Problem Relation Age of Onset    Stroke Mother     Colon cancer Father     Other Father         Pneumoconiosis    Cancer Sister         oral cancer     I have reviewed and agree with the history as documented  Social History     Tobacco Use    Smoking status: Former Smoker     Packs/day: 3 00     Years: 40 00     Pack years: 120 00     Types: Cigarettes     Start date:      Last attempt to quit: 1991     Years since quittin 8    Smokeless tobacco: Never Used   Substance Use Topics    Alcohol use: Yes     Comment: 1 drink/week    Drug use: No        Review of Systems   Constitutional: Positive for fatigue  Negative for appetite change, chills and fever  HENT: Negative for sneezing and sore throat  Eyes: Negative for visual disturbance  Respiratory: Negative for cough, choking, chest tightness, shortness of breath and wheezing  Cardiovascular: Negative for chest pain and palpitations  Gastrointestinal: Positive for nausea and vomiting  Negative for abdominal pain, constipation and diarrhea  Genitourinary: Negative for difficulty urinating and dysuria  Neurological: Negative for dizziness, weakness, light-headedness, numbness and headaches  All other systems reviewed and are negative        Physical Exam  ED Triage Vitals [19]   Temperature Pulse Respirations Blood Pressure SpO2   (!) 95 2 °F (35 1 °C) 71 16 (!) 184/81 96 %      Temp Source Heart Rate Source Patient Position - Orthostatic VS BP Location FiO2 (%)   Tympanic Monitor Sitting Left arm --      Pain Score       No Pain           Orthostatic Vital Signs  Vitals:    19 0700 19 0725 19 1151 19 1457   BP: 126/64   116/68   Pulse: 70 70 62 69 Patient Position - Orthostatic VS:    Sitting       Physical Exam   Constitutional: He is oriented to person, place, and time  He appears well-developed and well-nourished  No distress  HENT:   Head: Normocephalic and atraumatic  Mouth/Throat: Oropharynx is clear and moist    Eyes: Pupils are equal, round, and reactive to light  EOM are normal    Neck: No JVD present  No tracheal deviation present  Cardiovascular: Normal rate, regular rhythm, normal heart sounds and intact distal pulses  Exam reveals no gallop and no friction rub  No murmur heard  Pulmonary/Chest: Effort normal and breath sounds normal  No respiratory distress  He has no wheezes  He has no rales  Abdominal: Soft  Bowel sounds are normal  He exhibits no distension  There is no tenderness  There is no rebound and no guarding  Neurological: He is alert and oriented to person, place, and time  No cranial nerve deficit  He exhibits normal muscle tone  Skin: Skin is warm and dry  He is not diaphoretic  No pallor  Psychiatric: He has a normal mood and affect  His behavior is normal    Nursing note and vitals reviewed        ED Medications  Medications   albuterol inhalation solution 2 5 mg (2 5 mg Nebulization Given 2/16/19 0747)   allopurinol (ZYLOPRIM) tablet 100 mg (100 mg Oral Given 2/17/19 0812)   amiodarone tablet 200 mg (200 mg Oral Given 2/17/19 0811)   apixaban (ELIQUIS) tablet 5 mg (5 mg Oral Given 2/17/19 1708)   diazepam (VALIUM) tablet 5 mg (has no administration in time range)   ascorbic acid (VITAMIN C) tablet 1,000 mg (1,000 mg Oral Given 2/17/19 0811)   cyanocobalamin (VITAMIN B-12) tablet 1,000 mcg (1,000 mcg Oral Given 2/17/19 0812)   bisacodyl (DULCOLAX) EC tablet 5 mg (has no administration in time range)   calcium carbonate (TUMS) chewable tablet 750 mg (has no administration in time range)   ferrous sulfate tablet 325 mg (325 mg Oral Given 2/17/19 0811)   hydrOXYzine HCL (ATARAX) tablet 25 mg (has no administration in time range)   latanoprost (XALATAN) 0 005 % ophthalmic solution 1 drop (1 drop Both Eyes Given 2/17/19 2113)   metoclopramide (REGLAN) tablet 10 mg (10 mg Oral Given 2/17/19 2110)   famotidine (PEPCID) tablet 20 mg (20 mg Oral Given 2/17/19 2110)   rOPINIRole (REQUIP) tablet 3 mg (3 mg Oral Given 2/17/19 2109)   sertraline (ZOLOFT) tablet 25 mg (25 mg Oral Given 2/17/19 2110)   traZODone (DESYREL) tablet 50 mg (50 mg Oral Given 2/17/19 2110)   modafinil (PROVIGIL) tablet 100 mg (100 mg Oral Given 2/17/19 0811)   nystatin (MYCOSTATIN) powder ( Topical Given 2/17/19 1708)   guaiFENesin (MUCINEX) 12 hr tablet 1,200 mg (1,200 mg Oral Given 2/17/19 2109)   fluticasone-vilanterol (BREO ELLIPTA) 100-25 mcg/inh inhaler 1 puff (1 puff Inhalation Given 2/17/19 0815)   ipratropium-albuterol (DUO-NEB) 0 5-2 5 mg/3 mL inhalation solution 3 mL (3 mL Nebulization Given 2/17/19 1906)   pantoprazole (PROTONIX) EC tablet 40 mg (40 mg Oral Given 2/17/19 1117)   sucralfate (CARAFATE) oral suspension 1,000 mg (1,000 mg Oral Given 2/17/19 1708)   temazepam (RESTORIL) capsule 15 mg (15 mg Oral Given 2/17/19 2111)   sodium chloride 0 9 % bolus 1,000 mL (0 mL Intravenous Stopped 2/8/19 2237)   ondansetron (ZOFRAN) injection 4 mg (4 mg Intravenous Given 2/8/19 2124)   sodium chloride 0 9 % bolus 1,000 mL (0 mL Intravenous Stopped 2/9/19 0914)   gadobutrol injection (MULTI-DOSE) SOLN 9 mL (9 mL Intravenous Given 2/10/19 1707)   perflutren lipid microsphere (DEFINITY) injection (1 mL/min Intravenous Given 2/11/19 1654)   furosemide (LASIX) injection 20 mg (20 mg Intravenous Given 2/13/19 1204)   ondansetron (ZOFRAN) injection 4 mg (4 mg Intravenous Given 2/16/19 0840)       Diagnostic Studies  Results Reviewed     Procedure Component Value Units Date/Time    T4, free [[id number]]  (Normal) Collected:  02/08/19 2129    Lab Status:  Final result Specimen:  Blood from Arm, Right Updated:  02/09/19 0204     Free T4 1 33 ng/dL     Comprehensive metabolic panel [[id number]]  (Abnormal) Collected:  02/08/19 2129    Lab Status:  Final result Specimen:  Blood from Arm, Right Updated:  02/08/19 2300     Sodium 137 mmol/L      Potassium 4 2 mmol/L      Chloride 103 mmol/L      CO2 25 mmol/L      ANION GAP 9 mmol/L      BUN 15 mg/dL      Creatinine 0 86 mg/dL      Glucose 72 mg/dL      Calcium 9 1 mg/dL       U/L       U/L      Alkaline Phosphatase 846 U/L      Total Protein 8 9 g/dL      Albumin 2 6 g/dL      Total Bilirubin 3 82 mg/dL      eGFR 85 ml/min/1 73sq m     Narrative:         National Kidney Disease Education Program recommendations are as follows:  GFR calculation is accurate only with a steady state creatinine  Chronic Kidney disease less than 60 ml/min/1 73 sq  meters  Kidney failure less than 15 ml/min/1 73 sq  meters  TSH [[id number]]  (Abnormal) Collected:  02/08/19 2129    Lab Status:  Final result Specimen:  Blood from Arm, Right Updated:  02/08/19 2300     TSH 3RD GENERATON 10 200 uIU/mL     Narrative:         Patients undergoing fluorescein dye angiography may retain small amounts of fluorescein in the body for 48-72 hours post procedure  Samples containing fluorescein can produce falsely depressed TSH values  If the patient had this procedure,a specimen should be resubmitted post fluorescein clearance      Troponin I [[id number]]  (Normal) Collected:  02/08/19 2130    Lab Status:  Final result Specimen:  Blood from Arm, Right Updated:  02/08/19 2200     Troponin I <0 02 ng/mL     CBC and differential [[id number]]  (Abnormal) Collected:  02/08/19 2130    Lab Status:  Final result Specimen:  Blood from Arm, Right Updated:  02/08/19 2156     WBC 5 38 Thousand/uL      RBC 6 33 Million/uL      Hemoglobin 19 5 g/dL      Hematocrit 60 8 %      MCV 96 fL      MCH 30 8 pg      MCHC 32 1 g/dL      RDW 20 2 %      MPV 11 8 fL      Platelets 724 Thousands/uL      nRBC 0 /100 WBCs      Neutrophils Relative 82 %      Immat GRANS % 0 % Lymphocytes Relative 8 %      Monocytes Relative 8 %      Eosinophils Relative 1 %      Basophils Relative 1 %      Neutrophils Absolute 4 35 Thousands/µL      Immature Grans Absolute 0 02 Thousand/uL      Lymphocytes Absolute 0 42 Thousands/µL      Monocytes Absolute 0 45 Thousand/µL      Eosinophils Absolute 0 07 Thousand/µL      Basophils Absolute 0 07 Thousands/µL     Fingerstick Glucose (POCT) [[id number]]  (Normal) Collected:  02/08/19 2133    Lab Status:  Final result Updated:  02/08/19 2134     POC Glucose 75 mg/dl                  MRI brain w wo contrast   Final Result by Kassie Cyr DO (02/11 3031)      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  No acute ischemia, mass or hemorrhage  Workstation performed: [id number]         US right upper quadrant   Final Result by Jeanine Dave DO (02/10 1708)      Diffusely heterogeneous liver with masslike fullness left lobe  Unfortunately this cannot be further differentiated on this exam  More precise comparison of known hepatic lesions would be better evaluated with contrast enhanced cross-sectional imaging  No biliary dilatation detected  Small amount of fluid within the right upper quadrant  Workstation performed: [id number]         XR chest 2 views   ED Interpretation by Miriam Mcmanus MD (02/08 2201)   No active pulmonary disease      Final Result by Roberta Monroe MD (02/09 0202)      Cardiomegaly and pulmonary vascular congestion raises concern for CHF  The study was marked in [facility] for immediate notification  I      Workstation performed: [id number]         CT head without contrast   Final Result by Lorena Andres MD (02/08 2159)      No acute intracranial abnormality  Stable small old medial right occipital lobe infarct              Workstation performed: [id number]               Procedures  Procedures      Phone Consults  ED Phone Contact    ED Course           Identification of Seniors at Risk      Most Recent Value   (ISAR) Identification of Seniors at Risk   Before the illness or injury that brought you to the Emergency, did you need someone to help you on a regular basis? 1 Filed at: 02/08/2019 2030   In the last 24 hours, have you needed more help than usual?  1 Filed at: 02/08/2019 2030   Have you been hospitalized for one or more nights during the past 6 months? 0 Filed at: 02/08/2019 2030   In general, do you see well?  0 Filed at: 02/08/2019 2030   In general, do you have serious problems with your memory? 0 Filed at: 02/08/2019 2030   Do you take more than three different medications every day? 1 Filed at: 02/08/2019 2030   ISAR Score  3 Filed at: 02/08/2019 2030                          MDM  Number of Diagnoses or Management Options  Dehydration:   Fatigue:   Diagnosis management comments: 25-year-old male with dehydration and fatigue status post chemotherapy  Give IV fluids, check infectious workup, cardiac workup, likely admit for deconditioning        Disposition  Final diagnoses:   Fatigue   Dehydration     Time reflects when diagnosis was documented in both MDM as applicable and the Disposition within this note     Time User Action Codes Description Comment    2/9/2019 12:20 AM Ken Sotelo [R53 83] Fatigue     2/9/2019 12:20 AM Ken Sotelo [E86 0] Dehydration     2/9/2019 12:36 AM Anthony FONTAINE Add [C22 0] [facility]     2/9/2019 12:36 AM Anthony FONTAINE Modify [C22 0] Hepatocellular [facility] )     2/11/2019  2:23 PM Elise Ivan Add [C15 9] Malignant neoplasm of esophagus, unspecified location [facility])       ED Disposition     ED Disposition Condition Date/Time Comment    Admit  Sat Feb 9, 2019 12:21 AM       Follow-up Information    None         Current Discharge Medication List      CONTINUE these medications which have NOT CHANGED    Details   albuterol (2 5 mg/3 mL) 0 083 % nebulizer solution Take 1 ampule by nebulization every 4 (four) hours as needed for wheezing allopurinol (ZYLOPRIM) 100 mg tablet Take 100 mg by mouth daily      amiodarone 200 mg tablet Take 200 mg by mouth daily      apixaban (ELIQUIS) 5 mg Take 5 mg by mouth 2 (two) times a day      Ascorbic Acid (VITAMIN C) 1000 MG tablet Take 1,000 mg by mouth daily Does not know dosage of vitamin      Bisacodyl (LAXATIVE PO) Take by mouth as needed      calcium carbonate (TUMS EX) 750 mg chewable tablet Chew 1 tablet (750 mg total) daily  Qty: 30 tablet, Refills: 0    Associated Diagnoses: Gastroesophageal reflux disease without esophagitis      cyanocobalamin (VITAMIN B-12) 1,000 mcg tablet Take by mouth daily      diazepam (VALIUM) 5 mg tablet Take 5 mg by mouth every 6 (six) hours as needed for anxiety      diphenhydrAMINE (BENADRYL) 25 mg capsule Take 25 mg by mouth daily      Ferrous Sulfate (IRON) 325 (65 Fe) MG TABS Take by mouth daily      hydrOXYzine HCL (ATARAX) 25 mg tablet Take 25 mg by mouth 2 (two) times a day as needed for itching      insulin aspart (NovoLOG) 100 units/mL injection Inject under the skin as needed for high blood sugar        latanoprost (XALATAN) 0 005 % ophthalmic solution Administer 1 drop to both eyes daily at bedtime        Lenvatinib 4 MG Daily Dose 4 MG CPPK Take 2 capsules by mouth daily  Qty: 60 each, Refills: 0    Associated Diagnoses: Malignant neoplasm of liver, unspecified liver malignancy type (HCC)      LORazepam (ATIVAN) 1 mg tablet Take 0 5 mg by mouth 2 (two) times a day      rOPINIRole (REQUIP) 1 mg tablet Take 3 mg by mouth daily at bedtime Take 1mg in am and 3mg at bedtime       sertraline (ZOLOFT) 25 mg tablet Take 25 mg by mouth daily at bedtime        umeclidinium-vilanterol (ANORO ELLIPTA) 62 5-25 MCG/INH inhaler Inhale 1 puff daily      ipratropium (ATROVENT) 0 02 % nebulizer solution Take 1 vial (0 5 mg total) by nebulization 4 (four) times a day  Qty: 120 vial, Refills: 6    Associated Diagnoses: Stage 3 severe COPD by GOLD classification ([address] ) metoclopramide (REGLAN) 10 mg tablet Take 1 tablet (10 mg total) by mouth 4 (four) times a day  Qty: 60 tablet, Refills: 1    Associated Diagnoses: Chemotherapy induced nausea and vomiting      oxyCODONE (ROXICODONE) 5 mg immediate release tablet Take 1 tablet (5 mg total) by mouth every 4 (four) hours as needed for moderate pain Max Daily Amount: 30 mg  Qty: 120 tablet, Refills: 0    Associated Diagnoses: Hepatocellular carcinoma (HCC)      Pseudoephedrine HCl (NASAL DECONGESTANT PO) Take by mouth as needed      ranitidine (ZANTAC) 150 mg tablet Take 1 tablet (150 mg total) by mouth daily at bedtime  Qty: 30 tablet, Refills: 5    Associated Diagnoses: Gastroesophageal reflux disease without esophagitis      traZODone (DESYREL) 50 mg tablet Take 1 tablet (50 mg total) by mouth daily at bedtime  Qty: 30 tablet, Refills: 1    Associated Diagnoses: Insomnia due to medical condition           No discharge procedures on file  ED Provider  Attending physically available and evaluated Abebe Finnegan I managed the patient along with the ED Attending      Electronically Signed by         Gemma Caceres MD  02/17/19 9707

## 2019-02-10 ENCOUNTER — APPOINTMENT (INPATIENT)
Dept: RADIOLOGY | Facility: HOSPITAL | Age: 74
DRG: 435 | End: 2019-02-10
Payer: MEDICARE

## 2019-02-10 PROBLEM — R79.89 ABNORMAL LFTS: Status: ACTIVE | Noted: 2019-02-10

## 2019-02-10 LAB
AFP-TM SERPL-MCNC: 49.4 NG/ML (ref 0.5–8)
GLUCOSE SERPL-MCNC: 119 MG/DL (ref 65–140)
GLUCOSE SERPL-MCNC: 75 MG/DL (ref 65–140)
GLUCOSE SERPL-MCNC: 86 MG/DL (ref 65–140)
GLUCOSE SERPL-MCNC: 97 MG/DL (ref 65–140)

## 2019-02-10 PROCEDURE — 82105 ALPHA-FETOPROTEIN SERUM: CPT | Performed by: INTERNAL MEDICINE

## 2019-02-10 PROCEDURE — 99232 SBSQ HOSP IP/OBS MODERATE 35: CPT | Performed by: INTERNAL MEDICINE

## 2019-02-10 PROCEDURE — 94760 N-INVAS EAR/PLS OXIMETRY 1: CPT

## 2019-02-10 PROCEDURE — 82948 REAGENT STRIP/BLOOD GLUCOSE: CPT

## 2019-02-10 PROCEDURE — A9585 GADOBUTROL INJECTION: HCPCS | Performed by: INTERNAL MEDICINE

## 2019-02-10 PROCEDURE — 94640 AIRWAY INHALATION TREATMENT: CPT

## 2019-02-10 PROCEDURE — 70553 MRI BRAIN STEM W/O & W/DYE: CPT

## 2019-02-10 PROCEDURE — 76705 ECHO EXAM OF ABDOMEN: CPT

## 2019-02-10 RX ORDER — SODIUM CHLORIDE 9 MG/ML
100 INJECTION, SOLUTION INTRAVENOUS CONTINUOUS
Status: DISCONTINUED | OUTPATIENT
Start: 2019-02-10 | End: 2019-02-11

## 2019-02-10 RX ADMIN — SODIUM CHLORIDE 100 ML/HR: 0.9 INJECTION, SOLUTION INTRAVENOUS at 14:51

## 2019-02-10 RX ADMIN — LORAZEPAM 0.5 MG: 0.5 TABLET ORAL at 17:30

## 2019-02-10 RX ADMIN — CYANOCOBALAMIN TAB 500 MCG 1000 MCG: 500 TAB at 08:58

## 2019-02-10 RX ADMIN — GADOBUTROL 9 ML: 604.72 INJECTION INTRAVENOUS at 17:07

## 2019-02-10 RX ADMIN — APIXABAN 5 MG: 5 TABLET, FILM COATED ORAL at 08:59

## 2019-02-10 RX ADMIN — AMIODARONE HYDROCHLORIDE 200 MG: 200 TABLET ORAL at 08:58

## 2019-02-10 RX ADMIN — APIXABAN 5 MG: 5 TABLET, FILM COATED ORAL at 17:30

## 2019-02-10 RX ADMIN — IPRATROPIUM BROMIDE 0.5 MG: 0.5 SOLUTION RESPIRATORY (INHALATION) at 19:36

## 2019-02-10 RX ADMIN — IPRATROPIUM BROMIDE 0.5 MG: 0.5 SOLUTION RESPIRATORY (INHALATION) at 11:23

## 2019-02-10 RX ADMIN — SERTRALINE HYDROCHLORIDE 25 MG: 25 TABLET ORAL at 21:34

## 2019-02-10 RX ADMIN — FAMOTIDINE 20 MG: 20 TABLET, FILM COATED ORAL at 21:34

## 2019-02-10 RX ADMIN — METOCLOPRAMIDE HYDROCHLORIDE 10 MG: 10 TABLET ORAL at 17:31

## 2019-02-10 RX ADMIN — DIPHENHYDRAMINE HCL 25 MG: 25 TABLET ORAL at 08:59

## 2019-02-10 RX ADMIN — OXYCODONE HYDROCHLORIDE AND ACETAMINOPHEN 1000 MG: 500 TABLET ORAL at 08:59

## 2019-02-10 RX ADMIN — METOCLOPRAMIDE HYDROCHLORIDE 10 MG: 10 TABLET ORAL at 08:58

## 2019-02-10 RX ADMIN — METOCLOPRAMIDE HYDROCHLORIDE 10 MG: 10 TABLET ORAL at 21:34

## 2019-02-10 RX ADMIN — ROPINIROLE 3 MG: 2 TABLET, FILM COATED ORAL at 21:34

## 2019-02-10 RX ADMIN — IPRATROPIUM BROMIDE 0.5 MG: 0.5 SOLUTION RESPIRATORY (INHALATION) at 06:46

## 2019-02-10 RX ADMIN — TRAZODONE HYDROCHLORIDE 50 MG: 50 TABLET ORAL at 21:34

## 2019-02-10 RX ADMIN — LATANOPROST 1 DROP: 50 SOLUTION OPHTHALMIC at 21:34

## 2019-02-10 RX ADMIN — ALLOPURINOL 100 MG: 100 TABLET ORAL at 08:59

## 2019-02-10 RX ADMIN — FERROUS SULFATE TAB 325 MG (65 MG ELEMENTAL FE) 325 MG: 325 (65 FE) TAB at 08:59

## 2019-02-10 RX ADMIN — LORAZEPAM 0.5 MG: 0.5 TABLET ORAL at 08:58

## 2019-02-10 NOTE — ASSESSMENT & PLAN NOTE
· Possibly secondary to progression of hepatocellular carcinoma versus side-effect of Lenvatinib itself (hepatotoxicity)  · RUQ US as per above  · Check ammonia level

## 2019-02-10 NOTE — PROGRESS NOTES
[facility] - Internal Medicine Progress Note       PATIENT INFORMATION      Patient:  Erwin Herndon 74 y o  male   MRN: [id number]  PCP: Kathrin Mena MD  Unit/Bed#: [facility] 445-56 Encounter: [id number]  Date Of Visit: 02/10/19       ASSESSMENTS & PLAN     Physical deconditioning  Assessment & Plan  - likely multifactorial secondary to progression of metastatic cancer coupled with continued chemotherapy (recently started on new agent - Levnatinib which is currently held) and decreased appetite/oral intake   - free T4 within normal limits   - PT/OT and supportive care as tolerated     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  Assessment & Plan  - status post prior ablation/resection for, followed by SIRS-sphere whole liver treatment, followed by chemotherapy with Nexavar then Opdivo, and most recently on Lenvatinib   - abnormal LFTs beyond baseline noted (see plan below)  - presents with increased fatigue - supportive care  - oncology following     Esophageal cancer   Assessment & Plan  - status post prior chemotherapy/radiation and subsequent esophagectomy (all in 2016)   - oncology following   - PRN pain control and supportive care     Abnormal LFTs  Assessment & Plan  - possibly secondary to progression of hepatocellular carcinoma versus side-effect of Lenvatinib itself (hepatotoxicity)  - limit/avoid hepatotoxins as possible - liver ultrasound reveals pre-existing left lobe mass with a small amount of RUQ fluid  - monitor LFTs - on IV fluid maintenance     Vision abnormalities with right-sided weakness  Assessment & Plan  - over last several days prior to presentation - concern over possible brain metastasis noted - CT of head negative for acute etiology - pending MRI of brain today  - PT/OT as tolerated  - Reglan regimen on board for nausea/vomiting     Atrial fibrillation  Assessment & Plan  - rate controlled on Amiodarone  - continue Eliquis for anticoagulation    COPD  Assessment & Plan  - stable/asymptomatic  - continue Albuterol/Atrovent nebulizers PRN     Diabetes mellitus type 2  Assessment & Plan  - HbA1c of 8 9   - on SSI coverage per accuchrowena     Pulmonary vascular congestion  Assessment & Plan  - incidentally noted on CXR - asymptomatic without shortness of breath  - await echocardiogram  - concern of dehydration noted - initiated on maintenance IV fluid infusion - monitor for signs of iatrogenic fluid overload    Anxiety and depression  Assessment & Plan  - continue home Zoloft/Trazodone/Ativan regimen      VTE Prophylaxis:  Eliquis      SUBJECTIVE     Seen/examined earlier today during nursing rounds  Wife present at bedside during my encounter  Patient lying in bed with complaints of persistent weakness/fatigue although his nausea has somewhat improved today  Denies any fever/chills at this time  Denies any headaches  Remains with a flat affect and his wife is expectedly anxious  OBJECTIVE     Vitals:   Temp (24hrs), Av 9 °F (36 6 °C), Min:97 3 °F (36 3 °C), Max:98 2 °F (36 8 °C)    Temp:  [97 3 °F (36 3 °C)-98 2 °F (36 8 °C)] 98 2 °F (36 8 °C)  HR:  [62-71] 71  Resp:  [16-18] 18  BP: (107-135)/(58-76) 121/72  SpO2:  [90 %-98 %] 98 %  Body mass index is 28 67 kg/m²  Input and Output Summary (last 24 hours):        Intake/Output Summary (Last 24 hours) at 2/10/2019 1723  Last data filed at 2/10/2019 1401  Gross per 24 hour   Intake 295 ml   Output 450 ml   Net -155 ml       Physical Exam:     GENERAL:  Weak/fatigued - generally ill-appearing  HEAD:  Normocephalic - atraumatic  EYES: PERRL - EOMI   MOUTH:  Mucosa dry  NECK:  Supple - full range of motion  CARDIAC:  Regular rate/rhythm - S1/S2 positive  PULMONARY:  Clear breath sounds bilaterally - nonlabored respirations  ABDOMEN:  Soft - currently nontender/nondistended - active bowel sounds  MUSCULOSKELETAL:  Motor strength/range of motion markedly deconditioned  NEUROLOGIC: alert/awake  SKIN:  Chronic wrinkles/blemishes   PSYCHIATRIC:  Mood/affect flat      ADDITIONAL DATA       Labs & Recent Cultures:     Results from last 7 days   Lab Units 02/09/19  0449   WBC Thousand/uL 5 31   HEMOGLOBIN g/dL 17 3*   HEMATOCRIT % 53 4*   PLATELETS Thousands/uL 101*   NEUTROS PCT % 81*   LYMPHS PCT % 8*   MONOS PCT % 8   EOS PCT % 2     Results from last 7 days   Lab Units 02/09/19  0449   SODIUM mmol/L 139   POTASSIUM mmol/L 4 1   CHLORIDE mmol/L 108   CO2 mmol/L 26   BUN mg/dL 14   CREATININE mg/dL 0 57*   CALCIUM mg/dL 8 0*   ALK PHOS U/L 621*   ALT U/L 82*   AST U/L 168*         Last 24 Hours Medication List:     Current Facility-Administered Medications:  albuterol 2 5 mg Nebulization Q4H PRN Daniel Holden MD    allopurinol 100 mg Oral Daily Daniel Holden MD    amiodarone 200 mg Oral Daily Daniel Holden MD    apixaban 5 mg Oral BID Daniel Holden MD    vitamin C 1,000 mg Oral Daily Daniel Holden MD    bisacodyl 5 mg Oral Daily PRN Daniel Holden MD    calcium carbonate 750 mg Oral Daily PRN Daniel Holden MD    cyanocobalamin 1,000 mcg Oral Daily Daniel Holden MD    diazepam 5 mg Oral Q6H PRN Daniel Holden MD    diphenhydrAMINE 25 mg Oral Daily Daniel Holden MD    famotidine 20 mg Oral HS Daniel Holden MD    ferrous sulfate 325 mg Oral Daily Daniel Holden MD    hydrOXYzine HCL 25 mg Oral BID PRN Daniel Holden MD    insulin lispro 1-5 Units Subcutaneous HS Daniel Hloden MD    insulin lispro 1-6 Units Subcutaneous TID AC Sachin Castrejon MD    ipratropium 0 5 mg Nebulization 4x Daily Daniel Holden MD    latanoprost 1 drop Both Eyes HS Daniel Holden MD    LORazepam 0 5 mg Oral BID Daniel Holden MD    metoclopramide 10 mg Oral 4x Daily Daniel Holden MD    rOPINIRole 3 mg Oral HS Daniel Holden MD    sertraline 25 mg Oral HS Daniel Holden MD    sodium chloride 100 mL/hr Intravenous Continuous Sachin Castrejon MD Last Rate: 100 mL/hr (02/10/19 1451)   traZODone 50 mg Oral HS Rosamaria Feldman MD           Time Spent for Care: 32 minutes  More than 50% of total time spent on counseling and coordination of care as described above  Current Length of Stay: 1 day(s)      Code Status: Level 1 - Full Code         ** Please Note: This note is constructed using a voice recognition dictation system   **

## 2019-02-10 NOTE — PHYSICIAN ADVISOR
Current patient class: Inpatient  The patient is currently on Hospital Day: 2 at [address] [place] [address]      The patient was admitted to the hospital at 1303 on 2/9/19 for the following diagnosis:  Dehydration [E86 0]  Hepatocellular carcinoma ([address] ) [C22 0]  Fatigue [R53 83]  Weakness [R53 1]       There is documentation in the medical record of an expected length of stay of at least 2 midnights  The patient is therefore expected to satisfy the 2 midnight benchmark and given the 2 midnight presumption is appropriate for INPATIENT ADMISSION  Given this expectation of a satisfying stay, CMS instructs us that the patient is most often appropriate for inpatient admission under part A provided medical necessity is documented in the chart  After review of the relevant documentation, labs, vital signs and test results, the patient is appropriate for INPATIENT ADMISSION  Admission to the hospital as an inpatient is a complex decision making process which requires the practitioner to consider the patients presenting complaint, history and physical examination and all relevant testing  With this in mind, in this case, the patient was deemed appropriate for INPATIENT ADMISSION  After review of the documentation and testing available at the time of the admission I concur with this clinical determination of medical necessity  Rationale is as follows: The patient is a 74 yrs old Male who presented to the ED at 2/8/2019  8:48 PM with a chief complaint of Extremity Weakness (c/o right sided weakness x 2 days after starting Lenvima ([place] drug)  Pt is lethargic, difficulty ambulating and transferring  No neuro deficits noted on exam, family noticed bilateral weakness only  )     Given the need for further hospitalization, and along with the documentation of medical necessity present in the chart, the patient is appropriate for inpatient admission    The patient is expected to satisfy the 2 midnight benchmark, and will require further acute medical care  The patient does have comorbid conditions which increases the risk for significant adverse outcome  Given this the patient is appropriate for inpatient admission  The patients vitals on arrival were ED Triage Vitals [02/08/19 2028]   Temperature Pulse Respirations Blood Pressure SpO2   (!) 95 2 °F (35 1 °C) 71 16 (!) 184/81 96 %      Temp Source Heart Rate Source Patient Position - Orthostatic VS BP Location FiO2 (%)   Tympanic Monitor Sitting Left arm --      Pain Score       No Pain           Past Medical History:   Diagnosis Date    Anemia     Coronary artery disease     Dysphagia     Esophageal cancer (HCC)     Gout     Hyperlipidemia     Hypertension     Restless leg syndrome     Rheumatoid arthritis ([facility] [address] )     Sleep apnea     Vision abnormalities      Past Surgical History:   Procedure Laterality Date    APPENDECTOMY      CHOLECYSTECTOMY      GASTROJEJUNOSTOMY W/ JEJUNOSTOMY TUBE N/A 8/4/2016    Procedure: INSERTION JEJUNOSTOMY TUBE OPEN;  Surgeon: Ricky Zamora MD;  [facility];  Service:     KNEE ARTHROSCOPY      LIVER RESECTION N/A 10/31/2016    Procedure: INTRAOPERATIVE ULTRASOUND OF LIVER; LIVER LESION RESECTION/ABLATION ;  Surgeon: Ricky Zamora MD;  [facility];  Service:    [place] [employer] PLACEMENT      AL EGD INSERT GUIDE WIRE DILATOR PASSAGE ESOPHAGUS N/A 2/14/2018    Procedure: ESOPHAGOGASTRODUODENOSCOPY (EGD) with dilation;  Surgeon: Cristina Parra MD;  [facility];  Service: Thoracic    AL ESOPHAGOGASTRODUODENOSCOPY TRANSORAL DIAGNOSTIC N/A 10/31/2016    Procedure: ESOPHAGOGASTRODUODENOSCOPY (EGD);   Surgeon: Ricky Zamora MD;  [facility];  Service: Surgical Oncology    AL ESOPHAGOSCOPY FLEX Paulette Justin <30 MM DIAM N/A 1/6/2017    Procedure: DILATATION ESOPHAGEAL;  Surgeon: Cristina Parra MD;  [facility];  Service: Thoracic    AL ESOPHAGOSCOPY FLEX BALLOON DILAT <30 MM DIAM N/A 12/12/2016    Procedure: DILATATION ESOPHAGEAL, EGD;  Surgeon: Alli Barrios MD;  [facility];  Service: Thoracic    MI ESOPHAGOSCOPY FLEX BALLOON DILAT <30 MM DIAM N/A 5/24/2017    Procedure: EGD WITH DILATION ;  Surgeon: Zia Sotomayor MD;  [facility];  Service: Thoracic    MI ESOPHAGOSCOPY FLEX BALLOON DILAT <30 MM DIAM N/A 8/3/2017    Procedure: EGD WITH DILATION;  Surgeon: Alli Barrios MD;  [facility];  Service: Thoracic    MI REMOVAL [address] THORACOTOMY N/A 10/31/2016    Procedure: ESOPHAGECTOMY;  Surgeon: Jessenia Laguerre MD;  [facility];  Service: Surgical Oncology    TONSILLECTOMY      WOUND DEBRIDEMENT Left 5/3/2018    Procedure: DEBRIDEMENT UPPER EXTREMITY ([address]) left shoulder;  Surgeon: Inessa Bettencourt MD;  [facility];  Service: Orthopedics           Consults have been placed to:   IP CONSULT TO CASE MANAGEMENT  IP CONSULT TO ONCOLOGY    Vitals:    02/09/19 1518 02/09/19 1743 02/09/19 2000 02/09/19 2148   BP: 139/69      BP Location: Right arm      Pulse: 55 62     Resp: 18 16     Temp: 97 5 °F (36 4 °C)      TempSrc: Oral      SpO2: 96% 95% 95% 94%   Weight:       Height:           Most recent labs:    Recent Labs      02/08/19   2130  02/09/19   0449   WBC  5 38  5 31   HGB  19 5*  17 3*   HCT  60 8*  53 4*   PLT  109*  101*   K   --   4 1   CALCIUM   --   8 0*   BUN   --   14   CREATININE   --   0 57*   TROPONINI  <0 02   --    AST   --   168*   ALT   --   82*   ALKPHOS   --   621*       Scheduled Meds:  Current Facility-Administered Medications:  albuterol 2 5 mg Nebulization Q4H PRN Toyin Rosas MD   allopurinol 100 mg Oral Daily Toyin Rosas MD   amiodarone 200 mg Oral Daily Toyin Rosas MD   apixaban 5 mg Oral BID Toyin Rosas MD   vitamin C 1,000 mg Oral Daily Toyin Rosas MD   bisacodyl 5 mg Oral Daily PRN Toyin Rosas MD   calcium carbonate 750 mg Oral Daily PRN Toyin Rosas MD cyanocobalamin 1,000 mcg Oral Daily Tyrone Ho MD   diazepam 5 mg Oral Q6H PRN Tyrone Ho MD   diphenhydrAMINE 25 mg Oral Daily Tyrone Ho MD   famotidine 20 mg Oral HS Tyrone Ho MD   ferrous sulfate 325 mg Oral Daily Tyrone Ho MD   hydrOXYzine HCL 25 mg Oral BID PRN Tyrone Ho MD   insulin lispro 1-5 Units Subcutaneous HS Tyrone Ho MD   insulin lispro 1-6 Units Subcutaneous TID AC Tyrone Ho MD   ipratropium 0 5 mg Nebulization 4x Daily Tyrone Ho MD   latanoprost 1 drop Both Eyes HS Tyrone Ho MD   LORazepam 0 5 mg Oral BID Tyrone Ho MD   metoclopramide 10 mg Oral 4x Daily Tyrone Ho MD   rOPINIRole 3 mg Oral HS Tyrone Ho MD   sertraline 25 mg Oral HS Tyrone Ho MD   traZODone 50 mg Oral HS Tyrone Ho MD     Continuous Infusions:   PRN Meds:   albuterol    bisacodyl    calcium carbonate    diazepam    hydrOXYzine HCL    Surgical procedures (if appropriate):

## 2019-02-10 NOTE — SOCIAL WORK
Cm met with pt to discuss DCP and cm role  Pt lives with spouse in a ranch home with 3ste  Prior to admission pt used a SPC with ambulation and was independent with ADLS  No prior hx of VNA  Pt's preference for pharmacy is [facility]  Cm spoke with spouse Elaine Griffin and pt has no hx of drug/ETOH or inpt psych stays  Per Elaine Griffin requesting [facility]  Patient/caregiver received discharge checklist  Content reviewed  Patient/caregiver encouraged to participate in discharge plan of care prior to discharge home  CM reviewed d/c planning process including the following: identifying help at home, patient preference for d/c planning needs, Discharge Lounge, [facility] Meds to Bed program, availability of treatment team to discuss questions or concerns patient and/or family may have regarding understanding medications and recognizing signs and symptoms once discharged  CM also encouraged patient to follow up with all recommended appointments after discharge  Patient advised of importance for patient and family to participate in managing patients medical well being

## 2019-02-10 NOTE — ASSESSMENT & PLAN NOTE
· Patient known to Dr Vimal Roque  · S/p esophagectomy and chemo/radiation  · Oncology and Palliative Care following

## 2019-02-10 NOTE — PLAN OF CARE
HEMATOLOGIC - ADULT     Maintains hematologic stability Progressing        METABOLIC, FLUID AND ELECTROLYTES - ADULT     Electrolytes maintained within normal limits Progressing     Fluid balance maintained Progressing     Glucose maintained within target range Progressing        MUSCULOSKELETAL - ADULT     Maintain or return mobility to safest level of function Progressing     Maintain proper alignment of affected body part Progressing        Nutrition/Hydration-ADULT     Nutrient/Hydration intake appropriate for improving, restoring or maintaining nutritional needs Progressing        Potential for Falls     Patient will remain free of falls Progressing        Prexisting or High Potential for Compromised Skin Integrity     Skin integrity is maintained or improved Progressing        RESPIRATORY - ADULT     Achieves optimal ventilation and oxygenation Progressing        SKIN/TISSUE INTEGRITY - ADULT     Skin integrity remains intact Progressing     Incision(s), wounds(s) or drain site(s) healing without S/S of infection Progressing     Oral mucous membranes remain intact Progressing

## 2019-02-10 NOTE — ASSESSMENT & PLAN NOTE
· Patient is known to Dr Aneesh Mariscal of Oncology and Dr Sharon Andres of [facility]  · Heme/Onc consult appreciated - per Oncology, this is not curable and he was on the third line VEGF inhibitor with palliative goal  Grace Ohms now on hold  · Appreciate Palliative Care consult - I discussed with Dr Rocael Grigsby today  · RUQ revealed diffusely heterogenous liver with masslike fullness left lobe which could not be further differentiated on this exam and would be better evaluated with contrast enhanced cross-sectional imaging, no biliary dilatation detected, small amount of fluid within the right upper quadrant  · CMP in AM

## 2019-02-10 NOTE — ASSESSMENT & PLAN NOTE
· This was incidentally noted on CXR  He denies SOB today but is on NC O2  IVF have been discontinued   BNP WNL  · Echo pending

## 2019-02-10 NOTE — ASSESSMENT & PLAN NOTE
- likely multifactorial secondary to progression of metastatic cancer coupled with continued chemotherapy (recently started on new agent - Levnatinib which is currently held) and decreased appetite/oral intake   - free T4 within normal limits   - PT/OT and supportive care as tolerated

## 2019-02-10 NOTE — ASSESSMENT & PLAN NOTE
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

## 2019-02-11 ENCOUNTER — APPOINTMENT (INPATIENT)
Dept: NON INVASIVE DIAGNOSTICS | Facility: HOSPITAL | Age: 74
DRG: 435 | End: 2019-02-11
Payer: MEDICARE

## 2019-02-11 PROBLEM — R53.83 FATIGUE: Status: ACTIVE | Noted: 2019-02-11

## 2019-02-11 LAB
ALBUMIN SERPL BCP-MCNC: 1.6 G/DL (ref 3.5–5)
ALP SERPL-CCNC: 666 U/L (ref 46–116)
ALT SERPL W P-5'-P-CCNC: 77 U/L (ref 12–78)
AMMONIA PLAS-SCNC: 54 UMOL/L (ref 11–35)
ANION GAP SERPL CALCULATED.3IONS-SCNC: 7 MMOL/L (ref 4–13)
AST SERPL W P-5'-P-CCNC: 152 U/L (ref 5–45)
BASOPHILS # BLD AUTO: 0.06 THOUSANDS/ΜL (ref 0–0.1)
BASOPHILS NFR BLD AUTO: 1 % (ref 0–1)
BILIRUB SERPL-MCNC: 3.01 MG/DL (ref 0.2–1)
BUN SERPL-MCNC: 14 MG/DL (ref 5–25)
CALCIUM SERPL-MCNC: 7.9 MG/DL (ref 8.3–10.1)
CHLORIDE SERPL-SCNC: 106 MMOL/L (ref 100–108)
CO2 SERPL-SCNC: 23 MMOL/L (ref 21–32)
CREAT SERPL-MCNC: 0.76 MG/DL (ref 0.6–1.3)
EOSINOPHIL # BLD AUTO: 0.06 THOUSAND/ΜL (ref 0–0.61)
EOSINOPHIL NFR BLD AUTO: 1 % (ref 0–6)
ERYTHROCYTE [DISTWIDTH] IN BLOOD BY AUTOMATED COUNT: 20.6 % (ref 11.6–15.1)
GFR SERPL CREATININE-BSD FRML MDRD: 89 ML/MIN/1.73SQ M
GLUCOSE SERPL-MCNC: 105 MG/DL (ref 65–140)
GLUCOSE SERPL-MCNC: 116 MG/DL (ref 65–140)
GLUCOSE SERPL-MCNC: 144 MG/DL (ref 65–140)
GLUCOSE SERPL-MCNC: 84 MG/DL (ref 65–140)
GLUCOSE SERPL-MCNC: 93 MG/DL (ref 65–140)
HCT VFR BLD AUTO: 56 % (ref 36.5–49.3)
HGB BLD-MCNC: 18.5 G/DL (ref 12–17)
IMM GRANULOCYTES # BLD AUTO: 0.02 THOUSAND/UL (ref 0–0.2)
IMM GRANULOCYTES NFR BLD AUTO: 0 % (ref 0–2)
LYMPHOCYTES # BLD AUTO: 0.69 THOUSANDS/ΜL (ref 0.6–4.47)
LYMPHOCYTES NFR BLD AUTO: 12 % (ref 14–44)
MCH RBC QN AUTO: 31.8 PG (ref 26.8–34.3)
MCHC RBC AUTO-ENTMCNC: 33 G/DL (ref 31.4–37.4)
MCV RBC AUTO: 96 FL (ref 82–98)
MONOCYTES # BLD AUTO: 0.75 THOUSAND/ΜL (ref 0.17–1.22)
MONOCYTES NFR BLD AUTO: 13 % (ref 4–12)
NEUTROPHILS # BLD AUTO: 4.21 THOUSANDS/ΜL (ref 1.85–7.62)
NEUTS SEG NFR BLD AUTO: 73 % (ref 43–75)
NRBC BLD AUTO-RTO: 0 /100 WBCS
PLATELET # BLD AUTO: 118 THOUSANDS/UL (ref 149–390)
PMV BLD AUTO: 12.5 FL (ref 8.9–12.7)
POTASSIUM SERPL-SCNC: 4.9 MMOL/L (ref 3.5–5.3)
PROT SERPL-MCNC: 7.1 G/DL (ref 6.4–8.2)
RBC # BLD AUTO: 5.81 MILLION/UL (ref 3.88–5.62)
SODIUM SERPL-SCNC: 136 MMOL/L (ref 136–145)
WBC # BLD AUTO: 5.79 THOUSAND/UL (ref 4.31–10.16)

## 2019-02-11 PROCEDURE — 94760 N-INVAS EAR/PLS OXIMETRY 1: CPT

## 2019-02-11 PROCEDURE — 80053 COMPREHEN METABOLIC PANEL: CPT | Performed by: INTERNAL MEDICINE

## 2019-02-11 PROCEDURE — 82948 REAGENT STRIP/BLOOD GLUCOSE: CPT

## 2019-02-11 PROCEDURE — 85025 COMPLETE CBC W/AUTO DIFF WBC: CPT | Performed by: INTERNAL MEDICINE

## 2019-02-11 PROCEDURE — 99223 1ST HOSP IP/OBS HIGH 75: CPT | Performed by: INTERNAL MEDICINE

## 2019-02-11 PROCEDURE — 94640 AIRWAY INHALATION TREATMENT: CPT

## 2019-02-11 PROCEDURE — 82140 ASSAY OF AMMONIA: CPT | Performed by: PHYSICIAN ASSISTANT

## 2019-02-11 PROCEDURE — 99232 SBSQ HOSP IP/OBS MODERATE 35: CPT | Performed by: INTERNAL MEDICINE

## 2019-02-11 PROCEDURE — C8929 TTE W OR WO FOL WCON,DOPPLER: HCPCS

## 2019-02-11 RX ORDER — MODAFINIL 100 MG/1
100 TABLET ORAL DAILY
Status: DISCONTINUED | OUTPATIENT
Start: 2019-02-12 | End: 2019-02-19 | Stop reason: HOSPADM

## 2019-02-11 RX ORDER — LACTULOSE 20 G/30ML
20 SOLUTION ORAL 2 TIMES DAILY
Status: DISCONTINUED | OUTPATIENT
Start: 2019-02-11 | End: 2019-02-16

## 2019-02-11 RX ADMIN — SERTRALINE HYDROCHLORIDE 25 MG: 25 TABLET ORAL at 21:47

## 2019-02-11 RX ADMIN — PERFLUTREN 1 ML/MIN: 6.52 INJECTION, SUSPENSION INTRAVENOUS at 16:54

## 2019-02-11 RX ADMIN — LACTULOSE 20 G: 10 SOLUTION ORAL at 19:37

## 2019-02-11 RX ADMIN — IPRATROPIUM BROMIDE 0.5 MG: 0.5 SOLUTION RESPIRATORY (INHALATION) at 11:55

## 2019-02-11 RX ADMIN — LATANOPROST 1 DROP: 50 SOLUTION OPHTHALMIC at 21:47

## 2019-02-11 RX ADMIN — APIXABAN 5 MG: 5 TABLET, FILM COATED ORAL at 08:23

## 2019-02-11 RX ADMIN — LORAZEPAM 0.5 MG: 0.5 TABLET ORAL at 08:23

## 2019-02-11 RX ADMIN — IPRATROPIUM BROMIDE 0.5 MG: 0.5 SOLUTION RESPIRATORY (INHALATION) at 07:43

## 2019-02-11 RX ADMIN — FERROUS SULFATE TAB 325 MG (65 MG ELEMENTAL FE) 325 MG: 325 (65 FE) TAB at 08:23

## 2019-02-11 RX ADMIN — METOCLOPRAMIDE HYDROCHLORIDE 10 MG: 10 TABLET ORAL at 17:18

## 2019-02-11 RX ADMIN — METOCLOPRAMIDE HYDROCHLORIDE 10 MG: 10 TABLET ORAL at 08:23

## 2019-02-11 RX ADMIN — CYANOCOBALAMIN TAB 500 MCG 1000 MCG: 500 TAB at 08:23

## 2019-02-11 RX ADMIN — METOCLOPRAMIDE HYDROCHLORIDE 10 MG: 10 TABLET ORAL at 21:47

## 2019-02-11 RX ADMIN — ROPINIROLE 3 MG: 2 TABLET, FILM COATED ORAL at 21:47

## 2019-02-11 RX ADMIN — ALLOPURINOL 100 MG: 100 TABLET ORAL at 08:23

## 2019-02-11 RX ADMIN — FAMOTIDINE 20 MG: 20 TABLET, FILM COATED ORAL at 21:47

## 2019-02-11 RX ADMIN — APIXABAN 5 MG: 5 TABLET, FILM COATED ORAL at 17:18

## 2019-02-11 RX ADMIN — SODIUM CHLORIDE 100 ML/HR: 0.9 INJECTION, SOLUTION INTRAVENOUS at 04:00

## 2019-02-11 RX ADMIN — IPRATROPIUM BROMIDE 0.5 MG: 0.5 SOLUTION RESPIRATORY (INHALATION) at 19:32

## 2019-02-11 RX ADMIN — IPRATROPIUM BROMIDE 0.5 MG: 0.5 SOLUTION RESPIRATORY (INHALATION) at 15:46

## 2019-02-11 RX ADMIN — DIPHENHYDRAMINE HCL 25 MG: 25 TABLET ORAL at 08:23

## 2019-02-11 RX ADMIN — AMIODARONE HYDROCHLORIDE 200 MG: 200 TABLET ORAL at 08:23

## 2019-02-11 RX ADMIN — TRAZODONE HYDROCHLORIDE 50 MG: 50 TABLET ORAL at 21:46

## 2019-02-11 RX ADMIN — OXYCODONE HYDROCHLORIDE AND ACETAMINOPHEN 1000 MG: 500 TABLET ORAL at 08:23

## 2019-02-11 NOTE — PHYSICAL THERAPY NOTE
Physical Therapy Cancellation Note    The pt  Was attempted, but the pt's wife stated that he has not been doing well today, and that he has not been mobilizing  She notes that she does not want to "push" him to do things right now, and would prefer if therapy would check back tomorrow  Will re-attempt tomorrow      Selestine Shone, PTA

## 2019-02-11 NOTE — SOCIAL WORK
Discussed patient with Dr Verena Higuera who reports palliative care may schedule a  family meeting with the  care team in the next few days

## 2019-02-11 NOTE — ASSESSMENT & PLAN NOTE
· Reports prior to admission   Strength today is equal b/l  · MRI brain revealed the a 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 with no acute ischemia, mass, or hemorrhage

## 2019-02-11 NOTE — PROGRESS NOTES
Progress Note - Erwin Confer 1945, 74 y o  male MRN: [id number]    Unit/Bed#: [address] 522-01 Encounter: [id number]    Primary Care Provider: Kathrin Mena MD   Date and time admitted to hospital: 2/8/2019  8:48 PM        * Fatigue  Assessment & Plan  · Unclear etiology - suspect this is multifactorial in the setting of possible cancer progression and new chemotherapy agent  · Per his wife at bedside he has been like this for a number of days   Given his congestion on CXR will discontinue IVF  · Will discontinue scheduled Benadryl and Ativan to see if this improves his MS  · In light of elevated LFTs, will check ammonia level    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  Assessment & Plan  · Patient is known to Dr Jovanny Kramer of Oncology and Dr Rosa Staff of [address] [place] [address]  · Heme/Onc consult appreciated - per Oncology, this is not curable and he was on the third line VEGF inhibitor with palliative goal  Raoul Mcneill now on hold  · Appreciate Palliative Care consult - I discussed with Dr Haskel Bamberger today  · RUQ revealed diffusely heterogenous liver with masslike fullness left lobe which could not be further differentiated on this exam and would be better evaluated with contrast enhanced cross-sectional imaging, no biliary dilatation detected, small amount of fluid within the right upper quadrant  · CMP in AM    Esophageal cancer   Assessment & Plan  · Patient known to Dr Jovanny Kramer  · S/p esophagectomy and chemo/radiation  · Oncology and Palliative Care following    COPD  Assessment & Plan  · Patient known to Dr Heidy Pope  · No acute exacerbation  · On Atrovent neb    Atrial fibrillation  Assessment & Plan  · Rate controlled on Amiodarone  · On Eliquis for anticoagulation    Vision abnormalities with right-sided weakness  Assessment & Plan  · Noted prior to admission  · MRI of the brain revealed 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 without acute ischemia, mass, or hemorrhage    Diabetes mellitus type 2  Assessment & Plan  · Continue sliding scale coverage      Anxiety and depression  Assessment & Plan  · Benzo on hold in light of mental status    Pulmonary vascular congestion  Assessment & Plan  · This was incidentally noted on CXR  He denies SOB today but is on NC O2  IVF have been discontinued  BNP WNL  · Echo pending    Abnormal LFTs  Assessment & Plan  · Possibly secondary to progression of hepatocellular carcinoma versus side-effect of Lenvatinib itself (hepatotoxicity)  · RUQ US as per above  · Check ammonia level      Right sided weakness  Assessment & Plan  · Reports prior to admission  Strength today is equal b/l  · MRI brain revealed the a 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 with no acute ischemia, mass, or hemorrhage    Physical deconditioning  Assessment & Plan  - likely multifactorial secondary to progression of metastatic cancer coupled with continued chemotherapy (recently started on new agent - Levnatinib which is currently held) and decreased appetite/oral intake   - free T4 within normal limits   - PT/OT and supportive care as tolerated     VTE Pharmacologic Prophylaxis:   Pharmacologic: Apixaban (Eliquis)  Mechanical VTE Prophylaxis in Place: Yes    Patient Centered Rounds: I have performed bedside rounds with nursing staff today  Discussions with Specialists or Other Care Team Provider: [facility] attending Dr Abebe Henriquez, Dr North Divers with Palliative Care,     Education and Discussions with Family / Patient: Patient and his wife and cousin    Time Spent for Care: 30 minutes  More than 50% of total time spent on counseling and coordination of care as described above      Current Length of Stay: 2 day(s)    Current Patient Status: Inpatient   Certification Statement: The patient will continue to require additional inpatient hospital stay due to further work up as per above    Discharge Plan: not medically stable for d/c    Code Status: Level 1 - Full Code      Subjective:   Mr Clau Crowe reports that he continues to feel fatigued today  Per his wife he has been like this for days  Objective:     Vitals:   Temp (24hrs), Av 8 °F (36 6 °C), Min:97 8 °F (36 6 °C), Max:97 8 °F (36 6 °C)    Temp:  [97 8 °F (36 6 °C)] 97 8 °F (36 6 °C)  HR:  [74-83] 83  Resp:  [18] 18  BP: (124-131)/(66-67) 124/67  SpO2:  [92 %-98 %] 96 %  Body mass index is 28 67 kg/m²  Input and Output Summary (last 24 hours): Intake/Output Summary (Last 24 hours) at 2019 1610  Last data filed at 2019 1444  Gross per 24 hour   Intake 1815 ml   Output 1448 ml   Net 367 ml       Physical Exam:     Physical Exam   Constitutional: No distress  Patient seen lying on his back in bed resting  He arouses to voice but after finishes speaking he drifts off to sleep   Cardiovascular: Normal rate and regular rhythm  Pulmonary/Chest: Effort normal  No respiratory distress  On NC O2   Abdominal: Soft  Bowel sounds are normal  There is no tenderness  Musculoskeletal: He exhibits no edema  Neurological:   He is somnolent/lethargic but arouseable to voice  He lift all 4 extremities off the bed against gravity  He is oriented to person, place, and time   Skin: Skin is warm  He is not diaphoretic     Ab appearance to cheeks   Psychiatric:   Non-anxious or agitated appearing       Additional Data:     Labs:    Results from last 7 days   Lab Units 19  0443   WBC Thousand/uL 5 79   HEMOGLOBIN g/dL 18 5*   HEMATOCRIT % 56 0*   PLATELETS Thousands/uL 118*   NEUTROS PCT % 73   LYMPHS PCT % 12*   MONOS PCT % 13*   EOS PCT % 1     Results from last 7 days   Lab Units 19  0443   SODIUM mmol/L 136   POTASSIUM mmol/L 4 9   CHLORIDE mmol/L 106   CO2 mmol/L 23   BUN mg/dL 14   CREATININE mg/dL 0 76   ANION GAP mmol/L 7   CALCIUM mg/dL 7 9*   ALBUMIN g/dL 1 6*   TOTAL BILIRUBIN mg/dL 3 01*   ALK PHOS U/L 666*   ALT U/L 77   AST U/L 152*   GLUCOSE RANDOM mg/dL 84         Results from last 7 days   Lab Units 02/11/19  1040 02/11/19  0612 02/10/19  2041 02/10/19  1711 02/10/19  1120 02/10/19  0646 02/09/19  2119 02/09/19  1610 02/09/19  1148 02/09/19  0624 02/08/19  2133   POC GLUCOSE mg/dl 105 116 119 75 97 86 109 69 107 102 75                   * I Have Reviewed All Lab Data Listed Above  * Additional Pertinent Lab Tests Reviewed: All Labs Within Last 24 Hours Reviewed    Imaging:    Imaging Reports Reviewed Today Include: MRI brain, CXR  Imaging Personally Reviewed by Myself Includes:  None    Recent Cultures (last 7 days):           Last 24 Hours Medication List:     Current Facility-Administered Medications:  albuterol 2 5 mg Nebulization Q4H PRN Evy Aguilera MD   allopurinol 100 mg Oral Daily Evy Aguilera MD   amiodarone 200 mg Oral Daily Evy Aguilera MD   apixaban 5 mg Oral BID Evy Aguilera MD   vitamin C 1,000 mg Oral Daily Evy Aguilera MD   bisacodyl 5 mg Oral Daily PRN Evy Aguilera MD   calcium carbonate 750 mg Oral Daily PRN Evy Aguilera MD   cyanocobalamin 1,000 mcg Oral Daily Evy Aguilera MD   diazepam 5 mg Oral Q6H PRN Evy Aguilera MD   famotidine 20 mg Oral HS Evy Aguilera MD   ferrous sulfate 325 mg Oral Daily Evy Aguilera MD   hydrOXYzine HCL 25 mg Oral BID PRN Evy Aguilera MD   insulin lispro 1-5 Units Subcutaneous HS Evy Aguilera MD   insulin lispro 1-6 Units Subcutaneous TID AC Raymond Gonzales MD   ipratropium 0 5 mg Nebulization 4x Daily Evy Aguilera MD   latanoprost 1 drop Both Eyes HS Evy Aguilera MD   metoclopramide 10 mg Oral 4x Daily Evy Aguilera MD   rOPINIRole 3 mg Oral HS Evy Aguilera MD   sertraline 25 mg Oral HS Evy Aguilera MD   traZODone 50 mg Oral HS Evy Aguilera MD        Today, Patient Was Seen By: Kerney Opitz, PA-C    ** Please Note: Dictation voice to text software may have been used in the creation of this document   **

## 2019-02-11 NOTE — CONSULTS
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    Atrial fibrillation    Diabetes mellitus type 2    Vision abnormalities with right-sided weakness    Restless legs syndrome    Rheumatoid arthritis ([facility] [address] )    Sleep apnea    Hyperlipidemia    Hypertension    Peripheral neuropathy    Glaucoma    Gout    Incisional hernia    COPD    Anxiety and depression    History of stroke    Mild cognitive impairment    Nutritional deficiency    Coronary artery disease    Active advance directive    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)    Pulmonary metastases (HCC)    Physical deconditioning    Transaminitis    Right sided weakness    Pulmonary vascular congestion    Abnormal LFTs    Fatigue         Plan:  1  Discussed with SLKESHAV- Amanda Lawrence PA-C will work to de-prescribe medications that may be contributing to somnolence in an effort to improve mentation    - D/C Benadryl and ativan   2  Continue to evaluate for reversible causes of AMS  3  Will trial stimulant in AM to see if this helps further- will start Provigil 100 mg PO in the AM   4  Goals - evolving  Long conversation held with patient's spouseNgoziquin Espinozas  She remains hopeful that reversible cause can be identified but also, is realistic of severity of condition  She states that they were told in November he had a < 6 month prognosis  She has a good support system within her family and is in close contact with his 3 sons  Plan to have her discuss with his sons about a good time for a family meeting and then will arrange a meeting with family, palliative, SLIM and oncology  Time spent providing support        Code Status: Full  - Level 1   Power of :  presumed to be José Miguel Feldman by PA Act 169   Advance Directive / Living Will: no   POLST:  no We appreciate the invitation to be involved in this patient's care  We will follow closely  Please do not hesitate to reach our on call provider through our clinic answering service at  should you have acute symptom control concerns  IDENTIFICATION:  Inpatient consult to Palliative Care  Consult performed by: Dann Carpenter DO  Consult ordered by: Nate Pérez PA-C        Physician Requesting Consult: Will Santillan MD  Reason for Consult / Principal Problem: goals of care  Hx and PE limited by: patient unable to provide much history  HISTORY OF PRESENT ILLNESS:       Mandy Naranjo is a 74 y o  male who was diagnosed with esophageal cancer in 4/2016  He underwent chemotherapy (taxol + carboplatin) and then radiation therapy subsequently on 6-8/2016)  He then underwent esophagectomy in 10/2016  During this time, he was unfortunately found to have hepatocellular carcinoma and underwent liver resection/ablation  A year later, he was found to have local recurrence of his liver cancer  He underwent SIRSphere treatment (radiation) on 12/2017 and subsequent chemo (Nexavar) on 8-10/2018  Repeat imaging on 10/2018 showed progression of the disease in the liver and adrenal gland, left  He then underwent Opdivo chemotherapy from 10-12/2018  On 12/2018, he fell at home and was brought to the ED  Imaging there showed further progression of the liver disease  Geeta Fear was discontinued at this time and he was placed on Lenvima that was started on 2/1/2019  He presented on 2/8/2019 for acute onset fatigue and weakness which is likely related to his new VEGF inhibitor (Shaina Ates) therapy, but at this point most likely multifactorial in nature  For this reason, this medication is stopped  Given his incurable cancer and his inability to tolerate therapies, PCS is consulted to help clarify goals of care  He is currently arouses and communicates effectively but drifts back to sleep quickly   His spouse is at bedside  He denies pain when asked  Discussion held with spouse and see above for details  Review of Systems   Unable to perform ROS: other       Past Medical History:   Diagnosis Date    Anemia     Coronary artery disease     Dysphagia     Esophageal cancer ([facility] [address]     Gout     Hyperlipidemia     Hypertension     Restless leg syndrome     Rheumatoid arthritis ([facility] [address] [facility]     Sleep apnea     Vision abnormalities      Past Surgical History:   Procedure Laterality Date    APPENDECTOMY      CHOLECYSTECTOMY      GASTROJEJUNOSTOMY W/ JEJUNOSTOMY TUBE N/A 8/4/2016    Procedure: INSERTION JEJUNOSTOMY TUBE OPEN;  Surgeon: Ines Peterson MD;  [facility];  Service:     KNEE ARTHROSCOPY      LIVER RESECTION N/A 10/31/2016    Procedure: INTRAOPERATIVE ULTRASOUND OF LIVER; LIVER LESION RESECTION/ABLATION ;  Surgeon: Ines Peterson MD;  [facility];  Service:    Lynnette Rings PLACEMENT      NE EGD INSERT GUIDE WIRE DILATOR PASSAGE ESOPHAGUS N/A 2/14/2018    Procedure: ESOPHAGOGASTRODUODENOSCOPY (EGD) with dilation;  Surgeon: Maki Mohamud MD;  [facility];  Service: Thoracic    NE ESOPHAGOGASTRODUODENOSCOPY TRANSORAL DIAGNOSTIC N/A 10/31/2016    Procedure: ESOPHAGOGASTRODUODENOSCOPY (EGD);   Surgeon: Ines Peterson MD;  [facility];  Service: Surgical Oncology    NE ESOPHAGOSCOPY FLEX BALLOON DILAT <30 MM DIAM N/A 1/6/2017    Procedure: DILATATION ESOPHAGEAL;  Surgeon: Maki Mohamud MD;  [facility];  Service: Thoracic    NE ESOPHAGOSCOPY FLEX BALLOON DILAT <30 MM DIAM N/A 12/12/2016    Procedure: DILATATION ESOPHAGEAL, EGD;  Surgeon: Maki Mohamud MD;  [facility];  Service: Thoracic    NE ESOPHAGOSCOPY FLEX BALLOON DILAT <30 MM DIAM N/A 5/24/2017    Procedure: EGD WITH DILATION ;  Surgeon: Pranav Arredondo MD;  [facility];  Service: Thoracic    NE ESOPHAGOSCOPY FLEX BALLOON DILAT <30 MM DIAM N/A 8/3/2017    Procedure: EGD WITH DILATION; Surgeon: Glenda Cortez MD;  [facility];  Service: Thoracic    GA REMOVAL [address] THORACOTOMY N/A 10/31/2016    Procedure: ESOPHAGECTOMY;  Surgeon: Angella Mckeon MD;  [facility];  Service: Surgical Oncology    TONSILLECTOMY      WOUND DEBRIDEMENT Left 5/3/2018    Procedure: DEBRIDEMENT UPPER EXTREMITY ([address] OUT) left shoulder;  Surgeon: Suzan Meek MD;  [facility];  Service: Orthopedics     Social History     Socioeconomic History    Marital status: /Civil Union     Spouse name: Not on file    Number of children: Not on file    Years of education: Not on file    Highest education level: Not on file   Occupational History    Occupation:    Social Needs    Financial resource strain: Not on file    Food insecurity:     Worry: Not on file     Inability: Not on file   [employer] needs:     Medical: Not on file     Non-medical: Not on file   Tobacco Use    Smoking status: Former Smoker     Packs/day: 3 00     Years: 40 00     Pack years: 120 00     Types: Cigarettes     Start date:      Last attempt to quit: 1991     Years since quittin 8    Smokeless tobacco: Never Used   Substance and Sexual Activity    Alcohol use: Yes     Comment: 1 drink/week    Drug use: No    Sexual activity: Not Currently   Lifestyle    Physical activity:     Days per week: Not on file     Minutes per session: Not on file    Stress: Not on file   Relationships    Social connections:     Talks on phone: Not on file     Gets together: Not on file     Attends Jain service: Not on file     Active member of club or organization: Not on file     Attends meetings of clubs or organizations: Not on file     Relationship status: Not on file    Intimate partner violence:     Fear of current or ex partner: Not on file     Emotionally abused: Not on file     Physically abused: Not on file     Forced sexual activity: Not on file   Other Topics Concern  Not on file   Social History Narrative    Not on file     Family History   Problem Relation Age of Onset    Stroke Mother     Colon cancer Father     Other Father         Pneumoconiosis    Cancer Sister         oral cancer       MEDICATIONS / ALLERGIES:    all current active meds have been reviewed and current meds:   Current Facility-Administered Medications   Medication Dose Route Frequency    albuterol inhalation solution 2 5 mg  2 5 mg Nebulization Q4H PRN    allopurinol (ZYLOPRIM) tablet 100 mg  100 mg Oral Daily    amiodarone tablet 200 mg  200 mg Oral Daily    apixaban (ELIQUIS) tablet 5 mg  5 mg Oral BID    ascorbic acid (VITAMIN C) tablet 1,000 mg  1,000 mg Oral Daily    bisacodyl (DULCOLAX) EC tablet 5 mg  5 mg Oral Daily PRN    calcium carbonate (TUMS) chewable tablet 750 mg  750 mg Oral Daily PRN    cyanocobalamin (VITAMIN B-12) tablet 1,000 mcg  1,000 mcg Oral Daily    diazepam (VALIUM) tablet 5 mg  5 mg Oral Q6H PRN    famotidine (PEPCID) tablet 20 mg  20 mg Oral HS    ferrous sulfate tablet 325 mg  325 mg Oral Daily    hydrOXYzine HCL (ATARAX) tablet 25 mg  25 mg Oral BID PRN    insulin lispro (HumaLOG) 100 units/mL subcutaneous injection 1-5 Units  1-5 Units Subcutaneous HS    insulin lispro (HumaLOG) 100 units/mL subcutaneous injection 1-6 Units  1-6 Units Subcutaneous TID AC    ipratropium (ATROVENT) 0 02 % inhalation solution 0 5 mg  0 5 mg Nebulization 4x Daily    latanoprost (XALATAN) 0 005 % ophthalmic solution 1 drop  1 drop Both Eyes HS    metoclopramide (REGLAN) tablet 10 mg  10 mg Oral 4x Daily    rOPINIRole (REQUIP) tablet 3 mg  3 mg Oral HS    sertraline (ZOLOFT) tablet 25 mg  25 mg Oral HS    traZODone (DESYREL) tablet 50 mg  50 mg Oral HS       Allergies   Allergen Reactions    Humira [Adalimumab] Rash     itchy    Levaquin [Levofloxacin In D5w] Rash    Levofloxacin Rash    Lovenox [Enoxaparin] Rash and Hives       OBJECTIVE:    Physical Exam  Physical Exam   Constitutional: No distress  Chronically ill in appearance    HENT:   Head: Normocephalic and atraumatic  Right Ear: External ear normal    Left Ear: External ear normal    Nose: Nose normal    Eyes: Right eye exhibits no discharge  Left eye exhibits no discharge  Scleral icterus is present  Cardiovascular: Normal rate, regular rhythm and intact distal pulses  Pulmonary/Chest: Effort normal and breath sounds normal  No respiratory distress  Abdominal: Soft  Bowel sounds are normal  He exhibits no distension  Musculoskeletal: He exhibits no edema  Neurological:   Alert to name and answers questions somewhat appropriately but drifts quickly back to sleep    Skin: Skin is warm and dry  There is pallor  Nursing note and vitals reviewed  Lab Results: I have personally reviewed pertinent labs  Comprehensive metabolic panel [[id number]] (Abnormal) Collected: 02/11/19 0443   Lab Status: Final result Specimen: Blood from Arm, Left Updated: 02/11/19 3859    Sodium 136 mmol/L     Potassium 4 9 mmol/L     Chloride 106 mmol/L     CO2 23 mmol/L     ANION GAP 7 mmol/L     BUN 14 mg/dL     Creatinine 0 76 mg/dL     Glucose 84 mg/dL     Calcium 7 9Low  mg/dL     AST 152High  U/L     ALT 77 U/L     Alkaline Phosphatase 666High  U/L     Total Protein 7 1 g/dL     Albumin 1 6Low  g/dL     Total Bilirubin 3  01High  mg/dL     eGFR 89 ml/min/1 73sq m    Narrative:     National Kidney Disease Education Program recommendations are as follows:  GFR calculation is accurate only with a steady state creatinine  Chronic Kidney disease less than 60 ml/min/1 73 sq  meters  Kidney failure less than 15 ml/min/1 73 sq  meters  CBC and differential [[id number]] (Abnormal) Collected: 02/11/19 0443   Lab Status: Final result Specimen: Blood from Arm, Left Updated: 02/11/19 0541    WBC 5 79 Thousand/uL     RBC 5  81High  Million/uL     Hemoglobin 18  5High  g/dL     Hematocrit 56  0High  %     MCV 96 fL     MCH 31 8 pg     MCHC 33 0 g/dL     RDW 20 6High  %     MPV 12 5 fL     Platelets 040OXW  Thousands/uL     nRBC 0 /100 WBCs     Neutrophils Relative 73 %     Immat GRANS % 0 %     Lymphocytes Relative 12Low  %     Monocytes Relative 13High  %     Eosinophils Relative 1 %     Basophils Relative 1 %     Neutrophils Absolute 4 21 Thousands/µL     Immature Grans Absolute 0 02 Thousand/uL     Lymphocytes Absolute 0 69 Thousands/µL     Monocytes Absolute 0 75 Thousand/µL     Eosinophils Absolute 0 06 Thousand/µL     Basophils Absolute 0 06 Thousands/µL    AFP tumor marker [[id number]] (Abnormal) Collected: 02/10/19 0448   Lab Status: Final result Specimen: Blood from Arm, Left Updated: 02/10/19 0626    AFP TUMOR MARKER 49 4High  ng/mL      Imaging Studies: I have personally reviewed pertinent reports  Procedure Component Value Units Date/Time   MRI brain w wo contrast [[id number]] Collected: 02/11/19 0828   Order Status: Completed Updated: 02/11/19 0836   Narrative:     MRI BRAIN WITH AND WITHOUT CONTRAST    INDICATION:  R sided weakness, blurry vision, known liver CA  COMPARISON:  7/14/2017, MRI   CT dated 2/8/2019  TECHNIQUE:  Sagittal T1, axial T2, axial FLAIR, axial T1, axial Earling and gradient  , axial diffusion  Sagittal, axial T1 postcontrast   Axial bravo postcontrast with coronal reconstructions       IV Contrast:  9 mL of gadobutrol injection (MULTI-DOSE)      IMAGE QUALITY:   Examination degraded by patient motion  FINDINGS:    BRAIN PARENCHYMA:  Small old cortical infarct identified within the medial aspect of the right occipital lobe with mild cortical laminar necrosis       There are no white matter changes in the cerebral hemispheres        No mass, mass effect or midline shift   Diffusion imaging is unremarkable with no signs of acute ischemia        Postcontrast imaging of the brain demonstrates no abnormal enhancement      VENTRICLES:  Normal     SELLA AND PITUITARY GLAND:  Normal     ORBITS:  Normal     PARANASAL SINUSES:  Normal     VASCULATURE:  Evaluation of the major intracranial vasculature demonstrates appropriate flow voids  CALVARIUM AND SKULL BASE:  Prominent hyperostosis frontalis internus  EXTRACRANIAL SOFT TISSUES:  Normal    Impression:       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   No acute ischemia, mass or hemorrhage  Workstation performed: [id number]   US right upper quadrant [[id number]] Collected: 02/10/19 1656   Order Status: Completed Updated: 02/10/19 1709   Narrative:     RIGHT UPPER QUADRANT ULTRASOUND    INDICATION: Abnormal LFTs, history of HCC with elevated AFP tumor marker  COMPARISON:  MRI abdomen 12/5/2018, CT chest, abdomen and pelvis 5/2/2018    TECHNIQUE:   Real-time ultrasound of the right upper quadrant was performed with a curvilinear transducer with both volumetric sweeps and still imaging techniques  FINDINGS: Study limited by patient cooperativeness  PANCREAS: Suboptimally visualized  Visualized portions of the pancreas are within normal limits  AORTA AND IVC:  Visualized portions are normal for patient age  LIVER:  Size:  Within normal range   The liver measures 14 9 cm in the midclavicular line  Contour:  Surface contour is lobulated  Parenchyma:  The liver parenchyma is diffusely heterogeneous  Patient has known infiltrative mass within the left lobe on previous imaging  Unfortunately, cannot accurately compare mass with previous MRI given differences in modality  Limited imaging of the main portal vein shows it to be patent and hepatopetal      BILIARY:  The gallbladder is surgically absent  No intrahepatic biliary dilatation  CBD measures 4 mm  No choledocholithiasis  KIDNEY:   Right kidney measures 11 1 x 5 4 cm  Within normal limits  ASCITES:   There is a small amount of fluid noted in the right upper quadrant  Impression:       Diffusely heterogeneous liver with masslike fullness left lobe   Unfortunately this cannot be further differentiated on this exam  More precise comparison of known hepatic lesions would be better evaluated with contrast enhanced cross-sectional imaging  No biliary dilatation detected  Small amount of fluid within the right upper quadrant  Workstation performed: [id number]   XR chest 2 views [[id number]] Collected: 02/09/19 0200   Order Status: Completed Updated: 02/09/19 0203   Narrative:     CHEST     INDICATION:   fatigue  COMPARISON:  9/20/2018    EXAM PERFORMED/VIEWS:  FI CHEST PA & LATERAL      FINDINGS:  Right IJ approach central venous catheter with peripheral injection port seen in the right anterior chest wall  Heart shadow is enlarged but unchanged from prior exam     Pulmonary vascular congestion is present  The lungs are clear   No pneumothorax or pleural effusion  Osseous structures appear within normal limits for patient age  Impression:       Cardiomegaly and pulmonary vascular congestion raises concern for CHF  The study was marked in [facility] for immediate notification  Mary Jo Polk performed: [id number]   CT head without contrast [[id number]] Collected: 02/08/19 2144   Order Status: Completed Updated: 02/08/19 2200   Narrative:     CT BRAIN - WITHOUT CONTRAST    INDICATION:   Right-sided weakness  Khari So of esophageal cancer  COMPARISON:  CT brain dated May 2, 2018  TECHNIQUE:  CT examination of the brain was performed   In addition to axial images, coronal 2D reformatted images were created and submitted for interpretation       Radiation dose length product (DLP) for this visit:  171 56 mGy-cm    This examination, like all CT scans performed in the [facility], was performed utilizing techniques to minimize radiation dose exposure, including the use of iterative   reconstruction and automated exposure control       IMAGE QUALITY:  Diagnostic  FINDINGS:    PARENCHYMA:  No intracranial mass, mass effect or midline shift   No CT signs of acute infarction   No acute parenchymal hemorrhage  Haritha Murphy is a tiny focus of encephalomalacia at the medial right occipital lobe, likely the skull of an old infarct  There   is mild periventricular white matter low attenuation which is nonspecific and most likely related to chronic small vessel ischemic changes  VENTRICLES AND EXTRA-AXIAL SPACES:  Normal for the patient's age  VISUALIZED ORBITS AND PARANASAL SINUSES:  Unremarkable  CALVARIUM AND EXTRACRANIAL SOFT TISSUES:  Normal    Impression:       No acute intracranial abnormality  Stable small old medial right occipital lobe infarct  Workstation performed: [id number]       EKG, Pathology, and Other Studies: I have personally reviewed pertinent reports  Counseling / Coordination of Care  Total floor / unit time spent today 75+ minutes  Greater than 50% of total time was spent with the patient and / or family counseling and / or coordination of care   A description of the counseling / coordination of care: goals of care, family discussion, discussion with [address] [place] [address],   Palliative and Supportive Care  [phone]

## 2019-02-11 NOTE — PLAN OF CARE
Problem: Potential for Falls  Goal: Patient will remain free of falls  Description  INTERVENTIONS:  - Assess patient frequently for physical needs  -  Identify cognitive and physical deficits and behaviors that affect risk of falls  -  [place] fall precautions as indicated by assessment   - Educate patient/family on patient safety including physical limitations  - Instruct patient to call for assistance with activity based on assessment  - Modify environment to reduce risk of injury  - Consider OT/PT consult to assist with strengthening/mobility   Outcome: Progressing     Problem: Prexisting or High Potential for Compromised Skin Integrity  Goal: Skin integrity is maintained or improved  Description  INTERVENTIONS:  - Identify patients at risk for skin breakdown  - Assess and monitor skin integrity  - Assess and monitor nutrition and hydration status  - Monitor labs (i e  albumin)  - Assess for incontinence   - Turn and reposition patient  - Assist with mobility/ambulation  - Relieve pressure over bony prominences  - Avoid friction and shearing  - Provide appropriate hygiene as needed including keeping skin clean and dry  - Evaluate need for skin moisturizer/barrier cream  - Collaborate with interdisciplinary team (i e  Nutrition, Rehabilitation, etc )   - Patient/family teaching   Outcome: Progressing     Problem: Nutrition/Hydration-ADULT  Goal: Nutrient/Hydration intake appropriate for improving, restoring or maintaining nutritional needs  Description  Monitor and assess patient's nutrition/hydration status for malnutrition (ex- brittle hair, bruises, dry skin, pale skin and conjunctiva, muscle wasting, smooth red tongue, and disorientation)  Collaborate with interdisciplinary team and initiate plan and interventions as ordered  Monitor patient's weight and dietary intake as ordered or per policy  Utilize nutrition screening tool and intervene per policy   Determine patient's food preferences and provide high-protein, high-caloric foods as appropriate       INTERVENTIONS:  - Monitor oral intake, urinary output, labs, and treatment plans  - Assess nutrition and hydration status and recommend course of action  - Evaluate amount of meals eaten  - Assist patient with eating if necessary   - Allow adequate time for meals  - Recommend/ encourage appropriate diets, oral nutritional supplements, and vitamin/mineral supplements  - Order, calculate, and assess calorie counts as needed  - Recommend, monitor, and adjust tube feedings and TPN/PPN based on assessed needs  - Assess need for intravenous fluids  - Provide specific nutrition/hydration education as appropriate  - Include patient/family/caregiver in decisions related to nutrition   Outcome: Progressing     Problem: RESPIRATORY - ADULT  Goal: Achieves optimal ventilation and oxygenation  Description  INTERVENTIONS:  - Assess for changes in respiratory status  - Assess for changes in mentation and behavior  - Position to facilitate oxygenation and minimize respiratory effort  - Oxygen administration by appropriate delivery method based on oxygen saturation (per order) or ABGs  - Initiate smoking cessation education as indicated  - Encourage broncho-pulmonary hygiene including cough, deep breathe, Incentive Spirometry  - Assess the need for suctioning and aspirate as needed  - Assess and instruct to report SOB or any respiratory difficulty  - Respiratory Therapy support as indicated   Outcome: Progressing     Problem: METABOLIC, FLUID AND ELECTROLYTES - ADULT  Goal: Electrolytes maintained within normal limits  Description  INTERVENTIONS:  - Monitor labs and assess patient for signs and symptoms of electrolyte imbalances  - Administer electrolyte replacement as ordered  - Monitor response to electrolyte replacements, including repeat lab results as appropriate  - Instruct patient on fluid and nutrition as appropriate   Outcome: Progressing  Goal: Fluid balance maintained  Description  INTERVENTIONS:  - Monitor labs and assess for signs and symptoms of volume excess or deficit  - Monitor I/O and WT  - Instruct patient on fluid and nutrition as appropriate   Outcome: Progressing  Goal: Glucose maintained within target range  Description  INTERVENTIONS:  - Monitor Blood Glucose as ordered  - Assess for signs and symptoms of hyperglycemia and hypoglycemia  - Administer ordered medications to maintain glucose within target range  - Assess nutritional intake and initiate nutrition service referral as needed   Outcome: Progressing     Problem: SKIN/TISSUE INTEGRITY - ADULT  Goal: Skin integrity remains intact  Description  INTERVENTIONS  - Identify patients at risk for skin breakdown  - Assess and monitor skin integrity  - Assess and monitor nutrition and hydration status  - Monitor labs (i e  albumin)  - Assess for incontinence   - Turn and reposition patient  - Assist with mobility/ambulation  - Relieve pressure over bony prominences  - Avoid friction and shearing  - Provide appropriate hygiene as needed including keeping skin clean and dry  - Evaluate need for skin moisturizer/barrier cream  - Collaborate with interdisciplinary team (i e  Nutrition, Rehabilitation, etc )   - Patient/family teaching   Outcome: Progressing  Goal: Incision(s), wounds(s) or drain site(s) healing without S/S of infection  Description  INTERVENTIONS  - Assess and document risk factors for skin impairment   - Assess and document dressing, incision, wound bed, drain sites and surrounding tissue  - Initiate Nutrition services consult and/or wound management as needed   Outcome: Progressing  Goal: Oral mucous membranes remain intact  Description  INTERVENTIONS  - Assess oral mucosa and hygiene practices  - Implement preventative oral hygiene regimen  - Implement oral medicated treatments as ordered  - Initiate Nutrition services referral as needed   Outcome: Progressing     Problem: HEMATOLOGIC - ADULT  Goal: Maintains hematologic stability  Description  INTERVENTIONS  - Assess for signs and symptoms of bleeding or hemorrhage  - Monitor labs  - Administer supportive blood products/factors as ordered and appropriate   Outcome: Progressing     Problem: MUSCULOSKELETAL - ADULT  Goal: Maintain or return mobility to safest level of function  Description  INTERVENTIONS:  - Assess patient's ability to carry out ADLs; assess patient's baseline for ADL function and identify physical deficits which impact ability to perform ADLs (bathing, care of mouth/teeth, toileting, grooming, dressing, etc )  - Assess/evaluate cause of self-care deficits   - Assess range of motion  - Assess patient's mobility; develop plan if impaired  - Assess patient's need for assistive devices and provide as appropriate  - Encourage maximum independence but intervene and supervise when necessary  - Involve family in performance of ADLs  - Assess for home care needs following discharge   - Request OT consult to assist with ADL evaluation and planning for discharge  - Provide patient education as appropriate   Outcome: Progressing  Goal: Maintain proper alignment of affected body part  Description  INTERVENTIONS:  - Support, maintain and protect limb and body alignment  - Provide pt/fam with appropriate education   Outcome: Progressing     Problem: DISCHARGE PLANNING - CARE MANAGEMENT  Goal: Discharge to post-acute care or home with appropriate resources  Description  INTERVENTIONS:  - Conduct assessment to determine patient/family and health care team treatment goals, and need for post-acute services based on payer coverage, community resources, and patient preferences, and barriers to discharge  - Address psychosocial, clinical, and financial barriers to discharge as identified in assessment in conjunction with the patient/family and health care team  - Arrange appropriate level of post-acute services according to patient?s   needs and preference and payer coverage in collaboration with the physician and health care team  - Communicate with and update the patient/family, physician, and health care team regarding progress on the discharge plan  - Arrange appropriate transportation to post-acute venues  Outcome: Progressing

## 2019-02-11 NOTE — ASSESSMENT & PLAN NOTE
· Unclear etiology - suspect this is multifactorial in the setting of possible cancer progression and new chemotherapy agent  · Per his wife at bedside he has been like this for a number of days   Given his congestion on CXR will discontinue IVF  · Will discontinue scheduled Benadryl and Ativan to see if this improves his MS  · In light of elevated LFTs, will check ammonia level

## 2019-02-12 PROBLEM — Z71.89 GOALS OF CARE, COUNSELING/DISCUSSION: Status: ACTIVE | Noted: 2019-02-12

## 2019-02-12 LAB
ALBUMIN SERPL BCP-MCNC: 1.9 G/DL (ref 3.5–5)
ALP SERPL-CCNC: 682 U/L (ref 46–116)
ALT SERPL W P-5'-P-CCNC: 72 U/L (ref 12–78)
AMMONIA PLAS-SCNC: 22 UMOL/L (ref 11–35)
ANION GAP SERPL CALCULATED.3IONS-SCNC: 5 MMOL/L (ref 4–13)
AST SERPL W P-5'-P-CCNC: 140 U/L (ref 5–45)
BASOPHILS # BLD AUTO: 0.03 THOUSANDS/ΜL (ref 0–0.1)
BASOPHILS NFR BLD AUTO: 1 % (ref 0–1)
BILIRUB SERPL-MCNC: 2.92 MG/DL (ref 0.2–1)
BUN SERPL-MCNC: 12 MG/DL (ref 5–25)
CALCIUM SERPL-MCNC: 8 MG/DL (ref 8.3–10.1)
CHLORIDE SERPL-SCNC: 104 MMOL/L (ref 100–108)
CO2 SERPL-SCNC: 30 MMOL/L (ref 21–32)
CREAT SERPL-MCNC: 0.73 MG/DL (ref 0.6–1.3)
EOSINOPHIL # BLD AUTO: 0.05 THOUSAND/ΜL (ref 0–0.61)
EOSINOPHIL NFR BLD AUTO: 1 % (ref 0–6)
ERYTHROCYTE [DISTWIDTH] IN BLOOD BY AUTOMATED COUNT: 20.4 % (ref 11.6–15.1)
GFR SERPL CREATININE-BSD FRML MDRD: 91 ML/MIN/1.73SQ M
GLUCOSE SERPL-MCNC: 117 MG/DL (ref 65–140)
GLUCOSE SERPL-MCNC: 125 MG/DL (ref 65–140)
GLUCOSE SERPL-MCNC: 128 MG/DL (ref 65–140)
GLUCOSE SERPL-MCNC: 132 MG/DL (ref 65–140)
GLUCOSE SERPL-MCNC: 80 MG/DL (ref 65–140)
HCT VFR BLD AUTO: 54.4 % (ref 36.5–49.3)
HGB BLD-MCNC: 17.8 G/DL (ref 12–17)
IMM GRANULOCYTES # BLD AUTO: 0.01 THOUSAND/UL (ref 0–0.2)
IMM GRANULOCYTES NFR BLD AUTO: 0 % (ref 0–2)
LYMPHOCYTES # BLD AUTO: 0.58 THOUSANDS/ΜL (ref 0.6–4.47)
LYMPHOCYTES NFR BLD AUTO: 9 % (ref 14–44)
MCH RBC QN AUTO: 31 PG (ref 26.8–34.3)
MCHC RBC AUTO-ENTMCNC: 32.7 G/DL (ref 31.4–37.4)
MCV RBC AUTO: 95 FL (ref 82–98)
MONOCYTES # BLD AUTO: 0.57 THOUSAND/ΜL (ref 0.17–1.22)
MONOCYTES NFR BLD AUTO: 9 % (ref 4–12)
NEUTROPHILS # BLD AUTO: 5.01 THOUSANDS/ΜL (ref 1.85–7.62)
NEUTS SEG NFR BLD AUTO: 80 % (ref 43–75)
NRBC BLD AUTO-RTO: 0 /100 WBCS
PLATELET # BLD AUTO: 105 THOUSANDS/UL (ref 149–390)
PMV BLD AUTO: 11.2 FL (ref 8.9–12.7)
POTASSIUM SERPL-SCNC: 4.9 MMOL/L (ref 3.5–5.3)
PROT SERPL-MCNC: 7 G/DL (ref 6.4–8.2)
RBC # BLD AUTO: 5.74 MILLION/UL (ref 3.88–5.62)
SODIUM SERPL-SCNC: 139 MMOL/L (ref 136–145)
WBC # BLD AUTO: 6.25 THOUSAND/UL (ref 4.31–10.16)

## 2019-02-12 PROCEDURE — 85025 COMPLETE CBC W/AUTO DIFF WBC: CPT | Performed by: INTERNAL MEDICINE

## 2019-02-12 PROCEDURE — 93306 TTE W/DOPPLER COMPLETE: CPT | Performed by: INTERNAL MEDICINE

## 2019-02-12 PROCEDURE — 99233 SBSQ HOSP IP/OBS HIGH 50: CPT | Performed by: INTERNAL MEDICINE

## 2019-02-12 PROCEDURE — 82948 REAGENT STRIP/BLOOD GLUCOSE: CPT

## 2019-02-12 PROCEDURE — 94640 AIRWAY INHALATION TREATMENT: CPT

## 2019-02-12 PROCEDURE — 99232 SBSQ HOSP IP/OBS MODERATE 35: CPT | Performed by: INTERNAL MEDICINE

## 2019-02-12 PROCEDURE — 80053 COMPREHEN METABOLIC PANEL: CPT | Performed by: INTERNAL MEDICINE

## 2019-02-12 PROCEDURE — 82140 ASSAY OF AMMONIA: CPT | Performed by: INTERNAL MEDICINE

## 2019-02-12 PROCEDURE — 94760 N-INVAS EAR/PLS OXIMETRY 1: CPT

## 2019-02-12 RX ORDER — NYSTATIN 100000 [USP'U]/G
POWDER TOPICAL 2 TIMES DAILY
Status: DISCONTINUED | OUTPATIENT
Start: 2019-02-12 | End: 2019-02-19 | Stop reason: HOSPADM

## 2019-02-12 RX ADMIN — APIXABAN 5 MG: 5 TABLET, FILM COATED ORAL at 17:16

## 2019-02-12 RX ADMIN — APIXABAN 5 MG: 5 TABLET, FILM COATED ORAL at 09:46

## 2019-02-12 RX ADMIN — ROPINIROLE 3 MG: 2 TABLET, FILM COATED ORAL at 21:20

## 2019-02-12 RX ADMIN — METOCLOPRAMIDE HYDROCHLORIDE 10 MG: 10 TABLET ORAL at 09:46

## 2019-02-12 RX ADMIN — IPRATROPIUM BROMIDE 0.5 MG: 0.5 SOLUTION RESPIRATORY (INHALATION) at 08:14

## 2019-02-12 RX ADMIN — LACTULOSE 20 G: 10 SOLUTION ORAL at 17:16

## 2019-02-12 RX ADMIN — LACTULOSE 20 G: 10 SOLUTION ORAL at 09:57

## 2019-02-12 RX ADMIN — SERTRALINE HYDROCHLORIDE 25 MG: 25 TABLET ORAL at 21:20

## 2019-02-12 RX ADMIN — FAMOTIDINE 20 MG: 20 TABLET, FILM COATED ORAL at 21:20

## 2019-02-12 RX ADMIN — TRAZODONE HYDROCHLORIDE 50 MG: 50 TABLET ORAL at 21:20

## 2019-02-12 RX ADMIN — IPRATROPIUM BROMIDE 0.5 MG: 0.5 SOLUTION RESPIRATORY (INHALATION) at 12:18

## 2019-02-12 RX ADMIN — CYANOCOBALAMIN TAB 500 MCG 1000 MCG: 500 TAB at 09:45

## 2019-02-12 RX ADMIN — LATANOPROST 1 DROP: 50 SOLUTION OPHTHALMIC at 21:20

## 2019-02-12 RX ADMIN — FERROUS SULFATE TAB 325 MG (65 MG ELEMENTAL FE) 325 MG: 325 (65 FE) TAB at 09:46

## 2019-02-12 RX ADMIN — IPRATROPIUM BROMIDE 0.5 MG: 0.5 SOLUTION RESPIRATORY (INHALATION) at 19:24

## 2019-02-12 RX ADMIN — OXYCODONE HYDROCHLORIDE AND ACETAMINOPHEN 1000 MG: 500 TABLET ORAL at 09:46

## 2019-02-12 RX ADMIN — MODAFINIL 100 MG: 100 TABLET ORAL at 09:54

## 2019-02-12 RX ADMIN — METOCLOPRAMIDE HYDROCHLORIDE 10 MG: 10 TABLET ORAL at 17:16

## 2019-02-12 RX ADMIN — METOCLOPRAMIDE HYDROCHLORIDE 10 MG: 10 TABLET ORAL at 21:20

## 2019-02-12 RX ADMIN — NYSTATIN: 100000 POWDER TOPICAL at 17:17

## 2019-02-12 RX ADMIN — ALLOPURINOL 100 MG: 100 TABLET ORAL at 09:46

## 2019-02-12 RX ADMIN — IPRATROPIUM BROMIDE 0.5 MG: 0.5 SOLUTION RESPIRATORY (INHALATION) at 17:37

## 2019-02-12 RX ADMIN — AMIODARONE HYDROCHLORIDE 200 MG: 200 TABLET ORAL at 09:46

## 2019-02-12 RX ADMIN — METOCLOPRAMIDE HYDROCHLORIDE 10 MG: 10 TABLET ORAL at 12:05

## 2019-02-12 NOTE — PROGRESS NOTES
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    Atrial fibrillation    Diabetes mellitus type 2    Vision abnormalities with right-sided weakness    COPD    Anxiety and depression    Right sided weakness    Pulmonary vascular congestion    Abnormal LFTs      Plan:    1  Progressive Generalized weakness, fatigue and physical deconditioning, multifactorial, cancer progression and new chemotherapy agents  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, PT OT eval  2  Hepatocellular carcinoma with progression of adrenal and pulmonary metastasis, Oncology and palliative Care are following   3  Esophageal cancer status post esophagectomy with chemo and radiation  4  Severe COPD without exacerbation  5  Atrial fibrillation, heart rate controlled on amiodarone, and Eliquis  6  Diabetes mellitus type 2, last A1c 8 9, blood sugar acceptable on insulin sliding scale  7  Anxiety and depression, continue Zoloft  8  Transaminitis, acute/subacute liver failure,  possibly secondary to progression of hepatocellular carcinoma versus medication side effects,  liver US report reviewed, continue to monitor   9  High ammonia level, improving on lactulose  10  Sleep apnea       VTE Pharmacologic Prophylaxis:   Pharmacologic: Apixaban (Eliquis)  Mechanical VTE Prophylaxis in Place: Yes    Patient Centered Rounds: I have performed bedside rounds with nursing staff today  Discussions with Specialists or Other Care Team Provider:     Education and Discussions with Family / Patient:  Patient's wife    Time Spent for Care: 30 minutes    More than 50% of total time spent on counseling and coordination of care as described above     Current Length of Stay: 3 day(s)    Current Patient Status: Inpatient   Certification Statement: The patient will continue to require additional inpatient hospital stay due to Management of physical deconditioning    Discharge Plan / Estimated Discharge Date: not ready yet    Code Status: Level 1 - Full Code      Subjective:   Patient seen and examined  Comfortable in bed  Had a small BM today  Wife at bedside    Objective:     Vitals:   Temp (24hrs), Av 7 °F (36 5 °C), Min:97 5 °F (36 4 °C), Max:97 9 °F (36 6 °C)    Temp:  [97 5 °F (36 4 °C)-97 9 °F (36 6 °C)] 97 9 °F (36 6 °C)  HR:  [72-81] 81  Resp:  [18-20] 20  BP: (107-130)/(57-79) 130/73  SpO2:  [93 %-98 %] 96 %  Body mass index is 28 67 kg/m²  Input and Output Summary (last 24 hours): Intake/Output Summary (Last 24 hours) at 2019 1104  Last data filed at 2019 0900  Gross per 24 hour   Intake 676 ml   Output 970 ml   Net -294 ml       Physical Exam:     Physical Exam  Patient is more awake today, in no acute distress  Disoriented  Lung clear to auscultation bilateral anteriorly  Heart positive S1-S2 no murmur  Abdomen soft nontender positive bowel sounds  Lower extremities no edema    Additional Data:     Labs:    Results from last 7 days   Lab Units 19  0454   WBC Thousand/uL 6 25   HEMOGLOBIN g/dL 17 8*   HEMATOCRIT % 54 4*   PLATELETS Thousands/uL 105*   NEUTROS PCT % 80*   LYMPHS PCT % 9*   MONOS PCT % 9   EOS PCT % 1     Results from last 7 days   Lab Units 19  0454   POTASSIUM mmol/L 4 9   CHLORIDE mmol/L 104   CO2 mmol/L 30   BUN mg/dL 12   CREATININE mg/dL 0 73   CALCIUM mg/dL 8 0*   ALK PHOS U/L 682*   ALT U/L 72   AST U/L 140*           * I Have Reviewed All Lab Data Listed Above  * Additional Pertinent Lab Tests Reviewed:  All Labs For Current Hospital Admission Reviewed    Imaging:    Imaging Reports Reviewed Today Include:   Imaging Personally Reviewed by Myself Includes:     Recent Cultures (last 7 days): Last 24 Hours Medication List:     Current Facility-Administered Medications:  albuterol 2 5 mg Nebulization Q4H PRN Radha Zamudio MD   allopurinol 100 mg Oral Daily Radha Zamudio MD   amiodarone 200 mg Oral Daily Radha Zamudio MD   apixaban 5 mg Oral BID Radha Zamudio MD   vitamin C 1,000 mg Oral Daily Radha Zamudio, MD   bisacodyl 5 mg Oral Daily PRN Radha Zamudio MD   calcium carbonate 750 mg Oral Daily PRN Radha Zamudio MD   cyanocobalamin 1,000 mcg Oral Daily Radha Zamudio, MD   diazepam 5 mg Oral Q6H PRN Radha Zamudio, MD   famotidine 20 mg Oral HS Radha Zamudio, MD   ferrous sulfate 325 mg Oral Daily Radha Zamudio, MD   hydrOXYzine HCL 25 mg Oral BID PRN Radha Zamudio MD   insulin lispro 1-5 Units Subcutaneous HS Radha Zamudio MD   insulin lispro 1-6 Units Subcutaneous TID AC Pineda Catalan MD   ipratropium 0 5 mg Nebulization 4x Daily Radha Zamudio MD   lactulose 20 g Oral BID Pineda Catalan MD   latanoprost 1 drop Both Eyes HS Radha Zamudio MD   metoclopramide 10 mg Oral 4x Daily Radha Zamudio MD   modafinil 100 mg Oral Daily Palak Huang DO   rOPINIRole 3 mg Oral HS Radha Zamudio MD   sertraline 25 mg Oral HS Radha Zamudio, MD   traZODone 50 mg Oral HS Radha Zamudio MD        Today, Patient Was Seen By: Lexus Bob DO    ** Please Note: This note has been constructed using a voice recognition system   **

## 2019-02-12 NOTE — SOCIAL WORK
LSW met with Ramirokathy Elisa to introduce her to Palliative Care SW services/resources  Ramirokathy Elisa is grateful for the offer of support for her and her family  Chris Pérez is reasonably overwhelmed and feels that support could benefit  Chris Pérez is working with patient's three sons to coordinate a potential time for a multidisciplinary meeting  This meeting would allow all providers to present current status and potential options  The meeting would also provide the family with the same information to evaluate next steps        Once a time is identified the team will be available to participate    LSW will continue to follow regularly to provide emotional

## 2019-02-12 NOTE — ASSESSMENT & PLAN NOTE
- several discussions over the course of the day with patient's spouse- Saleem Pham  - reviewed and completed [facility] paperwork for her  - discussed family meeting and will plan to have this arranged for 2/13/19 at [place] [id number]- I have notified SLIM, oncology and CM  - discussed code status and decision made to change to Level 3   - time spent providing supportive listening

## 2019-02-13 LAB
ALBUMIN SERPL BCP-MCNC: 1.8 G/DL (ref 3.5–5)
ALP SERPL-CCNC: 643 U/L (ref 46–116)
ALT SERPL W P-5'-P-CCNC: 60 U/L (ref 12–78)
ANION GAP SERPL CALCULATED.3IONS-SCNC: 7 MMOL/L (ref 4–13)
AST SERPL W P-5'-P-CCNC: 99 U/L (ref 5–45)
BASOPHILS # BLD AUTO: 0.03 THOUSANDS/ΜL (ref 0–0.1)
BASOPHILS NFR BLD AUTO: 0 % (ref 0–1)
BILIRUB SERPL-MCNC: 3.09 MG/DL (ref 0.2–1)
BUN SERPL-MCNC: 11 MG/DL (ref 5–25)
CALCIUM SERPL-MCNC: 7.6 MG/DL (ref 8.3–10.1)
CHLORIDE SERPL-SCNC: 106 MMOL/L (ref 100–108)
CO2 SERPL-SCNC: 27 MMOL/L (ref 21–32)
CREAT SERPL-MCNC: 0.56 MG/DL (ref 0.6–1.3)
EOSINOPHIL # BLD AUTO: 0.06 THOUSAND/ΜL (ref 0–0.61)
EOSINOPHIL NFR BLD AUTO: 1 % (ref 0–6)
ERYTHROCYTE [DISTWIDTH] IN BLOOD BY AUTOMATED COUNT: 20 % (ref 11.6–15.1)
GFR SERPL CREATININE-BSD FRML MDRD: 101 ML/MIN/1.73SQ M
GLUCOSE SERPL-MCNC: 138 MG/DL (ref 65–140)
GLUCOSE SERPL-MCNC: 144 MG/DL (ref 65–140)
GLUCOSE SERPL-MCNC: 154 MG/DL (ref 65–140)
GLUCOSE SERPL-MCNC: 168 MG/DL (ref 65–140)
GLUCOSE SERPL-MCNC: 99 MG/DL (ref 65–140)
HCT VFR BLD AUTO: 51.3 % (ref 36.5–49.3)
HGB BLD-MCNC: 16.6 G/DL (ref 12–17)
IMM GRANULOCYTES # BLD AUTO: 0.02 THOUSAND/UL (ref 0–0.2)
IMM GRANULOCYTES NFR BLD AUTO: 0 % (ref 0–2)
LYMPHOCYTES # BLD AUTO: 0.57 THOUSANDS/ΜL (ref 0.6–4.47)
LYMPHOCYTES NFR BLD AUTO: 8 % (ref 14–44)
MCH RBC QN AUTO: 31 PG (ref 26.8–34.3)
MCHC RBC AUTO-ENTMCNC: 32.4 G/DL (ref 31.4–37.4)
MCV RBC AUTO: 96 FL (ref 82–98)
MONOCYTES # BLD AUTO: 0.68 THOUSAND/ΜL (ref 0.17–1.22)
MONOCYTES NFR BLD AUTO: 9 % (ref 4–12)
NEUTROPHILS # BLD AUTO: 5.89 THOUSANDS/ΜL (ref 1.85–7.62)
NEUTS SEG NFR BLD AUTO: 82 % (ref 43–75)
NRBC BLD AUTO-RTO: 0 /100 WBCS
PLATELET # BLD AUTO: 110 THOUSANDS/UL (ref 149–390)
PMV BLD AUTO: 12.5 FL (ref 8.9–12.7)
POTASSIUM SERPL-SCNC: 3.7 MMOL/L (ref 3.5–5.3)
PROT SERPL-MCNC: 6.5 G/DL (ref 6.4–8.2)
RBC # BLD AUTO: 5.36 MILLION/UL (ref 3.88–5.62)
SODIUM SERPL-SCNC: 140 MMOL/L (ref 136–145)
WBC # BLD AUTO: 7.25 THOUSAND/UL (ref 4.31–10.16)

## 2019-02-13 PROCEDURE — 99231 SBSQ HOSP IP/OBS SF/LOW 25: CPT | Performed by: INTERNAL MEDICINE

## 2019-02-13 PROCEDURE — 80053 COMPREHEN METABOLIC PANEL: CPT | Performed by: INTERNAL MEDICINE

## 2019-02-13 PROCEDURE — 97116 GAIT TRAINING THERAPY: CPT

## 2019-02-13 PROCEDURE — 94640 AIRWAY INHALATION TREATMENT: CPT

## 2019-02-13 PROCEDURE — 94760 N-INVAS EAR/PLS OXIMETRY 1: CPT

## 2019-02-13 PROCEDURE — 85025 COMPLETE CBC W/AUTO DIFF WBC: CPT | Performed by: INTERNAL MEDICINE

## 2019-02-13 PROCEDURE — 82948 REAGENT STRIP/BLOOD GLUCOSE: CPT

## 2019-02-13 PROCEDURE — 97530 THERAPEUTIC ACTIVITIES: CPT

## 2019-02-13 PROCEDURE — 99232 SBSQ HOSP IP/OBS MODERATE 35: CPT | Performed by: INTERNAL MEDICINE

## 2019-02-13 RX ORDER — FUROSEMIDE 10 MG/ML
20 INJECTION INTRAMUSCULAR; INTRAVENOUS ONCE
Status: COMPLETED | OUTPATIENT
Start: 2019-02-13 | End: 2019-02-13

## 2019-02-13 RX ORDER — GUAIFENESIN 600 MG
1200 TABLET, EXTENDED RELEASE 12 HR ORAL EVERY 12 HOURS SCHEDULED
Status: DISCONTINUED | OUTPATIENT
Start: 2019-02-13 | End: 2019-02-19 | Stop reason: HOSPADM

## 2019-02-13 RX ADMIN — FERROUS SULFATE TAB 325 MG (65 MG ELEMENTAL FE) 325 MG: 325 (65 FE) TAB at 09:31

## 2019-02-13 RX ADMIN — OXYCODONE HYDROCHLORIDE AND ACETAMINOPHEN 1000 MG: 500 TABLET ORAL at 09:31

## 2019-02-13 RX ADMIN — GUAIFENESIN 1200 MG: 600 TABLET, EXTENDED RELEASE ORAL at 12:04

## 2019-02-13 RX ADMIN — FAMOTIDINE 20 MG: 20 TABLET, FILM COATED ORAL at 21:39

## 2019-02-13 RX ADMIN — METOCLOPRAMIDE HYDROCHLORIDE 10 MG: 10 TABLET ORAL at 09:32

## 2019-02-13 RX ADMIN — CYANOCOBALAMIN TAB 500 MCG 1000 MCG: 500 TAB at 09:31

## 2019-02-13 RX ADMIN — ALBUTEROL SULFATE 2.5 MG: 2.5 SOLUTION RESPIRATORY (INHALATION) at 12:47

## 2019-02-13 RX ADMIN — NYSTATIN: 100000 POWDER TOPICAL at 17:40

## 2019-02-13 RX ADMIN — METOCLOPRAMIDE HYDROCHLORIDE 10 MG: 10 TABLET ORAL at 17:33

## 2019-02-13 RX ADMIN — ALLOPURINOL 100 MG: 100 TABLET ORAL at 09:31

## 2019-02-13 RX ADMIN — METOCLOPRAMIDE HYDROCHLORIDE 10 MG: 10 TABLET ORAL at 21:40

## 2019-02-13 RX ADMIN — APIXABAN 5 MG: 5 TABLET, FILM COATED ORAL at 17:33

## 2019-02-13 RX ADMIN — ROPINIROLE 3 MG: 2 TABLET, FILM COATED ORAL at 21:40

## 2019-02-13 RX ADMIN — LACTULOSE 20 G: 10 SOLUTION ORAL at 17:35

## 2019-02-13 RX ADMIN — AMIODARONE HYDROCHLORIDE 200 MG: 200 TABLET ORAL at 09:31

## 2019-02-13 RX ADMIN — TRAZODONE HYDROCHLORIDE 50 MG: 50 TABLET ORAL at 21:40

## 2019-02-13 RX ADMIN — LATANOPROST 1 DROP: 50 SOLUTION OPHTHALMIC at 21:40

## 2019-02-13 RX ADMIN — SERTRALINE HYDROCHLORIDE 25 MG: 25 TABLET ORAL at 21:40

## 2019-02-13 RX ADMIN — FUROSEMIDE 20 MG: 10 INJECTION, SOLUTION INTRAVENOUS at 12:04

## 2019-02-13 RX ADMIN — METOCLOPRAMIDE HYDROCHLORIDE 10 MG: 10 TABLET ORAL at 12:04

## 2019-02-13 RX ADMIN — NYSTATIN: 100000 POWDER TOPICAL at 09:37

## 2019-02-13 RX ADMIN — GUAIFENESIN 1200 MG: 600 TABLET, EXTENDED RELEASE ORAL at 21:40

## 2019-02-13 RX ADMIN — IPRATROPIUM BROMIDE 0.5 MG: 0.5 SOLUTION RESPIRATORY (INHALATION) at 15:18

## 2019-02-13 RX ADMIN — APIXABAN 5 MG: 5 TABLET, FILM COATED ORAL at 09:31

## 2019-02-13 RX ADMIN — ALBUTEROL SULFATE 2.5 MG: 2.5 SOLUTION RESPIRATORY (INHALATION) at 20:31

## 2019-02-13 RX ADMIN — LACTULOSE 20 G: 10 SOLUTION ORAL at 09:32

## 2019-02-13 RX ADMIN — IPRATROPIUM BROMIDE 0.5 MG: 0.5 SOLUTION RESPIRATORY (INHALATION) at 12:35

## 2019-02-13 RX ADMIN — IPRATROPIUM BROMIDE 0.5 MG: 0.5 SOLUTION RESPIRATORY (INHALATION) at 08:11

## 2019-02-13 RX ADMIN — MODAFINIL 100 MG: 100 TABLET ORAL at 09:37

## 2019-02-13 RX ADMIN — IPRATROPIUM BROMIDE 0.5 MG: 0.5 SOLUTION RESPIRATORY (INHALATION) at 20:30

## 2019-02-13 NOTE — TREATMENT PLAN
PALLIATIVE AND SUPPORTIVE CARE FAMILY CONFERENCE:    Time of Meetin:00 PM    Participants: MAGDA [place] with Philomena Kelly- cousin of family, patient's sons over phone- Balta Zhu and additional family member- Melissa Carmen CM, Dr Gayatri Galloway, Dr Laurence Wren  Patient Participation: no    Meeting Location:  Conference room    Advanced Directive of POLST available: no    A family meeting was held for Mr Ran Hope  This meeting was necessary for determine the appropriate course of treatment      Topics of Discussion:  - Dr Laurence Wren provided clinical update about hospitalization and reviewed imaging and laboratory data  - Dr Gayatri Galloway presented oncologic information regarding treatment options and prognosis  - Questions regarding medical issues addressed to the family's satisfaction  - discussed goals of care with specific focus on hospice cares, a variety of options were explored including:   [facility]   Different levels of care- does not qualify for IPU at this time but could utilize [address] inpatient unit for 2 weeks per my discussions with physician there   Discussed rehab to hospice with possibility of going home   Discussed placement with hospice  - Family will review all their options and will plan to reconvene in the coming days to determine how to proceed    Other Content of Meeting:  - time spent providing psychosocial support    PLAN:  - follow up family's decision regarding hospice cares  - Level 3 DNR/DNI  - await PT/OT recommendations    Time Involved in Meetin+ minutes    Becky Pittman DO  Palliative and Supportive Care  [phone]

## 2019-02-13 NOTE — PROGRESS NOTES
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    Atrial fibrillation    Diabetes mellitus type 2    Vision abnormalities with right-sided weakness    COPD    Anxiety and depression    Right sided weakness    Pulmonary vascular congestion    Abnormal LFTs    Goals of care, counseling/discussion      Plan:    1  Progressive Generalized weakness, fatigue and physical deconditioning, multifactorial, likely secondary to cancer progression and new chemotherapy agents, brain MRI with stable right occipital lobe infarct, palliative care input appreciated, started on Provigil,  PT OT eval  2  Hepatocellular carcinoma with progression of adrenal and pulmonary metastasis, Oncology and palliative Care are following   3  Esophageal cancer s/p  esophagectomy with chemo and radiation  4  Severe COPD without exacerbation, patient with cough and chest congestion, Lasix IV x1, start Mucinex, continue bronchodilator  5  Atrial fibrillation, heart rate controlled on amiodarone, and Eliquis  6  DM type 2, last A1c 8 9, blood sugar acceptable on insulin sliding scale  7  Anxiety and depression, continue Zoloft  8  Transaminitis, acute/subacute liver failure,  possibly secondary to progression of hepatocellular carcinoma vs medication side effects, liver US report reviewed, continue to monitor   9  High ammonia level, improving on lactulose  10  Sleep apnea  11  Elevated TSH, with normal free T4, repeat study       VTE Pharmacologic Prophylaxis:   Pharmacologic: Apixaban (Eliquis)  Mechanical VTE Prophylaxis in Place: Yes    Patient Centered Rounds: I have performed bedside rounds with nursing staff today      Discussions with Specialists or Other Care Team Provider:     Education and Discussions with Family / Patient:  Patient's wife at bedside    Time Spent for Care: 30 minutes  More than 50% of total time spent on counseling and coordination of care as described above  Current Length of Stay: 4 day(s)    Current Patient Status: Inpatient   Certification Statement: The patient will continue to require additional inpatient hospital stay due to Management of physical deconditioning    Discharge Plan / Estimated Discharge Date: not ready yet    Code Status: Level 3 - DNAR and DNI      Subjective:   Patient seen and examined  Comfortable in chair  More awake today  No nausea vomiting or diarrhea  Complaining of cough and chest congestion  Wife at bedside    Objective:     Vitals:   Temp (24hrs), Av 8 °F (36 6 °C), Min:97 5 °F (36 4 °C), Max:98 °F (36 7 °C)    Temp:  [97 5 °F (36 4 °C)-98 °F (36 7 °C)] 97 5 °F (36 4 °C)  HR:  [68-73] 72  Resp:  [18] 18  BP: (103-145)/(55-79) 138/71  SpO2:  [91 %-94 %] 93 %  Body mass index is 28 67 kg/m²  Input and Output Summary (last 24 hours):        Intake/Output Summary (Last 24 hours) at 2019 1123  Last data filed at 2019 0900  Gross per 24 hour   Intake 170 ml   Output 1052 ml   Net -882 ml       Physical Exam:     Physical Exam    Patient is more awake today, in no acute distress  Sitting in chair  Lung with decreased breath sounds bilateral intermittent rhonchi  Heart positive S1-S2 no murmur  Abdomen soft nontender positive bowel sounds  Lower extremities no edema    Additional Data:     Labs:    Results from last 7 days   Lab Units 19  0454   WBC Thousand/uL 6 25   HEMOGLOBIN g/dL 17 8*   HEMATOCRIT % 54 4*   PLATELETS Thousands/uL 105*   NEUTROS PCT % 80*   LYMPHS PCT % 9*   MONOS PCT % 9   EOS PCT % 1     Results from last 7 days   Lab Units 19  0454   POTASSIUM mmol/L 4 9   CHLORIDE mmol/L 104   CO2 mmol/L 30   BUN mg/dL 12   CREATININE mg/dL 0 73   CALCIUM mg/dL 8 0*   ALK PHOS U/L 682*   ALT U/L 72   AST U/L 140*           * I Have Reviewed All Lab Data Listed Above  * Additional Pertinent Lab Tests Reviewed: Kier[facility] 64 Admission Reviewed    Imaging:    Imaging Reports Reviewed Today Include:   Imaging Personally Reviewed by Myself Includes:     Recent Cultures (last 7 days):           Last 24 Hours Medication List:     Current Facility-Administered Medications:  albuterol 2 5 mg Nebulization Q4H PRN Astrid Ashley MD   allopurinol 100 mg Oral Daily Astrid Ashley MD   amiodarone 200 mg Oral Daily Astrid Ashley, MD   apixaban 5 mg Oral BID Astrid Ashley MD   vitamin C 1,000 mg Oral Daily Astrid Ashley MD   bisacodyl 5 mg Oral Daily PRN Astrid Ashley MD   calcium carbonate 750 mg Oral Daily PRN Astrid Ashley, MD   cyanocobalamin 1,000 mcg Oral Daily Astrid Ashley, MD   diazepam 5 mg Oral Q6H PRN Astrid Ashley MD   famotidine 20 mg Oral HS Astrid Ashley MD   ferrous sulfate 325 mg Oral Daily Astrid Ashley MD   hydrOXYzine HCL 25 mg Oral BID PRN Astrid Ashley MD   insulin lispro 1-5 Units Subcutaneous HS Astrid Ashley MD   insulin lispro 1-6 Units Subcutaneous TID AC Cynthia Tubbs MD   ipratropium 0 5 mg Nebulization 4x Daily Astrid Ashley MD   lactulose 20 g Oral BID Cynthia Tubbs MD   latanoprost 1 drop Both Eyes HS Astrid Ashley MD   metoclopramide 10 mg Oral 4x Daily Astrid Ashley MD   modafinil 100 mg Oral Daily Palak Huang DO   nystatin  Topical BID Álvaro Waldron DO   rOPINIRole 3 mg Oral HS Astrid Ashley MD   sertraline 25 mg Oral HS Astrid Ashley MD   traZODone 50 mg Oral HS Astrid Ashley MD        Today, Patient Was Seen By: Álvaro Waldron DO    ** Please Note: This note has been constructed using a voice recognition system   **

## 2019-02-13 NOTE — RESTORATIVE TECHNICIAN NOTE
Restorative Specialist Mobility Note       Activity: Bedrest, Dangle, Stand at bedside, Turn, Chair     Assistive Device: Other (Comment)(HHA x 2)     Ambulation Response:  Tolerated fairly well  Repositioned: Sitting, Up in chair

## 2019-02-13 NOTE — PROGRESS NOTES
Patient: Delia Deutsch  Patient MRN: [id number]  Service date: 2/13/2019  Attending Physician:       CHIEF COMPLAIN    Chief Complaint   Patient presents with    Extremity Weakness     c/o right sided weakness x 2 days after starting Lenvima ([place] drug)  Pt is lethargic, difficulty ambulating and transferring  No neuro deficits noted on exam, family noticed bilateral weakness only  Heme / Oncology history: Delia Deutsch is a 74 y o  male     Oncology History    64-year-old male status post esophagectomy and liver resection 10/31/16 for esophageal cancer and hepatocellular carcinoma in 2016  Treatment included a course of radiation to the esophagus to a total dose of 4680 cGy with treatments given from 6/27/16 to 8/3/2016  Radiation therapy was administered at [facility] by Dr Garland Arita  Concurrent Taxol and Carboplatin was under the care of Dr Remedios Oliva at [address]  CT scan of the abdomen and MRI of the abdomen in October 2017 revealed a new lesion in the left lobe of the liver measuring 4 1 x 2 7 cm with 1-2 additional lesions  CT-guided biopsy liver lesion was positive for hepatocellular carcinoma on October 11, 2017  Recommendations were made for Sir sphere radiation therapy to the whole liver which was given on December 13, 2017  The patient returns today for follow-up examination  reports he is feeling well and is having no abdominal complaints  He denies any pain  He is still having occasional nausea with some dysphagia and is considering having a repeat esophageal dilation performed  He had repeat blood work including liver function studies performed January 19, 2017 at [facility] but results are not available today  We will call him with these results  He has MRI of the abdomen and follow-up appointment with Dr Donato Salas on the same day February 6, 2018  Hopefully, this will show good results and response to treatment   Assuming MRI shows favorable response, then we would recommend repeat MRI again in 3 months  The patient states he will be following with Dr Lynn Foley every 3 months  It takes about 1 5 hours to come here from [place], [place] and we will therefore see him here on a p r n  basis  Should he have evidence of progressive disease the future then, we would be happy to re-evaluate him for consideration of additional Sir spheres to the liver  The patient is wife were in agreement with the above plan and they know they can call any time if they have any questions  2/14/18 Had EGD, dialtion of the esophagus  There was a mild stricture at the site of anastomosis  4/5/18 Had MRI of the abd: The previously noted dominant lesion in the left hepatic lobe demonstrate increasing necrosis suggest treatment response The segment 4 lesion is decreasing in size The lesion in the segment 5 cm remains stable with thin rim enhancement suggesting no viable tissue Segment 2 lesion is stable Enlarging regional lymph node in the upper abdomen just below the silvestre hepatis which measures 2 5 x 2 4 cm    Pt saw both surg Onc, and Med onc  On 4/6/18 Pt met with Dr Lily Carlson, who discussed, If radiation therapy could be applied to this lesion deferral of systemic treatment initiation could be accomplished  Systemic treatment options: potential treatment options for patients with [address]  including Sadia Guzmán  Pt had previous esophagus EBRT,8/2016  at [facility] in [place]  Treatment recoreds available in [address]           Esophageal cancer     4/27/2016 Initial Diagnosis     Esophageal cancer ([address] )         6/27/2016 - 7/26/2016 Chemotherapy     Taxol 50 + Carbo AUC 2         6/27/2016 - 8/3/2016 Radiation     Concurrent RT Esophagus 4,680 cGy   [place] [facility] [place]         10/31/2016 Surgery     Esophagectomy           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    10/31/2016 Initial Diagnosis     Cancer, hepatocellular ([facility] )       10/31/2016 Surgery     Resection/ablation of liver - segments 5/6         10/11/2017 Biopsy     Local recurrence         12/13/2017 -  Radiation     SIRSphere treatment  Treatment site:  whole liver  Whole liver Dose:  Prescribed: 1 66 GBq, Delivered: 1 61GBq  (Right lobe Dose:   Prescribed: 1 06 GBq,  Delivered: 1 03 GBq)  (Left lobe Dose:   Prescribed: 0 60 GBq,  Delivered: 0 58 GBq)         8/3/2018 - 10/23/2018 Chemotherapy     Nexavar 400 mg p o  daily  Initially planned to start at a visit on July 13th  Medication not obtained until August 3rd  10/18/2018 Progression     October 18, 2018 CT chest showed slight progression of multiple pulmonary nodules, all self sent all subcentimeter  Several new nodules are noted  Left adrenal nodule previously 2 1 cm, currently 2 8 cm  MRI liver showed progression of disease in the left adrenal and liver  A portacaval node has decreased in size, previously 2 5 cm, currently 2 1 cm          10/29/2018 - 12/12/2018 Chemotherapy     Opdivo 480 mg IV q 4 weeks  HISTORY OF PRESENT ILLNESS:  Indy De Oliveira is a 74 y o  male who has extensive oncology history as above, history of esophageal cancer and hepatocellular carcinoma, has received SIRsphere, currently on 3rd line treatment VEGF inhibitor / Aylin Mace , has been taking the medicine for about 5 days,  Presents with acute onset fatigue / stress weakness more pronounced on left side  Otherwise no bleeding, no leg swelling, no focal neuro deficit  2/13: family reported mental status is better today        PROBLEM LIST:    Patient Active Problem List   Diagnosis    Esophageal cancer     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    Atrial fibrillation    Diabetes mellitus type 2    Vision abnormalities with right-sided weakness    Restless legs syndrome    Rheumatoid arthritis ([facility] )    Sleep apnea    Hyperlipidemia    Hypertension    Peripheral neuropathy    Glaucoma    Gout    Incisional hernia    COPD    Anxiety and depression    History of stroke    Mild cognitive impairment    Nutritional deficiency    Coronary artery disease    Active advance directive    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)    Pulmonary metastases (HCC)    Physical deconditioning    Transaminitis    Right sided weakness    Pulmonary vascular congestion    Abnormal LFTs    Fatigue    Goals of care, counseling/discussion       ASSESSMENT/PLAN:  Kodi Escoto is a 74 y o  male with:    1) hepatocellular carcinoma  - not curable, on the 3rd line of VEGF inhibitor / Dominick Camejo,  goal is palliative  Given patient developed new symptoms, will hold the medicine  2) fatigue/weakness  - common side effect for VEGF inhibitors include fatigue, thyroid dysfunction, bleeding, thrombosis , hypertension, proteinuria etc   Although potentially could be related with the new treatment, time wise this appears to be too quick  We will hold medicine regardless  No strong evidence for thrombosis clinically  Continue supportive care  3) goal of care  - had a family meeting today with family member face to face and over the phone  Made aware this is not curable and effectiveness of available options are limited  Since on the most recent 3rd line therapy, there is no significant improvement of liver function  Cancer marker AFP are not available before starting therapy to make comparision  No recent image to make comparison  Recommend hospice  30     minutes were spent face to face with patient with greater than 50% of the time spent in counseling or coordination of care including discussions of treatment instructions    All of the patient's questions were answered to their satisfactory during this discussion   Savanah Ambrocio MD PhD  Hematology / Oncology                              PAST MEDICAL HISTORY:   has a past medical history of Anemia, Coronary artery disease, Dysphagia, Esophageal cancer ([address] ), Gout, Hyperlipidemia, Hypertension, Restless leg syndrome, Rheumatoid arthritis ([address] ), Sleep apnea, and Vision abnormalities  PAST SURGICAL HISTORY:   has a past surgical history that includes Appendectomy; Knee arthroscopy; Cholecystectomy; Tonsillectomy; Portacath placement; pr esophagoscopy flex balloon dilat <30 mm diam (N/A, 1/6/2017); pr esophagoscopy flex balloon dilat <30 mm diam (N/A, 12/12/2016); pr esophagogastroduodenoscopy transoral diagnostic (N/A, 10/31/2016); pr removal esophagus,no thoracotomy (N/A, 10/31/2016); Liver resection (N/A, 10/31/2016); Gastrojejunostomy w/ jejunostomy tube (N/A, 8/4/2016); pr esophagoscopy flex balloon dilat <30 mm diam (N/A, 5/24/2017); pr esophagoscopy flex balloon dilat <30 mm diam (N/A, 8/3/2017); pr egd insert guide wire dilator passage esophagus (N/A, 2/14/2018); and Wound debridement (Left, 5/3/2018)      CURRENT MEDICATIONS  Scheduled Meds:    Current Facility-Administered Medications:  albuterol 2 5 mg Nebulization Q4H PRN Alexandro Cadena MD   allopurinol 100 mg Oral Daily Alexandro Cadena MD   amiodarone 200 mg Oral Daily Alexandro Cadena MD   apixaban 5 mg Oral BID Alexandro Cadena MD   vitamin C 1,000 mg Oral Daily Alexandro Cadena MD   bisacodyl 5 mg Oral Daily PRN Alexandro Cadena MD   calcium carbonate 750 mg Oral Daily PRN Alexandro Cadena MD   cyanocobalamin 1,000 mcg Oral Daily Alexandro Cadena MD   diazepam 5 mg Oral Q6H PRN Alexandro Cadena MD   famotidine 20 mg Oral HS Alexandro Cadena MD   ferrous sulfate 325 mg Oral Daily Alexandro Cadena MD   guaiFENesin 1,200 mg Oral Q12H [facility] Arnoldo Dearth, DO   hydrOXYzine HCL 25 mg Oral BID PRN Alexandro Cadena MD   insulin lispro 1-5 Units Subcutaneous HS Alexandro Cadena MD   insulin lispro 1-6 Units Subcutaneous TID AC Delicia Lin MD   ipratropium 0 5 mg Nebulization 4x Daily Alexandro Cadena MD   lactulose 20 g Oral BID Delicia Lin MD   latanoprost 1 drop Both Eyes HS Mukul Chatman MD Gin   metoclopramide 10 mg Oral 4x Daily Rafia Cantrell MD   modafinil 100 mg Oral Daily Palak Huang DO   nystatin  Topical BID Soham Latif DO   rOPINIRole 3 mg Oral HS Rafia Cantrell MD   sertraline 25 mg Oral HS Rafia Cantrell MD   traZODone 50 mg Oral HS Rafia Cantrell, MD     Continuous Infusions:   PRN Meds:   albuterol    bisacodyl    calcium carbonate    diazepam    hydrOXYzine HCL    SOCIAL HISTORY:   reports that he quit smoking about 27 years ago  His smoking use included cigarettes  He started smoking about 68 years ago  He has a 120 00 pack-year smoking history  He has never used smokeless tobacco  He reports that he drinks alcohol  He reports that he does not use drugs  FAMILY HISTORY:  family history includes Cancer in his sister; Colon cancer in his father; Other in his father; Stroke in his mother  ALLERGIES:  is allergic to humira [adalimumab]; levaquin [levofloxacin in d5w]; levofloxacin; and lovenox [enoxaparin]  REVIEW OF SYSTEMS:  Please note that a 14-point review of systems was performed to include Constitutional, HEENT, Respiratory, CVS, GI, , Musculoskeletal, Integumentary, Neurologic, Rheumatologic, Endocrinologic, Psychiatric, Lymphatic, and Hematologic/Oncologic systems were reviewed and are negative unless otherwise stated in HPI  Positive and negative findings pertinent to this evaluation are incorporated into the history of present illness  PHYSICAL EXAMINATION:  Vital Signs: Temp:  [97 5 °F (36 4 °C)-98 °F (36 7 °C)] 97 5 °F (36 4 °C)  HR:  [68-75] 74  Resp:  [18-20] 20  BP: (103-138)/(55-71) 120/68  Body mass index is 28 67 kg/m²  Body surface area is 2 09 meters squared  Constitutional: Alert and oriented  HEENT: Anicteric, PERRLA  Chest: Decreased breathing sound bilaterally, No wheezes/rales/rhonchi  CVS: Regular rhythm  Normal rate  Abdomen: Soft, nontender, nondistended  No palpable organomegaly    Extremities: No cyanosis/clubbing/edema  Integumentary: No obvious rashes or bruises  Musculoskeletal: No obvious bony or joint deformities  Psychiatric: Appropriate affect and mood  Lymph Node Survey: No palpable preauricular, submandibular, cervical, supraclavicular, axillary, epitrochlear or inguinal lymphadenopathy  LABS:    Results from last 7 days   Lab Units 02/13/19  1155 02/12/19  0454 02/11/19  0443   WBC Thousand/uL 7 25 6 25 5 79   HEMATOCRIT % 51 3* 54 4* 56 0*   PLATELETS Thousands/uL 110* 105* 118*   NEUTROS PCT % 82* 80* 73   MONOS PCT % 9 9 13*       Results from last 7 days   Lab Units 02/13/19  1155 02/12/19  0454 02/11/19  0443   POTASSIUM mmol/L 3 7 4 9 4 9   CHLORIDE mmol/L 106 104 106   CO2 mmol/L 27 30 23   BUN mg/dL 11 12 14       Results from last 7 days   Lab Units 02/13/19  1155 02/12/19  0454 02/11/19  0443   ALBUMIN g/dL 1 8* 1 9* 1 6*   ALK PHOS U/L 643* 682* 666*   ALT U/L 60 72 77   AST U/L 99* 140* 152*               Invalid input(s): TNI,  PCT              IMAGING:    MRI brain w wo contrast   Final Result      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  No acute ischemia, mass or hemorrhage  Workstation performed: [id number]         US right upper quadrant   Final Result      Diffusely heterogeneous liver with masslike fullness left lobe  Unfortunately this cannot be further differentiated on this exam  More precise comparison of known hepatic lesions would be better evaluated with contrast enhanced cross-sectional imaging  No biliary dilatation detected  Small amount of fluid within the right upper quadrant  Workstation performed: [id number]         XR chest 2 views   ED Interpretation   No active pulmonary disease      Final Result      Cardiomegaly and pulmonary vascular congestion raises concern for CHF  The study was marked in [facility] for immediate notification    I      Workstation performed: [id number]         CT head without contrast   Final Result      No acute intracranial abnormality  Stable small old medial right occipital lobe infarct              Workstation performed: [id number]

## 2019-02-13 NOTE — UTILIZATION REVIEW
Continued Stay Review    Date: 2/13    Vital Signs: /70   Pulse 75   Temp 97 5 °F (36 4 °C) (Oral)   Resp 18   Ht 5' 10" (1 778 m)   Wt 90 6 kg (199 lb 12 8 oz)   SpO2 94%   BMI 28 67 kg/m²      Assessment/Plan:   Progressive Generalized weakness, fatigue and physical deconditioning, multifactorial, likely secondary to cancer progression and new chemotherapy agents, brain MRI with stable right occipital lobe infarct, palliative care input appreciated, started on Provigil,  PT OT eval  Hepatocellular carcinoma with progression of adrenal and pulmonary metastasis, Oncology and palliative Care are following   Transaminitis, acute/subacute liver failure,  possibly secondary to progression of hepatocellular carcinoma vs medication side effects, liver US report reviewed, continue to monitor  High ammonia level, improving on lactulose    The patient will continue to require additional inpatient hospital stay due to Management of physical deconditioning    Medications:   Scheduled Meds:   Current Facility-Administered Medications:  allopurinol 100 mg Oral Daily   amiodarone 200 mg Oral Daily   apixaban 5 mg Oral BID   vitamin C 1,000 mg Oral Daily   cyanocobalamin 1,000 mcg Oral Daily   famotidine 20 mg Oral HS   ferrous sulfate 325 mg Oral Daily   guaiFENesin 1,200 mg Oral Q12H ABBIE   insulin lispro 1-5 Units Subcutaneous HS   insulin lispro 1-6 Units Subcutaneous TID AC   ipratropium 0 5 mg Nebulization 4x Daily   lactulose 20 g Oral BID   latanoprost 1 drop Both Eyes HS   metoclopramide 10 mg Oral 4x Daily   modafinil 100 mg Oral Daily   nystatin  Topical BID   rOPINIRole 3 mg Oral HS   sertraline 25 mg Oral HS   traZODone 50 mg Oral HS     Continuous Infusions:    PRN Meds:   albuterol    bisacodyl    calcium carbonate    diazepam    hydrOXYzine HCL     Pertinent Labs/Diagnostic Results:   Alk Phos = 643  Total Bili = 3 09    Age/Sex: 74 y o  male     Discharge Plan: Referral to [facility]

## 2019-02-13 NOTE — SOCIAL WORK
Cm admissions at [facility], spoke with Arlen Adams who states that they received SNF referral for pt on 2/10 and their liaison Gunnar Chaudhari should be contacting cm to discuss case  Cm provided contact information and awaiting f/u call  Cm received call from Gunnar Chaudhari at [place], she will continue to follow pt and look for therapy notes once pt is able to participate with therapy

## 2019-02-14 PROBLEM — R33.9 URINARY RETENTION: Status: ACTIVE | Noted: 2019-02-14

## 2019-02-14 LAB
GLUCOSE SERPL-MCNC: 108 MG/DL (ref 65–140)
GLUCOSE SERPL-MCNC: 144 MG/DL (ref 65–140)
GLUCOSE SERPL-MCNC: 160 MG/DL (ref 65–140)
GLUCOSE SERPL-MCNC: 162 MG/DL (ref 65–140)

## 2019-02-14 PROCEDURE — 97530 THERAPEUTIC ACTIVITIES: CPT

## 2019-02-14 PROCEDURE — 94640 AIRWAY INHALATION TREATMENT: CPT

## 2019-02-14 PROCEDURE — 99232 SBSQ HOSP IP/OBS MODERATE 35: CPT | Performed by: FAMILY MEDICINE

## 2019-02-14 PROCEDURE — 99232 SBSQ HOSP IP/OBS MODERATE 35: CPT | Performed by: INTERNAL MEDICINE

## 2019-02-14 PROCEDURE — 97112 NEUROMUSCULAR REEDUCATION: CPT

## 2019-02-14 PROCEDURE — 97116 GAIT TRAINING THERAPY: CPT

## 2019-02-14 PROCEDURE — 94760 N-INVAS EAR/PLS OXIMETRY 1: CPT

## 2019-02-14 PROCEDURE — 82948 REAGENT STRIP/BLOOD GLUCOSE: CPT

## 2019-02-14 PROCEDURE — 0T9B70Z DRAINAGE OF BLADDER WITH DRAINAGE DEVICE, VIA NATURAL OR ARTIFICIAL OPENING: ICD-10-PCS | Performed by: INTERNAL MEDICINE

## 2019-02-14 RX ORDER — FLUTICASONE FUROATE AND VILANTEROL 100; 25 UG/1; UG/1
1 POWDER RESPIRATORY (INHALATION) DAILY
Status: DISCONTINUED | OUTPATIENT
Start: 2019-02-14 | End: 2019-02-19 | Stop reason: HOSPADM

## 2019-02-14 RX ORDER — SODIUM CHLORIDE 9 MG/ML
50 INJECTION, SOLUTION INTRAVENOUS CONTINUOUS
Status: DISCONTINUED | OUTPATIENT
Start: 2019-02-14 | End: 2019-02-16

## 2019-02-14 RX ADMIN — ALBUTEROL SULFATE 2.5 MG: 2.5 SOLUTION RESPIRATORY (INHALATION) at 02:10

## 2019-02-14 RX ADMIN — METOCLOPRAMIDE HYDROCHLORIDE 10 MG: 10 TABLET ORAL at 12:26

## 2019-02-14 RX ADMIN — FERROUS SULFATE TAB 325 MG (65 MG ELEMENTAL FE) 325 MG: 325 (65 FE) TAB at 09:25

## 2019-02-14 RX ADMIN — MODAFINIL 100 MG: 100 TABLET ORAL at 09:42

## 2019-02-14 RX ADMIN — FAMOTIDINE 20 MG: 20 TABLET, FILM COATED ORAL at 22:07

## 2019-02-14 RX ADMIN — IPRATROPIUM BROMIDE 0.5 MG: 0.5 SOLUTION RESPIRATORY (INHALATION) at 19:28

## 2019-02-14 RX ADMIN — APIXABAN 5 MG: 5 TABLET, FILM COATED ORAL at 17:00

## 2019-02-14 RX ADMIN — FLUTICASONE FUROATE AND VILANTEROL TRIFENATATE 1 PUFF: 100; 25 POWDER RESPIRATORY (INHALATION) at 12:25

## 2019-02-14 RX ADMIN — SODIUM CHLORIDE 75 ML/HR: 0.9 INJECTION, SOLUTION INTRAVENOUS at 20:32

## 2019-02-14 RX ADMIN — APIXABAN 5 MG: 5 TABLET, FILM COATED ORAL at 09:26

## 2019-02-14 RX ADMIN — METOCLOPRAMIDE HYDROCHLORIDE 10 MG: 10 TABLET ORAL at 17:00

## 2019-02-14 RX ADMIN — CYANOCOBALAMIN TAB 500 MCG 1000 MCG: 500 TAB at 09:26

## 2019-02-14 RX ADMIN — ALBUTEROL SULFATE 2.5 MG: 2.5 SOLUTION RESPIRATORY (INHALATION) at 16:10

## 2019-02-14 RX ADMIN — ALBUTEROL SULFATE 2.5 MG: 2.5 SOLUTION RESPIRATORY (INHALATION) at 07:57

## 2019-02-14 RX ADMIN — ROPINIROLE 3 MG: 2 TABLET, FILM COATED ORAL at 22:08

## 2019-02-14 RX ADMIN — OXYCODONE HYDROCHLORIDE AND ACETAMINOPHEN 1000 MG: 500 TABLET ORAL at 09:26

## 2019-02-14 RX ADMIN — ALBUTEROL SULFATE 2.5 MG: 2.5 SOLUTION RESPIRATORY (INHALATION) at 19:28

## 2019-02-14 RX ADMIN — METOCLOPRAMIDE HYDROCHLORIDE 10 MG: 10 TABLET ORAL at 09:26

## 2019-02-14 RX ADMIN — METOCLOPRAMIDE HYDROCHLORIDE 10 MG: 10 TABLET ORAL at 22:08

## 2019-02-14 RX ADMIN — GUAIFENESIN 1200 MG: 600 TABLET, EXTENDED RELEASE ORAL at 09:26

## 2019-02-14 RX ADMIN — GUAIFENESIN 1200 MG: 600 TABLET, EXTENDED RELEASE ORAL at 22:07

## 2019-02-14 RX ADMIN — IPRATROPIUM BROMIDE 0.5 MG: 0.5 SOLUTION RESPIRATORY (INHALATION) at 07:57

## 2019-02-14 RX ADMIN — AMIODARONE HYDROCHLORIDE 200 MG: 200 TABLET ORAL at 09:26

## 2019-02-14 RX ADMIN — INSULIN LISPRO 1 UNITS: 100 INJECTION, SOLUTION INTRAVENOUS; SUBCUTANEOUS at 12:25

## 2019-02-14 RX ADMIN — LATANOPROST 1 DROP: 50 SOLUTION OPHTHALMIC at 22:08

## 2019-02-14 RX ADMIN — LACTULOSE 20 G: 10 SOLUTION ORAL at 17:00

## 2019-02-14 RX ADMIN — ALLOPURINOL 100 MG: 100 TABLET ORAL at 09:26

## 2019-02-14 RX ADMIN — TRAZODONE HYDROCHLORIDE 50 MG: 50 TABLET ORAL at 22:07

## 2019-02-14 RX ADMIN — LACTULOSE 20 G: 10 SOLUTION ORAL at 09:28

## 2019-02-14 RX ADMIN — ALBUTEROL SULFATE 2.5 MG: 2.5 SOLUTION RESPIRATORY (INHALATION) at 12:15

## 2019-02-14 RX ADMIN — SERTRALINE HYDROCHLORIDE 25 MG: 25 TABLET ORAL at 22:07

## 2019-02-14 RX ADMIN — IPRATROPIUM BROMIDE 0.5 MG: 0.5 SOLUTION RESPIRATORY (INHALATION) at 12:15

## 2019-02-14 RX ADMIN — IPRATROPIUM BROMIDE 0.5 MG: 0.5 SOLUTION RESPIRATORY (INHALATION) at 16:09

## 2019-02-14 NOTE — OCCUPATIONAL THERAPY NOTE
OCCUPATIONAL THERAPY CANCELLATION:  Attempt to see patient x2  Patient now receiving treatment from [facility]  Will continue to follow on caseload    JYOTSNA España

## 2019-02-14 NOTE — PLAN OF CARE
Problem: Potential for Falls  Goal: Patient will remain free of falls  Description  INTERVENTIONS:  - Assess patient frequently for physical needs  -  Identify cognitive and physical deficits and behaviors that affect risk of falls  -  [place] fall precautions as indicated by assessment   - Educate patient/family on patient safety including physical limitations  - Instruct patient to call for assistance with activity based on assessment  - Modify environment to reduce risk of injury  - Consider OT/PT consult to assist with strengthening/mobility   2/14/2019 0026 by Herminia Steiner RN  Outcome: Progressing  2/14/2019 0022 by Herminia Steiner RN  Outcome: Adequate for Discharge     Problem: Prexisting or High Potential for Compromised Skin Integrity  Goal: Skin integrity is maintained or improved  Description  INTERVENTIONS:  - Identify patients at risk for skin breakdown  - Assess and monitor skin integrity  - Assess and monitor nutrition and hydration status  - Monitor labs (i e  albumin)  - Assess for incontinence   - Turn and reposition patient  - Assist with mobility/ambulation  - Relieve pressure over bony prominences  - Avoid friction and shearing  - Provide appropriate hygiene as needed including keeping skin clean and dry  - Evaluate need for skin moisturizer/barrier cream  - Collaborate with interdisciplinary team (i e  Nutrition, Rehabilitation, etc )   - Patient/family teaching   2/14/2019 0026 by Herminia Steiner RN  Outcome: Progressing  2/14/2019 0022 by Herminia Steiner RN  Outcome: Adequate for Discharge     Problem: Nutrition/Hydration-ADULT  Goal: Nutrient/Hydration intake appropriate for improving, restoring or maintaining nutritional needs  Description  Monitor and assess patient's nutrition/hydration status for malnutrition (ex- brittle hair, bruises, dry skin, pale skin and conjunctiva, muscle wasting, smooth red tongue, and disorientation)   Collaborate with interdisciplinary team and initiate plan and interventions as ordered  Monitor patient's weight and dietary intake as ordered or per policy  Utilize nutrition screening tool and intervene per policy  Determine patient's food preferences and provide high-protein, high-caloric foods as appropriate       INTERVENTIONS:  - Monitor oral intake, urinary output, labs, and treatment plans  - Assess nutrition and hydration status and recommend course of action  - Evaluate amount of meals eaten  - Assist patient with eating if necessary   - Allow adequate time for meals  - Recommend/ encourage appropriate diets, oral nutritional supplements, and vitamin/mineral supplements  - Order, calculate, and assess calorie counts as needed  - Recommend, monitor, and adjust tube feedings and TPN/PPN based on assessed needs  - Assess need for intravenous fluids  - Provide specific nutrition/hydration education as appropriate  - Include patient/family/caregiver in decisions related to nutrition   2/14/2019 0026 by Laura Abebe RN  Outcome: Progressing  2/14/2019 0022 by Laura Abebe RN  Outcome: Adequate for Discharge     Problem: RESPIRATORY - ADULT  Goal: Achieves optimal ventilation and oxygenation  Description  INTERVENTIONS:  - Assess for changes in respiratory status  - Assess for changes in mentation and behavior  - Position to facilitate oxygenation and minimize respiratory effort  - Oxygen administration by appropriate delivery method based on oxygen saturation (per order) or ABGs  - Initiate smoking cessation education as indicated  - Encourage broncho-pulmonary hygiene including cough, deep breathe, Incentive Spirometry  - Assess the need for suctioning and aspirate as needed  - Assess and instruct to report SOB or any respiratory difficulty  - Respiratory Therapy support as indicated   2/14/2019 0026 by Laura Abebe RN  Outcome: Progressing  2/14/2019 0022 by Laura Abebe RN  Outcome: Adequate for Discharge     Problem: METABOLIC, FLUID AND ELECTROLYTES - ADULT  Goal: Electrolytes maintained within normal limits  Description  INTERVENTIONS:  - Monitor labs and assess patient for signs and symptoms of electrolyte imbalances  - Administer electrolyte replacement as ordered  - Monitor response to electrolyte replacements, including repeat lab results as appropriate  - Instruct patient on fluid and nutrition as appropriate   2/14/2019 0026 by Tra La RN  Outcome: Progressing  2/14/2019 0022 by Tra La RN  Outcome: Adequate for Discharge  Goal: Fluid balance maintained  Description  INTERVENTIONS:  - Monitor labs and assess for signs and symptoms of volume excess or deficit  - Monitor I/O and WT  - Instruct patient on fluid and nutrition as appropriate   2/14/2019 0026 by Tra La RN  Outcome: Progressing  2/14/2019 0022 by Tra La RN  Outcome: Adequate for Discharge  Goal: Glucose maintained within target range  Description  INTERVENTIONS:  - Monitor Blood Glucose as ordered  - Assess for signs and symptoms of hyperglycemia and hypoglycemia  - Administer ordered medications to maintain glucose within target range  - Assess nutritional intake and initiate nutrition service referral as needed   2/14/2019 0026 by Tra La RN  Outcome: Progressing  2/14/2019 0022 by Tra La RN  Outcome: Adequate for Discharge     Problem: SKIN/TISSUE INTEGRITY - ADULT  Goal: Skin integrity remains intact  Description  INTERVENTIONS  - Identify patients at risk for skin breakdown  - Assess and monitor skin integrity  - Assess and monitor nutrition and hydration status  - Monitor labs (i e  albumin)  - Assess for incontinence   - Turn and reposition patient  - Assist with mobility/ambulation  - Relieve pressure over bony prominences  - Avoid friction and shearing  - Provide appropriate hygiene as needed including keeping skin clean and dry  - Evaluate need for skin moisturizer/barrier cream  - Collaborate with interdisciplinary team (i e  Nutrition, Rehabilitation, etc )   - Patient/family teaching   2/14/2019 0026 by Radha Sanchez RN  Outcome: Progressing  2/14/2019 0022 by Radha Sanchez RN  Outcome: Adequate for Discharge  Goal: Incision(s), wounds(s) or drain site(s) healing without S/S of infection  Description  INTERVENTIONS  - Assess and document risk factors for skin impairment   - Assess and document dressing, incision, wound bed, drain sites and surrounding tissue  - Initiate Nutrition services consult and/or wound management as needed   2/14/2019 0026 by Radha Sanchez RN  Outcome: Progressing  2/14/2019 0022 by Radha Sanchez RN  Outcome: Adequate for Discharge  Goal: Oral mucous membranes remain intact  Description  INTERVENTIONS  - Assess oral mucosa and hygiene practices  - Implement preventative oral hygiene regimen  - Implement oral medicated treatments as ordered  - Initiate Nutrition services referral as needed   2/14/2019 0026 by Radha Sanchez RN  Outcome: Progressing  2/14/2019 0022 by Radha Sanchez RN  Outcome: Adequate for Discharge     Problem: HEMATOLOGIC - ADULT  Goal: Maintains hematologic stability  Description  INTERVENTIONS  - Assess for signs and symptoms of bleeding or hemorrhage  - Monitor labs  - Administer supportive blood products/factors as ordered and appropriate   2/14/2019 0026 by Radha Sanchez RN  Outcome: Progressing  2/14/2019 0022 by Radha Sanchez RN  Outcome: Adequate for Discharge     Problem: MUSCULOSKELETAL - ADULT  Goal: Maintain or return mobility to safest level of function  Description  INTERVENTIONS:  - Assess patient's ability to carry out ADLs; assess patient's baseline for ADL function and identify physical deficits which impact ability to perform ADLs (bathing, care of mouth/teeth, toileting, grooming, dressing, etc )  - Assess/evaluate cause of self-care deficits   - Assess range of motion  - Assess patient's mobility; develop plan if impaired  - Assess patient's need for assistive devices and provide as appropriate  - Encourage maximum independence but intervene and supervise when necessary  - Involve family in performance of ADLs  - Assess for home care needs following discharge   - Request OT consult to assist with ADL evaluation and planning for discharge  - Provide patient education as appropriate   2/14/2019 0026 by Herminia Steiner RN  Outcome: Progressing  2/14/2019 0022 by Herminia Steiner RN  Outcome: Adequate for Discharge  Goal: Maintain proper alignment of affected body part  Description  INTERVENTIONS:  - Support, maintain and protect limb and body alignment  - Provide pt/fam with appropriate education   2/14/2019 0026 by Herimnia Steiner RN  Outcome: Progressing  2/14/2019 0022 by Herminia Steiner RN  Outcome: Adequate for Discharge     Problem: DISCHARGE PLANNING - CARE MANAGEMENT  Goal: Discharge to post-acute care or home with appropriate resources  Description  INTERVENTIONS:  - Conduct assessment to determine patient/family and health care team treatment goals, and need for post-acute services based on payer coverage, community resources, and patient preferences, and barriers to discharge  - Address psychosocial, clinical, and financial barriers to discharge as identified in assessment in conjunction with the patient/family and health care team  - Arrange appropriate level of post-acute services according to patient?s   needs and preference and payer coverage in collaboration with the physician and health care team  - Communicate with and update the patient/family, physician, and health care team regarding progress on the discharge plan  - Arrange appropriate transportation to post-acute venues  2/14/2019 0026 by Herminia Steiner RN  Outcome: Progressing  2/14/2019 0022 by Herminia Steiner RN  Outcome: Adequate for Discharge

## 2019-02-14 NOTE — SOCIAL WORK
Cm met with pt and spouse Saleem Hawkquin to discuss dcp  They would like referral to [facility]  Cm made referral and contacted admissions office, spoke with rep who advised there are currently no STR beds available  Cm made Saleem Pham aware, also provided list of SNFs in and around [facility]  Will review with pt and his sons and get back to cm

## 2019-02-14 NOTE — SOCIAL WORK
Cm met with Padma Pierre and pt, requested referral to 1  [facility] 2  [facility] 3  [facility] 4  [facility] 5  [facility]  Referral sent via [facility]  Message left for [facility] at [phone] ext [phone] to see she could assist with placement  Cm received call from BOD[facility] at the [place], pt does not meet service connected criteria for SNF, would be covered for hospice placement  Cm made Padma Pierre aware and we will continue to pursue SNF placement

## 2019-02-14 NOTE — RESTORATIVE TECHNICIAN NOTE
Restorative Specialist Mobility Note       Activity: Bedrest, Dangle, Stand at bedside, Turn, Ambulate in room, Ambulate in abdalla     Assistive Device: Front wheel walker     Ambulation Response: Tolerated fairly well  Repositioned: Sitting, Up in chair              Anti-Embolism Device On:  Bilateral, Sequential compression devices, below knee

## 2019-02-14 NOTE — PLAN OF CARE
Problem: Potential for Falls  Goal: Patient will remain free of falls  Description  INTERVENTIONS:  - Assess patient frequently for physical needs  -  Identify cognitive and physical deficits and behaviors that affect risk of falls  -  [place] fall precautions as indicated by assessment   - Educate patient/family on patient safety including physical limitations  - Instruct patient to call for assistance with activity based on assessment  - Modify environment to reduce risk of injury  - Consider OT/PT consult to assist with strengthening/mobility   Outcome: Adequate for Discharge     Problem: Prexisting or High Potential for Compromised Skin Integrity  Goal: Skin integrity is maintained or improved  Description  INTERVENTIONS:  - Identify patients at risk for skin breakdown  - Assess and monitor skin integrity  - Assess and monitor nutrition and hydration status  - Monitor labs (i e  albumin)  - Assess for incontinence   - Turn and reposition patient  - Assist with mobility/ambulation  - Relieve pressure over bony prominences  - Avoid friction and shearing  - Provide appropriate hygiene as needed including keeping skin clean and dry  - Evaluate need for skin moisturizer/barrier cream  - Collaborate with interdisciplinary team (i e  Nutrition, Rehabilitation, etc )   - Patient/family teaching   Outcome: Adequate for Discharge     Problem: Nutrition/Hydration-ADULT  Goal: Nutrient/Hydration intake appropriate for improving, restoring or maintaining nutritional needs  Description  Monitor and assess patient's nutrition/hydration status for malnutrition (ex- brittle hair, bruises, dry skin, pale skin and conjunctiva, muscle wasting, smooth red tongue, and disorientation)  Collaborate with interdisciplinary team and initiate plan and interventions as ordered  Monitor patient's weight and dietary intake as ordered or per policy  Utilize nutrition screening tool and intervene per policy   Determine patient's food preferences and provide high-protein, high-caloric foods as appropriate       INTERVENTIONS:  - Monitor oral intake, urinary output, labs, and treatment plans  - Assess nutrition and hydration status and recommend course of action  - Evaluate amount of meals eaten  - Assist patient with eating if necessary   - Allow adequate time for meals  - Recommend/ encourage appropriate diets, oral nutritional supplements, and vitamin/mineral supplements  - Order, calculate, and assess calorie counts as needed  - Recommend, monitor, and adjust tube feedings and TPN/PPN based on assessed needs  - Assess need for intravenous fluids  - Provide specific nutrition/hydration education as appropriate  - Include patient/family/caregiver in decisions related to nutrition   Outcome: Adequate for Discharge     Problem: RESPIRATORY - ADULT  Goal: Achieves optimal ventilation and oxygenation  Description  INTERVENTIONS:  - Assess for changes in respiratory status  - Assess for changes in mentation and behavior  - Position to facilitate oxygenation and minimize respiratory effort  - Oxygen administration by appropriate delivery method based on oxygen saturation (per order) or ABGs  - Initiate smoking cessation education as indicated  - Encourage broncho-pulmonary hygiene including cough, deep breathe, Incentive Spirometry  - Assess the need for suctioning and aspirate as needed  - Assess and instruct to report SOB or any respiratory difficulty  - Respiratory Therapy support as indicated   Outcome: Adequate for Discharge     Problem: METABOLIC, FLUID AND ELECTROLYTES - ADULT  Goal: Electrolytes maintained within normal limits  Description  INTERVENTIONS:  - Monitor labs and assess patient for signs and symptoms of electrolyte imbalances  - Administer electrolyte replacement as ordered  - Monitor response to electrolyte replacements, including repeat lab results as appropriate  - Instruct patient on fluid and nutrition as appropriate   Outcome: Adequate for Discharge  Goal: Fluid balance maintained  Description  INTERVENTIONS:  - Monitor labs and assess for signs and symptoms of volume excess or deficit  - Monitor I/O and WT  - Instruct patient on fluid and nutrition as appropriate   Outcome: Adequate for Discharge  Goal: Glucose maintained within target range  Description  INTERVENTIONS:  - Monitor Blood Glucose as ordered  - Assess for signs and symptoms of hyperglycemia and hypoglycemia  - Administer ordered medications to maintain glucose within target range  - Assess nutritional intake and initiate nutrition service referral as needed   Outcome: Adequate for Discharge     Problem: SKIN/TISSUE INTEGRITY - ADULT  Goal: Skin integrity remains intact  Description  INTERVENTIONS  - Identify patients at risk for skin breakdown  - Assess and monitor skin integrity  - Assess and monitor nutrition and hydration status  - Monitor labs (i e  albumin)  - Assess for incontinence   - Turn and reposition patient  - Assist with mobility/ambulation  - Relieve pressure over bony prominences  - Avoid friction and shearing  - Provide appropriate hygiene as needed including keeping skin clean and dry  - Evaluate need for skin moisturizer/barrier cream  - Collaborate with interdisciplinary team (i e  Nutrition, Rehabilitation, etc )   - Patient/family teaching   Outcome: Adequate for Discharge  Goal: Incision(s), wounds(s) or drain site(s) healing without S/S of infection  Description  INTERVENTIONS  - Assess and document risk factors for skin impairment   - Assess and document dressing, incision, wound bed, drain sites and surrounding tissue  - Initiate Nutrition services consult and/or wound management as needed   Outcome: Adequate for Discharge  Goal: Oral mucous membranes remain intact  Description  INTERVENTIONS  - Assess oral mucosa and hygiene practices  - Implement preventative oral hygiene regimen  - Implement oral medicated treatments as ordered  - Initiate Nutrition services referral as needed   Outcome: Adequate for Discharge     Problem: HEMATOLOGIC - ADULT  Goal: Maintains hematologic stability  Description  INTERVENTIONS  - Assess for signs and symptoms of bleeding or hemorrhage  - Monitor labs  - Administer supportive blood products/factors as ordered and appropriate   Outcome: Adequate for Discharge     Problem: MUSCULOSKELETAL - ADULT  Goal: Maintain or return mobility to safest level of function  Description  INTERVENTIONS:  - Assess patient's ability to carry out ADLs; assess patient's baseline for ADL function and identify physical deficits which impact ability to perform ADLs (bathing, care of mouth/teeth, toileting, grooming, dressing, etc )  - Assess/evaluate cause of self-care deficits   - Assess range of motion  - Assess patient's mobility; develop plan if impaired  - Assess patient's need for assistive devices and provide as appropriate  - Encourage maximum independence but intervene and supervise when necessary  - Involve family in performance of ADLs  - Assess for home care needs following discharge   - Request OT consult to assist with ADL evaluation and planning for discharge  - Provide patient education as appropriate   Outcome: Adequate for Discharge  Goal: Maintain proper alignment of affected body part  Description  INTERVENTIONS:  - Support, maintain and protect limb and body alignment  - Provide pt/fam with appropriate education   Outcome: Adequate for Discharge     Problem: DISCHARGE PLANNING - CARE MANAGEMENT  Goal: Discharge to post-acute care or home with appropriate resources  Description  INTERVENTIONS:  - Conduct assessment to determine patient/family and health care team treatment goals, and need for post-acute services based on payer coverage, community resources, and patient preferences, and barriers to discharge  - Address psychosocial, clinical, and financial barriers to discharge as identified in assessment in conjunction with the patient/family and health care team  - Arrange appropriate level of post-acute services according to patient?s   needs and preference and payer coverage in collaboration with the physician and health care team  - Communicate with and update the patient/family, physician, and health care team regarding progress on the discharge plan  - Arrange appropriate transportation to post-acute venues  Outcome: Adequate for Discharge

## 2019-02-14 NOTE — PROGRESS NOTES
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    Atrial fibrillation    Diabetes mellitus type 2    Vision abnormalities with right-sided weakness    COPD    Anxiety and depression    Right sided weakness    Pulmonary vascular congestion    Abnormal LFTs    Goals of care, counseling/discussion      Plan:    1  Progressive Generalized weakness, fatigue, confusion and physical deconditioning, improving,  multifactorial, likely secondary to cancer progression and new chemotherapy agents, brain MRI with stable right occipital lobe infarct, continue with supportive care, PT recommending home with family support, palliative Care is following   2  Hepatocellular carcinoma with progression of adrenal and pulmonary metastasis, Oncology and palliative Care are following, poor prognosis, hospice recommended  3  Esophageal cancer s/p  esophagectomy with chemo and radiation  4  Severe COPD without exacerbation, continue Mucinex and bronchodilators   5  Atrial fibrillation, heart rate controlled on amiodarone, and Eliquis  6  DM type 2, last A1c 8 9, blood sugar acceptable on ISS  7  Anxiety and depression, continue Zoloft  8  Transaminitis, elevated ammonia low,  acute/subacute liver failure,  possibly secondary to progression of hepatocellular carcinoma vs medication side effects, liver US report reviewed, continue lactulose, continue to monitor  9  Sleep apnea  10  Elevated TSH, with normal free T4, repeat study  11   Urinary retention, insert Wiley catheter       VTE Pharmacologic Prophylaxis:   Pharmacologic: Apixaban (Eliquis)  Mechanical VTE Prophylaxis in Place: Yes    Patient Centered Rounds: I have performed bedside rounds with nursing staff today  Discussions with Specialists or Other Care Team Provider:     Education and Discussions with Family / Patient:  Patient    Time Spent for Care: 30 minutes  More than 50% of total time spent on counseling and coordination of care as described above  Current Length of Stay: 5 day(s)    Current Patient Status: Inpatient   Certification Statement: The patient will continue to require additional inpatient hospital stay due to Management of physical deconditioning    Discharge Plan / Estimated Discharge Date: not ready yet    Code Status: Level 3 - DNAR and DNI      Subjective:   Patient seen and examined  Comfortable in chair  He feels stronger  Urinary retention will  require Wiley catheter  Had a family meeting yesterday      Objective:     Vitals:   Temp (24hrs), Av 8 °F (36 6 °C), Min:97 5 °F (36 4 °C), Max:98 2 °F (36 8 °C)    Temp:  [97 5 °F (36 4 °C)-98 2 °F (36 8 °C)] 98 2 °F (36 8 °C)  HR:  [71-86] 86  Resp:  [18-20] 18  BP: (117-128)/(68-70) 117/69  SpO2:  [91 %-96 %] 96 %  Body mass index is 28 67 kg/m²  Input and Output Summary (last 24 hours):        Intake/Output Summary (Last 24 hours) at 2019 0944  Last data filed at 2019 0207  Gross per 24 hour   Intake 300 ml   Output 1010 ml   Net -710 ml       Physical Exam:     Physical Exam    Patient is more awake today, in no acute distress  Sitting in chair  Lung with decreased breath sounds bilateral  Heart positive S1-S2 no murmur  Abdomen soft nontender positive bowel sounds  Lower extremities no edema    Additional Data:     Labs:    Results from last 7 days   Lab Units 19  1155   WBC Thousand/uL 7 25   HEMOGLOBIN g/dL 16 6   HEMATOCRIT % 51 3*   PLATELETS Thousands/uL 110*   NEUTROS PCT % 82*   LYMPHS PCT % 8*   MONOS PCT % 9   EOS PCT % 1     Results from last 7 days   Lab Units 19  1155   POTASSIUM mmol/L 3 7   CHLORIDE mmol/L 106   CO2 mmol/L 27   BUN mg/dL 11   CREATININE mg/dL 0 56*   CALCIUM mg/dL 7 6*   ALK PHOS U/L 643*   ALT U/L 60   AST U/L 99*           * I Have Reviewed All Lab Data Listed Above  * Additional Pertinent Lab Tests Reviewed: [place] [facility] Admission Reviewed    Imaging:    Imaging Reports Reviewed Today Include:   Imaging Personally Reviewed by Myself Includes:     Recent Cultures (last 7 days):           Last 24 Hours Medication List:     Current Facility-Administered Medications:  albuterol 2 5 mg Nebulization Q4H PRN Anoop Cerda MD   allopurinol 100 mg Oral Daily Anoop Cerda MD   amiodarone 200 mg Oral Daily Anoop Cerda MD   apixaban 5 mg Oral BID Anoop Cerda MD   vitamin C 1,000 mg Oral Daily Anoop Cerda MD   bisacodyl 5 mg Oral Daily PRN Anoop Cerda MD   calcium carbonate 750 mg Oral Daily PRN Anoop Cerda MD   cyanocobalamin 1,000 mcg Oral Daily Anoop Cerda MD   diazepam 5 mg Oral Q6H PRN Anoop Cerda MD   famotidine 20 mg Oral HS Anoop Cerda MD   ferrous sulfate 325 mg Oral Daily Anoop Cerda MD   guaiFENesin 1,200 mg Oral Q12H [facility] Celia Michael DO   hydrOXYzine HCL 25 mg Oral BID PRN Anoop Cerda MD   insulin lispro 1-5 Units Subcutaneous HS Anoop Cerda MD   insulin lispro 1-6 Units Subcutaneous TID AC Shalini Dwyer MD   ipratropium 0 5 mg Nebulization 4x Daily Anoop Cerda MD   lactulose 20 g Oral BID Shalini Dwyer MD   latanoprost 1 drop Both Eyes HS Anoop Cerda MD   metoclopramide 10 mg Oral 4x Daily Anoop Cerda MD   modafinil 100 mg Oral Daily Palka Huang DO   nystatin  Topical BID Celia Michael DO   rOPINIRole 3 mg Oral HS Anoop Cerda MD   sertraline 25 mg Oral HS Anoop Cerda MD   traZODone 50 mg Oral HS Anoop Cerda MD        Today, Patient Was Seen By: Celia Michael DO    ** Please Note: This note has been constructed using a voice recognition system   **

## 2019-02-14 NOTE — PLAN OF CARE
Problem: PHYSICAL THERAPY ADULT  Goal: Performs mobility at highest level of function for planned discharge setting  See evaluation for individualized goals  Description  Treatment/Interventions: Functional transfer training, LE strengthening/ROM, Elevations, Therapeutic exercise, Endurance training, Cognitive reorientation, Patient/family training, Equipment eval/education, Bed mobility, Gait training, Compensatory technique education, Continued evaluation, Spoke to nursing  Equipment Recommended: Hien Griffin       See flowsheet documentation for full assessment, interventions and recommendations     Outcome: Progressing

## 2019-02-14 NOTE — PHYSICAL THERAPY NOTE
Physical Therapy Progress Note     02/14/19 1500   Pain Assessment   Pain Assessment No/denies pain   Pain Score No Pain   Restrictions/Precautions   Other Precautions Fall Risk;O2   Subjective   Subjective The pt  is eager to go for a walk  Bed Mobility   Supine to Sit 3  Moderate assistance   Additional items Assist x 1; Increased time required;LE management;Verbal cues   Sit to Supine 4  Minimal assistance   Additional items Assist x 1; Increased time required;Verbal cues;LE management   Transfers   Sit to Stand 4  Minimal assistance   Additional items Assist x 1; Increased time required;Verbal cues   Stand to Sit 4  Minimal assistance   Additional items Assist x 1; Increased time required;Verbal cues   Ambulation/Elevation   Gait pattern Excessively slow;Decreased foot clearance; Short stride; Shuffling   Gait Assistance 4  Minimal assist   Additional items Verbal cues; Assist x 1   Assistive Device Rolling walker   Distance 110 feet x 2  Balance   Static Sitting Fair   Static Standing Fair   Ambulatory Poor +   Activity Tolerance   Activity Tolerance Patient tolerated treatment well   Nurse Made Aware Yes  Assessment   Prognosis Good   Problem List Decreased strength;Decreased range of motion;Decreased endurance; Impaired balance;Decreased mobility; Decreased coordination; Impaired judgement;Decreased safety awareness;Pain;Orthopedic restrictions;Decreased skin integrity   Assessment The pt  continues to require increased time to ambulate, and he continues to require assistance  He did require increased time to manuever around obstacles as well  He was unable to perform dynamic head and trunk movements with gait, but he was able to complete them while standing  He remains limited from his baseline, and he will benefit from continued physical therapy in order to maximize his functional mobility and independence     Barriers to Discharge Inaccessible home environment;Decreased caregiver support   Goals   Patient Goals To get stronger  STG Expiration Date 02/19/19   Treatment Day 2   Plan   Treatment/Interventions Functional transfer training;LE strengthening/ROM; Therapeutic exercise;Patient/family training;Bed mobility;Gait training   Progress Progressing toward goals   PT Frequency   (3-5x a week )   Recommendation   Recommendation Home PT; Home with family support  (With continued progress   otherwise rehab )   Equipment Recommended Brayden Palmer PTA

## 2019-02-14 NOTE — PROGRESS NOTES
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    Atrial fibrillation    Diabetes mellitus type 2    Vision abnormalities with right-sided weakness    Restless legs syndrome    Rheumatoid arthritis ([address] )    Sleep apnea    Hyperlipidemia    Hypertension    Peripheral neuropathy    Glaucoma    Gout    Incisional hernia    COPD    Anxiety and depression    History of stroke    Mild cognitive impairment    Nutritional deficiency    Coronary artery disease    Active advance directive    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)    Pulmonary metastases (HCC)    Physical deconditioning    Transaminitis    Right sided weakness    Pulmonary vascular congestion    Abnormal LFTs    Fatigue    Goals of care, counseling/discussion    Urinary retention   patient seen with wife present  Feeling much better and has been able to ambulate  PT feels he is safe to return home  He is hoping to continue to feel better and get stronger  He has a goal of making a plan cruise in March  He is wanting to continue treatment but does verbalize an understanding that treatment is what caused this hospitalization and is more likely to shorten his life than to prolong it  He verbalizes understanding that his disease is not curable  He denies pain or acute symptoms  Plan:   Home with PT and OT as he wants to go on cruise in March  Medically appropriate for hospice but he is not there yet       Pain History  Current pain location(s):   Pain Scale:     Severity:   Quality:   Current Analgesic regimen:   24 hour history:     Meds/Allergies   all current active meds have been reviewed and current meds:   Current Facility-Administered Medications   Medication Dose Route Frequency    albuterol inhalation solution 2 5 mg  2 5 mg Nebulization Q4H PRN    allopurinol (ZYLOPRIM) tablet 100 mg  100 mg Oral Daily    amiodarone tablet 200 mg  200 mg Oral Daily    apixaban (ELIQUIS) tablet 5 mg  5 mg Oral BID    ascorbic acid (VITAMIN C) tablet 1,000 mg  1,000 mg Oral Daily    bisacodyl (DULCOLAX) EC tablet 5 mg  5 mg Oral Daily PRN    calcium carbonate (TUMS) chewable tablet 750 mg  750 mg Oral Daily PRN    cyanocobalamin (VITAMIN B-12) tablet 1,000 mcg  1,000 mcg Oral Daily    diazepam (VALIUM) tablet 5 mg  5 mg Oral Q6H PRN    famotidine (PEPCID) tablet 20 mg  20 mg Oral HS    ferrous sulfate tablet 325 mg  325 mg Oral Daily    fluticasone-vilanterol (BREO ELLIPTA) 100-25 mcg/inh inhaler 1 puff  1 puff Inhalation Daily    guaiFENesin (MUCINEX) 12 hr tablet 1,200 mg  1,200 mg Oral Q12H ABBIE    hydrOXYzine HCL (ATARAX) tablet 25 mg  25 mg Oral BID PRN    insulin lispro (HumaLOG) 100 units/mL subcutaneous injection 1-5 Units  1-5 Units Subcutaneous HS    insulin lispro (HumaLOG) 100 units/mL subcutaneous injection 1-6 Units  1-6 Units Subcutaneous TID AC    ipratropium (ATROVENT) 0 02 % inhalation solution 0 5 mg  0 5 mg Nebulization 4x Daily    lactulose 20 g/30 mL oral solution 20 g  20 g Oral BID    latanoprost (XALATAN) 0 005 % ophthalmic solution 1 drop  1 drop Both Eyes HS    metoclopramide (REGLAN) tablet 10 mg  10 mg Oral 4x Daily    modafinil (PROVIGIL) tablet 100 mg  100 mg Oral Daily    nystatin (MYCOSTATIN) powder   Topical BID    rOPINIRole (REQUIP) tablet 3 mg  3 mg Oral HS    sertraline (ZOLOFT) tablet 25 mg  25 mg Oral HS    sodium chloride 0 9 % infusion  75 mL/hr Intravenous Continuous    traZODone (DESYREL) tablet 50 mg  50 mg Oral HS     Pain Management Medications:      Allergies   Allergen Reactions    Humira [Adalimumab] Rash     itchy    Levaquin [Levofloxacin In D5w] Rash    Levofloxacin Rash    Lovenox [Enoxaparin] Rash and Hives       Objective Temp:  [97 5 °F (36 4 °C)-98 2 °F (36 8 °C)] 98 2 °F (36 8 °C)  HR:  [71-86] 86  Resp:  [18-20] 18  BP: (117-128)/(68-70) 117/69      Intake/Output Summary (Last 24 hours) at 2/14/2019 1121  Last data filed at 2/14/2019 0207  Gross per 24 hour   Intake 300 ml   Output 1010 ml   Net -710 ml       Physical Exam: /65 (BP Location: Right arm)   Pulse 67   Temp (!) 97 4 °F (36 3 °C) (Oral)   Resp 18   Ht 5' 10" (1 778 m)   Wt 90 6 kg (199 lb 12 8 oz)   SpO2 97%   BMI 28 67 kg/m²   General appearance: alert and oriented, in no acute distress, cooperative and no distress  Lungs: diminished breath sounds  Heart: irregularly irregular rhythm  Abdomen: soft, non-tender; bowel sounds normal; no masses,  no organomegaly  Extremities: extremities normal, warm and well-perfused; no cyanosis, clubbing, or edema  Lab Results:   I have personally reviewed pertinent labs  , CBC: , CMP:   Lab Results   Component Value Date    SODIUM 138 02/15/2019    K 3 6 02/15/2019     02/15/2019    CO2 28 02/15/2019    BUN 12 02/15/2019    CREATININE 0 58 (L) 02/15/2019    CALCIUM 7 6 (L) 02/15/2019    AST 86 (H) 02/15/2019    ALT 54 02/15/2019    ALKPHOS 567 (H) 02/15/2019    EGFR 100 02/15/2019     Imaging Studies: I have personally reviewed pertinent reports  and I have personally reviewed pertinent films in PACS  EKG, Pathology, and Other Studies:     Counseling / Coordination of Care  Total floor / unit time spent today 30 minutes  Greater than 50% of total time was spent with the patient and / or family counseling and / or coordination of care   A description of the counseling / coordination of care: discussed with Dr Kendall Steiner

## 2019-02-14 NOTE — SOCIAL WORK
BONYW met with patient and wife to discuss their thoughts and feelings  Cherelle Portillo continues to work and is concerned about patient returning to their home  Patient would be alone the majority of the day except for when [facility] would visit  Patient would like to get as strong as possible prior to going back home  He feels that the physical therapy he has received here in the hospital has been helpful  They are both in agreement to an inpatient rehabilitation stay  The facility would hopefully be much closer to their home which would allow Cherelle Portillo to continue to work and visit often  Following his rehabilitation they would discuss next steps - possibly hospice  Patient's goals are to go on a cruise and shot his 20

## 2019-02-14 NOTE — PHYSICAL THERAPY NOTE
Physical Therapy Progress Note     02/13/19 1725   Pain Assessment   Pain Assessment No/denies pain   Pain Score No Pain   Restrictions/Precautions   Other Precautions Fall Risk   Subjective   Subjective The pt  states that he is eager to get up and walk  Bed Mobility   Supine to Sit 4  Minimal assistance   Additional items Assist x 1; Increased time required   Transfers   Sit to Stand 3  Moderate assistance   Additional items Assist x 1; Increased time required   Stand to Sit 4  Minimal assistance   Additional items Assist x 1   Ambulation/Elevation   Gait pattern Excessively slow; Short stride; Inconsistent margarito; Forward Flexion   Gait Assistance 4  Minimal assist   Additional items Assist x 1;Verbal cues   Assistive Device Rolling walker   Distance 370 feet with 8 standing rests  Balance   Static Sitting Fair   Static Standing Fair   Ambulatory Poor +   Activity Tolerance   Activity Tolerance Patient tolerated treatment well   Assessment   Prognosis Good   Problem List Decreased strength;Decreased range of motion;Decreased endurance; Impaired balance;Decreased mobility; Decreased coordination; Impaired judgement;Decreased safety awareness;Pain;Orthopedic restrictions;Decreased skin integrity   Assessment The pt  has improved mobility today as he was able to ambulate much farther  He did fatigue, but he recovered with multiple standing rests  He does require increased time to ambulate, and he had increasing forward flexion and difficulty steering the walker as he fatigued  Will continue to follow in order to safely progress his functional mobility and independence  Barriers to Discharge Inaccessible home environment   Goals   Patient Goals To keep moving  STG Expiration Date 02/19/19   Treatment Day 1   Plan   Treatment/Interventions Functional transfer training;LE strengthening/ROM; Therapeutic exercise; Endurance training;Patient/family training;Bed mobility;Gait training;Elevations   Progress Progressing toward goals   PT Frequency   (3-5x a week )   Recommendation   Recommendation Home PT; Home with family support  (With progress )   Equipment Recommended Walker   PT - OK to Discharge No     Marifer Michelle, PTA

## 2019-02-15 LAB
ALBUMIN SERPL BCP-MCNC: 1.7 G/DL (ref 3.5–5)
ALP SERPL-CCNC: 567 U/L (ref 46–116)
ALT SERPL W P-5'-P-CCNC: 54 U/L (ref 12–78)
ANION GAP SERPL CALCULATED.3IONS-SCNC: 7 MMOL/L (ref 4–13)
AST SERPL W P-5'-P-CCNC: 86 U/L (ref 5–45)
BILIRUB SERPL-MCNC: 3.35 MG/DL (ref 0.2–1)
BUN SERPL-MCNC: 12 MG/DL (ref 5–25)
CALCIUM SERPL-MCNC: 7.6 MG/DL (ref 8.3–10.1)
CHLORIDE SERPL-SCNC: 103 MMOL/L (ref 100–108)
CO2 SERPL-SCNC: 28 MMOL/L (ref 21–32)
CREAT SERPL-MCNC: 0.58 MG/DL (ref 0.6–1.3)
GFR SERPL CREATININE-BSD FRML MDRD: 100 ML/MIN/1.73SQ M
GLUCOSE SERPL-MCNC: 112 MG/DL (ref 65–140)
GLUCOSE SERPL-MCNC: 117 MG/DL (ref 65–140)
GLUCOSE SERPL-MCNC: 120 MG/DL (ref 65–140)
GLUCOSE SERPL-MCNC: 177 MG/DL (ref 65–140)
GLUCOSE SERPL-MCNC: 352 MG/DL (ref 65–140)
POTASSIUM SERPL-SCNC: 3.6 MMOL/L (ref 3.5–5.3)
PROT SERPL-MCNC: 6.2 G/DL (ref 6.4–8.2)
SODIUM SERPL-SCNC: 138 MMOL/L (ref 136–145)
TSH SERPL DL<=0.05 MIU/L-ACNC: 4.83 UIU/ML (ref 0.36–3.74)

## 2019-02-15 PROCEDURE — 97112 NEUROMUSCULAR REEDUCATION: CPT

## 2019-02-15 PROCEDURE — 97535 SELF CARE MNGMENT TRAINING: CPT

## 2019-02-15 PROCEDURE — 94760 N-INVAS EAR/PLS OXIMETRY 1: CPT

## 2019-02-15 PROCEDURE — 94640 AIRWAY INHALATION TREATMENT: CPT

## 2019-02-15 PROCEDURE — 97530 THERAPEUTIC ACTIVITIES: CPT

## 2019-02-15 PROCEDURE — 99232 SBSQ HOSP IP/OBS MODERATE 35: CPT | Performed by: INTERNAL MEDICINE

## 2019-02-15 PROCEDURE — 82948 REAGENT STRIP/BLOOD GLUCOSE: CPT

## 2019-02-15 PROCEDURE — 80053 COMPREHEN METABOLIC PANEL: CPT | Performed by: INTERNAL MEDICINE

## 2019-02-15 PROCEDURE — 84443 ASSAY THYROID STIM HORMONE: CPT | Performed by: INTERNAL MEDICINE

## 2019-02-15 PROCEDURE — 97116 GAIT TRAINING THERAPY: CPT

## 2019-02-15 RX ADMIN — FERROUS SULFATE TAB 325 MG (65 MG ELEMENTAL FE) 325 MG: 325 (65 FE) TAB at 09:21

## 2019-02-15 RX ADMIN — AMIODARONE HYDROCHLORIDE 200 MG: 200 TABLET ORAL at 09:22

## 2019-02-15 RX ADMIN — LACTULOSE 20 G: 10 SOLUTION ORAL at 09:22

## 2019-02-15 RX ADMIN — FAMOTIDINE 20 MG: 20 TABLET, FILM COATED ORAL at 21:42

## 2019-02-15 RX ADMIN — TRAZODONE HYDROCHLORIDE 50 MG: 50 TABLET ORAL at 21:42

## 2019-02-15 RX ADMIN — ALBUTEROL SULFATE 2.5 MG: 2.5 SOLUTION RESPIRATORY (INHALATION) at 03:48

## 2019-02-15 RX ADMIN — LATANOPROST 1 DROP: 50 SOLUTION OPHTHALMIC at 21:43

## 2019-02-15 RX ADMIN — FLUTICASONE FUROATE AND VILANTEROL TRIFENATATE 1 PUFF: 100; 25 POWDER RESPIRATORY (INHALATION) at 09:22

## 2019-02-15 RX ADMIN — NYSTATIN: 100000 POWDER TOPICAL at 17:12

## 2019-02-15 RX ADMIN — METOCLOPRAMIDE HYDROCHLORIDE 10 MG: 10 TABLET ORAL at 09:21

## 2019-02-15 RX ADMIN — METOCLOPRAMIDE HYDROCHLORIDE 10 MG: 10 TABLET ORAL at 17:11

## 2019-02-15 RX ADMIN — ALBUTEROL SULFATE 2.5 MG: 2.5 SOLUTION RESPIRATORY (INHALATION) at 11:41

## 2019-02-15 RX ADMIN — MODAFINIL 100 MG: 100 TABLET ORAL at 09:34

## 2019-02-15 RX ADMIN — ROPINIROLE 3 MG: 2 TABLET, FILM COATED ORAL at 21:42

## 2019-02-15 RX ADMIN — METOCLOPRAMIDE HYDROCHLORIDE 10 MG: 10 TABLET ORAL at 21:41

## 2019-02-15 RX ADMIN — IPRATROPIUM BROMIDE 0.5 MG: 0.5 SOLUTION RESPIRATORY (INHALATION) at 11:40

## 2019-02-15 RX ADMIN — LACTULOSE 20 G: 10 SOLUTION ORAL at 17:11

## 2019-02-15 RX ADMIN — METOCLOPRAMIDE HYDROCHLORIDE 10 MG: 10 TABLET ORAL at 12:30

## 2019-02-15 RX ADMIN — NYSTATIN: 100000 POWDER TOPICAL at 09:22

## 2019-02-15 RX ADMIN — CYANOCOBALAMIN TAB 500 MCG 1000 MCG: 500 TAB at 09:21

## 2019-02-15 RX ADMIN — SERTRALINE HYDROCHLORIDE 25 MG: 25 TABLET ORAL at 21:41

## 2019-02-15 RX ADMIN — OXYCODONE HYDROCHLORIDE AND ACETAMINOPHEN 1000 MG: 500 TABLET ORAL at 09:21

## 2019-02-15 RX ADMIN — APIXABAN 5 MG: 5 TABLET, FILM COATED ORAL at 17:11

## 2019-02-15 RX ADMIN — IPRATROPIUM BROMIDE 0.5 MG: 0.5 SOLUTION RESPIRATORY (INHALATION) at 19:16

## 2019-02-15 RX ADMIN — GUAIFENESIN 1200 MG: 600 TABLET, EXTENDED RELEASE ORAL at 21:41

## 2019-02-15 RX ADMIN — ALBUTEROL SULFATE 2.5 MG: 2.5 SOLUTION RESPIRATORY (INHALATION) at 07:58

## 2019-02-15 RX ADMIN — INSULIN LISPRO 6 UNITS: 100 INJECTION, SOLUTION INTRAVENOUS; SUBCUTANEOUS at 17:11

## 2019-02-15 RX ADMIN — GUAIFENESIN 1200 MG: 600 TABLET, EXTENDED RELEASE ORAL at 09:21

## 2019-02-15 RX ADMIN — APIXABAN 5 MG: 5 TABLET, FILM COATED ORAL at 09:21

## 2019-02-15 RX ADMIN — IPRATROPIUM BROMIDE 0.5 MG: 0.5 SOLUTION RESPIRATORY (INHALATION) at 07:58

## 2019-02-15 RX ADMIN — ALLOPURINOL 100 MG: 100 TABLET ORAL at 09:20

## 2019-02-15 NOTE — PHYSICAL THERAPY NOTE
Physical Therapy Progress Note     02/15/19 1545   Pain Assessment   Pain Assessment No/denies pain   Pain Score No Pain   Restrictions/Precautions   Other Precautions Fall Risk   Subjective   Subjective The pt  states that he did not sleep well, and that he is very tired  He wanted to trial getting in and out of the bathroom as well as the chair with his rollator  Bed Mobility   Supine to Sit 4  Minimal assistance   Additional items Assist x 1; Increased time required;HOB elevated;LE management;Verbal cues   Sit to Supine 4  Minimal assistance   Additional items Assist x 1; Increased time required;Verbal cues;LE management   Transfers   Sit to Stand 4  Minimal assistance  (Max assist from the low toilet )   Additional items Assist x 1; Increased time required;Verbal cues   Stand to Sit 4  Minimal assistance   Additional items Assist x 1; Increased time required;Verbal cues   Ambulation/Elevation   Gait pattern Excessively slow; Step to; Inconsistent margarito;Decreased foot clearance; Forward Flexion   Gait Assistance 4  Minimal assist   Additional items Assist x 1;Verbal cues; Tactile cues   Assistive Device 4-wheeled walker   Distance 10 feet, 120 feet, 20 feet  Balance   Static Sitting Fair   Dynamic Sitting Fair -   Static Standing Fair   Ambulatory Poor +   Activity Tolerance   Activity Tolerance Patient tolerated treatment well;Patient limited by fatigue   Nurse [phone] RN  Exercises   Balance training  Standing reaching activities with rollator support forward and bilaterally  Assessment   Prognosis Good   Problem List Decreased strength;Decreased range of motion;Decreased endurance; Impaired balance;Decreased mobility; Decreased coordination; Impaired judgement;Decreased safety awareness;Pain;Orthopedic restrictions;Decreased skin integrity   Assessment The pt  was fatigued today, but he was able to manuever around the room and the bathroom with his rollator   He demonstrated appropriate use of the hand brakes after instruction as well as proper use of the rollator  He was more limited today due to his fatigue  Will continue to follow in order to maximize his functional independence  Barriers to Discharge Inaccessible home environment;Decreased caregiver support   Goals   Patient Goals To get as strong as he can, and enjoy the last 2-3 months that he has left  [facility] Expiration Date 02/19/19   Treatment Day 3   Plan   Treatment/Interventions Functional transfer training;LE strengthening/ROM; Therapeutic exercise; Endurance training;Patient/family training;Bed mobility;Gait training   Progress Progressing toward goals   PT Frequency   (3-5x a week )   Recommendation   Recommendation Post acute IP rehab  (pending continued progress )   Equipment Recommended Priyank Diaz, PTA

## 2019-02-15 NOTE — OCCUPATIONAL THERAPY NOTE
Occupational Therapy Treatment Note:       02/15/19 1544   Restrictions/Precautions   Other Precautions O2;Fall Risk   Pain Assessment   Pain Assessment No/denies pain   Pain Score No Pain   ADL   Where Assessed Other (Comment)  (supine and seated)   Grooming Assistance 4  Minimal Assistance   Grooming Deficit Setup;Verbal cueing; Increased time to complete;Wash/dry hands; Wash/dry face   Grooming Comments   (seated)   UB Bathing Assistance 3  Moderate Assistance   UB Bathing Deficit Setup;Verbal cueing;Supervision/safety; Increased time to complete; Chest;Right arm;Left arm   UB Bathing Comments   (assist with back)   LB Bathing Assistance 2  Maximal Assistance   LB Bathing Deficit Setup;Verbal cueing;Supervision/safety; Increased time to complete;Perineal area; Buttocks;Right lower leg including foot; Left lower leg including foot   LB Bathing Comments   (seated and in stance)   UB Dressing Assistance 4  Minimal Assistance   UB Dressing Deficit Setup;Verbal cueing;Supervision/safety; Increased time to complete; Thread RUE; Thread LUE   UB Dressing Comments   (seated)   LB Dressing Assistance 2  Maximal Assistance   LB Dressing Deficit Setup; Requires assistive device for steadying;Verbal cueing;Supervision/safety; Increased time to complete; Don/doff R sock; Don/doff L sock; Thread RLE into pants; Thread LLE into pants   LB Dressing Comments   (seated and in stance)   Toileting Assistance  3  Moderate Assistance   Toileting Deficit Setup;Verbal cueing;Supervison/safety; Increased time to complete;Grab bar use   Toileting Comments   (provided with elevated toilet seat and rails)   Transfers   Sit to Stand 4  Minimal assistance   Additional items Assist x 1; Increased time required;Verbal cues   Stand to Sit 4  Minimal assistance   Additional items Assist x 1; Increased time required;Verbal cues   Cognition   Overall Cognitive Status Impaired   Arousal/Participation Alert; Cooperative   Attention Attends with cues to redirect Orientation Level Oriented X4   Memory Decreased recall of recent events;Decreased recall of precautions   Following Commands Follows one step commands without difficulty   Comments cooperative and motivated throughout session   Activity Tolerance   Activity Tolerance Patient tolerated treatment well  (with recommended rest periods)   Medical Staff Made Aware ok to see per RN   Assessment   Assessment Patient participated in skilled OT with focus on ADL skill performance, bed mobility skills, bathing, dressing, transfer skill training OOB and to elelvated toilet with rails  Patient presents seated EOB agreeable to engage in OT  Patient with fair+ functional seated balance EOB  Patient [facility])  Patient required assist levels as documented  Patient would benefit from [address] (pending progress) with focus on increasing functional strength, increasing functional independence with transfer skills, increase independence with adl/self care task performance for carryover into his daily routine      Plan   Treatment Interventions ADL retraining;Functional transfer training   Goal Expiration Date 02/19/19   Treatment Day 1   OT Frequency 3-5x/wk   Recommendation   OT Discharge Recommendation Short Term Rehab  (pending progress)   OT - OK to Discharge   (when medically cleared)   Mariann Daniel

## 2019-02-15 NOTE — PROGRESS NOTES
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    Atrial fibrillation    Diabetes mellitus type 2    Vision abnormalities with right-sided weakness    COPD    Anxiety and depression    Right sided weakness    Pulmonary vascular congestion    Abnormal LFTs    Goals of care, counseling/discussion    Urinary retention      Plan:    1  Progressive Generalized weakness, fatigue, confusion and physical deconditioning, improving,  multifactorial, likely secondary to cancer progression and new chemotherapy agents, brain MRI with stable right occipital lobe infarct, continue with supportive care,  palliative Care is following, awaiting rehab placement  2  Hepatocellular carcinoma with progression of adrenal and pulmonary metastasis, Oncology and palliative Care are following, poor prognosis, hospice recommended  3  Esophageal cancer s/p  esophagectomy with chemo and radiation  4  Severe COPD without exacerbation, continue Mucinex and bronchodilators   5  Atrial fibrillation, heart rate controlled on amiodarone, and Eliquis  6  DM type 2, last A1c 8 9, blood sugar acceptable on ISS  7  Anxiety and depression, continue Zoloft  8  Transaminitis, elevated ammonia ,  acute/subacute liver failure,  possibly secondary to progression of hepatocellular carcinoma vs medication side effects, liver US report reviewed, continue lactulose, continue to monitor  9  Sleep apnea  10   Urinary retention, continue with Wiley catheter, voiding trial when but rehab       VTE Pharmacologic Prophylaxis:   Pharmacologic: Apixaban (Eliquis)  Mechanical VTE Prophylaxis in Place: Yes    Patient Centered Rounds: I have performed bedside rounds with nursing staff today  Discussions with Specialists or Other Care Team Provider:     Education and Discussions with Family / Patient:  Patient and his wife at bedside    Time Spent for Care: 30 minutes  More than 50% of total time spent on counseling and coordination of care as described above  Current Length of Stay: 6 day(s)    Current Patient Status: Inpatient   Certification Statement: The patient will continue to require additional inpatient hospital stay due to Management of physical deconditioning    Discharge Plan / Estimated Discharge Date:  Hopefully rehab tomorrow    Code Status: Level 3 - DNAR and DNI      Subjective:   Patient seen and examined  Comfortable in  Bed  Clinically improving  Wiley catheter inserted  Started on IV fluid due to low urine output  No chest pain or shortness of breath  Wife at bedside      Objective:     Vitals:   Temp (24hrs), Av 8 °F (36 6 °C), Min:97 4 °F (36 3 °C), Max:98 °F (36 7 °C)    Temp:  [97 4 °F (36 3 °C)-98 °F (36 7 °C)] 97 4 °F (36 3 °C)  HR:  [67-82] 67  Resp:  [18-20] 18  BP: (110-124)/(64-65) 112/65  SpO2:  [92 %-98 %] 98 %  Body mass index is 28 67 kg/m²  Input and Output Summary (last 24 hours):        Intake/Output Summary (Last 24 hours) at 2/15/2019 0919  Last data filed at 2/15/2019 8027  Gross per 24 hour   Intake 1050 ml   Output 605 ml   Net 445 ml       Physical Exam:     Physical Exam    Patient is  awake alert in no acute distress   Lung with decreased breath sounds bilateral  Heart positive S1-S2 no murmur  Abdomen soft nontender positive bowel sounds  Wiley catheter in place  Lower extremities no edema    Additional Data:     Labs:    Results from last 7 days   Lab Units 19  1155   WBC Thousand/uL 7 25   HEMOGLOBIN g/dL 16 6   HEMATOCRIT % 51 3*   PLATELETS Thousands/uL 110*   NEUTROS PCT % 82*   LYMPHS PCT % 8*   MONOS PCT % 9   EOS PCT % 1     Results from last 7 days   Lab Units 02/15/19  0527   POTASSIUM mmol/L 3 6 CHLORIDE mmol/L 103   CO2 mmol/L 28   BUN mg/dL 12   CREATININE mg/dL 0 58*   CALCIUM mg/dL 7 6*   ALK PHOS U/L 567*   ALT U/L 54   AST U/L 86*           * I Have Reviewed All Lab Data Listed Above  * Additional Pertinent Lab Tests Reviewed:  Kiersten 64 Admission Reviewed    Imaging:    Imaging Reports Reviewed Today Include:   Imaging Personally Reviewed by Myself Includes:     Recent Cultures (last 7 days):           Last 24 Hours Medication List:     Current Facility-Administered Medications:  albuterol 2 5 mg Nebulization Q4H PRN Malika Casillas MD    allopurinol 100 mg Oral Daily Malika Casillas MD    amiodarone 200 mg Oral Daily Malika Casillas, MD    apixaban 5 mg Oral BID Malika Casillas MD    vitamin C 1,000 mg Oral Daily Malika Casillas, MD    bisacodyl 5 mg Oral Daily PRN Malika Casillas MD    calcium carbonate 750 mg Oral Daily PRN Malika Casillas MD    cyanocobalamin 1,000 mcg Oral Daily Malika Casillas MD    diazepam 5 mg Oral Q6H PRN Malika Casillas MD    famotidine 20 mg Oral HS Malika Casillas MD    ferrous sulfate 325 mg Oral Daily Malika Casillas MD    fluticasone-vilanterol 1 puff Inhalation Daily Hemant Levin,     guaiFENesin 1,200 mg Oral Q12H [address] Hemant Levin, DO    hydrOXYzine HCL 25 mg Oral BID PRN Malika Casillas MD    insulin lispro 1-5 Units Subcutaneous HS Malika Casillas MD    insulin lispro 1-6 Units Subcutaneous TID AC Gabriela Delvalle MD    ipratropium 0 5 mg Nebulization 4x Daily Malika Casillas MD    lactulose 20 g Oral BID Gabriela Delvalle MD    latanoprost 1 drop Both Eyes HS Malika Casillas MD    metoclopramide 10 mg Oral 4x Daily Malika Casillas MD    modafinil 100 mg Oral Daily Palak Huang, DO    nystatin  Topical BID Hemant Levin,     rOPINIRole 3 mg Oral HS Malika Casillas MD    sertraline 25 mg Oral HS Malika Casillas MD    sodium chloride 75 mL/hr Intravenous Continuous Vale Nichols PA-C Last Rate: 75 mL/hr (02/14/19 2032)   traZODone 50 mg Oral HS Malika Casillas MD         Today, Patient Was Seen By: Hemant Levin DO    ** Please Note: This note has been constructed using a voice recognition system   **

## 2019-02-15 NOTE — PLAN OF CARE
Problem: OCCUPATIONAL THERAPY ADULT  Goal: Performs self-care activities at highest level of function for planned discharge setting  See evaluation for individualized goals    Description    Outcome: 48 Rachel Lovell Sandoval

## 2019-02-15 NOTE — NURSING NOTE
Spoke with Blayne CODY [employer]  Patient with only 180 mL of charted urine output for the day   60mL in last 4 hours  Urine is dark siva in color  Orders placed to start continuous fluids at 75 mL/hr  Labs to be checked in AM   Will continue to monitor

## 2019-02-15 NOTE — PLAN OF CARE
Problem: PHYSICAL THERAPY ADULT  Goal: Performs mobility at highest level of function for planned discharge setting  See evaluation for individualized goals  Description  Treatment/Interventions: Functional transfer training, LE strengthening/ROM, Elevations, Therapeutic exercise, Endurance training, Cognitive reorientation, Patient/family training, Equipment eval/education, Bed mobility, Gait training, Compensatory technique education, Continued evaluation, Spoke to nursing  Equipment Recommended: Darcy Elizondo       See flowsheet documentation for full assessment, interventions and recommendations     Outcome: Progressing

## 2019-02-15 NOTE — PLAN OF CARE
Problem: Potential for Falls  Goal: Patient will remain free of falls  Description  INTERVENTIONS:  - Assess patient frequently for physical needs  -  Identify cognitive and physical deficits and behaviors that affect risk of falls  -  [facility] fall precautions as indicated by assessment   - Educate patient/family on patient safety including physical limitations  - Instruct patient to call for assistance with activity based on assessment  - Modify environment to reduce risk of injury  - Consider OT/PT consult to assist with strengthening/mobility   Outcome: Progressing     Problem: Prexisting or High Potential for Compromised Skin Integrity  Goal: Skin integrity is maintained or improved  Description  INTERVENTIONS:  - Identify patients at risk for skin breakdown  - Assess and monitor skin integrity  - Assess and monitor nutrition and hydration status  - Monitor labs (i e  albumin)  - Assess for incontinence   - Turn and reposition patient  - Assist with mobility/ambulation  - Relieve pressure over bony prominences  - Avoid friction and shearing  - Provide appropriate hygiene as needed including keeping skin clean and dry  - Evaluate need for skin moisturizer/barrier cream  - Collaborate with interdisciplinary team (i e  Nutrition, Rehabilitation, etc )   - Patient/family teaching   Outcome: Progressing     Problem: Nutrition/Hydration-ADULT  Goal: Nutrient/Hydration intake appropriate for improving, restoring or maintaining nutritional needs  Description  Monitor and assess patient's nutrition/hydration status for malnutrition (ex- brittle hair, bruises, dry skin, pale skin and conjunctiva, muscle wasting, smooth red tongue, and disorientation)  Collaborate with interdisciplinary team and initiate plan and interventions as ordered  Monitor patient's weight and dietary intake as ordered or per policy  Utilize nutrition screening tool and intervene per policy   Determine patient's food preferences and provide high-protein, high-caloric foods as appropriate       INTERVENTIONS:  - Monitor oral intake, urinary output, labs, and treatment plans  - Assess nutrition and hydration status and recommend course of action  - Evaluate amount of meals eaten  - Assist patient with eating if necessary   - Allow adequate time for meals  - Recommend/ encourage appropriate diets, oral nutritional supplements, and vitamin/mineral supplements  - Order, calculate, and assess calorie counts as needed  - Recommend, monitor, and adjust tube feedings and TPN/PPN based on assessed needs  - Assess need for intravenous fluids  - Provide specific nutrition/hydration education as appropriate  - Include patient/family/caregiver in decisions related to nutrition   Outcome: Progressing     Problem: RESPIRATORY - ADULT  Goal: Achieves optimal ventilation and oxygenation  Description  INTERVENTIONS:  - Assess for changes in respiratory status  - Assess for changes in mentation and behavior  - Position to facilitate oxygenation and minimize respiratory effort  - Oxygen administration by appropriate delivery method based on oxygen saturation (per order) or ABGs  - Initiate smoking cessation education as indicated  - Encourage broncho-pulmonary hygiene including cough, deep breathe, Incentive Spirometry  - Assess the need for suctioning and aspirate as needed  - Assess and instruct to report SOB or any respiratory difficulty  - Respiratory Therapy support as indicated   Outcome: Progressing     Problem: METABOLIC, FLUID AND ELECTROLYTES - ADULT  Goal: Electrolytes maintained within normal limits  Description  INTERVENTIONS:  - Monitor labs and assess patient for signs and symptoms of electrolyte imbalances  - Administer electrolyte replacement as ordered  - Monitor response to electrolyte replacements, including repeat lab results as appropriate  - Instruct patient on fluid and nutrition as appropriate   Outcome: Progressing  Goal: Fluid balance maintained  Description  INTERVENTIONS:  - Monitor labs and assess for signs and symptoms of volume excess or deficit  - Monitor I/O and WT  - Instruct patient on fluid and nutrition as appropriate   Outcome: Progressing  Goal: Glucose maintained within target range  Description  INTERVENTIONS:  - Monitor Blood Glucose as ordered  - Assess for signs and symptoms of hyperglycemia and hypoglycemia  - Administer ordered medications to maintain glucose within target range  - Assess nutritional intake and initiate nutrition service referral as needed   Outcome: Progressing     Problem: SKIN/TISSUE INTEGRITY - ADULT  Goal: Skin integrity remains intact  Description  INTERVENTIONS  - Identify patients at risk for skin breakdown  - Assess and monitor skin integrity  - Assess and monitor nutrition and hydration status  - Monitor labs (i e  albumin)  - Assess for incontinence   - Turn and reposition patient  - Assist with mobility/ambulation  - Relieve pressure over bony prominences  - Avoid friction and shearing  - Provide appropriate hygiene as needed including keeping skin clean and dry  - Evaluate need for skin moisturizer/barrier cream  - Collaborate with interdisciplinary team (i e  Nutrition, Rehabilitation, etc )   - Patient/family teaching   Outcome: Progressing  Goal: Incision(s), wounds(s) or drain site(s) healing without S/S of infection  Description  INTERVENTIONS  - Assess and document risk factors for skin impairment   - Assess and document dressing, incision, wound bed, drain sites and surrounding tissue  - Initiate Nutrition services consult and/or wound management as needed   Outcome: Progressing  Goal: Oral mucous membranes remain intact  Description  INTERVENTIONS  - Assess oral mucosa and hygiene practices  - Implement preventative oral hygiene regimen  - Implement oral medicated treatments as ordered  - Initiate Nutrition services referral as needed   Outcome: Progressing     Problem: HEMATOLOGIC - ADULT  Goal: Maintains hematologic stability  Description  INTERVENTIONS  - Assess for signs and symptoms of bleeding or hemorrhage  - Monitor labs  - Administer supportive blood products/factors as ordered and appropriate   Outcome: Progressing     Problem: MUSCULOSKELETAL - ADULT  Goal: Maintain or return mobility to safest level of function  Description  INTERVENTIONS:  - Assess patient's ability to carry out ADLs; assess patient's baseline for ADL function and identify physical deficits which impact ability to perform ADLs (bathing, care of mouth/teeth, toileting, grooming, dressing, etc )  - Assess/evaluate cause of self-care deficits   - Assess range of motion  - Assess patient's mobility; develop plan if impaired  - Assess patient's need for assistive devices and provide as appropriate  - Encourage maximum independence but intervene and supervise when necessary  - Involve family in performance of ADLs  - Assess for home care needs following discharge   - Request OT consult to assist with ADL evaluation and planning for discharge  - Provide patient education as appropriate   Outcome: Progressing  Goal: Maintain proper alignment of affected body part  Description  INTERVENTIONS:  - Support, maintain and protect limb and body alignment  - Provide pt/fam with appropriate education   Outcome: Progressing     Problem: DISCHARGE PLANNING - CARE MANAGEMENT  Goal: Discharge to post-acute care or home with appropriate resources  Description  INTERVENTIONS:  - Conduct assessment to determine patient/family and health care team treatment goals, and need for post-acute services based on payer coverage, community resources, and patient preferences, and barriers to discharge  - Address psychosocial, clinical, and financial barriers to discharge as identified in assessment in conjunction with the patient/family and health care team  - Arrange appropriate level of post-acute services according to patient?s   needs and preference and payer coverage in collaboration with the physician and health care team  - Communicate with and update the patient/family, physician, and health care team regarding progress on the discharge plan  - Arrange appropriate transportation to post-acute venues  Outcome: Progressing

## 2019-02-15 NOTE — SOCIAL WORK
Ortega left messages for Eddie Garcia at [facility], admissions rep at the [place] at [place], Tong Landa at [facility]  [facility] has no male beds  Received call back from Иван Timmons at [facility] and they currently have no male beds available but will review referral and follow-up if a bed does become available by Monday  Archana Jimenez still reviewing

## 2019-02-15 NOTE — SOCIAL WORK
Cm met with pt and spouse Lindsey Duncan, advised that [address] have no male beds available  Still waiting on a response from [facility] and there in no one in the admissions department at [facility], [facility] until Monday  Lindsey Duncan gave additional choices of [place] at [place]  Referrals sent via [facility]

## 2019-02-16 LAB
GLUCOSE SERPL-MCNC: 104 MG/DL (ref 65–140)
GLUCOSE SERPL-MCNC: 153 MG/DL (ref 65–140)

## 2019-02-16 PROCEDURE — 97116 GAIT TRAINING THERAPY: CPT

## 2019-02-16 PROCEDURE — 94640 AIRWAY INHALATION TREATMENT: CPT

## 2019-02-16 PROCEDURE — 82948 REAGENT STRIP/BLOOD GLUCOSE: CPT

## 2019-02-16 PROCEDURE — 94760 N-INVAS EAR/PLS OXIMETRY 1: CPT

## 2019-02-16 PROCEDURE — 99233 SBSQ HOSP IP/OBS HIGH 50: CPT | Performed by: FAMILY MEDICINE

## 2019-02-16 PROCEDURE — 99232 SBSQ HOSP IP/OBS MODERATE 35: CPT | Performed by: INTERNAL MEDICINE

## 2019-02-16 PROCEDURE — 97110 THERAPEUTIC EXERCISES: CPT

## 2019-02-16 RX ORDER — IPRATROPIUM BROMIDE AND ALBUTEROL SULFATE 2.5; .5 MG/3ML; MG/3ML
3 SOLUTION RESPIRATORY (INHALATION)
Status: DISCONTINUED | OUTPATIENT
Start: 2019-02-16 | End: 2019-02-19 | Stop reason: HOSPADM

## 2019-02-16 RX ORDER — ONDANSETRON 2 MG/ML
4 INJECTION INTRAMUSCULAR; INTRAVENOUS ONCE
Status: COMPLETED | OUTPATIENT
Start: 2019-02-16 | End: 2019-02-16

## 2019-02-16 RX ADMIN — IPRATROPIUM BROMIDE AND ALBUTEROL SULFATE 3 ML: 2.5; .5 SOLUTION RESPIRATORY (INHALATION) at 12:00

## 2019-02-16 RX ADMIN — APIXABAN 5 MG: 5 TABLET, FILM COATED ORAL at 08:42

## 2019-02-16 RX ADMIN — FERROUS SULFATE TAB 325 MG (65 MG ELEMENTAL FE) 325 MG: 325 (65 FE) TAB at 08:43

## 2019-02-16 RX ADMIN — APIXABAN 5 MG: 5 TABLET, FILM COATED ORAL at 17:27

## 2019-02-16 RX ADMIN — MODAFINIL 100 MG: 100 TABLET ORAL at 09:15

## 2019-02-16 RX ADMIN — METOCLOPRAMIDE HYDROCHLORIDE 10 MG: 10 TABLET ORAL at 08:42

## 2019-02-16 RX ADMIN — IPRATROPIUM BROMIDE AND ALBUTEROL SULFATE 3 ML: 2.5; .5 SOLUTION RESPIRATORY (INHALATION) at 19:13

## 2019-02-16 RX ADMIN — AMIODARONE HYDROCHLORIDE 200 MG: 200 TABLET ORAL at 08:43

## 2019-02-16 RX ADMIN — SERTRALINE HYDROCHLORIDE 25 MG: 25 TABLET ORAL at 21:35

## 2019-02-16 RX ADMIN — GUAIFENESIN 1200 MG: 600 TABLET, EXTENDED RELEASE ORAL at 08:43

## 2019-02-16 RX ADMIN — NYSTATIN: 100000 POWDER TOPICAL at 11:14

## 2019-02-16 RX ADMIN — NYSTATIN: 100000 POWDER TOPICAL at 17:28

## 2019-02-16 RX ADMIN — FAMOTIDINE 20 MG: 20 TABLET, FILM COATED ORAL at 21:35

## 2019-02-16 RX ADMIN — IPRATROPIUM BROMIDE AND ALBUTEROL SULFATE 3 ML: 2.5; .5 SOLUTION RESPIRATORY (INHALATION) at 15:34

## 2019-02-16 RX ADMIN — METOCLOPRAMIDE HYDROCHLORIDE 10 MG: 10 TABLET ORAL at 11:13

## 2019-02-16 RX ADMIN — CYANOCOBALAMIN TAB 500 MCG 1000 MCG: 500 TAB at 08:43

## 2019-02-16 RX ADMIN — METOCLOPRAMIDE HYDROCHLORIDE 10 MG: 10 TABLET ORAL at 21:35

## 2019-02-16 RX ADMIN — ONDANSETRON 4 MG: 2 INJECTION INTRAMUSCULAR; INTRAVENOUS at 08:40

## 2019-02-16 RX ADMIN — ALBUTEROL SULFATE 2.5 MG: 2.5 SOLUTION RESPIRATORY (INHALATION) at 07:47

## 2019-02-16 RX ADMIN — METOCLOPRAMIDE HYDROCHLORIDE 10 MG: 10 TABLET ORAL at 17:27

## 2019-02-16 RX ADMIN — LATANOPROST 1 DROP: 50 SOLUTION OPHTHALMIC at 21:35

## 2019-02-16 RX ADMIN — TRAZODONE HYDROCHLORIDE 50 MG: 50 TABLET ORAL at 21:35

## 2019-02-16 RX ADMIN — IPRATROPIUM BROMIDE 0.5 MG: 0.5 SOLUTION RESPIRATORY (INHALATION) at 07:41

## 2019-02-16 RX ADMIN — OXYCODONE HYDROCHLORIDE AND ACETAMINOPHEN 1000 MG: 500 TABLET ORAL at 08:43

## 2019-02-16 RX ADMIN — GUAIFENESIN 1200 MG: 600 TABLET, EXTENDED RELEASE ORAL at 21:35

## 2019-02-16 RX ADMIN — ROPINIROLE 3 MG: 2 TABLET, FILM COATED ORAL at 21:35

## 2019-02-16 RX ADMIN — FLUTICASONE FUROATE AND VILANTEROL TRIFENATATE 1 PUFF: 100; 25 POWDER RESPIRATORY (INHALATION) at 08:55

## 2019-02-16 RX ADMIN — ALLOPURINOL 100 MG: 100 TABLET ORAL at 08:42

## 2019-02-16 NOTE — PLAN OF CARE
Problem: Potential for Falls  Goal: Patient will remain free of falls  Description  INTERVENTIONS:  - Assess patient frequently for physical needs  -  Identify cognitive and physical deficits and behaviors that affect risk of falls  -  [place] fall precautions as indicated by assessment   - Educate patient/family on patient safety including physical limitations  - Instruct patient to call for assistance with activity based on assessment  - Modify environment to reduce risk of injury  - Consider OT/PT consult to assist with strengthening/mobility   Outcome: Progressing     Problem: Prexisting or High Potential for Compromised Skin Integrity  Goal: Skin integrity is maintained or improved  Description  INTERVENTIONS:  - Identify patients at risk for skin breakdown  - Assess and monitor skin integrity  - Assess and monitor nutrition and hydration status  - Monitor labs (i e  albumin)  - Assess for incontinence   - Turn and reposition patient  - Assist with mobility/ambulation  - Relieve pressure over bony prominences  - Avoid friction and shearing  - Provide appropriate hygiene as needed including keeping skin clean and dry  - Evaluate need for skin moisturizer/barrier cream  - Collaborate with interdisciplinary team (i e  Nutrition, Rehabilitation, etc )   - Patient/family teaching   Outcome: Progressing     Problem: Nutrition/Hydration-ADULT  Goal: Nutrient/Hydration intake appropriate for improving, restoring or maintaining nutritional needs  Description  Monitor and assess patient's nutrition/hydration status for malnutrition (ex- brittle hair, bruises, dry skin, pale skin and conjunctiva, muscle wasting, smooth red tongue, and disorientation)  Collaborate with interdisciplinary team and initiate plan and interventions as ordered  Monitor patient's weight and dietary intake as ordered or per policy  Utilize nutrition screening tool and intervene per policy   Determine patient's food preferences and provide high-protein, high-caloric foods as appropriate       INTERVENTIONS:  - Monitor oral intake, urinary output, labs, and treatment plans  - Assess nutrition and hydration status and recommend course of action  - Evaluate amount of meals eaten  - Assist patient with eating if necessary   - Allow adequate time for meals  - Recommend/ encourage appropriate diets, oral nutritional supplements, and vitamin/mineral supplements  - Order, calculate, and assess calorie counts as needed  - Recommend, monitor, and adjust tube feedings and TPN/PPN based on assessed needs  - Assess need for intravenous fluids  - Provide specific nutrition/hydration education as appropriate  - Include patient/family/caregiver in decisions related to nutrition   Outcome: Progressing     Problem: RESPIRATORY - ADULT  Goal: Achieves optimal ventilation and oxygenation  Description  INTERVENTIONS:  - Assess for changes in respiratory status  - Assess for changes in mentation and behavior  - Position to facilitate oxygenation and minimize respiratory effort  - Oxygen administration by appropriate delivery method based on oxygen saturation (per order) or ABGs  - Initiate smoking cessation education as indicated  - Encourage broncho-pulmonary hygiene including cough, deep breathe, Incentive Spirometry  - Assess the need for suctioning and aspirate as needed  - Assess and instruct to report SOB or any respiratory difficulty  - Respiratory Therapy support as indicated   Outcome: Progressing     Problem: METABOLIC, FLUID AND ELECTROLYTES - ADULT  Goal: Electrolytes maintained within normal limits  Description  INTERVENTIONS:  - Monitor labs and assess patient for signs and symptoms of electrolyte imbalances  - Administer electrolyte replacement as ordered  - Monitor response to electrolyte replacements, including repeat lab results as appropriate  - Instruct patient on fluid and nutrition as appropriate   Outcome: Progressing  Goal: Fluid balance maintained  Description  INTERVENTIONS:  - Monitor labs and assess for signs and symptoms of volume excess or deficit  - Monitor I/O and WT  - Instruct patient on fluid and nutrition as appropriate   Outcome: Progressing  Goal: Glucose maintained within target range  Description  INTERVENTIONS:  - Monitor Blood Glucose as ordered  - Assess for signs and symptoms of hyperglycemia and hypoglycemia  - Administer ordered medications to maintain glucose within target range  - Assess nutritional intake and initiate nutrition service referral as needed   Outcome: Progressing     Problem: SKIN/TISSUE INTEGRITY - ADULT  Goal: Skin integrity remains intact  Description  INTERVENTIONS  - Identify patients at risk for skin breakdown  - Assess and monitor skin integrity  - Assess and monitor nutrition and hydration status  - Monitor labs (i e  albumin)  - Assess for incontinence   - Turn and reposition patient  - Assist with mobility/ambulation  - Relieve pressure over bony prominences  - Avoid friction and shearing  - Provide appropriate hygiene as needed including keeping skin clean and dry  - Evaluate need for skin moisturizer/barrier cream  - Collaborate with interdisciplinary team (i e  Nutrition, Rehabilitation, etc )   - Patient/family teaching   Outcome: Progressing  Goal: Incision(s), wounds(s) or drain site(s) healing without S/S of infection  Description  INTERVENTIONS  - Assess and document risk factors for skin impairment   - Assess and document dressing, incision, wound bed, drain sites and surrounding tissue  - Initiate Nutrition services consult and/or wound management as needed   Outcome: Progressing  Goal: Oral mucous membranes remain intact  Description  INTERVENTIONS  - Assess oral mucosa and hygiene practices  - Implement preventative oral hygiene regimen  - Implement oral medicated treatments as ordered  - Initiate Nutrition services referral as needed   Outcome: Progressing     Problem: HEMATOLOGIC - ADULT  Goal: Maintains hematologic stability  Description  INTERVENTIONS  - Assess for signs and symptoms of bleeding or hemorrhage  - Monitor labs  - Administer supportive blood products/factors as ordered and appropriate   Outcome: Progressing     Problem: MUSCULOSKELETAL - ADULT  Goal: Maintain or return mobility to safest level of function  Description  INTERVENTIONS:  - Assess patient's ability to carry out ADLs; assess patient's baseline for ADL function and identify physical deficits which impact ability to perform ADLs (bathing, care of mouth/teeth, toileting, grooming, dressing, etc )  - Assess/evaluate cause of self-care deficits   - Assess range of motion  - Assess patient's mobility; develop plan if impaired  - Assess patient's need for assistive devices and provide as appropriate  - Encourage maximum independence but intervene and supervise when necessary  - Involve family in performance of ADLs  - Assess for home care needs following discharge   - Request OT consult to assist with ADL evaluation and planning for discharge  - Provide patient education as appropriate   Outcome: Progressing  Goal: Maintain proper alignment of affected body part  Description  INTERVENTIONS:  - Support, maintain and protect limb and body alignment  - Provide pt/fam with appropriate education   Outcome: Progressing     Problem: DISCHARGE PLANNING - CARE MANAGEMENT  Goal: Discharge to post-acute care or home with appropriate resources  Description  INTERVENTIONS:  - Conduct assessment to determine patient/family and health care team treatment goals, and need for post-acute services based on payer coverage, community resources, and patient preferences, and barriers to discharge  - Address psychosocial, clinical, and financial barriers to discharge as identified in assessment in conjunction with the patient/family and health care team  - Arrange appropriate level of post-acute services according to patient?s   needs and preference and payer coverage in collaboration with the physician and health care team  - Communicate with and update the patient/family, physician, and health care team regarding progress on the discharge plan  - Arrange appropriate transportation to post-acute venues  Outcome: Progressing

## 2019-02-16 NOTE — PLAN OF CARE
Problem: PHYSICAL THERAPY ADULT  Goal: Performs mobility at highest level of function for planned discharge setting  See evaluation for individualized goals  Description  Treatment/Interventions: Functional transfer training, LE strengthening/ROM, Elevations, Therapeutic exercise, Endurance training, Cognitive reorientation, Patient/family training, Equipment eval/education, Bed mobility, Gait training, Compensatory technique education, Continued evaluation, Spoke to nursing  Equipment Recommended: Merritt Moreno       See flowsheet documentation for full assessment, interventions and recommendations     Outcome: Progressing

## 2019-02-16 NOTE — PROGRESS NOTES
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    Atrial fibrillation    Diabetes mellitus type 2    Vision abnormalities with right-sided weakness    Restless legs syndrome    Rheumatoid arthritis ([address] )    Sleep apnea    Hyperlipidemia    Hypertension    Peripheral neuropathy    Glaucoma    Gout    Incisional hernia    COPD    Anxiety and depression    History of stroke    Mild cognitive impairment    Nutritional deficiency    Coronary artery disease    Active advance directive    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)    Pulmonary metastases (HCC)    Physical deconditioning    Transaminitis    Right sided weakness    Pulmonary vascular congestion    Abnormal LFTs    Fatigue    Goals of care, counseling/discussion    Urinary retention   1) stage 4 hepatocellular carcinoma - terminal prognosis, intolerant of chemotherapy  2) weakness improved - ambulating with PT and walker      Plan:   Agree with plan for rehab  If he fails then hospice would be appropriate  Goal remains to get to the cruise  No pain issues      Pain History  Current pain location(s):   Pain Scale:     Severity:    Quality:   Current Analgesic regimen:    24 hour history:     Meds/Allergies   all current active meds have been reviewed and current meds:   Current Facility-Administered Medications   Medication Dose Route Frequency    albuterol inhalation solution 2 5 mg  2 5 mg Nebulization Q4H PRN    allopurinol (ZYLOPRIM) tablet 100 mg  100 mg Oral Daily    amiodarone tablet 200 mg  200 mg Oral Daily    apixaban (ELIQUIS) tablet 5 mg  5 mg Oral BID    ascorbic acid (VITAMIN C) tablet 1,000 mg  1,000 mg Oral Daily    bisacodyl (DULCOLAX) EC tablet 5 mg  5 mg Oral Daily PRN  calcium carbonate (TUMS) chewable tablet 750 mg  750 mg Oral Daily PRN    cyanocobalamin (VITAMIN B-12) tablet 1,000 mcg  1,000 mcg Oral Daily    diazepam (VALIUM) tablet 5 mg  5 mg Oral Q6H PRN    famotidine (PEPCID) tablet 20 mg  20 mg Oral HS    ferrous sulfate tablet 325 mg  325 mg Oral Daily    fluticasone-vilanterol (BREO ELLIPTA) 100-25 mcg/inh inhaler 1 puff  1 puff Inhalation Daily    guaiFENesin (MUCINEX) 12 hr tablet 1,200 mg  1,200 mg Oral Q12H ABBIE    hydrOXYzine HCL (ATARAX) tablet 25 mg  25 mg Oral BID PRN    ipratropium-albuterol (DUO-NEB) 0 5-2 5 mg/3 mL inhalation solution 3 mL  3 mL Nebulization 4x Daily    latanoprost (XALATAN) 0 005 % ophthalmic solution 1 drop  1 drop Both Eyes HS    metoclopramide (REGLAN) tablet 10 mg  10 mg Oral 4x Daily    modafinil (PROVIGIL) tablet 100 mg  100 mg Oral Daily    nystatin (MYCOSTATIN) powder   Topical BID    rOPINIRole (REQUIP) tablet 3 mg  3 mg Oral HS    sertraline (ZOLOFT) tablet 25 mg  25 mg Oral HS    traZODone (DESYREL) tablet 50 mg  50 mg Oral HS     Pain Management Medications:      Allergies   Allergen Reactions    Humira [Adalimumab] Rash     itchy    Levaquin [Levofloxacin In D5w] Rash    Levofloxacin Rash    Lovenox [Enoxaparin] Rash and Hives       Objective     Temp:  [97 8 °F (36 6 °C)-98 4 °F (36 9 °C)] 98 4 °F (36 9 °C)  HR:  [73-77] 74  Resp:  [18-20] 18  BP: (107-115)/(54-64) 110/64      Intake/Output Summary (Last 24 hours) at 2/16/2019 1010  Last data filed at 2/16/2019 0800  Gross per 24 hour   Intake 1342 5 ml   Output 350 ml   Net 992 5 ml       Physical Exam: /68 (BP Location: Left arm)   Pulse 73   Temp 97 6 °F (36 4 °C) (Oral)   Resp 16   Ht 5' 10" (1 778 m)   Wt 91 1 kg (200 lb 12 8 oz)   SpO2 91%   BMI 28 81 kg/m²   General appearance: alert and oriented, in no acute distress, appears older than stated age, cooperative and no distress  Lungs: normal percussion bilaterally  Heart: regular rate and rhythm, S1, S2 normal, no murmur, click, rub or gallop  Abdomen: soft, non-tender; bowel sounds normal; no masses,  no organomegaly  Extremities: extremities normal, warm and well-perfused; no cyanosis, clubbing, or edema  Lab Results: I have personally reviewed pertinent labs  , CBC: , CMP: elevated bilirubin   Imaging Studies: I have personally reviewed pertinent reports  and I have personally reviewed pertinent films in PACS  EKG, Pathology, and Other Studies:     Counseling / Coordination of Care  Total floor / unit time spent today 40 minutes  Greater than 50% of total time was spent with the patient and / or family counseling and / or coordination of care   A description of the counseling / coordination of care: discussed with RN and SLIM attending

## 2019-02-16 NOTE — PROGRESS NOTES
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    Atrial fibrillation    Diabetes mellitus type 2    Vision abnormalities with right-sided weakness    COPD    Anxiety and depression    Right sided weakness    Pulmonary vascular congestion    Abnormal LFTs    Goals of care, counseling/discussion    Urinary retention      Plan:    1  Progressive generalized weakness, fatigue, confusion and physical deconditioning, improving,  multifactorial, likely secondary to cancer progression and new chemotherapy agents, brain MRI with stable right occipital lobe infarct, continue with supportive care,  palliative Care is following, awaiting rehab placement  2  Hepatocellular carcinoma with progression of adrenal and pulmonary metastasis, Oncology and palliative Care are following, poor prognosis, hospice recommended  3  Esophageal cancer s/p  esophagectomy with chemo and radiation  4  Severe COPD without exacerbation, continue Mucinex and bronchodilators   5  Atrial fibrillation, heart rate controlled on amiodarone, and Eliquis  6  DM type 2, last A1c 8 9, blood sugar acceptable on ISS  7  Anxiety and depression, continue Zoloft  8  Transaminitis, elevated ammonia , acute/subacute liver failure,  possibly secondary to progression of hepatocellular carcinoma vs medication side effects, liver US report reviewed, ammonia level has normalized,  continue to monitor  9  Sleep apnea  10   Urinary retention, continue with Wiley catheter, voiding trial when at rehab    Patient does not like fingerstick for blood sugar check any more  Multiple bowel movement on lactulose  Will discontinue insulin sliding scale  Discontinue lactulose       VTE Pharmacologic Prophylaxis:   Pharmacologic: Apixaban (Eliquis)  Mechanical VTE Prophylaxis in Place: Yes    Patient Centered Rounds: I have performed bedside rounds with nursing staff today  Discussions with Specialists or Other Care Team Provider:  Palliative care    Education and Discussions with Family / Patient:  Patient and his wife at bedside    Time Spent for Care: 30 minutes  More than 50% of total time spent on counseling and coordination of care as described above  Current Length of Stay: 7 day(s)    Current Patient Status: Inpatient   Certification Statement: The patient will continue to require additional inpatient hospital stay due to Management of physical deconditioning    Discharge Plan / Estimated Discharge Date:  Awaiting rehab placement  Code Status: Level 3 - DNAR and DNI      Subjective:   Patient seen and examined  Comfortable in in bed  Was ambulating today  Clinically improving      Objective:     Vitals:   Temp (24hrs), Av 2 °F (36 8 °C), Min:97 8 °F (36 6 °C), Max:98 4 °F (36 9 °C)    Temp:  [97 8 °F (36 6 °C)-98 4 °F (36 9 °C)] 98 4 °F (36 9 °C)  HR:  [73-77] 74  Resp:  [18-20] 18  BP: (107-115)/(54-64) 110/64  SpO2:  [90 %-95 %] 91 %  Body mass index is 28 81 kg/m²  Input and Output Summary (last 24 hours):        Intake/Output Summary (Last 24 hours) at 2019 1052  Last data filed at 2019 0800  Gross per 24 hour   Intake 1342 5 ml   Output 350 ml   Net 992 5 ml       Physical Exam:     Physical Exam    Patient is awake alert in no acute distress   Lung with decreased breath sounds bilateral  Heart positive S1-S2 no murmur  Abdomen soft nontender positive bowel sounds  Wiley catheter in place  Lower extremities no edema    Additional Data:     Labs:    Results from last 7 days   Lab Units 19  1155   WBC Thousand/uL 7 25   HEMOGLOBIN g/dL 16 6   HEMATOCRIT % 51 3*   PLATELETS Thousands/uL 110*   NEUTROS PCT % 82*   LYMPHS PCT % 8*   MONOS PCT % 9   EOS PCT % 1     Results from last 7 days   Lab Units 02/15/19  0527   POTASSIUM mmol/L 3 6   CHLORIDE mmol/L 103   CO2 mmol/L 28   BUN mg/dL 12   CREATININE mg/dL 0 58*   CALCIUM mg/dL 7 6*   ALK PHOS U/L 567*   ALT U/L 54   AST U/L 86*           * I Have Reviewed All Lab Data Listed Above  * Additional Pertinent Lab Tests Reviewed:  [facility] 64 Admission Reviewed    Imaging:    Imaging Reports Reviewed Today Include:   Imaging Personally Reviewed by Myself Includes:     Recent Cultures (last 7 days):           Last 24 Hours Medication List:     Current Facility-Administered Medications:  albuterol 2 5 mg Nebulization Q4H PRN Nicole Boykin MD    allopurinol 100 mg Oral Daily Nicole Boykin MD    amiodarone 200 mg Oral Daily Nicole Boykin MD    apixaban 5 mg Oral BID Nicole Boykin MD    vitamin C 1,000 mg Oral Daily Nicole Boykin MD    bisacodyl 5 mg Oral Daily PRN Nicole Boykin MD    calcium carbonate 750 mg Oral Daily PRN Nicole Boykin MD    cyanocobalamin 1,000 mcg Oral Daily Nicole Boykin MD    diazepam 5 mg Oral Q6H PRN Nicole Boykin MD    famotidine 20 mg Oral HS Nicole Boykin MD    ferrous sulfate 325 mg Oral Daily Nicole Boykin MD    fluticasone-vilanterol 1 puff Inhalation Daily Candie Button, DO    guaiFENesin 1,200 mg Oral Q12H [facility] Candie Button, DO    hydrOXYzine HCL 25 mg Oral BID PRN Nicole Boykin MD    insulin lispro 1-5 Units Subcutaneous HS Nicole Boykin MD    insulin lispro 1-6 Units Subcutaneous TID AC Ruchi Beltran MD    ipratropium-albuterol 3 mL Nebulization 4x Daily Candie Button, DO    lactulose 20 g Oral BID Ruchi Beltran MD    latanoprost 1 drop Both Eyes HS Nicole Boykin MD    metoclopramide 10 mg Oral 4x Daily Nicole Boykin MD    modafinil 100 mg Oral Daily Palak Huang, DO    nystatin  Topical BID Candie Button, DO    rOPINIRole 3 mg Oral HS Nicole Boykin MD    sertraline 25 mg Oral HS Nicole Boykin MD    sodium chloride 50 mL/hr Intravenous Continuous Parvez Spicer DO Last Rate: Stopped (02/16/19 9049)   traZODone 50 mg Oral HS Daniel Holden MD         Today, Patient Was Seen By: Parvez Spicer DO    ** Please Note: This note has been constructed using a voice recognition system   **

## 2019-02-16 NOTE — PHYSICAL THERAPY NOTE
Physical Therapy Progress Note     02/16/19 1000   Pain Assessment   Pain Assessment No/denies pain   Pain Score No Pain   Restrictions/Precautions   Weight Bearing Precautions Per Order No   Other Precautions Fall Risk   General   Chart Reviewed Yes   Response to Previous Treatment Patient with no complaints from previous session  Family/Caregiver Present Yes   Cognition   Overall Cognitive Status WFL   Arousal/Participation Alert; Responsive; Cooperative   Attention Attends with cues to redirect   Following Commands Follows one step commands without difficulty   Subjective   Subjective Patient in bed, agreeable to physical therapy  Patient's wife present  Patient appearing highly motivated to participate in session  Bed Mobility   Supine to Sit 4  Minimal assistance   Additional items Assist x 1;HOB elevated; Bedrails; Increased time required;Verbal cues;LE management   Sit to Supine 4  Minimal assistance   Additional items Assist x 1;HOB elevated; Bedrails; Increased time required;Verbal cues;LE management   Transfers   Sit to Stand 4  Minimal assistance   Additional items Assist x 1; Increased time required;Verbal cues   Stand to Sit 4  Minimal assistance   Additional items Assist x 1; Increased time required;Verbal cues   Ambulation/Elevation   Gait pattern Decreased foot clearance; Forward Flexion; Inconsistent margarito; Excessively slow; Step to   Gait Assistance 4  Minimal assist   Additional items Assist x 1;Verbal cues; Tactile cues   Assistive Device 4-wheeled walker   Distance 140ft, 100ft x2, 140ft    Balance   Static Sitting Fair   Dynamic Sitting Fair -   Static Standing Fair   Ambulatory Poor +   Endurance Deficit   Endurance Deficit Yes   Endurance Deficit Description fatigue, LE instability   Activity Tolerance   Activity Tolerance Patient tolerated treatment well;Patient limited by fatigue   Nurse Made Aware appropriate to see   Exercises   Hip Flexion Sitting;20 reps;AROM; Bilateral   Hip Abduction Sitting;20 reps;AROM; Bilateral   Hip Adduction Sitting;20 reps;AROM; Bilateral   Knee AROM Long Arc Quad Sitting;20 reps;AROM; Bilateral   Ankle Pumps Sitting;20 reps;AROM; Bilateral   Assessment   Prognosis Good   Problem List Decreased strength;Decreased range of motion;Decreased endurance; Impaired balance;Decreased mobility; Decreased coordination;Decreased cognition;Orthopedic restrictions;Decreased skin integrity;Pain;Decreased safety awareness   Assessment Patient able to ambulate increased distances this session with use of 4 wheeled walker, minimal assist x1  Occasional standing and one extended seated rest break required during ambulation  Patient educated on proper breathing during ambulation, with patient demonstrating difficulty  Difficulty also noted occasionally with RW managment and navigating obstacles in hallway, with patient tending to lean laterally towards right side, occasionally tactile cues required to correct  Patient had reports of fatigue during session  Patient tolerated all seated exercise well and patient educated on HEP with patient reporting understanding  Patient would continue to benefit from physical therapy to improve functional mobility until medically cleared  Goals   Patient Goals to get stronger and get better   STG Expiration Date 02/19/19   Short Term Goal #1 1  Modified Independent with Bed Mobility Rolling Right and Left     2  Modified Independent with Bed Mobility Supine-Sit     3  Modified Independent with Transfer Bed-Chair     4  Increase Dynamic Sitting Balance at least 1 Grade for improved stability with functional reach activities     5  Increase Dynamic Standing Balance at least 1 Grade for improved ease with Activities of Daily Living     6  Increase Lower Extremity Strength at least 1 Grade for improved ease mobility tasks     7  Modified Independent with  Ambulation 150 feet using RW to facilitate home and community mobility 8   Modified Independent with Ascending/Descending 3 steps to facilitate home and community accessibility  Treatment Day 4   Plan   Treatment/Interventions Functional transfer training;LE strengthening/ROM; Therapeutic exercise; Endurance training;Patient/family training;Bed mobility;Gait training;Spoke to nursing   Progress Progressing toward goals   PT Frequency Other (Comment)  (3-5x/wk)   Recommendation   Recommendation Post acute IP rehab   Equipment Recommended Walker   PT - OK to Discharge Yes  (once medically cleared to rehab)     Reynaldo Echevarria, PTA

## 2019-02-17 LAB
ALBUMIN SERPL BCP-MCNC: 1.7 G/DL (ref 3.5–5)
ALP SERPL-CCNC: 624 U/L (ref 46–116)
ALT SERPL W P-5'-P-CCNC: 54 U/L (ref 12–78)
ANION GAP SERPL CALCULATED.3IONS-SCNC: 7 MMOL/L (ref 4–13)
AST SERPL W P-5'-P-CCNC: 79 U/L (ref 5–45)
BASOPHILS # BLD AUTO: 0.04 THOUSANDS/ΜL (ref 0–0.1)
BASOPHILS NFR BLD AUTO: 0 % (ref 0–1)
BILIRUB SERPL-MCNC: 4.84 MG/DL (ref 0.2–1)
BUN SERPL-MCNC: 13 MG/DL (ref 5–25)
CALCIUM SERPL-MCNC: 8.1 MG/DL (ref 8.3–10.1)
CHLORIDE SERPL-SCNC: 102 MMOL/L (ref 100–108)
CO2 SERPL-SCNC: 27 MMOL/L (ref 21–32)
CREAT SERPL-MCNC: 0.66 MG/DL (ref 0.6–1.3)
EOSINOPHIL # BLD AUTO: 0.04 THOUSAND/ΜL (ref 0–0.61)
EOSINOPHIL NFR BLD AUTO: 0 % (ref 0–6)
ERYTHROCYTE [DISTWIDTH] IN BLOOD BY AUTOMATED COUNT: 19.9 % (ref 11.6–15.1)
GFR SERPL CREATININE-BSD FRML MDRD: 95 ML/MIN/1.73SQ M
GLUCOSE SERPL-MCNC: 93 MG/DL (ref 65–140)
HCT VFR BLD AUTO: 49.1 % (ref 36.5–49.3)
HGB BLD-MCNC: 16.2 G/DL (ref 12–17)
IMM GRANULOCYTES # BLD AUTO: 0.04 THOUSAND/UL (ref 0–0.2)
IMM GRANULOCYTES NFR BLD AUTO: 0 % (ref 0–2)
LYMPHOCYTES # BLD AUTO: 0.61 THOUSANDS/ΜL (ref 0.6–4.47)
LYMPHOCYTES NFR BLD AUTO: 6 % (ref 14–44)
MAGNESIUM SERPL-MCNC: 1.7 MG/DL (ref 1.6–2.6)
MCH RBC QN AUTO: 31.2 PG (ref 26.8–34.3)
MCHC RBC AUTO-ENTMCNC: 33 G/DL (ref 31.4–37.4)
MCV RBC AUTO: 95 FL (ref 82–98)
MONOCYTES # BLD AUTO: 0.72 THOUSAND/ΜL (ref 0.17–1.22)
MONOCYTES NFR BLD AUTO: 7 % (ref 4–12)
NEUTROPHILS # BLD AUTO: 8.27 THOUSANDS/ΜL (ref 1.85–7.62)
NEUTS SEG NFR BLD AUTO: 87 % (ref 43–75)
NRBC BLD AUTO-RTO: 0 /100 WBCS
PHOSPHATE SERPL-MCNC: 2.3 MG/DL (ref 2.3–4.1)
PLATELET # BLD AUTO: 101 THOUSANDS/UL (ref 149–390)
PMV BLD AUTO: 12.9 FL (ref 8.9–12.7)
POTASSIUM SERPL-SCNC: 3.8 MMOL/L (ref 3.5–5.3)
PROT SERPL-MCNC: 6.8 G/DL (ref 6.4–8.2)
RBC # BLD AUTO: 5.19 MILLION/UL (ref 3.88–5.62)
SODIUM SERPL-SCNC: 136 MMOL/L (ref 136–145)
WBC # BLD AUTO: 9.72 THOUSAND/UL (ref 4.31–10.16)

## 2019-02-17 PROCEDURE — 84100 ASSAY OF PHOSPHORUS: CPT | Performed by: INTERNAL MEDICINE

## 2019-02-17 PROCEDURE — 85025 COMPLETE CBC W/AUTO DIFF WBC: CPT | Performed by: INTERNAL MEDICINE

## 2019-02-17 PROCEDURE — 80053 COMPREHEN METABOLIC PANEL: CPT | Performed by: INTERNAL MEDICINE

## 2019-02-17 PROCEDURE — 99232 SBSQ HOSP IP/OBS MODERATE 35: CPT | Performed by: INTERNAL MEDICINE

## 2019-02-17 PROCEDURE — 94760 N-INVAS EAR/PLS OXIMETRY 1: CPT

## 2019-02-17 PROCEDURE — 94640 AIRWAY INHALATION TREATMENT: CPT

## 2019-02-17 PROCEDURE — 99232 SBSQ HOSP IP/OBS MODERATE 35: CPT | Performed by: FAMILY MEDICINE

## 2019-02-17 PROCEDURE — 83735 ASSAY OF MAGNESIUM: CPT | Performed by: INTERNAL MEDICINE

## 2019-02-17 RX ORDER — TEMAZEPAM 15 MG/1
15 CAPSULE ORAL
Status: DISCONTINUED | OUTPATIENT
Start: 2019-02-17 | End: 2019-02-19 | Stop reason: HOSPADM

## 2019-02-17 RX ORDER — PANTOPRAZOLE SODIUM 40 MG/1
40 TABLET, DELAYED RELEASE ORAL
Status: DISCONTINUED | OUTPATIENT
Start: 2019-02-17 | End: 2019-02-19 | Stop reason: HOSPADM

## 2019-02-17 RX ORDER — SUCRALFATE ORAL 1 G/10ML
1000 SUSPENSION ORAL EVERY 6 HOURS SCHEDULED
Status: DISCONTINUED | OUTPATIENT
Start: 2019-02-17 | End: 2019-02-19 | Stop reason: HOSPADM

## 2019-02-17 RX ADMIN — TEMAZEPAM 15 MG: 15 CAPSULE ORAL at 21:11

## 2019-02-17 RX ADMIN — FERROUS SULFATE TAB 325 MG (65 MG ELEMENTAL FE) 325 MG: 325 (65 FE) TAB at 08:11

## 2019-02-17 RX ADMIN — APIXABAN 5 MG: 5 TABLET, FILM COATED ORAL at 17:08

## 2019-02-17 RX ADMIN — IPRATROPIUM BROMIDE AND ALBUTEROL SULFATE 3 ML: 2.5; .5 SOLUTION RESPIRATORY (INHALATION) at 11:48

## 2019-02-17 RX ADMIN — FAMOTIDINE 20 MG: 20 TABLET, FILM COATED ORAL at 21:10

## 2019-02-17 RX ADMIN — OXYCODONE HYDROCHLORIDE AND ACETAMINOPHEN 1000 MG: 500 TABLET ORAL at 08:11

## 2019-02-17 RX ADMIN — APIXABAN 5 MG: 5 TABLET, FILM COATED ORAL at 08:12

## 2019-02-17 RX ADMIN — SUCRALFATE 1000 MG: 1 SUSPENSION ORAL at 11:17

## 2019-02-17 RX ADMIN — METOCLOPRAMIDE HYDROCHLORIDE 10 MG: 10 TABLET ORAL at 11:17

## 2019-02-17 RX ADMIN — IPRATROPIUM BROMIDE AND ALBUTEROL SULFATE 3 ML: 2.5; .5 SOLUTION RESPIRATORY (INHALATION) at 07:21

## 2019-02-17 RX ADMIN — ALLOPURINOL 100 MG: 100 TABLET ORAL at 08:12

## 2019-02-17 RX ADMIN — CYANOCOBALAMIN TAB 500 MCG 1000 MCG: 500 TAB at 08:12

## 2019-02-17 RX ADMIN — TRAZODONE HYDROCHLORIDE 50 MG: 50 TABLET ORAL at 21:10

## 2019-02-17 RX ADMIN — METOCLOPRAMIDE HYDROCHLORIDE 10 MG: 10 TABLET ORAL at 08:12

## 2019-02-17 RX ADMIN — GUAIFENESIN 1200 MG: 600 TABLET, EXTENDED RELEASE ORAL at 21:09

## 2019-02-17 RX ADMIN — GUAIFENESIN 1200 MG: 600 TABLET, EXTENDED RELEASE ORAL at 08:12

## 2019-02-17 RX ADMIN — SERTRALINE HYDROCHLORIDE 25 MG: 25 TABLET ORAL at 21:10

## 2019-02-17 RX ADMIN — LATANOPROST 1 DROP: 50 SOLUTION OPHTHALMIC at 21:13

## 2019-02-17 RX ADMIN — ROPINIROLE 3 MG: 2 TABLET, FILM COATED ORAL at 21:09

## 2019-02-17 RX ADMIN — IPRATROPIUM BROMIDE AND ALBUTEROL SULFATE 3 ML: 2.5; .5 SOLUTION RESPIRATORY (INHALATION) at 15:44

## 2019-02-17 RX ADMIN — FLUTICASONE FUROATE AND VILANTEROL TRIFENATATE 1 PUFF: 100; 25 POWDER RESPIRATORY (INHALATION) at 08:15

## 2019-02-17 RX ADMIN — AMIODARONE HYDROCHLORIDE 200 MG: 200 TABLET ORAL at 08:11

## 2019-02-17 RX ADMIN — PANTOPRAZOLE SODIUM 40 MG: 40 TABLET, DELAYED RELEASE ORAL at 11:17

## 2019-02-17 RX ADMIN — METOCLOPRAMIDE HYDROCHLORIDE 10 MG: 10 TABLET ORAL at 17:08

## 2019-02-17 RX ADMIN — NYSTATIN: 100000 POWDER TOPICAL at 08:19

## 2019-02-17 RX ADMIN — NYSTATIN: 100000 POWDER TOPICAL at 17:08

## 2019-02-17 RX ADMIN — MODAFINIL 100 MG: 100 TABLET ORAL at 08:11

## 2019-02-17 RX ADMIN — IPRATROPIUM BROMIDE AND ALBUTEROL SULFATE 3 ML: 2.5; .5 SOLUTION RESPIRATORY (INHALATION) at 19:06

## 2019-02-17 RX ADMIN — METOCLOPRAMIDE HYDROCHLORIDE 10 MG: 10 TABLET ORAL at 21:10

## 2019-02-17 RX ADMIN — SUCRALFATE 1000 MG: 1 SUSPENSION ORAL at 17:08

## 2019-02-17 NOTE — PROGRESS NOTES
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    Atrial fibrillation    Diabetes mellitus type 2    Restless legs syndrome    Rheumatoid arthritis ([facility] )    Sleep apnea    Hyperlipidemia    Hypertension    Peripheral neuropathy    Glaucoma    Gout    Incisional hernia    COPD    Anxiety and depression    History of stroke    Mild cognitive impairment    Nutritional deficiency    Coronary artery disease    Active advance directive    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 ([facility] )    Pulmonary metastases (HCC)    Physical deconditioning    Transaminitis    Right sided weakness    Pulmonary vascular congestion    Abnormal LFTs    Fatigue    Goals of care, counseling/discussion    Urinary retention   1) stage 4 hepatocellular carcinoma - terminal prognosis, intolerant of chemotherapy, rising bilirubin now 4 84  2) weakness improved awaiting rehab palcement      Plan:   No acute symptom management issues  Awaiting SNF for rehab  Eventual hospice when he is ready      Pain History  Current pain location(s):   Pain Scale:     Severity:    Quality:   Current Analgesic regimen:    24 hour history:     Meds/Allergies   all current active meds have been reviewed and current meds:   Current Facility-Administered Medications   Medication Dose Route Frequency    albuterol inhalation solution 2 5 mg  2 5 mg Nebulization Q4H PRN    allopurinol (ZYLOPRIM) tablet 100 mg  100 mg Oral Daily    amiodarone tablet 200 mg  200 mg Oral Daily    apixaban (ELIQUIS) tablet 5 mg  5 mg Oral BID    ascorbic acid (VITAMIN C) tablet 1,000 mg  1,000 mg Oral Daily    bisacodyl (DULCOLAX) EC tablet 5 mg  5 mg Oral Daily PRN    calcium carbonate (TUMS) chewable tablet 750 mg  750 mg Oral Daily PRN    cyanocobalamin (VITAMIN B-12) tablet 1,000 mcg  1,000 mcg Oral Daily    diazepam (VALIUM) tablet 5 mg  5 mg Oral Q6H PRN    famotidine (PEPCID) tablet 20 mg  20 mg Oral HS    ferrous sulfate tablet 325 mg  325 mg Oral Daily    fluticasone-vilanterol (BREO ELLIPTA) 100-25 mcg/inh inhaler 1 puff  1 puff Inhalation Daily    guaiFENesin (MUCINEX) 12 hr tablet 1,200 mg  1,200 mg Oral Q12H ABBIE    hydrOXYzine HCL (ATARAX) tablet 25 mg  25 mg Oral BID PRN    ipratropium-albuterol (DUO-NEB) 0 5-2 5 mg/3 mL inhalation solution 3 mL  3 mL Nebulization 4x Daily    latanoprost (XALATAN) 0 005 % ophthalmic solution 1 drop  1 drop Both Eyes HS    metoclopramide (REGLAN) tablet 10 mg  10 mg Oral 4x Daily    modafinil (PROVIGIL) tablet 100 mg  100 mg Oral Daily    nystatin (MYCOSTATIN) powder   Topical BID    rOPINIRole (REQUIP) tablet 3 mg  3 mg Oral HS    sertraline (ZOLOFT) tablet 25 mg  25 mg Oral HS    traZODone (DESYREL) tablet 50 mg  50 mg Oral HS     Pain Management Medications:      Allergies   Allergen Reactions    Humira [Adalimumab] Rash     itchy    Levaquin [Levofloxacin In D5w] Rash    Levofloxacin Rash    Lovenox [Enoxaparin] Rash and Hives       Objective     Temp:  [97 3 °F (36 3 °C)-97 9 °F (36 6 °C)] 97 3 °F (36 3 °C)  HR:  [70-73] 70  Resp:  [16-18] 18  BP: ()/(62-68) 126/64      Intake/Output Summary (Last 24 hours) at 2/17/2019 0938  Last data filed at 2/17/2019 0401  Gross per 24 hour   Intake 360 ml   Output 800 ml   Net -440 ml       Physical Exam: /64   Pulse 70   Temp (!) 97 3 °F (36 3 °C) (Oral)   Resp 18   Ht 5' 10" (1 778 m)   Wt 91 1 kg (200 lb 12 8 oz)   SpO2 92%   BMI 28 81 kg/m²   General appearance: alert and oriented, in no acute distress and cooperative  Lungs: diminished breath sounds  Heart: regular rate and rhythm, S1, S2 normal, no murmur, click, rub or gallop  Abdomen: distended but soft and nontender, positive ascites  Extremities: edema +2 and venous stasis dermatitis noted  Lab Results:   I have personally reviewed pertinent labs  , CBC:   Lab Results   Component Value Date    WBC 9 72 02/17/2019    HGB 16 2 02/17/2019    HCT 49 1 02/17/2019    MCV 95 02/17/2019     (L) 02/17/2019    MCH 31 2 02/17/2019    MCHC 33 0 02/17/2019    RDW 19 9 (H) 02/17/2019    MPV 12 9 (H) 02/17/2019    NRBC 0 02/17/2019   , CMP:   Lab Results   Component Value Date    SODIUM 136 02/17/2019    K 3 8 02/17/2019     02/17/2019    CO2 27 02/17/2019    BUN 13 02/17/2019    CREATININE 0 66 02/17/2019    CALCIUM 8 1 (L) 02/17/2019    AST 79 (H) 02/17/2019    ALT 54 02/17/2019    ALKPHOS 624 (H) 02/17/2019    EGFR 95 02/17/2019     Imaging Studies:   EKG, Pathology, and Other Studies:     Counseling / Coordination of Care  Total floor / unit time spent today 30 minutes  Greater than 50% of total time was spent with the patient and / or family counseling and / or coordination of care  A description of the counseling / coordination of care: discussed with SLIM attending

## 2019-02-17 NOTE — PROGRESS NOTES
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    Atrial fibrillation    Diabetes mellitus type 2    COPD    Anxiety and depression    Right sided weakness    Pulmonary vascular congestion    Abnormal LFTs    Goals of care, counseling/discussion    Urinary retention      Plan:       1  Progressive generalized weakness, fatigue, confusion and physical deconditioning, improving,  multifactorial, likely secondary to cancer progression and new chemotherapy agents, brain MRI with stable right occipital lobe infarct, continue with supportive care,  palliative Care is following, awaiting rehab placement  2  Hepatocellular carcinoma with progression of adrenal and pulmonary metastasis, Oncology and palliative Care are following, poor prognosis, hospice recommended  3  Esophageal cancer s/p  esophagectomy with chemo and radiation  4  Severe COPD without exacerbation, continue Mucinex and bronchodilators   5  Atrial fibrillation, heart rate controlled on amiodarone, and Eliquis  6  DM type 2, last A1c 8 9, patient does not want fingerstick anymore   7  Anxiety and depression, continue Zoloft  8  Transaminitis, worsening total bilirubin,  acute/subacute liver failure,  possibly secondary to progression of hepatocellular carcinoma vs medication side effects, liver US report reviewed, ammonia level has normalized,  continue to monitor  9  Sleep apnea  10  Urinary retention, continue with Wiley catheter, voiding trial when at rehab  11  Insomnia, add temazepam  12   GERD, add Protonix and Carafate      VTE Pharmacologic Prophylaxis:   Pharmacologic: Apixaban (Eliquis)  Mechanical VTE Prophylaxis in Place: Yes    Patient Centered Rounds: I have performed bedside rounds with nursing staff today  Discussions with Specialists or Other Care Team Provider:  Palliative    Education and Discussions with Family / Patient:  Patient and his wife at bedside    Time Spent for Care: 30 minutes  More than 50% of total time spent on counseling and coordination of care as described above  Current Length of Stay: 8 day(s)    Current Patient Status: Inpatient   Certification Statement: The patient will continue to require additional inpatient hospital stay due to Awaiting rehab placement    Discharge Plan / Estimated Discharge Date:  Awaiting rehab placement    Code Status: Level 3 - DNAR and DNI      Subjective:   Patient seen and examined  Complaining of cough during the night  Positional cough disappear when sitting in bed  Complaining of insomnia,  Requesting something to sleep    Objective:     Vitals:   Temp (24hrs), Av 6 °F (36 4 °C), Min:97 3 °F (36 3 °C), Max:97 9 °F (36 6 °C)    Temp:  [97 3 °F (36 3 °C)-97 9 °F (36 6 °C)] 97 3 °F (36 3 °C)  HR:  [70-73] 70  Resp:  [16-18] 18  BP: ()/(62-68) 126/64  SpO2:  [87 %-93 %] 92 %  Body mass index is 28 81 kg/m²  Input and Output Summary (last 24 hours):        Intake/Output Summary (Last 24 hours) at 2019 1035  Last data filed at 2019 0401  Gross per 24 hour   Intake 360 ml   Output 800 ml   Net -440 ml       Physical Exam:     Physical Exam  Patient is awake alert oriented no acute distress  Lung with decreased breath sounds bilateral poor effort  Heart positive S1-S2 no murmur  Abdomen soft nontender positive bowel sounds  Wiley catheter in place with dark urine  Lower extremities no edema    Additional Data:     Labs:    Results from last 7 days   Lab Units 19  0431   WBC Thousand/uL 9 72   HEMOGLOBIN g/dL 16 2   HEMATOCRIT % 49 1   PLATELETS Thousands/uL 101*   NEUTROS PCT % 87*   LYMPHS PCT % 6*   MONOS PCT % 7   EOS PCT % 0     Results from last 7 days Lab Units 02/17/19  0431   POTASSIUM mmol/L 3 8   CHLORIDE mmol/L 102   CO2 mmol/L 27   BUN mg/dL 13   CREATININE mg/dL 0 66   CALCIUM mg/dL 8 1*   ALK PHOS U/L 624*   ALT U/L 54   AST U/L 79*           * I Have Reviewed All Lab Data Listed Above  * Additional Pertinent Lab Tests Reviewed: Kiersten 66 Admission Reviewed    Imaging:    Imaging Reports Reviewed Today Include:   Imaging Personally Reviewed by Myself Includes:     Recent Cultures (last 7 days):           Last 24 Hours Medication List:     Current Facility-Administered Medications:  albuterol 2 5 mg Nebulization Q4H PRN Rachelle Hinojosa MD   allopurinol 100 mg Oral Daily Rachelle Hinojosa MD   amiodarone 200 mg Oral Daily Rachelle Hinojosa MD   apixaban 5 mg Oral BID Rachelle Hinojosa MD   vitamin C 1,000 mg Oral Daily Rachelle Hinojosa MD   bisacodyl 5 mg Oral Daily PRN Rachelle Hinojosa MD   calcium carbonate 750 mg Oral Daily PRN Rachelle Hinojosa MD   cyanocobalamin 1,000 mcg Oral Daily Rachelle Hinojosa MD   diazepam 5 mg Oral Q6H PRN Rachelle Hinojosa MD   famotidine 20 mg Oral HS Rachelle Hinojosa MD   ferrous sulfate 325 mg Oral Daily Rachelle Hinojosa MD   fluticasone-vilanterol 1 puff Inhalation Daily Maldonado Kendall DO   guaiFENesin 1,200 mg Oral Q12H [address] Maldonado Kendall DO   hydrOXYzine HCL 25 mg Oral BID PRN Rachelle Hinojosa MD   ipratropium-albuterol 3 mL Nebulization 4x Daily Maldonado Kendall DO   latanoprost 1 drop Both Eyes HS Rachelle Hinojosa MD   metoclopramide 10 mg Oral 4x Daily Rachelle Hinojosa MD   modafinil 100 mg Oral Daily Palak Huang,    nystatin  Topical BID Maldonado Kendall DO   rOPINIRole 3 mg Oral HS Rachelle Hinojosa MD   sertraline 25 mg Oral HS Rachelle Hinojosa MD   traZODone 50 mg Oral HS Rachelle Hinojosa MD        Today, Patient Was Seen By: Maldonado Kendall DO    ** Please Note: This note has been constructed using a voice recognition system   **

## 2019-02-17 NOTE — PLAN OF CARE
Problem: Potential for Falls  Goal: Patient will remain free of falls  Description  INTERVENTIONS:  - Assess patient frequently for physical needs  -  Identify cognitive and physical deficits and behaviors that affect risk of falls  -  Alexis fall precautions as indicated by assessment   - Educate patient/family on patient safety including physical limitations  - Instruct patient to call for assistance with activity based on assessment  - Modify environment to reduce risk of injury  - Consider OT/PT consult to assist with strengthening/mobility   Outcome: Progressing     Problem: Prexisting or High Potential for Compromised Skin Integrity  Goal: Skin integrity is maintained or improved  Description  INTERVENTIONS:  - Identify patients at risk for skin breakdown  - Assess and monitor skin integrity  - Assess and monitor nutrition and hydration status  - Monitor labs (i e  albumin)  - Assess for incontinence   - Turn and reposition patient  - Assist with mobility/ambulation  - Relieve pressure over bony prominences  - Avoid friction and shearing  - Provide appropriate hygiene as needed including keeping skin clean and dry  - Evaluate need for skin moisturizer/barrier cream  - Collaborate with interdisciplinary team (i e  Nutrition, Rehabilitation, etc )   - Patient/family teaching   Outcome: Progressing     Problem: Nutrition/Hydration-ADULT  Goal: Nutrient/Hydration intake appropriate for improving, restoring or maintaining nutritional needs  Description  Monitor and assess patient's nutrition/hydration status for malnutrition (ex- brittle hair, bruises, dry skin, pale skin and conjunctiva, muscle wasting, smooth red tongue, and disorientation)  Collaborate with interdisciplinary team and initiate plan and interventions as ordered  Monitor patient's weight and dietary intake as ordered or per policy  Utilize nutrition screening tool and intervene per policy   Determine patient's food preferences and provide high-protein, high-caloric foods as appropriate       INTERVENTIONS:  - Monitor oral intake, urinary output, labs, and treatment plans  - Assess nutrition and hydration status and recommend course of action  - Evaluate amount of meals eaten  - Assist patient with eating if necessary   - Allow adequate time for meals  - Recommend/ encourage appropriate diets, oral nutritional supplements, and vitamin/mineral supplements  - Order, calculate, and assess calorie counts as needed  - Recommend, monitor, and adjust tube feedings and TPN/PPN based on assessed needs  - Assess need for intravenous fluids  - Provide specific nutrition/hydration education as appropriate  - Include patient/family/caregiver in decisions related to nutrition   Outcome: Progressing     Problem: RESPIRATORY - ADULT  Goal: Achieves optimal ventilation and oxygenation  Description  INTERVENTIONS:  - Assess for changes in respiratory status  - Assess for changes in mentation and behavior  - Position to facilitate oxygenation and minimize respiratory effort  - Oxygen administration by appropriate delivery method based on oxygen saturation (per order) or ABGs  - Initiate smoking cessation education as indicated  - Encourage broncho-pulmonary hygiene including cough, deep breathe, Incentive Spirometry  - Assess the need for suctioning and aspirate as needed  - Assess and instruct to report SOB or any respiratory difficulty  - Respiratory Therapy support as indicated   Outcome: Progressing     Problem: METABOLIC, FLUID AND ELECTROLYTES - ADULT  Goal: Electrolytes maintained within normal limits  Description  INTERVENTIONS:  - Monitor labs and assess patient for signs and symptoms of electrolyte imbalances  - Administer electrolyte replacement as ordered  - Monitor response to electrolyte replacements, including repeat lab results as appropriate  - Instruct patient on fluid and nutrition as appropriate   Outcome: Progressing  Goal: Fluid balance maintained  Description  INTERVENTIONS:  - Monitor labs and assess for signs and symptoms of volume excess or deficit  - Monitor I/O and WT  - Instruct patient on fluid and nutrition as appropriate   Outcome: Progressing  Goal: Glucose maintained within target range  Description  INTERVENTIONS:  - Monitor Blood Glucose as ordered  - Assess for signs and symptoms of hyperglycemia and hypoglycemia  - Administer ordered medications to maintain glucose within target range  - Assess nutritional intake and initiate nutrition service referral as needed   Outcome: Progressing     Problem: SKIN/TISSUE INTEGRITY - ADULT  Goal: Skin integrity remains intact  Description  INTERVENTIONS  - Identify patients at risk for skin breakdown  - Assess and monitor skin integrity  - Assess and monitor nutrition and hydration status  - Monitor labs (i e  albumin)  - Assess for incontinence   - Turn and reposition patient  - Assist with mobility/ambulation  - Relieve pressure over bony prominences  - Avoid friction and shearing  - Provide appropriate hygiene as needed including keeping skin clean and dry  - Evaluate need for skin moisturizer/barrier cream  - Collaborate with interdisciplinary team (i e  Nutrition, Rehabilitation, etc )   - Patient/family teaching   Outcome: Progressing  Goal: Incision(s), wounds(s) or drain site(s) healing without S/S of infection  Description  INTERVENTIONS  - Assess and document risk factors for skin impairment   - Assess and document dressing, incision, wound bed, drain sites and surrounding tissue  - Initiate Nutrition services consult and/or wound management as needed   Outcome: Progressing  Goal: Oral mucous membranes remain intact  Description  INTERVENTIONS  - Assess oral mucosa and hygiene practices  - Implement preventative oral hygiene regimen  - Implement oral medicated treatments as ordered  - Initiate Nutrition services referral as needed   Outcome: Progressing     Problem: HEMATOLOGIC - ADULT  Goal: Maintains hematologic stability  Description  INTERVENTIONS  - Assess for signs and symptoms of bleeding or hemorrhage  - Monitor labs  - Administer supportive blood products/factors as ordered and appropriate   Outcome: Progressing     Problem: MUSCULOSKELETAL - ADULT  Goal: Maintain or return mobility to safest level of function  Description  INTERVENTIONS:  - Assess patient's ability to carry out ADLs; assess patient's baseline for ADL function and identify physical deficits which impact ability to perform ADLs (bathing, care of mouth/teeth, toileting, grooming, dressing, etc )  - Assess/evaluate cause of self-care deficits   - Assess range of motion  - Assess patient's mobility; develop plan if impaired  - Assess patient's need for assistive devices and provide as appropriate  - Encourage maximum independence but intervene and supervise when necessary  - Involve family in performance of ADLs  - Assess for home care needs following discharge   - Request OT consult to assist with ADL evaluation and planning for discharge  - Provide patient education as appropriate   Outcome: Progressing  Goal: Maintain proper alignment of affected body part  Description  INTERVENTIONS:  - Support, maintain and protect limb and body alignment  - Provide pt/fam with appropriate education   Outcome: Progressing     Problem: DISCHARGE PLANNING - CARE MANAGEMENT  Goal: Discharge to post-acute care or home with appropriate resources  Description  INTERVENTIONS:  - Conduct assessment to determine patient/family and health care team treatment goals, and need for post-acute services based on payer coverage, community resources, and patient preferences, and barriers to discharge  - Address psychosocial, clinical, and financial barriers to discharge as identified in assessment in conjunction with the patient/family and health care team  - Arrange appropriate level of post-acute services according to patient?s   needs and preference and payer coverage in collaboration with the physician and health care team  - Communicate with and update the patient/family, physician, and health care team regarding progress on the discharge plan  - Arrange appropriate transportation to post-acute venues  Outcome: Progressing

## 2019-02-18 PROBLEM — R09.89 PULMONARY VASCULAR CONGESTION: Status: RESOLVED | Noted: 2019-02-09 | Resolved: 2019-02-18

## 2019-02-18 PROBLEM — R53.1 RIGHT SIDED WEAKNESS: Status: RESOLVED | Noted: 2019-02-09 | Resolved: 2019-02-18

## 2019-02-18 PROCEDURE — 97530 THERAPEUTIC ACTIVITIES: CPT

## 2019-02-18 PROCEDURE — 94640 AIRWAY INHALATION TREATMENT: CPT | Performed by: SOCIAL WORKER

## 2019-02-18 PROCEDURE — 94640 AIRWAY INHALATION TREATMENT: CPT

## 2019-02-18 PROCEDURE — 94760 N-INVAS EAR/PLS OXIMETRY 1: CPT | Performed by: SOCIAL WORKER

## 2019-02-18 PROCEDURE — 97116 GAIT TRAINING THERAPY: CPT

## 2019-02-18 PROCEDURE — 99232 SBSQ HOSP IP/OBS MODERATE 35: CPT | Performed by: INTERNAL MEDICINE

## 2019-02-18 PROCEDURE — 94760 N-INVAS EAR/PLS OXIMETRY 1: CPT

## 2019-02-18 RX ORDER — MODAFINIL 100 MG/1
100 TABLET ORAL DAILY
Qty: 30 TABLET | Refills: 0 | Status: SHIPPED | OUTPATIENT
Start: 2019-02-19 | End: 2019-03-01

## 2019-02-18 RX ORDER — TEMAZEPAM 15 MG/1
15 CAPSULE ORAL
Qty: 30 CAPSULE | Refills: 0 | Status: SHIPPED | OUTPATIENT
Start: 2019-02-18 | End: 2019-02-28

## 2019-02-18 RX ORDER — SUCRALFATE ORAL 1 G/10ML
1000 SUSPENSION ORAL EVERY 6 HOURS SCHEDULED
Qty: 420 ML | Refills: 0 | Status: SHIPPED | OUTPATIENT
Start: 2019-02-18

## 2019-02-18 RX ORDER — PANTOPRAZOLE SODIUM 40 MG/1
40 TABLET, DELAYED RELEASE ORAL
Qty: 30 TABLET | Refills: 0 | Status: SHIPPED | OUTPATIENT
Start: 2019-02-19

## 2019-02-18 RX ORDER — GUAIFENESIN 1200 MG/1
1200 TABLET, EXTENDED RELEASE ORAL EVERY 12 HOURS SCHEDULED
Qty: 60 TABLET | Refills: 0 | Status: SHIPPED | OUTPATIENT
Start: 2019-02-18

## 2019-02-18 RX ADMIN — SUCRALFATE 1000 MG: 1 SUSPENSION ORAL at 12:56

## 2019-02-18 RX ADMIN — LATANOPROST 1 DROP: 50 SOLUTION OPHTHALMIC at 21:01

## 2019-02-18 RX ADMIN — IPRATROPIUM BROMIDE AND ALBUTEROL SULFATE 3 ML: 2.5; .5 SOLUTION RESPIRATORY (INHALATION) at 07:19

## 2019-02-18 RX ADMIN — SUCRALFATE 1000 MG: 1 SUSPENSION ORAL at 23:37

## 2019-02-18 RX ADMIN — AMIODARONE HYDROCHLORIDE 200 MG: 200 TABLET ORAL at 08:11

## 2019-02-18 RX ADMIN — HYDROXYZINE HYDROCHLORIDE 25 MG: 25 TABLET, FILM COATED ORAL at 18:20

## 2019-02-18 RX ADMIN — TRAZODONE HYDROCHLORIDE 50 MG: 50 TABLET ORAL at 21:01

## 2019-02-18 RX ADMIN — OXYCODONE HYDROCHLORIDE AND ACETAMINOPHEN 1000 MG: 500 TABLET ORAL at 08:11

## 2019-02-18 RX ADMIN — NYSTATIN: 100000 POWDER TOPICAL at 08:13

## 2019-02-18 RX ADMIN — FAMOTIDINE 20 MG: 20 TABLET, FILM COATED ORAL at 21:01

## 2019-02-18 RX ADMIN — FERROUS SULFATE TAB 325 MG (65 MG ELEMENTAL FE) 325 MG: 325 (65 FE) TAB at 08:11

## 2019-02-18 RX ADMIN — METOCLOPRAMIDE HYDROCHLORIDE 10 MG: 10 TABLET ORAL at 18:20

## 2019-02-18 RX ADMIN — SUCRALFATE 1000 MG: 1 SUSPENSION ORAL at 05:32

## 2019-02-18 RX ADMIN — METOCLOPRAMIDE HYDROCHLORIDE 10 MG: 10 TABLET ORAL at 08:11

## 2019-02-18 RX ADMIN — ALLOPURINOL 100 MG: 100 TABLET ORAL at 08:16

## 2019-02-18 RX ADMIN — SUCRALFATE 1000 MG: 1 SUSPENSION ORAL at 00:19

## 2019-02-18 RX ADMIN — SUCRALFATE 1000 MG: 1 SUSPENSION ORAL at 18:20

## 2019-02-18 RX ADMIN — IPRATROPIUM BROMIDE AND ALBUTEROL SULFATE 3 ML: 2.5; .5 SOLUTION RESPIRATORY (INHALATION) at 16:23

## 2019-02-18 RX ADMIN — MODAFINIL 100 MG: 100 TABLET ORAL at 08:16

## 2019-02-18 RX ADMIN — APIXABAN 5 MG: 5 TABLET, FILM COATED ORAL at 18:20

## 2019-02-18 RX ADMIN — SERTRALINE HYDROCHLORIDE 25 MG: 25 TABLET ORAL at 21:01

## 2019-02-18 RX ADMIN — IPRATROPIUM BROMIDE AND ALBUTEROL SULFATE 3 ML: 2.5; .5 SOLUTION RESPIRATORY (INHALATION) at 12:40

## 2019-02-18 RX ADMIN — GUAIFENESIN 1200 MG: 600 TABLET, EXTENDED RELEASE ORAL at 08:11

## 2019-02-18 RX ADMIN — APIXABAN 5 MG: 5 TABLET, FILM COATED ORAL at 08:10

## 2019-02-18 RX ADMIN — FLUTICASONE FUROATE AND VILANTEROL TRIFENATATE 1 PUFF: 100; 25 POWDER RESPIRATORY (INHALATION) at 08:12

## 2019-02-18 RX ADMIN — ROPINIROLE 3 MG: 2 TABLET, FILM COATED ORAL at 21:02

## 2019-02-18 RX ADMIN — METOCLOPRAMIDE HYDROCHLORIDE 10 MG: 10 TABLET ORAL at 12:56

## 2019-02-18 RX ADMIN — METOCLOPRAMIDE HYDROCHLORIDE 10 MG: 10 TABLET ORAL at 21:01

## 2019-02-18 RX ADMIN — IPRATROPIUM BROMIDE AND ALBUTEROL SULFATE 3 ML: 2.5; .5 SOLUTION RESPIRATORY (INHALATION) at 19:03

## 2019-02-18 RX ADMIN — PANTOPRAZOLE SODIUM 40 MG: 40 TABLET, DELAYED RELEASE ORAL at 05:32

## 2019-02-18 RX ADMIN — CYANOCOBALAMIN TAB 500 MCG 1000 MCG: 500 TAB at 08:10

## 2019-02-18 RX ADMIN — NYSTATIN: 100000 POWDER TOPICAL at 18:21

## 2019-02-18 RX ADMIN — GUAIFENESIN 1200 MG: 600 TABLET, EXTENDED RELEASE ORAL at 21:01

## 2019-02-18 NOTE — PROGRESS NOTES
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    Atrial fibrillation    Diabetes mellitus type 2    COPD    Anxiety and depression    Abnormal LFTs    Goals of care, counseling/discussion    Urinary retention      Plan:       1  Progressive generalized weakness, fatigue, confusion and physical deconditioning, improving,  multifactorial, likely secondary to cancer progression and new chemotherapy agents, brain MRI with stable right occipital lobe infarct, continue with supportive care,  palliative Care is following  2  Hepatocellular carcinoma with progression of adrenal and pulmonary metastasis, Oncology and palliative Care are following, poor prognosis, hospice recommended  3  Esophageal cancer s/p  esophagectomy with chemo and radiation  4  Severe COPD without exacerbation, continue Mucinex and bronchodilators   5  Atrial fibrillation, heart rate controlled on amiodarone, and Eliquis  6  DM type 2, last A1c 8 9, patient does not want fingerstick anymore   7  Anxiety and depression, continue Zoloft  8  Transaminitis, worsening total bilirubin,  acute/subacute liver failure,  possibly secondary to progression of hepatocellular carcinoma vs medication side effects, liver US report reviewed, ammonia level has normalized,  continue to monitor  9  Sleep apnea  10  Urinary retention, continue with Wiley catheter, voiding trial when at rehab  11  Insomnia, continue temazepam  12   GERD, continue Protonix and Carafate    Awaiting placement  Home versus rehab   is following  Medically stable for discharge      VTE Pharmacologic Prophylaxis:   Pharmacologic: Apixaban (Eliquis)  Mechanical VTE Prophylaxis in Place: Yes    Patient Centered Rounds: I have performed bedside rounds with nursing staff today  Discussions with Specialists or Other Care Team Provider:  Palliative    Education and Discussions with Family / Patient:  Patient and his wife at bedside    Time Spent for Care: 30 minutes  More than 50% of total time spent on counseling and coordination of care as described above  Current Length of Stay: 9 day(s)    Current Patient Status: Inpatient   Certification Statement: The patient will continue to require additional inpatient hospital stay due to Awaiting rehab placement    Discharge Plan / Estimated Discharge Date:  Awaiting rehab placement    Code Status: Level 3 - DNAR and DNI      Subjective:   Patient seen and examined  Feels better today  Less cough  Slept better on temazepam  Wants to go home      Objective:     Vitals:   Temp (24hrs), Av 5 °F (36 4 °C), Min:97 3 °F (36 3 °C), Max:97 8 °F (36 6 °C)    Temp:  [97 3 °F (36 3 °C)-97 8 °F (36 6 °C)] 97 3 °F (36 3 °C)  HR:  [60-75] 75  Resp:  [18-20] 20  BP: (103-122)/(64-68) 103/64  SpO2:  [92 %-99 %] 99 %  Body mass index is 28 81 kg/m²  Input and Output Summary (last 24 hours):        Intake/Output Summary (Last 24 hours) at 2019 1455  Last data filed at 2019 1442  Gross per 24 hour   Intake 1120 ml   Output 375 ml   Net 745 ml       Physical Exam:     Physical Exam     Patient is awake alert oriented no acute distress  Lung with decreased breath sounds bilateral poor effort  Heart positive S1-S2 no murmur  Abdomen soft nontender positive bowel sounds  Wiley catheter in place with dark urine  Lower extremities no edema    Additional Data:     Labs:    Results from last 7 days   Lab Units 19  0431   WBC Thousand/uL 9 72   HEMOGLOBIN g/dL 16 2   HEMATOCRIT % 49 1   PLATELETS Thousands/uL 101*   NEUTROS PCT % 87*   LYMPHS PCT % 6*   MONOS PCT % 7   EOS PCT % 0     Results from last 7 days   Lab Units 19  0431   POTASSIUM mmol/L 3 8   CHLORIDE mmol/L 102   CO2 mmol/L 27   BUN mg/dL 13   CREATININE mg/dL 0 66   CALCIUM mg/dL 8 1*   ALK PHOS U/L 624*   ALT U/L 54   AST U/L 79*           * I Have Reviewed All Lab Data Listed Above  * Additional Pertinent Lab Tests Reviewed:  Kiersten [facility] Admission Reviewed    Imaging:    Imaging Reports Reviewed Today Include:   Imaging Personally Reviewed by Myself Includes:     Recent Cultures (last 7 days):           Last 24 Hours Medication List:     Current Facility-Administered Medications:  albuterol 2 5 mg Nebulization Q4H PRN Estee Francisco MD   allopurinol 100 mg Oral Daily Estee Francisco MD   amiodarone 200 mg Oral Daily Estee Francisco MD   apixaban 5 mg Oral BID Estee Francisco MD   vitamin C 1,000 mg Oral Daily Estee Francisco MD   bisacodyl 5 mg Oral Daily PRN Estee Francisco MD   calcium carbonate 750 mg Oral Daily PRN Estee Francisco MD   cyanocobalamin 1,000 mcg Oral Daily Estee Francisco MD   diazepam 5 mg Oral Q6H PRN Estee Francisco MD   famotidine 20 mg Oral HS Estee Francisco MD   ferrous sulfate 325 mg Oral Daily Estee Francisco MD   fluticasone-vilanterol 1 puff Inhalation Daily Washington Shrestha DO   guaiFENesin 1,200 mg Oral Q12H [address] Washington Shrestha DO   hydrOXYzine HCL 25 mg Oral BID PRN Estee Francisco MD   ipratropium-albuterol 3 mL Nebulization 4x Daily Washington Shrestha DO   latanoprost 1 drop Both Eyes HS Estee Francisco MD   metoclopramide 10 mg Oral 4x Daily Estee Francisco MD   modafinil 100 mg Oral Daily Palak Huang, DO   nystatin  Topical BID Washington Shrestha,    pantoprazole 40 mg Oral Early Morning Washington Shrestha DO   rOPINIRole 3 mg Oral HS Estee Francisco MD   sertraline 25 mg Oral HS Estee Francisco MD   sucralfate 1,000 mg Oral Q6H [address] Washington Shrestha DO   temazepam 15 mg Oral HS PRN Washington Shrestha,    traZODone 50 mg Oral HS Estee Francisco MD        Today, Patient Was Seen By: Washington Shrestha DO    ** Please Note: This note has been constructed using a voice recognition system   **

## 2019-02-18 NOTE — SOCIAL WORK
Cm received call from Rah [facility] at [facility] they currently have no male beds available  Pt and spouse requesting referral to [facility] and would like to discharge home  Cm spoke with Rebeca Morrison at [facility] in [facility] and faxed clinicals  Pt is known to them and as requested by patient they will be able to send nurse Lai Santizo out

## 2019-02-18 NOTE — SOCIAL WORK
Cm met with palliative  [place]  Pt and spouse now interested in home hospice instead of gong home with VNA services  Cm printed a list of hospice agencies that covers pt area, advised [facility] does not  Cm to follow-up on pt's choices

## 2019-02-18 NOTE — SOCIAL WORK
Cm received call from Shwetha Moder they have decided on [facility]  Referral sent via [address], pt and spouse Verito Alexander states that they were informed by palliative  Debbie Mcfarland that the [facility] would probably be able to provide additional HHA services  Pt also requesting that cm make the request for a new Life Alert  Cm contacted HERLINDA Pyle at the [address] ext [phone] and [facility] regarding [facility]

## 2019-02-18 NOTE — PLAN OF CARE
Problem: PHYSICAL THERAPY ADULT  Goal: Performs mobility at highest level of function for planned discharge setting  See evaluation for individualized goals  Description  Treatment/Interventions: Functional transfer training, LE strengthening/ROM, Elevations, Therapeutic exercise, Endurance training, Cognitive reorientation, Patient/family training, Equipment eval/education, Bed mobility, Gait training, Compensatory technique education, Continued evaluation, Spoke to nursing  Equipment Recommended: Nay Puente       See flowsheet documentation for full assessment, interventions and recommendations     Outcome: Progressing

## 2019-02-18 NOTE — PHYSICAL THERAPY NOTE
Physical Therapy Progress Note     02/18/19 1600   Pain Assessment   Pain Assessment 0-10   Pain Score 6   Pain Type Acute pain   Pain Location Abdomen   Pain Orientation Left;Mid   Pain Descriptors Stabbing   Pain Frequency Intermittent  (Only with coughing )   Pain Onset Sudden  (With coughing )   Hospital Pain Intervention(s) Repositioned; Ambulation/increased activity; Emotional support   Response to Interventions Tolerated  Restrictions/Precautions   Braces or Orthoses   (Abdominal binder )   Other Precautions Pain; Fall Risk   Subjective   Subjective The pt  notes that he had an old hernia repair, and that with his incessant coughing that it has been increasingly painful  He notes that the abdominal binder provides some relief, but that he still has pain  He had multiple questions about him returning directly home  Transfers   Sit to Stand 5  Supervision   Additional items Increased time required;Armrests   Stand to Sit 5  Supervision   Additional items Increased time required;Armrests   Ambulation/Elevation   Gait pattern Excessively slow; Inconsistent margarito; Forward Flexion   Gait Assistance 5  Supervision   Additional items Verbal cues   Assistive Device 4-wheeled walker   Distance 590 feet total with 2 seated rests and 4 standing rests  Stair Management Assistance 5  Supervision   Additional items Verbal cues; Increased time required   Stair Management Technique One rail R;Sideways;Nonreciprocal   Number of Stairs 3   Balance   Static Sitting Fair +   Dynamic Sitting Fair   Static Standing Fair   Ambulatory Fair -   Activity Tolerance   Activity Tolerance Patient tolerated treatment well;Patient limited by fatigue   Nurse Made Aware Yes  Exercises   Knee AROM Long Arc Quad Sitting;Bilateral;AROM;5 reps   Assessment   Prognosis Good   Problem List Decreased strength;Decreased endurance; Impaired balance;Decreased mobility;Pain   Assessment The pt  was able to progress his total ambulatory distance, and he had improved balance today  He had no loss of balance, although he did fatigue with distances beyond 150 feet  Educated the pt  on the benefits of continued use of the rollator in order to maximize his activity tolerance and his balance instead of utilizing two single-point canes  He was able to complete the stairs, and ambulate farther today than prior sessions  He was instructed in pacing as well as taking appropriate rests as the pt  would attempt to hasten his pace in order to take a seated rest  Reviewed home safety, energy conservation techniques as well as addressed his questions about returning home  He has demonstrated the necessary mobility in order to safely return home with continued family support, however, he does remain limited from his baseline which would merit inpatient rehab  Will defer recommendations to the pt's preference  Goals   Patient Goals To go home  STG Expiration Date 02/19/19   Treatment Day 5   Plan   Treatment/Interventions Functional transfer training;LE strengthening/ROM; Elevations; Therapeutic exercise; Endurance training;Patient/family training;Bed mobility;Gait training   Progress Progressing toward goals   PT Frequency   (3-5x a week )   Recommendation   Recommendation Home with family support;Home PT;Post acute IP rehab  (As per pt  preference within his goals of care )   Equipment Recommended Keaton Helton   PT - OK to Discharge Yes     Selestine Shone, PTA

## 2019-02-19 VITALS
SYSTOLIC BLOOD PRESSURE: 116 MMHG | RESPIRATION RATE: 19 BRPM | TEMPERATURE: 97.5 F | WEIGHT: 200.8 LBS | OXYGEN SATURATION: 97 % | DIASTOLIC BLOOD PRESSURE: 65 MMHG | HEART RATE: 68 BPM | BODY MASS INDEX: 28.75 KG/M2 | HEIGHT: 70 IN

## 2019-02-19 PROCEDURE — 94760 N-INVAS EAR/PLS OXIMETRY 1: CPT | Performed by: SOCIAL WORKER

## 2019-02-19 PROCEDURE — 94640 AIRWAY INHALATION TREATMENT: CPT | Performed by: SOCIAL WORKER

## 2019-02-19 PROCEDURE — 99239 HOSP IP/OBS DSCHRG MGMT >30: CPT | Performed by: INTERNAL MEDICINE

## 2019-02-19 RX ORDER — POLYVINYL ALCOHOL 14 MG/ML
1 SOLUTION/ DROPS OPHTHALMIC
Qty: 15 ML | Refills: 0 | Status: SHIPPED | OUTPATIENT
Start: 2019-02-19

## 2019-02-19 RX ORDER — LORAZEPAM 1 MG/1
TABLET ORAL
Qty: 30 TABLET | Refills: 0 | Status: SHIPPED | OUTPATIENT
Start: 2019-02-19

## 2019-02-19 RX ORDER — POLYVINYL ALCOHOL 14 MG/ML
1 SOLUTION/ DROPS OPHTHALMIC
Status: DISCONTINUED | OUTPATIENT
Start: 2019-02-19 | End: 2019-02-19 | Stop reason: HOSPADM

## 2019-02-19 RX ORDER — OXYCODONE HYDROCHLORIDE 5 MG/1
5 TABLET ORAL EVERY 4 HOURS PRN
Qty: 30 TABLET | Refills: 0 | Status: SHIPPED | OUTPATIENT
Start: 2019-02-19

## 2019-02-19 RX ADMIN — SUCRALFATE 1000 MG: 1 SUSPENSION ORAL at 06:35

## 2019-02-19 RX ADMIN — GUAIFENESIN 1200 MG: 600 TABLET, EXTENDED RELEASE ORAL at 08:15

## 2019-02-19 RX ADMIN — IPRATROPIUM BROMIDE AND ALBUTEROL SULFATE 3 ML: 2.5; .5 SOLUTION RESPIRATORY (INHALATION) at 11:46

## 2019-02-19 RX ADMIN — AMIODARONE HYDROCHLORIDE 200 MG: 200 TABLET ORAL at 08:15

## 2019-02-19 RX ADMIN — PANTOPRAZOLE SODIUM 40 MG: 40 TABLET, DELAYED RELEASE ORAL at 06:35

## 2019-02-19 RX ADMIN — CYANOCOBALAMIN TAB 500 MCG 1000 MCG: 500 TAB at 08:15

## 2019-02-19 RX ADMIN — OXYCODONE HYDROCHLORIDE AND ACETAMINOPHEN 1000 MG: 500 TABLET ORAL at 08:15

## 2019-02-19 RX ADMIN — Medication 300 UNITS: at 12:07

## 2019-02-19 RX ADMIN — SUCRALFATE 1000 MG: 1 SUSPENSION ORAL at 12:07

## 2019-02-19 RX ADMIN — IPRATROPIUM BROMIDE AND ALBUTEROL SULFATE 3 ML: 2.5; .5 SOLUTION RESPIRATORY (INHALATION) at 07:18

## 2019-02-19 RX ADMIN — METOCLOPRAMIDE HYDROCHLORIDE 10 MG: 10 TABLET ORAL at 08:15

## 2019-02-19 RX ADMIN — ALLOPURINOL 100 MG: 100 TABLET ORAL at 08:15

## 2019-02-19 RX ADMIN — APIXABAN 5 MG: 5 TABLET, FILM COATED ORAL at 08:15

## 2019-02-19 RX ADMIN — NYSTATIN: 100000 POWDER TOPICAL at 08:19

## 2019-02-19 RX ADMIN — METOCLOPRAMIDE HYDROCHLORIDE 10 MG: 10 TABLET ORAL at 12:07

## 2019-02-19 RX ADMIN — FERROUS SULFATE TAB 325 MG (65 MG ELEMENTAL FE) 325 MG: 325 (65 FE) TAB at 08:15

## 2019-02-19 RX ADMIN — FLUTICASONE FUROATE AND VILANTEROL TRIFENATATE 1 PUFF: 100; 25 POWDER RESPIRATORY (INHALATION) at 08:16

## 2019-02-19 RX ADMIN — MODAFINIL 100 MG: 100 TABLET ORAL at 08:18

## 2019-02-19 NOTE — DISCHARGE SUMMARY
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    Atrial fibrillation    Diabetes mellitus type 2    COPD    Anxiety and depression    Abnormal LFTs    Goals of care, counseling/discussion    Urinary retention  Resolved Problems:    Vision abnormalities with right-sided weakness    Right sided weakness    Pulmonary vascular congestion      Consultations During Hospital Stay:  · Hem Onc and palliative care    Procedures Performed:     · none    Significant Findings / Test Results:     MRI brain w wo contrast   Final Result by Antoinette Ponce DO (02/11 9061)      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  No acute ischemia, mass or hemorrhage  Workstation performed: [id number]         US right upper quadrant   Final Result by Justin Daniels DO (02/10 1708)      Diffusely heterogeneous liver with masslike fullness left lobe  Unfortunately this cannot be further differentiated on this exam  More precise comparison of known hepatic lesions would be better evaluated with contrast enhanced cross-sectional imaging  No biliary dilatation detected  Small amount of fluid within the right upper quadrant  Workstation performed: [id number]         XR chest 2 views   ED Interpretation by Ant Causey MD (02/08 2201)   No active pulmonary disease      Final Result by Darrell Rodriguez MD (02/09 0202)      Cardiomegaly and pulmonary vascular congestion raises concern for CHF        The study was marked in [facility] for immediate notification  I      Workstation performed: [id number]         CT head without contrast   Final Result by Litzy Zarate MD (02/08 2159)      No acute intracranial abnormality  Stable small old medial right occipital lobe infarct  Workstation performed: [id number]                Hospital Course: Sanket Padron is a 74 y o  male patient who originally presented to the hospital on 2/8/2019 due to fatigue  He has known PMHx HCC w/ mets, esophageal cancer, COPD, A  Fib on Eliquis, DM2, anxiety  He was recently started on new chemo drug  Onc following  Chemo on hold  Also had complained of one-sided weakness and vision changes  MRI showed old stroke but nothing new  He was very fatigued/lethargic today  Benzos and Benadryl discontinued  Ammonia high, started on lactulose  Patient's weakness and fatigue and physical deconditioning most likely secondary to progression of his malignancy  Patient also had worsening of the liver function most likely secondary to worsened hepatocellular carcinoma  Patient had urinary retention for which Wiley catheter was placed  Patient will be discharged with Wiley for comfort  Palliative care team was following  Eventually pt and family decided for comfort directed care and hospice  Patient will be discharged home with home hospice today  Condition at Discharge: good     Discharge Day Visit / Exam:     Subjective:  Pt seen and examined by me this morning  Pt denies any specific complaints  Overall feeling comfortable  Patient complained of worsening swelling in bilateral lower extremities this morning  Apparently as per patient and wife he was sleeping in the chair last night with his legs hanging all night  Denies any pain      Vitals: Blood Pressure: 116/65 (02/19/19 0724)  Pulse: 68 (02/19/19 0724)  Temperature: 97 5 °F (36 4 °C) (02/18/19 2300)  Temp Source: Axillary (02/18/19 2300)  Respirations: 19 (02/19/19 0724)  Height: 5' 10" (177 8 cm) (02/09/19 0114)  Weight - Scale: 91 1 kg (200 lb 12 8 oz) (02/15/19 2100)  SpO2: 97 % (02/19/19 0724)     Exam:   Physical Exam    Constitutional: Pt appears well-developed and well-nourished  Not in any acute distress  HENT:   Head: Normocephalic and atraumatic  Eyes: EOM are normal    Neck: Neck supple  Cardiovascular: Normal rate, regular rhythm, normal heart sounds  Exam reveals no gallop and no friction rub  No murmur heard  Pulmonary/Chest: Effort normal and breath sounds normal  No respiratory distress  Pt has no wheezes or rales  Abdominal: Soft  Non-distended, Non-tender  Bowel sounds are normal    Musculoskeletal: Normal range of motion  Neurological: alert and oriented to person, place, and time  Psychiatric: normal mood and affect  Discussion with Family: wife at bedside    Discharge instructions/Information to patient and family:   See after visit summary for information provided to patient and family  Provisions for Follow-Up Care:  See after visit summary for information related to follow-up care and any pertinent home health orders  Disposition:     Home with VNA and home hospice    For Discharges to [facility] SNF:   · Not Applicable to this Patient - Not Applicable to this Patient    Planned Readmission: no     Discharge Statement:  I spent 40 minutes discharging the patient  This time was spent on the day of discharge  I had direct contact with the patient on the day of discharge  Greater than 50% of the total time was spent examining patient, answering all patient questions, arranging and discussing plan of care with patient as well as directly providing post-discharge instructions  Additional time then spent on discharge activities  Discharge Medications:  See after visit summary for reconciled discharge medications provided to patient and family        ** Please Note: This note has been constructed using a voice recognition system **

## 2019-02-19 NOTE — DISCHARGE INSTRUCTIONS
Driving Restrictions   WHAT YOU NEED TO KNOW:   You are not able to drive  Driving restrictions may be because you are being treated for a medical condition or you take certain medicines, such as narcotics  DISCHARGE INSTRUCTIONS:   Do not  drive as instructed by your healthcare provider  Driving could result in injury to yourself or others  © 2017 [address] [employer] Information is for End User's use only and may not be sold, redistributed or otherwise used for commercial purposes  All illustrations and images included in CareNotes® are the copyrighted property of [employer] , [employer]  or Charlie Perez  The above information is an  only  It is not intended as medical advice for individual conditions or treatments  Talk to your doctor, nurse or pharmacist before following any medical regimen to see if it is safe and effective for you

## 2019-02-19 NOTE — SOCIAL WORK
Call received from Fr[facility], [phone]  The patient is approved and she will call wife to discuss  She will order DME  If hospice meds are not filled at [place] [address] [facility] will fax scripts to her at fax# [phone] for her to send to their pharmacy  Informed RN

## 2019-02-19 NOTE — SOCIAL WORK
Ortega received call from Navneet Thorpe from [facility] of the [address] [place] [address]  She will review clinicals with the MD and contact cm with a decision

## 2019-02-19 NOTE — NURSING NOTE
Patient left via wheelchair with wife and PCA  Discharge instructions reviewed, scripts and AVS faxed to home health hospice with [facility]  Scripts handed to wife

## 2019-02-19 NOTE — RESTORATIVE TECHNICIAN NOTE
Restorative Specialist Mobility Note       Activity: Bathroom privileges, Dangle, Ambulate in room, Bedrest     Assistive Device: Front wheel walker     Ambulation Response: Tolerated fairly well  Repositioned: Supine              Anti-Embolism Device On:  Bilateral, Sequential compression devices, below knee

## 2019-02-19 NOTE — PLAN OF CARE
Problem: Potential for Falls  Goal: Patient will remain free of falls  Description  INTERVENTIONS:  - Assess patient frequently for physical needs  -  Identify cognitive and physical deficits and behaviors that affect risk of falls  -  [place] fall precautions as indicated by assessment   - Educate patient/family on patient safety including physical limitations  - Instruct patient to call for assistance with activity based on assessment  - Modify environment to reduce risk of injury  - Consider OT/PT consult to assist with strengthening/mobility   Outcome: Progressing     Problem: Prexisting or High Potential for Compromised Skin Integrity  Goal: Skin integrity is maintained or improved  Description  INTERVENTIONS:  - Identify patients at risk for skin breakdown  - Assess and monitor skin integrity  - Assess and monitor nutrition and hydration status  - Monitor labs (i e  albumin)  - Assess for incontinence   - Turn and reposition patient  - Assist with mobility/ambulation  - Relieve pressure over bony prominences  - Avoid friction and shearing  - Provide appropriate hygiene as needed including keeping skin clean and dry  - Evaluate need for skin moisturizer/barrier cream  - Collaborate with interdisciplinary team (i e  Nutrition, Rehabilitation, etc )   - Patient/family teaching   Outcome: Progressing     Problem: Nutrition/Hydration-ADULT  Goal: Nutrient/Hydration intake appropriate for improving, restoring or maintaining nutritional needs  Description  Monitor and assess patient's nutrition/hydration status for malnutrition (ex- brittle hair, bruises, dry skin, pale skin and conjunctiva, muscle wasting, smooth red tongue, and disorientation)  Collaborate with interdisciplinary team and initiate plan and interventions as ordered  Monitor patient's weight and dietary intake as ordered or per policy  Utilize nutrition screening tool and intervene per policy   Determine patient's food preferences and provide high-protein, high-caloric foods as appropriate       INTERVENTIONS:  - Monitor oral intake, urinary output, labs, and treatment plans  - Assess nutrition and hydration status and recommend course of action  - Evaluate amount of meals eaten  - Assist patient with eating if necessary   - Allow adequate time for meals  - Recommend/ encourage appropriate diets, oral nutritional supplements, and vitamin/mineral supplements  - Order, calculate, and assess calorie counts as needed  - Recommend, monitor, and adjust tube feedings and TPN/PPN based on assessed needs  - Assess need for intravenous fluids  - Provide specific nutrition/hydration education as appropriate  - Include patient/family/caregiver in decisions related to nutrition   Outcome: Progressing     Problem: RESPIRATORY - ADULT  Goal: Achieves optimal ventilation and oxygenation  Description  INTERVENTIONS:  - Assess for changes in respiratory status  - Assess for changes in mentation and behavior  - Position to facilitate oxygenation and minimize respiratory effort  - Oxygen administration by appropriate delivery method based on oxygen saturation (per order) or ABGs  - Initiate smoking cessation education as indicated  - Encourage broncho-pulmonary hygiene including cough, deep breathe, Incentive Spirometry  - Assess the need for suctioning and aspirate as needed  - Assess and instruct to report SOB or any respiratory difficulty  - Respiratory Therapy support as indicated   Outcome: Progressing     Problem: METABOLIC, FLUID AND ELECTROLYTES - ADULT  Goal: Electrolytes maintained within normal limits  Description  INTERVENTIONS:  - Monitor labs and assess patient for signs and symptoms of electrolyte imbalances  - Administer electrolyte replacement as ordered  - Monitor response to electrolyte replacements, including repeat lab results as appropriate  - Instruct patient on fluid and nutrition as appropriate   Outcome: Progressing  Goal: Fluid balance maintained  Description  INTERVENTIONS:  - Monitor labs and assess for signs and symptoms of volume excess or deficit  - Monitor I/O and WT  - Instruct patient on fluid and nutrition as appropriate   Outcome: Progressing  Goal: Glucose maintained within target range  Description  INTERVENTIONS:  - Monitor Blood Glucose as ordered  - Assess for signs and symptoms of hyperglycemia and hypoglycemia  - Administer ordered medications to maintain glucose within target range  - Assess nutritional intake and initiate nutrition service referral as needed   Outcome: Progressing     Problem: SKIN/TISSUE INTEGRITY - ADULT  Goal: Skin integrity remains intact  Description  INTERVENTIONS  - Identify patients at risk for skin breakdown  - Assess and monitor skin integrity  - Assess and monitor nutrition and hydration status  - Monitor labs (i e  albumin)  - Assess for incontinence   - Turn and reposition patient  - Assist with mobility/ambulation  - Relieve pressure over bony prominences  - Avoid friction and shearing  - Provide appropriate hygiene as needed including keeping skin clean and dry  - Evaluate need for skin moisturizer/barrier cream  - Collaborate with interdisciplinary team (i e  Nutrition, Rehabilitation, etc )   - Patient/family teaching   Outcome: Progressing  Goal: Incision(s), wounds(s) or drain site(s) healing without S/S of infection  Description  INTERVENTIONS  - Assess and document risk factors for skin impairment   - Assess and document dressing, incision, wound bed, drain sites and surrounding tissue  - Initiate Nutrition services consult and/or wound management as needed   Outcome: Progressing  Goal: Oral mucous membranes remain intact  Description  INTERVENTIONS  - Assess oral mucosa and hygiene practices  - Implement preventative oral hygiene regimen  - Implement oral medicated treatments as ordered  - Initiate Nutrition services referral as needed   Outcome: Progressing     Problem: HEMATOLOGIC - ADULT  Goal: Maintains hematologic stability  Description  INTERVENTIONS  - Assess for signs and symptoms of bleeding or hemorrhage  - Monitor labs  - Administer supportive blood products/factors as ordered and appropriate   Outcome: Progressing     Problem: MUSCULOSKELETAL - ADULT  Goal: Maintain or return mobility to safest level of function  Description  INTERVENTIONS:  - Assess patient's ability to carry out ADLs; assess patient's baseline for ADL function and identify physical deficits which impact ability to perform ADLs (bathing, care of mouth/teeth, toileting, grooming, dressing, etc )  - Assess/evaluate cause of self-care deficits   - Assess range of motion  - Assess patient's mobility; develop plan if impaired  - Assess patient's need for assistive devices and provide as appropriate  - Encourage maximum independence but intervene and supervise when necessary  - Involve family in performance of ADLs  - Assess for home care needs following discharge   - Request OT consult to assist with ADL evaluation and planning for discharge  - Provide patient education as appropriate   Outcome: Progressing  Goal: Maintain proper alignment of affected body part  Description  INTERVENTIONS:  - Support, maintain and protect limb and body alignment  - Provide pt/fam with appropriate education   Outcome: Progressing     Problem: DISCHARGE PLANNING - CARE MANAGEMENT  Goal: Discharge to post-acute care or home with appropriate resources  Description  INTERVENTIONS:  - Conduct assessment to determine patient/family and health care team treatment goals, and need for post-acute services based on payer coverage, community resources, and patient preferences, and barriers to discharge  - Address psychosocial, clinical, and financial barriers to discharge as identified in assessment in conjunction with the patient/family and health care team  - Arrange appropriate level of post-acute services according to patient?s   needs and preference and payer coverage in collaboration with the physician and health care team  - Communicate with and update the patient/family, physician, and health care team regarding progress on the discharge plan  - Arrange appropriate transportation to post-acute venues  Outcome: Progressing

## 2020-04-21 NOTE — PROGRESS NOTES
Records received, waiting for images to be pushed     [facility] Internal Medicine Progress Note  Patient: James Millan 74 y o  male   MRN: [id number]  PCP: Karan Trevino MD  Unit/Bed#: [facility] 926-01 Encounter: [id number]  Date Of Visit: 18    Assessment/plan:  1  Left shoulder and upper arm pain - s/p washout on 5/3/18  Discussed with orthopedics - unclear if infectious etiology  F/u culture results from washout  Cefazolin being continued pending cultures  2  Type 2 diabetes with hyperglycemia - dietary noncompliant yesterday afternoon and took small candy bar this afternoon as concerned when blood sugars dropped to 155 this afternoon from previously high values  Add Lantus 8 hs, decrease Humalog to 6 TID, ct SSI  3  COPD - no exacerbation  On Anoro at home - was taking it himself  Now profiled  4  H/o CVA - ct Eliquis  5  Afib - ct Eliquis, Amiodarone    VTE Pharmacologic Prophylaxis:   Pharmacologic: Apixaban (Eliquis)  Mechanical: Mechanical VTE prophylaxis in place  Discussions with Specialists or Other Care Team Provider: Discussed with orthopedics    Time Spent for Care: 20 minutes  More than 50% of total time spent on counseling and coordination of care as described above  Current Length of Stay: 3 day(s)    Current Patient Status: Inpatient     Code Status: Level 1 - Full Code    Subjective:   Left shoulder pain controlled  Objective:     Vitals:   Temp (24hrs), Av 8 °F (36 6 °C), Min:97 5 °F (36 4 °C), Max:98 2 °F (36 8 °C)    HR:  [58-70] 70  Resp:  [17-18] 18  BP: (104-122)/(56-74) 122/62  SpO2:  [94 %-97 %] 97 %  Body mass index is 31 57 kg/m²  Physical Exam:     Physical Exam   Constitutional: He is oriented to person, place, and time  HENT:   Head: Atraumatic  Eyes: EOM are normal    Neck: Neck supple  No JVD present  No tracheal deviation present  No thyromegaly present  Cardiovascular: Normal rate and normal heart sounds  Pulmonary/Chest: Effort normal and breath sounds normal  No respiratory distress   He has no wheezes  He has no rales  Abdominal: Soft  Bowel sounds are normal  He exhibits no distension  There is no tenderness  There is no rebound  Musculoskeletal: He exhibits no edema  Neurological: He is alert and oriented to person, place, and time  Skin:   Left shoulder - dressing dry   Psychiatric: He has a normal mood and affect  Additional Data:     Labs:      Results from last 7 days  Lab Units 05/03/18  0510   WBC Thousand/uL 6 64   HEMOGLOBIN g/dL 14 7   HEMATOCRIT % 44 0   PLATELETS Thousands/uL 170   NEUTROS PCT % 77*   LYMPHS PCT % 9*   MONOS PCT % 13*   EOS PCT % 1       Results from last 7 days  Lab Units 05/03/18  0510   SODIUM mmol/L 136   POTASSIUM mmol/L 3 9   CHLORIDE mmol/L 99*   CO2 mmol/L 30   BUN mg/dL 17   CREATININE mg/dL 0 92   CALCIUM mg/dL 8 8   GLUCOSE RANDOM mg/dL 237*       Results from last 7 days  Lab Units 05/03/18  0510   INR  1 51*       * I Have Reviewed All Lab Data Listed Above  * Additional Pertinent Lab Tests Reviewed:  Kiersten  Admission Reviewed      Last 24 Hours Medication List:     Current Facility-Administered Medications:  acetaminophen 650 mg Oral Q6H PRN [facility]    albuterol 2 puff Inhalation [facility]6H [facility]N [facility]    albuterol 2 5 mg Nebulization Q4H PRN [facility]    allopurinol 100 mg Oral Daily [facility]    amiodarone 200 mg Oral Daily [facility]    apixaban 5 mg Oral BID [facility]    atorvastatin 80 mg Oral Daily With [facility]    cefazolin 2,000 mg Intravenous Q8H [facility] Last Rate: 2,000 mg (05/04/18 1904)   dextromethorphan-guaiFENesin 10 mL Oral Q4H PRN Jax Gann PA-C    diazepam 5 mg Oral Q6H PRN Keaton Yuhi    diphenhydrAMINE 25 mg Oral Daily [facility]    gabapentin 300 mg Oral HS [facility]    hydrOXYzine HCL 25 mg Oral BID PRN [facility]    insulin lispro 1-6 Units Subcutaneous TID AC Cecilia Damian PA-C    insulin lispro 1-6 Units Subcutaneous HS Cecilia Damian PA-C    insulin lispro 6 Units Subcutaneous TID With Meals Devante Horton MD    [facility]rost 1 drop Both Eyes [facility]    LORazepam 2 mg Oral Q8H PRN Bennie Danielson MD    morphine injection 2 mg Intravenous Q2H PRN Shen Fake    oxyCODONE 10 mg Oral Q4H PRN Shen Fake    oxyCODONE 5 mg Oral Q4H [facility]N [facility]    pantoprazole 40 mg Oral Early Morning Maddie Medina PA-C    rOPINIRole 3 mg Oral HS [facility]    sertraline 25 mg Oral HS [facility]    traMADol 50 mg Oral Q6H PRN Shen Fake    Umeclidinium-Vilanterol  Inhalation Daily Vale Nichols PA-C         Today, Patient Was Seen By: Devante Horton MD    ** Please Note: Dragon 360 Dictation voice to text software may have been used in the creation of this document   **

## 2023-05-26 NOTE — CASE MANAGEMENT
Initial Clinical Review    Admission: Date/Time/Statement: 5/1 @ 1925    Orders Placed This Encounter   Procedures    Inpatient Admission     Standing Status:   Standing     Number of Occurrences:   1     Order Specific Question:   Admitting Physician     Answer:   Carmen Castillo [[id number]]     Order Specific Question:   Level of Care     Answer:   Med Surg [[id number]]     Order Specific Question:   Estimated length of stay     Answer:   More than 2 Midnights     Order Specific Question:   Certification     Answer:   I certify that inpatient services are medically necessary for this patient for a duration of greater than two midnights  See H&P and MD Progress Notes for additional information about the patient's course of treatment  ED: Date/Time/Mode of Arrival:   ED Arrival Information     Expected Arrival Acuity Means of Arrival Escorted By Service Admission Type    5/1/2018 16:05 5/1/2018 20:14 Urgent Walk-In Self Orthopedic Surgery Urgent    Arrival Complaint    SHOULDER INFECTION          Chief Complaint:   Chief Complaint   Patient presents with    Shoulder Pain     pt was seen at PCP today and told to come to ED to have shoulder drained and ABX tx       History of Illness: 74 y  o male complaining of left shoulder pain that started 1 or 2 weeks ago without any acute inciting injury  Pain is located over the anterior upper arm region  Pain is not associated with numbness or tingling  He does have pain with movement and is relieved with rest   Patient is right-hand dominant  He does have a history of left rotator cuff tear is a however, however the pain is new   PCP aspirated fluid from the lateral shoulder and it is sent for fluid analysis and cultures      ED Vital Signs:   ED Triage Vitals [05/01/18 2020]   Temperature Pulse Respirations Blood Pressure SpO2   98 9 °F (37 2 °C) 66 20 130/69 97 %      Temp Source Heart Rate Source Patient Position - Orthostatic VS BP Location FiO2 (%)   Tympanic Monitor Sitting Left arm --      Pain Score       8          Wt Readings from Last 1 Encounters:   05/01/18 99 8 kg (220 lb)       Vital Signs (abnormal): WNL    Abnormal Labs:    05/01/18 2103    CRP <3 0 mg/L >90 0        05/01/18 2103    Sodium 136 - 145 mmol/L 135       Diagnostic Test Results: Xray Left Shoulder - No acute osseous abnormality      CT Chest/ Abd/ Pelvis - pending    ED Treatment:   Medication Administration from 05/01/2018 1604 to 05/01/2018 2301       Date/Time Order Dose Route Action     05/01/2018 2216 apixaban (ELIQUIS) tablet 5 mg 5 mg Oral Given     05/01/2018 2216 atorvastatin (LIPITOR) tablet 80 mg 80 mg Oral Given     05/01/2018 2216 gabapentin (NEURONTIN) capsule 300 mg 300 mg Oral Given     05/01/2018 2218 rOPINIRole (REQUIP) tablet 3 mg 3 mg Oral Given     05/01/2018 2217 sertraline (ZOLOFT) tablet 25 mg 25 mg Oral Given     05/01/2018 2215 latanoprost (XALATAN) 0 005 % ophthalmic solution 1 drop 1 drop Both Eyes Given          Past Medical/Surgical History:    Active Ambulatory Problems     Diagnosis Date Noted    Esophageal cancer ([facility] ) 08/04/2016    Hepatocellular carcinoma ([facility] ) 10/31/2016    Atrial fibrillation ([facility] ) 11/01/2016    Hypotension 11/03/2016    Type 2 diabetes mellitus ([facility] ) 11/03/2016    Vision abnormalities     Dysphagia 12/12/2016    Radiation esophagitis 08/02/2016    Restless legs syndrome 06/15/2016    Rheumatoid arthritis ([facility] ) 06/13/2016    Sleep apnea 02/06/2018    Hyperlipidemia 06/15/2016    Hypertension 06/13/2016    Peripheral neuropathy 07/28/2017    Epidermoid cyst of neck 01/29/2014    Glaucoma 04/04/2018    Gout 06/15/2016    Incisional hernia 12/10/2013    Cough 04/11/2018    Stage 3 severe COPD by GOLD classification ([facility] ) 04/11/2018    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    Left shoulder pain 05/02/2018     Resolved Ambulatory Problems     Diagnosis Date Noted    Empyema ([facility] ) 03/25/2017     Past Medical History:   Diagnosis Date    Anemia     Arthritis     Coronary artery disease     Diabetes mellitus ([facility] )     Dysphagia     Esophageal cancer (HCC)     Gout     Hyperlipidemia     Hypertension     Restless leg syndrome     Rheumatoid arthritis (HCC)     Sleep apnea     Vision abnormalities        Admitting Diagnosis: Malignant neoplasm of lower third of esophagus (HCC) [C15 5]  Secondary hypertension [I15 9]  Hypokalemia [E87 6]  Hypomagnesemia [E83 42]  Hepatocellular carcinoma (HCC) [C22 0]  Shoulder pain [M25 519]  Acute cardioembolic stroke (HCC) [S26 1]  Atrial fibrillation, unspecified type ([place] [address] ) [I48 91]  Hyperlipidemia, unspecified hyperlipidemia type [E78 5]  Stage 3 severe COPD by GOLD classification ([place] [address] ) [J44 9]  Type 2 diabetes mellitus with diabetic chronic kidney disease, unspecified CKD stage, unspecified whether long term insulin use ([place] [address] ) [E11 22]    Age/Sex: 74 y o  male      Assessment:  74 y  o male with  left shoulder and upper arm pain, likely exacerbation of rotator cuff tear  Underwent aspiration by PCP   Low concern for septic arthritis      Plan:   · Non weight bearing left upper extremity in sling for comfort  · Analgesia PRN  · Follow up aspiration cultures and gram stain  · NPO at midnight  · Ancef 2g q8  · Dispo: Ortho will follow      Admission Orders:  NPO  US guided abscess drain  Body fluid culture and Gram stain  Internal Medicine cons  Oncology cons    Scheduled Meds:   Current Facility-Administered Medications:  allopurinol 100 mg Oral Daily   amiodarone 200 mg Oral Daily   apixaban 5 mg Oral BID   atorvastatin 80 mg Oral Daily With Dinner   cefazolin 2,000 mg Intravenous Q8H   diphenhydrAMINE 25 mg Oral Daily   gabapentin 300 mg Oral HS   insulin lispro 1-6 Units Subcutaneous TID With Meals   latanoprost 1 drop Both Eyes HS   LORazepam 1 mg Oral Q8H PRN   pantoprazole 40 mg Oral Early Morning   rOPINIRole 3 mg Oral HS   sertraline 25 mg Oral HS     Continuous Infusions:     lactated ringers 75 mL/hr     PRN Meds:     acetaminophen    albuterol    dextromethorphan-guaiFENesin    diazepam    hydrOXYzine HCL    LORazepam    morphine injection    oxyCODONE    traMADol    ------------------------------------------------------------------------    5/2 Internal Medicine cons:   * Left shoulder pain   Assessment & Plan     -Mgmt per ortho  -NPO for possible washout tomorrow pending fluid analysis/cultures          Type 2 diabetes mellitus (HCC)   Assessment & Plan     -On novolog insulin at home prn with daily FS  -Last A1c 7/2017 8 0, will recheck  -ISS with q6 FS while NPO, change to sliding scale mealtime insulin with AC FS when no longer NPO             Stage 3 severe COPD by GOLD classification ([place] [address] )   Assessment & Plan     -Current home meds include anoro ellipta, proventil, albuterol nebs, robitussin AC for cough  -Continue home meds  -Follows with Dr Whit Gar as OP          Atrial fibrillation [facility])   Assessment & Plan     -Follows with cardiologist Dr Silvia Ha at [place] as OP  -Controlled, continue amiodarone and Eliquis           Sleep apnea   Assessment & Plan     -CPAP qHS          [facility]   Assessment & Plan     -s/p resection and recurrence treated with SIR spheres embolization in 2017, now with possible recurrence   -Oncology following           Esophageal cancer ([facility] )   Assessment & Plan     -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  -Follows with Dr Kenzie Vidal as OP  -c/b recurrent strictures w/ dysphagia, last dilation 2/2018, follows with thoracic surgery           Anxiety and depression   Assessment & Plan     -Continue home meds Valium 5 mg q6 prn and ativan 1 mg q8 prn, Zoloft 25 mg qHS             VTE Prophylaxis: Eliquis  / Foot pumps     SURGERY DATE: 5/3/2018     Surgeon(s) and Role:     * Esa Goncalves MD - Primary     * Carolyne Gonzalez PA-C - Assisting     * Moihni Gordon MD - Assisting     Preop Diagnosis:  Acute pain of left shoulder [M25 512]  Subdeltoid fluid collection      Post-Op Diagnosis Codes:     * Acute pain of left shoulder [M25 512]  Subdeltoid fluid collection     Procedure(s) (LRB):  DEBRIDEMENT UPPER EXTREMITY ([address]) left shoulder (Left)    Oncology consult--1, locally advanced esophageal cancer:  Diagnosed in 2015,  Status post concurrent chemo radiation followed by surgery/tumor resection  Clinically in remission     2, hepatocellular carcinoma :  Diagnosed in 2015 Status post Sir sphere, recently found a new perihepatic lymph node enlargement, suspecting for tumor relapse, patient id under evaluation of Radiation Oncology  Final plan to be made     5/3 progress note:  Acute pain of left shoulder   Assessment & Plan     · Management per primary service, ortho  · XR from 5/1/18 showed no acute osseous abnormality  · Patient underwent ultrasound-guided left subdeltoid fluid collection aspiration which was successful per radiology report  ? No bacteria seen on culture or gram stain however >200,000 WBCs  · WBAT and sling per ortho  · Patient underwent debridement/washout of the left shoulder with ortho today, 5/3/18  · Pain control per primary service          Type 2 diabetes mellitus (HCC)   Assessment & Plan     · HbA1c 8 9  · Continue sliding scale insulin - will add bedtime coverage  · Change diet to diabetic              Stage 3 severe COPD by GOLD classification ([facility])   Assessment & Plan     · Current home meds include anoro ellipta, proventil, albuterol nebs, robitussin AC for cough   Continue home meds  · Follows with Dr Windy Browne as OP          History of stroke   Assessment & Plan     · Patient known to Dr Singh Due  · On Eliquis          Atrial fibrillation [facility])   Assessment & Plan     · Follows with cardiologist Dr Ruslan Ho at [place] as OP  · Controlled, on amiodarone and Eliquis           Esophageal cancer ([facility] )   Assessment & Plan     · 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

## (undated) DEVICE — REM POLYHESIVE ADULT PATIENT RETURN ELECTRODE: Brand: VALLEYLAB

## (undated) DEVICE — 3000CC GUARDIAN II: Brand: GUARDIAN

## (undated) DEVICE — SILVER-COATED ANTIMICROBIAL BARRIER DRESSING: Brand: ACTICOAT   4" X 8"

## (undated) DEVICE — JP PERF DRN SIL FLT 7MM FULL: Brand: CARDINAL HEALTH

## (undated) DEVICE — Device: Brand: DEFENDO AIR/WATER/SUCTION AND BIOPSY VALVE

## (undated) DEVICE — TUBING SUCTION 5MM X 12 FT

## (undated) DEVICE — ARM SLING: Brand: DEROYAL

## (undated) DEVICE — JACKSON-PRATT 100CC BULB RESERVOIR: Brand: CARDINAL HEALTH

## (undated) DEVICE — GLOVE INDICATOR PI UNDERGLOVE SZ 8.5 BLUE

## (undated) DEVICE — TRAVELKIT CONTAINS FIRST STEP KIT (200ML EP-4 KIT) AND SOILED SCOPE BAG - 1 KIT: Brand: TRAVELKIT CONTAINS FIRST STEP KIT AND SOILED SCOPE BAG

## (undated) DEVICE — QUANTUM TTC ESOPHAGEAL BALLOON DILATOR: Brand: QUANTUM TTC

## (undated) DEVICE — U-DRAPE: Brand: CONVERTORS

## (undated) DEVICE — GAUZE SPONGES,16 PLY: Brand: CURITY

## (undated) DEVICE — CHLORAPREP HI-LITE 26ML ORANGE

## (undated) DEVICE — HEAVY DUTY TABLE COVER: Brand: CONVERTORS

## (undated) DEVICE — 2000CC GUARDIAN II: Brand: GUARDIAN

## (undated) DEVICE — SUT VICRYL 1 CTB-1 36 IN JB947

## (undated) DEVICE — Device: Brand: ENDO SMARTCAP

## (undated) DEVICE — SUT VICRYL 2-0 CTB-1 36 IN JB945

## (undated) DEVICE — DRESSING MEPILEX AG BORDER 4 X 10 IN

## (undated) DEVICE — UTILITY MARKER,BLACK WITH LABELS: Brand: DEVON

## (undated) DEVICE — GLOVE SRG BIOGEL 8

## (undated) DEVICE — UNIVERSAL MAJOR EXTREMITY,KIT: Brand: CARDINAL HEALTH

## (undated) DEVICE — 1200CC GUARDIAN II: Brand: GUARDIAN

## (undated) DEVICE — PADDING CAST 4 IN  COTTON STRL

## (undated) DEVICE — SINGLE-USE SYRINGE/GAUGE ASSEMBLY: Brand: ALLIANCE II

## (undated) DEVICE — 60 ML SYRINGE,REGULAR TIP: Brand: MONOJECT

## (undated) DEVICE — INTENDED FOR TISSUE SEPARATION, AND OTHER PROCEDURES THAT REQUIRE A SHARP SURGICAL BLADE TO PUNCTURE OR CUT.: Brand: BARD-PARKER SAFETY BLADES SIZE 10, STERILE

## (undated) DEVICE — AIR AND WATER TUBING/CAP SET FOR OLYMPUS® SCOPES: Brand: ERBE